# Patient Record
Sex: MALE | HISPANIC OR LATINO | Employment: OTHER | URBAN - METROPOLITAN AREA
[De-identification: names, ages, dates, MRNs, and addresses within clinical notes are randomized per-mention and may not be internally consistent; named-entity substitution may affect disease eponyms.]

---

## 2018-02-27 RX ORDER — ATORVASTATIN CALCIUM 20 MG/1
TABLET, FILM COATED ORAL
Qty: 90 TAB | Refills: 3 | Status: SHIPPED | OUTPATIENT
Start: 2018-02-27 | End: 2020-06-09

## 2018-03-06 RX ORDER — METOCLOPRAMIDE 5 MG/1
TABLET ORAL
Qty: 90 TAB | Refills: 2 | Status: SHIPPED | OUTPATIENT
Start: 2018-03-06 | End: 2018-05-10 | Stop reason: SDUPTHER

## 2018-05-10 RX ORDER — METOCLOPRAMIDE 5 MG/1
TABLET ORAL
Qty: 90 TAB | Refills: 2 | Status: ON HOLD | OUTPATIENT
Start: 2018-05-10 | End: 2018-11-29

## 2018-05-10 NOTE — TELEPHONE ENCOUNTER
Was the patient seen in the last year in this department? Yes     Does patient have an active prescription for medications requested? Yes     Received Request Via Pharmacy

## 2018-05-30 DIAGNOSIS — N18.6 CKD (CHRONIC KIDNEY DISEASE) REQUIRING CHRONIC DIALYSIS (HCC): ICD-10-CM

## 2018-05-30 DIAGNOSIS — Z99.2 CKD (CHRONIC KIDNEY DISEASE) REQUIRING CHRONIC DIALYSIS (HCC): ICD-10-CM

## 2018-05-30 DIAGNOSIS — E83.39 HYPERPHOSPHATEMIA: ICD-10-CM

## 2018-05-30 RX ORDER — SEVELAMER CARBONATE 800 MG/1
800 TABLET, FILM COATED ORAL
Status: DISCONTINUED | OUTPATIENT
Start: 2018-05-30 | End: 2018-11-29

## 2018-09-26 RX ORDER — PAROXETINE HYDROCHLORIDE 20 MG/1
TABLET, FILM COATED ORAL
Qty: 90 TAB | Refills: 3 | Status: SHIPPED | OUTPATIENT
Start: 2018-09-26 | End: 2018-11-21

## 2018-11-21 ENCOUNTER — APPOINTMENT (OUTPATIENT)
Dept: RADIOLOGY | Facility: MEDICAL CENTER | Age: 58
DRG: 091 | End: 2018-11-21
Attending: EMERGENCY MEDICINE
Payer: MEDICARE

## 2018-11-21 ENCOUNTER — HOSPITAL ENCOUNTER (INPATIENT)
Facility: MEDICAL CENTER | Age: 58
LOS: 3 days | DRG: 091 | End: 2018-11-25
Attending: EMERGENCY MEDICINE | Admitting: INTERNAL MEDICINE
Payer: MEDICARE

## 2018-11-21 DIAGNOSIS — R41.0 DELIRIUM: ICD-10-CM

## 2018-11-21 DIAGNOSIS — I15.9 SECONDARY HYPERTENSION: ICD-10-CM

## 2018-11-21 DIAGNOSIS — R73.9 HYPERGLYCEMIA: ICD-10-CM

## 2018-11-21 PROBLEM — N18.6 ESRD ON DIALYSIS (HCC): Status: ACTIVE | Noted: 2018-11-21

## 2018-11-21 PROBLEM — I67.4 HYPERTENSIVE ENCEPHALOPATHY: Status: ACTIVE | Noted: 2018-11-21

## 2018-11-21 PROBLEM — G92.8 TOXIC METABOLIC ENCEPHALOPATHY: Status: ACTIVE | Noted: 2018-11-21

## 2018-11-21 PROBLEM — Z99.2 ESRD ON DIALYSIS (HCC): Status: ACTIVE | Noted: 2018-11-21

## 2018-11-21 LAB
ALBUMIN SERPL BCP-MCNC: 3.9 G/DL (ref 3.2–4.9)
ALBUMIN/GLOB SERPL: 1.3 G/DL
ALP SERPL-CCNC: 85 U/L (ref 30–99)
ALT SERPL-CCNC: 8 U/L (ref 2–50)
ANION GAP SERPL CALC-SCNC: 7 MMOL/L (ref 0–11.9)
APPEARANCE UR: CLEAR
AST SERPL-CCNC: 7 U/L (ref 12–45)
BACTERIA #/AREA URNS HPF: NEGATIVE /HPF
BASOPHILS # BLD AUTO: 0.8 % (ref 0–1.8)
BASOPHILS # BLD: 0.04 K/UL (ref 0–0.12)
BILIRUB SERPL-MCNC: 0.3 MG/DL (ref 0.1–1.5)
BILIRUB UR QL STRIP.AUTO: NEGATIVE
BUN SERPL-MCNC: 30 MG/DL (ref 8–22)
CALCIUM SERPL-MCNC: 8.8 MG/DL (ref 8.5–10.5)
CHLORIDE SERPL-SCNC: 94 MMOL/L (ref 96–112)
CO2 SERPL-SCNC: 33 MMOL/L (ref 20–33)
COLOR UR: YELLOW
CREAT SERPL-MCNC: 3.98 MG/DL (ref 0.5–1.4)
EOSINOPHIL # BLD AUTO: 0.18 K/UL (ref 0–0.51)
EOSINOPHIL NFR BLD: 3.6 % (ref 0–6.9)
EPI CELLS #/AREA URNS HPF: ABNORMAL /HPF
ERYTHROCYTE [DISTWIDTH] IN BLOOD BY AUTOMATED COUNT: 48.1 FL (ref 35.9–50)
GLOBULIN SER CALC-MCNC: 2.9 G/DL (ref 1.9–3.5)
GLUCOSE BLD-MCNC: 207 MG/DL (ref 65–99)
GLUCOSE SERPL-MCNC: 343 MG/DL (ref 65–99)
GLUCOSE UR STRIP.AUTO-MCNC: 500 MG/DL
HCT VFR BLD AUTO: 31.3 % (ref 42–52)
HGB BLD-MCNC: 9.8 G/DL (ref 14–18)
HYALINE CASTS #/AREA URNS LPF: ABNORMAL /LPF
IMM GRANULOCYTES # BLD AUTO: 0.03 K/UL (ref 0–0.11)
IMM GRANULOCYTES NFR BLD AUTO: 0.6 % (ref 0–0.9)
KETONES UR STRIP.AUTO-MCNC: NEGATIVE MG/DL
LEUKOCYTE ESTERASE UR QL STRIP.AUTO: NEGATIVE
LYMPHOCYTES # BLD AUTO: 0.72 K/UL (ref 1–4.8)
LYMPHOCYTES NFR BLD: 14.5 % (ref 22–41)
MCH RBC QN AUTO: 30.2 PG (ref 27–33)
MCHC RBC AUTO-ENTMCNC: 31.3 G/DL (ref 33.7–35.3)
MCV RBC AUTO: 96.3 FL (ref 81.4–97.8)
MICRO URNS: ABNORMAL
MONOCYTES # BLD AUTO: 0.53 K/UL (ref 0–0.85)
MONOCYTES NFR BLD AUTO: 10.6 % (ref 0–13.4)
NEUTROPHILS # BLD AUTO: 3.48 K/UL (ref 1.82–7.42)
NEUTROPHILS NFR BLD: 69.9 % (ref 44–72)
NITRITE UR QL STRIP.AUTO: NEGATIVE
NRBC # BLD AUTO: 0 K/UL
NRBC BLD-RTO: 0 /100 WBC
PH UR STRIP.AUTO: 8.5 [PH]
PLATELET # BLD AUTO: 176 K/UL (ref 164–446)
PMV BLD AUTO: 10.3 FL (ref 9–12.9)
POTASSIUM SERPL-SCNC: 4.8 MMOL/L (ref 3.6–5.5)
PROT SERPL-MCNC: 6.8 G/DL (ref 6–8.2)
PROT UR QL STRIP: >=1000 MG/DL
RBC # BLD AUTO: 3.25 M/UL (ref 4.7–6.1)
RBC # URNS HPF: ABNORMAL /HPF
RBC UR QL AUTO: NEGATIVE
SODIUM SERPL-SCNC: 134 MMOL/L (ref 135–145)
SP GR UR STRIP.AUTO: 1.02
UROBILINOGEN UR STRIP.AUTO-MCNC: 0.2 MG/DL
WBC # BLD AUTO: 5 K/UL (ref 4.8–10.8)
WBC #/AREA URNS HPF: ABNORMAL /HPF

## 2018-11-21 PROCEDURE — G0378 HOSPITAL OBSERVATION PER HR: HCPCS

## 2018-11-21 PROCEDURE — 85025 COMPLETE CBC W/AUTO DIFF WBC: CPT

## 2018-11-21 PROCEDURE — 82962 GLUCOSE BLOOD TEST: CPT

## 2018-11-21 PROCEDURE — 80053 COMPREHEN METABOLIC PANEL: CPT

## 2018-11-21 PROCEDURE — 96372 THER/PROPH/DIAG INJ SC/IM: CPT

## 2018-11-21 PROCEDURE — 83036 HEMOGLOBIN GLYCOSYLATED A1C: CPT

## 2018-11-21 PROCEDURE — 700102 HCHG RX REV CODE 250 W/ 637 OVERRIDE(OP): Performed by: INTERNAL MEDICINE

## 2018-11-21 PROCEDURE — 70450 CT HEAD/BRAIN W/O DYE: CPT

## 2018-11-21 PROCEDURE — 99285 EMERGENCY DEPT VISIT HI MDM: CPT

## 2018-11-21 PROCEDURE — 99220 PR INITIAL OBSERVATION CARE,LEVL III: CPT | Performed by: INTERNAL MEDICINE

## 2018-11-21 PROCEDURE — 81001 URINALYSIS AUTO W/SCOPE: CPT

## 2018-11-21 PROCEDURE — 700111 HCHG RX REV CODE 636 W/ 250 OVERRIDE (IP): Performed by: EMERGENCY MEDICINE

## 2018-11-21 PROCEDURE — 96374 THER/PROPH/DIAG INJ IV PUSH: CPT

## 2018-11-21 PROCEDURE — 36415 COLL VENOUS BLD VENIPUNCTURE: CPT

## 2018-11-21 PROCEDURE — 304561 HCHG STAT O2

## 2018-11-21 PROCEDURE — A9270 NON-COVERED ITEM OR SERVICE: HCPCS | Performed by: INTERNAL MEDICINE

## 2018-11-21 RX ORDER — LISINOPRIL 20 MG/1
40 TABLET ORAL DAILY
COMMUNITY
End: 2019-04-03

## 2018-11-21 RX ORDER — DEXTROSE MONOHYDRATE 25 G/50ML
25 INJECTION, SOLUTION INTRAVENOUS
Status: DISCONTINUED | OUTPATIENT
Start: 2018-11-21 | End: 2018-11-25 | Stop reason: HOSPADM

## 2018-11-21 RX ORDER — PAROXETINE HYDROCHLORIDE 40 MG/1
40 TABLET, FILM COATED ORAL DAILY
COMMUNITY
End: 2019-11-14 | Stop reason: SDUPTHER

## 2018-11-21 RX ORDER — PROMETHAZINE HYDROCHLORIDE 25 MG/1
12.5-25 TABLET ORAL EVERY 4 HOURS PRN
Status: DISCONTINUED | OUTPATIENT
Start: 2018-11-21 | End: 2018-11-25 | Stop reason: HOSPADM

## 2018-11-21 RX ORDER — PREDNISOLONE ACETATE 10 MG/ML
1 SUSPENSION/ DROPS OPHTHALMIC 2 TIMES DAILY
COMMUNITY
End: 2019-04-03

## 2018-11-21 RX ORDER — ONDANSETRON 4 MG/1
4 TABLET, ORALLY DISINTEGRATING ORAL EVERY 4 HOURS PRN
Status: DISCONTINUED | OUTPATIENT
Start: 2018-11-21 | End: 2018-11-25 | Stop reason: HOSPADM

## 2018-11-21 RX ORDER — POLYETHYLENE GLYCOL 3350 17 G/17G
1 POWDER, FOR SOLUTION ORAL
Status: DISCONTINUED | OUTPATIENT
Start: 2018-11-21 | End: 2018-11-25 | Stop reason: HOSPADM

## 2018-11-21 RX ORDER — HEPARIN SODIUM 5000 [USP'U]/ML
5000 INJECTION, SOLUTION INTRAVENOUS; SUBCUTANEOUS EVERY 8 HOURS
Status: DISCONTINUED | OUTPATIENT
Start: 2018-11-22 | End: 2018-11-25 | Stop reason: HOSPADM

## 2018-11-21 RX ORDER — SEVELAMER CARBONATE 800 MG/1
800 TABLET, FILM COATED ORAL
Status: DISCONTINUED | OUTPATIENT
Start: 2018-11-21 | End: 2018-11-25 | Stop reason: HOSPADM

## 2018-11-21 RX ORDER — AMOXICILLIN 250 MG
2 CAPSULE ORAL 2 TIMES DAILY
Status: DISCONTINUED | OUTPATIENT
Start: 2018-11-21 | End: 2018-11-25 | Stop reason: HOSPADM

## 2018-11-21 RX ORDER — PREDNISOLONE ACETATE 10 MG/ML
1 SUSPENSION/ DROPS OPHTHALMIC 2 TIMES DAILY
Status: DISCONTINUED | OUTPATIENT
Start: 2018-11-21 | End: 2018-11-25 | Stop reason: HOSPADM

## 2018-11-21 RX ORDER — OMEPRAZOLE 20 MG/1
20 CAPSULE, DELAYED RELEASE ORAL
Status: DISCONTINUED | OUTPATIENT
Start: 2018-11-22 | End: 2018-11-25 | Stop reason: HOSPADM

## 2018-11-21 RX ORDER — ONDANSETRON 2 MG/ML
4 INJECTION INTRAMUSCULAR; INTRAVENOUS EVERY 4 HOURS PRN
Status: DISCONTINUED | OUTPATIENT
Start: 2018-11-21 | End: 2018-11-25 | Stop reason: HOSPADM

## 2018-11-21 RX ORDER — HYDRALAZINE HYDROCHLORIDE 25 MG/1
75 TABLET, FILM COATED ORAL EVERY 8 HOURS
Status: DISCONTINUED | OUTPATIENT
Start: 2018-11-21 | End: 2018-11-24

## 2018-11-21 RX ORDER — AMLODIPINE BESYLATE 10 MG/1
10 TABLET ORAL DAILY
Status: DISCONTINUED | OUTPATIENT
Start: 2018-11-22 | End: 2018-11-25 | Stop reason: HOSPADM

## 2018-11-21 RX ORDER — BISACODYL 10 MG
10 SUPPOSITORY, RECTAL RECTAL
Status: DISCONTINUED | OUTPATIENT
Start: 2018-11-21 | End: 2018-11-25 | Stop reason: HOSPADM

## 2018-11-21 RX ORDER — DORZOLAMIDE HYDROCHLORIDE AND TIMOLOL MALEATE 20; 5 MG/ML; MG/ML
1 SOLUTION/ DROPS OPHTHALMIC 2 TIMES DAILY
Status: DISCONTINUED | OUTPATIENT
Start: 2018-11-21 | End: 2018-11-25 | Stop reason: HOSPADM

## 2018-11-21 RX ORDER — PAROXETINE HYDROCHLORIDE 20 MG/1
40 TABLET, FILM COATED ORAL DAILY
Status: DISCONTINUED | OUTPATIENT
Start: 2018-11-22 | End: 2018-11-25 | Stop reason: HOSPADM

## 2018-11-21 RX ORDER — SEVELAMER CARBONATE 800 MG/1
TABLET, FILM COATED ORAL
Status: DISCONTINUED | OUTPATIENT
Start: 2018-11-21 | End: 2018-11-21

## 2018-11-21 RX ORDER — LISINOPRIL 20 MG/1
40 TABLET ORAL DAILY
Status: DISCONTINUED | OUTPATIENT
Start: 2018-11-22 | End: 2018-11-25 | Stop reason: HOSPADM

## 2018-11-21 RX ORDER — ASCORBIC ACID 500 MG
500 TABLET ORAL 2 TIMES DAILY
Status: DISCONTINUED | OUTPATIENT
Start: 2018-11-21 | End: 2018-11-25 | Stop reason: HOSPADM

## 2018-11-21 RX ORDER — PROMETHAZINE HYDROCHLORIDE 12.5 MG/1
12.5-25 TABLET ORAL EVERY 4 HOURS PRN
Status: DISCONTINUED | OUTPATIENT
Start: 2018-11-21 | End: 2018-11-21

## 2018-11-21 RX ORDER — METOPROLOL TARTRATE 50 MG/1
100 TABLET, FILM COATED ORAL 2 TIMES DAILY
Status: DISCONTINUED | OUTPATIENT
Start: 2018-11-21 | End: 2018-11-25 | Stop reason: HOSPADM

## 2018-11-21 RX ORDER — HYDRALAZINE HYDROCHLORIDE 20 MG/ML
10 INJECTION INTRAMUSCULAR; INTRAVENOUS ONCE
Status: COMPLETED | OUTPATIENT
Start: 2018-11-21 | End: 2018-11-21

## 2018-11-21 RX ORDER — SEVELAMER CARBONATE 800 MG/1
1600 TABLET, FILM COATED ORAL
Status: DISCONTINUED | OUTPATIENT
Start: 2018-11-21 | End: 2018-11-25 | Stop reason: HOSPADM

## 2018-11-21 RX ORDER — ATORVASTATIN CALCIUM 20 MG/1
20 TABLET, FILM COATED ORAL DAILY
Status: DISCONTINUED | OUTPATIENT
Start: 2018-11-22 | End: 2018-11-25 | Stop reason: HOSPADM

## 2018-11-21 RX ORDER — PROMETHAZINE HYDROCHLORIDE 25 MG/1
12.5-25 SUPPOSITORY RECTAL EVERY 4 HOURS PRN
Status: DISCONTINUED | OUTPATIENT
Start: 2018-11-21 | End: 2018-11-25 | Stop reason: HOSPADM

## 2018-11-21 RX ADMIN — INSULIN HUMAN 2 UNITS: 100 INJECTION, SOLUTION PARENTERAL at 23:31

## 2018-11-21 RX ADMIN — HYDRALAZINE HYDROCHLORIDE 10 MG: 20 INJECTION INTRAMUSCULAR; INTRAVENOUS at 17:21

## 2018-11-21 RX ADMIN — OXYCODONE HYDROCHLORIDE AND ACETAMINOPHEN 500 MG: 500 TABLET ORAL at 22:31

## 2018-11-21 RX ADMIN — INSULIN HUMAN 15 UNITS: 100 INJECTION, SUSPENSION SUBCUTANEOUS at 23:31

## 2018-11-21 RX ADMIN — HYDRALAZINE HYDROCHLORIDE 75 MG: 25 TABLET, FILM COATED ORAL at 22:32

## 2018-11-21 RX ADMIN — METOPROLOL TARTRATE 100 MG: 50 TABLET ORAL at 22:31

## 2018-11-21 RX ADMIN — STANDARDIZED SENNA CONCENTRATE AND DOCUSATE SODIUM 2 TABLET: 8.6; 5 TABLET, FILM COATED ORAL at 22:32

## 2018-11-21 ASSESSMENT — COGNITIVE AND FUNCTIONAL STATUS - GENERAL
MOVING FROM LYING ON BACK TO SITTING ON SIDE OF FLAT BED: A LITTLE
SUGGESTED CMS G CODE MODIFIER DAILY ACTIVITY: CK
DRESSING REGULAR UPPER BODY CLOTHING: A LITTLE
EATING MEALS: A LITTLE
DRESSING REGULAR LOWER BODY CLOTHING: A LITTLE
TOILETING: A LITTLE
WALKING IN HOSPITAL ROOM: A LITTLE
MOVING TO AND FROM BED TO CHAIR: A LITTLE
HELP NEEDED FOR BATHING: A LITTLE
DAILY ACTIVITIY SCORE: 18
SUGGESTED CMS G CODE MODIFIER MOBILITY: CK
TURNING FROM BACK TO SIDE WHILE IN FLAT BAD: A LITTLE
PERSONAL GROOMING: A LITTLE
MOBILITY SCORE: 18
CLIMB 3 TO 5 STEPS WITH RAILING: A LITTLE
STANDING UP FROM CHAIR USING ARMS: A LITTLE

## 2018-11-21 ASSESSMENT — PATIENT HEALTH QUESTIONNAIRE - PHQ9
1. LITTLE INTEREST OR PLEASURE IN DOING THINGS: NOT AT ALL
2. FEELING DOWN, DEPRESSED, IRRITABLE, OR HOPELESS: NOT AT ALL
SUM OF ALL RESPONSES TO PHQ9 QUESTIONS 1 AND 2: 0

## 2018-11-21 ASSESSMENT — PAIN SCALES - GENERAL: PAINLEVEL_OUTOF10: 0

## 2018-11-21 ASSESSMENT — LIFESTYLE VARIABLES
ALCOHOL_USE: NO
EVER_SMOKED: NEVER

## 2018-11-21 NOTE — ED NOTES
Pt w/c to [facility]. Pt changed into gown and placed on monitor.  Agree with triage note.   Chart up and ready for ERP now.

## 2018-11-21 NOTE — ED TRIAGE NOTES
".  Chief Complaint   Patient presents with   • Sent by MD     from Dialysis   • Hypertension     165/75 in triage   • ALOC     per wife he is \"completely different\" today, symptoms started during dialysis, patient lethargic in triage     .BP (!) 165/75   Pulse 69   Temp 36.9 °C (98.4 °F) (Temporal)   Resp 14   Ht 1.702 m (5' 7\")   Wt 70.8 kg (156 lb)   SpO2 95%   BMI 24.43 kg/m²     Patient to triage with above complaints, completed dialysis today, educated on triage process, placed in lobby, told to inform staff of any changes in condition.   "

## 2018-11-22 ENCOUNTER — APPOINTMENT (OUTPATIENT)
Dept: CARDIOLOGY | Facility: MEDICAL CENTER | Age: 58
DRG: 091 | End: 2018-11-22
Attending: INTERNAL MEDICINE
Payer: MEDICARE

## 2018-11-22 ENCOUNTER — APPOINTMENT (OUTPATIENT)
Dept: RADIOLOGY | Facility: MEDICAL CENTER | Age: 58
DRG: 091 | End: 2018-11-22
Attending: INTERNAL MEDICINE
Payer: MEDICARE

## 2018-11-22 PROBLEM — I16.1 HYPERTENSIVE EMERGENCY: Status: ACTIVE | Noted: 2018-11-21

## 2018-11-22 PROBLEM — J96.11 CHRONIC RESPIRATORY FAILURE WITH HYPOXIA (HCC): Status: ACTIVE | Noted: 2018-11-22

## 2018-11-22 LAB
ALBUMIN SERPL BCP-MCNC: 3.6 G/DL (ref 3.2–4.9)
ALBUMIN/GLOB SERPL: 1.4 G/DL
ALP SERPL-CCNC: 70 U/L (ref 30–99)
ALT SERPL-CCNC: 9 U/L (ref 2–50)
ANION GAP SERPL CALC-SCNC: 11 MMOL/L (ref 0–11.9)
AST SERPL-CCNC: 9 U/L (ref 12–45)
BILIRUB SERPL-MCNC: 0.4 MG/DL (ref 0.1–1.5)
BUN SERPL-MCNC: 45 MG/DL (ref 8–22)
CALCIUM SERPL-MCNC: 8.8 MG/DL (ref 8.5–10.5)
CHLORIDE SERPL-SCNC: 99 MMOL/L (ref 96–112)
CO2 SERPL-SCNC: 29 MMOL/L (ref 20–33)
CREAT SERPL-MCNC: 5.26 MG/DL (ref 0.5–1.4)
ERYTHROCYTE [DISTWIDTH] IN BLOOD BY AUTOMATED COUNT: 48 FL (ref 35.9–50)
EST. AVERAGE GLUCOSE BLD GHB EST-MCNC: 169 MG/DL
GLOBULIN SER CALC-MCNC: 2.5 G/DL (ref 1.9–3.5)
GLUCOSE BLD-MCNC: 108 MG/DL (ref 65–99)
GLUCOSE BLD-MCNC: 113 MG/DL (ref 65–99)
GLUCOSE BLD-MCNC: 114 MG/DL (ref 65–99)
GLUCOSE BLD-MCNC: 114 MG/DL (ref 65–99)
GLUCOSE BLD-MCNC: 143 MG/DL (ref 65–99)
GLUCOSE BLD-MCNC: 151 MG/DL (ref 65–99)
GLUCOSE BLD-MCNC: 157 MG/DL (ref 65–99)
GLUCOSE BLD-MCNC: 217 MG/DL (ref 65–99)
GLUCOSE BLD-MCNC: 34 MG/DL (ref 65–99)
GLUCOSE BLD-MCNC: 56 MG/DL (ref 65–99)
GLUCOSE BLD-MCNC: 58 MG/DL (ref 65–99)
GLUCOSE BLD-MCNC: 63 MG/DL (ref 65–99)
GLUCOSE BLD-MCNC: 68 MG/DL (ref 65–99)
GLUCOSE BLD-MCNC: 70 MG/DL (ref 65–99)
GLUCOSE BLD-MCNC: 74 MG/DL (ref 65–99)
GLUCOSE BLD-MCNC: 74 MG/DL (ref 65–99)
GLUCOSE BLD-MCNC: 75 MG/DL (ref 65–99)
GLUCOSE BLD-MCNC: 87 MG/DL (ref 65–99)
GLUCOSE BLD-MCNC: 98 MG/DL (ref 65–99)
GLUCOSE SERPL-MCNC: 76 MG/DL (ref 65–99)
HAV IGM SERPL QL IA: NEGATIVE
HBA1C MFR BLD: 7.5 % (ref 0–5.6)
HBV CORE IGM SER QL: NEGATIVE
HBV SURFACE AB SERPL IA-ACNC: 404.4 MIU/ML (ref 0–10)
HBV SURFACE AG SER QL: NEGATIVE
HCT VFR BLD AUTO: 30.5 % (ref 42–52)
HCV AB SER QL: NEGATIVE
HGB BLD-MCNC: 9.7 G/DL (ref 14–18)
LV EJECT FRACT  99904: 65
LV EJECT FRACT MOD 2C 99903: 70.46
LV EJECT FRACT MOD 4C 99902: 69.8
LV EJECT FRACT MOD BP 99901: 70.3
MCH RBC QN AUTO: 30.4 PG (ref 27–33)
MCHC RBC AUTO-ENTMCNC: 31.8 G/DL (ref 33.7–35.3)
MCV RBC AUTO: 95.6 FL (ref 81.4–97.8)
PLATELET # BLD AUTO: 162 K/UL (ref 164–446)
PMV BLD AUTO: 11.1 FL (ref 9–12.9)
POTASSIUM SERPL-SCNC: 5 MMOL/L (ref 3.6–5.5)
PROT SERPL-MCNC: 6.1 G/DL (ref 6–8.2)
RBC # BLD AUTO: 3.19 M/UL (ref 4.7–6.1)
SODIUM SERPL-SCNC: 139 MMOL/L (ref 135–145)
WBC # BLD AUTO: 5.3 K/UL (ref 4.8–10.8)

## 2018-11-22 PROCEDURE — 82962 GLUCOSE BLOOD TEST: CPT | Mod: 91

## 2018-11-22 PROCEDURE — 99222 1ST HOSP IP/OBS MODERATE 55: CPT | Performed by: INTERNAL MEDICINE

## 2018-11-22 PROCEDURE — A9270 NON-COVERED ITEM OR SERVICE: HCPCS | Performed by: INTERNAL MEDICINE

## 2018-11-22 PROCEDURE — 700101 HCHG RX REV CODE 250: Performed by: INTERNAL MEDICINE

## 2018-11-22 PROCEDURE — 80053 COMPREHEN METABOLIC PANEL: CPT

## 2018-11-22 PROCEDURE — 93306 TTE W/DOPPLER COMPLETE: CPT | Mod: 26 | Performed by: INTERNAL MEDICINE

## 2018-11-22 PROCEDURE — 99233 SBSQ HOSP IP/OBS HIGH 50: CPT | Performed by: INTERNAL MEDICINE

## 2018-11-22 PROCEDURE — 85027 COMPLETE CBC AUTOMATED: CPT

## 2018-11-22 PROCEDURE — 96372 THER/PROPH/DIAG INJ SC/IM: CPT

## 2018-11-22 PROCEDURE — 93306 TTE W/DOPPLER COMPLETE: CPT

## 2018-11-22 PROCEDURE — 700102 HCHG RX REV CODE 250 W/ 637 OVERRIDE(OP): Performed by: INTERNAL MEDICINE

## 2018-11-22 PROCEDURE — 36415 COLL VENOUS BLD VENIPUNCTURE: CPT

## 2018-11-22 PROCEDURE — 80074 ACUTE HEPATITIS PANEL: CPT

## 2018-11-22 PROCEDURE — 700105 HCHG RX REV CODE 258: Performed by: INTERNAL MEDICINE

## 2018-11-22 PROCEDURE — 700111 HCHG RX REV CODE 636 W/ 250 OVERRIDE (IP): Performed by: INTERNAL MEDICINE

## 2018-11-22 PROCEDURE — 770020 HCHG ROOM/CARE - TELE (206)

## 2018-11-22 PROCEDURE — 86706 HEP B SURFACE ANTIBODY: CPT

## 2018-11-22 RX ORDER — DEXTROSE AND SODIUM CHLORIDE 5; .9 G/100ML; G/100ML
INJECTION, SOLUTION INTRAVENOUS
Status: ACTIVE
Start: 2018-11-22 | End: 2018-11-22

## 2018-11-22 RX ORDER — LABETALOL HYDROCHLORIDE 5 MG/ML
10 INJECTION, SOLUTION INTRAVENOUS EVERY 4 HOURS PRN
Status: DISCONTINUED | OUTPATIENT
Start: 2018-11-22 | End: 2018-11-25 | Stop reason: HOSPADM

## 2018-11-22 RX ORDER — DEXTROSE AND SODIUM CHLORIDE 5; .9 G/100ML; G/100ML
INJECTION, SOLUTION INTRAVENOUS CONTINUOUS
Status: DISCONTINUED | OUTPATIENT
Start: 2018-11-22 | End: 2018-11-22

## 2018-11-22 RX ORDER — HYDRALAZINE HYDROCHLORIDE 20 MG/ML
10 INJECTION INTRAMUSCULAR; INTRAVENOUS EVERY 6 HOURS PRN
Status: DISCONTINUED | OUTPATIENT
Start: 2018-11-22 | End: 2018-11-25 | Stop reason: HOSPADM

## 2018-11-22 RX ADMIN — DEXTROSE MONOHYDRATE 25 ML: 25 INJECTION, SOLUTION INTRAVENOUS at 05:39

## 2018-11-22 RX ADMIN — SODIUM CHLORIDE: 234 INJECTION INTRAMUSCULAR; INTRAVENOUS; SUBCUTANEOUS at 10:19

## 2018-11-22 RX ADMIN — LISINOPRIL 40 MG: 20 TABLET ORAL at 06:00

## 2018-11-22 RX ADMIN — DEXTROSE MONOHYDRATE 25 ML: 25 INJECTION, SOLUTION INTRAVENOUS at 08:02

## 2018-11-22 RX ADMIN — DEXTROSE AND SODIUM CHLORIDE: 5; 900 INJECTION, SOLUTION INTRAVENOUS at 08:10

## 2018-11-22 RX ADMIN — HEPARIN SODIUM 5000 UNITS: 5000 INJECTION, SOLUTION INTRAVENOUS; SUBCUTANEOUS at 13:44

## 2018-11-22 RX ADMIN — ATORVASTATIN CALCIUM 20 MG: 20 TABLET, FILM COATED ORAL at 05:40

## 2018-11-22 RX ADMIN — DEXTROSE MONOHYDRATE 25 ML: 25 INJECTION, SOLUTION INTRAVENOUS at 07:44

## 2018-11-22 RX ADMIN — STANDARDIZED SENNA CONCENTRATE AND DOCUSATE SODIUM 2 TABLET: 8.6; 5 TABLET, FILM COATED ORAL at 18:42

## 2018-11-22 RX ADMIN — AMLODIPINE BESYLATE 10 MG: 10 TABLET ORAL at 05:40

## 2018-11-22 RX ADMIN — DEXTROSE MONOHYDRATE 25 ML: 25 INJECTION, SOLUTION INTRAVENOUS at 11:58

## 2018-11-22 RX ADMIN — METOPROLOL TARTRATE 100 MG: 50 TABLET ORAL at 18:42

## 2018-11-22 RX ADMIN — OXYCODONE HYDROCHLORIDE AND ACETAMINOPHEN 500 MG: 500 TABLET ORAL at 18:42

## 2018-11-22 RX ADMIN — PREDNISOLONE ACETATE 1 DROP: 10 SUSPENSION/ DROPS OPHTHALMIC at 06:00

## 2018-11-22 RX ADMIN — SEVELAMER CARBONATE 1600 MG: 800 TABLET, FILM COATED ORAL at 18:42

## 2018-11-22 RX ADMIN — HEPARIN SODIUM 5000 UNITS: 5000 INJECTION, SOLUTION INTRAVENOUS; SUBCUTANEOUS at 20:38

## 2018-11-22 RX ADMIN — STANDARDIZED SENNA CONCENTRATE AND DOCUSATE SODIUM 2 TABLET: 8.6; 5 TABLET, FILM COATED ORAL at 05:40

## 2018-11-22 RX ADMIN — DEXTROSE MONOHYDRATE 25 ML: 25 INJECTION, SOLUTION INTRAVENOUS at 09:02

## 2018-11-22 RX ADMIN — METOPROLOL TARTRATE 100 MG: 50 TABLET ORAL at 05:39

## 2018-11-22 RX ADMIN — INSULIN HUMAN 20 UNITS: 100 INJECTION, SUSPENSION SUBCUTANEOUS at 04:57

## 2018-11-22 RX ADMIN — HYDRALAZINE HYDROCHLORIDE 75 MG: 25 TABLET, FILM COATED ORAL at 05:40

## 2018-11-22 RX ADMIN — DORZOLAMIDE HYDROCHLORIDE AND TIMOLOL MALEATE 1 DROP: 20; 5 SOLUTION/ DROPS OPHTHALMIC at 09:36

## 2018-11-22 RX ADMIN — OXYCODONE HYDROCHLORIDE AND ACETAMINOPHEN 500 MG: 500 TABLET ORAL at 05:39

## 2018-11-22 RX ADMIN — INSULIN HUMAN 2 UNITS: 100 INJECTION, SOLUTION PARENTERAL at 20:41

## 2018-11-22 RX ADMIN — PREDNISOLONE ACETATE 1 DROP: 10 SUSPENSION/ DROPS OPHTHALMIC at 18:43

## 2018-11-22 RX ADMIN — OMEPRAZOLE 20 MG: 20 CAPSULE, DELAYED RELEASE ORAL at 05:40

## 2018-11-22 RX ADMIN — HYDRALAZINE HYDROCHLORIDE 75 MG: 25 TABLET, FILM COATED ORAL at 20:38

## 2018-11-22 RX ADMIN — DEXTROSE MONOHYDRATE 25 ML: 25 INJECTION, SOLUTION INTRAVENOUS at 13:36

## 2018-11-22 RX ADMIN — SEVELAMER CARBONATE 1600 MG: 800 TABLET, FILM COATED ORAL at 13:44

## 2018-11-22 RX ADMIN — HEPARIN SODIUM 5000 UNITS: 5000 INJECTION, SOLUTION INTRAVENOUS; SUBCUTANEOUS at 04:57

## 2018-11-22 RX ADMIN — VITAMIN D, TAB 1000IU (100/BT) 1000 UNITS: 25 TAB at 05:40

## 2018-11-22 RX ADMIN — PAROXETINE HYDROCHLORIDE 40 MG: 20 TABLET, FILM COATED ORAL at 05:39

## 2018-11-22 RX ADMIN — HYDRALAZINE HYDROCHLORIDE 75 MG: 25 TABLET, FILM COATED ORAL at 13:44

## 2018-11-22 RX ADMIN — DORZOLAMIDE HYDROCHLORIDE AND TIMOLOL MALEATE 1 DROP: 20; 5 SOLUTION/ DROPS OPHTHALMIC at 18:43

## 2018-11-22 ASSESSMENT — PAIN SCALES - GENERAL
PAINLEVEL_OUTOF10: 0

## 2018-11-22 NOTE — ASSESSMENT & PLAN NOTE
-No further episodes.  -Resume NPH, but only during the day as fasting blood glucose already low normal.  Continue to monitor. Accu-Chek before meals and at bedtime.

## 2018-11-22 NOTE — CARE PLAN
Problem: Communication  Goal: The ability to communicate needs accurately and effectively will improve  Outcome: PROGRESSING AS EXPECTED  Encourage pt to voice concerns and feelings    Problem: Safety  Goal: Will remain free from falls  Outcome: PROGRESSING AS EXPECTED  Hourly rounding, bed low and locked, nonskid socks on, call light within reach

## 2018-11-22 NOTE — PROGRESS NOTES
This AM patient was hypoglycemic, BG dropping as low as 34.  During this episode patient diaphoretic and nonresponsive, rapid response called.  Patient started on D5NS @ 75 however still having episodes of hypoglycemia, IVFs switched to D10 @75.  Per hypoglycemic protocol patient has received D50 amps x5.  NPH d/c'd at this time.  Last BG-114 at 1353.  Patient awake and alert eating lunch with help from his wife  Will continue to monitor every hour and treat per protocol.       1658:  BG-157.  BG has been above 100 x4.  Will continue to monitor AC/HS.

## 2018-11-22 NOTE — ED NOTES
MRI screening form completed and sent. Pt family requesting a room for pt where wife can stay with him due to patient blindness, weakness, and history of safety issues with prior hospitalizations.

## 2018-11-22 NOTE — ED NOTES
Bedside report given to tele RN. Family at bedside, all belongings collected and sent. Pt care responsibilities handed off.

## 2018-11-22 NOTE — PROGRESS NOTES
Pt oriented to room. Tele placed. VS and assessment to be completed. No c/o pain, no s/s of distress. Wife, daughter and son at bedside. Call light within reach.

## 2018-11-22 NOTE — ASSESSMENT & PLAN NOTE
-better control with current regimen, but still elevated.  -Continue Norvasc, lisinopril, and metoprolol.  Increase hydralazine to 100 mg 3 times daily. Continue to monitor blood pressure trend closely, with PRN IV hydralazine and labetalol for significant hypertension.  -Dialysis per nephrology, which will also help with blood pressure control.

## 2018-11-22 NOTE — CODE DOCUMENTATION
Blood sugar 34, 1/2 amp of D50 given and blood sugar re-checked and 112. Patient unresponsive during hypoglycemic episode

## 2018-11-22 NOTE — CONSULTS
DATE OF SERVICE:  11/22/2018    NEPHROLOGY CONSULTATION    REQUESTING PHYSICIAN:  Bismark Philippe MD    REASON FOR CONSULTATION:  To evaluate and provide dialysis for patient with   end-stage renal disease.    HISTORY OF PRESENT ILLNESS:  Patient is a 58-year-old male who came to the   emergency room with altered mental status, confusion after completed dialysis.    With concerns of stroke, his wife brought him to the emergency room.    Currently, patient is doing better, is alert and oriented x3, no upper or   lower extremity weakness.  His hemodialysis schedule is on Monday, Wednesday,   Friday.  Laboratory results reviewed, remain stable.    REVIEW OF SYSTEMS:  GENERAL:  Positive for fatigue, weakness.  No fever or chills.  HEENT:  No nosebleeds.  No sinus pain.  No sore throat.  NECK:  No pain, no stiffness.  RESPIRATORY:  No cough, no hemoptysis.  No shortness of breath.  CARDIOVASCULAR:  No chest pain, no palpitations, no dyspnea.  GASTROINTESTINAL:  No abdominal pain.  No nausea, vomiting, diarrhea.  All other systems reviewed, all negative.    PAST MEDICAL HISTORY:  End-stage renal disease, on dialysis, anemia, legally   blind, diabetes mellitus type 2, hypertension, hyperlipidemia.    PAST SURGICAL HISTORY:  AV fistula creation, cataract surgery.    FAMILY HISTORY:  No history of kidney disease.    SOCIAL HISTORY:  No tobacco, alcohol or drug use.    ALLERGIES:  No known drug allergies.    OUTPATIENT MEDICATIONS:  Reviewed.    PHYSICAL EXAMINATION:  VITAL SIGNS:  Blood pressure 140/67, heart rate 58, temperature 36.7 Celsius.  GENERAL APPEARANCE:  Well-developed, well-nourished male in no acute distress.  HEENT:  Normocephalic, atraumatic.  Left eye pupil round, reactive to light.    Nares patent.  Oropharynx is clear, moist mucosa, no erythema or exudate.  NECK:  Supple, no lymphadenopathy, no thyromegaly appreciated.  LUNGS:  Clear to auscultation bilaterally.  No rales.  No wheezes.  No    rhonchi.  HEART:  Regular rate.  No rub or gallop.  ABDOMEN:  Soft, nontender, nondistended.  Bowel sounds present.  EXTREMITIES:  No cyanosis, no clubbing, no edema.  NEUROLOGIC:  Alert and oriented x3.  No focal deficits.  Cranial nerves II-XII   grossly intact.    LABORATORY RESULTS:  Hemoglobin 9.7.  Sodium 139, potassium 5.0, BUN 45 and   creatinine 5.26.    ASSESSMENT AND PLAN:  Patient is a 58-year-old male with multiple medical   problems, end-stage renal disease, on hemodialysis, admitted with confusion,   altered mental status changes.  1.  End-stage renal disease.  We will continue dialysis as per patient's   schedule on Monday, Wednesday, Friday.  No need for emergent dialysis today.  2.  Electrolytes remain well controlled, to monitor.  3.  Anemia.  Hemoglobin level is stable, to monitor.  4.  Volume, well controlled.    RECOMMENDATIONS:  Renal diet.  Hemodialysis on Monday, Wednesday, Friday.  All   medications adjust to renal doses.    Thank you for the consult.       ____________________________________     MD SHELLI RODRIGUEZ / MATEO    DD:  11/22/2018 14:18:32  DT:  11/22/2018 15:33:14    D#:  [id number]  Job#:  [id number]

## 2018-11-22 NOTE — ED NOTES
Pt and family updated on plan of care. Pt provided with box lunch per request. Denies any further needs or concerns at this time.

## 2018-11-22 NOTE — ED PROVIDER NOTES
"ED Provider Note    CHIEF COMPLAINT  Chief Complaint   Patient presents with   • Sent by MD     from Dialysis   • Hypertension     165/75 in triage   • ALOC     per wife he is \"completely different\" today, symptoms started during dialysis, patient lethargic in triage       HPI  Pj Britt is a 58 y.o. male who presents for evaluation of altered mentation and apparent confusion, this has been waxing and waning of the past couple months but really severe over the past 2 days, the wife reports that he seems to at times be picking at things in the air, seems to be somewhat disoriented.  At dialysis today dialysis nurse was concerned about his behavior so she sent him here for evaluation.  In the past he has had similar symptoms and workups in the hospital, wife states that last time it happened he was experiencing hypertension and they were ultimately told he might of had a little stroke.  Otherwise the patient offers no complaints, no chest pain or shortness of breath, no abdominal pain, produces urine but only a very little bit.  No other specific complaints offered by the patient or his family at this time.  No unilateral deficits complained of    REVIEW OF SYSTEMS  Negative for fever, rash, chest pain, dyspnea, abdominal pain, nausea, vomiting, diarrhea, headache, focal weakness, focal numbness, focal tingling, back pain. All other systems are negative.     PAST MEDICAL HISTORY  Past Medical History:   Diagnosis Date   • Anemia    • Blind in both eyes    • Diabetes (HCC)     insulin dependent   • Dialysis patient ([facility])     Earline KEATING   • ESRD (end stage renal disease) ([facility])    • Heart burn    • Hyperlipemia    • Hypertension        FAMILY HISTORY  History reviewed. No pertinent family history.    SOCIAL HISTORY  Social History   Substance Use Topics   • Smoking status: Never Smoker   • Smokeless tobacco: Never Used   • Alcohol use No       SURGICAL HISTORY  Past Surgical History:   Procedure Laterality Date " "  • RECOVERY  4/19/2016    Procedure: VASCULAR CASE-ILEANA-LEFT ARM FISTULOGRAM WITH ANGIOPLASTY 45891, 39262, 89483-DBP STAGE RENAL DISEASE N18.6;  Surgeon: Perfecto Surgery;  Location: SURGERY PRE-POST PROC UNIT [facility];  Service:    • RECOVERY  2/24/2016    Procedure: IR1 VASCULAR CASE LEFT ARM FISTULOGRAM WITH ANGIOPLASTY;  Surgeon: Perfecto Surgery;  Location: SURGERY PRE-POST PROC UNIT [facility];  Service:    • CATH PLACEMENT Right 12/10/2015    Procedure: CATH PLACEMENT;  Surgeon: Lorene Baldwin M.D.;  Location: SURGERY [facility];  Service:    • AV FISTULA CREATION Left 12/10/2015    Procedure: AV FISTULA CREATION;  Surgeon: Lorene Baldwin M.D.;  Location: SURGERY [facility];  Service:    • PB REMV CATARACT EXTRACAP,INSERT LENS  10/21/15   • OTHER  9/22/15    trabecular micro bypass stent- right eye   • OTHER  7/2015    take out blood of left eye   • OTHER  3/2015    take blood out of eye        CURRENT MEDICATIONS  I personally reviewed the medication list in the charting documentation.     ALLERGIES  No Known Allergies    MEDICAL RECORD  I have reviewed patient's medical record and pertinent results are listed above.      PHYSICAL EXAM  VITAL SIGNS: BP (!) 165/75   Pulse 65   Temp 36.9 °C (98.4 °F) (Temporal)   Resp 14   Ht 1.702 m (5' 7\")   Wt 70.8 kg (156 lb)   SpO2 100%   BMI 24.43 kg/m²    Constitutional: Chronically ill-appearing, not acutely toxic, no distress  HENT: Mucus membranes moist.    Eyes: No scleral icterus. Normal conjunctiva   Neck: Supple, comfortable, nonpainful range of motion.   Cardiovascular: Regular heart rate and rhythm.   Thorax & Lungs: Chest is nontender.  Lungs are clear to auscultation with good air movement bilaterally.  No wheeze, rhonchi, nor rales.   Abdomen: Soft, with no tenderness, rebound nor guarding.  No mass or pulsatile mass appreciated.  Skin: Warm, dry. No rash appreciated  Extremities/Musculoskeletal: No sign of trauma. No asymmetric calf " tenderness, erythema or edema. Normal range of motion   Neurologic: Alert & oriented.  Normal and symmetric upper and lower extremity motor and sensory function bilaterally  Psychiatric: Normal affect appropriate for the clinical situation.    DIAGNOSTIC STUDIES / PROCEDURES    LABS  Results for orders placed or performed during the hospital encounter of 11/21/18   CBC WITH DIFFERENTIAL   Result Value Ref Range    WBC 5.0 4.8 - 10.8 K/uL    RBC 3.25 (L) 4.70 - 6.10 M/uL    Hemoglobin 9.8 (L) 14.0 - 18.0 g/dL    Hematocrit 31.3 (L) 42.0 - 52.0 %    MCV 96.3 81.4 - 97.8 fL    MCH 30.2 27.0 - 33.0 pg    MCHC 31.3 (L) 33.7 - 35.3 g/dL    RDW 48.1 35.9 - 50.0 fL    Platelet Count 176 164 - 446 K/uL    MPV 10.3 9.0 - 12.9 fL    Neutrophils-Polys 69.90 44.00 - 72.00 %    Lymphocytes 14.50 (L) 22.00 - 41.00 %    Monocytes 10.60 0.00 - 13.40 %    Eosinophils 3.60 0.00 - 6.90 %    Basophils 0.80 0.00 - 1.80 %    Immature Granulocytes 0.60 0.00 - 0.90 %    Nucleated RBC 0.00 /100 WBC    Neutrophils (Absolute) 3.48 1.82 - 7.42 K/uL    Lymphs (Absolute) 0.72 (L) 1.00 - 4.80 K/uL    Monos (Absolute) 0.53 0.00 - 0.85 K/uL    Eos (Absolute) 0.18 0.00 - 0.51 K/uL    Baso (Absolute) 0.04 0.00 - 0.12 K/uL    Immature Granulocytes (abs) 0.03 0.00 - 0.11 K/uL    NRBC (Absolute) 0.00 K/uL   COMP METABOLIC PANEL   Result Value Ref Range    Sodium 134 (L) 135 - 145 mmol/L    Potassium 4.8 3.6 - 5.5 mmol/L    Chloride 94 (L) 96 - 112 mmol/L    Co2 33 20 - 33 mmol/L    Anion Gap 7.0 0.0 - 11.9    Glucose 343 (H) 65 - 99 mg/dL    Bun 30 (H) 8 - 22 mg/dL    Creatinine 3.98 (H) 0.50 - 1.40 mg/dL    Calcium 8.8 8.5 - 10.5 mg/dL    AST(SGOT) 7 (L) 12 - 45 U/L    ALT(SGPT) 8 2 - 50 U/L    Alkaline Phosphatase 85 30 - 99 U/L    Total Bilirubin 0.3 0.1 - 1.5 mg/dL    Albumin 3.9 3.2 - 4.9 g/dL    Total Protein 6.8 6.0 - 8.2 g/dL    Globulin 2.9 1.9 - 3.5 g/dL    A-G Ratio 1.3 g/dL   URINALYSIS CULTURE, IF INDICATED   Result Value Ref Range    Color  Yellow     Character Clear     Specific Gravity 1.017 <1.035    Ph 8.5 (A) 5.0 - 8.0    Glucose 500 (A) Negative mg/dL    Ketones Negative Negative mg/dL    Protein >=1000 (A) Negative mg/dL    Bilirubin Negative Negative    Urobilinogen, Urine 0.2 Negative    Nitrite Negative Negative    Leukocyte Esterase Negative Negative    Occult Blood Negative Negative    Micro Urine Req Microscopic    ESTIMATED GFR   Result Value Ref Range    GFR If  19 (A) >60 mL/min/1.73 m 2    GFR If Non African American 15 (A) >60 mL/min/1.73 m 2   URINE MICROSCOPIC (W/UA)   Result Value Ref Range    WBC 2-5 (A) /hpf    RBC 2-5 (A) /hpf    Bacteria Negative None /hpf    Epithelial Cells Few /hpf    Hyaline Cast 6-10 (A) /lpf        RADIOLOGY  CT-HEAD W/O   Final Result      No acute intracranial hemorrhage is identified.      Patchy white matter hypodensity is present.  This is a nonspecific finding which usually is found to represent chronic microvascular disease in patient's of this demographic.  Demyelination, age indeterminant ischemia and gliosis are also common    possibilities.      Mild sinus inflammatory disease       COURSE & MEDICAL DECISION MAKING  I have reviewed any medical record information, laboratory studies and radiographic results as noted above.  Differential diagnoses includes: ICH, hypertensive urgency, dehydration, electrolyte abnormality, anemia, DKA    Encounter Summary: This is a 58 y.o. male with apparent altered mentation over the past 2 days but a progressive set of symptoms over the past couple of months leading to this point, dialysis patient, he is also diabetic, on exam he has no focal deficits or findings, he does not appear acutely ill.  He offers no specific complaints.  His blood pressure is elevated at the time of exam, 180 systolic, seems unlikely that this represented hypertensive emergency leading to his symptoms but a small dose of hydralazine will be administered to see if we get  his pressure down.  Will check head CT, blood work, urinalysis and ultimately reevaluate him ------ workup was essentially unremarkable, no UTI, blood work reveals hyperglycemia and renal disease but nothing acute, his head CT does not reveal any acute findings either, the family is very concerned, at this point patient has been admitted to the hospital in guarded condition      DISPOSITION: Admit in guarded condition      FINAL IMPRESSION  1. Delirium    2. Secondary hypertension    3. Hyperglycemia           This dictation was created using voice recognition software. The accuracy of the dictation is limited to the abilities of the software. I expect there may be some errors of grammar and possibly content. The nursing notes were reviewed and certain aspects of this information were incorporated into this note.    Electronically signed by: Art Lopez, 11/21/2018 4:33 PM

## 2018-11-22 NOTE — ASSESSMENT & PLAN NOTE
-Unclear etiology, but likely hypertensive encephalopathy. No PRES on MRI. hypoglycemia might have contributed. No seizures on EEG.   -Resolved, and back to baseline.  -Continue to optimize blood pressure.  -Maintain normal glycemia.  -Continue to monitor.

## 2018-11-22 NOTE — PROGRESS NOTES
[facility] Daily Progress Note    Date of Service  11/22/2018    Chief Complaint  58 y.o. 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    Hospital Course    The patient was initially evaluated, and was noted to have high blood pressures in the 200s head CT showed no acute bleed.  Noted to be hyperglycemic.  Urinalysis did not show any UTI.  Initial labs showed sodium of 134, creatinine of 3.98, BUN of 30.  And blood glucose of 343.  Further workup with brain MRI, EEG, echocardiogram were ordered.       Interval Problem Update  11/22/2018 - no overnight events.  Was noted to be hypoglycemic this morning, required IV dextrose. Remains hemodynamically stable and afebrile. Stable on 2L O2 NC. BP better at 140/67.  Creatinine 5.26, BUN of 45.  No leukocytosis.  Hemoglobin stable    > I have personally seen and examined the patient today.  More awake, answers questions appropriately, oriented to place and time.  Per the wife, he is much better.  Able to eat, and finishes breakfast.  Denied any focal weakness or numbness, or facial droop.  No nausea, vomiting, chest pain, shortness of breath.      Consultants/Specialty  Nephrology    Code Status  Full    Disposition  Monitor on telemetry.  I anticipate that he will need at least 2 midnights hospital stay to provide medically necessary services.       Review of Systems  ROS     Pertinent positives/negatives as mentioned above.     A complete review of systems was personally done by me. All other systems were negative.       Physical Exam  Temp:  [36.3 °C (97.4 °F)-37 °C (98.6 °F)] 36.3 °C (97.4 °F)  Pulse:  [58-77] 58  Resp:  [14-20] 20  BP: (140-199)/(67-89) 140/67    Physical Exam   Constitutional: He appears well-developed and well-nourished. No distress.   HENT:   Head: Normocephalic and atraumatic.   Eyes:  Pupils are equal, round, and reactive to light. EOM are normal. Right eye exhibits no discharge. Left eye exhibits no discharge. No scleral icterus.   Neck: Normal range of motion. Neck supple.   Cardiovascular: Normal rate and regular rhythm.  Exam reveals no gallop and no friction rub.    No murmur heard.  Pulmonary/Chest: Effort normal and breath sounds normal. He has no wheezes. He has no rales. He exhibits no tenderness.   Abdominal: Soft. Bowel sounds are normal. There is no tenderness. There is no rebound and no guarding.   Musculoskeletal: Normal range of motion. He exhibits no edema or tenderness.   Lymphadenopathy:     He has no cervical adenopathy.   Neurological: No cranial nerve deficit.   Slow to respond, but answers questions appropriately.  Oriented to place and time.  Moves all 4 extremities.  Motor symmetric x4.  No facial droop.  No pronator drift.   Skin: Skin is warm and dry. No rash noted. He is not diaphoretic. No erythema.   Psychiatric: His behavior is normal. Thought content normal.   Flat affect.  Not very forthcoming, but answers questions appropriately.   Vitals reviewed.          Fluids    Intake/Output Summary (Last 24 hours) at 11/22/18 0834  Last data filed at 11/21/18 2215   Gross per 24 hour   Intake              100 ml   Output                0 ml   Net              100 ml       Laboratory  Recent Labs      11/21/18   1630  11/22/18   0318   WBC  5.0  5.3   RBC  3.25*  3.19*   HEMOGLOBIN  9.8*  9.7*   HEMATOCRIT  31.3*  30.5*   MCV  96.3  95.6   MCH  30.2  30.4   MCHC  31.3*  31.8*   RDW  48.1  48.0   PLATELETCT  176  162*   MPV  10.3  11.1     Recent Labs      11/21/18   1630  11/22/18   0318   SODIUM  134*  139   POTASSIUM  4.8  5.0   CHLORIDE  94*  99   CO2  33  29   GLUCOSE  343*  76   BUN  30*  45*   CREATININE  3.98*  5.26*   CALCIUM  8.8  8.8                   Imaging  EC-ECHOCARDIOGRAM COMPLETE W/O CONT   Final Result      CT-HEAD W/O   Final Result      No acute  intracranial hemorrhage is identified.      Patchy white matter hypodensity is present.  This is a nonspecific finding which usually is found to represent chronic microvascular disease in patient's of this demographic.  Demyelination, age indeterminant ischemia and gliosis are also common    possibilities.      Mild sinus inflammatory disease      MR-BRAIN-W/O    (Results Pending)        Assessment/Plan  * Toxic metabolic encephalopathy- (present on admission)   Assessment & Plan    -Unclear etiology, but likely hypertensive encephalopathy. ?PRES.  I am wondering if hypoglycemia contributed to this.  -Awaiting workup with brain MRI, EEG, and echo.  -Control blood pressure as below.  -Management of hypoglycemia as below.  -Continue to monitor, neurochecks.     Hypertensive emergency- (present on admission)   Assessment & Plan    -Blood pressure better, after IV antihypertensives.  -Continue Norvasc, lisinopril, hydralazine, and metoprolol.  Continue to monitor blood pressure trend closely, and start PRN IV hydralazine and labetalol for significant hypertension.  -Nephrology consulted for resumption of his dialysis, which will also help with blood pressure control.     Hypoglycemia- (present on admission)   Assessment & Plan    -Unclear etiology, but likely from poor oral intake from encephalopathy, and resumption of his long-acting insulin.  -Hold NPH for now.  Start D10 normal saline at 75 cc/h.  Monitor blood glucose every 1 hour until stable, then Accu-Chek before meals and at bedtime.  -Check hemoglobin A1c.  -He is more awake, and eating, hopeful that his blood sugar will stabilize.     Chronic respiratory failure with hypoxia (HCC)- (present on admission)   Assessment & Plan    -Stable at baseline oxygen requirement.  Continue bronchodilators per RT protocol, and oxygen supplements.     ESRD on dialysis (HCC)- (present on admission)   Assessment & Plan    -I have consulted nephrology for resumption of his  dialysis, as I anticipate that he will need more than 2 midnights hospital stay.     Anemia of chronic renal failure- (present on admission)   Assessment & Plan    -Continue to monitor hemoglobin closely, transfuse if less than 7.     Type 2 diabetes mellitus with nephropathy, and retinopathy (HCC)- (present on admission)   Assessment & Plan    Uncontrolled  Continue basal insulin and SSI  A1c ordered     Hyperlipidemia- (present on admission)   Assessment & Plan    -Continue lipitor.          VTE prophylaxis: heparin SQ

## 2018-11-22 NOTE — H&P
"Hospital Medicine History & Physical Note    Date of Service  11/21/2018    Primary Care Physician  Faisal Germain M.D.    Consultants      Code Status  full    Chief Complaint  Sent by MD (from Dialysis); Hypertension (165/75 in triage); and ALOC (per wife he is \"completely different\" today, symptoms started during dialysis, patient lethargic in triage)      History of Presenting Illness  58 y.o. male who presented 11/21/2018 with Sent by MD (from Dialysis); Hypertension (165/75 in triage); and ALOC (per wife he is \"completely different\" today, symptoms started during dialysis, patient lethargic in triage)  Poor historian as he is somewhat confused  Family at bedside who provided story.    On chronic O2 for possibly asthma from secondhand smoke (he works in [employer] before)  History of diabetes with glaucoma and diabetic retinopathy, legally blind  ESRD on dialysis, had a session today.    On and off confusion since September 24, 2018.  Memory loss, does not know his age, unable to find location of bathroom at home. Usually confused for a couple of hours.  Loses balance at times but he is legally blind. Uses blindstick for movement at home. Family says his walking has been slower.  No headaches. Occasional dizziness.  Only change were increasing blood pressure medications  Blood sugars have been averaging 120-130  Blood pressure however usually high  No fever, chills, nausea, diarrhea or dysuria  Still makes a small amount of urine  Family says he denies smoking or alcohol  Yesterday and today all day confused. Systolic blood pressure high at 200s per their report. Also has night terrors  At the ED, he is afebrile and hypertensive with SBOs currently 160s.  Head CT nonspecific findings but no acute bleed  Mild anemia. Hyperglycemia.  U/A not indictaive of UTI  When I saw him at ED, mildly confused. Not oriented to date. Also seems to be slow to thought. Has twitched more like asterixis than tremors. Family at " bedside.    Review of Systems  Review of Systems   Unable to perform ROS: Mental acuity       Past Medical History   has a past medical history of Anemia; Blind in both eyes; Diabetes ([facility]); Dialysis patient ([facility]); ESRD (end stage renal disease) ([facility]); Heart burn; Hyperlipemia; and Hypertension.    Surgical History   has a past surgical history that includes pr remv cataract extracap,insert lens (10/21/15); other (3/2015); other (7/2015); other (9/22/15); cath placement (Right, 12/10/2015); av fistula creation (Left, 12/10/2015); recovery (2/24/2016); and recovery (4/19/2016).     Family History  family history is not on file.   No fam history of kidney disease  Social History   reports that he has never smoked. He has never used smokeless tobacco. He reports that he does not drink alcohol or use drugs.    Allergies  No Known Allergies    Medications  Prior to Admission Medications   Prescriptions Last Dose Informant Patient Reported? Taking?   Cholecalciferol (VITAMIN D3 PO) 11/21/2018 at am  Yes Yes   Sig: Take 1 Cap by mouth every day.   Sevelamer Carbonate (RENVELA) 800 MG Tab 11/21/2018 at am Family Member Yes No   Sig: Take 2-3 Tabs by mouth 3 times a day, with meals. 2 tabs TID With meals, and 1 tab twice with snacks   amlodipine (NORVASC) 10 MG Tab 11/21/2018 at am Family Member No No   Sig: Take 1 Tab by mouth every day.   ascorbic acid (VITAMIN C) 500 MG tablet 11/21/2018 at am Family Member No No   Sig: Take 1 Tab by mouth 2 Times a Day.   atorvastatin (LIPITOR) 20 MG Tab 11/21/2018 at am  No No   Sig: TAKE 1 TAB BY MOUTH DAILY   dorzolamide-timolol (COSOPT) 22.3-6.8 MG/ML Solution 11/20/2018 at unknown Family Member Yes No   Sig: Place 1 Drop in both eyes 2 times a day.   hydrALAZINE (APRESOLINE) 25 MG Tab 11/21/2018 at am Family Member No No   Sig: Take 3 Tabs by mouth every 8 hours.   insulin NPH (HUMULIN,NOVOLIN) 100 UNIT/ML Suspension 11/21/2018 at am Family Member Yes No   Sig: Inject 15-20 Units as  instructed 2 Times a Day. 20 units in the morning and 15 units in the evening   lactulose 10 GM/15ML Solution 11/21/2018 at am Family Member Yes No   Sig: Take 20 g by mouth every day.   lisinopril (PRINIVIL) 20 MG Tab 11/21/2018 at am  Yes Yes   Sig: Take 40 mg by mouth every day.   metoclopramide (REGLAN) 5 MG tablet 11/21/2018 at am  No No   Sig: TAKE 1 TAB BY MOUTH BEFORE MEALS AND AT BEDTIME   metoprolol (LOPRESSOR) 100 MG Tab 11/21/2018 at am Family Member No No   Sig: Take 1 Tab by mouth 2 Times a Day.   omeprazole (PRILOSEC) 20 MG delayed-release capsule 11/21/2018 at am Family Member No No   Sig: TAKE 1 CAPSULE BY MOUTH EVERY DAY   paroxetine (PAXIL) 40 MG tablet 11/21/2018 at am  Yes Yes   Sig: Take 40 mg by mouth every day.   prednisoLONE acetate (PRED FORTE) 1 % Suspension 11/20/2018 at Unknown time  Yes Yes   Sig: Place 1 Drop in right eye 2 Times a Day.   promethazine (PHENERGAN) 12.5 MG tablet 11/21/2018 at unknown Family Member No No   Sig: Take 1-2 Tabs by mouth every four hours as needed for Nausea/Vomiting (if no relief from ondansetron or if ondansetron is not ordered).      Facility-Administered Medications Last Administration Doses Remaining   sevelamer carbonate (RENVELA) tablet 800 mg None recorded 1095          Physical Exam  Temp:  [36.9 °C (98.4 °F)] 36.9 °C (98.4 °F)  Pulse:  [61-77] 74  Resp:  [14] 14  BP: (165)/(75) 165/75    Physical Exam   Constitutional: He appears well-developed and well-nourished.   Weak   HENT:   Head: Normocephalic and atraumatic.   Eyes: Conjunctivae and EOM are normal. No scleral icterus.   Known blindness   Neck: Normal range of motion. Neck supple.   Cardiovascular: Normal rate and regular rhythm.  Exam reveals no gallop and no friction rub.    No murmur heard.  Pulmonary/Chest: Effort normal and breath sounds normal. No respiratory distress. He has no wheezes. He has no rales.   Abdominal: Soft. Bowel sounds are normal. He exhibits no distension. There is no  tenderness. There is no rebound and no guarding.   Musculoskeletal: He exhibits no edema or tenderness.   Neurological: He is alert.   Cognitive deficit  Memoery deficit  AAOx2  Possibly asterixis   Skin: Skin is warm.   Psychiatric: He has a normal mood and affect. His behavior is normal.       Laboratory:  Recent Labs      11/21/18   1630   WBC  5.0   RBC  3.25*   HEMOGLOBIN  9.8*   HEMATOCRIT  31.3*   MCV  96.3   MCH  30.2   MCHC  31.3*   RDW  48.1   PLATELETCT  176   MPV  10.3     Recent Labs      11/21/18   1630   SODIUM  134*   POTASSIUM  4.8   CHLORIDE  94*   CO2  33   GLUCOSE  343*   BUN  30*   CREATININE  3.98*   CALCIUM  8.8     Recent Labs      11/21/18   1630   ALTSGPT  8   ASTSGOT  7*   ALKPHOSPHAT  85   TBILIRUBIN  0.3   GLUCOSE  343*                 No results for input(s): TROPONINI in the last 72 hours.    Urinalysis:    Recent Labs      11/21/18   1710   SPECGRAVITY  1.017   GLUCOSEUR  500*   KETONES  Negative   NITRITE  Negative   LEUKESTERAS  Negative   WBCURINE  2-5*   RBCURINE  2-5*   BACTERIA  Negative   EPITHELCELL  Few        Imaging:  CT-HEAD W/O   Final Result      No acute intracranial hemorrhage is identified.      Patchy white matter hypodensity is present.  This is a nonspecific finding which usually is found to represent chronic microvascular disease in patient's of this demographic.  Demyelination, age indeterminant ischemia and gliosis are also common    possibilities.      Mild sinus inflammatory disease      EC-ECHOCARDIOGRAM COMPLETE W/O CONT    (Results Pending)   MR-BRAIN-W/O    (Results Pending)         Assessment/Plan:  I anticipate this patient is appropriate for observation status at this time.    * Toxic metabolic encephalopathy   Assessment & Plan    Unclear cause probably more related to hypertensive encephalopathy vs stroke  Ordered MRI brain, EEG, echo, put on telemetry     Hypertensive encephalopathy   Assessment & Plan    IV antihypertensives if SBP greater than  175  Follow MRI results     ESRD on dialysis (HCC)   Assessment & Plan    Currently on observation and improving  Contact Nephrology if he is hospitalized longer     Anemia of chronic renal failure- (present on admission)   Assessment & Plan    Noted  Defer to Nephrology in the outpatient     Type 2 diabetes mellitus with nephropathy (HCC)- (present on admission)   Assessment & Plan    Uncontrolled  Continue basal insulin and SSI  A1c ordered     Blindness- (present on admission)   Assessment & Plan    Noted     Hyperlipidemia- (present on admission)   Assessment & Plan    Continue lipitor         VTE prophylaxis: heparin SQ    Spoke with ED physician  Spoke with patient and family at length

## 2018-11-22 NOTE — PROGRESS NOTES
Bedside report received from SAMAN Bates.  Patient resting in bed.  Wife at bedside.  Patient on 2L NC.  Both patient and wife educated on importance of calling for assistance, state understanding.  Call light within reach. Will reassses BP.

## 2018-11-22 NOTE — ASSESSMENT & PLAN NOTE
-Blood glucose trend shows daytime hyperglycemia, with fasting blood glucose normal.  -I will restart him on NPH, but only 10 units in the morning only.  Continue to hold nighttime NPH. Continue sliding scale insulin coverage. Accu-Chek before meals and at bedtime.  Diabetic diet.

## 2018-11-22 NOTE — PROGRESS NOTES
2 RN skin check with Malini RN    Skin intact, fistula LUE  generalized bruising  Bilat heels red and blanchable

## 2018-11-23 ENCOUNTER — APPOINTMENT (OUTPATIENT)
Dept: RADIOLOGY | Facility: MEDICAL CENTER | Age: 58
DRG: 091 | End: 2018-11-23
Attending: INTERNAL MEDICINE
Payer: MEDICARE

## 2018-11-23 LAB
GLUCOSE BLD-MCNC: 120 MG/DL (ref 65–99)
GLUCOSE BLD-MCNC: 137 MG/DL (ref 65–99)
GLUCOSE BLD-MCNC: 155 MG/DL (ref 65–99)
GLUCOSE BLD-MCNC: 170 MG/DL (ref 65–99)
GLUCOSE BLD-MCNC: 186 MG/DL (ref 65–99)
GLUCOSE BLD-MCNC: 201 MG/DL (ref 65–99)
GLUCOSE BLD-MCNC: 224 MG/DL (ref 65–99)

## 2018-11-23 PROCEDURE — 5A1D70Z PERFORMANCE OF URINARY FILTRATION, INTERMITTENT, LESS THAN 6 HOURS PER DAY: ICD-10-PCS | Performed by: INTERNAL MEDICINE

## 2018-11-23 PROCEDURE — 90935 HEMODIALYSIS ONE EVALUATION: CPT

## 2018-11-23 PROCEDURE — 700111 HCHG RX REV CODE 636 W/ 250 OVERRIDE (IP): Performed by: INTERNAL MEDICINE

## 2018-11-23 PROCEDURE — 770020 HCHG ROOM/CARE - TELE (206)

## 2018-11-23 PROCEDURE — 70551 MRI BRAIN STEM W/O DYE: CPT

## 2018-11-23 PROCEDURE — 99232 SBSQ HOSP IP/OBS MODERATE 35: CPT | Performed by: INTERNAL MEDICINE

## 2018-11-23 PROCEDURE — 95951 EEG: CPT | Mod: 26,52 | Performed by: PSYCHIATRY & NEUROLOGY

## 2018-11-23 PROCEDURE — 700101 HCHG RX REV CODE 250: Performed by: INTERNAL MEDICINE

## 2018-11-23 PROCEDURE — 700105 HCHG RX REV CODE 258: Performed by: INTERNAL MEDICINE

## 2018-11-23 PROCEDURE — 700111 HCHG RX REV CODE 636 W/ 250 OVERRIDE (IP)

## 2018-11-23 PROCEDURE — 82962 GLUCOSE BLOOD TEST: CPT | Mod: 91

## 2018-11-23 PROCEDURE — 99233 SBSQ HOSP IP/OBS HIGH 50: CPT | Performed by: INTERNAL MEDICINE

## 2018-11-23 PROCEDURE — 700102 HCHG RX REV CODE 250 W/ 637 OVERRIDE(OP): Performed by: INTERNAL MEDICINE

## 2018-11-23 PROCEDURE — A9270 NON-COVERED ITEM OR SERVICE: HCPCS | Performed by: INTERNAL MEDICINE

## 2018-11-23 PROCEDURE — 95951 EEG: CPT | Mod: 52 | Performed by: PSYCHIATRY & NEUROLOGY

## 2018-11-23 RX ORDER — HEPARIN SODIUM 1000 [USP'U]/ML
1500 INJECTION, SOLUTION INTRAVENOUS; SUBCUTANEOUS
Status: DISCONTINUED | OUTPATIENT
Start: 2018-11-23 | End: 2018-11-25 | Stop reason: HOSPADM

## 2018-11-23 RX ORDER — SODIUM CHLORIDE 9 MG/ML
250 INJECTION, SOLUTION INTRAVENOUS
Status: DISCONTINUED | OUTPATIENT
Start: 2018-11-23 | End: 2018-11-25 | Stop reason: HOSPADM

## 2018-11-23 RX ORDER — HEPARIN SODIUM 1000 [USP'U]/ML
INJECTION, SOLUTION INTRAVENOUS; SUBCUTANEOUS
Status: COMPLETED
Start: 2018-11-23 | End: 2018-11-23

## 2018-11-23 RX ADMIN — PREDNISOLONE ACETATE 1 DROP: 10 SUSPENSION/ DROPS OPHTHALMIC at 06:06

## 2018-11-23 RX ADMIN — HYDRALAZINE HYDROCHLORIDE 75 MG: 25 TABLET, FILM COATED ORAL at 06:07

## 2018-11-23 RX ADMIN — SEVELAMER CARBONATE 1600 MG: 800 TABLET, FILM COATED ORAL at 14:08

## 2018-11-23 RX ADMIN — OXYCODONE HYDROCHLORIDE AND ACETAMINOPHEN 500 MG: 500 TABLET ORAL at 06:06

## 2018-11-23 RX ADMIN — SODIUM CHLORIDE: 234 INJECTION INTRAMUSCULAR; INTRAVENOUS; SUBCUTANEOUS at 04:27

## 2018-11-23 RX ADMIN — AMLODIPINE BESYLATE 10 MG: 10 TABLET ORAL at 06:07

## 2018-11-23 RX ADMIN — STANDARDIZED SENNA CONCENTRATE AND DOCUSATE SODIUM 2 TABLET: 8.6; 5 TABLET, FILM COATED ORAL at 18:00

## 2018-11-23 RX ADMIN — INSULIN HUMAN 1 UNITS: 100 INJECTION, SOLUTION PARENTERAL at 13:53

## 2018-11-23 RX ADMIN — PAROXETINE HYDROCHLORIDE 40 MG: 20 TABLET, FILM COATED ORAL at 06:06

## 2018-11-23 RX ADMIN — OXYCODONE HYDROCHLORIDE AND ACETAMINOPHEN 500 MG: 500 TABLET ORAL at 18:35

## 2018-11-23 RX ADMIN — LISINOPRIL 40 MG: 20 TABLET ORAL at 06:07

## 2018-11-23 RX ADMIN — HEPARIN SODIUM 5000 UNITS: 5000 INJECTION, SOLUTION INTRAVENOUS; SUBCUTANEOUS at 14:08

## 2018-11-23 RX ADMIN — HYDRALAZINE HYDROCHLORIDE 75 MG: 25 TABLET, FILM COATED ORAL at 21:15

## 2018-11-23 RX ADMIN — VITAMIN D, TAB 1000IU (100/BT) 1000 UNITS: 25 TAB at 06:07

## 2018-11-23 RX ADMIN — HEPARIN SODIUM 5000 UNITS: 5000 INJECTION, SOLUTION INTRAVENOUS; SUBCUTANEOUS at 06:05

## 2018-11-23 RX ADMIN — ATORVASTATIN CALCIUM 20 MG: 20 TABLET, FILM COATED ORAL at 06:07

## 2018-11-23 RX ADMIN — SEVELAMER CARBONATE 1600 MG: 800 TABLET, FILM COATED ORAL at 09:10

## 2018-11-23 RX ADMIN — SEVELAMER CARBONATE 1600 MG: 800 TABLET, FILM COATED ORAL at 18:36

## 2018-11-23 RX ADMIN — OMEPRAZOLE 20 MG: 20 CAPSULE, DELAYED RELEASE ORAL at 06:06

## 2018-11-23 RX ADMIN — METOPROLOL TARTRATE 100 MG: 50 TABLET ORAL at 06:11

## 2018-11-23 RX ADMIN — PREDNISOLONE ACETATE 1 DROP: 10 SUSPENSION/ DROPS OPHTHALMIC at 18:36

## 2018-11-23 RX ADMIN — STANDARDIZED SENNA CONCENTRATE AND DOCUSATE SODIUM 2 TABLET: 8.6; 5 TABLET, FILM COATED ORAL at 06:06

## 2018-11-23 RX ADMIN — HEPARIN SODIUM 1500 UNITS: 1000 INJECTION, SOLUTION INTRAVENOUS; SUBCUTANEOUS at 13:48

## 2018-11-23 RX ADMIN — METOPROLOL TARTRATE 100 MG: 50 TABLET ORAL at 18:35

## 2018-11-23 RX ADMIN — HEPARIN SODIUM 5000 UNITS: 5000 INJECTION, SOLUTION INTRAVENOUS; SUBCUTANEOUS at 21:13

## 2018-11-23 ASSESSMENT — ENCOUNTER SYMPTOMS
DIARRHEA: 0
FEVER: 0
NAUSEA: 0
SENSORY CHANGE: 0
CHILLS: 0
MYALGIAS: 0
FOCAL WEAKNESS: 0
BACK PAIN: 0
HEADACHES: 0
ABDOMINAL PAIN: 0
DIZZINESS: 0
PALPITATIONS: 0
SHORTNESS OF BREATH: 0
COUGH: 0
ORTHOPNEA: 0
HEMOPTYSIS: 0
WHEEZING: 0
VOMITING: 0

## 2018-11-23 ASSESSMENT — PAIN SCALES - GENERAL
PAINLEVEL_OUTOF10: 0

## 2018-11-23 NOTE — PROGRESS NOTES
Nephrology Daily Progress Note    Date of Service  11/23/2018    Chief Complaint  58 y.o. Male with ESRD/HD -MWF, admitted 11/21/2018 with AMS, confusion    Interval Problem Update  Doing better  No acute events  AAO  scheduled MRI brain, dialysis after    Review of Systems  Review of Systems   Constitutional: Positive for malaise/fatigue. Negative for chills and fever.   HENT: Negative.    Eyes:        Legally blind   Respiratory: Negative for cough, hemoptysis, shortness of breath and wheezing.    Cardiovascular: Negative for chest pain, palpitations and orthopnea.   Gastrointestinal: Negative for abdominal pain, diarrhea, nausea and vomiting.   Genitourinary: Negative for dysuria.        Making very little urine   Musculoskeletal: Negative for back pain, joint pain and myalgias.   Skin: Negative.    Neurological: Negative for dizziness, sensory change, focal weakness and headaches.        Physical Exam  Temp:  [36.8 °C (98.2 °F)-37.4 °C (99.4 °F)] 37.2 °C (99 °F)  Pulse:  [60-67] 67  Resp:  [17-20] 19  BP: (121-153)/(63-70) 143/70    Physical Exam   Constitutional: He is oriented to person, place, and time. He appears well-developed and well-nourished. No distress.   HENT:   Head: Normocephalic and atraumatic.   Mouth/Throat: Oropharynx is clear and moist.   Neck: Normal range of motion. Neck supple. No thyromegaly present.   Cardiovascular: Normal rate and regular rhythm.  Exam reveals no gallop and no friction rub.    Pulmonary/Chest: Effort normal and breath sounds normal. No respiratory distress. He has no wheezes. He has no rales.   Abdominal: Soft. Bowel sounds are normal. He exhibits no distension.   Musculoskeletal: He exhibits no edema.   Lymphadenopathy:     He has no cervical adenopathy.   Neurological: He is alert and oriented to person, place, and time.   Skin: Skin is warm. No rash noted. No erythema.   Nursing note and vitals reviewed.      Fluids    Intake/Output Summary (Last 24 hours) at  11/23/18 1541  Last data filed at 11/23/18 0700   Gross per 24 hour   Intake              800 ml   Output                0 ml   Net              800 ml       Laboratory  Recent Labs      11/21/18   1630  11/22/18   0318   WBC  5.0  5.3   RBC  3.25*  3.19*   HEMOGLOBIN  9.8*  9.7*   HEMATOCRIT  31.3*  30.5*   MCV  96.3  95.6   MCH  30.2  30.4   MCHC  31.3*  31.8*   RDW  48.1  48.0   PLATELETCT  176  162*   MPV  10.3  11.1     Recent Labs      11/21/18   1630  11/22/18   0318   SODIUM  134*  139   POTASSIUM  4.8  5.0   CHLORIDE  94*  99   CO2  33  29   GLUCOSE  343*  76   BUN  30*  45*   CREATININE  3.98*  5.26*   CALCIUM  8.8  8.8                       Assessment/Plan  1.ESRD/HD -MWF -will dialyze after MRI brain completed  2.HTN: BP well controlled  3.Electrolytes: well controlled.  4.Anemia: Hb stable  5.Volume:UF with HD     Recs: HD MWF, renal diet. all meds to renal doses

## 2018-11-23 NOTE — PROGRESS NOTES
Received report and assumed care of patient. Patient is alert and oriented x3 disoriented to time. Patient lethargic but easy to arouse. Wife at bedside. Patient is legally blind. . D10 running at 75mL/hr. Patient is in no signs of distress denies pain at this time. Patient was updated on the plan of care for the day. Call light within reach, bed in low position, 2 side rails up. All fall precautions in place. Will continue to monitor.

## 2018-11-23 NOTE — PROCEDURES
VIDEO ELECTROENCEPHALOGRAM REPORT      Referring provider: Dr. Philippe.     DOS: 11/23/2018 (total recording of 26 minutes).     INDICATION:  Pj Britt 58 y.o. male presenting with episode of decreased responsiveness. Rule out seizure.     CURRENT ANTIEPILEPTIC REGIMEN: None.     TECHNIQUE: 30 channel video electroencephalogram (EEG) was performed in accordance with the international 10-20 system. The study was reviewed in bipolar and referential montages. The recording examined the patient during wakeful and drowsy state(s).     DESCRIPTION OF THE RECORD:  During the wakefulness, the background showed a symmetrical 7 Hz theta activity posteriorly with amplitude of 70 mV.  There was reactivity to eye closure/opening.  A normal anterior-posterior gradient was noted with faster beta frequencies seen anteriorly.  During drowsiness, theta/delta frequencies were seen.    ACTIVATION PROCEDURES:   Intermittent Photic stimulation was performed in a stepwise fashion from 1 to 30 Hz, but failed to produce any significant background changes.       ICTAL AND/OR INTERICTAL FINDINGS:   Frontal Intermittent Rhythmic Delta Activity (FIRDA) noted. No seizures were reported or recorded during the study.     EKG: sampling of the EKG recording demonstrated sinus rhythm.     INTERPRETATION:  This is an abnormal video EEG recording in the awake and drowsy state(s).  Mild diffuse cerebral dysfunction, suggestive of an encephalopathic state. Frontal Intermittent Rhythmic Delta Activity (FIRDA) noted, which may suggest: increased intracranial pressure, or and underlying structural abnormality, or a deep epileptogenic focus. Clinical and radiological correlation is recommended.    Note: This EEG does not rule out epilepsy.  If the clinical suspicion remains high for seizures, a prolonged recording to capture clinical or subclinical events may be helpful.        Senthil Rivera MD   Epilepsy and Neurodiagnostics.   Clinical   of Neurology [facility].   Diplomate in Neurology, Epilepsy, and Electrodiagnostic Medicine.   Office: [phone]  Fax: [phone]

## 2018-11-23 NOTE — PROGRESS NOTES
Patient's BS at this time is 224. Patient currently running D10 at 75 maintenance fluid. Paged Dr. Dowd to notify. Received orders to hold D10 for 2 hours and reassess BS. If BS is above 150 keep holding D10 for 2 more hours. If BS at 4AM is above 150 to discontinued D10 altogether. If BS drops below 150 during this trial time to restart D10. Family aware and agrees with plan.

## 2018-11-24 LAB
GLUCOSE BLD-MCNC: 101 MG/DL (ref 65–99)
GLUCOSE BLD-MCNC: 130 MG/DL (ref 65–99)
GLUCOSE BLD-MCNC: 134 MG/DL (ref 65–99)
GLUCOSE BLD-MCNC: 93 MG/DL (ref 65–99)

## 2018-11-24 PROCEDURE — 770020 HCHG ROOM/CARE - TELE (206)

## 2018-11-24 PROCEDURE — 700102 HCHG RX REV CODE 250 W/ 637 OVERRIDE(OP): Performed by: INTERNAL MEDICINE

## 2018-11-24 PROCEDURE — G8978 MOBILITY CURRENT STATUS: HCPCS | Mod: CI

## 2018-11-24 PROCEDURE — A9270 NON-COVERED ITEM OR SERVICE: HCPCS | Performed by: INTERNAL MEDICINE

## 2018-11-24 PROCEDURE — G8979 MOBILITY GOAL STATUS: HCPCS | Mod: CI

## 2018-11-24 PROCEDURE — 700111 HCHG RX REV CODE 636 W/ 250 OVERRIDE (IP): Performed by: INTERNAL MEDICINE

## 2018-11-24 PROCEDURE — 97161 PT EVAL LOW COMPLEX 20 MIN: CPT

## 2018-11-24 PROCEDURE — 99232 SBSQ HOSP IP/OBS MODERATE 35: CPT | Performed by: INTERNAL MEDICINE

## 2018-11-24 PROCEDURE — 82962 GLUCOSE BLOOD TEST: CPT | Mod: 91

## 2018-11-24 RX ORDER — HYDRALAZINE HYDROCHLORIDE 50 MG/1
100 TABLET, FILM COATED ORAL EVERY 8 HOURS
Status: DISCONTINUED | OUTPATIENT
Start: 2018-11-24 | End: 2018-11-25 | Stop reason: HOSPADM

## 2018-11-24 RX ADMIN — LISINOPRIL 40 MG: 20 TABLET ORAL at 04:45

## 2018-11-24 RX ADMIN — HEPARIN SODIUM 5000 UNITS: 5000 INJECTION, SOLUTION INTRAVENOUS; SUBCUTANEOUS at 04:44

## 2018-11-24 RX ADMIN — PAROXETINE HYDROCHLORIDE 40 MG: 20 TABLET, FILM COATED ORAL at 04:45

## 2018-11-24 RX ADMIN — PREDNISOLONE ACETATE 1 DROP: 10 SUSPENSION/ DROPS OPHTHALMIC at 17:57

## 2018-11-24 RX ADMIN — SEVELAMER CARBONATE 1600 MG: 800 TABLET, FILM COATED ORAL at 09:09

## 2018-11-24 RX ADMIN — SEVELAMER CARBONATE 1600 MG: 800 TABLET, FILM COATED ORAL at 17:59

## 2018-11-24 RX ADMIN — OXYCODONE HYDROCHLORIDE AND ACETAMINOPHEN 500 MG: 500 TABLET ORAL at 17:59

## 2018-11-24 RX ADMIN — STANDARDIZED SENNA CONCENTRATE AND DOCUSATE SODIUM 2 TABLET: 8.6; 5 TABLET, FILM COATED ORAL at 04:45

## 2018-11-24 RX ADMIN — METOPROLOL TARTRATE 100 MG: 50 TABLET ORAL at 04:45

## 2018-11-24 RX ADMIN — AMLODIPINE BESYLATE 10 MG: 10 TABLET ORAL at 04:45

## 2018-11-24 RX ADMIN — HYDRALAZINE HYDROCHLORIDE 100 MG: 50 TABLET, FILM COATED ORAL at 22:16

## 2018-11-24 RX ADMIN — VITAMIN D, TAB 1000IU (100/BT) 1000 UNITS: 25 TAB at 04:46

## 2018-11-24 RX ADMIN — OXYCODONE HYDROCHLORIDE AND ACETAMINOPHEN 500 MG: 500 TABLET ORAL at 04:45

## 2018-11-24 RX ADMIN — ATORVASTATIN CALCIUM 20 MG: 20 TABLET, FILM COATED ORAL at 04:45

## 2018-11-24 RX ADMIN — HEPARIN SODIUM 5000 UNITS: 5000 INJECTION, SOLUTION INTRAVENOUS; SUBCUTANEOUS at 14:00

## 2018-11-24 RX ADMIN — SEVELAMER CARBONATE 1600 MG: 800 TABLET, FILM COATED ORAL at 13:05

## 2018-11-24 RX ADMIN — INSULIN HUMAN 10 UNITS: 100 INJECTION, SUSPENSION SUBCUTANEOUS at 13:09

## 2018-11-24 RX ADMIN — HYDRALAZINE HYDROCHLORIDE 100 MG: 50 TABLET, FILM COATED ORAL at 13:05

## 2018-11-24 RX ADMIN — PREDNISOLONE ACETATE 1 DROP: 10 SUSPENSION/ DROPS OPHTHALMIC at 04:44

## 2018-11-24 RX ADMIN — HYDRALAZINE HYDROCHLORIDE 75 MG: 25 TABLET, FILM COATED ORAL at 04:44

## 2018-11-24 RX ADMIN — STANDARDIZED SENNA CONCENTRATE AND DOCUSATE SODIUM 2 TABLET: 8.6; 5 TABLET, FILM COATED ORAL at 17:58

## 2018-11-24 RX ADMIN — HEPARIN SODIUM 5000 UNITS: 5000 INJECTION, SOLUTION INTRAVENOUS; SUBCUTANEOUS at 22:16

## 2018-11-24 RX ADMIN — OMEPRAZOLE 20 MG: 20 CAPSULE, DELAYED RELEASE ORAL at 04:46

## 2018-11-24 ASSESSMENT — COGNITIVE AND FUNCTIONAL STATUS - GENERAL
WALKING IN HOSPITAL ROOM: A LITTLE
MOVING TO AND FROM BED TO CHAIR: A LITTLE
CLIMB 3 TO 5 STEPS WITH RAILING: A LITTLE
SUGGESTED CMS G CODE MODIFIER MOBILITY: CK
MOBILITY SCORE: 19
STANDING UP FROM CHAIR USING ARMS: A LITTLE
MOVING FROM LYING ON BACK TO SITTING ON SIDE OF FLAT BED: A LITTLE

## 2018-11-24 ASSESSMENT — ENCOUNTER SYMPTOMS
CHILLS: 0
VOMITING: 0
HEADACHES: 0
FOCAL WEAKNESS: 0
NAUSEA: 0
HEMOPTYSIS: 0
PALPITATIONS: 0
SENSORY CHANGE: 0
ABDOMINAL PAIN: 0
DIARRHEA: 0
DIZZINESS: 0
WHEEZING: 0
SHORTNESS OF BREATH: 0
BACK PAIN: 0
ORTHOPNEA: 0
FEVER: 0
MYALGIAS: 0
COUGH: 0

## 2018-11-24 ASSESSMENT — PAIN SCALES - GENERAL
PAINLEVEL_OUTOF10: 0
PAINLEVEL_OUTOF10: 0

## 2018-11-24 ASSESSMENT — GAIT ASSESSMENTS
DISTANCE (FEET): 200
ASSISTIVE DEVICE: FRONT WHEEL WALKER
GAIT LEVEL OF ASSIST: MINIMAL ASSIST

## 2018-11-24 NOTE — RESPIRATORY CARE
COPD EDUCATION by COPD CLINICAL EDUCATOR  11/24/2018 at 6:07 AM by Roslyn Helm     Patient reviewed by COPD education team. Patient does not qualify for COPD program.

## 2018-11-24 NOTE — PROGRESS NOTES
Hemodialysis ordered by Dr. Chan. Treatment started at 1348 and ended at 1648. Pt stable, vss, no c/o post tx. See flow sheets for details. Net UF 1.5 L. Report to ELGIN Arshad RN. Lt fa avf dressing clean, dry, intact.

## 2018-11-24 NOTE — PROGRESS NOTES
[facility] Medicine Daily Progress Note    Date of Service  11/24/2018    Chief Complaint  58 y.o. 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    Hospital Course    The patient was initially evaluated, and was noted to have high blood pressures in the 200s head CT showed no acute bleed.  Noted to be hyperglycemic.  Urinalysis did not show any UTI.  Initial labs showed sodium of 134, creatinine of 3.98, BUN of 30.  And blood glucose of 343.  Further workup with brain MRI, EEG, echocardiogram were ordered. Was noted to be hypoglycemic, required IV dextrose.  Mentation has improved. Echocardiogram showed EF of 65%, with grade 2 diastolic dysfunction, no significant change from echocardiogram done in December 2015. HbA1c 7.5.         Interval Problem Update  11/24/2018 - no overnight events. Remains hemodynamically stable and afebrile.  Blood pressure elevated, but fairly controlled. Stable on 2L O2 NC.  Brain MRI showed no acute abnormality, with mild cerebral atrophy, and mild chronic microvascular ischemic disease, with chronic punctate micro bleeds in the supratentorial brain parenchyma, and right-sided retinal detachment. . Nighttime , prelunch .  EEG showed no seizures, with only mild diffuse cerebral dysfunction with FIRDA.  I have discussed this with neurology, and recommended no further interventions or need for antiepileptics. Had dialysis yesterday.  PT recommended no further inpatient needs.    > I have personally seen and examined the patient today.  Awake, and answers questions appropriately.  Denied any pain.  No nausea, vomiting, abdominal pain, diarrhea.          Consultants/Specialty  Nephrology    Code Status  Full    Disposition  Monitor on telemetry. Pending OT eval.    Review of Systems  ROS     Pertinent positives/negatives as  mentioned above.     A complete review of systems was personally done by me. All other systems were negative.       Physical Exam  Temp:  [36.4 °C (97.6 °F)-37.5 °C (99.5 °F)] 37.5 °C (99.5 °F)  Pulse:  [56-79] 66  Resp:  [16-18] 17  BP: (129-175)/(68-76) 153/69    Physical Exam   Constitutional: He appears well-developed and well-nourished. No distress.   HENT:   Head: Normocephalic and atraumatic.   Eyes: Pupils are equal, round, and reactive to light. EOM are normal. Right eye exhibits no discharge. Left eye exhibits no discharge. No scleral icterus.   Neck: Normal range of motion. Neck supple.   Cardiovascular: Normal rate and regular rhythm.  Exam reveals no gallop and no friction rub.    No murmur heard.  Pulmonary/Chest: Effort normal and breath sounds normal. He has no wheezes. He has no rales. He exhibits no tenderness.   Abdominal: Soft. Bowel sounds are normal. There is no tenderness. There is no rebound and no guarding.   Musculoskeletal: Normal range of motion. He exhibits no edema or tenderness.   Lymphadenopathy:     He has no cervical adenopathy.   Neurological: No cranial nerve deficit.   Awake, answers questions appropriately.  Oriented to time place and person.  Nonfocal neurologic exam.   Skin: Skin is warm and dry. No rash noted. He is not diaphoretic. No erythema.   Psychiatric: His behavior is normal. Thought content normal.   Flat affect, not forthcoming, but converses when spoken to.   Vitals reviewed.        Fluids    Intake/Output Summary (Last 24 hours) at 11/24/18 0834  Last data filed at 11/23/18 1945   Gross per 24 hour   Intake              890 ml   Output             2000 ml   Net            -1110 ml       Laboratory  Recent Labs      11/21/18   1630  11/22/18   0318   WBC  5.0  5.3   RBC  3.25*  3.19*   HEMOGLOBIN  9.8*  9.7*   HEMATOCRIT  31.3*  30.5*   MCV  96.3  95.6   MCH  30.2  30.4   MCHC  31.3*  31.8*   RDW  48.1  48.0   PLATELETCT  176  162*   MPV  10.3  11.1     Recent Labs       11/21/18   1630  11/22/18   0318   SODIUM  134*  139   POTASSIUM  4.8  5.0   CHLORIDE  94*  99   CO2  33  29   GLUCOSE  343*  76   BUN  30*  45*   CREATININE  3.98*  5.26*   CALCIUM  8.8  8.8                   Imaging  MR-BRAIN-W/O   Final Result      1.  No acute abnormality.   2.  Mild cerebral atrophy.   3.  Mild chronic microvascular ischemic disease.   4.  There are chronic punctate microbleeds in the supratentorial brain parenchyma.   5.  Right sided retinal detachment      EC-ECHOCARDIOGRAM COMPLETE W/O CONT   Final Result      CT-HEAD W/O   Final Result      No acute intracranial hemorrhage is identified.      Patchy white matter hypodensity is present.  This is a nonspecific finding which usually is found to represent chronic microvascular disease in patient's of this demographic.  Demyelination, age indeterminant ischemia and gliosis are also common    possibilities.      Mild sinus inflammatory disease           Assessment/Plan  * Toxic metabolic encephalopathy- (present on admission)   Assessment & Plan    -Unclear etiology, but likely hypertensive encephalopathy. No PRES on MRI. hypoglycemia might have contributed. No seizures on EEG.   -Resolved, and back to baseline.  -Continue to optimize blood pressure.  -Maintain normal glycemia.  -Continue to monitor.     Hypertensive emergency- (present on admission)   Assessment & Plan    -better control with current regimen, but still elevated.  -Continue Norvasc, lisinopril, and metoprolol.  Increase hydralazine to 100 mg 3 times daily. Continue to monitor blood pressure trend closely, with PRN IV hydralazine and labetalol for significant hypertension.  -Dialysis per nephrology, which will also help with blood pressure control.     Hypoglycemia- (present on admission)   Assessment & Plan    -No further episodes.  -Resume NPH, but only during the day as fasting blood glucose already low normal.  Continue to monitor. Accu-Chek before meals and at bedtime.      Chronic respiratory failure with hypoxia (HCC)- (present on admission)   Assessment & Plan    -Stable at baseline oxygen requirement.  Continue bronchodilators per RT protocol, and oxygen supplements.     ESRD on dialysis ([facility])- (present on admission)   Assessment & Plan    -Continue dialysis per nephrology.     Anemia of chronic renal failure- (present on admission)   Assessment & Plan    -Hgb stable. Continue to monitor hemoglobin closely, transfuse if less than 7.     Type 2 diabetes mellitus with nephropathy, and retinopathy ([facility])- (present on admission)   Assessment & Plan    -Blood glucose trend shows daytime hyperglycemia, with fasting blood glucose normal.  -I will restart him on NPH, but only 10 units in the morning only.  Continue to hold nighttime NPH. Continue sliding scale insulin coverage. Accu-Chek before meals and at bedtime.  Diabetic diet.     Blindness- (present on admission)   Assessment & Plan    -Due to retinal detachment.  Chronic.     Hyperlipidemia- (present on admission)   Assessment & Plan    -Continue lipitor.          VTE prophylaxis: heparin SQ

## 2018-11-24 NOTE — PROGRESS NOTES
Received report and assumed care of patient. Patient is alert and oriented x4. Patient is in no signs of distress. Wife at bedside. No longer has IV fluids running. Patient was updated on the plan of care for the day. Call light within reach, bed in low position, 2 side rails up. All fall precautions in place. Will continue to monitor.

## 2018-11-24 NOTE — PROGRESS NOTES
Bedside report received RN to RN with patient. Assuming care [id number].  Patient is sleeping at this time  Wife is present/sleeping/rooming in  2L sopplemental O2 in place

## 2018-11-24 NOTE — PROGRESS NOTES
Nephrology Daily Progress Note    Date of Service  11/24/2018    Chief Complaint  58 y.o. Male with ESRD/HD - MWF, admitted 11/21/2018 with AMS, confusion    Interval Problem Update  Doing better  No acute events  AAO  HD MWF    Review of Systems  Review of Systems   Constitutional: Positive for malaise/fatigue. Negative for chills and fever.   HENT: Negative.    Eyes:        Legally blind   Respiratory: Negative for cough, hemoptysis, shortness of breath and wheezing.    Cardiovascular: Negative for chest pain, palpitations and orthopnea.   Gastrointestinal: Negative for abdominal pain, diarrhea, nausea and vomiting.   Genitourinary: Negative for dysuria.        Making very little urine   Musculoskeletal: Negative for back pain, joint pain and myalgias.   Skin: Negative.    Neurological: Negative for dizziness, sensory change, focal weakness and headaches. Seizures:         Physical Exam  Temp:  [36.4 °C (97.6 °F)-37.5 °C (99.5 °F)] 36.7 °C (98 °F)  Pulse:  [56-79] 63  Resp:  [15-18] 15  BP: (119-175)/(61-76) 133/63    Physical Exam   Constitutional: He is oriented to person, place, and time. He appears well-developed and well-nourished. No distress.   HENT:   Head: Normocephalic and atraumatic.   Mouth/Throat: Oropharynx is clear and moist.   Neck: Normal range of motion. Neck supple. No thyromegaly present.   Cardiovascular: Normal rate and regular rhythm.  Exam reveals no gallop and no friction rub.    Pulmonary/Chest: Effort normal and breath sounds normal. No respiratory distress. He has no wheezes. He has no rales.   Abdominal: Soft. Bowel sounds are normal. He exhibits no distension.   Musculoskeletal: He exhibits no edema.   Lymphadenopathy:     He has no cervical adenopathy.   Neurological: He is alert and oriented to person, place, and time.   Skin: Skin is warm. No rash noted. No erythema.   Nursing note and vitals reviewed.      Fluids    Intake/Output Summary (Last 24 hours) at 11/24/18 9655  Last data  filed at 11/23/18 1945   Gross per 24 hour   Intake              650 ml   Output             2000 ml   Net            -1350 ml       Laboratory  Recent Labs      11/21/18   1630  11/22/18   0318   WBC  5.0  5.3   RBC  3.25*  3.19*   HEMOGLOBIN  9.8*  9.7*   HEMATOCRIT  31.3*  30.5*   MCV  96.3  95.6   MCH  30.2  30.4   MCHC  31.3*  31.8*   RDW  48.1  48.0   PLATELETCT  176  162*   MPV  10.3  11.1     Recent Labs      11/21/18   1630  11/22/18   0318   SODIUM  134*  139   POTASSIUM  4.8  5.0   CHLORIDE  94*  99   CO2  33  29   GLUCOSE  343*  76   BUN  30*  45*   CREATININE  3.98*  5.26*   CALCIUM  8.8  8.8                       Assessment/Plan  1.ESRD/HD -TTS   2.HTN: BP well controlled  3.Electrolytes: well controlled.  4.Anemia: Hb stable  5.Volume: well controlled with UF with HD     Recs: HD TTS, renal diet. all meds to renal doses

## 2018-11-24 NOTE — THERAPY
"Physical Therapy Evaluation completed.   Bed Mobility:  Supine to Sit: Minimal Assist (wife assists pt, very supportive. )  Transfers: Sit to Stand: Supervised  Gait: Level Of Assist: Minimal Assist (needed assist to direct walker due to vision loss. ) with Front-Wheel Walker x 200 feet      Plan of Care: Patient with no further skilled PT needs in the acute care setting at this time  Discharge Recommendations: Equipment: Front-Wheel Walker.   Pt presents with AMS, now improving, close to baseline. Pt's wife is in room, very supportive. Pt has 24/7 assist at home between wife and son. Today, pt needed min A to ambulate using FWW due to vision loss. Pt will need a FWW to use at home. No further inpt PT needs. See \"Rehab Therapy-Acute\" Patient Summary Report for complete documentation.     "

## 2018-11-25 ENCOUNTER — PATIENT OUTREACH (OUTPATIENT)
Dept: HEALTH INFORMATION MANAGEMENT | Facility: OTHER | Age: 58
End: 2018-11-25

## 2018-11-25 VITALS
SYSTOLIC BLOOD PRESSURE: 125 MMHG | DIASTOLIC BLOOD PRESSURE: 69 MMHG | OXYGEN SATURATION: 98 % | TEMPERATURE: 97.8 F | BODY MASS INDEX: 24.19 KG/M2 | HEART RATE: 53 BPM | RESPIRATION RATE: 16 BRPM | WEIGHT: 154.1 LBS | HEIGHT: 67 IN

## 2018-11-25 PROBLEM — G92.8 TOXIC METABOLIC ENCEPHALOPATHY: Status: RESOLVED | Noted: 2018-11-21 | Resolved: 2018-11-25

## 2018-11-25 PROBLEM — I16.1 HYPERTENSIVE EMERGENCY: Status: RESOLVED | Noted: 2018-11-21 | Resolved: 2018-11-25

## 2018-11-25 LAB
ANION GAP SERPL CALC-SCNC: 10 MMOL/L (ref 0–11.9)
BUN SERPL-MCNC: 59 MG/DL (ref 8–22)
CALCIUM SERPL-MCNC: 8.8 MG/DL (ref 8.5–10.5)
CHLORIDE SERPL-SCNC: 95 MMOL/L (ref 96–112)
CO2 SERPL-SCNC: 28 MMOL/L (ref 20–33)
CREAT SERPL-MCNC: 6.76 MG/DL (ref 0.5–1.4)
GLUCOSE SERPL-MCNC: 107 MG/DL (ref 65–99)
POTASSIUM SERPL-SCNC: 5.1 MMOL/L (ref 3.6–5.5)
SODIUM SERPL-SCNC: 133 MMOL/L (ref 135–145)

## 2018-11-25 PROCEDURE — 99239 HOSP IP/OBS DSCHRG MGMT >30: CPT | Performed by: INTERNAL MEDICINE

## 2018-11-25 PROCEDURE — A9270 NON-COVERED ITEM OR SERVICE: HCPCS | Performed by: INTERNAL MEDICINE

## 2018-11-25 PROCEDURE — 82962 GLUCOSE BLOOD TEST: CPT | Mod: 91

## 2018-11-25 PROCEDURE — 700111 HCHG RX REV CODE 636 W/ 250 OVERRIDE (IP): Performed by: INTERNAL MEDICINE

## 2018-11-25 PROCEDURE — 700102 HCHG RX REV CODE 250 W/ 637 OVERRIDE(OP): Performed by: INTERNAL MEDICINE

## 2018-11-25 PROCEDURE — 36415 COLL VENOUS BLD VENIPUNCTURE: CPT

## 2018-11-25 PROCEDURE — 80048 BASIC METABOLIC PNL TOTAL CA: CPT

## 2018-11-25 RX ORDER — HYDRALAZINE HYDROCHLORIDE 100 MG/1
100 TABLET, FILM COATED ORAL EVERY 8 HOURS
Qty: 90 TAB | Refills: 2 | Status: SHIPPED | OUTPATIENT
Start: 2018-11-25 | End: 2019-07-30

## 2018-11-25 RX ADMIN — PAROXETINE HYDROCHLORIDE 40 MG: 20 TABLET, FILM COATED ORAL at 05:05

## 2018-11-25 RX ADMIN — SEVELAMER CARBONATE 1600 MG: 800 TABLET, FILM COATED ORAL at 13:16

## 2018-11-25 RX ADMIN — INSULIN HUMAN 10 UNITS: 100 INJECTION, SUSPENSION SUBCUTANEOUS at 09:45

## 2018-11-25 RX ADMIN — HYDRALAZINE HYDROCHLORIDE 100 MG: 50 TABLET, FILM COATED ORAL at 13:16

## 2018-11-25 RX ADMIN — ATORVASTATIN CALCIUM 20 MG: 20 TABLET, FILM COATED ORAL at 05:05

## 2018-11-25 RX ADMIN — SEVELAMER CARBONATE 1600 MG: 800 TABLET, FILM COATED ORAL at 09:11

## 2018-11-25 RX ADMIN — HEPARIN SODIUM 5000 UNITS: 5000 INJECTION, SOLUTION INTRAVENOUS; SUBCUTANEOUS at 05:05

## 2018-11-25 RX ADMIN — HEPARIN SODIUM 5000 UNITS: 5000 INJECTION, SOLUTION INTRAVENOUS; SUBCUTANEOUS at 13:19

## 2018-11-25 RX ADMIN — LISINOPRIL 40 MG: 20 TABLET ORAL at 06:00

## 2018-11-25 RX ADMIN — OMEPRAZOLE 20 MG: 20 CAPSULE, DELAYED RELEASE ORAL at 05:05

## 2018-11-25 RX ADMIN — HYDRALAZINE HYDROCHLORIDE 100 MG: 50 TABLET, FILM COATED ORAL at 05:05

## 2018-11-25 RX ADMIN — OXYCODONE HYDROCHLORIDE AND ACETAMINOPHEN 500 MG: 500 TABLET ORAL at 05:05

## 2018-11-25 RX ADMIN — AMLODIPINE BESYLATE 10 MG: 10 TABLET ORAL at 05:05

## 2018-11-25 RX ADMIN — METOPROLOL TARTRATE 100 MG: 50 TABLET ORAL at 05:05

## 2018-11-25 RX ADMIN — STANDARDIZED SENNA CONCENTRATE AND DOCUSATE SODIUM 2 TABLET: 8.6; 5 TABLET, FILM COATED ORAL at 05:05

## 2018-11-25 RX ADMIN — PREDNISOLONE ACETATE 1 DROP: 10 SUSPENSION/ DROPS OPHTHALMIC at 05:10

## 2018-11-25 RX ADMIN — VITAMIN D, TAB 1000IU (100/BT) 1000 UNITS: 25 TAB at 05:05

## 2018-11-25 ASSESSMENT — PAIN SCALES - GENERAL: PAINLEVEL_OUTOF10: 0

## 2018-11-25 NOTE — DISCHARGE SUMMARY
"Discharge Summary    CHIEF COMPLAINT ON ADMISSION  Chief Complaint   Patient presents with   • Sent by MD     from Dialysis   • Hypertension     165/75 in triage   • ALOC     per wife he is \"completely different\" today, symptoms started during dialysis, patient lethargic in triage       Reason for Admission  Shaking     Admission Date  11/21/2018    CODE STATUS  Full Code    HPI & HOSPITAL COURSE    58 y.o. 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    The patient was initially evaluated, and was noted to have high blood pressures in the 200s head CT showed no acute bleed.  He was noted to be hyperglycemic.  Urinalysis did not show any UTI.  Initial labs showed sodium of 134, creatinine of 3.98, BUN of 30.   Further workup with brain MRI, EEG, echocardiogram were pursued. He then became hypoglycemic, and required IV dextrose.  His insulin was temporarily held.  His confusion was felt to be due to hypertensive urgency, and his medications were optimized with better blood pressure control.  His mentation has improved. Echocardiogram showed EF of 65%, with grade 2 diastolic dysfunction, no significant change from echocardiogram done in December 2015. HbA1c 7.5. Brain MRI showed no acute abnormality, with mild cerebral atrophy, and mild chronic microvascular ischemic disease, with chronic punctate micro bleeds in the supratentorial brain parenchyma, and right-sided retinal detachment. EEG showed no seizures, with only mild diffuse cerebral dysfunction with FIRDA.  I have discussed this with neurology, and they recommended no further interventions or need for antiepileptics. He had dialysis while in the hospital.  He had improved BG trend, although still had daytime hyperglycemia, and he was restarted on only daytime NPH with better blood glucose control.    He was " seen by PT who recommended no further inpatient needs.  With his clinical improvement, he was deemed ready to be discharged from the hospital as he did not have any further inpatient needs.  The discharge plan was discussed with the patient's wife, with which they were agreeable to.    Therefore, he is discharged in fair and stable condition to home with close outpatient follow-up.    The patient met 2-midnight criteria for an inpatient stay at the time of discharge.    Discharge Date  11/25/2018      FOLLOW UP ITEMS POST DISCHARGE  -He will be continued on adjusted doses of hydralazine, along with the rest of his home high antihypertensives.  Follow-up with PCP.  -He will be continued on only daytime NPH at 10 units.  Continue blood glucose monitoring at home.  -Per the wife's request, I have asked the schedulers to get him an appointment with outpatient neurology.    DISCHARGE DIAGNOSES  Principal Problem (Resolved):    Toxic metabolic encephalopathy POA: Yes  Active Problems:    Hyperlipidemia POA: Yes    Blindness POA: Yes    Type 2 diabetes mellitus with nephropathy, and retinopathy (HCC) POA: Yes    Anemia of chronic renal failure POA: Yes    ESRD on dialysis (HCC) POA: Yes    Chronic respiratory failure with hypoxia (HCC) POA: Yes  Resolved Problems:    Hypertensive emergency POA: Yes    Hypoglycemia POA: Yes      FOLLOW UP  No future appointments.  Faisal Germain M.D.  [address] [facility] [id number] [phone]    Schedule an appointment as soon as possible for a visit  Hospital follow-up      MEDICATIONS ON DISCHARGE     Medication List      CHANGE how you take these medications      Instructions   hydrALAZINE 100 MG tablet  What changed:  · medication strength  · how much to take  Commonly known as:  APRESOLINE   Take 1 Tab by mouth every 8 hours.  Dose:  100 mg     insulin  UNIT/ML Susp  What changed:  · how much to take  · when to take this  · additional instructions  Commonly known as:   HUMULIN,NOVOLIN   Inject 10 Units as instructed every morning.  Dose:  10 Units        CONTINUE taking these medications      Instructions   amLODIPine 10 MG Tabs  Commonly known as:  NORVASC   Take 1 Tab by mouth every day.  Dose:  10 mg     ascorbic acid 500 MG tablet  Commonly known as:  VITAMIN C   Take 1 Tab by mouth 2 Times a Day.  Dose:  500 mg     atorvastatin 20 MG Tabs  Commonly known as:  LIPITOR   TAKE 1 TAB BY MOUTH DAILY     lactulose 10 GM/15ML Soln   Take 20 g by mouth every day.  Dose:  20 g     lisinopril 20 MG Tabs  Commonly known as:  PRINIVIL   Take 40 mg by mouth every day.  Dose:  40 mg     metoclopramide 5 MG tablet  Commonly known as:  REGLAN   TAKE 1 TAB BY MOUTH BEFORE MEALS AND AT BEDTIME     metoprolol 100 MG Tabs  Commonly known as:  LOPRESSOR   Take 1 Tab by mouth 2 Times a Day.  Dose:  100 mg     omeprazole 20 MG delayed-release capsule  Commonly known as:  PRILOSEC   TAKE 1 CAPSULE BY MOUTH EVERY DAY     PAXIL 40 MG tablet  Generic drug:  paroxetine   Take 40 mg by mouth every day.  Dose:  40 mg     prednisoLONE acetate 1 % Susp  Commonly known as:  PRED FORTE   Place 1 Drop in right eye 2 Times a Day.  Dose:  1 Drop     promethazine 12.5 MG tablet  Commonly known as:  PHENERGAN   Take 1-2 Tabs by mouth every four hours as needed for Nausea/Vomiting (if no relief from ondansetron or if ondansetron is not ordered).  Dose:  12.5-25 mg     RENVELA 800 MG Tabs tablet  Generic drug:  sevelamer carbonate   Take 2-3 Tabs by mouth 3 times a day, with meals. 2 tabs TID With meals, and 1 tab twice with snacks  Dose:  2-3 Tab     VITAMIN D3 PO   Take 1 Cap by mouth every day.  Dose:  1 Cap            Allergies  No Known Allergies    DIET  Orders Placed This Encounter   Procedures   • Diet Order Diabetic, Renal     Standing Status:   Standing     Number of Occurrences:   1     Order Specific Question:   Diet:     Answer:   Diabetic [3]     Order Specific Question:   Diet:     Answer:   Renal [8]        ACTIVITY  As tolerated.  Weight bearing as tolerated    CONSULTATIONS  none    PROCEDURES  none    LABORATORY  Lab Results   Component Value Date    SODIUM 133 (L) 11/25/2018    POTASSIUM 5.1 11/25/2018    CHLORIDE 95 (L) 11/25/2018    CO2 28 11/25/2018    GLUCOSE 107 (H) 11/25/2018    BUN 59 (H) 11/25/2018    CREATININE 6.76 (HH) 11/25/2018        Lab Results   Component Value Date    WBC 5.3 11/22/2018    HEMOGLOBIN 9.7 (L) 11/22/2018    HEMATOCRIT 30.5 (L) 11/22/2018    PLATELETCT 162 (L) 11/22/2018        Total time of the discharge process = 40 minutes.

## 2018-11-25 NOTE — DISCHARGE INSTRUCTIONS
Discharge Instructions    Discharged to home by car with relative. Discharged via wheelchair, hospital escort: Yes.  Special equipment needed: Oxygen and Walker    Be sure to schedule a follow-up appointment with your primary care doctor or any specialists as instructed.     Discharge Plan:   Diet Plan: Discussed  Confirmed Follow up Appointment: Patient to Call and Schedule Appointment  Confirmed Symptoms Management: Discussed  Medication Reconciliation Updated: Yes  Pneumococcal Vaccine Administered/Refused: Not given - Patient refused pneumococcal vaccine  Influenza Vaccine Indication: Patient Refuses, Not indicated: Previously immunized this influenza season and > 8 years of age    I understand that a diet low in cholesterol, fat, and sodium is recommended for good health. Unless I have been given specific instructions below for another diet, I accept this instruction as my diet prescription.   Other diet: diabetic    Special Instructions: None    · Is patient discharged on Warfarin / Coumadin?   No     Depression / Suicide Risk    As you are discharged from this [facility] facility, it is important to learn how to keep safe from harming yourself.    Recognize the warning signs:  · Abrupt changes in personality, positive or negative- including increase in energy   · Giving away possessions  · Change in eating patterns- significant weight changes-  positive or negative  · Change in sleeping patterns- unable to sleep or sleeping all the time   · Unwillingness or inability to communicate  · Depression  · Unusual sadness, discouragement and loneliness  · Talk of wanting to die  · Neglect of personal appearance   · Rebelliousness- reckless behavior  · Withdrawal from people/activities they love  · Confusion- inability to concentrate     If you or a loved one observes any of these behaviors or has concerns about self-harm, here's what you can do:  · Talk about it- your feelings and reasons for harming  yourself  · Remove any means that you might use to hurt yourself (examples: pills, rope, extension cords, firearm)  · Get professional help from the community (Mental Health, Substance Abuse, psychological counseling)  · Do not be alone:Call your Safe Contact- someone whom you trust who will be there for you.  · Call your local CRISIS HOTLINE 401-9806 or 089-059-5330  · Call your local Children's Mobile Crisis Response Team [place] (744) 414-4344 or www.[employer]  · Call the toll free National Suicide Prevention Hotlines   · National Suicide Prevention Lifeline 589-603-YKZE (5029)  · National Hope Line Network 800-SUICIDE (389-4068)

## 2018-11-25 NOTE — PROGRESS NOTES
Bedside shift report received. VSS. Family at bedside. No s/s of acute distress, fall precautions in place. Care assumed.

## 2018-11-25 NOTE — PROGRESS NOTES
Discharge instructions reviewed with patient, wife and son. IV removed.  All questions answered. All belongings with patient. Patient, wife and son verbalize understanding of instructions given.

## 2018-11-25 NOTE — CARE PLAN
Problem: Communication  Goal: The ability to communicate needs accurately and effectively will improve  Outcome: PROGRESSING AS EXPECTED  Fall precautions in place. Treaded socks on pt. Appropriate signs on doorway. Bedrails up. Bed in lowest position and locked.  Call light and phone within reach. Patient educated on importance of calling nurses before getting out of bed, verbalizes understanding.        Problem: Safety  Goal: Will remain free from injury  Outcome: PROGRESSING AS EXPECTED  Fistula left arm, sign in place.    Problem: Venous Thromboembolism (VTW)/Deep Vein Thrombosis (DVT) Prevention:  Goal: Patient will participate in Venous Thrombosis (VTE)/Deep Vein Thrombosis (DVT)Prevention Measures  Outcome: PROGRESSING AS EXPECTED  Heparin ordered for DVT prophylaxis     Problem: Pain Management  Goal: Pain level will decrease to patient's comfort goal  Outcome: PROGRESSING AS EXPECTED  Denies any complaints of pain

## 2018-11-26 ENCOUNTER — APPOINTMENT (OUTPATIENT)
Dept: RADIOLOGY | Facility: MEDICAL CENTER | Age: 58
DRG: 091 | End: 2018-11-26
Attending: EMERGENCY MEDICINE
Payer: MEDICARE

## 2018-11-26 ENCOUNTER — HOSPITAL ENCOUNTER (INPATIENT)
Facility: MEDICAL CENTER | Age: 58
LOS: 2 days | DRG: 091 | End: 2018-11-29
Attending: EMERGENCY MEDICINE | Admitting: INTERNAL MEDICINE
Payer: MEDICARE

## 2018-11-26 DIAGNOSIS — R40.4 ALTERED LEVEL OF CONSCIOUSNESS: ICD-10-CM

## 2018-11-26 DIAGNOSIS — R25.3 MUSCLE TWITCHING: ICD-10-CM

## 2018-11-26 DIAGNOSIS — R53.1 WEAKNESS: ICD-10-CM

## 2018-11-26 DIAGNOSIS — G25.3 MYOCLONIC JERKING: ICD-10-CM

## 2018-11-26 DIAGNOSIS — N18.6 ESRD (END STAGE RENAL DISEASE) (HCC): ICD-10-CM

## 2018-11-26 PROBLEM — J98.4 PNEUMONITIS: Status: ACTIVE | Noted: 2018-11-26

## 2018-11-26 LAB
ALBUMIN SERPL BCP-MCNC: 4.3 G/DL (ref 3.2–4.9)
ALBUMIN/GLOB SERPL: 1.6 G/DL
ALP SERPL-CCNC: 98 U/L (ref 30–99)
ALT SERPL-CCNC: 13 U/L (ref 2–50)
AMMONIA PLAS-SCNC: 32 UMOL/L (ref 11–45)
ANION GAP SERPL CALC-SCNC: 10 MMOL/L (ref 0–11.9)
APTT PPP: 29.4 SEC (ref 24.7–36)
AST SERPL-CCNC: 10 U/L (ref 12–45)
BASOPHILS # BLD AUTO: 0.3 % (ref 0–1.8)
BASOPHILS # BLD: 0.02 K/UL (ref 0–0.12)
BILIRUB SERPL-MCNC: 0.4 MG/DL (ref 0.1–1.5)
BNP SERPL-MCNC: 658 PG/ML (ref 0–100)
BUN SERPL-MCNC: 32 MG/DL (ref 8–22)
CALCIUM SERPL-MCNC: 9.2 MG/DL (ref 8.5–10.5)
CHLORIDE SERPL-SCNC: 93 MMOL/L (ref 96–112)
CO2 SERPL-SCNC: 31 MMOL/L (ref 20–33)
CREAT SERPL-MCNC: 4.01 MG/DL (ref 0.5–1.4)
EOSINOPHIL # BLD AUTO: 0.1 K/UL (ref 0–0.51)
EOSINOPHIL NFR BLD: 1.5 % (ref 0–6.9)
ERYTHROCYTE [DISTWIDTH] IN BLOOD BY AUTOMATED COUNT: 45.1 FL (ref 35.9–50)
GLOBULIN SER CALC-MCNC: 2.7 G/DL (ref 1.9–3.5)
GLUCOSE BLD-MCNC: 105 MG/DL (ref 65–99)
GLUCOSE BLD-MCNC: 132 MG/DL (ref 65–99)
GLUCOSE BLD-MCNC: 81 MG/DL (ref 65–99)
GLUCOSE SERPL-MCNC: 258 MG/DL (ref 65–99)
HCT VFR BLD AUTO: 30.4 % (ref 42–52)
HGB BLD-MCNC: 10.1 G/DL (ref 14–18)
IMM GRANULOCYTES # BLD AUTO: 0.02 K/UL (ref 0–0.11)
IMM GRANULOCYTES NFR BLD AUTO: 0.3 % (ref 0–0.9)
INR PPP: 0.97 (ref 0.87–1.13)
LACTATE BLD-SCNC: 0.8 MMOL/L (ref 0.5–2)
LACTATE BLD-SCNC: 0.8 MMOL/L (ref 0.5–2)
LACTATE BLD-SCNC: 1.1 MMOL/L (ref 0.5–2)
LYMPHOCYTES # BLD AUTO: 0.35 K/UL (ref 1–4.8)
LYMPHOCYTES NFR BLD: 5.3 % (ref 22–41)
MAGNESIUM SERPL-MCNC: 2.1 MG/DL (ref 1.5–2.5)
MCH RBC QN AUTO: 30.9 PG (ref 27–33)
MCHC RBC AUTO-ENTMCNC: 33.2 G/DL (ref 33.7–35.3)
MCV RBC AUTO: 93 FL (ref 81.4–97.8)
MONOCYTES # BLD AUTO: 0.55 K/UL (ref 0–0.85)
MONOCYTES NFR BLD AUTO: 8.3 % (ref 0–13.4)
NEUTROPHILS # BLD AUTO: 5.55 K/UL (ref 1.82–7.42)
NEUTROPHILS NFR BLD: 84.3 % (ref 44–72)
NRBC # BLD AUTO: 0 K/UL
NRBC BLD-RTO: 0 /100 WBC
PHOSPHATE SERPL-MCNC: 3.8 MG/DL (ref 2.5–4.5)
PLATELET # BLD AUTO: 176 K/UL (ref 164–446)
PMV BLD AUTO: 10.5 FL (ref 9–12.9)
POTASSIUM SERPL-SCNC: 4.3 MMOL/L (ref 3.6–5.5)
PROCALCITONIN SERPL-MCNC: 0.57 NG/ML
PROT SERPL-MCNC: 7 G/DL (ref 6–8.2)
PROTHROMBIN TIME: 13 SEC (ref 12–14.6)
RBC # BLD AUTO: 3.27 M/UL (ref 4.7–6.1)
SODIUM SERPL-SCNC: 134 MMOL/L (ref 135–145)
TROPONIN I SERPL-MCNC: 0.03 NG/ML (ref 0–0.04)
TSH SERPL DL<=0.005 MIU/L-ACNC: 1.73 UIU/ML (ref 0.38–5.33)
VIT B12 SERPL-MCNC: 510 PG/ML (ref 211–911)
WBC # BLD AUTO: 6.6 K/UL (ref 4.8–10.8)

## 2018-11-26 PROCEDURE — 72125 CT NECK SPINE W/O DYE: CPT

## 2018-11-26 PROCEDURE — 83605 ASSAY OF LACTIC ACID: CPT

## 2018-11-26 PROCEDURE — 84443 ASSAY THYROID STIM HORMONE: CPT

## 2018-11-26 PROCEDURE — 99285 EMERGENCY DEPT VISIT HI MDM: CPT

## 2018-11-26 PROCEDURE — A9270 NON-COVERED ITEM OR SERVICE: HCPCS | Performed by: INTERNAL MEDICINE

## 2018-11-26 PROCEDURE — 70450 CT HEAD/BRAIN W/O DYE: CPT

## 2018-11-26 PROCEDURE — 700101 HCHG RX REV CODE 250: Performed by: INTERNAL MEDICINE

## 2018-11-26 PROCEDURE — 85025 COMPLETE CBC W/AUTO DIFF WBC: CPT

## 2018-11-26 PROCEDURE — 96365 THER/PROPH/DIAG IV INF INIT: CPT

## 2018-11-26 PROCEDURE — 84100 ASSAY OF PHOSPHORUS: CPT

## 2018-11-26 PROCEDURE — 700105 HCHG RX REV CODE 258: Performed by: INTERNAL MEDICINE

## 2018-11-26 PROCEDURE — 82140 ASSAY OF AMMONIA: CPT

## 2018-11-26 PROCEDURE — 82962 GLUCOSE BLOOD TEST: CPT

## 2018-11-26 PROCEDURE — 83735 ASSAY OF MAGNESIUM: CPT

## 2018-11-26 PROCEDURE — 83880 ASSAY OF NATRIURETIC PEPTIDE: CPT

## 2018-11-26 PROCEDURE — 700111 HCHG RX REV CODE 636 W/ 250 OVERRIDE (IP): Performed by: INTERNAL MEDICINE

## 2018-11-26 PROCEDURE — 84484 ASSAY OF TROPONIN QUANT: CPT

## 2018-11-26 PROCEDURE — 82607 VITAMIN B-12: CPT

## 2018-11-26 PROCEDURE — 700102 HCHG RX REV CODE 250 W/ 637 OVERRIDE(OP): Performed by: INTERNAL MEDICINE

## 2018-11-26 PROCEDURE — 87040 BLOOD CULTURE FOR BACTERIA: CPT

## 2018-11-26 PROCEDURE — 71250 CT THORAX DX C-: CPT

## 2018-11-26 PROCEDURE — 85610 PROTHROMBIN TIME: CPT

## 2018-11-26 PROCEDURE — 96367 TX/PROPH/DG ADDL SEQ IV INF: CPT

## 2018-11-26 PROCEDURE — 36415 COLL VENOUS BLD VENIPUNCTURE: CPT

## 2018-11-26 PROCEDURE — 99226 PR SUBSEQUENT OBSERVATION CARE,LEVEL III: CPT | Mod: AI | Performed by: INTERNAL MEDICINE

## 2018-11-26 PROCEDURE — 84145 PROCALCITONIN (PCT): CPT

## 2018-11-26 PROCEDURE — 85730 THROMBOPLASTIN TIME PARTIAL: CPT

## 2018-11-26 PROCEDURE — 71045 X-RAY EXAM CHEST 1 VIEW: CPT

## 2018-11-26 PROCEDURE — G0378 HOSPITAL OBSERVATION PER HR: HCPCS

## 2018-11-26 PROCEDURE — 80053 COMPREHEN METABOLIC PANEL: CPT

## 2018-11-26 RX ORDER — SEVELAMER CARBONATE 800 MG/1
1600 TABLET, FILM COATED ORAL
Status: DISCONTINUED | OUTPATIENT
Start: 2018-11-26 | End: 2018-11-29 | Stop reason: HOSPADM

## 2018-11-26 RX ORDER — PREDNISOLONE ACETATE 10 MG/ML
1 SUSPENSION/ DROPS OPHTHALMIC 2 TIMES DAILY
Status: DISCONTINUED | OUTPATIENT
Start: 2018-11-26 | End: 2018-11-29 | Stop reason: HOSPADM

## 2018-11-26 RX ORDER — OMEPRAZOLE 20 MG/1
20 CAPSULE, DELAYED RELEASE ORAL DAILY
Status: DISCONTINUED | OUTPATIENT
Start: 2018-11-27 | End: 2018-11-29 | Stop reason: HOSPADM

## 2018-11-26 RX ORDER — DEXTROSE MONOHYDRATE 25 G/50ML
25 INJECTION, SOLUTION INTRAVENOUS
Status: DISCONTINUED | OUTPATIENT
Start: 2018-11-26 | End: 2018-11-29 | Stop reason: HOSPADM

## 2018-11-26 RX ORDER — HEPARIN SODIUM 5000 [USP'U]/ML
5000 INJECTION, SOLUTION INTRAVENOUS; SUBCUTANEOUS EVERY 8 HOURS
Status: DISCONTINUED | OUTPATIENT
Start: 2018-11-27 | End: 2018-11-29 | Stop reason: HOSPADM

## 2018-11-26 RX ORDER — HYDRALAZINE HYDROCHLORIDE 50 MG/1
100 TABLET, FILM COATED ORAL EVERY 8 HOURS
Status: DISCONTINUED | OUTPATIENT
Start: 2018-11-26 | End: 2018-11-29 | Stop reason: HOSPADM

## 2018-11-26 RX ORDER — ATORVASTATIN CALCIUM 20 MG/1
20 TABLET, FILM COATED ORAL DAILY
Status: DISCONTINUED | OUTPATIENT
Start: 2018-11-27 | End: 2018-11-29 | Stop reason: HOSPADM

## 2018-11-26 RX ORDER — LACTOBACILLUS RHAMNOSUS GG 10B CELL
1 CAPSULE ORAL
Status: DISCONTINUED | OUTPATIENT
Start: 2018-11-27 | End: 2018-11-29 | Stop reason: HOSPADM

## 2018-11-26 RX ORDER — BISACODYL 10 MG
10 SUPPOSITORY, RECTAL RECTAL
Status: DISCONTINUED | OUTPATIENT
Start: 2018-11-26 | End: 2018-11-29 | Stop reason: HOSPADM

## 2018-11-26 RX ORDER — POLYETHYLENE GLYCOL 3350 17 G/17G
1 POWDER, FOR SOLUTION ORAL
Status: DISCONTINUED | OUTPATIENT
Start: 2018-11-26 | End: 2018-11-29 | Stop reason: HOSPADM

## 2018-11-26 RX ORDER — PAROXETINE HYDROCHLORIDE 20 MG/1
40 TABLET, FILM COATED ORAL DAILY
Status: DISCONTINUED | OUTPATIENT
Start: 2018-11-27 | End: 2018-11-29 | Stop reason: HOSPADM

## 2018-11-26 RX ORDER — AZITHROMYCIN 250 MG/1
500 TABLET, FILM COATED ORAL ONCE
Status: COMPLETED | OUTPATIENT
Start: 2018-11-26 | End: 2018-11-26

## 2018-11-26 RX ORDER — AMLODIPINE BESYLATE 10 MG/1
10 TABLET ORAL DAILY
Status: DISCONTINUED | OUTPATIENT
Start: 2018-11-27 | End: 2018-11-29 | Stop reason: HOSPADM

## 2018-11-26 RX ORDER — LISINOPRIL 20 MG/1
40 TABLET ORAL DAILY
Status: DISCONTINUED | OUTPATIENT
Start: 2018-11-27 | End: 2018-11-29 | Stop reason: HOSPADM

## 2018-11-26 RX ORDER — SODIUM CHLORIDE 9 MG/ML
500 INJECTION, SOLUTION INTRAVENOUS
Status: DISCONTINUED | OUTPATIENT
Start: 2018-11-26 | End: 2018-11-29 | Stop reason: HOSPADM

## 2018-11-26 RX ORDER — PROMETHAZINE HYDROCHLORIDE 25 MG/1
12.5-25 TABLET ORAL EVERY 4 HOURS PRN
Status: DISCONTINUED | OUTPATIENT
Start: 2018-11-26 | End: 2018-11-26

## 2018-11-26 RX ORDER — METOPROLOL TARTRATE 50 MG/1
100 TABLET, FILM COATED ORAL 2 TIMES DAILY
Status: DISCONTINUED | OUTPATIENT
Start: 2018-11-26 | End: 2018-11-29 | Stop reason: HOSPADM

## 2018-11-26 RX ORDER — ASCORBIC ACID 500 MG
500 TABLET ORAL 2 TIMES DAILY
Status: DISCONTINUED | OUTPATIENT
Start: 2018-11-26 | End: 2018-11-29 | Stop reason: HOSPADM

## 2018-11-26 RX ORDER — AZITHROMYCIN 250 MG/1
250 TABLET, FILM COATED ORAL DAILY
Status: DISCONTINUED | OUTPATIENT
Start: 2018-11-27 | End: 2018-11-29 | Stop reason: HOSPADM

## 2018-11-26 RX ORDER — AMOXICILLIN 250 MG
2 CAPSULE ORAL 2 TIMES DAILY
Status: DISCONTINUED | OUTPATIENT
Start: 2018-11-26 | End: 2018-11-29 | Stop reason: HOSPADM

## 2018-11-26 RX ADMIN — HYDRALAZINE HYDROCHLORIDE 100 MG: 50 TABLET, FILM COATED ORAL at 22:38

## 2018-11-26 RX ADMIN — METOPROLOL TARTRATE 100 MG: 25 TABLET, FILM COATED ORAL at 20:51

## 2018-11-26 RX ADMIN — AZITHROMYCIN 500 MG: 250 TABLET, FILM COATED ORAL at 20:58

## 2018-11-26 RX ADMIN — OXYCODONE HYDROCHLORIDE AND ACETAMINOPHEN 500 MG: 500 TABLET ORAL at 20:51

## 2018-11-26 RX ADMIN — SODIUM CHLORIDE 3 G: 900 INJECTION INTRAVENOUS at 20:51

## 2018-11-26 RX ADMIN — SEVELAMER CARBONATE 1600 MG: 800 TABLET, FILM COATED ORAL at 20:50

## 2018-11-26 RX ADMIN — STANDARDIZED SENNA CONCENTRATE AND DOCUSATE SODIUM 2 TABLET: 8.6; 5 TABLET, FILM COATED ORAL at 20:51

## 2018-11-26 RX ADMIN — PREDNISOLONE ACETATE 1 DROP: 10 SUSPENSION/ DROPS OPHTHALMIC at 22:38

## 2018-11-26 ASSESSMENT — COGNITIVE AND FUNCTIONAL STATUS - GENERAL
STANDING UP FROM CHAIR USING ARMS: A LOT
SUGGESTED CMS G CODE MODIFIER DAILY ACTIVITY: CK
MOVING TO AND FROM BED TO CHAIR: A LITTLE
WALKING IN HOSPITAL ROOM: A LOT
SUGGESTED CMS G CODE MODIFIER MOBILITY: CL
DAILY ACTIVITIY SCORE: 16
DRESSING REGULAR UPPER BODY CLOTHING: A LITTLE
DRESSING REGULAR LOWER BODY CLOTHING: A LITTLE
MOBILITY SCORE: 14
EATING MEALS: A LOT
CLIMB 3 TO 5 STEPS WITH RAILING: A LOT
PERSONAL GROOMING: A LITTLE
HELP NEEDED FOR BATHING: A LITTLE
MOVING FROM LYING ON BACK TO SITTING ON SIDE OF FLAT BED: A LOT
TURNING FROM BACK TO SIDE WHILE IN FLAT BAD: A LITTLE
TOILETING: A LOT

## 2018-11-26 ASSESSMENT — PAIN SCALES - GENERAL: PAINLEVEL_OUTOF10: 3

## 2018-11-26 ASSESSMENT — LIFESTYLE VARIABLES: DO YOU DRINK ALCOHOL: NO

## 2018-11-26 ASSESSMENT — PATIENT HEALTH QUESTIONNAIRE - PHQ9
2. FEELING DOWN, DEPRESSED, IRRITABLE, OR HOPELESS: NOT AT ALL
SUM OF ALL RESPONSES TO PHQ9 QUESTIONS 1 AND 2: 0
1. LITTLE INTEREST OR PLEASURE IN DOING THINGS: NOT AT ALL

## 2018-11-26 NOTE — ED NOTES
Med rec complete per pt's wife at bedside. Per wife, pt was continued on all home meds and was added hydralazine. Pt has not started since he was DC'd from [facility] yesterday.

## 2018-11-26 NOTE — ED PROVIDER NOTES
ED Provider Note    CHIEF COMPLAINT  Chief Complaint   Patient presents with   • Weakness   • Altered Mental Status       HPI  Pj Britt is a 58 y.o. male who presents to the emergency department for weakness, dizziness gait instability and altered mentation.  The patient was admitted on 1121 for a similar process and while here as moistening it really improved.  He seemed to sleep poorly he seemed very tired and he seemed very unstable on his feet.  He fell last night and hit his head.  Unclear if he got knocked out.  He has a large periorbital ecchymosis and contusion was at dialysis today which she completed but was sent here for further evaluation.  He remains confused.  There is no history of fever.  He denies pain.  Denies any numbness tingling his arms or legs.  Denies any other aggravating or alleviating factors or associated complaints.  No new medications.  Otherwise not that far from his baseline per his wife.    REVIEW OF SYSTEMS  See HPI for further details. All other systems are negative.    PAST MEDICAL HISTORY  Past Medical History:   Diagnosis Date   • Anemia    • Blind in both eyes    • Diabetes (Formerly [facility])     insulin dependent   • Dialysis patient (Formerly [facility])     Earline KEATING   • ESRD (end stage renal disease) (Formerly [facility])    • Heart burn    • Hyperlipemia    • Hypertension        FAMILY HISTORY  History reviewed. No pertinent family history.    SOCIAL HISTORY  Social History     Social History   • Marital status:      Spouse name: N/A   • Number of children: N/A   • Years of education: N/A     Social History Main Topics   • Smoking status: Never Smoker   • Smokeless tobacco: Never Used   • Alcohol use No   • Drug use: No   • Sexual activity: Not on file     Other Topics Concern   • Not on file     Social History Narrative   • No narrative on file       SURGICAL HISTORY  Past Surgical History:   Procedure Laterality Date   • RECOVERY  4/19/2016    Procedure: VASCULAR CASE-ILEANA-LEFT ARM FISTULOGRAM  WITH ANGIOPLASTY 63286, 55534, [id number] STAGE RENAL DISEASE N18.6;  Surgeon: Recoveryonclay Surgery;  [place] [facility] [place]  Service:    • RECOVERY  2/24/2016    Procedure: IR1 VASCULAR CASE LEFT ARM FISTULOGRAM WITH ANGIOPLASTY;  Surgeon: Recoveryonclay Surgery;  [place] [facility] [place]  Service:    • CATH PLACEMENT Right 12/10/2015    Procedure: CATH PLACEMENT;  Surgeon: Lorene Baldwin M.D.;  Location: SURGERY [facility];  Service:    • AV FISTULA CREATION Left 12/10/2015    Procedure: AV FISTULA CREATION;  Surgeon: Lorene Baldwin M.D.;  Location: SURGERY [facility];  Service:    • PB REMV CATARACT EXTRACAP,INSERT LENS  10/21/15   • OTHER  9/22/15    trabecular micro bypass stent- right eye   • OTHER  7/2015    take out blood of left eye   • OTHER  3/2015    take blood out of eye        CURRENT MEDICATIONS  Home Medications     Reviewed by Hillary Escamilla R.N. (Registered Nurse) on 11/26/18 at 1309  Med List Status: Not Addressed   Medication Last Dose Status   amlodipine (NORVASC) 10 MG Tab 11/21/2018 Active   ascorbic acid (VITAMIN C) 500 MG tablet 11/21/2018 Active   atorvastatin (LIPITOR) 20 MG Tab 11/21/2018 Active   Cholecalciferol (VITAMIN D3 PO) 11/21/2018 Active   hydrALAZINE (APRESOLINE) 100 MG tablet  Active   insulin NPH (HUMULIN,NOVOLIN) 100 UNIT/ML Suspension  Active   lactulose 10 GM/15ML Solution 11/21/2018 Active   lisinopril (PRINIVIL) 20 MG Tab 11/21/2018 Active   metoclopramide (REGLAN) 5 MG tablet 11/21/2018 Active   metoprolol (LOPRESSOR) 100 MG Tab 11/21/2018 Active   omeprazole (PRILOSEC) 20 MG delayed-release capsule 11/21/2018 Active   paroxetine (PAXIL) 40 MG tablet 11/21/2018 Active   prednisoLONE acetate (PRED FORTE) 1 % Suspension 11/20/2018 Active   promethazine (PHENERGAN) 12.5 MG tablet 11/21/2018 Active   Sevelamer Carbonate (RENVELA) 800 MG Tab 11/21/2018 Active   sevelamer carbonate (RENVELA) tablet 800 mg  Active                 ALLERGIES  No Known Allergies    PHYSICAL EXAM  VITAL SIGNS: /61   Pulse 79   Temp 36.4 °C (97.5 °F) (Temporal)   Resp 16   Wt 65 kg (143 lb 4.8 oz)   SpO2 93%   BMI 22.44 kg/m²      Constitutional: Somnolent sitting supine in the gurney.  No acute cardiopulmonary distress.  Eyes are closed.  Obvious periorbital ecchymosis.   HENT: Normocephalic right periorbital ecchymosis., Bilateral external ears normal, Oropharynx moist, No oral exudates, Nose normal.   Eyes: Blind, chronically.  Right eye is red and opacified.  This is chronic.  Left eye diminished responsive to the left pupil.  Eyes not injected red  Neck: No midline tenderness not ranged  Cardiovascular: Normal heart rate, Normal rhythm, No murmurs, No rubs, No gallops.   Thorax & Lungs: Normal breath sounds, No respiratory distress, No wheezing,  Abdomen: Bowel sounds normal, Soft, No tenderness,    Skin: Warm, Dry, No erythema, No rash.   Back: No tenderness, No CVA tenderness.   Musculoskeletal: Good range of motion in all major joints.  Neurologic: Somnolent eyes are closed.  No acute cardiopulmonary distress per  Psychiatric: Affect somnolent, difficult to assess     Results for orders placed or performed during the hospital encounter of 11/26/18   CBC WITH DIFFERENTIAL   Result Value Ref Range    WBC 6.6 4.8 - 10.8 K/uL    RBC 3.27 (L) 4.70 - 6.10 M/uL    Hemoglobin 10.1 (L) 14.0 - 18.0 g/dL    Hematocrit 30.4 (L) 42.0 - 52.0 %    MCV 93.0 81.4 - 97.8 fL    MCH 30.9 27.0 - 33.0 pg    MCHC 33.2 (L) 33.7 - 35.3 g/dL    RDW 45.1 35.9 - 50.0 fL    Platelet Count 176 164 - 446 K/uL    MPV 10.5 9.0 - 12.9 fL    Neutrophils-Polys 84.30 (H) 44.00 - 72.00 %    Lymphocytes 5.30 (L) 22.00 - 41.00 %    Monocytes 8.30 0.00 - 13.40 %    Eosinophils 1.50 0.00 - 6.90 %    Basophils 0.30 0.00 - 1.80 %    Immature Granulocytes 0.30 0.00 - 0.90 %    Nucleated RBC 0.00 /100 WBC    Neutrophils (Absolute) 5.55 1.82 - 7.42 K/uL    Lymphs (Absolute) 0.35 (L)  1.00 - 4.80 K/uL    Monos (Absolute) 0.55 0.00 - 0.85 K/uL    Eos (Absolute) 0.10 0.00 - 0.51 K/uL    Baso (Absolute) 0.02 0.00 - 0.12 K/uL    Immature Granulocytes (abs) 0.02 0.00 - 0.11 K/uL    NRBC (Absolute) 0.00 K/uL   CMP   Result Value Ref Range    Sodium 134 (L) 135 - 145 mmol/L    Potassium 4.3 3.6 - 5.5 mmol/L    Chloride 93 (L) 96 - 112 mmol/L    Co2 31 20 - 33 mmol/L    Anion Gap 10.0 0.0 - 11.9    Glucose 258 (H) 65 - 99 mg/dL    Bun 32 (H) 8 - 22 mg/dL    Creatinine 4.01 (H) 0.50 - 1.40 mg/dL    Calcium 9.2 8.5 - 10.5 mg/dL    AST(SGOT) 10 (L) 12 - 45 U/L    ALT(SGPT) 13 2 - 50 U/L    Alkaline Phosphatase 98 30 - 99 U/L    Total Bilirubin 0.4 0.1 - 1.5 mg/dL    Albumin 4.3 3.2 - 4.9 g/dL    Total Protein 7.0 6.0 - 8.2 g/dL    Globulin 2.7 1.9 - 3.5 g/dL    A-G Ratio 1.6 g/dL   PT/INR   Result Value Ref Range    PT 13.0 12.0 - 14.6 sec    INR 0.97 0.87 - 1.13   LACTIC ACID   Result Value Ref Range    Lactic Acid 1.1 0.5 - 2.0 mmol/L   LACTIC ACID   Result Value Ref Range    Lactic Acid 0.8 0.5 - 2.0 mmol/L   TROPONIN   Result Value Ref Range    Troponin I 0.03 0.00 - 0.04 ng/mL   BTYPE NATRIURETIC PEPTIDE   Result Value Ref Range    B Natriuretic Peptide 658 (H) 0 - 100 pg/mL   PHOSPHORUS   Result Value Ref Range    Phosphorus 3.8 2.5 - 4.5 mg/dL   MAGNESIUM   Result Value Ref Range    Magnesium 2.1 1.5 - 2.5 mg/dL   APTT   Result Value Ref Range    APTT 29.4 24.7 - 36.0 sec   AMMONIA   Result Value Ref Range    Ammonia 32 11 - 45 umol/L   ESTIMATED GFR   Result Value Ref Range    GFR If  19 (A) >60 mL/min/1.73 m 2    GFR If Non African American 15 (A) >60 mL/min/1.73 m 2   Lactic Acid -STAT Once   Result Value Ref Range    Lactic Acid 0.8 0.5 - 2.0 mmol/L   Procalcitonin   Result Value Ref Range    Procalcitonin 0.57 (H) <0.25 ng/mL   TSH WITH REFLEX TO FT4   Result Value Ref Range    TSH 1.730 0.380 - 5.330 uIU/mL   VITAMIN B12   Result Value Ref Range    Vitamin B12 -True Cobalamin  510 211 - 911 pg/mL   ACCU-CHEK GLUCOSE   Result Value Ref Range    Glucose - Accu-Ck 132 (H) 65 - 99 mg/dL      RADIOLOGY/PROCEDURES  CT-CHEST (THORAX) W/O   Final Result      1.  Small bilateral pleural effusions are noted, larger on the left side. These were also present in 2016.      2.  Pulmonary opacifications with poorly defined borders are noted in the lower pulmonary lobes bilaterally. Findings could be due to edema aspiration or pneumonia.      3.  No pneumothorax identified.      4.  Cardiomegaly is again noted.      5.  Right adrenal mass is again noted likely representing adenoma.            CT-HEAD W/O   Final Result      1.  Cerebral atrophy.      2.  White matter lucencies most consistent with small vessel ischemic change versus demyelination or gliosis.      3. No intracranial mass effect or acute hemorrhage identified.      4. Deformity of the right optic globe similar to previous findings and consistent with injury.         CT-CSPINE WITHOUT PLUS RECONS   Final Result      No acute fracture cervical spine. Degenerative changes.   Small left pleural effusion upper left hemothorax.      DX-CHEST-PORTABLE (1 VIEW)   Final Result      Hypoinflation. Bilateral lung base atelectasis with mild edema or pneumonitis not excluded.            COURSE & MEDICAL DECISION MAKING  Pertinent Labs & Imaging studies reviewed. (See chart for details)  Patient presents the emergency department again with altered mentation, and confusion.  This time his family reports more gait instability and weakness and a fall.     The patient had a recent admission for a similar process and was diagnosed with toxic metabolic encephalopathy.  His wife states he never really had significant improvement and went home and seems to continue to decline.  The physician's notes were reviewed to see what the workup and thoughts were as well as his labs and previous workup per    He fell last night and hit his head and has some periorbital  ecchymosis, and contusions.  Because of his obvious signs of head injury and altered mental status I felt that a head CT was indicated.  Because he felt and has decreased mental status and signs of head trauma his neck cannot be cleared on clinical basis alone.  Therefore CT of the neck was obtained as well.  The CTs were negative for acute pathology, however, there was some pleural effusion identified in the left hemithorax.      Patient is worked up with labs.  He does have an elevated BNP.  He may have some component of CHF.  This test is not particular useful in this case.  He is not having an MI.  Does not make urine.  Sepsis remains a possibility although I think it is unlikely.  His lactic acid and white count are normal.    The patient's mental status is still diminished and is still not at baseline and he is confused.  Because of his persistent altered mental status he should be readmitted.  I spoke with Dr. Timmons who will see the patient.  He is admitted in guarded condition per    The patient did go to dialysis today and the family reports a complete run of dialysis.  He has no nuchal rigidity I doubt this is meningitis.  Patient requires further workup and treatment is admitted to Dr. Timmons.    FINAL IMPRESSION  1. Altered level of consciousness    2. Weakness    3. ESRD (end stage renal disease) (HCC)    4.  Acute encephalopathy.    2.   3.         Electronically signed by: Jacky Apodaca, 11/26/2018 3:12 PM

## 2018-11-26 NOTE — ED TRIAGE NOTES
Patient has extensive history of diabetes and ckd on dialysis, [facility]  Returned because per wife patient is very weak, falling and confused.  Patient fell last night night and hit his head, causing a right black eye, no loc per family with fall.  He does not appear to be on blood thinners.  Patient has Toxic Metabolic encephalopathy and wife is having an increasing difficult time to take care of him.  Patient is blind.

## 2018-11-27 LAB
AMMONIA PLAS-SCNC: 34 UMOL/L (ref 11–45)
ERYTHROCYTE [DISTWIDTH] IN BLOOD BY AUTOMATED COUNT: 46.7 FL (ref 35.9–50)
GLUCOSE BLD-MCNC: 111 MG/DL (ref 65–99)
GLUCOSE BLD-MCNC: 142 MG/DL (ref 65–99)
GLUCOSE BLD-MCNC: 158 MG/DL (ref 65–99)
GLUCOSE BLD-MCNC: 180 MG/DL (ref 65–99)
HCT VFR BLD AUTO: 26.8 % (ref 42–52)
HGB BLD-MCNC: 8.7 G/DL (ref 14–18)
IRON SATN MFR SERPL: 32 % (ref 15–55)
IRON SERPL-MCNC: 68 UG/DL (ref 50–180)
MCH RBC QN AUTO: 30.9 PG (ref 27–33)
MCHC RBC AUTO-ENTMCNC: 32.5 G/DL (ref 33.7–35.3)
MCV RBC AUTO: 95 FL (ref 81.4–97.8)
PLATELET # BLD AUTO: 147 K/UL (ref 164–446)
PMV BLD AUTO: 10.7 FL (ref 9–12.9)
POTASSIUM SERPL-SCNC: 4.3 MMOL/L (ref 3.6–5.5)
RBC # BLD AUTO: 2.82 M/UL (ref 4.7–6.1)
TIBC SERPL-MCNC: 214 UG/DL (ref 250–450)
TREPONEMA PALLIDUM IGG+IGM AB [PRESENCE] IN SERUM OR PLASMA BY IMMUNOASSAY: NON REACTIVE
WBC # BLD AUTO: 5.8 K/UL (ref 4.8–10.8)

## 2018-11-27 PROCEDURE — 83550 IRON BINDING TEST: CPT

## 2018-11-27 PROCEDURE — 85027 COMPLETE CBC AUTOMATED: CPT

## 2018-11-27 PROCEDURE — 94760 N-INVAS EAR/PLS OXIMETRY 1: CPT

## 2018-11-27 PROCEDURE — A9270 NON-COVERED ITEM OR SERVICE: HCPCS | Performed by: INTERNAL MEDICINE

## 2018-11-27 PROCEDURE — 700105 HCHG RX REV CODE 258: Performed by: INTERNAL MEDICINE

## 2018-11-27 PROCEDURE — 700102 HCHG RX REV CODE 250 W/ 637 OVERRIDE(OP): Performed by: NURSE PRACTITIONER

## 2018-11-27 PROCEDURE — 770020 HCHG ROOM/CARE - TELE (206)

## 2018-11-27 PROCEDURE — 99232 SBSQ HOSP IP/OBS MODERATE 35: CPT | Performed by: INTERNAL MEDICINE

## 2018-11-27 PROCEDURE — 84132 ASSAY OF SERUM POTASSIUM: CPT

## 2018-11-27 PROCEDURE — 700102 HCHG RX REV CODE 250 W/ 637 OVERRIDE(OP): Performed by: INTERNAL MEDICINE

## 2018-11-27 PROCEDURE — 82140 ASSAY OF AMMONIA: CPT

## 2018-11-27 PROCEDURE — 99233 SBSQ HOSP IP/OBS HIGH 50: CPT | Performed by: INTERNAL MEDICINE

## 2018-11-27 PROCEDURE — 96372 THER/PROPH/DIAG INJ SC/IM: CPT

## 2018-11-27 PROCEDURE — A9270 NON-COVERED ITEM OR SERVICE: HCPCS | Performed by: NURSE PRACTITIONER

## 2018-11-27 PROCEDURE — 83036 HEMOGLOBIN GLYCOSYLATED A1C: CPT

## 2018-11-27 PROCEDURE — 36415 COLL VENOUS BLD VENIPUNCTURE: CPT

## 2018-11-27 PROCEDURE — 99222 1ST HOSP IP/OBS MODERATE 55: CPT | Performed by: PSYCHIATRY & NEUROLOGY

## 2018-11-27 PROCEDURE — 82962 GLUCOSE BLOOD TEST: CPT | Mod: 91

## 2018-11-27 PROCEDURE — 700111 HCHG RX REV CODE 636 W/ 250 OVERRIDE (IP): Performed by: INTERNAL MEDICINE

## 2018-11-27 PROCEDURE — 86780 TREPONEMA PALLIDUM: CPT

## 2018-11-27 PROCEDURE — 700105 HCHG RX REV CODE 258

## 2018-11-27 PROCEDURE — 83540 ASSAY OF IRON: CPT

## 2018-11-27 RX ORDER — CLONAZEPAM 0.5 MG/1
0.25 TABLET ORAL 3 TIMES DAILY
Status: DISCONTINUED | OUTPATIENT
Start: 2018-11-27 | End: 2018-11-27

## 2018-11-27 RX ORDER — CLONAZEPAM 0.5 MG/1
0.25 TABLET ORAL 3 TIMES DAILY
Status: DISCONTINUED | OUTPATIENT
Start: 2018-11-27 | End: 2018-11-29 | Stop reason: HOSPADM

## 2018-11-27 RX ORDER — SODIUM CHLORIDE 9 MG/ML
INJECTION, SOLUTION INTRAVENOUS
Status: COMPLETED
Start: 2018-11-27 | End: 2018-11-27

## 2018-11-27 RX ORDER — CLONAZEPAM 0.5 MG/1
0.25 TABLET ORAL 3 TIMES DAILY PRN
Status: DISCONTINUED | OUTPATIENT
Start: 2018-11-30 | End: 2018-11-29 | Stop reason: HOSPADM

## 2018-11-27 RX ADMIN — Medication 1 CAPSULE: at 08:23

## 2018-11-27 RX ADMIN — ATORVASTATIN CALCIUM 20 MG: 20 TABLET, FILM COATED ORAL at 05:41

## 2018-11-27 RX ADMIN — STANDARDIZED SENNA CONCENTRATE AND DOCUSATE SODIUM 2 TABLET: 8.6; 5 TABLET, FILM COATED ORAL at 05:40

## 2018-11-27 RX ADMIN — INSULIN HUMAN 1 UNITS: 100 INJECTION, SOLUTION PARENTERAL at 20:46

## 2018-11-27 RX ADMIN — PREDNISOLONE ACETATE 1 DROP: 10 SUSPENSION/ DROPS OPHTHALMIC at 17:31

## 2018-11-27 RX ADMIN — METOPROLOL TARTRATE 100 MG: 25 TABLET, FILM COATED ORAL at 05:41

## 2018-11-27 RX ADMIN — LISINOPRIL 40 MG: 20 TABLET ORAL at 05:50

## 2018-11-27 RX ADMIN — AMLODIPINE BESYLATE 10 MG: 10 TABLET ORAL at 05:41

## 2018-11-27 RX ADMIN — HYDRALAZINE HYDROCHLORIDE 100 MG: 50 TABLET, FILM COATED ORAL at 08:23

## 2018-11-27 RX ADMIN — PAROXETINE HYDROCHLORIDE 40 MG: 20 TABLET, FILM COATED ORAL at 06:28

## 2018-11-27 RX ADMIN — AZITHROMYCIN 250 MG: 250 TABLET, FILM COATED ORAL at 05:42

## 2018-11-27 RX ADMIN — SODIUM CHLORIDE 3 G: 900 INJECTION INTRAVENOUS at 17:15

## 2018-11-27 RX ADMIN — SEVELAMER CARBONATE 1600 MG: 800 TABLET, FILM COATED ORAL at 08:23

## 2018-11-27 RX ADMIN — HYDRALAZINE HYDROCHLORIDE 100 MG: 50 TABLET, FILM COATED ORAL at 20:38

## 2018-11-27 RX ADMIN — CLONAZEPAM 0.25 MG: 0.5 TABLET ORAL at 17:12

## 2018-11-27 RX ADMIN — STANDARDIZED SENNA CONCENTRATE AND DOCUSATE SODIUM 2 TABLET: 8.6; 5 TABLET, FILM COATED ORAL at 17:11

## 2018-11-27 RX ADMIN — SEVELAMER CARBONATE 1600 MG: 800 TABLET, FILM COATED ORAL at 17:11

## 2018-11-27 RX ADMIN — HEPARIN SODIUM 5000 UNITS: 5000 INJECTION, SOLUTION INTRAVENOUS; SUBCUTANEOUS at 20:39

## 2018-11-27 RX ADMIN — SEVELAMER CARBONATE 1600 MG: 800 TABLET, FILM COATED ORAL at 12:06

## 2018-11-27 RX ADMIN — OMEPRAZOLE 20 MG: 20 CAPSULE, DELAYED RELEASE ORAL at 05:41

## 2018-11-27 RX ADMIN — METOPROLOL TARTRATE 100 MG: 25 TABLET, FILM COATED ORAL at 17:11

## 2018-11-27 RX ADMIN — OXYCODONE HYDROCHLORIDE AND ACETAMINOPHEN 500 MG: 500 TABLET ORAL at 17:15

## 2018-11-27 RX ADMIN — PREDNISOLONE ACETATE 1 DROP: 10 SUSPENSION/ DROPS OPHTHALMIC at 05:49

## 2018-11-27 RX ADMIN — OXYCODONE HYDROCHLORIDE AND ACETAMINOPHEN 500 MG: 500 TABLET ORAL at 05:41

## 2018-11-27 RX ADMIN — HEPARIN SODIUM 5000 UNITS: 5000 INJECTION, SOLUTION INTRAVENOUS; SUBCUTANEOUS at 05:47

## 2018-11-27 RX ADMIN — HEPARIN SODIUM 5000 UNITS: 5000 INJECTION, SOLUTION INTRAVENOUS; SUBCUTANEOUS at 14:06

## 2018-11-27 RX ADMIN — SODIUM CHLORIDE: 9 INJECTION, SOLUTION INTRAVENOUS at 05:30

## 2018-11-27 RX ADMIN — SODIUM CHLORIDE 3 G: 900 INJECTION INTRAVENOUS at 05:35

## 2018-11-27 RX ADMIN — HYDRALAZINE HYDROCHLORIDE 100 MG: 50 TABLET, FILM COATED ORAL at 14:06

## 2018-11-27 ASSESSMENT — COPD QUESTIONNAIRES
DURING THE PAST 4 WEEKS HOW MUCH DID YOU FEEL SHORT OF BREATH: SOME OF THE TIME
HAVE YOU SMOKED AT LEAST 100 CIGARETTES IN YOUR ENTIRE LIFE: NO/DON'T KNOW
COPD SCREENING SCORE: 3
DO YOU EVER COUGH UP ANY MUCUS OR PHLEGM?: NO/ONLY WITH OCCASIONAL COLDS OR INFECTIONS

## 2018-11-27 ASSESSMENT — ENCOUNTER SYMPTOMS
SPUTUM PRODUCTION: 0
SHORTNESS OF BREATH: 0
SEIZURES: 0
MYALGIAS: 1
HALLUCINATIONS: 0
COUGH: 0
SPEECH CHANGE: 0
ABDOMINAL PAIN: 0
SENSORY CHANGE: 0
FEVER: 0
PALPITATIONS: 0
HEADACHES: 0
FOCAL WEAKNESS: 0
DIARRHEA: 0
NAUSEA: 0
VOMITING: 0
CHILLS: 0

## 2018-11-27 ASSESSMENT — PAIN SCALES - GENERAL
PAINLEVEL_OUTOF10: 0
PAINLEVEL_OUTOF10: 3

## 2018-11-27 ASSESSMENT — LIFESTYLE VARIABLES: EVER_SMOKED: YES

## 2018-11-27 NOTE — ED NOTES
Patient medicated with evening medications with assistance from family. Antibiotics initiated per orders.

## 2018-11-27 NOTE — H&P
Hospital Medicine History & Physical Note    Date of Service  11/26/2018    Primary Care Physician  Faisal Germain M.D.    Consultants  Nephrology  Call Neurology in the AM for opinion on altered mentation and muscle twitching    Code Status  full    Chief Complaint  Weakness and Altered Mental Status        History of Presenting Illness  58 y.o. male who presented 11/26/2018 with Weakness and Altered Mental Status  History of ESRD on dialysis  Known blindness    Hospitalized for confusion yesterday. Had full, exhaustive work-up including CTs, MRIs, EEGs.  Had dialysis yesterday  More confused after discharge. More irritable and angry.  Also had shaking, chills. No fevers. The shaking and muscle twitching is new.   On PRN Reglan for nausea    At the ED, afebrile and hemodynamically stable.  CT head showed:  1.  Cerebral atrophy.  2.  White matter lucencies most consistent with small vessel ischemic change versus demyelination or gliosis.  3. No intracranial mass effect or acute hemorrhage identified.  4. Deformity of the right optic globe similar to previous findings and consistent with injury.  CXR showed poss edema vs pneumonitis. Same with CT chest that was ordered.  Labs reviewed. Other than mild anemia, elevated BUN and creatinine, stable. (normal K no acidosis)  When I saw him at the ED, he is confused. Blind. Lip smaking. Twitches. Seems to have asterixis. Trace LE edema. Family at bedside with questions.    Review of Systems  ROS    Past Medical History   has a past medical history of Anemia; Blind in both eyes; Diabetes (Formerly [facility]); Dialysis patient (Formerly [facility]); ESRD (end stage renal disease) (Formerly [facility]); Heart burn; Hyperlipemia; and Hypertension.    Surgical History   has a past surgical history that includes pr remv cataract extracap,insert lens (10/21/15); other (3/2015); other (7/2015); other (9/22/15); cath placement (Right, 12/10/2015); av fistula creation (Left, 12/10/2015); recovery (2/24/2016); and recovery  (4/19/2016).     Family History  family history is not on file.   No fam history of ESRD  Dementia in the family  Social History   reports that he has never smoked. He has never used smokeless tobacco. He reports that he does not drink alcohol or use drugs.    Allergies  No Known Allergies    Medications  Prior to Admission Medications   Prescriptions Last Dose Informant Patient Reported? Taking?   Cholecalciferol (VITAMIN D3 PO) 11/26/2018 at am  Yes No   Sig: Take 1 Cap by mouth every day.   Sevelamer Carbonate (RENVELA) 800 MG Tab 11/26/2018 at am Family Member Yes No   Sig: Take 2-3 Tabs by mouth 3 times a day, with meals. 2 tabs TID With meals, and 1 tab twice with snacks   amlodipine (NORVASC) 10 MG Tab 11/26/2018 at am Family Member No No   Sig: Take 1 Tab by mouth every day.   ascorbic acid (VITAMIN C) 500 MG tablet 11/26/2018 at am Family Member No No   Sig: Take 1 Tab by mouth 2 Times a Day.   atorvastatin (LIPITOR) 20 MG Tab 11/26/2018 at am  No No   Sig: TAKE 1 TAB BY MOUTH DAILY   hydrALAZINE (APRESOLINE) 100 MG tablet NEW RX at NEW  No No   Sig: Take 1 Tab by mouth every 8 hours.   insulin NPH (HUMULIN,NOVOLIN) 100 UNIT/ML Suspension unknown at Unknown time  No No   Sig: Inject 10 Units as instructed every morning.   lactulose 10 GM/15ML Solution 11/26/2018 at am Family Member Yes No   Sig: Take 20 g by mouth every day.   lisinopril (PRINIVIL) 20 MG Tab 11/26/2018 at am  Yes No   Sig: Take 40 mg by mouth every day.   metoclopramide (REGLAN) 5 MG tablet 11/21/2018 at am  No No   Sig: TAKE 1 TAB BY MOUTH BEFORE MEALS AND AT BEDTIME   metoprolol (LOPRESSOR) 100 MG Tab 11/26/2018 at am Family Member No No   Sig: Take 1 Tab by mouth 2 Times a Day.   omeprazole (PRILOSEC) 20 MG delayed-release capsule 11/26/2018 at am Family Member No No   Sig: TAKE 1 CAPSULE BY MOUTH EVERY DAY   paroxetine (PAXIL) 40 MG tablet 11/26/2018 at am  Yes No   Sig: Take 40 mg by mouth every day.   prednisoLONE acetate (PRED FORTE)  1 % Suspension 11/26/2018 at am  Yes No   Sig: Place 1 Drop in right eye 2 Times a Day.   promethazine (PHENERGAN) 12.5 MG tablet unknown at unknown Family Member No No   Sig: Take 1-2 Tabs by mouth every four hours as needed for Nausea/Vomiting (if no relief from ondansetron or if ondansetron is not ordered).      Facility-Administered Medications Last Administration Doses Remaining   sevelamer carbonate (RENVELA) tablet 800 mg None recorded 1095          Physical Exam  Temp:  [36.4 °C (97.5 °F)] 36.4 °C (97.5 °F)  Pulse:  [72-79] 72  Resp:  [16] 16  BP: (118)/(61) 118/61    Physical Exam    Laboratory:  Recent Labs      11/26/18   1523   WBC  6.6   RBC  3.27*   HEMOGLOBIN  10.1*   HEMATOCRIT  30.4*   MCV  93.0   MCH  30.9   MCHC  33.2*   RDW  45.1   PLATELETCT  176   MPV  10.5     Recent Labs      11/25/18   0508  11/26/18   1523   SODIUM  133*  134*   POTASSIUM  5.1  4.3   CHLORIDE  95*  93*   CO2  28  31   GLUCOSE  107*  258*   BUN  59*  32*   CREATININE  6.76*  4.01*   CALCIUM  8.8  9.2     Recent Labs      11/25/18   0508  11/26/18   1523   ALTSGPT   --   13   ASTSGOT   --   10*   ALKPHOSPHAT   --   98   TBILIRUBIN   --   0.4   GLUCOSE  107*  258*     Recent Labs      11/26/18   1523   APTT  29.4   INR  0.97     Recent Labs      11/26/18   1523   BNPBTYPENAT  658*         Recent Labs      11/26/18   1523   TROPONINI  0.03       Urinalysis:    No results found     Imaging:  CT-CHEST (THORAX) W/O   Final Result      1.  Small bilateral pleural effusions are noted, larger on the left side. These were also present in 2016.      2.  Pulmonary opacifications with poorly defined borders are noted in the lower pulmonary lobes bilaterally. Findings could be due to edema aspiration or pneumonia.      3.  No pneumothorax identified.      4.  Cardiomegaly is again noted.      5.  Right adrenal mass is again noted likely representing adenoma.            CT-HEAD W/O   Final Result      1.  Cerebral atrophy.      2.  White  matter lucencies most consistent with small vessel ischemic change versus demyelination or gliosis.      3. No intracranial mass effect or acute hemorrhage identified.      4. Deformity of the right optic globe similar to previous findings and consistent with injury.         CT-CSPINE WITHOUT PLUS RECONS   Final Result      No acute fracture cervical spine. Degenerative changes.   Small left pleural effusion upper left hemothorax.      DX-CHEST-PORTABLE (1 VIEW)   Final Result      Hypoinflation. Bilateral lung base atelectasis with mild edema or pneumonitis not excluded.            Assessment/Plan:  I anticipate this patient is appropriate for observation status at this time.    * Toxic metabolic encephalopathy- (present on admission)   Assessment & Plan    Multifactorial  PRES in the differential  Work-up, which was exhaustive last hospitalization was unremarkable so far  Has muscle twitching and possibly asterixis as well which may be related to ESRD  PT/OT   Please call Neurology in the AM to provide opinion on etiology. Family requesting.  Ordered TSH and B12     Muscle twitching   Assessment & Plan    Fasciculations and asterixis  Also diskinetic jaw  Has used Reglan in the past; not currently  Also related to the fact he has ESRD and electrolyte shifting  Consult NEurology in the AM for opinion  Avoid antiemetics and Reglan     Acute on chronic respiratory failure with hypoxia (HCC)- (present on admission)   Assessment & Plan    Both CXR and CT Chest show patchy opacities that can either be edema or pneumonia  Fluid restriction, diuretics and dialysis  IV Abx; downgrade or discontinue if procalcitonin negative or low  Ordered baseline ABG  Low threshold for Pulmonology consult for opinion     HCAP (healthcare-associated pneumonia)- (present on admission)   Assessment & Plan    IV Abs, resp and O2 per protocol  Follow procalcitonin     ESRD on dialysis (HCC)- (present on admission)   Assessment & Plan     Nephrology consulted.     Anemia of chronic renal failure- (present on admission)   Assessment & Plan    Mild currently  Ordered iron panel (last one was 2015 on our system)  As per Nephrology     Type 2 diabetes mellitus with nephropathy, and retinopathy (HCC)- (present on admission)   Assessment & Plan    Basal and SS insulin  Order A1c,   Avoid hypoglycemia  Rule out neuroglycopenia as a cause of altered mental statis     Hypertension- (present on admission)   Assessment & Plan    Stable, but not well controlled  COntinue his blood pressure medications and diuretics     Blindness- (present on admission)   Assessment & Plan    Noted  Requested private room     Hyperlipidemia- (present on admission)   Assessment & Plan    Continue statin         VTE prophylaxis: heparin SQ  Reviewed vitals, labs, imaging, staff notes.  Discussed assessment and plan with Pj Britt's family at bedside  Discussed with ED physician.

## 2018-11-27 NOTE — ASSESSMENT & PLAN NOTE
No active signs of bleeding  Ordered iron panel (last one was 2015 on our system)  Continue Epogen as per nephrology

## 2018-11-27 NOTE — CONSULTS
Consults   Nephrology Inpatient Consultation    Date of Service  11/27/2018    Reason for Consultation  ESRD, assess the need for dialysis    History of Presenting Illness  58 y.o. male admitted 11/26/2018 with end-stage renal disease.  The patient is on hemodialysis Monday Wednesday Friday at [facility] in [place].  He was hospitalized previously for altered mental status and muscle twitching and had a workup for this.  The discharge diagnosis was toxic encephalopathy.  The wife tells me that he has become more and more confused.  It seems that he is hallucinating.  She brought him in after his dialysis on Monday.  Electrolytes are stable.    Referring Physician  SORIN Saini*    Consulting Physician  Josh Montgomery M.D.    Review of Systems  Review of Systems   Unable to perform ROS: Mental acuity      Past Medical History  Past Medical History:   Diagnosis Date   • Anemia    • Blind in both eyes    • Diabetes (HCC)     insulin dependent   • Dialysis patient ([facility])     [facility]   • ESRD (end stage renal disease) ([facility])    • Heart burn    • Hyperlipemia    • Hypertension        Surgical History  Past Surgical History:   Procedure Laterality Date   • RECOVERY  4/19/2016    Procedure: VASCULAR CASE-ILEANA-LEFT ARM FISTULOGRAM WITH ANGIOPLASTY 03049, 30812, 53084-WRA STAGE RENAL DISEASE N18.6;  Surgeon: Perfecto Surgery;  Location: SURGERY PRE-POST PROC UNIT [facility];  Service:    • RECOVERY  2/24/2016    Procedure: IR1 VASCULAR CASE LEFT ARM FISTULOGRAM WITH ANGIOPLASTY;  Surgeon: Perfecto Surgery;  Location: SURGERY PRE-POST PROC UNIT [facility];  Service:    • CATH PLACEMENT Right 12/10/2015    Procedure: CATH PLACEMENT;  Surgeon: Lorene Baldwin M.D.;  Location: SURGERY [facility];  Service:    • AV FISTULA CREATION Left 12/10/2015    Procedure: AV FISTULA CREATION;  Surgeon: Lorene Baldwin M.D.;  Location: SURGERY [facility];  Service:    • PB REMV CATARACT EXTRACAP,INSERT LENS  10/21/15    • OTHER  9/22/15    trabecular micro bypass stent- right eye   • OTHER  7/2015    take out blood of left eye   • OTHER  3/2015    take blood out of eye        Medications  No current facility-administered medications on file prior to encounter.      Current Outpatient Prescriptions on File Prior to Encounter   Medication Sig Dispense Refill   • hydrALAZINE (APRESOLINE) 100 MG tablet Take 1 Tab by mouth every 8 hours. 90 Tab 2   • insulin NPH (HUMULIN,NOVOLIN) 100 UNIT/ML Suspension Inject 10 Units as instructed every morning. 1 Vial 0   • lisinopril (PRINIVIL) 20 MG Tab Take 40 mg by mouth every day.     • paroxetine (PAXIL) 40 MG tablet Take 40 mg by mouth every day.     • Cholecalciferol (VITAMIN D3 PO) Take 1 Cap by mouth every day.     • prednisoLONE acetate (PRED FORTE) 1 % Suspension Place 1 Drop in right eye 2 Times a Day.     • metoclopramide (REGLAN) 5 MG tablet TAKE 1 TAB BY MOUTH BEFORE MEALS AND AT BEDTIME 90 Tab 2   • atorvastatin (LIPITOR) 20 MG Tab TAKE 1 TAB BY MOUTH DAILY 90 Tab 3   • Sevelamer Carbonate (RENVELA) 800 MG Tab Take 2-3 Tabs by mouth 3 times a day, with meals. 2 tabs TID With meals, and 1 tab twice with snacks     • lactulose 10 GM/15ML Solution Take 20 g by mouth every day.     • omeprazole (PRILOSEC) 20 MG delayed-release capsule TAKE 1 CAPSULE BY MOUTH EVERY DAY 30 Cap 0   • promethazine (PHENERGAN) 12.5 MG tablet Take 1-2 Tabs by mouth every four hours as needed for Nausea/Vomiting (if no relief from ondansetron or if ondansetron is not ordered). 30 Tab 0   • metoprolol (LOPRESSOR) 100 MG Tab Take 1 Tab by mouth 2 Times a Day. 60 Tab 2   • amlodipine (NORVASC) 10 MG Tab Take 1 Tab by mouth every day. 30 Tab 2   • ascorbic acid (VITAMIN C) 500 MG tablet Take 1 Tab by mouth 2 Times a Day. 30 Tab 2       Family History  family history is not on file.    Social History  Social History   Substance Use Topics   • Smoking status: Never Smoker   • Smokeless tobacco: Never Used   •  Alcohol use No       Allergies  No Known Allergies     Physical Exam  Laboratory/Imaging   Hemodynamics  Temp (24hrs), Av.3 °C (99.2 °F), Min:37.1 °C (98.7 °F), Max:37.8 °C (100 °F)   Temperature: 37.5 °C (99.5 °F)  Pulse  Av.5  Min: 66  Max: 79 Heart Rate (Monitored): 71  Blood Pressure: 117/50, NIBP: (!) 165/55      Respiratory      Respiration: 17, Pulse Oximetry: 97 %, O2 Daily Delivery Respiratory : Silicone Nasal Cannula     Work Of Breathing / Effort: Mild  RUL Breath Sounds: Clear, RML Breath Sounds: Clear;Diminished, RLL Breath Sounds: Clear;Diminished, ANABEL Breath Sounds: Clear, LLL Breath Sounds: Clear;Diminished    Fluids  Date 18 0700 - 18 0659   Shift 7629-7476 3689-6719 7392-4232 24 Hour Total   I  N  T  A  K  E   P.O. 120   120    Shift Total 120   120   O  U  T  P  U  T   Shift Total       Weight (kg) 67.5 67.5 67.5 67.5         Nutrition  Orders Placed This Encounter   Procedures   • Diet Order Renal, Diabetic     Standing Status:   Standing     Number of Occurrences:   1     Order Specific Question:   Diet:     Answer:   Renal [8]     Order Specific Question:   Diet:     Answer:   Diabetic [3]       Physical Exam   Constitutional: He appears well-developed. He has a sickly appearance.   Cardiovascular: Normal rate and regular rhythm.    Pulmonary/Chest: Effort normal and breath sounds normal.   Abdominal: Soft. Bowel sounds are normal.   Musculoskeletal: He exhibits no edema or tenderness.   Neurological: He is alert.   Confused   Skin: Skin is warm and dry.       Recent Labs      18   1523  18   0054   WBC  6.6  5.8   RBC  3.27*  2.82*   HEMOGLOBIN  10.1*  8.7*   HEMATOCRIT  30.4*  26.8*   MCV  93.0  95.0   MCH  30.9  30.9   MCHC  33.2*  32.5*   RDW  45.1  46.7   PLATELETCT  176  147*   MPV  10.5  10.7     Recent Labs      18   0508  18   1523   SODIUM  133*  134*   POTASSIUM  5.1  4.3   CHLORIDE  95*  93*   CO2  28  31   GLUCOSE  107*  258*   BUN  59*   32*   CREATININE  6.76*  4.01*   CALCIUM  8.8  9.2     Recent Labs      11/26/18   1523   APTT  29.4   INR  0.97     Recent Labs      11/26/18   1523   BNPBTYPENAT  658*              Assessment/Plan     1.  End-stage renal disease, normally on a Monday Wednesday Friday schedule.  No indication for dialysis today.  Plan on dialysis tomorrow.  2. Muscle twitching, unclear cause  3. Anemia, add epogen    -HD tomorrow

## 2018-11-27 NOTE — CONSULTS
"Chief Complaint   Patient presents with   • Weakness   • Altered Mental Status       Problem List Items Addressed This Visit     None      Visit Diagnoses     Altered level of consciousness        Weakness        ESRD (end stage renal disease) ([facility])          Hospital Neurology Consult      History of present illness:  This is a 58-year old male with PMhx significant for ESRD (on HD since 2015, Monday/Wed/Fri with Left arm AV fistula), hypertension, dyslipidemia, type II diabetes mellitus, and anemia of chronic disease who presented to [facility] on 11/26/18 for a chief complaint of persistent altered mental status. HPI provided by wife at bedside. She states that since September 2018, patient's cognition/mentation has been gradually declining; she further describes this as mild confusion, forgetfulness, and lethargy. She admits that this impairment appears to fluctuate, and appears to worsen just prior to and during dialysis. Last week (11/21/18) during dialysis, patient was noted to become very confused, impulsive and uncooperative; pulling at lines, removing clothing, etc. Patient was thus brought to [facility] for further evaluation. While here, patient had CT Brain that was found to be unremarkable; also had EEG (26 min study) with mild underlying encephalopathy and FIRDA; no seizures/epileptiforms noted. MRI Brain wo contrast also completed at that time revealed no acute intracranial abnormality. Patient was discharged on 11/24/18 with dx. of encephalopathy and was instructed to follow-up with outpatient neurology.   Per wife, after discharge patient again became very confused; pulling off clothing, occasionally hallucinating (visual); also had mechanical fall from standing resulting in Right periorbital ecchymosis and Left shoulder pain. Patient also witnessed by wife to have brief, intermittent \"jerking\" movements to body. Wife thus brought patient back to hospital for further evaluation. At time of " presentation here on 11/26, repeat CT Brain revealed no acute intracranial abnormality or hemorrhage. Currently, patient is laying in bed; drowsy, arousable to voice. He is oriented x 4. Admits to mild Left shoulder pain from recent fall. Denies headache or dizziness at this time. Admits to mild generalized weakness; denies focal weakness, numbness or tingling.     Neurology has been consulted by Dr. Carnes to further evaluate the findings noted above.     Past medical history:   Past Medical History:   Diagnosis Date   • Anemia    • Blind in both eyes    • Diabetes (HCC)     insulin dependent   • Dialysis patient (HCC)     Earline KEATING   • ESRD (end stage renal disease) ([facility])    • Heart burn    • Hyperlipemia    • Hypertension        Past surgical history:   Past Surgical History:   Procedure Laterality Date   • RECOVERY  4/19/2016    Procedure: VASCULAR CASE-ILEANA-LEFT ARM FISTULOGRAM WITH ANGIOPLASTY 90610, 82727, 23511-SUG STAGE RENAL DISEASE N18.6;  Surgeon: Perfecto Surgery;  [place] [facility]  Service:    • RECOVERY  2/24/2016    Procedure: IR1 VASCULAR CASE LEFT ARM FISTULOGRAM WITH ANGIOPLASTY;  Surgeon: Perfecto Surgery;  [place] [facility]  Service:    • CATH PLACEMENT Right 12/10/2015    Procedure: CATH PLACEMENT;  Surgeon: Lorene Baldwin M.D.;  Location: SURGERY [facility];  Service:    • AV FISTULA CREATION Left 12/10/2015    Procedure: AV FISTULA CREATION;  Surgeon: Lorene Baldwin M.D.;  Location: SURGERY [facility];  Service:    • PB REMV CATARACT EXTRACAP,INSERT LENS  10/21/15   • OTHER  9/22/15    trabecular micro bypass stent- right eye   • OTHER  7/2015    take out blood of left eye   • OTHER  3/2015    take blood out of eye        Family history:   History reviewed. No pertinent family history.    Social history:   Social History     Social History   • Marital status:      Spouse name: N/A   • Number of children: N/A    • Years of education: N/A     Occupational History   • Not on file.     Social History Main Topics   • Smoking status: Never Smoker   • Smokeless tobacco: Never Used   • Alcohol use No   • Drug use: No   • Sexual activity: Not on file     Other Topics Concern   • Not on file     Social History Narrative   • No narrative on file       Current medications:   Current Facility-Administered Medications   Medication Dose   • lidocaine (XYLOCAINE) 1 % injection 1 mL  1 mL   • clonazePAM (KLONOPIN) tablet 0.25 mg  0.25 mg   • amLODIPine (NORVASC) tablet 10 mg  10 mg   • ascorbic acid tablet 500 mg  500 mg   • atorvastatin (LIPITOR) tablet 20 mg  20 mg   • hydrALAZINE (APRESOLINE) tablet 100 mg  100 mg   • insulin NPH (HUMULIN,NOVOLIN) injection 10 Units  10 Units   • lisinopril (PRINIVIL) tablet 40 mg  40 mg   • metoprolol (LOPRESSOR) tablet 100 mg  100 mg   • omeprazole (PRILOSEC) capsule 20 mg  20 mg   • PARoxetine (PAXIL) tablet 40 mg  40 mg   • prednisoLONE acetate (PRED FORTE) 1 % ophthalmic suspension 1 Drop  1 Drop   • sevelamer carbonate (RENVELA) tablet 1,600 mg  1,600 mg   • senna-docusate (PERICOLACE or SENOKOT S) 8.6-50 MG per tablet 2 Tab  2 Tab    And   • polyethylene glycol/lytes (MIRALAX) PACKET 1 Packet  1 Packet    And   • magnesium hydroxide (MILK OF MAGNESIA) suspension 30 mL  30 mL    And   • bisacodyl (DULCOLAX) suppository 10 mg  10 mg   • heparin injection 5,000 Units  5,000 Units   • NS (BOLUS) infusion 500 mL  500 mL   • Respiratory Care per Protocol     • ampicillin/sulbactam (UNASYN) 3 g in  mL IVPB  3 g   • azithromycin (ZITHROMAX) tablet 250 mg  250 mg   • lactobacillus rhamnosus (CULTURELLE) capsule 1 Cap  1 Cap   • insulin regular (HUMULIN R) injection 1-6 Units  1-6 Units    And   • glucose 4 g chewable tablet 16 g  16 g    And   • dextrose 50% (D50W) injection 25 mL  25 mL       Medication Allergy:  No Known Allergies    Review of systems:   Constitutional: denies fever, night  sweats, weight loss.   Eyes: denies acute vision change, eye pain or secretion.   Ears, Nose, Mouth, Throat: denies nasal secretion, nasal bleeding, difficulty swallowing, hearing loss, tinnitus, vertigo, ear pain, acute dental problems, oral ulcers or lesions.   Endocrine: denies recent weight changes, heat or cold intolerance, polyuria, polydypsia, polyphagia,abnormal hair growth.  Cardiovascular: denies new onset of chest pain, palpitations, syncope, or dyspnea of exertion.  Pulmonary: denies shortness of breath, new onset of cough, hemoptysis, wheezing, chest pain or flu-like symptoms.   GI: denies nausea, vomiting, diarrhea, GI bleeding, change in appetite, abdominal pain, and change in bowel habits.  : Oliguric at  Baseline.   Heme/oncology: denies history of easy bruising or bleeding. No history of cancer, DVTor PE.  Allergy/immunology: denies hives/urticaria, or itching.   Dermatologic: denies new rash, or new skin lesions.  Musculoskeletal:denies joint swelling or pain, muscle pain, neck and back pain.  Neurologic: As noted above.   Psychiatric: denies symptoms of depression, anxiety, hallucinations, mood swings or changes, suicidal or homicidal thoughts.     Physical examination:   Vitals:    11/27/18 0530 11/27/18 0716 11/27/18 0849 11/27/18 1213   BP: 142/55 116/53 137/88 117/50   Pulse: 73  66 66   Resp: 16  16 17   Temp: 37.2 °C (99 °F)  37.8 °C (100 °F) 37.5 °C (99.5 °F)   TempSrc:   Temporal Temporal   SpO2: 91%  97% 97%   Weight:       Height:         General: Patient in no acute distress, cooperative.  HEENT: Normocephalic, no signs of acute trauma. Note Right eye opacity; chronic.   Neck: supple, no meningeal signs or carotid bruits. There is normal range of motion. No tenderness on exam.   Chest: clear to auscultation. No cough.   CV: RRR, no murmurs.   Skin: no signs of acute rashes or trauma.   Musculoskeletal: joints exhibit full range of motion, without any pain to palpation. There are no  signs of joint or muscle swelling. There is no tenderness to deep palpation of muscles.   Psychiatric: No hallucinatory behavior. Denies symptoms of depression or suicidal ideation. Mood and affect appear normal on exam.     NEUROLOGICAL EXAM:   Mental status, orientation: Drowsy, briskly arousable. fully oriented to self, place time and situation.   Speech and language: speech is clear and fluent. The patient is able to name, repeat and comprehend.   Memory: There is intact recollection of recent and remote events.   Cranial nerve exam: Right eye pupil is non reactive. Left Pupil is 3 mm and reactive to light and accommodation. Patient is legally blind bilaterally. Intact full EOM in all directions of gaze. Face appears symmetric. Sensation in the face is intact to light touch. Tongue is midline and without any signs of tongue biting or fasciculations. Shoulder shrug is intact bilaterally.   Motor exam: Strength is 5/5 in all extremities. No drift. Tone is normal. No abnormal movements were seen on exam.   Sensory exam reveals normal sense of light touch and pinprick in all extremities.   Deep tendon reflexes:  2+ throughout. Plantar responses are flexor. There is no clonus.   Coordination: shows a normal finger-nose-finger. Normal rapidly alternating movements.   Gait: Not assessed at this time.    ANCILLARY DATA REVIEWED:     Lab Data Review:  Recent Results (from the past 24 hour(s))   CBC WITH DIFFERENTIAL    Collection Time: 11/26/18  3:23 PM   Result Value Ref Range    WBC 6.6 4.8 - 10.8 K/uL    RBC 3.27 (L) 4.70 - 6.10 M/uL    Hemoglobin 10.1 (L) 14.0 - 18.0 g/dL    Hematocrit 30.4 (L) 42.0 - 52.0 %    MCV 93.0 81.4 - 97.8 fL    MCH 30.9 27.0 - 33.0 pg    MCHC 33.2 (L) 33.7 - 35.3 g/dL    RDW 45.1 35.9 - 50.0 fL    Platelet Count 176 164 - 446 K/uL    MPV 10.5 9.0 - 12.9 fL    Neutrophils-Polys 84.30 (H) 44.00 - 72.00 %    Lymphocytes 5.30 (L) 22.00 - 41.00 %    Monocytes 8.30 0.00 - 13.40 %    Eosinophils  1.50 0.00 - 6.90 %    Basophils 0.30 0.00 - 1.80 %    Immature Granulocytes 0.30 0.00 - 0.90 %    Nucleated RBC 0.00 /100 WBC    Neutrophils (Absolute) 5.55 1.82 - 7.42 K/uL    Lymphs (Absolute) 0.35 (L) 1.00 - 4.80 K/uL    Monos (Absolute) 0.55 0.00 - 0.85 K/uL    Eos (Absolute) 0.10 0.00 - 0.51 K/uL    Baso (Absolute) 0.02 0.00 - 0.12 K/uL    Immature Granulocytes (abs) 0.02 0.00 - 0.11 K/uL    NRBC (Absolute) 0.00 K/uL   CMP    Collection Time: 11/26/18  3:23 PM   Result Value Ref Range    Sodium 134 (L) 135 - 145 mmol/L    Potassium 4.3 3.6 - 5.5 mmol/L    Chloride 93 (L) 96 - 112 mmol/L    Co2 31 20 - 33 mmol/L    Anion Gap 10.0 0.0 - 11.9    Glucose 258 (H) 65 - 99 mg/dL    Bun 32 (H) 8 - 22 mg/dL    Creatinine 4.01 (H) 0.50 - 1.40 mg/dL    Calcium 9.2 8.5 - 10.5 mg/dL    AST(SGOT) 10 (L) 12 - 45 U/L    ALT(SGPT) 13 2 - 50 U/L    Alkaline Phosphatase 98 30 - 99 U/L    Total Bilirubin 0.4 0.1 - 1.5 mg/dL    Albumin 4.3 3.2 - 4.9 g/dL    Total Protein 7.0 6.0 - 8.2 g/dL    Globulin 2.7 1.9 - 3.5 g/dL    A-G Ratio 1.6 g/dL   PT/INR    Collection Time: 11/26/18  3:23 PM   Result Value Ref Range    PT 13.0 12.0 - 14.6 sec    INR 0.97 0.87 - 1.13   LACTIC ACID    Collection Time: 11/26/18  3:23 PM   Result Value Ref Range    Lactic Acid 1.1 0.5 - 2.0 mmol/L   BLOOD CULTURE    Collection Time: 11/26/18  3:23 PM   Result Value Ref Range    Significant Indicator NEG     Source BLD     Site PERIPHERAL     Blood Culture       No Growth    Note: Blood cultures are incubated for 5 days and  are monitored continuously.Positive blood cultures  are called to the RN and reported as soon as  they are identified.     TROPONIN    Collection Time: 11/26/18  3:23 PM   Result Value Ref Range    Troponin I 0.03 0.00 - 0.04 ng/mL   BTYPE NATRIURETIC PEPTIDE    Collection Time: 11/26/18  3:23 PM   Result Value Ref Range    B Natriuretic Peptide 658 (H) 0 - 100 pg/mL   PHOSPHORUS    Collection Time: 11/26/18  3:23 PM   Result Value Ref  Range    Phosphorus 3.8 2.5 - 4.5 mg/dL   MAGNESIUM    Collection Time: 11/26/18  3:23 PM   Result Value Ref Range    Magnesium 2.1 1.5 - 2.5 mg/dL   APTT    Collection Time: 11/26/18  3:23 PM   Result Value Ref Range    APTT 29.4 24.7 - 36.0 sec   AMMONIA    Collection Time: 11/26/18  3:23 PM   Result Value Ref Range    Ammonia 32 11 - 45 umol/L   ESTIMATED GFR    Collection Time: 11/26/18  3:23 PM   Result Value Ref Range    GFR If  19 (A) >60 mL/min/1.73 m 2    GFR If Non African American 15 (A) >60 mL/min/1.73 m 2   BLOOD CULTURE    Collection Time: 11/26/18  4:31 PM   Result Value Ref Range    Significant Indicator NEG     Source BLD     Site PERIPHERAL     Blood Culture       No Growth    Note: Blood cultures are incubated for 5 days and  are monitored continuously.Positive blood cultures  are called to the RN and reported as soon as  they are identified.     LACTIC ACID    Collection Time: 11/26/18  4:32 PM   Result Value Ref Range    Lactic Acid 0.8 0.5 - 2.0 mmol/L   Lactic Acid -STAT Once    Collection Time: 11/26/18  6:48 PM   Result Value Ref Range    Lactic Acid 0.8 0.5 - 2.0 mmol/L   Procalcitonin    Collection Time: 11/26/18  6:48 PM   Result Value Ref Range    Procalcitonin 0.57 (H) <0.25 ng/mL   TSH WITH REFLEX TO FT4    Collection Time: 11/26/18  6:48 PM   Result Value Ref Range    TSH 1.730 0.380 - 5.330 uIU/mL   VITAMIN B12    Collection Time: 11/26/18  6:48 PM   Result Value Ref Range    Vitamin B12 -True Cobalamin 510 211 - 911 pg/mL   ACCU-CHEK GLUCOSE    Collection Time: 11/26/18  8:14 PM   Result Value Ref Range    Glucose - Accu-Ck 132 (H) 65 - 99 mg/dL   CBC without Differential    Collection Time: 11/27/18 12:54 AM   Result Value Ref Range    WBC 5.8 4.8 - 10.8 K/uL    RBC 2.82 (L) 4.70 - 6.10 M/uL    Hemoglobin 8.7 (L) 14.0 - 18.0 g/dL    Hematocrit 26.8 (L) 42.0 - 52.0 %    MCV 95.0 81.4 - 97.8 fL    MCH 30.9 27.0 - 33.0 pg    MCHC 32.5 (L) 33.7 - 35.3 g/dL    RDW 46.7  35.9 - 50.0 fL    Platelet Count 147 (L) 164 - 446 K/uL    MPV 10.7 9.0 - 12.9 fL   IRON/TOTAL IRON BIND    Collection Time: 11/27/18 12:54 AM   Result Value Ref Range    Iron 68 50 - 180 ug/dL    Total Iron Binding 214 (L) 250 - 450 ug/dL    % Saturation 32 15 - 55 %   ACCU-CHEK GLUCOSE    Collection Time: 11/27/18  5:52 AM   Result Value Ref Range    Glucose - Accu-Ck 111 (H) 65 - 99 mg/dL   ACCU-CHEK GLUCOSE    Collection Time: 11/27/18 12:07 PM   Result Value Ref Range    Glucose - Accu-Ck 142 (H) 65 - 99 mg/dL       Labs reviewed by me.     Records Reviewed:   Reviewed previous encounter (11/23/18); EEG from previous encounter also reviewed.     Imaging reviewed by me:     CT-CHEST (THORAX) W/O   Final Result      1.  Small bilateral pleural effusions are noted, larger on the left side. These were also present in 2016.      2.  Pulmonary opacifications with poorly defined borders are noted in the lower pulmonary lobes bilaterally. Findings could be due to edema aspiration or pneumonia.      3.  No pneumothorax identified.      4.  Cardiomegaly is again noted.      5.  Right adrenal mass is again noted likely representing adenoma.            CT-HEAD W/O   Final Result      1.  Cerebral atrophy.      2.  White matter lucencies most consistent with small vessel ischemic change versus demyelination or gliosis.      3. No intracranial mass effect or acute hemorrhage identified.      4. Deformity of the right optic globe similar to previous findings and consistent with injury.         CT-CSPINE WITHOUT PLUS RECONS   Final Result      No acute fracture cervical spine. Degenerative changes.   Small left pleural effusion upper left hemothorax.      DX-CHEST-PORTABLE (1 VIEW)   Final Result      Hypoinflation. Bilateral lung base atelectasis with mild edema or pneumonitis not excluded.          ASSESSMENT AND PLAN:  58-year old male with ESRD (on HD since 2015, Monday/Wed/Fri with Left arm AV fistula), hypertension,  "dyslipidemia, type II diabetes mellitus, and anemia of chronic disease who presented to [facility] on 11/26/18 for a chief complaint of progressive altered mental status (since September 2018), with fluctuating confusion (primarily before/during dialysis) and myoclonic jerking. Notably, patient was admitted here last week, work-up at that time included CT Brain and MRI Brain, both of which were unremarkable for acute findings (did note chronic findings consistent with patient's history). Patient also had EEG at that time consistent with mild underlying encephalopathy and FIRDA; no epileptiforms noted.   At time of my presentation to the bedside, patient appears drowsy however is briskly arousable; oriented to self, place, time and situation (\"here because I fell.\"). No focal neurological deficits appreciated by me. Did note multiple (2-3), brief, truncal myoclonic jerks during my examination. Further review of patient's medical record reveals that patient has had an upward trend in his BUN since this September. Considering this, as well as patient's non-focal neurological exam and essentially unremarkable work-up thus far, feel most likely etiology for patient's mild confusion is a toxic-metabolic encephalopathy. Specifically, a uremic encephalopathy in setting of a recent significant increase in BUN.      Recommendations/Plan:     -Will defer further Cr/BUN and electrolyte management to Nephrology, though would recommend more aggressive BUN management (previously in the 20s, now/most recently 30-59). Patient is planned for HD tomorrow (11/28).   -Check B12, TSH, RPR, Ammonia-- Note B12 WNL at 510, TSH WNL at 1.730, most recent Ammonia (11/26) WNL at 32. RPR pending.   -Start Clonazepam, 0.25 mg PO TID x 3 days (11/27 at 1700 through 11/30 at 1200), then TID PRN for myoclonus.   -Will plan to re-evaluate patient tomorrow to determine if need for further diagnostics; may consider continuous EEG monitoring x " 24 hours though feel an epileptogenic source of patient's symptoms is unlikely.  -Recommend PT/OT eval and treat; consider physiatry consult for deconditioning/functional decline.   -All other medical management per primary team/Nephrology.     The plan of care above has been discussed with Dr. Baldwin.     Dulce Arguelles MSN, BSN, AGNP-C  [facility]

## 2018-11-27 NOTE — PROGRESS NOTES
Received report from ED Gustavo DAVISON. Pt transported to Tele 8 with Abdelrahman DAVISON. All personal belongings brought up from ED with patient and family. Family at bedside. Pt put on tele monitor and monitor room notified. Discussed pt plan of care. Any questions and concerns addressed at this time. Bed locked and in lowest position. Educated pt on use of call light and call light within reach. Fall precautions in place. No other needs at this time.

## 2018-11-27 NOTE — RESPIRATORY CARE
COPD EDUCATION by COPD CLINICAL EDUCATOR  11/27/2018 at 8:06 AM by Hermelinda Carrillo     Patient reviewed by COPD education team. Patient does not qualify for COPD program.

## 2018-11-27 NOTE — ED NOTES
Report given at bedside to Tele 8 RN Alejandra.    Patient transported to tele floor via gurney with cardiac monitor in place accompanied by tele RN x 2 and family. All belongings accounted for,

## 2018-11-27 NOTE — ED NOTES
Report received at bedside from SAMAN Ojeda.    Patient resting on gurney. No acute distress. Complains of 2/10 pain to back. Currently A & O x 3, disoriented to event. GCS 14. Denies needs at this time. Family at bedside. Call bell within reach.

## 2018-11-27 NOTE — ASSESSMENT & PLAN NOTE
He is alert and oriented and answers questions appropriately.  It could be multifactorial multifactorial  Neurology evaluated him and made recommendations.  He underwent extensive workup for altered mental status.    Vitamin B12 and TSH are within normal limits.

## 2018-11-27 NOTE — PROGRESS NOTES
Took bedside report from SAMAN Roberts. Assumed patient care. Patient Resting comfortably in bed. Plan of care discussed. Questions and concerns answered at this time. Bed locked and in lowest position. Call light within reach. Will continue to monitor patient.

## 2018-11-27 NOTE — ASSESSMENT & PLAN NOTE
Symptoms improved significantly.  He underwent chest x-ray and CT scan which showed patchy opacities that could be either edema or pneumonia.    Fluid restriction, diuretics and dialysis  Continue IV antibiotics.

## 2018-11-27 NOTE — PROGRESS NOTES
2 RN skin check performed with Angi DAVISON.     Right eye bruised from fall previous night.   Right third toe has skin tear.   All other skin intact.

## 2018-11-27 NOTE — DISCHARGE PLANNING
Outpatient Dialysis Note    Confirmed patient is active at:    [facility]  [address] [place] [address]      Schedule: Monday, Wednesday, Friday  Time: 6:00 am    Spoke with Naresh at facility who confirmed.    Forwarded records for review.    Dialysis Coordinator, [facility]  Anat Almazan [phone]

## 2018-11-27 NOTE — ASSESSMENT & PLAN NOTE
Denies jerking movement.  Also related to the fact he has ESRD and electrolyte shifting  Neurology evaluated him.

## 2018-11-27 NOTE — CARE PLAN
Problem: Safety  Goal: Will remain free from falls  Outcome: PROGRESSING AS EXPECTED  Educated pt and family on use of call light and bed strip alarm. Call light within reach. Fall precautions in place.     Problem: Pain Management  Goal: Pain level will decrease to patient's comfort goal  Outcome: PROGRESSING AS EXPECTED  Discussed importance of pain management. Pt states pain is generalized and does not want any medication or intervention.

## 2018-11-27 NOTE — CARE PLAN
Problem: Safety  Goal: Will remain free from injury  Outcome: PROGRESSING AS EXPECTED  Discussed with the patient and family the importance of calling for assistance prior to getting out of bed in order to help prevent falls since patient is confused and has weakness. Patient and family accepting of the information but reinforcement may be required. All questions answered at this time.     Problem: Infection  Goal: Will remain free from infection  Outcome: PROGRESSING AS EXPECTED  Discussed with the patient and family the importance of washing hands before and after eating or using the restroom in order to help prevent infections. Patient accepting of the information. All questions answered at this time.

## 2018-11-28 LAB
ALBUMIN SERPL BCP-MCNC: 3.3 G/DL (ref 3.2–4.9)
ALBUMIN/GLOB SERPL: 1.4 G/DL
ALP SERPL-CCNC: 65 U/L (ref 30–99)
ALT SERPL-CCNC: 11 U/L (ref 2–50)
ANION GAP SERPL CALC-SCNC: 12 MMOL/L (ref 0–11.9)
AST SERPL-CCNC: 8 U/L (ref 12–45)
BILIRUB SERPL-MCNC: 0.3 MG/DL (ref 0.1–1.5)
BUN SERPL-MCNC: 62 MG/DL (ref 8–22)
CALCIUM SERPL-MCNC: 8.5 MG/DL (ref 8.5–10.5)
CHLORIDE SERPL-SCNC: 92 MMOL/L (ref 96–112)
CO2 SERPL-SCNC: 28 MMOL/L (ref 20–33)
CREAT SERPL-MCNC: 6.42 MG/DL (ref 0.5–1.4)
ERYTHROCYTE [DISTWIDTH] IN BLOOD BY AUTOMATED COUNT: 46.3 FL (ref 35.9–50)
EST. AVERAGE GLUCOSE BLD GHB EST-MCNC: 177 MG/DL
GLOBULIN SER CALC-MCNC: 2.3 G/DL (ref 1.9–3.5)
GLUCOSE BLD-MCNC: 117 MG/DL (ref 65–99)
GLUCOSE BLD-MCNC: 141 MG/DL (ref 65–99)
GLUCOSE BLD-MCNC: 163 MG/DL (ref 65–99)
GLUCOSE BLD-MCNC: 220 MG/DL (ref 65–99)
GLUCOSE SERPL-MCNC: 131 MG/DL (ref 65–99)
HBA1C MFR BLD: 7.8 % (ref 0–5.6)
HCT VFR BLD AUTO: 24.3 % (ref 42–52)
HGB BLD-MCNC: 8.1 G/DL (ref 14–18)
MCH RBC QN AUTO: 31.8 PG (ref 27–33)
MCHC RBC AUTO-ENTMCNC: 33.3 G/DL (ref 33.7–35.3)
MCV RBC AUTO: 95.3 FL (ref 81.4–97.8)
PLATELET # BLD AUTO: 133 K/UL (ref 164–446)
PMV BLD AUTO: 11.4 FL (ref 9–12.9)
POTASSIUM SERPL-SCNC: 4.5 MMOL/L (ref 3.6–5.5)
PROCALCITONIN SERPL-MCNC: 0.44 NG/ML
PROT SERPL-MCNC: 5.6 G/DL (ref 6–8.2)
RBC # BLD AUTO: 2.55 M/UL (ref 4.7–6.1)
SODIUM SERPL-SCNC: 132 MMOL/L (ref 135–145)
WBC # BLD AUTO: 5.7 K/UL (ref 4.8–10.8)

## 2018-11-28 PROCEDURE — G8978 MOBILITY CURRENT STATUS: HCPCS | Mod: CJ

## 2018-11-28 PROCEDURE — 97162 PT EVAL MOD COMPLEX 30 MIN: CPT

## 2018-11-28 PROCEDURE — 97165 OT EVAL LOW COMPLEX 30 MIN: CPT

## 2018-11-28 PROCEDURE — 700102 HCHG RX REV CODE 250 W/ 637 OVERRIDE(OP): Performed by: NURSE PRACTITIONER

## 2018-11-28 PROCEDURE — G8979 MOBILITY GOAL STATUS: HCPCS | Mod: CI

## 2018-11-28 PROCEDURE — 85027 COMPLETE CBC AUTOMATED: CPT

## 2018-11-28 PROCEDURE — 700111 HCHG RX REV CODE 636 W/ 250 OVERRIDE (IP): Performed by: INTERNAL MEDICINE

## 2018-11-28 PROCEDURE — 84145 PROCALCITONIN (PCT): CPT

## 2018-11-28 PROCEDURE — G8987 SELF CARE CURRENT STATUS: HCPCS | Mod: CK

## 2018-11-28 PROCEDURE — 5A1D70Z PERFORMANCE OF URINARY FILTRATION, INTERMITTENT, LESS THAN 6 HOURS PER DAY: ICD-10-PCS | Performed by: INTERNAL MEDICINE

## 2018-11-28 PROCEDURE — A9270 NON-COVERED ITEM OR SERVICE: HCPCS | Performed by: NURSE PRACTITIONER

## 2018-11-28 PROCEDURE — 36415 COLL VENOUS BLD VENIPUNCTURE: CPT

## 2018-11-28 PROCEDURE — 90935 HEMODIALYSIS ONE EVALUATION: CPT

## 2018-11-28 PROCEDURE — 99232 SBSQ HOSP IP/OBS MODERATE 35: CPT | Performed by: INTERNAL MEDICINE

## 2018-11-28 PROCEDURE — 700102 HCHG RX REV CODE 250 W/ 637 OVERRIDE(OP): Performed by: INTERNAL MEDICINE

## 2018-11-28 PROCEDURE — 770020 HCHG ROOM/CARE - TELE (206)

## 2018-11-28 PROCEDURE — 99233 SBSQ HOSP IP/OBS HIGH 50: CPT | Performed by: INTERNAL MEDICINE

## 2018-11-28 PROCEDURE — A9270 NON-COVERED ITEM OR SERVICE: HCPCS | Performed by: INTERNAL MEDICINE

## 2018-11-28 PROCEDURE — 80053 COMPREHEN METABOLIC PANEL: CPT

## 2018-11-28 PROCEDURE — 700105 HCHG RX REV CODE 258: Performed by: INTERNAL MEDICINE

## 2018-11-28 PROCEDURE — G8988 SELF CARE GOAL STATUS: HCPCS | Mod: CJ

## 2018-11-28 PROCEDURE — 82962 GLUCOSE BLOOD TEST: CPT | Mod: 91

## 2018-11-28 PROCEDURE — 99231 SBSQ HOSP IP/OBS SF/LOW 25: CPT | Performed by: PSYCHIATRY & NEUROLOGY

## 2018-11-28 RX ORDER — ACETAMINOPHEN 325 MG/1
650 TABLET ORAL EVERY 6 HOURS PRN
Status: DISCONTINUED | OUTPATIENT
Start: 2018-11-28 | End: 2018-11-29 | Stop reason: HOSPADM

## 2018-11-28 RX ORDER — AMOXICILLIN AND CLAVULANATE POTASSIUM 500; 125 MG/1; MG/1
1 TABLET, FILM COATED ORAL EVERY EVENING
Status: DISCONTINUED | OUTPATIENT
Start: 2018-11-28 | End: 2018-11-29 | Stop reason: HOSPADM

## 2018-11-28 RX ORDER — HEPARIN SODIUM 1000 [USP'U]/ML
2000 INJECTION, SOLUTION INTRAVENOUS; SUBCUTANEOUS
Status: DISCONTINUED | OUTPATIENT
Start: 2018-11-28 | End: 2018-11-29 | Stop reason: HOSPADM

## 2018-11-28 RX ADMIN — AMLODIPINE BESYLATE 10 MG: 10 TABLET ORAL at 05:29

## 2018-11-28 RX ADMIN — Medication 1 CAPSULE: at 08:46

## 2018-11-28 RX ADMIN — HYDRALAZINE HYDROCHLORIDE 100 MG: 50 TABLET, FILM COATED ORAL at 05:29

## 2018-11-28 RX ADMIN — HEPARIN SODIUM 5000 UNITS: 5000 INJECTION, SOLUTION INTRAVENOUS; SUBCUTANEOUS at 21:04

## 2018-11-28 RX ADMIN — PAROXETINE HYDROCHLORIDE 40 MG: 20 TABLET, FILM COATED ORAL at 05:46

## 2018-11-28 RX ADMIN — METOPROLOL TARTRATE 100 MG: 25 TABLET, FILM COATED ORAL at 05:28

## 2018-11-28 RX ADMIN — ATORVASTATIN CALCIUM 20 MG: 20 TABLET, FILM COATED ORAL at 05:29

## 2018-11-28 RX ADMIN — STANDARDIZED SENNA CONCENTRATE AND DOCUSATE SODIUM 2 TABLET: 8.6; 5 TABLET, FILM COATED ORAL at 17:44

## 2018-11-28 RX ADMIN — HYDRALAZINE HYDROCHLORIDE 100 MG: 50 TABLET, FILM COATED ORAL at 14:38

## 2018-11-28 RX ADMIN — LISINOPRIL 40 MG: 20 TABLET ORAL at 05:29

## 2018-11-28 RX ADMIN — INSULIN HUMAN 2 UNITS: 100 INJECTION, SOLUTION PARENTERAL at 21:05

## 2018-11-28 RX ADMIN — HEPARIN SODIUM 2000 UNITS: 1000 INJECTION, SOLUTION INTRAVENOUS; SUBCUTANEOUS at 08:57

## 2018-11-28 RX ADMIN — HYDRALAZINE HYDROCHLORIDE 100 MG: 50 TABLET, FILM COATED ORAL at 21:04

## 2018-11-28 RX ADMIN — HEPARIN SODIUM 5000 UNITS: 5000 INJECTION, SOLUTION INTRAVENOUS; SUBCUTANEOUS at 05:27

## 2018-11-28 RX ADMIN — PREDNISOLONE ACETATE 1 DROP: 10 SUSPENSION/ DROPS OPHTHALMIC at 17:46

## 2018-11-28 RX ADMIN — HEPARIN SODIUM 5000 UNITS: 5000 INJECTION, SOLUTION INTRAVENOUS; SUBCUTANEOUS at 14:38

## 2018-11-28 RX ADMIN — PREDNISOLONE ACETATE 1 DROP: 10 SUSPENSION/ DROPS OPHTHALMIC at 05:40

## 2018-11-28 RX ADMIN — CLONAZEPAM 0.25 MG: 0.5 TABLET ORAL at 17:45

## 2018-11-28 RX ADMIN — METOPROLOL TARTRATE 100 MG: 25 TABLET, FILM COATED ORAL at 17:44

## 2018-11-28 RX ADMIN — SEVELAMER CARBONATE 1600 MG: 800 TABLET, FILM COATED ORAL at 17:44

## 2018-11-28 RX ADMIN — CLONAZEPAM 0.25 MG: 0.5 TABLET ORAL at 12:15

## 2018-11-28 RX ADMIN — OXYCODONE HYDROCHLORIDE AND ACETAMINOPHEN 500 MG: 500 TABLET ORAL at 05:30

## 2018-11-28 RX ADMIN — SEVELAMER CARBONATE 1600 MG: 800 TABLET, FILM COATED ORAL at 12:15

## 2018-11-28 RX ADMIN — OXYCODONE HYDROCHLORIDE AND ACETAMINOPHEN 500 MG: 500 TABLET ORAL at 17:45

## 2018-11-28 RX ADMIN — OMEPRAZOLE 20 MG: 20 CAPSULE, DELAYED RELEASE ORAL at 05:27

## 2018-11-28 RX ADMIN — AZITHROMYCIN 250 MG: 250 TABLET, FILM COATED ORAL at 05:29

## 2018-11-28 RX ADMIN — SODIUM CHLORIDE 3 G: 900 INJECTION INTRAVENOUS at 05:40

## 2018-11-28 RX ADMIN — SEVELAMER CARBONATE 1600 MG: 800 TABLET, FILM COATED ORAL at 08:46

## 2018-11-28 RX ADMIN — AMOXICILLIN AND CLAVULANATE POTASSIUM 1 TABLET: 500; 125 TABLET, FILM COATED ORAL at 17:43

## 2018-11-28 RX ADMIN — CLONAZEPAM 0.25 MG: 0.5 TABLET ORAL at 05:29

## 2018-11-28 ASSESSMENT — GAIT ASSESSMENTS
ASSISTIVE DEVICE: FRONT WHEEL WALKER
GAIT LEVEL OF ASSIST: MINIMAL ASSIST
DISTANCE (FEET): 22

## 2018-11-28 ASSESSMENT — ENCOUNTER SYMPTOMS
SPUTUM PRODUCTION: 0
PALPITATIONS: 0
SENSORY CHANGE: 0
FOCAL WEAKNESS: 0
VOMITING: 0
CHILLS: 0
DIARRHEA: 0
NAUSEA: 0
SPEECH CHANGE: 0
HEADACHES: 0
MYALGIAS: 1
SHORTNESS OF BREATH: 0
HALLUCINATIONS: 0
SEIZURES: 0
FEVER: 0
COUGH: 0
ABDOMINAL PAIN: 0

## 2018-11-28 ASSESSMENT — PATIENT HEALTH QUESTIONNAIRE - PHQ9
2. FEELING DOWN, DEPRESSED, IRRITABLE, OR HOPELESS: NOT AT ALL
1. LITTLE INTEREST OR PLEASURE IN DOING THINGS: NOT AT ALL
SUM OF ALL RESPONSES TO PHQ9 QUESTIONS 1 AND 2: 0

## 2018-11-28 ASSESSMENT — COGNITIVE AND FUNCTIONAL STATUS - GENERAL
DRESSING REGULAR LOWER BODY CLOTHING: A LITTLE
MOVING TO AND FROM BED TO CHAIR: A LOT
EATING MEALS: A LITTLE
WALKING IN HOSPITAL ROOM: A LITTLE
HELP NEEDED FOR BATHING: A LITTLE
SUGGESTED CMS G CODE MODIFIER MOBILITY: CK
TURNING FROM BACK TO SIDE WHILE IN FLAT BAD: A LITTLE
STANDING UP FROM CHAIR USING ARMS: A LITTLE
TOILETING: A LITTLE
DRESSING REGULAR UPPER BODY CLOTHING: A LITTLE
CLIMB 3 TO 5 STEPS WITH RAILING: A LITTLE
PERSONAL GROOMING: A LITTLE
DAILY ACTIVITIY SCORE: 18
MOBILITY SCORE: 17
SUGGESTED CMS G CODE MODIFIER DAILY ACTIVITY: CK
MOVING FROM LYING ON BACK TO SITTING ON SIDE OF FLAT BED: A LITTLE

## 2018-11-28 ASSESSMENT — ACTIVITIES OF DAILY LIVING (ADL): TOILETING: REQUIRES ASSIST

## 2018-11-28 ASSESSMENT — PAIN SCALES - GENERAL: PAINLEVEL_OUTOF10: 3

## 2018-11-28 NOTE — CARE PLAN
Problem: Venous Thromboembolism (VTW)/Deep Vein Thrombosis (DVT) Prevention:  Goal: Patient will participate in Venous Thrombosis (VTE)/Deep Vein Thrombosis (DVT)Prevention Measures  Outcome: PROGRESSING AS EXPECTED  Discussed importance of movement and ambulation as much as pt can tolerate. Discussed PE/DVT. Pt receiving heparin.     Problem: Fluid Volume:  Goal: Will maintain balanced intake and output  Outcome: PROGRESSING AS EXPECTED  Discussed pt diet and diabetes disease process. Pt states appetite good. Had BM prior to arrival. Pt anuric due to ESRD and dialysis.

## 2018-11-28 NOTE — PROGRESS NOTES
HD Tx per Dr. REAGAN Montgomery x 3 hrs., initiated at 0902, ended at 1202, net UF 2000 ml. See paper flow-sheet for details.    Report given to DARRIUS Chan RN.

## 2018-11-28 NOTE — FACE TO FACE
Face to Face Supporting Documentation - Home Health    The encounter with this patient was in whole or in part the primary reason for home health admission.    Date of encounter:   Patient:                    MRN:                       YOB: 2020  Pj Britt  [id number]  1960     Home health to see patient for:  Physical Therapy evaluation and treatment    Skilled need for:  Recent Deterioration of Health Status Due to jerky movements    Skilled nursing interventions to include:  Comment: Physican therapy    Homebound status evidenced by:  Need the aid of supportive devices such as crutches, canes, wheelchairs or walkers. Leaving home requires a considerable and taxing effort. There is a normal inability to leave the home.    Community Physician to provide follow up care: Faisal Germain M.D.     Optional Interventions? No      I certify the face to face encounter for this home health care referral meets the CMS requirements and the encounter/clinical assessment with the patient was, in whole, or in part, for the medical condition(s) listed above, which is the primary reason for home health care. Based on my clinical findings: the service(s) are medically necessary, support the need for home health care, and the homebound criteria are met.  I certify that this patient has had a face to face encounter by myself.  Whitney Carnes M.D. - NPI: [id number]

## 2018-11-28 NOTE — PROGRESS NOTES
Received report from Terri DAVISON. Discussed pt plan of care. Wife at bedside. Any questions and concerns addressed at this time. Re-educated wife that all 4 bed rails cannot be up because it is considered a restraint. Explained a bedside table could be used or she could sit in a chair on that side. Pt wife does not show understanding and needs reinforcement. Pt resting in bed. Bed locked and in lowest position. Educated on use of call light and call light within reach. Fall precautions in place. No other needs at this time.

## 2018-11-28 NOTE — THERAPY
"Physical Therapy Evaluation completed.   Bed Mobility:  Supine to Sit: Minimal Assist  Transfers: Sit to Stand: Contact Guard Assist  Gait: Level Of Assist: Minimal Assist (mostly CGA; min A for navigation of FWW and cueing) with Front-Wheel Walker       Plan of Care: Will benefit from Physical Therapy 3 times per week  Discharge Recommendations: Equipment: Will Continue to Assess for Equipment Needs. Pt has FWW; see below    Pt presents to PT with imparied endurance, balance and general locoomotion associated with recent deconditioning and medical co-morbidties. He was able to demonstrate short distance ambulation with FWW with min A(mostly CGA with assist for navigation and unfamiliar environment as pt is blind). He and family report desire to dc to home and report will refuse SNF placement. Given current objective findings and family/pt desires would recommend home PT after medical dc to home for optimal functional progression in home environment. Pt's current cognition is exacerabting his functional impairements and risk for falls. However it is improving per family though is not yet at baseline. WIll continue to visit while here and note pt essentially has 24/7 assist as needed at home.    See \"Rehab Therapy-Acute\" Patient Summary Report for complete documentation.     "

## 2018-11-28 NOTE — PROGRESS NOTES
Nephrology Daily Progress Note    Date of Service  11/28/2018    Chief Complaint  58 y.o. male admitted 11/26/2018 with ESRD, muscle jerks/MS changes    Interval Problem Update  Events reviewed. Had better sleep overnight.   No new issues.    Review of Systems  Review of Systems   Constitutional: Negative for chills and fever.   All other systems reviewed and are negative.       Physical Exam  Temp:  [36.5 °C (97.7 °F)-37.5 °C (99.5 °F)] 37 °C (98.6 °F)  Pulse:  [59-66] 59  Resp:  [17-18] 17  BP: (117-168)/(50-80) 135/55    Physical Exam   Constitutional: He is oriented to person, place, and time. He appears well-developed.   Cardiovascular: Normal rate and regular rhythm.    Pulmonary/Chest: Effort normal and breath sounds normal.   Musculoskeletal: He exhibits no tenderness.   Neurological: He is alert and oriented to person, place, and time.   Skin: Skin is warm and dry. He is not diaphoretic.       Fluids    Intake/Output Summary (Last 24 hours) at 11/28/18 1042  Last data filed at 11/28/18 1000   Gross per 24 hour   Intake              160 ml   Output                0 ml   Net              160 ml       Laboratory  Recent Labs      11/26/18   1523  11/27/18   0054  11/28/18   0025   WBC  6.6  5.8  5.7   RBC  3.27*  2.82*  2.55*   HEMOGLOBIN  10.1*  8.7*  8.1*   HEMATOCRIT  30.4*  26.8*  24.3*   MCV  93.0  95.0  95.3   MCH  30.9  30.9  31.8   MCHC  33.2*  32.5*  33.3*   RDW  45.1  46.7  46.3   PLATELETCT  176  147*  133*   MPV  10.5  10.7  11.4     Recent Labs      11/26/18   1523  11/27/18   0054  11/28/18   0025   SODIUM  134*   --   132*   POTASSIUM  4.3  4.3  4.5   CHLORIDE  93*   --   92*   CO2  31   --   28   GLUCOSE  258*   --   131*   BUN  32*   --   62*   CREATININE  4.01*   --   6.42*   CALCIUM  9.2   --   8.5     Recent Labs      11/26/18   1523   APTT  29.4   INR  0.97     Recent Labs      11/26/18   1523   BNPBTYPENAT  658*           Imaging  CT-CHEST (THORAX) W/O   Final Result      1.  Small  bilateral pleural effusions are noted, larger on the left side. These were also present in 2016.      2.  Pulmonary opacifications with poorly defined borders are noted in the lower pulmonary lobes bilaterally. Findings could be due to edema aspiration or pneumonia.      3.  No pneumothorax identified.      4.  Cardiomegaly is again noted.      5.  Right adrenal mass is again noted likely representing adenoma.            CT-HEAD W/O   Final Result      1.  Cerebral atrophy.      2.  White matter lucencies most consistent with small vessel ischemic change versus demyelination or gliosis.      3. No intracranial mass effect or acute hemorrhage identified.      4. Deformity of the right optic globe similar to previous findings and consistent with injury.         CT-CSPINE WITHOUT PLUS RECONS   Final Result      No acute fracture cervical spine. Degenerative changes.   Small left pleural effusion upper left hemothorax.      DX-CHEST-PORTABLE (1 VIEW)   Final Result      Hypoinflation. Bilateral lung base atelectasis with mild edema or pneumonitis not excluded.           Assessment/Plan    1. ESRD - HD today, monitor  2. Myoclonic jerking - Noted felt to be uremic; neurology to assess after HD. May need increased time on tx  3. Anemia - Epogen

## 2018-11-28 NOTE — PROGRESS NOTES
[facility] Medicine Daily Progress Note    Date of Service  11/27/2018    Chief Complaint  58 y.o. male admitted 11/26/2018 with weakness and altered mental status    Hospital Course    *58-year-old male presented to the hospital on November 26, 2018 with complaint of altered mental status.  He has a history of end-stage renal disease and he is on hemodialysis.  He has a known bilateral blindness.  He underwent workup for confusion which include CT scan MRI and EEG on prior admission.  Requested consult with neurology.*      Interval Problem Update    I saw this patient at bedside this morning.  Patient's wife Julee interpretation.  Patient explains that he has mild right-sided rib pain.  He was alert and oriented and he answered all my questions appropriately.  Patient's wife requested consult with neurology.  I discussed with neurologist regarding his current medical condition.    Ammonia and vitamin B12 are within normal limits.    Nephrology evaluated him for his end-stage renal disease.    Consultants/Specialty  Neurology  Nephrology  Code Status  Full    Disposition  To be decided    Review of Systems  Review of Systems   Constitutional: Negative for chills and fever.   HENT: Negative for hearing loss and tinnitus.    Eyes:        Bilateral blindness   Respiratory: Negative for cough, sputum production and shortness of breath.    Cardiovascular: Negative for chest pain, palpitations and leg swelling.   Gastrointestinal: Negative for abdominal pain, diarrhea, nausea and vomiting.   Genitourinary: Negative for dysuria and urgency.   Musculoskeletal: Positive for myalgias.   Skin: Negative for rash.   Neurological: Negative for sensory change, speech change, focal weakness, seizures and headaches.   Psychiatric/Behavioral: Negative for hallucinations.        Physical Exam  Temp:  [37.1 °C (98.7 °F)-37.8 °C (100 °F)] 37.3 °C (99.2 °F)  Pulse:  [65-73] 65  Resp:  [16-18] 17  BP: (116-163)/(49-88)  130/80    Physical Exam   Constitutional: He is oriented to person, place, and time. No distress.   HENT:   Head: Normocephalic and atraumatic.   Neck: Normal range of motion.   Cardiovascular: Normal rate.    No murmur heard.  Pulmonary/Chest: Effort normal and breath sounds normal. No respiratory distress. He has no wheezes.   Abdominal: Soft. Bowel sounds are normal. He exhibits no distension. There is no tenderness. There is no rebound.   Musculoskeletal: Normal range of motion. He exhibits no edema.   Bruise below the right knee   Neurological: He is alert and oriented to person, place, and time.   Answered all orientation questions correctly   Skin: Skin is warm and dry. He is not diaphoretic.       Fluids    Intake/Output Summary (Last 24 hours) at 11/27/18 1838  Last data filed at 11/27/18 1042   Gross per 24 hour   Intake              120 ml   Output                0 ml   Net              120 ml       Laboratory  Recent Labs      11/26/18   1523  11/27/18   0054   WBC  6.6  5.8   RBC  3.27*  2.82*   HEMOGLOBIN  10.1*  8.7*   HEMATOCRIT  30.4*  26.8*   MCV  93.0  95.0   MCH  30.9  30.9   MCHC  33.2*  32.5*   RDW  45.1  46.7   PLATELETCT  176  147*   MPV  10.5  10.7     Recent Labs      11/25/18   0508  11/26/18   1523  11/27/18   0054   SODIUM  133*  134*   --    POTASSIUM  5.1  4.3  4.3   CHLORIDE  95*  93*   --    CO2  28  31   --    GLUCOSE  107*  258*   --    BUN  59*  32*   --    CREATININE  6.76*  4.01*   --    CALCIUM  8.8  9.2   --      Recent Labs      11/26/18   1523   APTT  29.4   INR  0.97     Recent Labs      11/26/18   1523   BNPBTYPENAT  658*           Imaging  CT-CHEST (THORAX) W/O   Final Result      1.  Small bilateral pleural effusions are noted, larger on the left side. These were also present in 2016.      2.  Pulmonary opacifications with poorly defined borders are noted in the lower pulmonary lobes bilaterally. Findings could be due to edema aspiration or pneumonia.      3.  No  pneumothorax identified.      4.  Cardiomegaly is again noted.      5.  Right adrenal mass is again noted likely representing adenoma.            CT-HEAD W/O   Final Result      1.  Cerebral atrophy.      2.  White matter lucencies most consistent with small vessel ischemic change versus demyelination or gliosis.      3. No intracranial mass effect or acute hemorrhage identified.      4. Deformity of the right optic globe similar to previous findings and consistent with injury.         CT-CSPINE WITHOUT PLUS RECONS   Final Result      No acute fracture cervical spine. Degenerative changes.   Small left pleural effusion upper left hemothorax.      DX-CHEST-PORTABLE (1 VIEW)   Final Result      Hypoinflation. Bilateral lung base atelectasis with mild edema or pneumonitis not excluded.           Assessment/Plan  * Toxic metabolic encephalopathy- (present on admission)   Assessment & Plan    It could be multifactorial multifactorial  This morning he was alert and oriented and answered all orientations questions.  I requested consult with neurology.  Appreciate recommendations  He underwent extensive workup for altered mental status.    Vitamin B12 and TSH are within normal limits.     Muscle twitching   Assessment & Plan    No jerking movement this morning on examination.  Has used Reglan in the past; not currently  Also related to the fact he has ESRD and electrolyte shifting  Requested consult with neurology.     Acute on chronic respiratory failure with hypoxia (HCC)- (present on admission)   Assessment & Plan    He underwent chest x-ray and CT scan which showed patchy opacities that could be either edema or pneumonia.    Fluid restriction, diuretics and dialysis  Continue IV antibiotics.  Elevated prolactin.       HCAP (healthcare-associated pneumonia)- (present on admission)   Assessment & Plan    Continue IV antibiotic.  Pro calcitonin level elevated.     ESRD on dialysis (HCC)- (present on admission)   Assessment  & Plan    Nephrology consulted.     Anemia of chronic renal failure- (present on admission)   Assessment & Plan    No active signs of bleeding  Ordered iron panel (last one was 2015 on our system)  Continue Epogen as per nephrology     Type 2 diabetes mellitus with nephropathy, and retinopathy (HCC)- (present on admission)   Assessment & Plan    Continue insulin  Last A1c was 7.5  Hypoglycemia per protocol       Hypertension- (present on admission)   Assessment & Plan    Continue his blood pressure medications and diuretics.  We will continue to monitor     Blindness- (present on admission)   Assessment & Plan    Noted  Requested private room     Hyperlipidemia- (present on admission)   Assessment & Plan    Continue statin          VTE prophylaxis: Heparin

## 2018-11-29 ENCOUNTER — PATIENT OUTREACH (OUTPATIENT)
Dept: HEALTH INFORMATION MANAGEMENT | Facility: OTHER | Age: 58
End: 2018-11-29

## 2018-11-29 VITALS
HEART RATE: 59 BPM | TEMPERATURE: 98.4 F | RESPIRATION RATE: 16 BRPM | OXYGEN SATURATION: 98 % | HEIGHT: 67 IN | BODY MASS INDEX: 24.6 KG/M2 | DIASTOLIC BLOOD PRESSURE: 60 MMHG | WEIGHT: 156.75 LBS | SYSTOLIC BLOOD PRESSURE: 169 MMHG

## 2018-11-29 PROBLEM — G92.8 TOXIC METABOLIC ENCEPHALOPATHY: Status: RESOLVED | Noted: 2018-11-21 | Resolved: 2018-11-29

## 2018-11-29 LAB
ERYTHROCYTE [DISTWIDTH] IN BLOOD BY AUTOMATED COUNT: 46.7 FL (ref 35.9–50)
GLUCOSE BLD-MCNC: 134 MG/DL (ref 65–99)
HCT VFR BLD AUTO: 27 % (ref 42–52)
HGB BLD-MCNC: 8.4 G/DL (ref 14–18)
MCH RBC QN AUTO: 30 PG (ref 27–33)
MCHC RBC AUTO-ENTMCNC: 31.1 G/DL (ref 33.7–35.3)
MCV RBC AUTO: 96.4 FL (ref 81.4–97.8)
PLATELET # BLD AUTO: 151 K/UL (ref 164–446)
PMV BLD AUTO: 11.3 FL (ref 9–12.9)
RBC # BLD AUTO: 2.8 M/UL (ref 4.7–6.1)
WBC # BLD AUTO: 5.7 K/UL (ref 4.8–10.8)

## 2018-11-29 PROCEDURE — A9270 NON-COVERED ITEM OR SERVICE: HCPCS | Performed by: INTERNAL MEDICINE

## 2018-11-29 PROCEDURE — 99239 HOSP IP/OBS DSCHRG MGMT >30: CPT | Performed by: INTERNAL MEDICINE

## 2018-11-29 PROCEDURE — 700102 HCHG RX REV CODE 250 W/ 637 OVERRIDE(OP): Performed by: NURSE PRACTITIONER

## 2018-11-29 PROCEDURE — 700111 HCHG RX REV CODE 636 W/ 250 OVERRIDE (IP): Performed by: INTERNAL MEDICINE

## 2018-11-29 PROCEDURE — 82962 GLUCOSE BLOOD TEST: CPT

## 2018-11-29 PROCEDURE — A9270 NON-COVERED ITEM OR SERVICE: HCPCS | Performed by: NURSE PRACTITIONER

## 2018-11-29 PROCEDURE — 700102 HCHG RX REV CODE 250 W/ 637 OVERRIDE(OP): Performed by: INTERNAL MEDICINE

## 2018-11-29 PROCEDURE — 85027 COMPLETE CBC AUTOMATED: CPT

## 2018-11-29 PROCEDURE — 99232 SBSQ HOSP IP/OBS MODERATE 35: CPT | Performed by: INTERNAL MEDICINE

## 2018-11-29 PROCEDURE — 36415 COLL VENOUS BLD VENIPUNCTURE: CPT

## 2018-11-29 RX ORDER — AZITHROMYCIN 250 MG/1
250 TABLET, FILM COATED ORAL DAILY
Qty: 1 TAB | Refills: 0 | Status: SHIPPED | OUTPATIENT
Start: 2018-11-29 | End: 2019-04-03

## 2018-11-29 RX ORDER — AMOXICILLIN AND CLAVULANATE POTASSIUM 500; 125 MG/1; MG/1
1 TABLET, FILM COATED ORAL EVERY EVENING
Qty: 2 TAB | Refills: 0 | Status: SHIPPED | OUTPATIENT
Start: 2018-11-29 | End: 2018-12-01

## 2018-11-29 RX ORDER — CLONAZEPAM 0.5 MG/1
0.25 TABLET ORAL 3 TIMES DAILY
Qty: 30 TAB | Refills: 0 | Status: SHIPPED | OUTPATIENT
Start: 2018-11-29 | End: 2018-12-29

## 2018-11-29 RX ADMIN — CLONAZEPAM 0.25 MG: 0.5 TABLET ORAL at 06:04

## 2018-11-29 RX ADMIN — SEVELAMER CARBONATE 1600 MG: 800 TABLET, FILM COATED ORAL at 06:02

## 2018-11-29 RX ADMIN — METOPROLOL TARTRATE 100 MG: 25 TABLET, FILM COATED ORAL at 06:02

## 2018-11-29 RX ADMIN — LISINOPRIL 40 MG: 20 TABLET ORAL at 06:03

## 2018-11-29 RX ADMIN — HYDRALAZINE HYDROCHLORIDE 100 MG: 50 TABLET, FILM COATED ORAL at 06:03

## 2018-11-29 RX ADMIN — HEPARIN SODIUM 5000 UNITS: 5000 INJECTION, SOLUTION INTRAVENOUS; SUBCUTANEOUS at 06:03

## 2018-11-29 RX ADMIN — AMLODIPINE BESYLATE 10 MG: 10 TABLET ORAL at 06:02

## 2018-11-29 RX ADMIN — ATORVASTATIN CALCIUM 20 MG: 20 TABLET, FILM COATED ORAL at 06:03

## 2018-11-29 RX ADMIN — PREDNISOLONE ACETATE 1 DROP: 10 SUSPENSION/ DROPS OPHTHALMIC at 06:04

## 2018-11-29 RX ADMIN — OMEPRAZOLE 20 MG: 20 CAPSULE, DELAYED RELEASE ORAL at 06:03

## 2018-11-29 RX ADMIN — Medication 1 CAPSULE: at 06:02

## 2018-11-29 RX ADMIN — OXYCODONE HYDROCHLORIDE AND ACETAMINOPHEN 500 MG: 500 TABLET ORAL at 06:04

## 2018-11-29 RX ADMIN — STANDARDIZED SENNA CONCENTRATE AND DOCUSATE SODIUM 2 TABLET: 8.6; 5 TABLET, FILM COATED ORAL at 06:02

## 2018-11-29 RX ADMIN — PAROXETINE HYDROCHLORIDE 40 MG: 20 TABLET, FILM COATED ORAL at 06:02

## 2018-11-29 RX ADMIN — AZITHROMYCIN 250 MG: 250 TABLET, FILM COATED ORAL at 06:02

## 2018-11-29 ASSESSMENT — ENCOUNTER SYMPTOMS
FEVER: 0
CHILLS: 0

## 2018-11-29 ASSESSMENT — PAIN SCALES - GENERAL: PAINLEVEL_OUTOF10: 0

## 2018-11-29 NOTE — DISCHARGE PLANNING
Anticipated Discharge Disposition: D/C to Home with Help    Action: Pt had HH orders placed, pt resides in [place] with his wife acting as his primary care giver. The only HH agency that services pt's area, [facility], is currently full and not able to accept new patients. There is a waiting list and new patients will not begin to be accepted until next week some time. LSW discussed with RN who spoke with MD. Plan is for pt to d/c to home with orders for OP PT/OT and instruction to f/u with PCP for HH orders if family would like to pursue this service.    Barriers to Discharge: None.    Plan: No further d/c planning needs at this time.

## 2018-11-29 NOTE — PROGRESS NOTES
0735: Bedside report received.  Pt resting in bed.  Denies any pain or needs at this time.  Call light and personal belongings within reach.  Bed locked in lowest position.  Bed alarm on.  Educated to call for assistance.  Family remains at bedside.    [id number]: ARMANDO RN from [facility] at bedside.    [id number]: PT and OT with pt. --Family not fully open to the idea of home health, however doesn't sound to be completely against a discussion with social work regarding schedules and costs.  Social work aware.

## 2018-11-29 NOTE — THERAPY
"Occupational Therapy Evaluation completed.   Functional Status:  Min A for supine>sit, toilet transfer and functional transfer; CGA for toileting; CGA for sit>stand; min A for grooming while standing  Plan of Care: Will benefit from Occupational Therapy 3 times per week  Discharge Recommendations:  Equipment: Shower Chair. Recommend home health or outpatient transitional care services for continued occupational therapy services    See \"Rehab Therapy-Acute\" Patient Summary Report for complete documentation.    OT eval completed on 59 YO M admitted with increased confusion. Pt with bilateral blindness ESRD. Pt with supportive family who are able to provide 24/7 assistance as PLOF. Pt required VC and assistance to navigate room and bathroom as unfamiliar to pt. SO was able to demonstrate ability to provide cues as needed for pt to safely func amb within room and bathroom. Pt's SO provided hand-over-hand and physical assistance for grooming while standing. Will continue to follow for acute OT services. Pt would greatly benefit from HH therapy services to increase strength and independence with BADLs/functional transfers in home setting.   "

## 2018-11-29 NOTE — DISCHARGE INSTRUCTIONS
Discharge Instructions    Discharged to home by car with relative. Discharged via wheelchair, hospital escort: Yes.  Special equipment needed: Not Applicable    Be sure to schedule a follow-up appointment with your primary care doctor or any specialists as instructed.     Discharge Plan:   Diet Plan: Discussed  Activity Level: Discussed  Confirmed Follow up Appointment: Patient to Call and Schedule Appointment  Confirmed Symptoms Management: Discussed  Medication Reconciliation Updated: Yes  Influenza Vaccine Indication: Not indicated: Previously immunized this influenza season and > 8 years of age    I understand that a diet low in cholesterol, fat, and sodium is recommended for good health. Unless I have been given specific instructions below for another diet, I accept this instruction as my diet prescription.   Other diet: Renal and diabetic    Special Instructions: None    · Is patient discharged on Warfarin / Coumadin?   No       Community-Acquired Pneumonia, Adult  Pneumonia is an infection of the lungs. There are different types of pneumonia. One type can develop while a person is in a hospital. A different type, called community-acquired pneumonia, develops in people who are not, or have not recently been, in the hospital or other health care facility.  What are the causes?  Pneumonia may be caused by bacteria, viruses, or funguses. Community-acquired pneumonia is often caused by Streptococcus pneumonia bacteria. These bacteria are often passed from one person to another by breathing in droplets from the cough or sneeze of an infected person.  What increases the risk?  The condition is more likely to develop in:  · People who have chronic diseases, such as chronic obstructive pulmonary disease (COPD), asthma, congestive heart failure, cystic fibrosis, diabetes, or kidney disease.  · People who have early-stage or late-stage HIV.  · People who have sickle cell disease.  · People who have had their spleen  removed (splenectomy).  · People who have poor dental hygiene.  · People who have medical conditions that increase the risk of breathing in (aspirating) secretions their own mouth and nose.  · People who have a weakened immune system (immunocompromised).  · People who smoke.  · People who travel to areas where pneumonia-causing germs commonly exist.  · People who are around animal habitats or animals that have pneumonia-causing germs, including birds, bats, rabbits, cats, and farm animals.  What are the signs or symptoms?  Symptoms of this condition include:  · A dry cough.  · A wet (productive) cough.  · Fever.  · Sweating.  · Chest pain, especially when breathing deeply or coughing.  · Rapid breathing or difficulty breathing.  · Shortness of breath.  · Shaking chills.  · Fatigue.  · Muscle aches.  How is this diagnosed?  Your health care provider will take a medical history and perform a physical exam. You may also have other tests, including:  · Imaging studies of your chest, including X-rays.  · Tests to check your blood oxygen level and other blood gases.  · Other tests on blood, mucus (sputum), fluid around your lungs (pleural fluid), and urine.  If your pneumonia is severe, other tests may be done to identify the specific cause of your illness.  How is this treated?  The type of treatment that you receive depends on many factors, such as the cause of your pneumonia, the medicines you take, and other medical conditions that you have. For most adults, treatment and recovery from pneumonia may occur at home. In some cases, treatment must happen in a hospital. Treatment may include:  · Antibiotic medicines, if the pneumonia was caused by bacteria.  · Antiviral medicines, if the pneumonia was caused by a virus.  · Medicines that are given by mouth or through an IV tube.  · Oxygen.  · Respiratory therapy.  Although rare, treating severe pneumonia may include:  · Mechanical ventilation. This is done if you are not  breathing well on your own and you cannot maintain a safe blood oxygen level.  · Thoracentesis. This procedure removes fluid around one lung or both lungs to help you breathe better.  Follow these instructions at home:  · Take over-the-counter and prescription medicines only as told by your health care provider.  ¨ Only take cough medicine if you are losing sleep. Understand that cough medicine can prevent your body’s natural ability to remove mucus from your lungs.  ¨ If you were prescribed an antibiotic medicine, take it as told by your health care provider. Do not stop taking the antibiotic even if you start to feel better.  · Sleep in a semi-upright position at night. Try sleeping in a reclining chair, or place a few pillows under your head.  · Do not use tobacco products, including cigarettes, chewing tobacco, and e-cigarettes. If you need help quitting, ask your health care provider.  · Drink enough water to keep your urine clear or pale yellow. This will help to thin out mucus secretions in your lungs.  How is this prevented?  There are ways that you can decrease your risk of developing community-acquired pneumonia. Consider getting a pneumococcal vaccine if:  · You are older than 65 years of age.  · You are older than 19 years of age and are undergoing cancer treatment, have chronic lung disease, or have other medical conditions that affect your immune system. Ask your health care provider if this applies to you.  There are different types and schedules of pneumococcal vaccines. Ask your health care provider which vaccination option is best for you.  You may also prevent community-acquired pneumonia if you take these actions:  · Get an influenza vaccine every year. Ask your health care provider which type of influenza vaccine is best for you.  · Go to the dentist on a regular basis.  · Wash your hands often. Use hand  if soap and water are not available.  Contact a health care provider if:  · You  have a fever.  · You are losing sleep because you cannot control your cough with cough medicine.  Get help right away if:  · You have worsening shortness of breath.  · You have increased chest pain.  · Your sickness becomes worse, especially if you are an older adult or have a weakened immune system.  · You cough up blood.  This information is not intended to replace advice given to you by your health care provider. Make sure you discuss any questions you have with your health care provider.  Document Released: 12/18/2006 Document Revised: 04/27/2017 Document Reviewed: 04/13/2016  [employer] Interactive Patient Education © 2017 [employer]      Toxic Metabolic Encephalopathy  Toxic metabolic encephalopathy (TME) is a type of brain disorder caused by a change in brain chemistry. This condition may result from illnesses or conditions that cause an imbalance of fluid, minerals (electrolytes), and other substances in the body that affect the way the brain functions. It is not caused by brain damage or brain disease.  TME can cause confusion and other mental disturbances, which are generally referred to as delirium. Untreated delirium may lead to permanent mental changes or worsening medical conditions. Untreated delirium is a life-threatening condition that may need to be treated in the hospital.  What are the causes?  Possible causes of TME that can lead to delirium include:  · Short-term (acute) or long-term (chronic) disease of the kidney or liver.  · Not having enough fluid in the body (dehydration).  · Changes in the acid level (pH) of the blood.  · High or low levels of any of the following substances in the blood:  ¨ Calcium.  ¨ Salt (sodium).  ¨ Sugar (glucose).  ¨ Magnesium.  ¨ Phosphate.  · High body temperature.  · Not having enough oxygen in the blood.  · Low levels of B vitamins. This can result from alcohol abuse.  · Certain medicines, such as steroids and medicines that reduce the activity of the immune  system (immunosuppressants).  · Certain infections.  What increases the risk?  You may have a higher risk for TME if you:  · Are elderly.  · Have dementia.  · Are in the hospital, especially in intensive care.  · Live in a nursing home.  · Had recent surgery.  · Have liver or kidney disease.  · Have poorly controlled diabetes.  · Have chronic medical problems, especially heart or lung disease.  · Are not getting enough fluids.  · Have poor nutrition.  · Abuse alcohol.  What are the signs or symptoms?  Symptoms of TME may include:  · Muscle stiffness or jerking (spasticity).  · Shaking (tremors).  · Flapping of the hands.  · Weakness.  · Clumsiness.  · Slowed breathing.  · Jerky movements that you cannot control (seizures).  · Not being able to stay awake (drowsiness).  · Not being able to pay attention.  · Loss of consciousness (coma).  Symptoms of delirium caused by TME include:  · Confusion.  · Difficulty focusing or concentrating, or inability to focus or concentrate.  · Not knowing where you are (disorientation).  · Seeing or hearing things that are not real (hallucinations).  · Fearfulness.  · False beliefs (delusions).  · Changes in mood or personality.  · Changes in speech, such as saying things that do not make sense.  · Memory loss.  · Irritability.  · Avoiding other people (withdrawal).  · Depression.  · Poor judgment.  · Changes in eating and sleeping patterns.  · Hyperactivity.  · Decreased alertness.  · General mistrust of others (paranoia).  Delirium may come and go. Symptoms of delirium may start suddenly or gradually, and they often get worse at night.  How is this diagnosed?  This condition is diagnosed based on:  · Your symptoms and behavior.  · An exam to check how you are thinking, feeling, and behaving (mental status exam). To diagnose delirium, the mental status exam must rule out other possible causes of TME, and must show:  ¨ Changes in attention and awareness.  ¨ Changes that develop over a  short period of time and tend to come and go (fluctuate).  ¨ Changes in memory, language, and thinking that were not present before.  · A physical exam.  · Imaging tests, such as:  ¨ MRI.  ¨ CT scan.  · Blood tests to:  ¨ Measure liver and kidney function.  ¨ Check for a lack (deficiency) of vitamin B.  ¨ Check for changes in acid levels (pH) and changes in calcium, sodium, or magnesium levels in the blood.  ¨ Measure your blood sugar (glucose).  ¨ Measure your blood oxygen level.  How is this treated?  Treatment for TME depends on the cause, and it may include.  · Getting fluids through an IV tube.  · Regulating calcium, sodium, glucose, or magnesium levels in the body.  · Getting oxygen.  · Improving nutrition.  · Treating liver or kidney disease.  · Adjusting certain medicines.  · Treating infections.  If the cause is found and treated, delirium usually improves. Managing delirium may include:  · Keeping the room well-lit and quiet.  · Using calendars, pictures, and clocks to prevent disorientation.  · Having frequent checks from nursing staff and visits from caregivers.  · Wearing eyeglasses or a hearing aid, if needed.  · Physical therapy.  · Medicine to treat agitation, anxiety, hallucinations, or delusions.  Follow these instructions at home:  · Drink enough fluid to keep your urine clear or pale yellow.  · Take over-the-counter and prescription medicines only as told by your health care provider.  · Return to your normal activities as told by your health care provider. Ask your health care provider what activities are safe for you.  · Follow a healthy diet. Do not skip meals.  · Do not drink alcohol.  · Go to bed at the same time every night.  · Keep all follow-up visits as told by your health care provider. This is important.  Contact a health care provider if:  · You are unable to feed yourself or hydrate yourself.  · You need help at home.  · You start to feel clumsy.  · You start to have tremors or  weakness.  Get help right away if:  · You have a seizure.  · You lose consciousness.  · You have trouble breathing.  · You do not feel able to care for yourself at home.  · You have a fever.  · You become disoriented at home.  This information is not intended to replace advice given to you by your health care provider. Make sure you discuss any questions you have with your health care provider.  Document Released: 05/26/2017 Document Revised: 08/21/2017 Document Reviewed: 05/26/2017  [employer] Interactive Patient Education © 2017 [employer]    Amoxicillin; Clavulanic Acid tablets  What is this medicine?  AMOXICILLIN; CLAVULANIC ACID (a mox i PAULO in; CHARITY oh ic AS id) is a penicillin antibiotic. It is used to treat certain kinds of bacterial infections. It will not work for colds, flu, or other viral infections.  This medicine may be used for other purposes; ask your health care provider or pharmacist if you have questions.  COMMON BRAND NAME(S): Augmentin  What should I tell my health care provider before I take this medicine?  They need to know if you have any of these conditions:  -bowel disease, like colitis  -kidney disease  -liver disease  -mononucleosis  -an unusual or allergic reaction to amoxicillin, penicillin, cephalosporin, other antibiotics, clavulanic acid, other medicines, foods, dyes, or preservatives  -pregnant or trying to get pregnant  -breast-feeding  How should I use this medicine?  Take this medicine by mouth with a full glass of water. Follow the directions on the prescription label. Take at the start of a meal. Do not crush or chew. If the tablet has a score line, you may cut it in half at the score line for easier swallowing. Take your medicine at regular intervals. Do not take your medicine more often than directed. Take all of your medicine as directed even if you think you are better. Do not skip doses or stop your medicine early.  Talk to your pediatrician regarding the use of this  medicine in children. Special care may be needed.  Overdosage: If you think you have taken too much of this medicine contact a poison control center or emergency room at once.  NOTE: This medicine is only for you. Do not share this medicine with others.  What if I miss a dose?  If you miss a dose, take it as soon as you can. If it is almost time for your next dose, take only that dose. Do not take double or extra doses.  What may interact with this medicine?  -allopurinol  -anticoagulants  -birth control pills  -methotrexate  -probenecid  This list may not describe all possible interactions. Give your health care provider a list of all the medicines, herbs, non-prescription drugs, or dietary supplements you use. Also tell them if you smoke, drink alcohol, or use illegal drugs. Some items may interact with your medicine.  What should I watch for while using this medicine?  Tell your doctor or health care professional if your symptoms do not improve.  Do not treat diarrhea with over the counter products. Contact your doctor if you have diarrhea that lasts more than 2 days or if it is severe and watery.  If you have diabetes, you may get a false-positive result for sugar in your urine. Check with your doctor or health care professional.  Birth control pills may not work properly while you are taking this medicine. Talk to your doctor about using an extra method of birth control.  What side effects may I notice from receiving this medicine?  Side effects that you should report to your doctor or health care professional as soon as possible:  -allergic reactions like skin rash, itching or hives, swelling of the face, lips, or tongue  -breathing problems  -dark urine  -fever or chills, sore throat  -redness, blistering, peeling or loosening of the skin, including inside the mouth  -seizures  -trouble passing urine or change in the amount of urine  -unusual bleeding, bruising  -unusually weak or tired  -white patches or  sores in the mouth or throat  Side effects that usually do not require medical attention (report to your doctor or health care professional if they continue or are bothersome):  -diarrhea  -dizziness  -headache  -nausea, vomiting  -stomach upset  -vaginal or anal irritation  This list may not describe all possible side effects. Call your doctor for medical advice about side effects. You may report side effects to FDA at 8-664-EPW-4946.  Where should I keep my medicine?  Keep out of the reach of children.  Store at room temperature below 25 degrees C (77 degrees F). Keep container tightly closed. Throw away any unused medicine after the expiration date.  NOTE: This sheet is a summary. It may not cover all possible information. If you have questions about this medicine, talk to your doctor, pharmacist, or health care provider.  © 2018 [employer] (2009-03-12 12:04:30)    Azithromycin tablets  What is this medicine?  AZITHROMYCIN (az ith lester MYE sin) is a macrolide antibiotic. It is used to treat or prevent certain kinds of bacterial infections. It will not work for colds, flu, or other viral infections.  This medicine may be used for other purposes; ask your health care provider or pharmacist if you have questions.  COMMON BRAND NAME(S): Zithromax, Zithromax Tri-Nader, Zithromax Z-Nader  What should I tell my health care provider before I take this medicine?  They need to know if you have any of these conditions:  -kidney disease  -liver disease  -irregular heartbeat or heart disease  -an unusual or allergic reaction to azithromycin, erythromycin, other macrolide antibiotics, foods, dyes, or preservatives  -pregnant or trying to get pregnant  -breast-feeding  How should I use this medicine?  Take this medicine by mouth with a full glass of water. Follow the directions on the prescription label. The tablets can be taken with food or on an empty stomach. If the medicine upsets your stomach, take it with food.  Take your medicine at regular intervals. Do not take your medicine more often than directed. Take all of your medicine as directed even if you think your are better. Do not skip doses or stop your medicine early.  Talk to your pediatrician regarding the use of this medicine in children. While this drug may be prescribed for children as young as 6 months for selected conditions, precautions do apply.  Overdosage: If you think you have taken too much of this medicine contact a poison control center or emergency room at once.  NOTE: This medicine is only for you. Do not share this medicine with others.  What if I miss a dose?  If you miss a dose, take it as soon as you can. If it is almost time for your next dose, take only that dose. Do not take double or extra doses.  What may interact with this medicine?  Do not take this medicine with any of the following medications:  -lincomycin  This medicine may also interact with the following medications:  -amiodarone  -antacids  -birth control pills  -cyclosporine  -digoxin  -magnesium  -nelfinavir  -phenytoin  -warfarin  This list may not describe all possible interactions. Give your health care provider a list of all the medicines, herbs, non-prescription drugs, or dietary supplements you use. Also tell them if you smoke, drink alcohol, or use illegal drugs. Some items may interact with your medicine.  What should I watch for while using this medicine?  Tell your doctor or healthcare professional if your symptoms do not start to get better or if they get worse.  Do not treat diarrhea with over the counter products. Contact your doctor if you have diarrhea that lasts more than 2 days or if it is severe and watery.  This medicine can make you more sensitive to the sun. Keep out of the sun. If you cannot avoid being in the sun, wear protective clothing and use sunscreen. Do not use sun lamps or tanning beds/booths.  What side effects may I notice from receiving this  medicine?  Side effects that you should report to your doctor or health care professional as soon as possible:  -allergic reactions like skin rash, itching or hives, swelling of the face, lips, or tongue  -confusion, nightmares or hallucinations  -dark urine  -difficulty breathing  -hearing loss  -irregular heartbeat or chest pain  -pain or difficulty passing urine  -redness, blistering, peeling or loosening of the skin, including inside the mouth  -white patches or sores in the mouth  -yellowing of the eyes or skin  Side effects that usually do not require medical attention (report to your doctor or health care professional if they continue or are bothersome):  -diarrhea  -dizziness, drowsiness  -headache  -stomach upset or vomiting  -tooth discoloration  -vaginal irritation  This list may not describe all possible side effects. Call your doctor for medical advice about side effects. You may report side effects to FDA at 3-420-FDA-3632.  Where should I keep my medicine?  Keep out of the reach of children.  Store at room temperature between 15 and 30 degrees C (59 and 86 degrees F). Throw away any unused medicine after the expiration date.  NOTE: This sheet is a summary. It may not cover all possible information. If you have questions about this medicine, talk to your doctor, pharmacist, or health care provider.  © 2018 [employer] (2017-02-14 15:26:03)    Clonazepam tablets  What is this medicine?  CLONAZEPAM (kloe NA ze pernell) is a benzodiazepine. It is used to treat certain types of seizures. It is also used to treat panic disorder.  This medicine may be used for other purposes; ask your health care provider or pharmacist if you have questions.  COMMON BRAND NAME(S): Ceberclon, Klonopin  What should I tell my health care provider before I take this medicine?  They need to know if you have any of these conditions:  -an alcohol or drug abuse problem  -bipolar disorder, depression, psychosis or other mental  health condition  -glaucoma  -kidney or liver disease  -lung or breathing disease  -myasthenia gravis  -Parkinson's disease  -porphyria  -seizures or a history of seizures  -suicidal thoughts  -an unusual or allergic reaction to clonazepam, other benzodiazepines, foods, dyes, or preservatives  -pregnant or trying to get pregnant  -breast-feeding  How should I use this medicine?  Take this medicine by mouth with a glass of water. Follow the directions on the prescription label. If it upsets your stomach, take it with food or milk. Take your medicine at regular intervals. Do not take it more often than directed. Do not stop taking or change the dose except on the advice of your doctor or health care professional.  A special MedGuide will be given to you by the pharmacist with each prescription and refill. Be sure to read this information carefully each time.  Talk to your pediatrician regarding the use of this medicine in children. Special care may be needed.  Overdosage: If you think you have taken too much of this medicine contact a poison control center or emergency room at once.  NOTE: This medicine is only for you. Do not share this medicine with others.  What if I miss a dose?  If you miss a dose, take it as soon as you can. If it is almost time for your next dose, take only that dose. Do not take double or extra doses.  What may interact with this medicine?  Do not take this medication with any of the following medicines:  -narcotic medicines for cough  -sodium oxybate  This medicine may also interact with the following medications:  -alcohol  -antihistamines for allergy, cough and cold  -antiviral medicines for HIV or AIDS  -certain medicines for anxiety or sleep  -certain medicines for depression, like amitriptyline, fluoxetine, sertraline  -certain medicines for fungal infections like ketoconazole and itraconazole  -certain medicines for seizures like carbamazepine, phenobarbital, phenytoin,  primidone  -general anesthetics like halothane, isoflurane, methoxyflurane, propofol  -local anesthetics like lidocaine, pramoxine, tetracaine  -medicines that relax muscles for surgery  -narcotic medicines for pain  -phenothiazines like chlorpromazine, mesoridazine, prochlorperazine, thioridazine  This list may not describe all possible interactions. Give your health care provider a list of all the medicines, herbs, non-prescription drugs, or dietary supplements you use. Also tell them if you smoke, drink alcohol, or use illegal drugs. Some items may interact with your medicine.  What should I watch for while using this medicine?  Tell your doctor or health care professional if your symptoms do not start to get better or if they get worse.  Do not stop taking except on your doctor's advice. You may develop a severe reaction. Your doctor will tell you how much medicine to take.  You may get drowsy or dizzy. Do not drive, use machinery, or do anything that needs mental alertness until you know how this medicine affects you. To reduce the risk of dizzy and fainting spells, do not stand or sit up quickly, especially if you are an older patient. Alcohol may increase dizziness and drowsiness. Avoid alcoholic drinks.  If you are taking another medicine that also causes drowsiness, you may have more side effects. Give your health care provider a list of all medicines you use. Your doctor will tell you how much medicine to take. Do not take more medicine than directed. Call emergency for help if you have problems breathing or unusual sleepiness.  The use of this medicine may increase the chance of suicidal thoughts or actions. Pay special attention to how you are responding while on this medicine. Any worsening of mood, or thoughts of suicide or dying should be reported to your health care professional right away.  What side effects may I notice from receiving this medicine?  Side effects that you should report to your  doctor or health care professional as soon as possible:  -allergic reactions like skin rash, itching or hives, swelling of the face, lips, or tongue  -breathing problems  -confusion  -loss of balance or coordination  -signs and symptoms of low blood pressure like dizziness; feeling faint or lightheaded, falls; unusually weak or tired  -suicidal thoughts or mood changes  Side effects that usually do not require medical attention (report to your doctor or health care professional if they continue or are bothersome):  -dizziness  -headache  -tiredness  -upset stomach  This list may not describe all possible side effects. Call your doctor for medical advice about side effects. You may report side effects to FDA at 7-135-FDA-6192.  Where should I keep my medicine?  Keep out of the reach of children. This medicine can be abused. Keep your medicine in a safe place to protect it from theft. Do not share this medicine with anyone. Selling or giving away this medicine is dangerous and against the law.  This medicine may cause accidental overdose and death if taken by other adults, children, or pets. Mix any unused medicine with a substance like cat litter or coffee grounds. Then throw the medicine away in a sealed container like a sealed bag or a coffee can with a lid. Do not use the medicine after the expiration date.  Store at room temperature between 15 and 30 degrees C (59 and 86 degrees F). Protect from light. Keep container tightly closed.  NOTE: This sheet is a summary. It may not cover all possible information. If you have questions about this medicine, talk to your doctor, pharmacist, or health care provider.  © 2018 Elsevier/Gold Standard (2017-05-26 18:46:32)      Depression / Suicide Risk    As you are discharged from this [facility] facility, it is important to learn how to keep safe from harming yourself.    Recognize the warning signs:  · Abrupt changes in personality, positive or negative- including  increase in energy   · Giving away possessions  · Change in eating patterns- significant weight changes-  positive or negative  · Change in sleeping patterns- unable to sleep or sleeping all the time   · Unwillingness or inability to communicate  · Depression  · Unusual sadness, discouragement and loneliness  · Talk of wanting to die  · Neglect of personal appearance   · Rebelliousness- reckless behavior  · Withdrawal from people/activities they love  · Confusion- inability to concentrate     If you or a loved one observes any of these behaviors or has concerns about self-harm, here's what you can do:  · Talk about it- your feelings and reasons for harming yourself  · Remove any means that you might use to hurt yourself (examples: pills, rope, extension cords, firearm)  · Get professional help from the community (Mental Health, Substance Abuse, psychological counseling)  · Do not be alone:Call your Safe Contact- someone whom you trust who will be there for you.  · Call your local CRISIS HOTLINE 626-7502 or 277-539-0189  · Call your local Children's Mobile Crisis Response Team [place] (287) 995-7584 or www.CBTec  · Call the toll free National Suicide Prevention Hotlines   · National Suicide Prevention Lifeline 435-223-ENKT (8935)  · National Hope Line Network 800-SUICIDE (291-1743)

## 2018-11-29 NOTE — PROGRESS NOTES
"NEUROLOGY CONSULT PROGRESS NOTE:    Referring Physician: SORIN Saini*  Admission Date: 11/26/2018    Reason for Consult: myoclonic jerks and fluctuating mentation.     Subjective:  Pt reports feeling tired after dialysis.  Wife reports much improvement in his mental status and no further abnomal jerking. Denies headache or visual changes.    Physical Exam:  BP (!) 168/61 Comment: RN Notified   Pulse (!) 57   Temp 36.7 °C (98.1 °F) (Temporal)   Resp 16   Ht 1.702 m (5' 7\")   Wt 66.9 kg (147 lb 7.8 oz)   SpO2 97%   BMI 23.10 kg/m²       GEN: lethargic but arousable and conversant.  HEENT: non-icteric and non-injected eyes. moist conjunctivae; no lid-lag.  normocephalic.  Nontender sinuses. Pupils (see below).  Hearings (see below). The nares are patent.  Oropharynx clear without lesions and normal hard and soft palates.   NECK: Trachea midline, supple, No lymphadenopathy  Ext: no edema, Radial and dorsal petal pulses 2+ b/l  Psychiatric: lethargic but normal mood, alert and oriented as below.      Neurological Examination:  1. Higher Functions: alert and oriented to self, place, time, person, normal language, speech, prosody and voice. Intact short and long term memories. Intact simple calculations. No left to right confusion.  2. Cranial Nerves:  CN I: Not examined  CN II: legally blind.  CN III, IV, VI: EOMI; no nystagmus; R pupil damaged and fixed.   CN V: sensation intact bilaterally  CN VII: face symmetric  CN VIII: hearing intact to finger rub bilaterally  CN IX, X: palate elevates symmetrically  CN XI: SCMs & trapezii 5/5 bilaterally  CN XII: tongue protrudes midline  3. Motor: normal muscle bulks, normal tones, symmetric strength in b/l UEs and LEs.  4. Sensory: intact to light touch in all distal limbs.  5. Coordination/Gait: unable to perform due to blindness.   6. Involuntary Movements/Tremor: no more myoclonic jerking noted.   7. Reflexes: 1/2 symmetric throughout, no cross adduction. " No Babinski. No clonus. No Knox.  8. Gait: deferred.    Labs: personally reviewed  Recent Labs      11/26/18   1523  11/27/18   0054  11/28/18   0025   WBC  6.6  5.8  5.7   RBC  3.27*  2.82*  2.55*   HEMOGLOBIN  10.1*  8.7*  8.1*   HEMATOCRIT  30.4*  26.8*  24.3*   MCV  93.0  95.0  95.3   MCH  30.9  30.9  31.8   MCHC  33.2*  32.5*  33.3*   RDW  45.1  46.7  46.3   PLATELETCT  176  147*  133*   MPV  10.5  10.7  11.4     Recent Labs      11/26/18   1523  11/27/18 0054  11/28/18   0025   SODIUM  134*   --   132*   POTASSIUM  4.3  4.3  4.5   CHLORIDE  93*   --   92*   CO2  31   --   28   GLUCOSE  258*   --   131*   BUN  32*   --   62*   CREATININE  4.01*   --   6.42*   CALCIUM  9.2   --   8.5     Recent Labs      11/26/18   1523   BNPBTYPENAT  658*     Recent Labs      11/26/18   1523   TROPONINI  0.03   BNPBTYPENAT  658*         Recent Labs      11/26/18   1523  11/27/18   0054  11/28/18   0025   SODIUM  134*   --   132*   POTASSIUM  4.3  4.3  4.5   CHLORIDE  93*   --   92*   CO2  31   --   28   GLUCOSE  258*   --   131*   BUN  32*   --   62*     Recent Labs      11/26/18   1523  11/27/18   0054  11/28/18   0025   SODIUM  134*   --   132*   POTASSIUM  4.3  4.3  4.5   CHLORIDE  93*   --   92*   CO2  31   --   28   BUN  32*   --   62*   CREATININE  4.01*   --   6.42*   MAGNESIUM  2.1   --    --    PHOSPHORUS  3.8   --    --    CALCIUM  9.2   --   8.5     Recent Labs      11/26/18   1523   APTT  29.4   INR  0.97     Radiology/Reports: independently reviewed by me (see below).    ASSESSMENT:  58 yr old man with DM complicated by retinal detachment and legal blindness as well as ESRD on dialysis consulted for myoclonic jerks and fluctuating mental status. Low dose clonazepam started yesterday. No further abnormal movement noted and the wife confirms that the patient appears better.  He remains generally fatigued. No myoclonus noted during my exam. Urea is the cause of these myoclonic jerks and fluctuating mental state  is more likely due to lethargy.      RECOMMENDATIONS:  - No change in recs.   - Clonazepam 0.25 mg TID for total 3 days and then only as needed.   - Bring BUN closer to normal range.   - PT/OT/Speech.   - There is no further need of neurological intervention for this metabolic issues.     Neurology signing off. Thank you for the consultation. Please feel free to call back w/ any further questions.    I have reviewed the images and labs results with the patient and the family present, and answered all questions and explained in details the assessment and recommendation sections above. The patient and the family expressed their understanding and agreement to the above.    Seth Baldwin M.D.  Neurohospitalist

## 2018-11-29 NOTE — PROGRESS NOTES
Discharge instructions provided to pt and spouse. Discussed diet, activity, follow up, prescriptions and symptom management. Pt and spouse state understanding. Pt states all questions have been answered. Copy of discharge provided to pt. Signed copy in chart. Pt states that all personal belongings are in possession. Pt waiting for ride.

## 2018-11-29 NOTE — PROGRESS NOTES
Nephrology Daily Progress Note    Date of Service  11/29/2018    Chief Complaint  58 y.o. male admitted 11/26/2018 with ESRD, muscle jerks/MS changes    Interval Problem Update  Neurology noted sx felt to be uremia  Overall improved, no problems overnight    Review of Systems  Review of Systems   Constitutional: Negative for chills and fever.   All other systems reviewed and are negative.       Physical Exam  Temp:  [36.6 °C (97.8 °F)-37.4 °C (99.3 °F)] 37.1 °C (98.7 °F)  Pulse:  [57-65] 63  Resp:  [16-17] 16  BP: (142-168)/(59-78) 142/64    Physical Exam   Constitutional: He has a sickly appearance. He appears ill.   Cardiovascular: Normal rate and regular rhythm.    Pulmonary/Chest: Effort normal and breath sounds normal.   Musculoskeletal: He exhibits no edema or tenderness.   Skin: He is not diaphoretic.       Fluids    Intake/Output Summary (Last 24 hours) at 11/29/18 0815  Last data filed at 11/28/18 2000   Gross per 24 hour   Intake              860 ml   Output             2500 ml   Net            -1640 ml       Laboratory  Recent Labs      11/27/18   0054  11/28/18   0025  11/29/18   0023   WBC  5.8  5.7  5.7   RBC  2.82*  2.55*  2.80*   HEMOGLOBIN  8.7*  8.1*  8.4*   HEMATOCRIT  26.8*  24.3*  27.0*   MCV  95.0  95.3  96.4   MCH  30.9  31.8  30.0   MCHC  32.5*  33.3*  31.1*   RDW  46.7  46.3  46.7   PLATELETCT  147*  133*  151*   MPV  10.7  11.4  11.3     Recent Labs      11/26/18   1523  11/27/18   0054  11/28/18   0025   SODIUM  134*   --   132*   POTASSIUM  4.3  4.3  4.5   CHLORIDE  93*   --   92*   CO2  31   --   28   GLUCOSE  258*   --   131*   BUN  32*   --   62*   CREATININE  4.01*   --   6.42*   CALCIUM  9.2   --   8.5     Recent Labs      11/26/18   1523   APTT  29.4   INR  0.97     Recent Labs      11/26/18   1523   BNPBTYPENAT  658*           Imaging  CT-CHEST (THORAX) W/O   Final Result      1.  Small bilateral pleural effusions are noted, larger on the left side. These were also present in 2016.       2.  Pulmonary opacifications with poorly defined borders are noted in the lower pulmonary lobes bilaterally. Findings could be due to edema aspiration or pneumonia.      3.  No pneumothorax identified.      4.  Cardiomegaly is again noted.      5.  Right adrenal mass is again noted likely representing adenoma.            CT-HEAD W/O   Final Result      1.  Cerebral atrophy.      2.  White matter lucencies most consistent with small vessel ischemic change versus demyelination or gliosis.      3. No intracranial mass effect or acute hemorrhage identified.      4. Deformity of the right optic globe similar to previous findings and consistent with injury.         CT-CSPINE WITHOUT PLUS RECONS   Final Result      No acute fracture cervical spine. Degenerative changes.   Small left pleural effusion upper left hemothorax.      DX-CHEST-PORTABLE (1 VIEW)   Final Result      Hypoinflation. Bilateral lung base atelectasis with mild edema or pneumonitis not excluded.           Assessment/Plan    1. ESRD - Outpatient HD reviewed, TIW  2. Myoclonic jerking - Neurology felt to be uremic  3. Anemia - Epogen    -Etiology felt to be uremic. However, outpatient HD orders reviewed and time is 4 hours -- the problem is by our measures he is quite well dialyzed. In fact the sp Kt/V  By the daugirdas II method is 1.97 which is quite excellent for IHD.   -He certainly has improved with dialysis and can be discharged  -I would recommend we consider temporary d/c of Paxil with appropriate outpatient follow up with his PCP as well as d/c phenergan (which he has as an outpatient) and d/c reglan (once more, an outpatient medication list at dialysis)  -Now on klonopin

## 2018-11-29 NOTE — DISCHARGE SUMMARY
Discharge Summary    CHIEF COMPLAINT ON ADMISSION  Chief Complaint   Patient presents with   • Weakness   • Altered Mental Status       Reason for Admission  WEAKNESS GLF     Admission Date  11/26/2018    CODE STATUS  Full Code    HPI & HOSPITAL COURSE     58-year-old male presented to the hospital on November 26, 2018 with complaint of altered mental status.  He has a history of end-stage renal disease and he is on hemodialysis.  He has a known bilateral blindness.  He underwent workup for confusion which include CT scan MRI and EEG on prior admission which was negative for any acute abnormalities.  Requested consult with neurology because of her jerking movements and they evaluated him and recommended clonazepam for his jerking movement and his jerky movement most likely secondary to elevated BUN.  Next day of his hospital admission he was able to answer questions appropriately.  He was completely alert and oriented.  Check vitamin B12 and TSH levels and they were within normal limits.  He has history of end-stage renal disease and he is on hemodialysis.  Nephrology evaluated him and he underwent hemodialysis.  He found to have acute on chronic respiratory failure with hypoxia CT scan finding consistent with possible pneumonia I prescribed him Augmentin and azithromycin to complete a course of antibiotic for total of 5 days.  On the day of discharge he denies any acute complaints and patient and his wife feels ready to be discharged home.  Physical therapy evaluated him for discharge planning.  I provided him prescription for outpatient physical therapy.  I recommended him to follow-up with primary care provider.    Therefore, he is discharged in fair and stable condition to home with close outpatient follow-up.    The patient met 2-midnight criteria for an inpatient stay at the time of discharge.    Discharge Date  11/29/18     FOLLOW UP ITEMS POST DISCHARGE  Primary care provider    DISCHARGE  DIAGNOSES  Principal Problem (Resolved):    Toxic metabolic encephalopathy POA: Yes  Active Problems:    Acute on chronic respiratory failure with hypoxia (HCC) POA: Yes    Muscle twitching POA: Unknown    HCAP (healthcare-associated pneumonia) POA: Yes    Hyperlipidemia POA: Yes    Blindness POA: Yes    Hypertension POA: Yes    Type 2 diabetes mellitus with nephropathy, and retinopathy (HCC) POA: Yes    Anemia of chronic renal failure POA: Yes    ESRD on dialysis ([facility]) POA: Yes      FOLLOW UP  Future Appointments  Date Time Provider Department Center   5/17/2019 8:20 AM Jcarlos Edwards M.D. [facility] None     Faisal Germain M.D.  [address] [facility] [address]  [phone]    Schedule an appointment as soon as possible for a visit in 2 weeks        MEDICATIONS ON DISCHARGE     Medication List      START taking these medications      Instructions   amoxicillin-clavulanate 500-125 MG Tabs  Commonly known as:  AUGMENTIN   Doctor's comments:  Renal adjusted dose. To complete course of antibiotic on 11/30/18  Take 1 Tab by mouth every evening for 2 doses.  Dose:  1 Tab     azithromycin 250 MG Tabs  Commonly known as:  ZITHROMAX   Take 1 Tab by mouth every day. 1 tablet to complete 5 day course of antibiotic on 11/30/18  Dose:  250 mg     clonazePAM 0.5 MG Tabs  Commonly known as:  KLONOPIN   Doctor's comments:  Three times daily till 11/30/18 and afterwards as needed for muscle twitching.  Take 0.5 Tabs by mouth 3 times a day for 30 days. Three times daily till 11/30/18 and later as needed for muscle twitching.  Dose:  0.25 mg        CONTINUE taking these medications      Instructions   amLODIPine 10 MG Tabs  Commonly known as:  NORVASC   Take 1 Tab by mouth every day.  Dose:  10 mg     ascorbic acid 500 MG tablet  Commonly known as:  VITAMIN C   Take 1 Tab by mouth 2 Times a Day.  Dose:  500 mg     atorvastatin 20 MG Tabs  Commonly known as:  LIPITOR   TAKE 1 TAB BY MOUTH DAILY     hydrALAZINE 100 MG tablet  Commonly  known as:  APRESOLINE   Take 1 Tab by mouth every 8 hours.  Dose:  100 mg     insulin  UNIT/ML Susp  Commonly known as:  HUMULIN,NOVOLIN   Inject 10 Units as instructed every morning.  Dose:  10 Units     lactulose 10 GM/15ML Soln   Take 20 g by mouth every day.  Dose:  20 g     lisinopril 20 MG Tabs  Commonly known as:  PRINIVIL   Take 40 mg by mouth every day.  Dose:  40 mg     metoprolol 100 MG Tabs  Commonly known as:  LOPRESSOR   Take 1 Tab by mouth 2 Times a Day.  Dose:  100 mg     omeprazole 20 MG delayed-release capsule  Commonly known as:  PRILOSEC   TAKE 1 CAPSULE BY MOUTH EVERY DAY     PAXIL 40 MG tablet  Generic drug:  paroxetine   Take 40 mg by mouth every day.  Dose:  40 mg     prednisoLONE acetate 1 % Susp  Commonly known as:  PRED FORTE   Place 1 Drop in right eye 2 Times a Day.  Dose:  1 Drop     promethazine 12.5 MG tablet  Commonly known as:  PHENERGAN   Take 1-2 Tabs by mouth every four hours as needed for Nausea/Vomiting (if no relief from ondansetron or if ondansetron is not ordered).  Dose:  12.5-25 mg     RENVELA 800 MG Tabs tablet  Generic drug:  sevelamer carbonate   Take 2-3 Tabs by mouth 3 times a day, with meals. 2 tabs TID With meals, and 1 tab twice with snacks  Dose:  2-3 Tab     VITAMIN D3 PO   Take 1 Cap by mouth every day.  Dose:  1 Cap        STOP taking these medications    metoclopramide 5 MG tablet  Commonly known as:  REGLAN            Allergies  No Known Allergies    DIET  Orders Placed This Encounter   Procedures   • Diet Order Renal, Diabetic     Standing Status:   Standing     Number of Occurrences:   1     Order Specific Question:   Diet:     Answer:   Renal [8]     Order Specific Question:   Diet:     Answer:   Diabetic [3]       ACTIVITY  As tolerated.  Weight bearing as tolerated    CONSULTATIONS  Neurology  Nephrology    PROCEDURES  Hemodialysis    LABORATORY  Lab Results   Component Value Date    SODIUM 132 (L) 11/28/2018    POTASSIUM 4.5 11/28/2018     CHLORIDE 92 (L) 11/28/2018    CO2 28 11/28/2018    GLUCOSE 131 (H) 11/28/2018    BUN 62 (H) 11/28/2018    CREATININE 6.42 (HH) 11/28/2018        Lab Results   Component Value Date    WBC 5.7 11/29/2018    HEMOGLOBIN 8.4 (L) 11/29/2018    HEMATOCRIT 27.0 (L) 11/29/2018    PLATELETCT 151 (L) 11/29/2018        Total time of the discharge process exceeds 36 minutes.

## 2018-12-01 LAB
BACTERIA BLD CULT: NORMAL
BACTERIA BLD CULT: NORMAL
SIGNIFICANT IND 70042: NORMAL
SIGNIFICANT IND 70042: NORMAL
SITE SITE: NORMAL
SITE SITE: NORMAL
SOURCE SOURCE: NORMAL
SOURCE SOURCE: NORMAL

## 2018-12-12 NOTE — TELEPHONE ENCOUNTER
Was the patient seen in the last year in this department? No     Does patient have an active prescription for medications requested? Yes    Received Request Via: Pharmacy    Request was sent for Vitamin D3 2000 Unit Softgel and a 90 day supply of this.    Thank you.

## 2019-01-07 RX ORDER — ACETAMINOPHEN 160 MG
1 TABLET,DISINTEGRATING ORAL DAILY
Qty: 30 CAP | Refills: 11 | Status: SHIPPED | OUTPATIENT
Start: 2019-01-07 | End: 2019-04-03

## 2019-04-03 ENCOUNTER — HOSPITAL ENCOUNTER (OUTPATIENT)
Facility: MEDICAL CENTER | Age: 59
End: 2019-04-04
Attending: EMERGENCY MEDICINE | Admitting: INTERNAL MEDICINE
Payer: MEDICARE

## 2019-04-03 ENCOUNTER — APPOINTMENT (OUTPATIENT)
Dept: RADIOLOGY | Facility: MEDICAL CENTER | Age: 59
End: 2019-04-03
Attending: EMERGENCY MEDICINE
Payer: MEDICARE

## 2019-04-03 DIAGNOSIS — N18.9 CHRONIC RENAL FAILURE, UNSPECIFIED CKD STAGE: ICD-10-CM

## 2019-04-03 DIAGNOSIS — Z99.81 ON HOME OXYGEN THERAPY: Chronic | ICD-10-CM

## 2019-04-03 DIAGNOSIS — Z99.2 ESRD ON DIALYSIS (HCC): ICD-10-CM

## 2019-04-03 DIAGNOSIS — E11.21 TYPE 2 DIABETES MELLITUS WITH NEPHROPATHY (HCC): ICD-10-CM

## 2019-04-03 DIAGNOSIS — Z91.81 AT HIGH RISK FOR FALLS: ICD-10-CM

## 2019-04-03 DIAGNOSIS — I10 ESSENTIAL HYPERTENSION: Chronic | ICD-10-CM

## 2019-04-03 DIAGNOSIS — H54.7 BLINDNESS: ICD-10-CM

## 2019-04-03 DIAGNOSIS — R53.1 WEAKNESS: ICD-10-CM

## 2019-04-03 DIAGNOSIS — N18.6 ESRD ON DIALYSIS (HCC): ICD-10-CM

## 2019-04-03 PROBLEM — R12 HEART BURN: Chronic | Status: ACTIVE | Noted: 2019-04-03

## 2019-04-03 PROBLEM — F39 MOOD DISORDER (HCC): Chronic | Status: ACTIVE | Noted: 2019-04-03

## 2019-04-03 LAB
25(OH)D3 SERPL-MCNC: 39 NG/ML (ref 30–100)
ABO GROUP BLD: NORMAL
ALBUMIN SERPL BCP-MCNC: 4.3 G/DL (ref 3.2–4.9)
ALBUMIN/GLOB SERPL: 1.8 G/DL
ALP SERPL-CCNC: 102 U/L (ref 30–99)
ALT SERPL-CCNC: 14 U/L (ref 2–50)
ANION GAP SERPL CALC-SCNC: 9 MMOL/L (ref 0–11.9)
AST SERPL-CCNC: 11 U/L (ref 12–45)
BASOPHILS # BLD AUTO: 0.8 % (ref 0–1.8)
BASOPHILS # BLD: 0.04 K/UL (ref 0–0.12)
BILIRUB SERPL-MCNC: 0.6 MG/DL (ref 0.1–1.5)
BLD GP AB SCN SERPL QL: NORMAL
BUN SERPL-MCNC: 26 MG/DL (ref 8–22)
CALCIUM SERPL-MCNC: 9.1 MG/DL (ref 8.5–10.5)
CHLORIDE SERPL-SCNC: 93 MMOL/L (ref 96–112)
CK SERPL-CCNC: 43 U/L (ref 0–154)
CO2 SERPL-SCNC: 30 MMOL/L (ref 20–33)
CREAT SERPL-MCNC: 4.01 MG/DL (ref 0.5–1.4)
EOSINOPHIL # BLD AUTO: 0.3 K/UL (ref 0–0.51)
EOSINOPHIL NFR BLD: 6 % (ref 0–6.9)
ERYTHROCYTE [DISTWIDTH] IN BLOOD BY AUTOMATED COUNT: 48.8 FL (ref 35.9–50)
ERYTHROCYTE [SEDIMENTATION RATE] IN BLOOD BY WESTERGREN METHOD: 8 MM/HOUR (ref 0–20)
EST. AVERAGE GLUCOSE BLD GHB EST-MCNC: 123 MG/DL
FOLATE SERPL-MCNC: 11.5 NG/ML
GLOBULIN SER CALC-MCNC: 2.4 G/DL (ref 1.9–3.5)
GLUCOSE BLD-MCNC: 119 MG/DL (ref 65–99)
GLUCOSE BLD-MCNC: 127 MG/DL (ref 65–99)
GLUCOSE BLD-MCNC: 143 MG/DL (ref 65–99)
GLUCOSE SERPL-MCNC: 134 MG/DL (ref 65–99)
HBA1C MFR BLD: 5.9 % (ref 0–5.6)
HCT VFR BLD AUTO: 34 % (ref 42–52)
HGB BLD-MCNC: 11 G/DL (ref 14–18)
IMM GRANULOCYTES # BLD AUTO: 0.01 K/UL (ref 0–0.11)
IMM GRANULOCYTES NFR BLD AUTO: 0.2 % (ref 0–0.9)
LACTATE BLD-SCNC: 0.7 MMOL/L (ref 0.5–2)
LYMPHOCYTES # BLD AUTO: 0.39 K/UL (ref 1–4.8)
LYMPHOCYTES NFR BLD: 7.8 % (ref 22–41)
MAGNESIUM SERPL-MCNC: 2.2 MG/DL (ref 1.5–2.5)
MCH RBC QN AUTO: 30.7 PG (ref 27–33)
MCHC RBC AUTO-ENTMCNC: 32.4 G/DL (ref 33.7–35.3)
MCV RBC AUTO: 95 FL (ref 81.4–97.8)
MONOCYTES # BLD AUTO: 0.48 K/UL (ref 0–0.85)
MONOCYTES NFR BLD AUTO: 9.6 % (ref 0–13.4)
NEUTROPHILS # BLD AUTO: 3.79 K/UL (ref 1.82–7.42)
NEUTROPHILS NFR BLD: 75.6 % (ref 44–72)
NRBC # BLD AUTO: 0 K/UL
NRBC BLD-RTO: 0 /100 WBC
PHOSPHATE SERPL-MCNC: 3 MG/DL (ref 2.5–4.5)
PLATELET # BLD AUTO: 173 K/UL (ref 164–446)
PMV BLD AUTO: 10.9 FL (ref 9–12.9)
POTASSIUM SERPL-SCNC: 4.4 MMOL/L (ref 3.6–5.5)
PROCALCITONIN SERPL-MCNC: 0.24 NG/ML
PROT SERPL-MCNC: 6.7 G/DL (ref 6–8.2)
RBC # BLD AUTO: 3.58 M/UL (ref 4.7–6.1)
RH BLD: NORMAL
SODIUM SERPL-SCNC: 132 MMOL/L (ref 135–145)
T4 FREE SERPL-MCNC: 1.11 NG/DL (ref 0.53–1.43)
TROPONIN I SERPL-MCNC: 0.04 NG/ML (ref 0–0.04)
TSH SERPL DL<=0.005 MIU/L-ACNC: 2.26 UIU/ML (ref 0.38–5.33)
VIT B12 SERPL-MCNC: 380 PG/ML (ref 211–911)
WBC # BLD AUTO: 5 K/UL (ref 4.8–10.8)

## 2019-04-03 PROCEDURE — 700102 HCHG RX REV CODE 250 W/ 637 OVERRIDE(OP): Performed by: STUDENT IN AN ORGANIZED HEALTH CARE EDUCATION/TRAINING PROGRAM

## 2019-04-03 PROCEDURE — 82746 ASSAY OF FOLIC ACID SERUM: CPT

## 2019-04-03 PROCEDURE — 83735 ASSAY OF MAGNESIUM: CPT

## 2019-04-03 PROCEDURE — 85610 PROTHROMBIN TIME: CPT

## 2019-04-03 PROCEDURE — 86850 RBC ANTIBODY SCREEN: CPT

## 2019-04-03 PROCEDURE — 304561 HCHG STAT O2

## 2019-04-03 PROCEDURE — 85730 THROMBOPLASTIN TIME PARTIAL: CPT

## 2019-04-03 PROCEDURE — 86900 BLOOD TYPING SEROLOGIC ABO: CPT

## 2019-04-03 PROCEDURE — 85025 COMPLETE CBC W/AUTO DIFF WBC: CPT

## 2019-04-03 PROCEDURE — 83605 ASSAY OF LACTIC ACID: CPT

## 2019-04-03 PROCEDURE — 83036 HEMOGLOBIN GLYCOSYLATED A1C: CPT

## 2019-04-03 PROCEDURE — A9270 NON-COVERED ITEM OR SERVICE: HCPCS | Performed by: STUDENT IN AN ORGANIZED HEALTH CARE EDUCATION/TRAINING PROGRAM

## 2019-04-03 PROCEDURE — G0378 HOSPITAL OBSERVATION PER HR: HCPCS

## 2019-04-03 PROCEDURE — 71045 X-RAY EXAM CHEST 1 VIEW: CPT

## 2019-04-03 PROCEDURE — 82962 GLUCOSE BLOOD TEST: CPT | Mod: 91

## 2019-04-03 PROCEDURE — 82306 VITAMIN D 25 HYDROXY: CPT

## 2019-04-03 PROCEDURE — 84484 ASSAY OF TROPONIN QUANT: CPT

## 2019-04-03 PROCEDURE — 70450 CT HEAD/BRAIN W/O DYE: CPT

## 2019-04-03 PROCEDURE — 99220 PR INITIAL OBSERVATION CARE,LEVL III: CPT | Mod: GC | Performed by: INTERNAL MEDICINE

## 2019-04-03 PROCEDURE — 82550 ASSAY OF CK (CPK): CPT

## 2019-04-03 PROCEDURE — 84100 ASSAY OF PHOSPHORUS: CPT

## 2019-04-03 PROCEDURE — 85652 RBC SED RATE AUTOMATED: CPT

## 2019-04-03 PROCEDURE — 96372 THER/PROPH/DIAG INJ SC/IM: CPT | Mod: XU

## 2019-04-03 PROCEDURE — 99285 EMERGENCY DEPT VISIT HI MDM: CPT

## 2019-04-03 PROCEDURE — 93005 ELECTROCARDIOGRAM TRACING: CPT | Performed by: EMERGENCY MEDICINE

## 2019-04-03 PROCEDURE — 86901 BLOOD TYPING SEROLOGIC RH(D): CPT

## 2019-04-03 PROCEDURE — 96374 THER/PROPH/DIAG INJ IV PUSH: CPT

## 2019-04-03 PROCEDURE — 80053 COMPREHEN METABOLIC PANEL: CPT

## 2019-04-03 PROCEDURE — 84439 ASSAY OF FREE THYROXINE: CPT

## 2019-04-03 PROCEDURE — 84145 PROCALCITONIN (PCT): CPT

## 2019-04-03 PROCEDURE — 36415 COLL VENOUS BLD VENIPUNCTURE: CPT

## 2019-04-03 PROCEDURE — 84443 ASSAY THYROID STIM HORMONE: CPT

## 2019-04-03 PROCEDURE — 82607 VITAMIN B-12: CPT

## 2019-04-03 PROCEDURE — 700111 HCHG RX REV CODE 636 W/ 250 OVERRIDE (IP): Performed by: STUDENT IN AN ORGANIZED HEALTH CARE EDUCATION/TRAINING PROGRAM

## 2019-04-03 RX ORDER — PROMETHAZINE HYDROCHLORIDE 25 MG/1
25 TABLET ORAL EVERY 6 HOURS PRN
COMMUNITY
End: 2019-07-30

## 2019-04-03 RX ORDER — AMLODIPINE BESYLATE 5 MG/1
5 TABLET ORAL
Status: DISCONTINUED | OUTPATIENT
Start: 2019-04-04 | End: 2019-04-03

## 2019-04-03 RX ORDER — PAROXETINE HYDROCHLORIDE 20 MG/1
40 TABLET, FILM COATED ORAL DAILY
Status: DISCONTINUED | OUTPATIENT
Start: 2019-04-04 | End: 2019-04-04 | Stop reason: HOSPADM

## 2019-04-03 RX ORDER — PROMETHAZINE HYDROCHLORIDE 25 MG/1
25 TABLET ORAL EVERY 6 HOURS PRN
Status: DISCONTINUED | OUTPATIENT
Start: 2019-04-03 | End: 2019-04-04 | Stop reason: HOSPADM

## 2019-04-03 RX ORDER — ASCORBIC ACID 500 MG
500 TABLET ORAL 2 TIMES DAILY
Status: DISCONTINUED | OUTPATIENT
Start: 2019-04-03 | End: 2019-04-04 | Stop reason: HOSPADM

## 2019-04-03 RX ORDER — GUAIFENESIN/DEXTROMETHORPHAN 100-10MG/5
10 SYRUP ORAL EVERY 6 HOURS PRN
Status: DISCONTINUED | OUTPATIENT
Start: 2019-04-03 | End: 2019-04-04 | Stop reason: HOSPADM

## 2019-04-03 RX ORDER — HYDRALAZINE HYDROCHLORIDE 20 MG/ML
10 INJECTION INTRAMUSCULAR; INTRAVENOUS EVERY 4 HOURS PRN
Status: DISCONTINUED | OUTPATIENT
Start: 2019-04-03 | End: 2019-04-04 | Stop reason: HOSPADM

## 2019-04-03 RX ORDER — LACTULOSE 20 G/30ML
30 SOLUTION ORAL 2 TIMES DAILY
Status: DISCONTINUED | OUTPATIENT
Start: 2019-04-03 | End: 2019-04-04 | Stop reason: HOSPADM

## 2019-04-03 RX ORDER — HYDRALAZINE HYDROCHLORIDE 25 MG/1
100 TABLET, FILM COATED ORAL 3 TIMES DAILY
Status: DISCONTINUED | OUTPATIENT
Start: 2019-04-03 | End: 2019-04-04 | Stop reason: HOSPADM

## 2019-04-03 RX ORDER — ACETAMINOPHEN 325 MG/1
650 TABLET ORAL EVERY 6 HOURS PRN
Status: DISCONTINUED | OUTPATIENT
Start: 2019-04-03 | End: 2019-04-04 | Stop reason: HOSPADM

## 2019-04-03 RX ORDER — AMOXICILLIN 250 MG
2 CAPSULE ORAL 2 TIMES DAILY
Status: DISCONTINUED | OUTPATIENT
Start: 2019-04-03 | End: 2019-04-04 | Stop reason: HOSPADM

## 2019-04-03 RX ORDER — OMEPRAZOLE 20 MG/1
20 CAPSULE, DELAYED RELEASE ORAL DAILY
Status: DISCONTINUED | OUTPATIENT
Start: 2019-04-04 | End: 2019-04-04 | Stop reason: HOSPADM

## 2019-04-03 RX ORDER — AMLODIPINE BESYLATE 5 MG/1
5 TABLET ORAL
Status: DISCONTINUED | OUTPATIENT
Start: 2019-04-04 | End: 2019-04-04 | Stop reason: HOSPADM

## 2019-04-03 RX ORDER — DEXTROSE MONOHYDRATE 25 G/50ML
25 INJECTION, SOLUTION INTRAVENOUS
Status: DISCONTINUED | OUTPATIENT
Start: 2019-04-03 | End: 2019-04-04 | Stop reason: HOSPADM

## 2019-04-03 RX ORDER — POLYETHYLENE GLYCOL 3350 17 G/17G
1 POWDER, FOR SOLUTION ORAL
Status: DISCONTINUED | OUTPATIENT
Start: 2019-04-03 | End: 2019-04-04 | Stop reason: HOSPADM

## 2019-04-03 RX ORDER — ATORVASTATIN CALCIUM 20 MG/1
20 TABLET, FILM COATED ORAL DAILY
Status: DISCONTINUED | OUTPATIENT
Start: 2019-04-04 | End: 2019-04-04 | Stop reason: HOSPADM

## 2019-04-03 RX ORDER — METOPROLOL TARTRATE 100 MG/1
100 TABLET ORAL 2 TIMES DAILY
Status: DISCONTINUED | OUTPATIENT
Start: 2019-04-03 | End: 2019-04-04 | Stop reason: HOSPADM

## 2019-04-03 RX ORDER — BISACODYL 10 MG
10 SUPPOSITORY, RECTAL RECTAL
Status: DISCONTINUED | OUTPATIENT
Start: 2019-04-03 | End: 2019-04-04 | Stop reason: HOSPADM

## 2019-04-03 RX ORDER — HEPARIN SODIUM 5000 [USP'U]/ML
5000 INJECTION, SOLUTION INTRAVENOUS; SUBCUTANEOUS EVERY 8 HOURS
Status: DISCONTINUED | OUTPATIENT
Start: 2019-04-03 | End: 2019-04-04 | Stop reason: HOSPADM

## 2019-04-03 RX ORDER — CALCIUM ACETATE 667 MG/1
667 TABLET ORAL
Status: DISCONTINUED | OUTPATIENT
Start: 2019-04-04 | End: 2019-04-04 | Stop reason: HOSPADM

## 2019-04-03 RX ADMIN — INSULIN HUMAN 10 UNITS: 100 INJECTION, SUSPENSION SUBCUTANEOUS at 19:09

## 2019-04-03 RX ADMIN — HYDRALAZINE HYDROCHLORIDE 100 MG: 50 TABLET, FILM COATED ORAL at 18:50

## 2019-04-03 RX ADMIN — HYDRALAZINE HYDROCHLORIDE 10 MG: 20 INJECTION INTRAMUSCULAR; INTRAVENOUS at 23:26

## 2019-04-03 RX ADMIN — LACTULOSE 30 ML: 20 SOLUTION ORAL at 17:52

## 2019-04-03 RX ADMIN — OXYCODONE HYDROCHLORIDE AND ACETAMINOPHEN 500 MG: 500 TABLET ORAL at 17:52

## 2019-04-03 RX ADMIN — METOPROLOL TARTRATE 100 MG: 100 TABLET ORAL at 17:52

## 2019-04-03 RX ADMIN — HEPARIN SODIUM 5000 UNITS: 5000 INJECTION, SOLUTION INTRAVENOUS; SUBCUTANEOUS at 20:00

## 2019-04-03 ASSESSMENT — COGNITIVE AND FUNCTIONAL STATUS - GENERAL
CLIMB 3 TO 5 STEPS WITH RAILING: A LOT
DRESSING REGULAR LOWER BODY CLOTHING: A LITTLE
TOILETING: A LOT
TURNING FROM BACK TO SIDE WHILE IN FLAT BAD: A LITTLE
SUGGESTED CMS G CODE MODIFIER DAILY ACTIVITY: CK
DRESSING REGULAR UPPER BODY CLOTHING: A LITTLE
DAILY ACTIVITIY SCORE: 17
PERSONAL GROOMING: A LITTLE
MOVING TO AND FROM BED TO CHAIR: A LITTLE
EATING MEALS: A LITTLE
HELP NEEDED FOR BATHING: A LITTLE
MOVING FROM LYING ON BACK TO SITTING ON SIDE OF FLAT BED: A LITTLE
WALKING IN HOSPITAL ROOM: A LOT
MOBILITY SCORE: 15
SUGGESTED CMS G CODE MODIFIER MOBILITY: CK
STANDING UP FROM CHAIR USING ARMS: A LOT

## 2019-04-03 ASSESSMENT — PATIENT HEALTH QUESTIONNAIRE - PHQ9
SUM OF ALL RESPONSES TO PHQ9 QUESTIONS 1 AND 2: 0
1. LITTLE INTEREST OR PLEASURE IN DOING THINGS: NOT AT ALL
2. FEELING DOWN, DEPRESSED, IRRITABLE, OR HOPELESS: NOT AT ALL

## 2019-04-03 ASSESSMENT — LIFESTYLE VARIABLES
EVER_SMOKED: NEVER
DO YOU DRINK ALCOHOL: NO

## 2019-04-03 NOTE — ED NOTES
Patient family states that after dialysis at 10:00 am patient was not responding to questions normally. In triage patient has right sided facial droop. Charge called to updated. Stew code called.

## 2019-04-03 NOTE — CONSULTS
Chief Complaint   Patient presents with   • Weakness       Problem List Items Addressed This Visit     None      Visit Diagnoses     Weakness        At high risk for falls        Chronic renal failure, unspecified CKD stage          Stroke Alert Consult    History of present illness:  This is a 58-year old male with PMhx significant for dyslipidemia, hypertension, diabetes mellitus, ESRD on HD on M/W/F, anemia of chronic disease, blind bilaterally who presented to [facility] on 4/3/19 for a chief complaint of generalized weakness and confusion following HD today. Patient's wife provides majority of history. She states that patient presented to HD center this morning around 0500 in his usual state of health; finished HD around 1000, was reportedly uncomplicated. At time HD finished, wife noted that patient appeared lethargic and confused; was not following commands; had extreme difficulty getting into the car. Wife thus drove from [place] to ED here; at time of presentation /70, . CT Brain revealed no acute intracranial abnormality. Patient ultimately determined not to be a candidate for IV tPA secondary to mild/non disabling deficits, NIHSS 0 thus low suspicion for stroke. Currently patient is sitting up in stretcher; awake, drowsy. Follows simple commands; moves all extremities equally, mildy weak throughout. Denies headache, dizziness, chest pain, SOB.     Neurology has been consulted by Dr. Andrzej Burns to further evaluate the findings noted above.     Past medical history:   Past Medical History:   Diagnosis Date   • Anemia    • Blind in both eyes    • Diabetes (HCC)     insulin dependent   • Dialysis patient ([facility])     RISHABH, Earline   • ESRD (end stage renal disease) ([facility])    • Heart burn    • Hyperlipemia    • Hypertension        Past surgical history:   Past Surgical History:   Procedure Laterality Date   • RECOVERY  4/19/2016    Procedure: VASCULAR CASE-ILEANA-LEFT ARM FISTULOGRAM WITH  ANGIOPLASTY 50698, 06966, 29059-JHU STAGE RENAL DISEASE N18.6;  Surgeon: Perfecto Surgery;  [place] [facility] [place];  Service:    • RECOVERY  2/24/2016    Procedure: IR1 VASCULAR CASE LEFT ARM FISTULOGRAM WITH ANGIOPLASTY;  Surgeon: Perfecto Surgery;  Location: SURGERY PRE[place] PROC UNIT [facility] [place];  Service:    • CATH PLACEMENT Right 12/10/2015    Procedure: CATH PLACEMENT;  Surgeon: Lorene Baldwin M.D.;  Location: SURGERY [facility];  Service:    • AV FISTULA CREATION Left 12/10/2015    Procedure: AV FISTULA CREATION;  Surgeon: Lorene Baldwin M.D.;  Location: SURGERY [facility];  Service:    • PB REMV CATARACT EXTRACAP,INSERT LENS  10/21/15   • OTHER  9/22/15    trabecular micro bypass stent- right eye   • OTHER  7/2015    take out blood of left eye   • OTHER  3/2015    take blood out of eye        Family history:   History reviewed. No pertinent family history.    Social history:   Social History     Social History   • Marital status:      Spouse name: N/A   • Number of children: N/A   • Years of education: N/A     Occupational History   • Not on file.     Social History Main Topics   • Smoking status: Never Smoker   • Smokeless tobacco: Never Used   • Alcohol use No   • Drug use: No   • Sexual activity: Not on file     Other Topics Concern   • Not on file     Social History Narrative   • No narrative on file       Current medications:   No current facility-administered medications for this encounter.      Current Outpatient Prescriptions   Medication   • calcium acetate (PHOS-LO) 667 MG Cap   • insulin NPH (HUMULIN,NOVOLIN) 100 UNIT/ML Suspension   • promethazine (PHENERGAN) 25 MG Tab   • hydrALAZINE (APRESOLINE) 100 MG tablet   • paroxetine (PAXIL) 40 MG tablet   • atorvastatin (LIPITOR) 20 MG Tab   • lactulose 10 GM/15ML Solution   • omeprazole (PRILOSEC) 20 MG delayed-release capsule   • metoprolol (LOPRESSOR) 100 MG Tab   • ascorbic acid (VITAMIN C) 500 MG tablet  "      Medication Allergy:  No Known Allergies    Review of systems:   As noted above; otherwise all systems review and found to be negative.     Physical examination:   Vitals:    04/03/19 1505 04/03/19 1506 04/03/19 1531 04/03/19 1532   BP:       Pulse:   64 64   Resp:   16 16   Temp:  36.7 °C (98.1 °F)     TempSrc:  Tympanic     SpO2:   100% 96%   Weight:       Height: 1.702 m (5' 7\")        General: Patient in no acute distress, pleasant and cooperative.  HEENT: Normocephalic, no signs of acute trauma.   Neck: supple, no meningeal signs or carotid bruits. There is normal range of motion. No tenderness on exam.   Chest: clear to auscultation. No cough.   CV: RRR, no murmurs.   Skin: no signs of acute rashes or trauma.   Musculoskeletal: joints exhibit full range of motion, without any pain to palpation. There are no signs of joint or muscle swelling. There is no tenderness to deep palpation of muscles.   Psychiatric: No hallucinatory behavior. Denies symptoms of depression or suicidal ideation. Mood and affect appear normal on exam.     NEUROLOGICAL EXAM:   Mental status, orientation: Awake, alert and fully oriented.   Speech and language: speech is clear and fluent. The patient is able to name, repeat and comprehend.   Memory: There is intact recollection of recent and remote events.   Cranial nerve exam: Patient is blind bilaterally; Right pupil is non reactive; left 4-5 mm an reactive. Tracks spontaneously, not to command. Intact full EOM in all directions of gaze. Face appears symmetric. Sensation in the face is intact to light touch. Tongue is midline and without any signs of tongue biting or fasciculations. Shoulder shrug is intact bilaterally.   Motor exam: Strength is 4+/5 in all extremities, no drift. Tone is normal. No abnormal movements were seen on exam.   Sensory exam reveals normal sense of light touch and pinprick in all extremities.   Deep tendon reflexes:  2+ throughout. Plantar responses are " flexor. There is no clonus.   Coordination: shows a normal finger-nose-finger. Normal rapidly alternating movements.   Gait: Not assessed at this time as patient is a fall risk.     NIH Stroke Scale    1a Level of Consciousness   1b Orientation Questions   1c Response to Commands   2 Gaze   3 Visual Fields   4 Facial Movement   5 Motor Function (arm)   a Left   b Right   6 Motor Function (leg)   a Left   b Right   7 Limb Ataxia   8 Sensory   9 Language   10 Articulation   11 Extinction/Inattention     Score: 0    ANCILLARY DATA REVIEWED:     Lab Data Review:  Recent Results (from the past 24 hour(s))   CBC WITH DIFFERENTIAL    Collection Time: 04/03/19  2:25 PM   Result Value Ref Range    WBC 5.0 4.8 - 10.8 K/uL    RBC 3.58 (L) 4.70 - 6.10 M/uL    Hemoglobin 11.0 (L) 14.0 - 18.0 g/dL    Hematocrit 34.0 (L) 42.0 - 52.0 %    MCV 95.0 81.4 - 97.8 fL    MCH 30.7 27.0 - 33.0 pg    MCHC 32.4 (L) 33.7 - 35.3 g/dL    RDW 48.8 35.9 - 50.0 fL    Platelet Count 173 164 - 446 K/uL    MPV 10.9 9.0 - 12.9 fL    Neutrophils-Polys 75.60 (H) 44.00 - 72.00 %    Lymphocytes 7.80 (L) 22.00 - 41.00 %    Monocytes 9.60 0.00 - 13.40 %    Eosinophils 6.00 0.00 - 6.90 %    Basophils 0.80 0.00 - 1.80 %    Immature Granulocytes 0.20 0.00 - 0.90 %    Nucleated RBC 0.00 /100 WBC    Neutrophils (Absolute) 3.79 1.82 - 7.42 K/uL    Lymphs (Absolute) 0.39 (L) 1.00 - 4.80 K/uL    Monos (Absolute) 0.48 0.00 - 0.85 K/uL    Eos (Absolute) 0.30 0.00 - 0.51 K/uL    Baso (Absolute) 0.04 0.00 - 0.12 K/uL    Immature Granulocytes (abs) 0.01 0.00 - 0.11 K/uL    NRBC (Absolute) 0.00 K/uL   COMP METABOLIC PANEL    Collection Time: 04/03/19  2:25 PM   Result Value Ref Range    Sodium 132 (L) 135 - 145 mmol/L    Potassium 4.4 3.6 - 5.5 mmol/L    Chloride 93 (L) 96 - 112 mmol/L    Co2 30 20 - 33 mmol/L    Anion Gap 9.0 0.0 - 11.9    Glucose 134 (H) 65 - 99 mg/dL    Bun 26 (H) 8 - 22 mg/dL    Creatinine 4.01 (H) 0.50 - 1.40 mg/dL    Calcium 9.1 8.5 - 10.5 mg/dL     AST(SGOT) 11 (L) 12 - 45 U/L    ALT(SGPT) 14 2 - 50 U/L    Alkaline Phosphatase 102 (H) 30 - 99 U/L    Total Bilirubin 0.6 0.1 - 1.5 mg/dL    Albumin 4.3 3.2 - 4.9 g/dL    Total Protein 6.7 6.0 - 8.2 g/dL    Globulin 2.4 1.9 - 3.5 g/dL    A-G Ratio 1.8 g/dL   COD (ADULT)    Collection Time: 04/03/19  2:25 PM   Result Value Ref Range    ABO Grouping Only O     Rh Grouping Only POS     Antibody Screen-Cod NEG    TROPONIN    Collection Time: 04/03/19  2:25 PM   Result Value Ref Range    Troponin I 0.04 0.00 - 0.04 ng/mL   ACCU-CHEK GLUCOSE    Collection Time: 04/03/19  2:25 PM   Result Value Ref Range    Glucose - Accu-Ck 143 (H) 65 - 99 mg/dL   ESTIMATED GFR    Collection Time: 04/03/19  2:25 PM   Result Value Ref Range    GFR If  19 (A) >60 mL/min/1.73 m 2    GFR If Non African American 15 (A) >60 mL/min/1.73 m 2       Labs reviewed by me.     Imaging reviewed by me:     DX-CHEST-PORTABLE (1 VIEW)   Final Result      1.  Cardiomegaly with interstitial edema.      2.  Bibasilar atelectasis.      CT-HEAD W/O   Final Result      1.  No evidence of acute intracranial process.      2.  Cerebral atrophy as well as periventricular chronic small vessel ischemic change.        ASSESSMENT AND PLAN:  58-year old male with PMhx significant for dyslipidemia, hypertension, diabetes mellitus, ESRD on HD on M/W/F, anemia of chronic disease, blind bilaterally who presented to [facility] on 4/3/19 for a chief complaint of generalized weakness and confusion following HD today; CT Brain unremarkable; Patient ultimately determined not to be a candidate for IV tPA secondary to mild/non disabling deficits, NIHSS 0 thus low suspicion for stroke. Differential diagnoses include generalized fatigue/weakness associated with extracellular fluid shifts associated with HD vs. Epileptogenic event (will note however that patient was seen here in January 2019; had myoclonus in setting of profoundly elevated BUN/Cr after which  time he started HD and symptoms resolved). Will also recommend to rule out infectious and or metabolic sources of patient's symptoms.     Recommendations/Plan:     -Give Keppra 500 mg IVPB now.   -Obtain EEG in the am.   -Check CBC, CMP, Blood cultures.   -All other medical management per primary team. As was noted above, recommend to also rule out infectious and/or metabolic source of patient's symptoms.     Will follow up with results of the above and make additional recommendations accordingly. The plan of care above has been discussed with Dr. Montalvo.     Dulce Arguelles MSN, BSN, AGNP-C  [facility]

## 2019-04-03 NOTE — ED NOTES
Pt arrvied to ED with spouse in WC. Vitals recorded. Pulse ox 64% 2 lpm via NC. Triage RN notified. Pt put on 6lpm via NC, improved to 98%. Pts BP taken manually, 190/70. Value reported to triage RN.

## 2019-04-03 NOTE — ED PROVIDER NOTES
ED Provider Note    CHIEF COMPLAINT  Chief Complaint   Patient presents with   • Weakness       HPI  Pj Britt is a 58 y.o. male who presents with a report that the patient was at dialysis at around 10:00 this morning and was feeling globally weak and when his wife came to pick him up this afternoon she felt like he might have a little facial droop on perhaps the right and he could not walk very well because of weakness.  The patient is blind legally and is a history of Monday Wednesday Friday dialysis secondary to chronic renal failure.  Denies any recent trauma, fever, chills, sweats, unilateral weakness or numbness and states that he just feels weak all over.  Unclear whether he had full dialysis today or not    REVIEW OF SYSTEMS  See HPI for further details. All other systems are negative.     PAST MEDICAL HISTORY  Past Medical History:   Diagnosis Date   • Anemia    • Blind in both eyes    • Diabetes (HCC)     insulin dependent   • Dialysis patient ([facility])     Earline KEATING   • ESRD (end stage renal disease) ([facility])    • Heart burn    • Hyperlipemia    • Hypertension        FAMILY HISTORY  No family history on file.    SOCIAL HISTORY   reports that he has never smoked. He has never used smokeless tobacco. He reports that he does not drink alcohol or use drugs.    SURGICAL HISTORY  Past Surgical History:   Procedure Laterality Date   • RECOVERY  4/19/2016    Procedure: VASCULAR CASE-ILEANA-LEFT ARM FISTULOGRAM WITH ANGIOPLASTY 32380, 35031, 31119-VAN STAGE RENAL DISEASE N18.6;  Surgeon: Recoveryonly Surgery;  [place] [facility] [place]:    • RECOVERY  2/24/2016    Procedure: IR1 VASCULAR CASE LEFT ARM FISTULOGRAM WITH ANGIOPLASTY;  Surgeon: Perfecto Surgery;  [place] [facility] [place]  Service:    • CATH PLACEMENT Right 12/10/2015    Procedure: CATH PLACEMENT;  Surgeon: Lorene Baldwin M.D.;  Location: SURGERY [facility];  Service:    • AV FISTULA CREATION  Left 12/10/2015    Procedure: AV FISTULA CREATION;  Surgeon: Lorene Baldwin M.D.;  Location: SURGERY [facility];  Service:    • PB REMV CATARACT EXTRACAP,INSERT LENS  10/21/15   • OTHER  9/22/15    trabecular micro bypass stent- right eye   • OTHER  7/2015    take out blood of left eye   • OTHER  3/2015    take blood out of eye        CURRENT MEDICATIONS  Home Medications     Reviewed by Laurent Ladd (Pharmacy Tech) on 04/03/19 at 1449  Med List Status: Complete   Medication Last Dose Status   ascorbic acid (VITAMIN C) 500 MG tablet 4/3/2019 Active   atorvastatin (LIPITOR) 20 MG Tab 4/3/2019 Active   calcium acetate (PHOS-LO) 667 MG Cap 4/2/2019 Active   hydrALAZINE (APRESOLINE) 100 MG tablet 4/3/2019 Active   insulin NPH (HUMULIN,NOVOLIN) 100 UNIT/ML Suspension 4/2/2019 Active   lactulose 10 GM/15ML Solution 4/3/2019 Active   metoprolol (LOPRESSOR) 100 MG Tab 4/3/2019 Active   omeprazole (PRILOSEC) 20 MG delayed-release capsule 4/3/2019 Active   paroxetine (PAXIL) 40 MG tablet 4/3/2019 Active   promethazine (PHENERGAN) 25 MG Tab 4/2/2019 Active                ALLERGIES  No Known Allergies    PHYSICAL EXAM  VITAL SIGNS: BP (!) 190/70   Pulse 66   Temp 37.2 °C (99 °F) (Temporal)   Resp 18   Wt 63.4 kg (139 lb 12.4 oz)   SpO2 98%   BMI 21.89 kg/m²    Constitutional: Appears older than stated age, No acute distress, weak appearance.   HENT: Normocephalic, Atraumatic, Bilateral external ears normal, Oropharynx is clear mucous membranes are moist. No oral exudates or nasal discharge.   Eyes: Pupils are equal round and reactive, EOMI, Conjunctiva normal, No discharge.   Neck: Normal range of motion, No tenderness, Supple, No stridor. No meningismus.  Lymphatic: No lymphadenopathy noted.   Cardiovascular: Regular rate and rhythm without murmur rub or gallop.  Thorax & Lungs: Clear breath sounds bilaterally without wheezes, rhonchi or rales. There is no chest wall tenderness.   Abdomen: Soft non-tender  non-distended. There is no rebound or guarding. No organomegaly is appreciated. Bowel sounds are normal.  Skin: Normal without rash.   Back: No CVA or spinal tenderness.   Extremities: Intact distal pulses, No edema, No tenderness, No cyanosis, No clubbing. Capillary refill is less than 2 seconds.  Musculoskeletal: Good range of motion in all major joints. No tenderness to palpation or major deformities noted.   Neurologic: Alert & oriented x 3, Normal motor function, Normal sensory function, patient seems to be globally weak with 4 out of 5 strength in both upper extremities and both lower extremities symmetrically with no evidence of facial droop and he is following commands from his wife on examination  psychiatric: Affect normal, Judgment normal, Mood normal. There is no suicidal ideation or patient reported hallucinations.       RADIOLOGY/PROCEDURES  DX-CHEST-PORTABLE (1 VIEW)   Final Result      1.  Cardiomegaly with interstitial edema.      2.  Bibasilar atelectasis.      CT-HEAD W/O   Final Result      1.  No evidence of acute intracranial process.      2.  Cerebral atrophy as well as periventricular chronic small vessel ischemic change.            COURSE & MEDICAL DECISION MAKING  Pertinent Labs & Imaging studies reviewed. (See chart for details)  Patient presents at charge desk with possible stroke symptoms per his wife but the patient appears globally weak and symmetric and has a NIH stroke score of 0 with no evidence of facial droop or unilateral extremity numbness or weakness    I sent him for CT scan of the head and this shows no evidence of intracranial process and only periventricular chronic small vessel disease    Laboratory evaluation reveals anemia with a hemoglobin of 11, electrolyte derangements including low sodium 132 and a BUN and creatinine elevated at 26 and 4.01 consistent with his chronic renal disease.  Does have a glucose of 143 ruling out hypoglycemia as a cause of his  weakness    Portable chest x-ray shows some cardiomegaly with interstitial edema however the patient does not have any hypoxia or respiratory distress.    I thought we might be able to get the patient home but we attempted to ambulate the patient with his normal walker and he was too weak and his knees gave out and almost fell.  He is at fall risk of significance and will bring him in hospital under the care of the resident staff service.  Family is appreciative this plan of care    Nephrology will be consulted on an as-needed basis    FINAL IMPRESSION  1. Weakness    2. At high risk for falls    3. Chronic renal failure, unspecified CKD stage             Electronically signed by: Andrzej Burns, 4/3/2019 2:59 PM

## 2019-04-03 NOTE — ED NOTES
Pt ambulated using walker with standby asst, per wife pt moving slow and weaker , feels pt high risk for falling. Informed erp, pt will be admitted. Updated pt's family poc

## 2019-04-03 NOTE — ED NOTES
Med rec updated and complete.  Allergies reviewed. Pt is unable to participate in an interview.  Wife provides a detailed list of current medications.  Dicussed with wife last doses taken.  Confirmed with home pharmacy that the hydralazine in 100 mg TID  Not 300 mg TID.

## 2019-04-03 NOTE — ED NOTES
Patient roomed, on monitor, in gown, NIH stroke scale started, wife at bedside. Patient lethargic.

## 2019-04-03 NOTE — H&P
Internal Medicine Admitting History and Physical      Note Author: Zulema Garza M.D.       Name Pj Britt 1960   Age and Sex 58 y.o. male   MRN [id number]   Code Status DNAR, DNI     See patient list for primary contact information.  Call [phone] to page.     After 5 PM or if no immediate response to page, please call for cross-coverage.    Attending: Pawel Moreno  Team: WIL Rosa  1st Call: Zulema Garza, Day Intern, R1  2nd Call: Carline Masters, Day Senior Resident, R2-R3      CC:  Weakness and muscle twitching following HD session earlier on the day of admission.      HPI:  58-year-old male with IDDM complicated by BL blindness, nephropathy and neuropathy, ESRD on HD MWF, HTN, HLD, depression, and home O2 dependence on 2 LPM. Admitted with generalized weakness and muscle twitching after his HD session today. The patient lives with his wife and children; the patient's wife is his primary caregiver. The patient is a poor historian, per his wife's report, he developed generalized weakness and muscle twitching (characterized by a pill rolling tremor and random spasms) following HD today. The patient completed a 4 hour HD session today, but was only able to complete 30-45 minutes of his HD session on Monday due to an issue with his fistula. The patient has been noted to have associated symptoms of reduced appetite x 2 months, nausea x 3 weeks, and an episode of NBNB emesis last night and this morning.     ROS was positive for chronic cough and chronic constipation unchanged from baseline, history of pneumonia 0.5-1.5 years ago, chronic BL blindness, and chronic peripheral neuropathy.  ROS was negative for fever, chills, SOB, CP, palpitations, abdominal pain, diarrhea, melena, hematochezia, orthopnea, PND, peripheral edema, abnormal bruising and history of stroke or heart attack.   Of note, the patient was hospitalized for a similar presentation in 2018 and diagnosed with  encephalopathy on EEG.     In the ED, the patient had -210 mmHg, his vital signs were otherwise stable; he was afebrile with NSR, DBP 70-90 and SpO2 95% on his home O2 dose of 2 LPM. CMP was at the patient's baseline. Normal WBC 5.0, trop 0.04, procal 0.24, lactate 0.7 and CPK 43. Non-contrast head CT negative was for an acute bleed or intracranial abnormality. CXR was significant for bibasilar atelectasis and interstitial edema, but otherwise unremarkable compared with the patient's 11/26/19 CXR. Neurology consulted by EDP and recommended EEG for further evaluation.       ROS:  Constitutional: Negative for fever, chills and malaise.  HEN: Negative for sinus pain and congestion.  Throat: Negative for dysphagia and sore throat.  Eyes: Positive for chronic BL blindness. Negative for pain and abnormal discharge.   Respiratory: Positive for chronic cough. Negative for shortness of breath.  Cardiovascular: Negative for chest pain and palpitations.  Gastrointestinal: Positive for chronic constipation, reduced appetite x 2 months, occasional nausea x 3 weeks. Negative for diarrhea, melena and hematochezia.   Genitourinary: Positive for oliguria secondary to ESRD. Negative for dysuria and hematuria.    Extremities: Negative for pain and swelling.  Neurological: Positive for muscle twitching. Negative for headaches and dizziness.  Hematologic: Negative for abnormal bleeding and bruising.   Psychiatric: Positive for depression. Negative for substance abuse.      PMH:  Patient Active Problem List    Diagnosis Date Noted   • Generalized weakness 04/03/2019     Priority: High   • Muscle twitching 11/26/2018     Priority: High   • ESRD on dialysis ([facility]) 11/21/2018     Priority: High   • Acute on chronic respiratory failure with hypoxia ([facility]) 10/28/2016     Priority: High   • IDDM (insulin dependent diabetes mellitus) ([facility]) 12/08/2015     Priority: High   • HCAP (healthcare-associated pneumonia) 10/30/2016     Priority:  Medium   • Type 2 diabetes mellitus with nephropathy, and retinopathy (HCC) 10/28/2016     Priority: Medium   • Anemia of chronic renal failure 10/28/2016     Priority: Medium   • Fever, unknown origin 10/28/2016     Priority: Medium   • Chronic abdominal pain 10/28/2016     Priority: Medium   • Blindness 12/08/2015     Priority: Medium   • Adrenal incidentaloma (HCC) 10/30/2016     Priority: Low   • Hyperlipidemia 12/08/2015     Priority: Low   • Hypertension 12/08/2015     Priority: Low   • Pneumonitis 11/26/2018   • Acute renal failure (HCC) 12/06/2015   • Elevated troponin 12/06/2015   • Hypertensive crisis 12/06/2015   • Bilateral pulmonary infiltrates on chest x-ray 12/06/2015       PSH:  Past Surgical History:   Procedure Laterality Date   • RECOVERY  4/19/2016    Procedure: VASCULAR CASE-ILEANA-LEFT ARM FISTULOGRAM WITH ANGIOPLASTY 57642, 44911, 21301-WBI STAGE RENAL DISEASE N18.6;  Surgeon: Recoveryonclay Surgery;  Location: SURGERY PRE-POST PROC UNIT [facility] [place];  Service:    • RECOVERY  2/24/2016    Procedure: IR1 VASCULAR CASE LEFT ARM FISTULOGRAM WITH ANGIOPLASTY;  Surgeon: Perfecto Surgery;  Location: SURGERY PRE-POST PROC UNIT [facility] [place];  Service:    • CATH PLACEMENT Right 12/10/2015    Procedure: CATH PLACEMENT;  Surgeon: Lorene Baldwin M.D.;  Location: SURGERY [facility];  Service:    • AV FISTULA CREATION Left 12/10/2015    Procedure: AV FISTULA CREATION;  Surgeon: Lorene Baldwin M.D.;  Location: SURGERY [facility];  Service:    • PB REMV CATARACT EXTRACAP,INSERT LENS  10/21/15   • OTHER  9/22/15    trabecular micro bypass stent- right eye   • OTHER  7/2015    take out blood of left eye   • OTHER  3/2015    take blood out of eye        Medications:  No current facility-administered medications on file prior to encounter.      Current Outpatient Prescriptions on File Prior to Encounter   Medication Sig Dispense Refill   • hydrALAZINE (APRESOLINE) 100 MG tablet Take 1 Tab by mouth every 8  hours. 90 Tab 2   • paroxetine (PAXIL) 40 MG tablet Take 40 mg by mouth every day.     • atorvastatin (LIPITOR) 20 MG Tab TAKE 1 TAB BY MOUTH DAILY 90 Tab 3   • lactulose 10 GM/15ML Solution Take 20 g by mouth every day.     • omeprazole (PRILOSEC) 20 MG delayed-release capsule TAKE 1 CAPSULE BY MOUTH EVERY DAY 30 Cap 0   • metoprolol (LOPRESSOR) 100 MG Tab Take 1 Tab by mouth 2 Times a Day. 60 Tab 2   • ascorbic acid (VITAMIN C) 500 MG tablet Take 1 Tab by mouth 2 Times a Day. 30 Tab 2       Allergies:   No Known Allergies      FH:  Family History   Problem Relation Age of Onset   • Alzheimer's Disease Mother    • Hypertension Father    • Diabetes Father    • Diabetes Brother        SH:  Social History     Social History   • Marital status:      Spouse name: N/A   • Number of children: N/A   • Years of education: N/A     Occupational History   • Not on file.     Social History Main Topics   • Smoking status: Never Smoker   • Smokeless tobacco: Never Used   • Alcohol use No   • Drug use: No   • Sexual activity: Not on file     Other Topics Concern   • Weight Concern No   • Special Diet Yes     Social History Narrative   • No narrative on file       Vitals:  Vitals:    04/03/19 1631 04/03/19 1701 04/03/19 1830 04/03/19 1901   BP:       Pulse: 64 65 66 65   Resp: 12 19 16 20   Temp:       TempSrc:       SpO2: 97% 95% 99% 97%   Weight:       Height:           Physical Exam  General: No apparent distress, pleasant, cooperative  HEN: Normocephalic, atraumatic  Eyes: Sclerae anicteric, chronic blindness bilaterally  Throat: Oropharynx clear, oral mucosa moist, Mallampati score 3  Neck: Supple, no lymphadenopathy    Heart: Regular rhythm, normal rate, faint systolic murmur at LUSB, no rubs or gallops  Lung: Normal work of breathing, clear to auscultation bilaterally, diminished breath sounds at BL lung bases   Abdomen: Soft, normoactive BS, non-tender, non-distended, no rebound or guarding, OUSMANE deferred to a later  time    Extremities: No cyanosis, trace pitting edema till mid-shin in BL LEs, AV fistula with palpable thrill in L forearm    Neuro: Alert, oriented to person, place, time and situation, normal sensation, 4+/5 strength in BL upper and lower extremities, normal tone and coordination  Skin: Warm, dry, intact, RUE with fine petechial rash distal to BP cuff  Psych: Low mood, flat affect      Lab Data:  Recent Results (from the past 24 hour(s))   CBC WITH DIFFERENTIAL    Collection Time: 04/03/19  2:25 PM   Result Value Ref Range    WBC 5.0 4.8 - 10.8 K/uL    RBC 3.58 (L) 4.70 - 6.10 M/uL    Hemoglobin 11.0 (L) 14.0 - 18.0 g/dL    Hematocrit 34.0 (L) 42.0 - 52.0 %    MCV 95.0 81.4 - 97.8 fL    MCH 30.7 27.0 - 33.0 pg    MCHC 32.4 (L) 33.7 - 35.3 g/dL    RDW 48.8 35.9 - 50.0 fL    Platelet Count 173 164 - 446 K/uL    MPV 10.9 9.0 - 12.9 fL    Neutrophils-Polys 75.60 (H) 44.00 - 72.00 %    Lymphocytes 7.80 (L) 22.00 - 41.00 %    Monocytes 9.60 0.00 - 13.40 %    Eosinophils 6.00 0.00 - 6.90 %    Basophils 0.80 0.00 - 1.80 %    Immature Granulocytes 0.20 0.00 - 0.90 %    Nucleated RBC 0.00 /100 WBC    Neutrophils (Absolute) 3.79 1.82 - 7.42 K/uL    Lymphs (Absolute) 0.39 (L) 1.00 - 4.80 K/uL    Monos (Absolute) 0.48 0.00 - 0.85 K/uL    Eos (Absolute) 0.30 0.00 - 0.51 K/uL    Baso (Absolute) 0.04 0.00 - 0.12 K/uL    Immature Granulocytes (abs) 0.01 0.00 - 0.11 K/uL    NRBC (Absolute) 0.00 K/uL   COMP METABOLIC PANEL    Collection Time: 04/03/19  2:25 PM   Result Value Ref Range    Sodium 132 (L) 135 - 145 mmol/L    Potassium 4.4 3.6 - 5.5 mmol/L    Chloride 93 (L) 96 - 112 mmol/L    Co2 30 20 - 33 mmol/L    Anion Gap 9.0 0.0 - 11.9    Glucose 134 (H) 65 - 99 mg/dL    Bun 26 (H) 8 - 22 mg/dL    Creatinine 4.01 (H) 0.50 - 1.40 mg/dL    Calcium 9.1 8.5 - 10.5 mg/dL    AST(SGOT) 11 (L) 12 - 45 U/L    ALT(SGPT) 14 2 - 50 U/L    Alkaline Phosphatase 102 (H) 30 - 99 U/L    Total Bilirubin 0.6 0.1 - 1.5 mg/dL    Albumin 4.3 3.2 -  4.9 g/dL    Total Protein 6.7 6.0 - 8.2 g/dL    Globulin 2.4 1.9 - 3.5 g/dL    A-G Ratio 1.8 g/dL   COD (ADULT)    Collection Time: 04/03/19  2:25 PM   Result Value Ref Range    ABO Grouping Only O     Rh Grouping Only POS     Antibody Screen-Cod NEG    TROPONIN    Collection Time: 04/03/19  2:25 PM   Result Value Ref Range    Troponin I 0.04 0.00 - 0.04 ng/mL   ACCU-CHEK GLUCOSE    Collection Time: 04/03/19  2:25 PM   Result Value Ref Range    Glucose - Accu-Ck 143 (H) 65 - 99 mg/dL   ESTIMATED GFR    Collection Time: 04/03/19  2:25 PM   Result Value Ref Range    GFR If  19 (A) >60 mL/min/1.73 m 2    GFR If Non African American 15 (A) >60 mL/min/1.73 m 2   TSH    Collection Time: 04/03/19  2:25 PM   Result Value Ref Range    TSH 2.260 0.380 - 5.330 uIU/mL   VITAMIN B12    Collection Time: 04/03/19  2:25 PM   Result Value Ref Range    Vitamin B12 -True Cobalamin 380 211 - 911 pg/mL   FREE THYROXINE    Collection Time: 04/03/19  2:25 PM   Result Value Ref Range    Free T-4 1.11 0.53 - 1.43 ng/dL   FOLATE    Collection Time: 04/03/19  2:25 PM   Result Value Ref Range    Folate -Folic Acid 11.5 >4.0 ng/mL   LACTIC ACID    Collection Time: 04/03/19  4:52 PM   Result Value Ref Range    Lactic Acid 0.7 0.5 - 2.0 mmol/L   PROCALCITONIN    Collection Time: 04/03/19  4:52 PM   Result Value Ref Range    Procalcitonin 0.24 <0.25 ng/mL   MAGNESIUM    Collection Time: 04/03/19  4:52 PM   Result Value Ref Range    Magnesium 2.2 1.5 - 2.5 mg/dL   PHOSPHORUS    Collection Time: 04/03/19  4:52 PM   Result Value Ref Range    Phosphorus 3.0 2.5 - 4.5 mg/dL   CREATINE KINASE    Collection Time: 04/03/19  4:52 PM   Result Value Ref Range    CPK Total 43 0 - 154 U/L   WESTERGREN SED RATE    Collection Time: 04/03/19  4:52 PM   Result Value Ref Range    Sed Rate Westergren 8 0 - 20 mm/hour   ACCU-CHEK GLUCOSE    Collection Time: 04/03/19  5:51 PM   Result Value Ref Range    Glucose - Accu-Ck 127 (H) 65 - 99 mg/dL        Imaging and Procedures:  Independant Imaging Review: Completed     DX-CHEST-PORTABLE (1 VIEW)   Final Result      1.  Cardiomegaly with interstitial edema.      2.  Bibasilar atelectasis.      CT-HEAD W/O   Final Result      1.  No evidence of acute intracranial process.      2.  Cerebral atrophy as well as periventricular chronic small vessel ischemic change.          Assessment and Plan    Muscle twitching  Onset of generalized weakness and muscle twitching following HD today.  Characterized by random spasms and a pill rolling tremor per the patient's wife's report.   Hospitalized for a similar presentation in November 2018, diagnosed with encephalopathy on EEG.    CMP at patient's baseline per chart review.   Normal trop 0.04, procal 0.24, WBC 5.0, lactate 0.7, CPK 43.   Non-contrast head CT negative for acute bleed or intracranial abnormality.   CXR shows bibasilar atelectasis and interstitial edema, otherwise unremarkable compared with 11/26/19 CXR.    Differential includes seizure, metabolic derangement, neuromuscular disorder and neurocognitive impairment. Stroke and an infectious process are possible but less likely at this time.   Neurology consulted by EDP, appreciate recommendations.    Plan:  Admit to medical floor for observation  q4h neuro checks, EEG  NPO till bedside swallow screen  RT protocol, continuous pulse ox  Normal TSH 2.26 and FT4 1.11  RPR non-reactive  B12, folate, iron panel pending  Monitoring for signs of infection    Generalized weakness  Onset of generalized weakness and muscle twitching following HD today.  Hospitalized for a similar presentation in November 2018, diagnosed with encephalopathy on EEG.  Associated sx of reduced appetite x 2 months, nausea x 3 weeks, and an episode of NBNB emesis last night and this morning.    -210 mmHg, VS otherwise stable.   CMP at patient's baseline per chart review.   Normal trop 0.04, procal 0.24, WBC 5.0, lactate 0.7, CPK 43.   "  Non-contrast head CT negative for acute bleed or intracranial abnormality.   CXR shows bibasilar atelectasis and interstitial edema, otherwise unremarkable compared with 11/26/19 CXR.    Differential includes seizure, metabolic derangement, neuromuscular disorder and neurocognitive impairment. Stroke and an infectious process are possible but less likely at this time.   Neurology consulted by EDP, appreciate recommendations.    Plan:  Admit to medical floor for observation  q4h neuro checks, EEG  NPO till bedside swallow screen  RT protocol, continuous pulse ox  Normal TSH 2.26 and FT4 1.11  RPR non-reactive  B12, folate, iron panel pending  Monitoring for signs of infection    IDDM (insulin dependent diabetes mellitus) (HCC)  On NPH 15 units qam and 20 units qpm at home.  Hgb A1c 7.8 in Nov 2018.  Endorses good medication and dietary compliance.     Plan:  NPH 10 units BID while inpatient  Low SSI  Hypoglycemia protocol   Diabetic diet    Adrenal incidentaloma (HCC)  Seen on chest CT.  Work up in 2016, including plasma metanephrines, catecholamines, renin, aldosterone and DHEA, was unremarkable.  CTM.    Hyperlipidemia  Continue atorvastatin 20 mg daily.    Hypertension  Presented with generalized weakness and muscle twitching, SBP elevated to 190-210 at presentation.   Non-contrast head CT negative for acute bleed or intracranial abnormality.   Home -180 per patient's wife's report.     Plan:  Continue home hydralazine and metoprolol with holding parameters  PRN IV hydralazine for SBP > 210, DBP > 110  CTM    Type 2 diabetes mellitus with nephropathy, and retinopathy (HCC)  Several year history, complicated by BL blindness, ESRD and peripheral neuropathy.   See \"IDDM\" for additional information.     Blindness  BL blindness, chronic, secondary to diabetic retinopathy.    Plan:  Fall precautions  Supervised meals and activity    Anemia of chronic renal failure  H and H stable.    Plan:  Iron panel " pending  Continue Epogen per Nephrology       ESRD on dialysis (HCC)  Onset 3-4 years ago, on HD MWF, fistula in L forearm with palpable thrill, produces minimal urine.  Underwent HD today with 4 L UF, developed generalized weakness and muscle twitching thereafter, for which he was brought to the ED.  BUN and Cr at patient's baseline.   CTM.  Nephrology consult if patient remains inpatient on Friday.       Anticipated Hospital Stay: Observation admit      Quality-Core Measures:  No Morales catheter   No central line  No antibiotics indicated at this time  Heparin for VTE prophylaxis   PT and OT consulted for rehab potential and discharge recommendations      PCP: Faisal Germain M.D.

## 2019-04-03 NOTE — ED TRIAGE NOTES
Chief Complaint   Patient presents with   • Weakness       Per report, Patient came from dialysis, symptoms started around 10am.  generalized weakness, and right sided facial droop. Wife states  is off from baseline mentation.  BSFS 147.  Wife states patient was in dialysis for 4 hours.

## 2019-04-03 NOTE — H&P
Senior Admit note       HPI: Mr. Britt is a pleasant 59 yo Greenlandic speaking male with PMHx of IDDM2, blindness, ESRD on MWF HD, HTN, chronic respiratory failure on 2L home oxygen and anxiety who presented to the hospital following hemodialysis due to generalized weakness. Per wife who provides much of the history he was moving more slowly after dialysis and having difficulty walking due to weakness. She notes that he has had a decreased appetite lately and has not been eating well, his fluid intake has also been poor but is restricted due to his ESRD.   He underwent HD on Monday but was only able to complete 30-40 minutes due to problems with his fistula, he did complete 4 hours today. He was seen back in November with a very similar presentation including weakness and recovered, he was treated with antibiotics for pneumonia at that time, per wife he has been doing well since.   He denies fever, chills, headache, chest pain, palpitations, SOB, abdominal pain, nausea or vomiting. He has issues with constipation and takes a daily lactulose, he also has intermittent jerking like movement and was evaluated on his previous admission by neurology who recomended clonazepam, EEG was negative of seizure activity. He is alert and oriented x 4 during interview.     Physical Exam   Vitals: 98.1, HR 64, RR 16, /86, 96% on 2L home oxygen     Physical Exam   Constitutional: He is oriented to person, place, and time.   Chronically ill appearing  male, sitting in bed comfortably in no acute distress     HENT:   Head: Normocephalic and atraumatic.   Mouth/Throat: Oropharynx is clear and moist.   Eyes: Right eye exhibits no discharge. Left eye exhibits no discharge.   Bilateral blindness, Rt sided ptosis    Neck: Normal range of motion. No JVD present.   Cardiovascular: Normal rate and regular rhythm.    Murmur heard.  Soft systolic murmur in aortic region    Pulmonary/Chest: Effort normal and breath sounds normal. No  respiratory distress. He has no wheezes. He has no rales.   On home Oxygen    Abdominal: Soft. Bowel sounds are normal. He exhibits no distension and no mass. There is no tenderness. There is no guarding.   Musculoskeletal: Normal range of motion. He exhibits no edema.   Lymphadenopathy:     He has no cervical adenopathy.   Neurological: He is alert and oriented to person, place, and time. No cranial nerve deficit or sensory deficit. He exhibits normal muscle tone.   No focal deficits    Skin: Skin is warm and dry.   Petechial rash over Rt forarm and hand, distal to blood pressure cuff  Left forearm fistula with good thrill    Psychiatric:   Depressed affect        Assessment and Plan     # Generalized weakness   # Hypertensive urgency   # ESRD on HD  # IDDM type 2   # hyperlipidemia   # bilateral blindness   # peripheral neuropathy   # Petechia   # anxiety and depression     - Weakness possibly due to post dialysis vs poor oral intake vs possible depression. Will evaluate for other conditions with CPK, ESR, Thyroid, VIt D, Vit B12, folate. Continue antidepressant, consider pscyh consult if no improvement, PT/OT and nutrition consulted   - Blood pressure significantly elevated during admission, per wife he was hypotensive with SBP 50's during HD. Will restart home BP medications, hydralazine PRN   - pt does not appear to have infectious process but given clinic picture will order blood Cx and Pro-calcitonin, currently 0/4 SIRS criteria, CXR shows no consolidation  - continue home statin  - continue home insulin at reduced dose 10 U BID   - petechia is a new findings, seen only on Rt arm may be secondary to blood pressure cuff as cuff is very tight and has been done frequently due to hemodialysis and BP fluctuations, will monitor closely, coag panel pending   - will consult nephrology tomorrow morning for inpatient HD, no acute HD needed at this time     Patient is DNR, this is different from his advanced directed  that his wife has a copy of. He was A&O x 4 and clearly verbalized this decision with his family present.     [facility] Moe Masters  PGY 2 [facility]

## 2019-04-03 NOTE — ED NOTES
Labs drawn sent to lab, patient resting in bed, still lethargic, but states he is comfortable, family at bedside. Updated on plan of care.

## 2019-04-04 ENCOUNTER — PATIENT OUTREACH (OUTPATIENT)
Dept: HEALTH INFORMATION MANAGEMENT | Facility: OTHER | Age: 59
End: 2019-04-04

## 2019-04-04 VITALS
HEIGHT: 67 IN | RESPIRATION RATE: 17 BRPM | HEART RATE: 63 BPM | DIASTOLIC BLOOD PRESSURE: 68 MMHG | SYSTOLIC BLOOD PRESSURE: 158 MMHG | OXYGEN SATURATION: 95 % | BODY MASS INDEX: 22.35 KG/M2 | TEMPERATURE: 98.5 F | WEIGHT: 142.42 LBS

## 2019-04-04 PROBLEM — N18.6 ESRD ON DIALYSIS (HCC): Chronic | Status: ACTIVE | Noted: 2018-11-21

## 2019-04-04 PROBLEM — R25.3 MUSCLE TWITCHING: Status: RESOLVED | Noted: 2018-11-26 | Resolved: 2019-04-04

## 2019-04-04 PROBLEM — Z99.2 ESRD ON DIALYSIS (HCC): Chronic | Status: ACTIVE | Noted: 2018-11-21

## 2019-04-04 PROBLEM — R53.1 GENERALIZED WEAKNESS: Chronic | Status: ACTIVE | Noted: 2019-04-03

## 2019-04-04 PROBLEM — R53.1 GENERALIZED WEAKNESS: Status: RESOLVED | Noted: 2019-04-03 | Resolved: 2019-04-04

## 2019-04-04 LAB
ALBUMIN SERPL BCP-MCNC: 3.6 G/DL (ref 3.2–4.9)
ALBUMIN/GLOB SERPL: 1.6 G/DL
ALP SERPL-CCNC: 81 U/L (ref 30–99)
ALT SERPL-CCNC: 11 U/L (ref 2–50)
ANION GAP SERPL CALC-SCNC: 8 MMOL/L (ref 0–11.9)
APTT PPP: 29.9 SEC (ref 24.7–36)
AST SERPL-CCNC: 9 U/L (ref 12–45)
BASOPHILS # BLD AUTO: 1 % (ref 0–1.8)
BASOPHILS # BLD: 0.05 K/UL (ref 0–0.12)
BILIRUB SERPL-MCNC: 0.5 MG/DL (ref 0.1–1.5)
BUN SERPL-MCNC: 34 MG/DL (ref 8–22)
CALCIUM SERPL-MCNC: 8.4 MG/DL (ref 8.5–10.5)
CHLORIDE SERPL-SCNC: 93 MMOL/L (ref 96–112)
CO2 SERPL-SCNC: 32 MMOL/L (ref 20–33)
CREAT SERPL-MCNC: 5 MG/DL (ref 0.5–1.4)
EOSINOPHIL # BLD AUTO: 0.28 K/UL (ref 0–0.51)
EOSINOPHIL NFR BLD: 5.6 % (ref 0–6.9)
ERYTHROCYTE [DISTWIDTH] IN BLOOD BY AUTOMATED COUNT: 49.1 FL (ref 35.9–50)
GLOBULIN SER CALC-MCNC: 2.3 G/DL (ref 1.9–3.5)
GLUCOSE BLD-MCNC: 106 MG/DL (ref 65–99)
GLUCOSE BLD-MCNC: 118 MG/DL (ref 65–99)
GLUCOSE BLD-MCNC: 71 MG/DL (ref 65–99)
GLUCOSE SERPL-MCNC: 63 MG/DL (ref 65–99)
HCT VFR BLD AUTO: 31.5 % (ref 42–52)
HGB BLD-MCNC: 10 G/DL (ref 14–18)
IMM GRANULOCYTES # BLD AUTO: 0.01 K/UL (ref 0–0.11)
IMM GRANULOCYTES NFR BLD AUTO: 0.2 % (ref 0–0.9)
INR PPP: 1.01 (ref 0.87–1.13)
LYMPHOCYTES # BLD AUTO: 0.45 K/UL (ref 1–4.8)
LYMPHOCYTES NFR BLD: 9.1 % (ref 22–41)
MAGNESIUM SERPL-MCNC: 2.2 MG/DL (ref 1.5–2.5)
MCH RBC QN AUTO: 30.4 PG (ref 27–33)
MCHC RBC AUTO-ENTMCNC: 31.7 G/DL (ref 33.7–35.3)
MCV RBC AUTO: 95.7 FL (ref 81.4–97.8)
MONOCYTES # BLD AUTO: 0.6 K/UL (ref 0–0.85)
MONOCYTES NFR BLD AUTO: 12.1 % (ref 0–13.4)
NEUTROPHILS # BLD AUTO: 3.57 K/UL (ref 1.82–7.42)
NEUTROPHILS NFR BLD: 72 % (ref 44–72)
NRBC # BLD AUTO: 0 K/UL
NRBC BLD-RTO: 0 /100 WBC
PLATELET # BLD AUTO: 153 K/UL (ref 164–446)
PMV BLD AUTO: 10.7 FL (ref 9–12.9)
POTASSIUM SERPL-SCNC: 4.5 MMOL/L (ref 3.6–5.5)
PROT SERPL-MCNC: 5.9 G/DL (ref 6–8.2)
PROTHROMBIN TIME: 13.4 SEC (ref 12–14.6)
RBC # BLD AUTO: 3.29 M/UL (ref 4.7–6.1)
SODIUM SERPL-SCNC: 133 MMOL/L (ref 135–145)
WBC # BLD AUTO: 5 K/UL (ref 4.8–10.8)

## 2019-04-04 PROCEDURE — 700111 HCHG RX REV CODE 636 W/ 250 OVERRIDE (IP): Performed by: STUDENT IN AN ORGANIZED HEALTH CARE EDUCATION/TRAINING PROGRAM

## 2019-04-04 PROCEDURE — 80053 COMPREHEN METABOLIC PANEL: CPT

## 2019-04-04 PROCEDURE — 99214 OFFICE O/P EST MOD 30 MIN: CPT | Performed by: INTERNAL MEDICINE

## 2019-04-04 PROCEDURE — 99217 PR OBSERVATION CARE DISCHARGE: CPT | Mod: GC | Performed by: INTERNAL MEDICINE

## 2019-04-04 PROCEDURE — 96375 TX/PRO/DX INJ NEW DRUG ADDON: CPT

## 2019-04-04 PROCEDURE — 700102 HCHG RX REV CODE 250 W/ 637 OVERRIDE(OP): Performed by: STUDENT IN AN ORGANIZED HEALTH CARE EDUCATION/TRAINING PROGRAM

## 2019-04-04 PROCEDURE — 97162 PT EVAL MOD COMPLEX 30 MIN: CPT

## 2019-04-04 PROCEDURE — 36415 COLL VENOUS BLD VENIPUNCTURE: CPT

## 2019-04-04 PROCEDURE — G0378 HOSPITAL OBSERVATION PER HR: HCPCS

## 2019-04-04 PROCEDURE — 99214 OFFICE O/P EST MOD 30 MIN: CPT | Performed by: PSYCHIATRY & NEUROLOGY

## 2019-04-04 PROCEDURE — 85025 COMPLETE CBC W/AUTO DIFF WBC: CPT

## 2019-04-04 PROCEDURE — A9270 NON-COVERED ITEM OR SERVICE: HCPCS | Performed by: INTERNAL MEDICINE

## 2019-04-04 PROCEDURE — 700102 HCHG RX REV CODE 250 W/ 637 OVERRIDE(OP): Performed by: INTERNAL MEDICINE

## 2019-04-04 PROCEDURE — 82962 GLUCOSE BLOOD TEST: CPT

## 2019-04-04 PROCEDURE — 700105 HCHG RX REV CODE 258: Performed by: PSYCHIATRY & NEUROLOGY

## 2019-04-04 PROCEDURE — 96372 THER/PROPH/DIAG INJ SC/IM: CPT

## 2019-04-04 PROCEDURE — 700111 HCHG RX REV CODE 636 W/ 250 OVERRIDE (IP): Performed by: PSYCHIATRY & NEUROLOGY

## 2019-04-04 PROCEDURE — A9270 NON-COVERED ITEM OR SERVICE: HCPCS | Performed by: STUDENT IN AN ORGANIZED HEALTH CARE EDUCATION/TRAINING PROGRAM

## 2019-04-04 PROCEDURE — 83735 ASSAY OF MAGNESIUM: CPT

## 2019-04-04 PROCEDURE — 97165 OT EVAL LOW COMPLEX 30 MIN: CPT

## 2019-04-04 RX ORDER — AMLODIPINE BESYLATE 5 MG/1
5 TABLET ORAL DAILY
Qty: 30 TAB | Refills: 0 | Status: SHIPPED | OUTPATIENT
Start: 2019-04-05 | End: 2019-07-30

## 2019-04-04 RX ADMIN — PAROXETINE HYDROCHLORIDE 40 MG: 20 TABLET, FILM COATED ORAL at 04:33

## 2019-04-04 RX ADMIN — Medication 667 MG: at 12:26

## 2019-04-04 RX ADMIN — SODIUM CHLORIDE 250 MG: 9 INJECTION, SOLUTION INTRAVENOUS at 12:26

## 2019-04-04 RX ADMIN — HYDRALAZINE HYDROCHLORIDE 100 MG: 50 TABLET, FILM COATED ORAL at 04:34

## 2019-04-04 RX ADMIN — ATORVASTATIN CALCIUM 20 MG: 20 TABLET, FILM COATED ORAL at 04:35

## 2019-04-04 RX ADMIN — HYDRALAZINE HYDROCHLORIDE 100 MG: 50 TABLET, FILM COATED ORAL at 13:20

## 2019-04-04 RX ADMIN — OMEPRAZOLE 20 MG: 20 CAPSULE, DELAYED RELEASE ORAL at 04:35

## 2019-04-04 RX ADMIN — OXYCODONE HYDROCHLORIDE AND ACETAMINOPHEN 500 MG: 500 TABLET ORAL at 04:35

## 2019-04-04 RX ADMIN — Medication 667 MG: at 08:36

## 2019-04-04 RX ADMIN — HEPARIN SODIUM 5000 UNITS: 5000 INJECTION, SOLUTION INTRAVENOUS; SUBCUTANEOUS at 04:35

## 2019-04-04 RX ADMIN — METOPROLOL TARTRATE 100 MG: 100 TABLET ORAL at 04:33

## 2019-04-04 RX ADMIN — AMLODIPINE BESYLATE 5 MG: 5 TABLET ORAL at 00:36

## 2019-04-04 RX ADMIN — LACTULOSE 30 ML: 20 SOLUTION ORAL at 04:35

## 2019-04-04 ASSESSMENT — ENCOUNTER SYMPTOMS
WEAKNESS: 1
COUGH: 0
WEIGHT LOSS: 0
PALPITATIONS: 0
DIAPHORESIS: 0
NAUSEA: 0
SHORTNESS OF BREATH: 0
FEVER: 0
DEPRESSION: 1
HEMOPTYSIS: 0
CHILLS: 0
ABDOMINAL PAIN: 0
DIZZINESS: 0
HEARTBURN: 0
HEADACHES: 0
BLURRED VISION: 1

## 2019-04-04 ASSESSMENT — COPD QUESTIONNAIRES
DO YOU EVER COUGH UP ANY MUCUS OR PHLEGM?: NO/ONLY WITH OCCASIONAL COLDS OR INFECTIONS
DURING THE PAST 4 WEEKS HOW MUCH DID YOU FEEL SHORT OF BREATH: SOME OF THE TIME
HAVE YOU SMOKED AT LEAST 100 CIGARETTES IN YOUR ENTIRE LIFE: NO/DON'T KNOW
COPD SCREENING SCORE: 3

## 2019-04-04 ASSESSMENT — COGNITIVE AND FUNCTIONAL STATUS - GENERAL
WALKING IN HOSPITAL ROOM: A LITTLE
CLIMB 3 TO 5 STEPS WITH RAILING: A LITTLE
DRESSING REGULAR LOWER BODY CLOTHING: A LITTLE
MOVING FROM LYING ON BACK TO SITTING ON SIDE OF FLAT BED: A LITTLE
TOILETING: A LITTLE
EATING MEALS: A LITTLE
STANDING UP FROM CHAIR USING ARMS: A LITTLE
DAILY ACTIVITIY SCORE: 18
MOVING TO AND FROM BED TO CHAIR: A LITTLE
HELP NEEDED FOR BATHING: A LITTLE
SUGGESTED CMS G CODE MODIFIER DAILY ACTIVITY: CK
DRESSING REGULAR UPPER BODY CLOTHING: A LITTLE
SUGGESTED CMS G CODE MODIFIER MOBILITY: CK
TURNING FROM BACK TO SIDE WHILE IN FLAT BAD: A LITTLE
MOBILITY SCORE: 18
PERSONAL GROOMING: A LITTLE

## 2019-04-04 ASSESSMENT — GAIT ASSESSMENTS
DEVIATION: BRADYKINETIC
DISTANCE (FEET): 100
ASSISTIVE DEVICE: FRONT WHEEL WALKER
GAIT LEVEL OF ASSIST: CONTACT GUARD ASSIST

## 2019-04-04 ASSESSMENT — ACTIVITIES OF DAILY LIVING (ADL): TOILETING: INDEPENDENT

## 2019-04-04 ASSESSMENT — LIFESTYLE VARIABLES: EVER_SMOKED: NEVER

## 2019-04-04 NOTE — ASSESSMENT & PLAN NOTE
RESOLVED.  Onset of generalized weakness and muscle twitching following HD today.  Hospitalized for a similar presentation in November 2018, diagnosed with encephalopathy on EEG.  Associated sx of reduced appetite x 2 months, nausea x 3 weeks, and an episode of NBNB emesis last night and this morning.    -210 mmHg, VS otherwise stable.   CMP at patient's baseline per chart review.   Normal trop 0.04, procal 0.24, WBC 5.0, lactate 0.7, CPK 43.    Non-contrast head CT negative for acute bleed or intracranial abnormality.   CXR shows bibasilar atelectasis and interstitial edema, otherwise unremarkable compared with 11/26/19 CXR.    Differential includes seizure, metabolic derangement, neuromuscular disorder and neurocognitive impairment. Stroke and an infectious process are possible but less likely at this time.   Neurology consulted by EDP, appreciate recommendations.    Plan:  Admit to medical floor for observation  q4h neuro checks, EEG  NPO till bedside swallow screen  RT protocol, continuous pulse ox  Normal TSH 2.26 and FT4 1.11  RPR non-reactive  B12, folate, iron panel pending  Monitoring for signs of infection

## 2019-04-04 NOTE — DISCHARGE PLANNING
Bedside RN informed LSW that [facility] MD is requesting HH for pt. MD placed F2F but no referral. Bedside RN later informed LSW that pt and family are refusing HHC.

## 2019-04-04 NOTE — FACE TO FACE
Face to Face Supporting Documentation - Home Health    The encounter with this patient was in whole or in part the primary reason for home health admission.    Date of encounter:   Patient:                    MRN:                       YOB: 2021  Pj Britt  [id number]  1960     Home health to see patient for:  Skilled Nursing care for assessment, interventions & education, Registered dietitian consult, Home health aide and Physical Therapy evaluation and treatment    Skilled need for:  Exacerbation of Chronic Disease State ESRD, Recent Deterioration of Health Status ESRD and Medication Management NA    Skilled nursing interventions to include:  Line/Drain/Airway education and care and Comment: Per Home Health evaluation    Homebound status evidenced by:  Need the aid of supportive devices such as crutches, canes, wheelchairs or walkers, Require the use of special transportation, Needs the assistance of another person in order to leave the home or Have a condition such that leaving his or her home is medically contraindicated. Leaving home requires a considerable and taxing effort. There is a normal inability to leave the home.    Community Physician to provide follow up care: Faisal Germain M.D.     Optional Interventions? No      I certify the face to face encounter for this home health care referral meets the CMS requirements and the encounter/clinical assessment with the patient was, in whole, or in part, for the medical condition(s) listed above, which is the primary reason for home health care. Based on my clinical findings: the service(s) are medically necessary, support the need for home health care, and the homebound criteria are met.  I certify that this patient has had a face to face encounter by myself.  Zulema Garza M.D. - NPI: [id number]

## 2019-04-04 NOTE — ASSESSMENT & PLAN NOTE
History of anxiety and low mood per chart review, on paroxetine 40 mg daily at home.    Plan:  Continue home paroxetine for now  Recommend outpatient Psych referral for further evaluation and management

## 2019-04-04 NOTE — PROGRESS NOTES
Received patient from ED around 2100. Supportive family at bedside. Patient and family oriented to room. Assessment performed. Alert and oriented x 4. Neurologically intact. Generalized weakness. Lungs clear to diminished. HTN, otherwise VSS. Bilateral, complete blindness. Dialysis graft to left arm, +thrill/+bruit. Will continue to monitor.

## 2019-04-04 NOTE — ASSESSMENT & PLAN NOTE
Onset 3-4 years ago, on HD MWF, fistula in L forearm with palpable thrill, produces minimal urine.  Underwent HD today with 4 L UF, developed generalized weakness and muscle twitching thereafter, for which he was brought to the ED.  BUN and Cr at patient's baseline.   CTM.  Nephrology consult if patient remains inpatient on Friday.

## 2019-04-04 NOTE — PROGRESS NOTES
2RN skin check:  Mild discoloration to bilateral feet. Dialysis graft to left arm. Slight redness to right arm. Otherwise, skin intact.

## 2019-04-04 NOTE — THERAPY
"Physical Therapy Evaluation completed.   Bed Mobility:  Supine to Sit: Minimal Assist  Transfers: Sit to Stand: Minimal Assist  Gait: Level Of Assist: Contact Guard Assist (provided by spouse, SPV from therapist) with Front-Wheel Walker       Plan of Care: Patient with no further skilled PT needs in the acute care setting at this time  Discharge Recommendations: Equipment: No Equipment Needed. Recommend outpatient or home health transitional care services for continued physical therapy services.    See \"Rehab Therapy-Acute\" Patient Summary Report for complete documentation.     Pt is a 57yo male presenting to acute from dialysis with increased weakness an tremors. Pt with hx of ESRD and bilateral blindness. Spouse and dtr present during session. Spouse reports that she or her son are always available at home to assist pt. Spouse reports she ambulates with him and gives him directions, as well as assists with IADLs and ADLs. Spouse reports pt is close to his baseline, just moving around slower. Discussed with spouse that home health PT can be beneficial to assist pt and spouse in returning to pt's PLOF. Spouse seeming hesitant. During session, spouse able to demonstrate providing CGA for safety and balance and PV from therapist to help pt perform supine > sit and STS. Pt ambulated with CGA from spouse and SPV from therapist with FWW ~100ft, and negotiated 2 stairs without LOB. Pt with slight unsteadiness throughout, spouse able to assist pt. Pt demonstrated good dynamic standing balance to brush teeth at sink. Pt with no further acute PT needs, recommend home health therapy to maximize functional independence to facilitate safe return home. Patient is currently not being actively followed for therapy services at this time, however may be seen if requested by attending provider for 1 more visit within 30 days to address any discharge or equipment needs.      "

## 2019-04-04 NOTE — DISCHARGE PLANNING
Care Transition Team Assessment    Information Source  Orientation : Disoriented to Event  Elopement Risk  Legal Hold: No  Interdisciplinary Discharge Planning  Does Admitting Nurse Feel This Could be a Complex Discharge?: No  Primary Care Physician:  (Faisal Germain)  Lives with - Patient's Self Care Capacity: Spouse  Support Systems: Family Member(s)  Do You Take your Prescribed Medications Regularly: Yes  Patient Expects to be Discharged to::  (home with wife)  Durable Medical Equipment:  (home oxygen 2lm)  Vision / Hearing Impairment  Vision Impairment :  (bilateral blindness)  Advance Directive  Advance Directive?:  (pt in now DNR which is new)  Domestic Abuse  Have you ever been the victim of abuse or violence?: No

## 2019-04-04 NOTE — ASSESSMENT & PLAN NOTE
BL blindness, chronic, secondary to diabetic retinopathy.    Plan:  Fall precautions  Supervised meals and activity

## 2019-04-04 NOTE — DIETARY
"Nutrition services: Day 0 of admit.  Pj Britt is a 58 y.o. male with admitting DX of ESRD and generalized weakness.   Consult received for for tube feeding.  Pt is currently on a renal, diabetic diet.  Per discussion with RN, pt is doing well on an oral diet - no indication for TF at this time.  Pt also noted with poor PO intake on nutrition admit screen.  Pt appears thin.  RD attempted to visit pt to discuss appetite and PO intake - pt was busy with interdisciplinary team multiple times.  RD to follow-up as appropriate and will continue to monitor for consistently adequate PO intake.     Assessment:  Height: 170.2 cm (5' 7\")  Weight: 64.6 kg (142 lb 6.7 oz) - via bed scale.   Body mass index is 22.31 kg/m². - WNL.   Diet/Intake: Renal, diabetic.  Per chart, pt consumed % of breakfast this morning.     Evaluation:   1. Pt noted with muscle twitching, IDDM, ESRD on HD (M/W/F), T2DM, blindness, on home oxygen therapy, heart burn, hyperlipidemia, HTN, and adrenal incidentaloma.    2. Per H&P, pt noted with a decreased appetite lately and has not been eating well, his fluid intake has also been poor.   3. Per discussion with RN earlier, RN inquired about supplements (Boost) to optimize intake.  4. Family is interested in peritoneal dialysis per progress notes.  5. Per chart review, pt weighed 66.9 kg via stand up scale on 11/27 admit and wt upon current admit is 64.6 kg via bed scale.  This is a 3.4% wt loss over the past 4.5 months, which is not severe however notable.  6. Labs: Sodium: 133, Glucose: 63, BUN: 34, Creat.: 5.00.  A1C: 5.9.   7. Meds: Ascorbic acid, Phos-lo, SSI, Humulin, Lactulose, Prilosec, Senokot.    Malnutrition Risk: N/A.     Recommendations/Plan:  1. Boost Glucose Control BID.   2. Encourage intake of meals and supplements.  3. Document intake of all meals and supplements as % taken in ADL's to provide interdisciplinary communication across all shifts.   4. Monitor " weight.  5. Nutrition rep will continue to see patient for ongoing meal and snack preferences.     RD following.

## 2019-04-04 NOTE — ASSESSMENT & PLAN NOTE
Seen on chest CT.  Work up in 2016, including plasma metanephrines, catecholamines, renin, aldosterone and DHEA, was unremarkable.  CTM.

## 2019-04-04 NOTE — PROGRESS NOTES
CC:  CONFUSION    Date of Admission: 4/3/2019    Today's Date: 04/04/19    Consulting Physician: Pawel Moreno M.D.      HPI:    Pj Britt is a 58 y.o. male with confusion spell post HD, often has but this more severe. Now at baseline. Episodes of staring spells and jerks of limbs lasting several minutes, kelby post HD.  No other complaints.       ROS:   PERTINENT POSITIVES IN HPI  All other systems were reviewed and were negative.       Past Medical History:   Past Medical History:   Diagnosis Date   • Anemia    • Blind in both eyes    • Diabetes (HCC)     insulin dependent   • Dialysis patient (HCC)     MWF, [facility]   • ESRD (end stage renal disease) ([facility])    • Heart burn    • Hyperlipemia    • Hypertension    • Pneumonia        Past Surgical History:   Past Surgical History:   Procedure Laterality Date   • RECOVERY  4/19/2016    Procedure: VASCULAR CASE-ILEANA-LEFT ARM FISTULOGRAM WITH ANGIOPLASTY 02902, 25996, 56354-BNE STAGE RENAL DISEASE N18.6;  Surgeon: Perfecto Surgery;  Location: SURGERY PRE-POST PROC UNIT [facility];  Service:    • RECOVERY  2/24/2016    Procedure: IR1 VASCULAR CASE LEFT ARM FISTULOGRAM WITH ANGIOPLASTY;  Surgeon: Perfecto Surgery;  Location: SURGERY PRE-POST PROC UNIT [facility];  Service:    • CATH PLACEMENT Right 12/10/2015    Procedure: CATH PLACEMENT;  Surgeon: Lorene Baldwin M.D.;  Location: [facility];  Service:    • AV FISTULA CREATION Left 12/10/2015    Procedure: AV FISTULA CREATION;  Surgeon: Lorene Baldwin M.D.;  Location: SURGERY [facility];  Service:    • PB REMV CATARACT EXTRACAP,INSERT LENS  10/21/15   • OTHER  9/22/15    trabecular micro bypass stent- right eye   • OTHER  7/2015    take out blood of left eye   • OTHER  3/2015    take blood out of eye        Social History:   Social History     Social History   • Marital status:      Spouse name: N/A   • Number of children: N/A   • Years of education: N/A     Occupational History   • Not on  file.     Social History Main Topics   • Smoking status: Never Smoker   • Smokeless tobacco: Never Used   • Alcohol use No   • Drug use: No   • Sexual activity: Not on file     Other Topics Concern   • Weight Concern No   • Special Diet Yes     Social History Narrative   • No narrative on file       Family History:   Family History   Problem Relation Age of Onset   • Alzheimer's Disease Mother    • Hypertension Father    • Diabetes Father    • Diabetes Brother        HISTORIES AS ABOVE ARE INCOMPLETE IF PATIENT IS INTUBATED, OR UNABLE TO COMMUNICATE OR ELUCIDATE.    Allergies: No Known Allergies      Current Facility-Administered Medications:   •  senna-docusate (PERICOLACE or SENOKOT S) 8.6-50 MG per tablet 2 Tab, 2 Tab, Oral, BID **AND** polyethylene glycol/lytes (MIRALAX) PACKET 1 Packet, 1 Packet, Oral, QDAY PRN **AND** magnesium hydroxide (MILK OF MAGNESIA) suspension 30 mL, 30 mL, Oral, QDAY PRN **AND** bisacodyl (DULCOLAX) suppository 10 mg, 10 mg, Rectal, QDAY PRN, Zulema Garza M.D.  •  Respiratory Care per Protocol, , Nebulization, Continuous RT, Zulema Garza M.D.  •  heparin injection 5,000 Units, 5,000 Units, Subcutaneous, Q8HRS, Zulema Garza M.D., 5,000 Units at 04/04/19 0435  •  acetaminophen (TYLENOL) tablet 650 mg, 650 mg, Oral, Q6HRS PRN, Zulema Garza M.D.  •  hydrALAZINE (APRESOLINE) injection 10 mg, 10 mg, Intravenous, Q4HRS PRN, Zulema Garza M.D., 10 mg at 04/03/19 2326  •  guaiFENesin dextromethorphan (ROBITUSSIN DM) 100-10 MG/5ML syrup 10 mL, 10 mL, Oral, Q6HRS PRN, Zulema Garza M.D.  •  insulin lispro (HUMALOG) injection 1-6 Units, 1-6 Units, Subcutaneous, Q6HRS, Stopped at 04/03/19 1800 **AND** Accu-Chek Q6 if NPO, , , Q6H **AND** NOTIFY MD and PharmD, , , Once **AND** glucose 4 g chewable tablet 16 g, 16 g, Oral, Q15 MIN PRN **AND** dextrose 50% (D50W) injection 25 mL, 25 mL, Intravenous, Q15 MIN PRN, Zulema Garza M.D.  •  ascorbic acid tablet 500 mg, 500 mg, Oral, BID, Zulema Garza,  M.D., 500 mg at 04/04/19 0435  •  atorvastatin (LIPITOR) tablet 20 mg, 20 mg, Oral, DAILY, Zulema Garza M.D., 20 mg at 04/04/19 0435  •  calcium acetate (PHOS-LO) 667 MG tablet 667 mg, 667 mg, Oral, TID AC, Zulema Garza M.D., 667 mg at 04/04/19 0836  •  hydrALAZINE (APRESOLINE) tablet 100 mg, 100 mg, Oral, TID, Zulema Garza M.D., 100 mg at 04/04/19 0434  •  insulin NPH (HUMULIN,NOVOLIN) injection 10 Units, 10 Units, Subcutaneous, BID, Zulema Garza M.D., Stopped at 04/04/19 0600  •  metoprolol (LOPRESSOR) tablet 100 mg, 100 mg, Oral, BID, Zulema Garza M.D., 100 mg at 04/04/19 0433  •  lactulose 20 GM/30ML solution 30 mL, 30 mL, Oral, BID, Zulema Garza M.D., 30 mL at 04/04/19 0435  •  omeprazole (PRILOSEC) capsule 20 mg, 20 mg, Oral, DAILY, Zulema Garza M.D., 20 mg at 04/04/19 0435  •  promethazine (PHENERGAN) tablet 25 mg, 25 mg, Oral, Q6HRS PRN, Zulema Garza M.D.  •  PARoxetine (PAXIL) tablet 40 mg, 40 mg, Oral, DAILY, Zulema Garza M.D., 40 mg at 04/04/19 0433  •  amLODIPine (NORVASC) tablet 5 mg, 5 mg, Oral, Q DAY, Pawel Moreno M.D., 5 mg at 04/04/19 0036      PHYSICAL EXAM    Vitals:    04/04/19 0000 04/04/19 0036 04/04/19 0400 04/04/19 0800   BP: (!) 185/80 (!) 176/79 150/71 147/68   Pulse: 61  61 (!) 58   Resp: 16  16 17   Temp: 36.8 °C (98.3 °F)  36.7 °C (98 °F) 37.1 °C (98.8 °F)   TempSrc: Temporal  Temporal Temporal   SpO2: 100%  99% 97%   Weight:       Height:           Head/Neck: NCAT. no meningismus neg kernig neg brudzinski. No obvious mass or heard bruit. No tender arteries or lost pulses. No rash of head or neck.    Skin: Warm, dry, intact. No rashes observed head/neck or body    Eyes/Funduscopic: unable    Mental Status: Awake, alert, oriented x 3. Name/repeat/fluent/command follow intact. No neglect/extinction. Attention and concentration, recent & remote memory, Fund of Knowledge wnl.     Cranial Nerves:  bilat blind right eye opaque other CN II-XII intact. No nystagmus.       Motor:  bulk  & tone wnl. strength intact. no abn mvmts.       Sensory: symmetric to sharp     Coordination: dysmetria absent    DTR's: intact/sym. no clonus. Toes mute.    Gait/Station: n/a fall concern        Labs:  Recent Labs      04/03/19 1425 04/04/19   0354   WBC  5.0  5.0   RBC  3.58*  3.29*   HEMOGLOBIN  11.0*  10.0*   HEMATOCRIT  34.0*  31.5*   MCV  95.0  95.7   MCH  30.7  30.4   MCHC  32.4*  31.7*   RDW  48.8  49.1   PLATELETCT  173  153*   MPV  10.9  10.7     Recent Labs      04/03/19 1425 04/04/19   0354   SODIUM  132*  133*   POTASSIUM  4.4  4.5   CHLORIDE  93*  93*   CO2  30  32   GLUCOSE  134*  63*   BUN  26*  34*   CREATININE  4.01*  5.00*   CALCIUM  9.1  8.4*     Recent Labs      04/03/19   1425   APTT  29.9   INR  1.01         Recent Labs      04/03/19 1425 04/03/19   1652   CPKTOTAL   --   43   TROPONINI  0.04   --          Recent Labs      04/03/19 1425 04/03/19   1652 04/04/19   0354   SODIUM  132*   --   133*   POTASSIUM  4.4   --   4.5   CHLORIDE  93*   --   93*   CO2  30   --   32   GLUCOSE  134*   --   63*   BUN  26*   --   34*   CPKTOTAL   --   43   --      Recent Labs      04/03/19 1425 04/03/19   1652 04/04/19   0354   SODIUM  132*   --   133*   POTASSIUM  4.4   --   4.5   CHLORIDE  93*   --   93*   CO2  30   --   32   BUN  26*   --   34*   CREATININE  4.01*   --   5.00*   MAGNESIUM   --   2.2  2.2   PHOSPHORUS   --   3.0   --    CALCIUM  9.1   --   8.4*     Recent Labs      04/03/19   1425   APTT  29.9   INR  1.01     Results for orders placed or performed during the hospital encounter of 12/06/15   ECHOCARDIOGRAM COMP W/O CONT   Result Value Ref Range    Eject.Frac. MOD BP 53.52     Eject.Frac. MOD 4C 53.07     Eject.Frac. MOD 2C 54.91     Left Ventrical Ejection Fraction 60               Imaging: neuroimaging reviewed and directly visualized by me  DX-CHEST-PORTABLE (1 VIEW)   Final Result      1.  Cardiomegaly with interstitial edema.      2.  Bibasilar atelectasis.      CT-HEAD  W/O   Final Result      1.  No evidence of acute intracranial process.      2.  Cerebral atrophy as well as periventricular chronic small vessel ischemic change.      MRI 11/21/18  Impression       1.  No acute abnormality.  2.  Mild cerebral atrophy.  3.  Mild chronic microvascular ischemic disease.  4.  There are chronic punctate microbleeds in the supratentorial brain parenchyma.  5.  Right sided retinal detachment   Reading Provider Reading Date   Dax Talbert M.D. 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  Neuro sign off, reconsult PRN.  F/u otpt Neuro MD in coming months.     Consulting Physician: ZEYAD Wells M.D.  , Neurohospitalist & Stroke  Clinical Professor, [employer]  Diplomate, Neurology & Neuroimaging

## 2019-04-04 NOTE — ASSESSMENT & PLAN NOTE
On 2 LPM of oxygen at home, clarify the condition for which he is requiring home O2, determine whether he is hypoxemic on room air.

## 2019-04-04 NOTE — DISCHARGE SUMMARY
Internal Medicine Discharge Summary    Note Author: Zulema Garza M.D.       Name Pj Britt     1960   Age-Sex 58 y.o. male   MRN [id number]       Admit Date: 4/3/2019       Discharge Date: 19    Service: [facility] Internal Medicine Blue Team  Attending Physician: Dr. Moreno       Senior Resident: Dr. Masters  Jesus Resident: Dr. Garza      Primary Diagnosis:   Generalized weakness    Secondary Diagnoses:   Principal Problem (Resolved):    Generalized weakness POA: Yes  Active Problems:    IDDM (insulin dependent diabetes mellitus) (HCC) (Chronic) POA: Yes    ESRD on dialysis (HCC) (Chronic) POA: Yes    Blindness (Chronic) POA: Yes    Type 2 diabetes mellitus with nephropathy, and retinopathy (HCC) (Chronic) POA: Yes    Anemia of chronic renal failure (Chronic) POA: Yes    Mood disorder (HCC) (Chronic) POA: Yes    Hyperlipidemia (Chronic) POA: Yes    Hypertension (Chronic) POA: Yes    Adrenal incidentaloma (HCC) (Chronic) POA: Yes    Heart burn (Chronic) POA: Yes    On home oxygen therapy (Chronic) POA: Yes  Resolved Problems:    Muscle twitching POA: Yes      Hospital Summary Brief Narrative:       58-year-old male with IDDM complicated by BL blindness, nephropathy and neuropathy, ESRD on HD MWF, HTN, HLD, depression, prolonged occupational exposure to 2nd-hand smoke and home O2 dependence on 2 LPM. Admitted with generalized weakness and muscle twitching after completing a 4-hour HD session earlier that day in the setting of completing only 30-40 minutes of his previous HD session on Monday due to an issue with his fistula. He did not have any focal deficits at presentation. He was noted to have -210 mmHg in the ED, but his temperature, HR and RR were WNL and his SpO2 was greater than 95% on his home dose of 2L O2 by NC. The patient’s CMP was at his usual baseline per chart review, and he had a normal trop 0.04, procal 0.24, WBC 5.0, lactate 0.7 and CPK 43. His non-contrast head CT was  negative for acute bleed or intracranial abnormality. His CXR showed bibasilar atelectasis and interstitial edema, but was otherwise unremarkable compared with his previous CXR on 11/26/19. The patient’s weakness, muscle twitching and HTN resolved overnight. He was evaluated by Nephrology 4/4/19 morning and his symptoms were most likely secondary to post-HD disequilibrium. The the patient was cleared by Nephrology to be discharged home. PT evaluation on 4/4/19 recommended home with home PT, but the patient and his family made the informed decision to decline home health at this time. The patient was accordingly discharged with his family with close outpatient follow-up.      Of note, the patient expressed his desire to start peritoneal dialysis in place of HD and was advised by Nephrology to follow up on this with his vascular surgeon Dr. Baldwin in the outpatient setting.      Problem-Based Hospital Course:          ESRD on dialysis (HCC)   Assessment & Plan    Onset 3-4 years ago, on HD MWF, fistula in L forearm with palpable thrill, produces minimal urine.  Underwent HD today with 4 L UF, developed generalized weakness and muscle twitching thereafter, for which he was brought to the ED.  BUN and Cr at patient's baseline.   CTM.  Nephrology consult if patient remains inpatient on Friday.      IDDM (insulin dependent diabetes mellitus) ([facility])   Assessment & Plan    On NPH 15 units qam and 20 units qpm at home.  Hgb A1c 7.8 in Nov 2018.  Endorses good medication and dietary compliance.     Plan:  NPH 10 units BID while inpatient  Low SSI  Hypoglycemia protocol   Diabetic diet     Muscle twitching-resolved as of 4/4/2019   Assessment & Plan    RESOLVED.  Onset of generalized weakness and muscle twitching following HD today.  Characterized by random spasms and a pill rolling tremor per the patient's wife's report.   Hospitalized for a similar presentation in November 2018, diagnosed with encephalopathy on EEG.    CMP  at patient's baseline per chart review.   Normal trop 0.04, procal 0.24, WBC 5.0, lactate 0.7, CPK 43.   Non-contrast head CT negative for acute bleed or intracranial abnormality.   CXR shows bibasilar atelectasis and interstitial edema, otherwise unremarkable compared with 11/26/19 CXR.    Differential includes seizure, metabolic derangement, neuromuscular disorder and neurocognitive impairment. Stroke and an infectious process are possible but less likely at this time.   Neurology consulted by EDP, appreciate recommendations.    Plan:  Admit to medical floor for observation  q4h neuro checks, EEG  NPO till bedside swallow screen  RT protocol, continuous pulse ox  Normal TSH 2.26 and FT4 1.11  RPR non-reactive  B12, folate, iron panel pending  Monitoring for signs of infection     * Generalized weakness-resolved as of 4/4/2019   Assessment & Plan    RESOLVED.  Onset of generalized weakness and muscle twitching following HD today.  Hospitalized for a similar presentation in November 2018, diagnosed with encephalopathy on EEG.  Associated sx of reduced appetite x 2 months, nausea x 3 weeks, and an episode of NBNB emesis last night and this morning.    -210 mmHg, VS otherwise stable.   CMP at patient's baseline per chart review.   Normal trop 0.04, procal 0.24, WBC 5.0, lactate 0.7, CPK 43.    Non-contrast head CT negative for acute bleed or intracranial abnormality.   CXR shows bibasilar atelectasis and interstitial edema, otherwise unremarkable compared with 11/26/19 CXR.    Differential includes seizure, metabolic derangement, neuromuscular disorder and neurocognitive impairment. Stroke and an infectious process are possible but less likely at this time.   Neurology consulted by EDP, appreciate recommendations.    Plan:  Admit to medical floor for observation  q4h neuro checks, EEG  NPO till bedside swallow screen  RT protocol, continuous pulse ox  Normal TSH 2.26 and FT4 1.11  RPR non-reactive  B12, folate,  "iron panel pending  Monitoring for signs of infection     Mood disorder (HCC)   Assessment & Plan    History of anxiety and low mood per chart review, on paroxetine 40 mg daily at home.    Plan:  Continue home paroxetine for now  Recommend outpatient Psych referral for further evaluation and management      Anemia of chronic renal failure   Assessment & Plan    H and H stable.    Plan:  Iron panel pending  Continue Epogen per Nephrology        Type 2 diabetes mellitus with nephropathy, and retinopathy (HCC)   Assessment & Plan    Several year history, complicated by BL blindness, ESRD and peripheral neuropathy.   See \"IDDM\" for additional information.      Blindness   Assessment & Plan    BL blindness, chronic, secondary to diabetic retinopathy.    Plan:  Fall precautions  Supervised meals and activity     On home oxygen therapy   Assessment & Plan    On 2 LPM of oxygen at home, clarify the condition for which he is requiring home O2, determine whether he is hypoxemic on room air.     Heart burn   Assessment & Plan    Continue home omeprazole 20 mg daily     Adrenal incidentaloma (HCC)   Assessment & Plan    Seen on chest CT.  Work up in 2016, including plasma metanephrines, catecholamines, renin, aldosterone and DHEA, was unremarkable.  CTM.     Hypertension   Assessment & Plan    Presented with generalized weakness and muscle twitching, SBP elevated to 190-210 at presentation.   Non-contrast head CT negative for acute bleed or intracranial abnormality.   Home -180 per patient's wife's report.     Plan:  Continue home hydralazine and metoprolol with holding parameters  PRN IV hydralazine for SBP > 210, DBP > 110  CTM     Hyperlipidemia   Assessment & Plan    Continue atorvastatin 20 mg daily.         Consultants:     Nephrology  Neurology    Procedures:        None      Imaging and Testing:      DX-CHEST-PORTABLE (1 VIEW)   Final Result      1.  Cardiomegaly with interstitial edema.      2.  Bibasilar " atelectasis.      CT-HEAD W/O   Final Result      1.  No evidence of acute intracranial process.      2.  Cerebral atrophy as well as periventricular chronic small vessel ischemic change.          Discharge Medications:          Medication Reconciliation: Completed       Medication List      START taking these medications      Instructions   amLODIPine 5 MG Tabs  Start taking on:  4/5/2019  Commonly known as:  NORVASC   Take 1 Tab by mouth every day.  Dose:  5 mg        CONTINUE taking these medications      Instructions   ascorbic acid 500 MG tablet  Commonly known as:  VITAMIN C   Take 1 Tab by mouth 2 Times a Day.  Dose:  500 mg     atorvastatin 20 MG Tabs  Commonly known as:  LIPITOR   TAKE 1 TAB BY MOUTH DAILY     calcium acetate 667 MG Caps  Commonly known as:  PHOS-LO   Take 667-2,001 mg by mouth 3 times a day, with meals. 667 mg with snacks 2001 mg with meals  Dose:  667-2001 mg     hydrALAZINE 100 MG tablet  Commonly known as:  APRESOLINE   Take 1 Tab by mouth every 8 hours.  Dose:  100 mg     insulin  UNIT/ML Susp  Commonly known as:  HUMULIN,NOVOLIN   Inject 15-20 Units as instructed 2 Times a Day. 20 units in AM  15 units in EVENING  Dose:  15-20 Units     lactulose 10 GM/15ML Soln   Take 20 g by mouth every day.  Dose:  20 g     metoprolol 100 MG Tabs  Commonly known as:  LOPRESSOR   Take 1 Tab by mouth 2 Times a Day.  Dose:  100 mg     omeprazole 20 MG delayed-release capsule  Commonly known as:  PRILOSEC   TAKE 1 CAPSULE BY MOUTH EVERY DAY     PAXIL 40 MG tablet  Generic drug:  paroxetine   Take 40 mg by mouth every day.  Dose:  40 mg     promethazine 25 MG Tabs  Commonly known as:  PHENERGAN   Take 25 mg by mouth every 6 hours as needed for Nausea/Vomiting.  Dose:  25 mg            Disposition: Home with close outpatient follow up    Diet: Diabetic, Renal    Activity: As tolerated    Instructions:      The patient was instructed to return to the ER in the event of worsening symptoms. I have  counseled the patient on the importance of compliance and the patient has agreed to proceed with all medical recommendations and follow up plan indicated above.   The patient understands that all medications come with benefits and risks. Risks may include permanent injury or death and these risks can be minimized with close reassessment and monitoring.        Primary Care Provider:   Faisal Germain M.D.  Discharge summary made available to primary care provider:  Completed  Copy of discharge summary given to the patient: Completed      Follow up appointment details:        Future Appointments  Date Time Provider Department Center   5/17/2019 8:20 AM Jcarlos Edwards M.D. [facility] None     Faisal Germain M.D.  [address]  [place] [address]  [phone]    Go on 4/8/2019  Please arrive at 7:25am for your appointment. Thank you      Pending Studies:   None    Summary of follow up issues:   The patient is due for a repeat colonoscopy this year, he should also follow up with GI about his decreased appetite and nausea.   The patient was started on amlodipine during this hospitalization.   The patient will need follow up on his ESRD, diabetes and hypertension.  The patient was advised to follow up with his providers at [facility] regarding peritoneal dialysis.        Discharge Time: greater than 45 minutes (amoutn of time spent on discharge day patient visit, preparing discharge paperwork and arranging for patient follow up)    Hospital Course Type: Inpatient Stay < 2 midnights, patient recovered more rapidly than anticipated    Condition on Discharge: stable within his chronic medical conditions, alert, oriented x 4    ______________________________________________________________________      Interval History and Exam at Discharge:   No acute events overnight.  No longer with the generalized weakness and muscle twitching with which he presented yesterday.   Slept well, patient reports feeling better this morning.    Patient's wife notes significant improvement in her , she feels he is back to his baseline.  Evaluated by Nephrology and cleared to be discharged home; his symptoms were likely due to post HD dysequilibrium considering he did not complete his HD session on Monday, 4/1/19.      Physical Exam:     Temp:  [36.7 °C (98 °F)-37.1 °C (98.8 °F)] 36.9 °C (98.4 °F)  Pulse:  [56-67] 57  Resp:  [12-20] 17  BP: (147-185)/(68-80) 160/74  SpO2:  [91 %-100 %] 95 %    General: No apparent distress, pleasant, cooperative  HEN: Normocephalic, atraumatic  Eyes: Sclerae anicteric, chronic blindness bilaterally  Throat: Oropharynx clear, oral mucosa moist, Mallampati score 3  Neck: Supple, no lymphadenopathy    Heart: Regular rhythm, normal rate, faint systolic murmur at LUSB, no rubs or gallops  Lung: Normal work of breathing, clear to auscultation bilaterally, diminished breath sounds at BL lung bases   Abdomen: Soft, normoactive BS, non-tender, non-distended, no rebound or guarding, OUSMANE deferred to a later time    Extremities: No cyanosis, no edema, AV fistula with palpable thrill in L forearm    Neuro: Alert, oriented to person, place, time and situation, normal sensation, 4+/5 strength in BL upper and lower extremities, normal tone and coordination  Skin: Warm, dry, intact, no rash  Psych: Low mood, flat affect       Most Recent Labs:      Lab Results   Component Value Date/Time    WBC 5.0 04/04/2019 03:54 AM    RBC 3.29 (L) 04/04/2019 03:54 AM    HEMOGLOBIN 10.0 (L) 04/04/2019 03:54 AM    HEMATOCRIT 31.5 (L) 04/04/2019 03:54 AM    MCV 95.7 04/04/2019 03:54 AM    MCH 30.4 04/04/2019 03:54 AM    MCHC 31.7 (L) 04/04/2019 03:54 AM    MPV 10.7 04/04/2019 03:54 AM    NEUTSPOLYS 72.00 04/04/2019 03:54 AM    LYMPHOCYTES 9.10 (L) 04/04/2019 03:54 AM    MONOCYTES 12.10 04/04/2019 03:54 AM    EOSINOPHILS 5.60 04/04/2019 03:54 AM    BASOPHILS 1.00 04/04/2019 03:54 AM    HYPOCHROMIA 1+ 12/30/2013 09:18 AM      Lab Results   Component  Value Date/Time    SODIUM 133 (L) 04/04/2019 03:54 AM    POTASSIUM 4.5 04/04/2019 03:54 AM    CHLORIDE 93 (L) 04/04/2019 03:54 AM    CO2 32 04/04/2019 03:54 AM    GLUCOSE 63 (L) 04/04/2019 03:54 AM    BUN 34 (H) 04/04/2019 03:54 AM    CREATININE 5.00 (H) 04/04/2019 03:54 AM      Lab Results   Component Value Date/Time    ALTSGPT 11 04/04/2019 03:54 AM    ASTSGOT 9 (L) 04/04/2019 03:54 AM    ALKPHOSPHAT 81 04/04/2019 03:54 AM    TBILIRUBIN 0.5 04/04/2019 03:54 AM    ALBUMIN 3.6 04/04/2019 03:54 AM    GLOBULIN 2.3 04/04/2019 03:54 AM    INR 1.01 04/03/2019 02:25 PM     Lab Results   Component Value Date/Time    PROTHROMBTM 13.4 04/03/2019 02:25 PM    INR 1.01 04/03/2019 02:25 PM

## 2019-04-04 NOTE — NON-PROVIDER
**MED STUDENT NOTE**        Attending: Pawel Moreno M.D.  Student: Javon Phelps, Student    Patient: Pj Britt; [id number]; 1960    ID: 58 y.o. male admitted for Weakness/malaise post-dialysis     SUBJECTIVE:   · No acute events overnight, Pt reports feeling better and more energy  · No pain, no HA, No dizziness or LOC.     Review of Systems   Constitutional: Positive for malaise/fatigue. Negative for chills, diaphoresis, fever and weight loss.   Eyes: Positive for blurred vision.   Respiratory: Negative for cough and hemoptysis.    Cardiovascular: Negative for chest pain and palpitations.   Gastrointestinal: Negative for abdominal pain, heartburn and nausea.   Genitourinary: Negative for dysuria.   Skin: Positive for rash.   Neurological: Positive for weakness. Negative for dizziness and headaches.   Psychiatric/Behavioral: Positive for depression.       OBJECTIVE:  Vitals:  Vitals:    04/04/19 0400 04/04/19 0800 04/04/19 1100 04/04/19 1214   BP: 150/71 147/68 159/74    Pulse: 61 (!) 58 (!) 56 62   Resp: 16 17 18 17   Temp: 36.7 °C (98 °F) 37.1 °C (98.8 °F) 36.9 °C (98.4 °F)    TempSrc: Temporal Temporal Temporal    SpO2: 99% 97% 96% 98%   Weight:       Height:             PE:  Physical Exam   Constitutional: He is oriented to person, place, and time and well-developed, well-nourished, and in no distress. No distress.   HENT:   Head: Atraumatic.   Eyes:   Loss of vision in both eyes   Cardiovascular: Normal rate.    Murmur heard.  Holosystolic   Pulmonary/Chest: No respiratory distress. He has no wheezes. He has no rales.   Abdominal: He exhibits no distension. There is no tenderness.   Musculoskeletal: Normal range of motion.   Neurological: He is alert and oriented to person, place, and time.   Skin: He is not diaphoretic.     Recent Labs      04/03/19   1425  04/04/19   0354   WBC  5.0  5.0   RBC  3.58*  3.29*   HEMOGLOBIN  11.0*  10.0*   HEMATOCRIT  34.0*  31.5*   MCV  95.0  95.7   MCH  30.7  30.4    RDW  48.8  49.1   PLATELETCT  173  153*   MPV  10.9  10.7   NEUTSPOLYS  75.60*  72.00   LYMPHOCYTES  7.80*  9.10*   MONOCYTES  9.60  12.10   EOSINOPHILS  6.00  5.60   BASOPHILS  0.80  1.00     Recent Labs      04/03/19   1425  04/03/19   1652  04/04/19   0354   SODIUM  132*   --   133*   POTASSIUM  4.4   --   4.5   CHLORIDE  93*   --   93*   CO2  30   --   32   GLUCOSE  134*   --   63*   BUN  26*   --   34*   CPKTOTAL   --   43   --      Recent Labs      04/03/19   1425 04/04/19   0354   ALBUMIN  4.3  3.6   TBILIRUBIN  0.6  0.5   ALKPHOSPHAT  102*  81   TOTPROTEIN  6.7  5.9*   ALTSGPT  14  11   ASTSGOT  11*  9*   CREATININE  4.01*  5.00*     Recent Labs      04/03/19   1425  04/04/19   0354   ASTSGOT  11*  9*   ALTSGPT  14  11   TBILIRUBIN  0.6  0.5   ALKPHOSPHAT  102*  81   GLOBULIN  2.4  2.3   INR  1.01   --      Recent Labs      04/03/19   1425   APTT  29.9   INR  1.01     Recent Labs      04/03/19   1425  04/04/19   0354   SODIUM  132*  133*   POTASSIUM  4.4  4.5   CHLORIDE  93*  93*   CO2  30  32   GLUCOSE  134*  63*   BUN  26*  34*   CREATININE  4.01*  5.00*     Recent Labs      04/03/19   1425  04/03/19   1652   CPKTOTAL   --   43   TROPONINI  0.04   --          Intake/Output Summary (Last 24 hours) at 04/04/19 1254  Last data filed at 04/04/19 0900   Gross per 24 hour   Intake              240 ml   Output                0 ml   Net              240 ml         Microbiology:     Imaging:       Assessment and Plan:    # Weakness/ malaise  - Post dialysis pt felt weak, very exhausted. Had bp systolic of 50 while in dialysis. Bp in ER was 200's. Petechiae noted on R forearm and hand. Likely due to blood pressure cuff.  - Wife reported he was acting different, moving slower   - Pt missed dialysis monday  - Checked TSH, b12, vit D CMP, CBC, troponins  -Hbg 10  -Work up for possible stroke due to AMS concerns  -Pt feeling better today  -PT OT consulted. Pt able to ambulate      # ESRD  - on dialysis 3x a week. GFR  12. Cr 5 is slightly above baseline. Pt makes minimal urine   - pt missed dialysis Monday. Likely relating to current condition.   - BP medications previously discussed  -Ca Acetate- bind phosphate.  - Renally dosing any medications needed  - Nephrology consulted. Pt discussed peritoneal dialysis given good care from caretaker (wife)   -Dr. Baldwin will be notified of pt peritoneal dialysis desires. Plan to have pt seen before going back to [place]    # HTN  - Likely related to ESRD.  - BP's can be extremely elevated. Pt reports good BP control at home.   - Kidney and eye disease likely related. Evidence of small vessel damage on head CT.   - Giving amlodipine metoprolol and hydralazine  - anticipate good BP control. Goals systolic <180    # Chronic Resp Failure  - Pt on 2L of oxygen at home   - Continued oxygen use in hospital. No acute worsening at this time.     # Diabetes Mellitis   - longstanding hx of DM2. Slightly elvated BG on admission. Contributing to blindness, ESRD. neuropathy,  - Pt taking humalog and NPH. Report good control of BG at home  - A1c of 5.9%  - Diabetic diet while in hospital     #Hyperlipidemia  -Pt taking statin  -Will continue statin therapy     # Depression  - hx of depression   - Pt taking Paroxetine  - May be contributing to symptoms of malaise.  - Continuing paroxetine   - Recommend outpt therapy    # Blindness  -Pt has lost vision in both eyes related to HTN and diabetes    But is DNR, expressed wishes to be DNR/do not intubate. This was confirmed verbally. Pt advanced directive states otherwise. Pt A&O x4. Has capacity to make this decision.    PROBLEM LIST:  Active Hospital Problems    Diagnosis   • Generalized weakness [R53.1]     Priority: High   • Muscle twitching [R25.3]     Priority: High   • ESRD on dialysis ([facility]) [N18.6, Z99.2]     Priority: High   • IDDM (insulin dependent diabetes mellitus) ([facility]) [E11.9, Z79.4]     Priority: High   • Mood disorder ([facility]) [F39]      Priority: Medium   • Type 2 diabetes mellitus with nephropathy, and retinopathy (HCC) [E11.21]     Priority: Medium   • Anemia of chronic renal failure [N18.9, D63.1]     Priority: Medium   • Blindness [H54.7]     Priority: Medium   • Heart burn [R12]     Priority: Low   • Adrenal incidentaloma (HCC) [E27.8]     Priority: Low   • Hyperlipidemia [E78.5]     Priority: Low   • Hypertension [I10]     Priority: Low   • On home oxygen therapy [Z99.81]         #Core Measures   VTE PPx:Yes heparin  Abx:no  Lines/Tubes:PIV  Fluids:NS  Diet: Diabetic  Code Status: DNR

## 2019-04-04 NOTE — ASSESSMENT & PLAN NOTE
RESOLVED.  Onset of generalized weakness and muscle twitching following HD today.  Characterized by random spasms and a pill rolling tremor per the patient's wife's report.   Hospitalized for a similar presentation in November 2018, diagnosed with encephalopathy on EEG.    CMP at patient's baseline per chart review.   Normal trop 0.04, procal 0.24, WBC 5.0, lactate 0.7, CPK 43.   Non-contrast head CT negative for acute bleed or intracranial abnormality.   CXR shows bibasilar atelectasis and interstitial edema, otherwise unremarkable compared with 11/26/19 CXR.    Differential includes seizure, metabolic derangement, neuromuscular disorder and neurocognitive impairment. Stroke and an infectious process are possible but less likely at this time.   Neurology consulted by EDP, appreciate recommendations.    Plan:  Admit to medical floor for observation  q4h neuro checks, EEG  NPO till bedside swallow screen  RT protocol, continuous pulse ox  Normal TSH 2.26 and FT4 1.11  RPR non-reactive  B12, folate, iron panel pending  Monitoring for signs of infection

## 2019-04-04 NOTE — PROGRESS NOTES
Internal Medicine Interval Note    Note Author: Zulema Garza M.D.      Name Pj Britt    1960   Age 58 y.o. male   MRN [id number]   Code Status DNAR-DNI     After 5PM or if no immediate response to page, please call for cross-coverage.    Attending: Dr. Moreno  Team: Moe See patient list for primary contact information.  Call [phone] to page.    1st Call:  Dr. Garza, Day Intern, R1 2nd Call:  Dr. Masters, Day Senior Resident, R2-R3       Principal Problem:  Generalized weakness       Interval History:  No acute events overnight.  No longer with the generalized weakness and muscle twitching with which he presented yesterday.   Slept well, patient reports feeling better this morning.   Patient's wife notes significant improvement in her , she feels he is back to his baseline.  Evaluated by Nephrology and cleared to be discharged home; his symptoms were likely due to post HD dysequilibrium considering he did not complete his HD session on Monday, 19.      ROS:  Constitutional: Negative for fever, chills and malaise.  HEN: Negative for sinus pain and congestion.  Throat: Negative for dysphagia and sore throat.  Eyes: Positive for chronic BL blindness. Negative for pain and abnormal discharge.   Respiratory: Positive for chronic cough. Negative for shortness of breath.  Cardiovascular: Negative for chest pain and palpitations.  Gastrointestinal: Positive for chronic constipation, reduced appetite x 2 months, occasional nausea x 3 weeks. Negative for diarrhea, melena and hematochezia.   Genitourinary: Positive for oliguria secondary to ESRD. Negative for dysuria and hematuria.    Extremities: Negative for pain and swelling.  Neurological: Negative for headaches and dizziness.  Hematologic: Negative for abnormal bleeding and bruising.   Psychiatric: Positive for depression. Negative for substance abuse.      Disposition-Discharge Barriers:  Discharged home with close outpatient  follow up.   Home health was recommended for home PT.  The patient and his family made the informed decision to decline home health at this time.      Consultants:  None      Quality Measures:  Morales: No  Central Line: No, LUE AV fistula with palpable thrill  VTE Prophylaxis: Heparin  Antibiotics: NA  Rehab: Evaluated by PT      Physical Exam:    Temp:  [36.7 °C (98 °F)-37.1 °C (98.8 °F)] 36.9 °C (98.4 °F)  Pulse:  [56-67] 57  Resp:  [12-20] 17  BP: (147-185)/(68-80) 160/74  SpO2:  [91 %-100 %] 95 %     General: No apparent distress, pleasant, cooperative  HEN: Normocephalic, atraumatic  Eyes: Sclerae anicteric, chronic blindness bilaterally  Throat: Oropharynx clear, oral mucosa moist, Mallampati score 3  Neck: Supple, no lymphadenopathy    Heart: Regular rhythm, normal rate, faint systolic murmur at LUSB, no rubs or gallops  Lung: Normal work of breathing, clear to auscultation bilaterally, diminished breath sounds at BL lung bases   Abdomen: Soft, normoactive BS, non-tender, non-distended, no rebound or guarding, OUSMANE deferred to a later time    Extremities: No cyanosis, no edema, AV fistula with palpable thrill in L forearm    Neuro: Alert, oriented to person, place, time and situation, normal sensation, 4+/5 strength in BL upper and lower extremities, normal tone and coordination  Skin: Warm, dry, intact, no rash  Psych: Low mood, flat affect      Labs and Imaging:  Pertinent labs and diagnostic tests associated with this visit have been reviewed and specific comments can be found in the assessment and plan section below.     Recent Labs      04/03/19   1425 04/03/19   1652  04/04/19   0354   SODIUM  132*   --   133*   POTASSIUM  4.4   --   4.5   CHLORIDE  93*   --   93*   CO2  30   --   32   BUN  26*   --   34*   CREATININE  4.01*   --   5.00*   GLUCOSE  134*   --   63*   CALCIUM  9.1   --   8.4*   MAGNESIUM   --   2.2  2.2   PHOSPHORUS   --   3.0   --      Recent Labs      04/03/19   1425 04/04/19   0354  "  WBC  5.0  5.0   HEMOGLOBIN  11.0*  10.0*   HEMATOCRIT  34.0*  31.5*   PLATELETCT  173  153*     Recent Labs      04/03/19   1425  04/04/19   0354   ALTSGPT  14  11   ASTSGOT  11*  9*   ALKPHOSPHAT  102*  81   TBILIRUBIN  0.6  0.5   ALBUMIN  4.3  3.6       Assessment and Plan:    ESRD on dialysis (HCC)   Assessment & Plan    Onset 3-4 years ago, on HD MWF, fistula in L forearm with palpable thrill, produces minimal urine.  Underwent HD today with 4 L UF, developed generalized weakness and muscle twitching thereafter, for which he was brought to the ED.  BUN and Cr at patient's baseline.   CTM.  Nephrology consult if patient remains inpatient on Friday.      IDDM (insulin dependent diabetes mellitus) ([facility])   Assessment & Plan    On NPH 15 units qam and 20 units qpm at home.  Hgb A1c 7.8 in Nov 2018.  Endorses good medication and dietary compliance.     Plan:  NPH 10 units BID while inpatient  Low SSI  Hypoglycemia protocol   Diabetic diet     Mood disorder ([facility])   Assessment & Plan    History of anxiety and low mood per chart review, on paroxetine 40 mg daily at home.    Plan:  Continue home paroxetine for now  Recommend outpatient Psych referral for further evaluation and management      Anemia of chronic renal failure   Assessment & Plan    H and H stable.    Plan:  Iron panel pending  Continue Epogen per Nephrology        Type 2 diabetes mellitus with nephropathy, and retinopathy ([facility])   Assessment & Plan    Several year history, complicated by BL blindness, ESRD and peripheral neuropathy.   See \"IDDM\" for additional information.      Blindness   Assessment & Plan    BL blindness, chronic, secondary to diabetic retinopathy.    Plan:  Fall precautions  Supervised meals and activity     On home oxygen therapy   Assessment & Plan    On 2 LPM of oxygen at home, clarify the condition for which he is requiring home O2, determine whether he is hypoxemic on room air.     Heart burn   Assessment & Plan    Continue home " omeprazole 20 mg daily     Adrenal incidentaloma (HCC)   Assessment & Plan    Seen on chest CT.  Work up in 2016, including plasma metanephrines, catecholamines, renin, aldosterone and DHEA, was unremarkable.  CTM.     Hypertension   Assessment & Plan    Presented with generalized weakness and muscle twitching, SBP elevated to 190-210 at presentation.   Non-contrast head CT negative for acute bleed or intracranial abnormality.   Home -180 per patient's wife's report.     Plan:  Continue home hydralazine and metoprolol with holding parameters  PRN IV hydralazine for SBP > 210, DBP > 110  CTM     Hyperlipidemia   Assessment & Plan    Continue atorvastatin 20 mg daily.

## 2019-04-04 NOTE — DISCHARGE INSTRUCTIONS
Please attend your dialysis appointment tomorrow as scheduled.   Resume your home medications.   Sart taking amlodipine for better blood pressure control.   Continue to monitor and log your blood pressure at home, bring this log to your primary care appointment.    Discharge Instructions    Discharged to home by car with relative. Discharged via wheelchair, hospital escort: Refused.  Special equipment needed: Not Applicable    Be sure to schedule a follow-up appointment with your primary care doctor or any specialists as instructed.     Discharge Plan:   Diet Plan: Discussed  Activity Level: Discussed  Confirmed Follow up Appointment: Patient to Call and Schedule Appointment  Confirmed Symptoms Management: Discussed  Medication Reconciliation Updated: Yes  Influenza Vaccine Indication: Patient Refuses    I understand that a diet low in cholesterol, fat, and sodium is recommended for good health. Unless I have been given specific instructions below for another diet, I accept this instruction as my diet prescription.   Other diet: Diabetic    Special Instructions: None    · Is patient discharged on Warfarin / Coumadin?   No     Depression / Suicide Risk    As you are discharged from this [facility] facility, it is important to learn how to keep safe from harming yourself.    Recognize the warning signs:  · Abrupt changes in personality, positive or negative- including increase in energy   · Giving away possessions  · Change in eating patterns- significant weight changes-  positive or negative  · Change in sleeping patterns- unable to sleep or sleeping all the time   · Unwillingness or inability to communicate  · Depression  · Unusual sadness, discouragement and loneliness  · Talk of wanting to die  · Neglect of personal appearance   · Rebelliousness- reckless behavior  · Withdrawal from people/activities they love  · Confusion- inability to concentrate     If you or a loved one observes any of these behaviors or  has concerns about self-harm, here's what you can do:  · Talk about it- your feelings and reasons for harming yourself  · Remove any means that you might use to hurt yourself (examples: pills, rope, extension cords, firearm)  · Get professional help from the community (Mental Health, Substance Abuse, psychological counseling)  · Do not be alone:Call your Safe Contact- someone whom you trust who will be there for you.  · Call your local CRISIS HOTLINE 131-9913 or 326-469-1890  · Call your local Children's Mobile Crisis Response Team [place] (602) 540-0418 or www.Nutrinia  · Call the toll free National Suicide Prevention Hotlines   · National Suicide Prevention Lifeline 571-357-PUAH (9026)  · National Hope Line Network 800-SUICIDE (852-8957)

## 2019-04-04 NOTE — ASSESSMENT & PLAN NOTE
"Several year history, complicated by BL blindness, ESRD and peripheral neuropathy.   See \"IDDM\" for additional information.   "

## 2019-04-04 NOTE — ASSESSMENT & PLAN NOTE
Presented with generalized weakness and muscle twitching, SBP elevated to 190-210 at presentation.   Non-contrast head CT negative for acute bleed or intracranial abnormality.   Home -180 per patient's wife's report.     Plan:  Continue home hydralazine and metoprolol with holding parameters  PRN IV hydralazine for SBP > 210, DBP > 110  CTM

## 2019-04-04 NOTE — RESPIRATORY CARE
COPD EDUCATION by COPD CLINICAL EDUCATOR  4/4/2019 at 5:58 AM by Pippa Bateman     Patient reviewed by COPD education team. Patient does not qualify for COPD program.

## 2019-04-04 NOTE — THERAPY
"Occupational Therapy Evaluation completed.   Functional Status:  Min A supine to sit, SBA sit to stand.  Pt walked hallway with FWW.  Pt stood at sink to brush teeth with min A.  Pt's wife present for session and providing all physical support for session, and reports this is baseline due to pt's blindness.  Pt left up in chair at end of session.  Plan of Care: Patient with no further skilled OT needs in the acute care setting at this time  Discharge Recommendations:  Equipment: No Equipment Needed. urrently anticipate no further skilled therapy needs once patient is discharged from the inpatient setting.    Pt is 59 y/o M seen for OT evaluation. Pt admitted with weakness and has hx of ESRD and blindness. Pt's wife present and assists pt at home due to blindness, and provided all physical support today. Pt appears to be close to or at baseline with self-care and does not have any further acute OT needs. Pt's wife expressed that pt probably would not want HH services.    See \"Rehab Therapy-Acute\" Patient Summary Report for complete documentation.    "

## 2019-04-04 NOTE — CONSULTS
Consults     Nephrology Consultation    Date of Service  4/4/2019    Referring Physician  Pawel Moreno M.D.    Consulting Physician  Denys Haskins M.D.    Reason for Consultation  Management of ESRD on HD      History of Presenting Illness  58 y.o. male with ESRD on HD MWF who presented 4/3/2019 with weakness, nausea, vomiting.     Patient was in his usual state of health attending hemodialysis every Monday Wednesday Friday.  The patient went to hemodialysis 2 days ago (Monday), but was unable to get a full dialysis session due to infiltration of his access.  In fact, he had less than 30 minutes of hemodialysis.  He then returned to hemodialysis on Wednesday (yesterday) for full hemodialysis treatment.  After hemodialysis he was feeling very weak, headache, nauseous.  This did not improve, and he was recommended to go to the hospital.  Today, his headache, nausea, and weakness are slowly improving.    The patient has been on hemodialysis for 3-1/2 years.  Diabetes is the etiology of his ESRD.  The patient is 100% blind.  The patient lives with his wife and son, who are considering transitioning to peritoneal dialysis.  They recognize that they must fully care for the patient's peritoneal dialysis as the patient will be unable to do it himself as he has 100% blind.    Review of Systems  Review of Systems   Constitutional: Negative for fever.   Respiratory: Negative for shortness of breath.    Cardiovascular: Negative for chest pain.   Gastrointestinal: Negative for abdominal pain.   All other systems reviewed and are negative.      Past Medical History  Past Medical History:   Diagnosis Date   • Anemia    • Blind in both eyes    • Diabetes (HCC)     insulin dependent   • Dialysis patient (HCC)     Earline KEATING   • ESRD (end stage renal disease) ([facility])    • Heart burn    • Hyperlipemia    • Hypertension    • Pneumonia        Surgical History  Past Surgical History:   Procedure Laterality Date   • RECOVERY  4/19/2016     Procedure: VASCULAR CASE-ILEANA-LEFT ARM FISTULOGRAM WITH ANGIOPLASTY 65444, 81652, [id number]-NZA STAGE RENAL DISEASE N18.6;  Surgeon: Recoveryonclay Surgery;  [place] [facility] [place];  Service:    • RECOVERY  2/24/2016    Procedure: IR1 VASCULAR CASE LEFT ARM FISTULOGRAM WITH ANGIOPLASTY;  Surgeon: Perfecto Surgery;  Location: SURG[place] PRE-[place] PROC UNIT [facility];  Service:    • CATH PLACEMENT Right 12/10/2015    Procedure: CATH PLACEMENT;  Surgeon: Lorene Baldwin M.D.;  Location: SURGERY [facility];  Service:    • AV FISTULA CREATION Left 12/10/2015    Procedure: AV FISTULA CREATION;  Surgeon: Lorene Baldwin M.D.;  Location: SURGERY [facility];  Service:    • PB REMV CATARACT EXTRACAP,INSERT LENS  10/21/15   • OTHER  9/22/15    trabecular micro bypass stent- right eye   • OTHER  7/2015    take out blood of left eye   • OTHER  3/2015    take blood out of eye        Family History  Family History   Problem Relation Age of Onset   • Alzheimer's Disease Mother    • Hypertension Father    • Diabetes Father    • Diabetes Brother        Social History  Social History     Social History   • Marital status:      Spouse name: N/A   • Number of children: N/A   • Years of education: N/A     Occupational History   • Not on file.     Social History Main Topics   • Smoking status: Never Smoker   • Smokeless tobacco: Never Used   • Alcohol use No   • Drug use: No   • Sexual activity: Not on file     Other Topics Concern   • Weight Concern No   • Special Diet Yes     Social History Narrative   • No narrative on file       Medications  Current Facility-Administered Medications   Medication Dose Route Frequency Provider Last Rate Last Dose   • levETIRAcetam (KEPPRA) 250 mg in  mL IVPB  250 mg Intravenous Q12HRS Suhas Montalvo M.D.   Stopped at 04/04/19 1241   • senna-docusate (PERICOLACE or SENOKOT S) 8.6-50 MG per tablet 2 Tab  2 Tab Oral BID Zulema Garza M.D.        And   • polyethylene  glycol/lytes (MIRALAX) PACKET 1 Packet  1 Packet Oral QDAY PRN Zulema Garza M.D.        And   • magnesium hydroxide (MILK OF MAGNESIA) suspension 30 mL  30 mL Oral QDAY PRN Zulema Garza M.D.        And   • bisacodyl (DULCOLAX) suppository 10 mg  10 mg Rectal QDAY PRN Zulema Garza M.D.       • Respiratory Care per Protocol   Nebulization Continuous RT Zulema Garza M.D.       • heparin injection 5,000 Units  5,000 Units Subcutaneous Q8HRS Zulema Garza M.D.   5,000 Units at 04/04/19 0435   • acetaminophen (TYLENOL) tablet 650 mg  650 mg Oral Q6HRS PRN Zulema Garza M.D.       • hydrALAZINE (APRESOLINE) injection 10 mg  10 mg Intravenous Q4HRS PRN Zulema Garza M.D.   10 mg at 04/03/19 2326   • guaiFENesin dextromethorphan (ROBITUSSIN DM) 100-10 MG/5ML syrup 10 mL  10 mL Oral Q6HRS PRN Zulema Garza M.D.       • insulin lispro (HUMALOG) injection 1-6 Units  1-6 Units Subcutaneous Q6HRS Zulema Garza M.D.   Stopped at 04/03/19 1800    And   • glucose 4 g chewable tablet 16 g  16 g Oral Q15 MIN PRN Zulema Garza M.D.        And   • dextrose 50% (D50W) injection 25 mL  25 mL Intravenous Q15 MIN PRN Zulema Garza M.D.       • ascorbic acid tablet 500 mg  500 mg Oral BID Zulema Garza M.D.   500 mg at 04/04/19 0435   • atorvastatin (LIPITOR) tablet 20 mg  20 mg Oral DAILY Zulema Garza M.D.   20 mg at 04/04/19 0435   • calcium acetate (PHOS-LO) 667 MG tablet 667 mg  667 mg Oral TID AC Zulema Garza M.D.   667 mg at 04/04/19 1226   • hydrALAZINE (APRESOLINE) tablet 100 mg  100 mg Oral TID Zulema Garza M.D.   100 mg at 04/04/19 1320   • insulin NPH (HUMULIN,NOVOLIN) injection 10 Units  10 Units Subcutaneous BID Zulema Garza M.D.   Stopped at 04/04/19 0600   • metoprolol (LOPRESSOR) tablet 100 mg  100 mg Oral BID Zulema Garza M.D.   100 mg at 04/04/19 0433   • lactulose 20 GM/30ML solution 30 mL  30 mL Oral BID Zulema Garza M.D.   30 mL at 04/04/19 0435   • omeprazole (PRILOSEC) capsule 20 mg  20 mg Oral DAILY Zulema  ZEYAD Garza   20 mg at 04/04/19 0435   • promethazine (PHENERGAN) tablet 25 mg  25 mg Oral Q6HRS PRN Zulema Garza M.D.       • PARoxetine (PAXIL) tablet 40 mg  40 mg Oral DAILY Zulema Garza M.D.   40 mg at 04/04/19 0433   • amLODIPine (NORVASC) tablet 5 mg  5 mg Oral Q DAY Pawel Moreno M.D.   5 mg at 04/04/19 0036       Allergies  No Known Allergies    Physical Exam  Temp:  [36.7 °C (98 °F)-37.2 °C (99 °F)] 36.9 °C (98.4 °F)  Pulse:  [56-67] 57  Resp:  [12-20] 17  BP: (147-190)/(68-80) 160/74  SpO2:  [91 %-100 %] 95 %    Physical Exam   Constitutional: He is oriented to person, place, and time. He appears well-developed. No distress.   HENT:   Mouth/Throat: Oropharynx is clear and moist. No oropharyngeal exudate.   Eyes: EOM are normal. No scleral icterus.   Neck: No tracheal deviation present.   Cardiovascular: Normal rate and normal heart sounds.    No murmur heard.  Pulmonary/Chest: Effort normal and breath sounds normal. No stridor. He has no rales.   Abdominal: Soft. He exhibits no distension. There is no tenderness.   Musculoskeletal: Normal range of motion. He exhibits no edema.   Neurological: He is alert and oriented to person, place, and time.   Skin: Skin is warm and dry. He is not diaphoretic.   Psychiatric: He has a normal mood and affect.   Access: Left radiocephalic AV fistula with patent bruit and thrill.      Fluids  Date 04/04/19 0700 - 04/05/19 0659   Shift 9160-9045 6906-1680 8571-2333 24 Hour Total   I  N  T  A  K  E   P.O. 240   240    Shift Total 240   240   O  U  T  P  U  T   Shift Total       Weight (kg) 64.6 64.6 64.6 64.6         Laboratory  Labs reviewed, pertinent labs below.  Recent Labs      04/03/19   1425  04/04/19   0354   WBC  5.0  5.0   RBC  3.58*  3.29*   HEMOGLOBIN  11.0*  10.0*   HEMATOCRIT  34.0*  31.5*   MCV  95.0  95.7   MCH  30.7  30.4   MCHC  32.4*  31.7*   RDW  48.8  49.1   PLATELETCT  173  153*   MPV  10.9  10.7     Recent Labs      04/03/19   1425  04/04/19   0354    SODIUM  132*  133*   POTASSIUM  4.4  4.5   CHLORIDE  93*  93*   CO2  30  32   GLUCOSE  134*  63*   BUN  26*  34*   CREATININE  4.01*  5.00*   CALCIUM  9.1  8.4*     Recent Labs      04/03/19   1425   APTT  29.9   INR  1.01                 URINALYSIS:    Lab Results  Component Value Date/Time   COLORURINE Yellow 11/21/2018 1710   CLARITY Clear 11/21/2018 1710   SPECGRAVITY 1.017 11/21/2018 1710   PHURINE 8.5 (A) 11/21/2018 1710   KETONES Negative 11/21/2018 1710   PROTEINURIN >=1000 (A) 11/21/2018 1710   BILIRUBINUR Negative 11/21/2018 1710   UROBILU 0.2 11/21/2018 1710   NITRITE Negative 11/21/2018 1710   LEUKESTERAS Negative 11/21/2018 1710   OCCULTBLOOD Negative 11/21/2018 1710     UPC    Lab Results  Component Value Date/Time   TOTPROTUR 340.8 (H) 12/06/2015 0748      Lab Results  Component Value Date/Time   CREATININEU 58.80 12/06/2015 0748       Imaging reviewed      Assessment/Plan  58 y.o. male with ESRD on HD MWF who presented 4/3/2019 with weakness, nausea, vomiting.     1.  Weakness, nausea, vomiting.  This is likely due to mild dialysis disequilibrium syndrome after missing dialysis on Monday.  The patient should make a recovery without any further intervention.  -Patient and family are considering a switch to peritoneal dialysis.  Patient is established with Dr. Baldwin who previously was the vascular surgeon for his AV fistula.  The dialysis clinic will take care of future referrals for peritoneal dialysis catheter placement if needed.    2.  End-stage renal disease on hemodialysis Monday Wednesday Friday.  No acute need for dialysis today we will plan on hemodialysis tomorrow per usual schedule.    3.  Anemia of chronic kidney disease, no need for Epogen with hemoglobin over 10.  We will continue to monitor and order Epogen as needed.    4.  Hyponatremia.  Due to excess free water intake in this patient who is anuric.  Limit free water to 1 to 1-1/2 L a day.    Plan discussed with Dr. Tiffanie Mcfarlane  MD Jozef  Nephrology

## 2019-04-26 DIAGNOSIS — I10 ESSENTIAL HYPERTENSION: Chronic | ICD-10-CM

## 2019-04-26 RX ORDER — AMLODIPINE BESYLATE 5 MG/1
TABLET ORAL
Refills: 0 | OUTPATIENT
Start: 2019-04-26

## 2019-07-30 ENCOUNTER — OFFICE VISIT (OUTPATIENT)
Dept: NEUROLOGY | Facility: MEDICAL CENTER | Age: 59
End: 2019-07-30
Payer: MEDICARE

## 2019-07-30 VITALS
RESPIRATION RATE: 16 BRPM | DIASTOLIC BLOOD PRESSURE: 60 MMHG | TEMPERATURE: 98.4 F | SYSTOLIC BLOOD PRESSURE: 126 MMHG | HEART RATE: 60 BPM | OXYGEN SATURATION: 92 %

## 2019-07-30 DIAGNOSIS — E11.21 TYPE 2 DIABETES MELLITUS WITH NEPHROPATHY (HCC): Chronic | ICD-10-CM

## 2019-07-30 DIAGNOSIS — Z99.2 ESRD ON DIALYSIS (HCC): Chronic | ICD-10-CM

## 2019-07-30 DIAGNOSIS — G25.3 MYOCLONUS: ICD-10-CM

## 2019-07-30 DIAGNOSIS — G93.41 METABOLIC ENCEPHALOPATHY: ICD-10-CM

## 2019-07-30 DIAGNOSIS — I10 ESSENTIAL HYPERTENSION: Chronic | ICD-10-CM

## 2019-07-30 DIAGNOSIS — R41.0 DELIRIUM: ICD-10-CM

## 2019-07-30 DIAGNOSIS — R90.89 ABNORMAL BRAIN MRI: ICD-10-CM

## 2019-07-30 DIAGNOSIS — N18.6 ESRD ON DIALYSIS (HCC): Chronic | ICD-10-CM

## 2019-07-30 PROCEDURE — 99215 OFFICE O/P EST HI 40 MIN: CPT | Performed by: PSYCHIATRY & NEUROLOGY

## 2019-07-30 RX ORDER — ONDANSETRON 4 MG/1
4 TABLET, FILM COATED ORAL PRN
Refills: 11 | Status: ON HOLD | COMMUNITY
Start: 2019-05-17 | End: 2022-07-12

## 2019-07-30 RX ORDER — ACETAMINOPHEN 160 MG
1 TABLET,DISINTEGRATING ORAL
Refills: 11 | COMMUNITY
Start: 2019-05-22 | End: 2019-07-30

## 2019-07-30 ASSESSMENT — ENCOUNTER SYMPTOMS
ABDOMINAL PAIN: 0
HALLUCINATIONS: 1
BRUISES/BLEEDS EASILY: 0
EYE DISCHARGE: 0
FALLS: 0
SHORTNESS OF BREATH: 0
SORE THROAT: 0
WEIGHT LOSS: 0
FEVER: 0

## 2019-07-30 ASSESSMENT — PATIENT HEALTH QUESTIONNAIRE - PHQ9: CLINICAL INTERPRETATION OF PHQ2 SCORE: 0

## 2019-07-30 NOTE — PROGRESS NOTES
Chief Complaint   Patient presents with   • New Patient     Confusion, lethargy     Patient is referred by Dr. Montalvo (inpatient neurology) for initial consult.    History of present illness:  Pj Britt 58 y.o. male presents today for altered mental status.   History is obtained from patient, significant other and daughter.  and Patient is accompanied by Wife Lindsey and daughter Tonja.    Duration/timing: See below  Context:   Metabolic encephalopathy: Patient is in dialysis (initally at baseline) and during the dialysis he thought he was in [facility]. When told to go home, patient states he wasn't done with dialysis. In the hospital, he was having visual hallucinations and stating he was working and found all his friends in the hospital. Dialysis ~2015 from DM complications. +Rare hypoglycemia.  Positive associated myoclonus.  Metabolic encephalopathy occurred twice once in November 2018 (dialysis shortened due to fistula failure) and once in April 2019 (skipped dialysis due to plumbing issue at dialysis center).  He otherwise does not miss any of his dialysis sessions.  Abnormal MRI: incidental microbleeds  Baseline: He is alert, oriented, aware. Blind at baseline. No myoclonus. Intermittent, 1x a week, he does interact with people and having conversations, and singing in the middle of the night. None that are scary. Occurs mostly in the evening. Minimal urine output. +Neuropathy, feet. He can walk with walker.   Location: Brain  Quality: Confusion, abnormal movements  Severity: Moderate, requiring hospitalization  Modifying factors: Improved with regular normal duration dialysis  Associated signs/symptoms: End-stage renal disease on hemodialysis since 2015 Monday Wednesday Friday with left arm AV fistula  Denies: bladder incontinence, bowel incontinence, headaches, vision changes, weakness, other numbness/tingling, swallowing difficulties, speech disturbance, memory loss, hearing loss, loss of  consciousness, REM behavior disorder, seizures, falls, family hx seizure and febrile seizure as child     Patient has tried:  -Clonazepam 0.25 mg 3 times daily as needed, uncertain benefit, tried inpatient    Past medical history:   Past Medical History:   Diagnosis Date   • Anemia    • Blind    • Blind in both eyes    • Diabetes (HCC)     insulin dependent   • Dialysis patient ([facility])     RISHABH, Earline   • ESRD (end stage renal disease) ([facility])    • Heart burn    • Hyperlipemia    • Hypertension    • Pneumonia        Past surgical history:   Past Surgical History:   Procedure Laterality Date   • RECOVERY  4/19/2016    Procedure: VASCULAR CASE-ILEANA-LEFT ARM FISTULOGRAM WITH ANGIOPLASTY 21282, 55497, [id number]-[id number] STAGE RENAL DISEASE N18.6;  Surgeon: Recoveryonclay Surgery;  Location: SURGERY PRE-POST PROC UNIT [facility];  Service:    • RECOVERY  2/24/2016    Procedure: IR1 VASCULAR CASE LEFT ARM FISTULOGRAM WITH ANGIOPLASTY;  Surgeon: Perfecto Surgery;  Location: SURGERY PRE-POST PROC UNIT [facility];  Service:    • CATH PLACEMENT Right 12/10/2015    Procedure: CATH PLACEMENT;  Surgeon: Lorene Baldwin M.D.;  Location: SURGERY [facility];  Service:    • AV FISTULA CREATION Left 12/10/2015    Procedure: AV FISTULA CREATION;  Surgeon: Lorene Baldwin M.D.;  Location: SURGERY [facility];  Service:    • PB REMV CATARACT EXTRACAP,INSERT LENS  10/21/15   • OTHER  9/22/15    trabecular micro bypass stent- right eye   • OTHER  7/2015    take out blood of left eye   • OTHER  3/2015    take blood out of eye        Family history:   Family History   Problem Relation Age of Onset   • Alzheimer's Disease Mother 80        Lives in [place]   • Hypertension Father    • Diabetes Father    • Diabetes Brother      Social history:   Social History   • Marital status:      Social History Main Topics   • Smoking status: Never Smoker   • Smokeless tobacco: Never Used   • Alcohol use No   • Drug use: No       Current medications:    Current Outpatient Prescriptions   Medication   • calcium acetate (PHOS-LO) 667 MG Cap   • insulin NPH (HUMULIN,NOVOLIN) 100 UNIT/ML Suspension   • paroxetine (PAXIL) 40 MG tablet   • atorvastatin (LIPITOR) 20 MG Tab   • lactulose 10 GM/15ML Solution   • omeprazole (PRILOSEC) 20 MG delayed-release capsule   • metoprolol (LOPRESSOR) 100 MG Tab   • ascorbic acid (VITAMIN C) 500 MG tablet   • ondansetron (ZOFRAN) 4 MG Tab tablet     No current facility-administered medications for this visit.        Medication Allergy:  No Known Allergies    Review of Systems   Constitutional: Negative for fever and weight loss.   HENT: Negative for sore throat.    Eyes: Negative for discharge.   Respiratory: Negative for shortness of breath.    Cardiovascular: Negative for leg swelling.   Gastrointestinal: Negative for abdominal pain.   Genitourinary: Negative for dysuria.   Musculoskeletal: Negative for falls.   Skin: Negative for rash.   Neurological:        As per HPI   Endo/Heme/Allergies: Does not bruise/bleed easily.   Psychiatric/Behavioral: Positive for hallucinations.       Physical examination:   Vitals:    07/30/19 1338   BP: 126/60   BP Location: Right arm   Patient Position: Sitting   BP Cuff Size: Adult   Pulse: 60   Resp: 16   Temp: 36.9 °C (98.4 °F)   TempSrc: Temporal   SpO2: 92%     General: Patient in no apparent distress.  He is sitting quietly in his wheelchair.  He is thin.  Eyes: Ophthalmoscopic examination performed but discs cannot be visualized well enough to characterize bilaterally.  White cornea on the right.  HENT: Normocephalic, atraumatic.  Cardiovascular: No lower extremity edema.  Respiratory: Normal respiratory effort.   Skin: No appreciable signs of acute rashes or bruising.   Musculoskeletal: No signs of joint or muscle swelling.   Psychiatric: Pleasant.  He allows his wife and family to speak mostly for him.  He is very quiet.    NEUROLOGICAL EXAM:   Mental status: Awake, alert and fully  oriented to person, place, time and situation. Normal attention, concentration and fund of knowledge for education level.  Patient knows it is July 2019.  When asked current events regarding sports he does not want to answer me.  Speech and language: Speech is fluent without errors and clear.  Cranial nerve exam:  II: Patient cannot see light or large movements.  Visual fields are not intact to confrontation.  Right pupil is nonreactive.  Left is reactive, round.  III, IV, VI: Conjugate gaze, no diplopia, right eyelid asymmetric to the left (uncertain of this is ptosis)  V: Sensation to light touch is normal over V1-3 distributions bilaterally.  .  VII: Facial movements are symmetrical. There is no facial droop. .  VIII: Hearing intact to soft speech and finger rub bilaterally  IX: Cannot visualize uvula.  Dysarthria is not present.  XI: Shoulder shrug are symmetrical and strong.   XII: Tongue protrudes midline.    Motor exam:  Muscle tone is normal in all 4 limbs. and No abnormal movements appreciated.    Muscle strength: 5 out of 5 strength in the upper extremities proximal and with handgrip.  4 out of 5 hip flexion bilaterally.  4+ out of 5 knee flexion extension.    Sensory exam:  Decreased light touch in the feet.  Otherwise intact in the arms and proximal leg.    Deep tendon reflexes:       Right  Left  Biceps   0/4  0/4  Triceps  0/4  0/4  Brachioradialis 0/4  0/4  Knee jerk  0/4  0/4  Ankle jerk  0/4  0/4   bilateral toes are downgoing to plantar stimulation..    Coordination: He can touch his nose without issue bilaterally.  Gait: Not attempted, no walker with him today, he is in wheelchair      ANCILLARY DATA REVIEWED:   Lab Data Review:  Lab Results   Component Value Date/Time    WBC 5.0 04/04/2019 03:54 AM    RBC 3.29 (L) 04/04/2019 03:54 AM    HEMOGLOBIN 10.0 (L) 04/04/2019 03:54 AM    HEMATOCRIT 31.5 (L) 04/04/2019 03:54 AM    MCV 95.7 04/04/2019 03:54 AM    MCH 30.4 04/04/2019 03:54 AM    MCHC 31.7  (L) 04/04/2019 03:54 AM    MPV 10.7 04/04/2019 03:54 AM    NEUTSPOLYS 72.00 04/04/2019 03:54 AM    LYMPHOCYTES 9.10 (L) 04/04/2019 03:54 AM    MONOCYTES 12.10 04/04/2019 03:54 AM    EOSINOPHILS 5.60 04/04/2019 03:54 AM    BASOPHILS 1.00 04/04/2019 03:54 AM    HYPOCHROMIA 1+ 12/30/2013 09:18 AM      Lab Results   Component Value Date/Time    SODIUM 133 (L) 04/04/2019 03:54 AM    POTASSIUM 4.5 04/04/2019 03:54 AM    CHLORIDE 93 (L) 04/04/2019 03:54 AM    CO2 32 04/04/2019 03:54 AM    GLUCOSE 63 (L) 04/04/2019 03:54 AM    BUN 34 (H) 04/04/2019 03:54 AM    CREATININE 5.00 (H) 04/04/2019 03:54 AM       Lab Results  Component Value Date/Time   ASTSGOT 9 (L) 04/04/2019 0354   ALTSGPT 11 04/04/2019 0354   ALKPHOSPHAT 81 04/04/2019 0354   ALBUMIN 3.6 04/04/2019 0354     Lab Results   Component Value Date/Time    HBA1C 5.9 (H) 04/03/2019 04:52 PM      EEG November 2018, duration of 26 minutes interpreted by Dr. Dimas:  This is an abnormal video EEG recording in the awake and drowsy   state(s).  Mild diffuse cerebral dysfunction, suggestive of an   encephalopathic state. Frontal Intermittent Rhythmic Delta   Activity (FIRDA) noted, which may suggest: increased intracranial   pressure, or and underlying structural abnormality, or a deep   epileptogenic focus. Clinical and radiological correlation is   recommended.    Imaging:   MRI brain without contrast November 2018 performed for altered mental status:  1.  No acute abnormality.  2.  Mild cerebral atrophy.  3.  Mild chronic microvascular ischemic disease.  4.  There are chronic punctate microbleeds in the supratentorial brain parenchyma.  5.  Right sided retinal detachment    CT head without contrast April 2019 performed for altered mental status:  1.  No evidence of acute intracranial process.  2.  Cerebral atrophy as well as periventricular chronic small vessel ischemic change.    Records reviewed: Patient has been seen twice by neurology inpatient, November 2018 and April  2019.  In November patient was having myoclonic jerking encephalopathy thought to be secondary to uremia.  Suspicious for seizures was less likely.  Clonazepam 0.25 mg 3 times daily was tried as needed for 3 days.  Resolution of symptoms after hemodialysis.  In April 2019 they were consulted for confusion and generalized weakness following hemodialysis.  Patient was not following commands after hemodialysis and had extreme difficulty gaining and car.  On evaluation blood glucose was within normal limits with a blood pressure was 190/70.  CT head was unremarkable.  On evaluation patient was drowsy.  At that time patient was given Keppra  mg once.    Discharge summary for April was reviewed.  Again conclusion is much like disequilibrium from hemodialysis.    ASSESSMENT AND PLAN:    1. Abnormal brain MRI: Cerebral micro-bleeds on GRE sequence.  Incidental on MRI brain performed in November 2018 for altered mental status.  Patient does have evidence and history suggestive of toxic metabolic encephalopathy associated with shortened or missed dialysis sessions.  He does also have delirium which can be metabolic as well.  Based on family's report there are no clear concerns with regards to his static or progressive memory dysfunction suggestive of a neurodegenerative disease.  Cerebral amyloid angiopathy (none in the cerebellum) is a possibility however he does have a history of uncontrolled hypertension of uncertain duration with systolics up to the 200s which can cause microbleed's as well.  Mom with memory disease in her 80s.  -Monitor clinically in 1 year  -Educated family that he does have micro-bleeds, caution with anticoagulation    2. Metabolic encephalopathy: Toxic metabolic based on clinical history and evaluation at the time of hospitalization.  No acute intracranial abnormality on brain imaging.  EEG at the time of event with FIRDA and diffuse slowing which is supportive.  Again these are associated with  missed or shortened dialysis sessions which can clearly cause a metabolic encephalopathy.  These are associated with mild clonus which is not uncommon as well.  -Recommend regular duration and frequency dialysis as deemed appropriate by nephrology    3. Myoclonus: Toxic, see above     4. Delirium: Based on clinical history reported by wife.  Patient is having pleasant visual hallucinations.  He is not having any signs of behavioral disturbances that are aggressive or bothersome to the family or patient.  -Monitor    5. ESRD on dialysis (HCC): As above    6. Type 2 diabetes mellitus with nephropathy, and retinopathy (HCC): Complicated by peripheral neuropathy as well in the bilateral lower extremities.  Will defer treatment to primary care and endocrinology.    7. Essential hypertension: Chronic with intermittent fluctuations in systolic blood pressure.  Recommend optimization by nephrology and primary care as deemed appropriate.    FOLLOW-UP: Return in about 1 year (around 7/30/2020).  For abnormal brain MRI  EDUCATION AND COUNSELING:  -I personally discussed the following with the patient:   Differential diagnosis, medical reasoning as above, work-up in the hospital, education as above, red flag symptoms and exam findings to seek emergency care and to return to office sooner than scheduled as above    The patient understands and agrees that due to the complexity of his/her diagnosis, results of any testing and further recommendations will typically be discussed/made during a face to face encounter in my office. The patient and/or family further understands it is their responsibility to keep proper follow up.     Disclaimer  This dictation was created using voice recognition software. I have made every reasonable attempt to avoid dictation errors, but this document may contain an error not identified before finalizing. If the error changes the accuracy of the document, I would appreciate it being brought to my  attention. Thank you very much.     Lizet Cardenas MD  Neurology, Neurophysiology  [facility]

## 2019-12-01 RX ORDER — PAROXETINE HYDROCHLORIDE 40 MG/1
TABLET, FILM COATED ORAL
Qty: 90 TAB | Refills: 3 | Status: SHIPPED | OUTPATIENT
Start: 2019-12-01 | End: 2020-12-01

## 2020-01-30 NOTE — PROGRESS NOTES
[facility] Medicine Daily Progress Note    Date of Service  11/28/2018    Chief Complaint  58 y.o. male admitted 11/26/2018 with weakness and altered mental status    Hospital Course    *58-year-old male presented to the hospital on November 26, 2018 with complaint of altered mental status.  He has a history of end-stage renal disease and he is on hemodialysis.  He has a known bilateral blindness.  He underwent workup for confusion which include CT scan MRI and EEG on prior admission.  Requested consult with neurology.*      Interval Problem Update    No acute events overnight.    Patient family expressed that he is doing well.  He underwent hemodialysis today.  His jerking movement has improved.    Continue IV antibiotics.    Today again he answered all orientation questions correctly.      Consultants/Specialty  Neurology  Nephrology  Code Status  Full    Disposition  To be decided    Review of Systems  Review of Systems   Constitutional: Negative for chills and fever.   HENT: Negative for hearing loss and tinnitus.    Eyes:        Bilateral blindness   Respiratory: Negative for cough, sputum production and shortness of breath.    Cardiovascular: Negative for chest pain, palpitations and leg swelling.   Gastrointestinal: Negative for abdominal pain, diarrhea, nausea and vomiting.   Genitourinary: Negative for dysuria and urgency.   Musculoskeletal: Positive for myalgias.   Skin: Negative for rash.   Neurological: Negative for sensory change, speech change, focal weakness, seizures and headaches.   Psychiatric/Behavioral: Negative for hallucinations.        Physical Exam  Temp:  [36.5 °C (97.7 °F)-37.5 °C (99.5 °F)] 37 °C (98.6 °F)  Pulse:  [59-66] 59  Resp:  [17-18] 17  BP: (117-168)/(50-80) 135/55    Physical Exam   Constitutional: He is oriented to person, place, and time. No distress.   HENT:   Head: Normocephalic and atraumatic.   Neck: Normal range of motion.   Cardiovascular: Normal rate.    No murmur  Patient talked with brother via phone   heard.  Pulmonary/Chest: Effort normal and breath sounds normal. No respiratory distress. He has no wheezes.   Abdominal: Soft. Bowel sounds are normal. He exhibits no distension. There is no tenderness. There is no rebound.   Musculoskeletal: Normal range of motion. He exhibits no edema.   Bruise below the right knee   Neurological: He is alert and oriented to person, place, and time.   Answered all orientation questions correctly   Skin: Skin is warm and dry. He is not diaphoretic.   No acute change in exam    Fluids    Intake/Output Summary (Last 24 hours) at 11/28/18 0904  Last data filed at 11/27/18 1042   Gross per 24 hour   Intake              120 ml   Output                0 ml   Net              120 ml       Laboratory  Recent Labs      11/26/18   1523  11/27/18   0054  11/28/18   0025   WBC  6.6  5.8  5.7   RBC  3.27*  2.82*  2.55*   HEMOGLOBIN  10.1*  8.7*  8.1*   HEMATOCRIT  30.4*  26.8*  24.3*   MCV  93.0  95.0  95.3   MCH  30.9  30.9  31.8   MCHC  33.2*  32.5*  33.3*   RDW  45.1  46.7  46.3   PLATELETCT  176  147*  133*   MPV  10.5  10.7  11.4     Recent Labs      11/26/18   1523  11/27/18   0054  11/28/18   0025   SODIUM  134*   --   132*   POTASSIUM  4.3  4.3  4.5   CHLORIDE  93*   --   92*   CO2  31   --   28   GLUCOSE  258*   --   131*   BUN  32*   --   62*   CREATININE  4.01*   --   6.42*   CALCIUM  9.2   --   8.5     Recent Labs      11/26/18   1523   APTT  29.4   INR  0.97     Recent Labs      11/26/18   1523   BNPBTYPENAT  658*           Imaging  CT-CHEST (THORAX) W/O   Final Result      1.  Small bilateral pleural effusions are noted, larger on the left side. These were also present in 2016.      2.  Pulmonary opacifications with poorly defined borders are noted in the lower pulmonary lobes bilaterally. Findings could be due to edema aspiration or pneumonia.      3.  No pneumothorax identified.      4.  Cardiomegaly is again noted.      5.  Right adrenal mass is again noted likely representing  adenoma.            CT-HEAD W/O   Final Result      1.  Cerebral atrophy.      2.  White matter lucencies most consistent with small vessel ischemic change versus demyelination or gliosis.      3. No intracranial mass effect or acute hemorrhage identified.      4. Deformity of the right optic globe similar to previous findings and consistent with injury.         CT-CSPINE WITHOUT PLUS RECONS   Final Result      No acute fracture cervical spine. Degenerative changes.   Small left pleural effusion upper left hemothorax.      DX-CHEST-PORTABLE (1 VIEW)   Final Result      Hypoinflation. Bilateral lung base atelectasis with mild edema or pneumonitis not excluded.           Assessment/Plan  * Toxic metabolic encephalopathy- (present on admission)   Assessment & Plan    He is alert and oriented and answers questions appropriately.  It could be multifactorial multifactorial  Neurology evaluated him and made recommendations.  He underwent extensive workup for altered mental status.    Vitamin B12 and TSH are within normal limits.     Muscle twitching   Assessment & Plan    Denies jerking movement.  Also related to the fact he has ESRD and electrolyte shifting  Neurology evaluated him.     Acute on chronic respiratory failure with hypoxia (HCC)- (present on admission)   Assessment & Plan    Symptoms improved significantly.  He underwent chest x-ray and CT scan which showed patchy opacities that could be either edema or pneumonia.    Fluid restriction, diuretics and dialysis  Continue IV antibiotics.         HCAP (healthcare-associated pneumonia)- (present on admission)   Assessment & Plan    Continue IV antibiotic.  Pro calcitonin level elevated.     ESRD on dialysis (HCC)- (present on admission)   Assessment & Plan    He underwent hemodialysis today.     Anemia of chronic renal failure- (present on admission)   Assessment & Plan    No active signs of bleeding  Ordered iron panel (last one was 2015 on our system)  Continue  Epogen as per nephrology     Type 2 diabetes mellitus with nephropathy, and retinopathy (HCC)- (present on admission)   Assessment & Plan    Continue insulin  Last A1c was 7.5  Hypoglycemia per protocol       Hypertension- (present on admission)   Assessment & Plan    Continue his blood pressure medications and diuretics.  We will continue to monitor     Blindness- (present on admission)   Assessment & Plan    History of bilateral blindness.     Hyperlipidemia- (present on admission)   Assessment & Plan    Continue statin          VTE prophylaxis: Heparin

## 2020-04-06 RX ORDER — AMLODIPINE BESYLATE 5 MG/1
TABLET ORAL
Qty: 180 TAB | Refills: 3 | Status: SHIPPED | OUTPATIENT
Start: 2020-04-06 | End: 2021-04-12

## 2020-12-01 RX ORDER — PAROXETINE HYDROCHLORIDE 40 MG/1
TABLET, FILM COATED ORAL
Qty: 90 TAB | Refills: 3 | Status: SHIPPED | OUTPATIENT
Start: 2020-12-01 | End: 2021-12-03

## 2021-03-23 RX ORDER — CALCIUM ACETATE 667 MG/1
CAPSULE ORAL
Qty: 540 CAPSULE | Refills: 3 | Status: SHIPPED | OUTPATIENT
Start: 2021-03-23 | End: 2022-04-19 | Stop reason: SDUPTHER

## 2021-04-12 RX ORDER — AMLODIPINE BESYLATE 5 MG/1
TABLET ORAL
Qty: 90 TABLET | Refills: 3 | Status: SHIPPED | OUTPATIENT
Start: 2021-04-12 | End: 2021-10-07

## 2021-06-03 RX ORDER — ATORVASTATIN CALCIUM 20 MG/1
TABLET, FILM COATED ORAL
Qty: 90 TABLET | Refills: 3 | Status: SHIPPED | OUTPATIENT
Start: 2021-06-03 | End: 2022-06-03 | Stop reason: SDUPTHER

## 2021-10-07 RX ORDER — AMLODIPINE BESYLATE 5 MG/1
TABLET ORAL
Qty: 90 TABLET | Refills: 3 | Status: SHIPPED | OUTPATIENT
Start: 2021-10-07 | End: 2022-04-02

## 2021-12-03 RX ORDER — PAROXETINE HYDROCHLORIDE 40 MG/1
TABLET, FILM COATED ORAL
Qty: 90 TABLET | Refills: 3 | Status: ON HOLD | OUTPATIENT
Start: 2021-12-03 | End: 2022-07-19

## 2022-03-30 DIAGNOSIS — E11.21 TYPE 2 DIABETES MELLITUS WITH NEPHROPATHY (HCC): Chronic | ICD-10-CM

## 2022-03-30 DIAGNOSIS — Z99.2 ESRD ON DIALYSIS (HCC): Chronic | ICD-10-CM

## 2022-03-30 DIAGNOSIS — N18.6 ESRD ON DIALYSIS (HCC): Chronic | ICD-10-CM

## 2022-03-30 DIAGNOSIS — H54.3 BLINDNESS OF BOTH EYES: Chronic | ICD-10-CM

## 2022-03-30 DIAGNOSIS — R53.1 WEAKNESS: ICD-10-CM

## 2022-03-30 NOTE — PROGRESS NOTES
Patient seen and examined at [facility]. See [facility] EMR for full record. His wife complains he has worsening weakness in right arm. He was supposed to see neurology before the pandemic, but the appointment was cancelled due to Covid. She is requesting re-referral to neurology. Referral placed.    Denys Haskins MD  Nephrology

## 2022-04-02 RX ORDER — AMLODIPINE BESYLATE 5 MG/1
TABLET ORAL
Qty: 90 TABLET | Refills: 3 | Status: ON HOLD | OUTPATIENT
Start: 2022-04-02 | End: 2022-07-19

## 2022-04-16 DIAGNOSIS — N18.6 ESRD (END STAGE RENAL DISEASE) ON DIALYSIS (HCC): ICD-10-CM

## 2022-04-16 DIAGNOSIS — E83.39 HYPERPHOSPHATEMIA: ICD-10-CM

## 2022-04-16 DIAGNOSIS — Z99.2 ESRD (END STAGE RENAL DISEASE) ON DIALYSIS (HCC): ICD-10-CM

## 2022-04-18 RX ORDER — CALCIUM ACETATE 667 MG/1
CAPSULE ORAL
Qty: 540 CAPSULE | Refills: 3 | OUTPATIENT
Start: 2022-04-18

## 2022-04-19 RX ORDER — CALCIUM ACETATE 667 MG/1
1334 CAPSULE ORAL
Qty: 540 CAPSULE | Refills: 3 | Status: ON HOLD | OUTPATIENT
Start: 2022-04-19 | End: 2022-07-19

## 2022-04-21 NOTE — CARE PLAN
Problem: Safety  Goal: Will remain free from injury  Outcome: PROGRESSING AS EXPECTED  Bed position low, call light within reach, treaded footwear, fall risk education    Problem: Infection  Goal: Will remain free from infection  Outcome: PROGRESSING AS EXPECTED  Hand hygiene and infection prevention education    Problem: Safety:  Goal: Will remain free from injury  Outcome: PROGRESSING AS EXPECTED  Bed position low, call light within reach, treaded footwear, fall risk education       Normal for race

## 2022-05-23 ENCOUNTER — OFFICE VISIT (OUTPATIENT)
Dept: NEUROLOGY | Facility: MEDICAL CENTER | Age: 62
End: 2022-05-23
Attending: PSYCHIATRY & NEUROLOGY
Payer: MEDICARE

## 2022-05-23 VITALS
HEIGHT: 67 IN | BODY MASS INDEX: 18.69 KG/M2 | SYSTOLIC BLOOD PRESSURE: 134 MMHG | TEMPERATURE: 97.7 F | WEIGHT: 119.05 LBS | DIASTOLIC BLOOD PRESSURE: 68 MMHG | OXYGEN SATURATION: 93 % | RESPIRATION RATE: 18 BRPM | HEART RATE: 65 BPM

## 2022-05-23 DIAGNOSIS — R29.898 RIGHT ARM WEAKNESS: Primary | ICD-10-CM

## 2022-05-23 PROCEDURE — 99212 OFFICE O/P EST SF 10 MIN: CPT | Performed by: PSYCHIATRY & NEUROLOGY

## 2022-05-23 PROCEDURE — 99215 OFFICE O/P EST HI 40 MIN: CPT | Performed by: PSYCHIATRY & NEUROLOGY

## 2022-05-23 ASSESSMENT — ENCOUNTER SYMPTOMS
TINGLING: 1
DIZZINESS: 0
HEADACHES: 0
FOCAL WEAKNESS: 1
NECK PAIN: 1
SENSORY CHANGE: 1

## 2022-05-23 ASSESSMENT — PATIENT HEALTH QUESTIONNAIRE - PHQ9: CLINICAL INTERPRETATION OF PHQ2 SCORE: 0

## 2022-05-23 ASSESSMENT — LIFESTYLE VARIABLES: SUBSTANCE_ABUSE: 0

## 2022-05-24 NOTE — PROGRESS NOTES
Bessie Britt is a 61 y.o. male who presents with his wife, daughter, from the office of Dr. Faisal Germain MD, for consultation, with a 4-week history of sudden onset right upper extremity pain, weakness and paresthesias.  Translation services are provided.  The family also helps with translation.    LILIBETH eDsai is a pleasant 61-year-old right-handed central [place] gentleman who symptoms started spontaneously about 4 weeks ago.  He describes a sudden onset of shoulder pain on the right, into the joint but also anteriorly and up towards the neck.  Neck rotation in either direction worsen the symptoms.  Within a week he had hit a roel, now he was having tingling and paresthesias in the fingers on the right, the weakness in the right upper extremity most profound proximally, but even there was some weakness with grasp.  He denies cramping sensations, there was never a rash, he was never febrile.  Though he had received his Pneumovax immunization several months prior, he had received no other immunizations recently.    He denied any involvement of the right lower extremity or right face, there was never slurring of speech or language deficit.  The left side of his body was unaffected.  He denies ever having symptoms like this in the past.    A directed work-up has never been done.  He has not gone to physical therapy.  With his diabetes, steroids were never introduced.    Review of the records as well as information from the family, show that he has had a history of a small stroke in the past, evidently affecting the right side of the body though the symptoms resolved.  He has been on Lipitor since, because of dialysis and his shunt, antiplatelets are contraindicated.  He has a history of a diabetic polyneuropathy with numbness in the feet, this has not changed since his present symptoms began.  He denies burning dysesthesias or paresthesias.    He has a medical history of diabetes with  "ophthalmologic complication (he is legally blind), nephropathy now on hemodialysis, GERD, hypertension and anemia of chronic renal disease.  There is no history of PVD, malignancy, thyroid disease, liver disease, migraine, autoimmune disease, MS, seizure, or neurodegenerative disease.    There is no surgical history of note.    They are not aware of anyone in the family with a history of similar symptoms or diagnosed neuropathy.    He does not smoke or drink.  He is permanently disabled because of his diabetes, the renal failure, and blindness.    He is on Norvasc, vitamin C, Lipitor, NPH insulin, lactulose, Lopressor, Prilosec, Zofran, Paxil and PhosLo.    Review of Systems   Constitutional: Negative for malaise/fatigue.   Musculoskeletal: Positive for neck pain.   Neurological: Positive for tingling, sensory change and focal weakness. Negative for dizziness and headaches.   Psychiatric/Behavioral: Negative for substance abuse.   All other systems reviewed and are negative.    Objective     /68 (BP Location: Right arm, Patient Position: Sitting, BP Cuff Size: Adult)   Pulse 65   Temp 36.5 °C (97.7 °F) (Temporal)   Resp 18   Ht 1.702 m (5' 7\")   Wt 54 kg (119 lb 0.8 oz)   SpO2 93%   BMI 18.65 kg/m²      Physical Exam    He appears in no acute distress.  Vital signs are stable.  There is no malar rash.  Dentition is poor, there is no active periodontal disease.  Cardiac evaluation is unremarkable.  Carotid pulses are present bilaterally without asymmetry.  Compression maneuvers of the cervical spine are negative, range of motion is full.  I could not auscultate easily for bruits.  There is a brawny, darkened discoloration in both lower extremities distal to the midshin.  Distal pulses, still, are present though diminished.  Straight leg raising is negative bilaterally.     Neurological Exam    He is fully oriented, there is no aphasia, apraxia, or inattention.    PERRLA/EOMI, visual fields are full, " facial movements are symmetric.  There is a subtle diminished perception of pin on the right side of the face when compared to the left, the tongue and uvula are midline, there is no bulbar dysfunction.  Right shoulder shrug is slowed when compared to the left, head rotation is intact.    Musculoskeletal exam does reveal diminished tone in the right upper extremity.  Atrophy is seen of the hand intrinsic musculatures as well as at the biceps.  Strength assessment with the left upper and lower extremity is intact, as it is with the right lower extremity.  Right upper extremity shows significant weakness in multiple groups including subscapularis, supra and infraspinatus, deltoid, to a lesser degree biceps than triceps, but also with hand intrinsic musculatures, though this is to a lesser degree than the proximal muscle groups.    Reflexes are absent at the ankles bilaterally, the right triceps jerk is absent, the biceps jerk on the right slightly diminished when compared to the left.  Knee jerks are present but symmetric and depressed, ankle jerks are absent bilaterally.  Both toes are downgoing.    Repetitive movements with the right hand are notably slowed proportionate to the degree of weakness, they are minimally slowed with the right foot as well as both left upper and lower extremities.  There is no appendicular dystaxia throughout, movements are absent with the right.  I did not stand him to walk.    Sensory exam reveals a subjective decrement of pin in the right upper extremity over the outer aspect of the arm as well as forearm.  There is diminished pin in any median nerve distribution in the right hand.  Pin and temperature intact in the left upper extremity.  There is a stocking pattern loss of vibration below the knees, pin and temperature below the ankles.  JPS is impaired in the toes.    I discussed the case with Dr. Lizet Cardenas MD, who will be performing the EMG/NCV studies.    Assessment & Plan      1. Right arm weakness  The distribution of his weakness as well as sensory distortions involves more than just a single peripheral branch or 1 to 2 cervical levels.  It suggests a brachial plexopathy, which would fit [place] given the early onset pain, even though there was no rash.  We are already beyond window of time for possible use of steroids, but these will be contraindicated, regardless, because of his diabetes.  Physical therapy is going to be critical if we are to see some improvement, they were all told it would take a while for this to happen.  He has rather significant weakness in the right upper extremity.    Though he is at risk, stroke could be considered as good cervical myeloradiculopathy though I think this even less likely.  Still, MRI of the cervical spine as well as MRI of the brain should be done for thoroughness.  We will contact him with results if they are significant, otherwise would follow-up and talk specifics over the next several months.    - Referral to Neurodiagnostics (EEG,EP,EMG/NCS/DBS)  - MR-CERVICAL SPINE-W/O; Future  - Referral to Physical Therapy  - MR-BRAIN-W/O; Future    Time: 50 minutes in total spent on patient care including free charting, record review, discussion with healthcare staff and documentation.  This includes face-to-face time with the patient for exam, review, discussion, as well as counseling and coordinating care.

## 2022-05-30 ENCOUNTER — HOSPITAL ENCOUNTER (OUTPATIENT)
Dept: RADIOLOGY | Facility: MEDICAL CENTER | Age: 62
End: 2022-05-30
Attending: PSYCHIATRY & NEUROLOGY
Payer: MEDICARE

## 2022-05-30 DIAGNOSIS — R29.898 RIGHT ARM WEAKNESS: ICD-10-CM

## 2022-05-30 PROCEDURE — 70551 MRI BRAIN STEM W/O DYE: CPT | Mod: ME

## 2022-05-30 PROCEDURE — 72141 MRI NECK SPINE W/O DYE: CPT | Mod: ME

## 2022-06-03 ENCOUNTER — TELEPHONE (OUTPATIENT)
Dept: CARDIOLOGY | Facility: MEDICAL CENTER | Age: 62
End: 2022-06-03
Payer: MEDICARE

## 2022-06-03 RX ORDER — ATORVASTATIN CALCIUM 20 MG/1
20 TABLET, FILM COATED ORAL
Qty: 90 TABLET | Refills: 3 | Status: ON HOLD | OUTPATIENT
Start: 2022-06-03 | End: 2022-07-19

## 2022-06-06 ENCOUNTER — TELEPHONE (OUTPATIENT)
Dept: NEUROLOGY | Facility: MEDICAL CENTER | Age: 62
End: 2022-06-06
Payer: MEDICARE

## 2022-06-06 NOTE — TELEPHONE ENCOUNTER
I spoke to Lindsey, in regards  To a new referral to [facility],   She would like to know details about images and when the referral will be placed .

## 2022-06-07 NOTE — TELEPHONE ENCOUNTER
Naresh Camp from [facility] scheduling called  and spoke to this patients wife who stated he's never seen another cardio provider before. She stated he has had some testing done at [facility] and unsure what tests were done there.      Thank you,    VIKKI

## 2022-06-09 ENCOUNTER — TELEPHONE (OUTPATIENT)
Dept: CARDIOLOGY | Facility: MEDICAL CENTER | Age: 62
End: 2022-06-09
Payer: MEDICARE

## 2022-06-09 NOTE — TELEPHONE ENCOUNTER
FAX Medical Records to [facility].   Fax Number : [phone]  Phone Number [phone]  Confirmation received.

## 2022-06-16 ENCOUNTER — NON-PROVIDER VISIT (OUTPATIENT)
Dept: NEUROLOGY | Facility: MEDICAL CENTER | Age: 62
End: 2022-06-16
Attending: PSYCHIATRY & NEUROLOGY
Payer: MEDICARE

## 2022-06-16 DIAGNOSIS — R29.898 RIGHT ARM WEAKNESS: ICD-10-CM

## 2022-06-16 PROCEDURE — 95886 MUSC TEST DONE W/N TEST COMP: CPT | Mod: 26 | Performed by: PSYCHIATRY & NEUROLOGY

## 2022-06-16 PROCEDURE — 95886 MUSC TEST DONE W/N TEST COMP: CPT | Performed by: PSYCHIATRY & NEUROLOGY

## 2022-06-16 PROCEDURE — 95913 NRV CNDJ TEST 13/> STUDIES: CPT | Mod: 26 | Performed by: PSYCHIATRY & NEUROLOGY

## 2022-06-16 PROCEDURE — 95913 NRV CNDJ TEST 13/> STUDIES: CPT | Performed by: PSYCHIATRY & NEUROLOGY

## 2022-06-16 NOTE — LETTER
[facility] - Neurology   [address], [address]  [place] [address]  Phone: [phone]  Fax: [phone]              Pj Britt  1960    Encounter Date: 6/16/2022    EMG PROVIDER          PROGRESS NOTE:  No notes on file        No Recipients

## 2022-06-16 NOTE — PROCEDURES
"NERVE CONDUCTION STUDIES AND ELECTROMYOGRAPHY REPORT  [facility]  06/16/22          IMPRESSION:  This is an abnormal study.  There is electrophysiologic evidence of:  1. Subacute reinnervation of the right C5/C6 myotome consistent with subacute right C5-6 radiculopathy.   2. Severe, length dependent, sensorimotor, primarily axonal polyneuropathy.  3. Suspected right ulnar neuropathy -likely at the wrist.  Recommend dedicated studies for further localization if clinically appropriate.    There is no convincing evidence of a right brachial plexopathy.  Recommend clinical correlation.    Lizet Cardenas MD  Neurology - Neurophysiology              REASON FOR REFERRAL:  Mr. Pj Britt 61 y.o. referred by Dr. Javon Flores for evaluation of right arm weakness.  He had spontaneous onset of right shoulder pain radiating towards the neck worse with turning of his head.  Symptoms were at its worst after week.  This is associate with tingling and paresthesias of the fingers.  The left upper extremity is unaffected.  The legs fatigue with prolonged ambulation.  He has a 30 year history of diabetes.  He has a left upper extremity dialysis fistula.    Height: 5'7\"   Weight: 65 kg    Symptom focused neurological exam shows significant proximal right upper extremity weakness with more retained strength distally.  He has normal sensation to light touch in the right second finger but decreased sensation to light touch in the right fifth finger.         ELECTRODIAGNOSTIC EXAMINATION:  Nerve conduction studies (NCS) and electromyography (EMG) are utilized to evaluate direct or indirect damage to the peripheral nervous system. NCS are performed to measure the nerve(s) response(s) to electrostimulation across a given nerve segment. EMG evaluates the passive and active electrical activity of the muscle(s) in question.  Muscles are innervated by specific peripheral nerves and roots. Often times, several " nerves the muscle to be examined in order to determine the presence or absence of the disease process. Furthermore, nerves and muscles may need to be tested in a [id number] comparison, as well as in additional extremities, as this may be crucial in characterizing the extent of the disease process, which may be diffuse or isolated and of varying degree of severity. The extent of the neurodiagnostic exam is justified as it may help arrive to a proper diagnosis, which ultimately may contribute to better management of the patient. Therefore, the nerves to muscles examined during the study were medically necessary.    Unless otherwise noted, temperature of the extremity(s) study was monitored before and during the examination and remained between 32 and 36 degrees C for the upper extremities, and between 30 and 36 degrees C for the lower extremities. The patient tolerated testing well, without any complications.       NERVE CONDUCTION STUDY SUMMARY:  Selected nerves of the bilateral upper extremity and right lower extremity are studied.    Normal and symmetric bilateral lateral antebrachial cutaneous sensory responses - latency asymmetry of doubtful clinical significance.  Normal bilateral medial antebrachial cutaneous sensory responses.  Abnormal median sensory and motor responses due to slowing.  Abnormal right radial sensory response due to slowing.  Essentially normal right radial motor response at the EIP.  Abnormal right ulnar sensory response due to low amplitude and slowing.  Abnormal right ulnar motor response at the ADM due to prolonged distal latency, slowing and low amplitude.  Unobtainable right radial F wave.  Prolonged right median and ulnar F waves.  Unobtainable right sural sensory response.  Unobtainable right common peroneal motor response at the EDB.  Abnormal right common peroneal motor response at the TA due to low amplitude.  Unobtainable right tibial motor response at the AH.      NEEDLE EMG  SUMMARY:  Concentric needle study of selected right upper and lower extremity muscles is performed.     Insertion activity could not be evaluated in the right biceps and deltoid due to inability to relax.  Otherwise insertion activity is normal in remaining muscles sampled.    Unstable polyphasic motor units with reduced recruitment/activation are appreciated in the right biceps triceps and deltoid.    Large amplitude prolonged duration motor unit potentials are appreciated in the right TA.  Otherwise with activation, there are normal morphology (amplitude/duration) motor unit action potentials firing with normal recruitment in remaining muscles tested.         PATIENT DATA TABLES  Nerve Conduction Studies     Stim Site NR Onset (ms) Norm Onset (ms) O-P Amp (µV) Norm O-P Amp Site1 Site2 Delta-P (ms) Dist (cm) Goyo (m/s) Norm Goyo (m/s)   Left Lat Ante Brach Cutan Anti Sensory (Lat Forearm)   Lat Biceps    1.3  11.3  Lat Biceps Lat Forearm 1.7 10.0 59    Right Lat Ante Brach Cutan Anti Sensory (Lat Forearm)   Lat Biceps    1.7  9.1  Lat Biceps Lat Forearm 2.3 10.0 43        2.1  4.9              2.2  4.4          Left Med Ante Brach Cutan Anti Sensory (Med Forearm)   Elbow    1.8  7.8  Elbow Med Forearm 2.6 10.0 38        1.2  1.2          Right Med Ante Brach Cutan Anti Sensory (Med Forearm)   Elbow    2.0  12.7  Elbow Med Forearm 3.2 10.0 31        1.8  9.4              2.1  12.0              2.2  0.4          Right Median Anti Sensory (2nd Digit)   Wrist    3.8 <3.8 26.3 >10 Wrist 2nd Digit 4.7 14.0 *30 >50   Right Radial Anti Sensory (Base 1st Digit)   Wrist    2.1 <2.8 11.4 >10 Wrist Base 1st Digit 2.8 10.0 *36 >50   Right Sural Anti Sensory (Lat Mall)   Calf *NR  <4.6  >3 Calf Lat Mall  14.0  >40   Right Ulnar Anti Sensory (5th Digit)   Wrist    3.1 <3.2 *3.4 >5 Wrist 5th Digit 3.9 14.0 *36 >50       3.0  1.8              3.0  2.9              2.8  2.0               Stim Site NR Onset (ms) Norm Onset (ms) O-P Amp  (mV) Norm O-P Amp Site1 Site2 Delta-0 (ms) Dist (cm) Goyo (m/s) Norm Goyo (m/s)   Right Median Motor (Abd Poll Brev)   Wrist    3.8 <4 5.2 >5 Elbow Wrist 6.3 24.0 *38 >50   Elbow    10.1  4.4          Right Peroneal EDB Motor (Ext Dig Brev)   Ankle *NR  <6  >2.5 B Fib Ankle  0.0  >40   B Fib *NR     Poplt B Fib  0.0     Poplt *NR             Right Peroneal TA Motor (AntTibialis)   Fib Head    4.4 <4.5 2.3 3 Poplit Fib Head 2.1 10.0 48 >40   Poplit    6.5  2.0          Right Radial Motor (Ext Indicis)   Elbow    2.0 <3.1 *4.9 >5 Up Arm Elbow 2.5 16.0 64 >50   Up Arm    4.5  3.9  Axilla Up Arm 1.6 10.0 63    Axilla    6.1  3.4          Right Tibial Motor (Abd Giron Brev)   Ankle *NR  <6  >4 Knee Ankle  0.0  >40   Knee *NR             Right Ulnar Motor (Abd Dig Min)   Wrist    *4.1 <3.1 *4.9 >7 B Elbow Wrist 4.4 18.5 *42 >50   B Elbow    8.5  3.5  A Elbow B Elbow 2.4 10.0 42    A Elbow    10.9  3.4            F Wave Studies     NR F-Lat (ms) Lat Norm (ms)   Right Median (Abd Poll Brev)      *33.80 <31   Right Radial (EIP)   *NR     Right Ulnar (Abd Dig Min)      *33.67 <32                                              Electromyography     Side Muscle Nerve Root Ins Act Fibs Psw Amp Dur Poly Recrt Int Pat Comment   Right 1stDorInt Ulnar C8-T1 Nml Nml Nml Nml Nml 0 Nml Nml    Right PronatorTeres Median C6-7 Nml Nml Nml Nml Nml 0 Nml Nml    Right Biceps Musculocut C5-6    Nml Nml *2+ *Reduced *75% Cannot relax   Right Triceps Radial C6-7-8 Nml Nml Nml Nml Nml *1+ *Reduced *50%    Right Deltoid Axillary C5-6    Nml Nml *2+ *Reduced *25% Cannot relax   Right Abd Poll Brev Median C8-T1 Nml Nml Nml Nml Nml 0 Nml Nml    Right Abd Dig Min Ulnar C8-T1 Nml Nml Nml Nml Nml 0 Nml Nml    Right FlexDigProf Ulnar C8,T1 Nml Nml Nml Nml Nml 0 Nml Nml    Right FlexCarpiUln Ulnar C8,T1 Nml Nml Nml Nml Nml 0 Nml Nml    Right Ext Indicis Radial (Post Int) C7-8 Nml Nml Nml Nml Nml 0 Nml Nml    Right AntTibialis Dp Br Fibular L4-5 Nml Nml Nml  *Incr *>12ms 0 Nml Nml    Right Gastroc Tibial S1-2 Nml Nml Nml Nml Nml 0 Nml Nml    Right VastusLat Femoral L2-4 Nml Nml Nml Nml Nml 0 Nml Nml    Right GluteusMed SupGluteal L5-S1 Nml Nml Nml Nml Nml 0 Nml Nml    Right ExtHallLong Dp Br Fibular L5, S1 Nml Nml Nml Nml Nml 0 Nml Nml

## 2022-06-21 ENCOUNTER — OFFICE VISIT (OUTPATIENT)
Dept: CARDIOLOGY | Facility: MEDICAL CENTER | Age: 62
End: 2022-06-21
Payer: MEDICARE

## 2022-06-21 ENCOUNTER — PRE-ADMISSION TESTING (OUTPATIENT)
Dept: ADMISSIONS | Facility: MEDICAL CENTER | Age: 62
DRG: 453 | End: 2022-06-21
Attending: NEUROLOGICAL SURGERY
Payer: MEDICARE

## 2022-06-21 VITALS
DIASTOLIC BLOOD PRESSURE: 70 MMHG | HEIGHT: 67 IN | OXYGEN SATURATION: 98 % | WEIGHT: 143.3 LBS | HEART RATE: 54 BPM | RESPIRATION RATE: 18 BRPM | SYSTOLIC BLOOD PRESSURE: 140 MMHG | BODY MASS INDEX: 22.49 KG/M2

## 2022-06-21 DIAGNOSIS — N18.6 ESRD ON DIALYSIS (HCC): Chronic | ICD-10-CM

## 2022-06-21 DIAGNOSIS — Z99.81 ON HOME OXYGEN THERAPY: Chronic | ICD-10-CM

## 2022-06-21 DIAGNOSIS — Z99.2 ESRD ON DIALYSIS (HCC): Chronic | ICD-10-CM

## 2022-06-21 DIAGNOSIS — I10 HTN (HYPERTENSION), MALIGNANT: ICD-10-CM

## 2022-06-21 DIAGNOSIS — I48.0 PAROXYSMAL ATRIAL FIBRILLATION (HCC): ICD-10-CM

## 2022-06-21 DIAGNOSIS — Z91.89 OTHER SPECIFIED PERSONAL RISK FACTORS, NOT ELSEWHERE CLASSIFIED: ICD-10-CM

## 2022-06-21 DIAGNOSIS — E11.21 TYPE 2 DIABETES MELLITUS WITH NEPHROPATHY (HCC): Chronic | ICD-10-CM

## 2022-06-21 DIAGNOSIS — F39 MOOD DISORDER (HCC): Chronic | ICD-10-CM

## 2022-06-21 PROCEDURE — 99204 OFFICE O/P NEW MOD 45 MIN: CPT | Mod: 25 | Performed by: INTERNAL MEDICINE

## 2022-06-21 PROCEDURE — 93000 ELECTROCARDIOGRAM COMPLETE: CPT | Performed by: INTERNAL MEDICINE

## 2022-06-21 NOTE — PROGRESS NOTES
Chief Complaint   Patient presents with   • Atrial Fibrillation   • Hyperlipidemia   • Hypertension       Subjective     Pj Britt is a 61 y.o. male who presents today for cardiac care and evaluation due to suspicion for new onset atrial fibrillation recently when he was seen at [facility].  Patient also has a history of diabetes, end-stage renal disease on hemodialysis, hypertension.  Patient does not have any prior cardiac problems that he knows of.  No prior cardiac procedures or surgeries.    I personally interpreted the EKG tracings at the outside facility which showed evidence of paroxysmal atrial fibrillation.  Patient is largely asymptomatic.  Of note, patient cannot be on anticoagulation due to his wife's report.  It is unclear at this time.    I personally interpreted EKG tracing today in clinic which shows sinus bradycardia.  No evidence of heart block.  No evidence of acute coronary syndrome.    His most recent since last echocardiogram was done in November 2018 which showed normal LV function and no significant valvular disease.  I personally interpreted the images with patient and his wife in clinic today.    No family history of sudden cardiac death.    Of note, patient is on oxygen supplementation and mostly immobile due to blindness.    Past Medical History:   Diagnosis Date   • Anemia    • Blind in both eyes    • Diabetes ([facility])     insulin dependent   • Dialysis patient ([facility])     Earline KEATING   • ESRD (end stage renal disease) ([facility])     Dr flores   • High cholesterol    • Hyperlipemia    • Hypertension    • Snoring     no sleep study     Past Surgical History:   Procedure Laterality Date   • RECOVERY  4/19/2016    Procedure: VASCULAR CASE-ILEANA-LEFT ARM FISTULOGRAM WITH ANGIOPLASTY 05472, 93946, 51280-FSV STAGE RENAL DISEASE N18.6;  Surgeon: Perfecto Surgery;  Location: SURGERY PRE-POST PROC UNIT [facility];  Service:    • RECOVERY  2/24/2016    Procedure: IR1 VASCULAR CASE LEFT  ARM FISTULOGRAM WITH ANGIOPLASTY;  Surgeon: Perfecto Surgery;  Location: SURGERY PRE-POST PROC UNIT [facility];  Service:    • CATH PLACEMENT Right 12/10/2015    Procedure: CATH PLACEMENT;  Surgeon: Lorene Baldwin M.D.;  Location: SURGERY [facility];  Service:    • AV FISTULA CREATION Left 12/10/2015    Procedure: AV FISTULA CREATION;  Surgeon: Lorene Baldwin M.D.;  Location: SURGERY [facility];  Service:    • SD CATARACT SURG W/IOL 1 STAGE WO ENDO  10/21/15   • OTHER  9/22/15    trabecular micro bypass stent- right eye   • OTHER  7/2015    take out blood of left eye   • OTHER  3/2015    take blood out of eye      Family History   Problem Relation Age of Onset   • Alzheimer's Disease Mother 80        Lives in [place]   • Hypertension Father    • Diabetes Father    • Diabetes Brother      Social History     Socioeconomic History   • Marital status:      Spouse name: Not on file   • Number of children: Not on file   • Years of education: Not on file   • Highest education level: Not on file   Occupational History   • Not on file   Tobacco Use   • Smoking status: Never Smoker   • Smokeless tobacco: Never Used   Vaping Use   • Vaping Use: Never used   Substance and Sexual Activity   • Alcohol use: No     Alcohol/week: 0.0 oz   • Drug use: No   • Sexual activity: Not on file   Other Topics Concern   •  Service Not Asked   • Blood Transfusions Not Asked   • Caffeine Concern Not Asked   • Occupational Exposure Not Asked   • Hobby Hazards Not Asked   • Sleep Concern Not Asked   • Stress Concern Not Asked   • Weight Concern No   • Special Diet Yes   • Back Care Not Asked   • Exercise Not Asked   • Bike Helmet Not Asked   • Seat Belt Not Asked   • Self-Exams Not Asked   Social History Narrative   • Not on file     Social Determinants of Health     Financial Resource Strain: Not on file   Food Insecurity: Not on file   Transportation Needs: Not on file   Physical Activity: Not on file   Stress: Not on  "file   Social Connections: Not on file   Intimate Partner Violence: Not on file   Housing Stability: Not on file     No Known Allergies  Outpatient Encounter Medications as of 6/21/2022   Medication Sig Dispense Refill   • atorvastatin (LIPITOR) 20 MG Tab Take 1 Tablet by mouth every day. 90 Tablet 3   • calcium acetate (PHOS-LO) 667 MG Cap Take 2 Capsules by mouth 3 times a day with meals. (Patient taking differently: Take 2,001 mg by mouth 3 times a day with meals.) 540 Capsule 3   • amLODIPine (NORVASC) 5 MG Tab TAKE 2 TABLETS BY MOUTH EVERY DAY 90 Tablet 3   • paroxetine (PAXIL) 40 MG tablet TAKE 1 TABLET BY MOUTH EVERY DAY 90 Tablet 3   • ondansetron (ZOFRAN) 4 MG Tab tablet Take  by mouth as needed. Indications: Nausea and Vomiting  11   • insulin NPH (HUMULIN,NOVOLIN) 100 UNIT/ML Suspension Inject 15-20 Units as instructed 2 Times a Day. 20 units in AM   15 units in EVENING     • lactulose 10 GM/15ML Solution Take 20 g by mouth as needed. Indications: Constipation     • metoprolol (LOPRESSOR) 100 MG Tab Take 1 Tab by mouth 2 Times a Day. 60 Tab 2   • ascorbic acid (VITAMIN C) 500 MG tablet Take 1 Tab by mouth 2 Times a Day. 30 Tab 2     No facility-administered encounter medications on file as of 6/21/2022.     ROS           Objective     BP (!) 140/70 (BP Location: Left arm, Patient Position: Sitting, BP Cuff Size: Adult)   Pulse (!) 54   Resp 18   Ht 1.702 m (5' 7\")   Wt 65 kg (143 lb 4.8 oz)   SpO2 98%   BMI 22.44 kg/m²     Physical Exam  Vitals and nursing note reviewed.   Constitutional:       General: He is not in acute distress.     Appearance: He is not diaphoretic.   HENT:      Head: Normocephalic and atraumatic.      Right Ear: External ear normal.      Left Ear: External ear normal.      Nose: No congestion or rhinorrhea.   Eyes:      General:         Right eye: No discharge.         Left eye: No discharge.   Neck:      Thyroid: No thyromegaly.      Vascular: No JVD.   Cardiovascular:      " Rate and Rhythm: Normal rate and regular rhythm.      Pulses: Normal pulses.   Pulmonary:      Effort: No respiratory distress.      Comments: Patient is dependent on supplemental oxygen.    Abdominal:      General: There is no distension.      Tenderness: There is no abdominal tenderness.   Musculoskeletal:         General: No swelling or tenderness.      Right lower leg: No edema.      Left lower leg: No edema.   Skin:     General: Skin is warm and dry.   Neurological:      Mental Status: He is alert and oriented to person, place, and time.      Cranial Nerves: No cranial nerve deficit.   Psychiatric:         Behavior: Behavior normal.                Assessment & Plan     1. Paroxysmal atrial fibrillation (HCC)  EKG    EC-ECHOCARDIOGRAM COMPLETE W/O CONT    NM-CARDIAC STRESS TEST    Cardiac Event Monitor   2. Type 2 diabetes mellitus with nephropathy, and retinopathy (HCC)  EC-ECHOCARDIOGRAM COMPLETE W/O CONT    NM-CARDIAC STRESS TEST   3. ESRD on dialysis (HCC)  NM-CARDIAC STRESS TEST   4. On home oxygen therapy     5. Mood disorder (HCC)     6. HTN (hypertension), malignant  EC-ECHOCARDIOGRAM COMPLETE W/O CONT    NM-CARDIAC STRESS TEST   7. Other specified personal risk factors, not elsewhere classified  EC-ECHOCARDIOGRAM COMPLETE W/O CONT    NM-CARDIAC STRESS TEST       Medical Decision Making: Today's Assessment/Status/Plan:   At this time, to further risk stratify, I will order a transthoracic echocardiogram to assess cardiac and valvular functions. I will also order a myocardial nuclear scan stress test to assess for coronary ischemia.  Blood pressure is borderline today.  No change in medical therapy at this time.  Continue dialysis with nephrology.  In terms of paroxysmal atrial fibrillation, we will obtain long-term event monitor to assess for atrial fibrillation burden.  In the meantime, we will hold off on anticoagulation at patient's wife's request.

## 2022-06-24 LAB — EKG IMPRESSION: NORMAL

## 2022-07-07 ENCOUNTER — NON-PROVIDER VISIT (OUTPATIENT)
Dept: CARDIOLOGY | Facility: MEDICAL CENTER | Age: 62
End: 2022-07-07
Attending: INTERNAL MEDICINE
Payer: MEDICARE

## 2022-07-07 ENCOUNTER — TELEPHONE (OUTPATIENT)
Dept: CARDIOLOGY | Facility: MEDICAL CENTER | Age: 62
End: 2022-07-07

## 2022-07-07 ENCOUNTER — HOSPITAL ENCOUNTER (OUTPATIENT)
Dept: RADIOLOGY | Facility: MEDICAL CENTER | Age: 62
End: 2022-07-07
Attending: INTERNAL MEDICINE
Payer: MEDICARE

## 2022-07-07 DIAGNOSIS — E11.21 TYPE 2 DIABETES MELLITUS WITH NEPHROPATHY (HCC): Chronic | ICD-10-CM

## 2022-07-07 DIAGNOSIS — N18.6 ESRD ON DIALYSIS (HCC): Chronic | ICD-10-CM

## 2022-07-07 DIAGNOSIS — I48.0 PAROXYSMAL ATRIAL FIBRILLATION (HCC): ICD-10-CM

## 2022-07-07 DIAGNOSIS — Z91.89 OTHER SPECIFIED PERSONAL RISK FACTORS, NOT ELSEWHERE CLASSIFIED: ICD-10-CM

## 2022-07-07 DIAGNOSIS — I10 HTN (HYPERTENSION), MALIGNANT: ICD-10-CM

## 2022-07-07 DIAGNOSIS — Z99.2 ESRD ON DIALYSIS (HCC): Chronic | ICD-10-CM

## 2022-07-07 PROCEDURE — A9502 TC99M TETROFOSMIN: HCPCS

## 2022-07-07 PROCEDURE — 700111 HCHG RX REV CODE 636 W/ 250 OVERRIDE (IP)

## 2022-07-07 PROCEDURE — 99999 PR NO CHARGE: CPT | Performed by: INTERNAL MEDICINE

## 2022-07-07 RX ORDER — REGADENOSON 0.08 MG/ML
INJECTION, SOLUTION INTRAVENOUS
Status: COMPLETED
Start: 2022-07-07 | End: 2022-07-07

## 2022-07-07 RX ORDER — REGADENOSON 0.08 MG/ML
0.4 INJECTION, SOLUTION INTRAVENOUS ONCE
Status: COMPLETED | OUTPATIENT
Start: 2022-07-07 | End: 2022-07-07

## 2022-07-07 RX ORDER — AMINOPHYLLINE 25 MG/ML
100 INJECTION, SOLUTION INTRAVENOUS
Status: DISCONTINUED | OUTPATIENT
Start: 2022-07-07 | End: 2022-07-08 | Stop reason: HOSPADM

## 2022-07-07 RX ADMIN — REGADENOSON 0.4 MG: 0.08 INJECTION, SOLUTION INTRAVENOUS at 11:10

## 2022-07-07 NOTE — PROGRESS NOTES
Closed encounter no charge.    Pt's wife refused the monitor, stating  was having surgery on the 12th. Also BioTel MCOT to confusing for the wife to handle. Pt and wife refused .

## 2022-07-07 NOTE — TELEPHONE ENCOUNTER
"[1:08 PM] Shannan Deluca, I have Pj Britt here. Supposed to get a 30 day MCOT BioTel, he is having 2 surgeries starting 7/12 then 2 days later having a spine surgery, the wife wants to come back after the surgeries for this type of monitor.    [1:08 PM] Shannan Dawson  I can always put on a Holter for 5 days have them take it off day of surgery and send it in?    [1:09 PM] Shannan Dawson Dr. To patient, what do you think before I send them away.    [1:23 PM] Shannan Dawson  I think they will need an easier monitor.    _________________________________________________________________________________     Spoke to Shannan kelly tech over phone. Patient in office for placement of 30 day BioTel. Per Shannan, patient is scheduled to have several back surgeries next week. Patient and his spouse do not want to worry about monitor during extended hospitalization. Shannan requesting guidance.    Per TT last office visit note, \"at this time, to further risk stratify, I will order a transthoracic echocardiogram to assess cardiac and valvular functions. I will also order a myocardial nuclear scan stress test to assess for coronary ischemia. In terms of paroxysmal atrial fibrillation, we will obtain long-term event monitor to assess for atrial fibrillation burden.  In the meantime, we will hold off on anticoagulation at patient's wife's request.\" Discussed with JACKIE who advises patient is not cleared from a cardiac standpoint as testing has not been completed, but patient may decline placement of monitor if they choose.    Reviewed JACKIE discussion with Shannan. Information relayed to patient and his spouse. Per Shannan, patient and his spouse do not understand. They decline placement of monitor at this time. Shannan states that they do not understand need for monitor or how to manage it. She recommends Zio AT be placed instead.             To TT: Please advise  "

## 2022-07-08 PROCEDURE — 78452 HT MUSCLE IMAGE SPECT MULT: CPT | Mod: 26 | Performed by: INTERNAL MEDICINE

## 2022-07-08 PROCEDURE — 93018 CV STRESS TEST I&R ONLY: CPT | Performed by: INTERNAL MEDICINE

## 2022-07-12 ENCOUNTER — APPOINTMENT (OUTPATIENT)
Dept: RADIOLOGY | Facility: MEDICAL CENTER | Age: 62
DRG: 453 | End: 2022-07-12
Attending: NEUROLOGICAL SURGERY
Payer: MEDICARE

## 2022-07-12 ENCOUNTER — ANESTHESIA EVENT (OUTPATIENT)
Dept: SURGERY | Facility: MEDICAL CENTER | Age: 62
DRG: 453 | End: 2022-07-12
Payer: MEDICARE

## 2022-07-12 ENCOUNTER — HOSPITAL ENCOUNTER (INPATIENT)
Facility: MEDICAL CENTER | Age: 62
LOS: 7 days | DRG: 453 | End: 2022-07-19
Attending: NEUROLOGICAL SURGERY | Admitting: NEUROLOGICAL SURGERY
Payer: MEDICARE

## 2022-07-12 ENCOUNTER — ANESTHESIA (OUTPATIENT)
Dept: SURGERY | Facility: MEDICAL CENTER | Age: 62
DRG: 453 | End: 2022-07-12
Payer: MEDICARE

## 2022-07-12 DIAGNOSIS — R12 HEARTBURN: Chronic | ICD-10-CM

## 2022-07-12 DIAGNOSIS — Z99.2 ESRD ON DIALYSIS (HCC): Chronic | ICD-10-CM

## 2022-07-12 DIAGNOSIS — I10 PRIMARY HYPERTENSION: Chronic | ICD-10-CM

## 2022-07-12 DIAGNOSIS — K59.00 CONSTIPATION, UNSPECIFIED CONSTIPATION TYPE: ICD-10-CM

## 2022-07-12 DIAGNOSIS — E78.5 HYPERLIPIDEMIA, UNSPECIFIED HYPERLIPIDEMIA TYPE: Chronic | ICD-10-CM

## 2022-07-12 DIAGNOSIS — E11.21 TYPE 2 DIABETES MELLITUS WITH NEPHROPATHY (HCC): Chronic | ICD-10-CM

## 2022-07-12 DIAGNOSIS — N18.6 ESRD ON DIALYSIS (HCC): Chronic | ICD-10-CM

## 2022-07-12 DIAGNOSIS — M62.838 MUSCLE SPASMS OF LOWER EXTREMITY, UNSPECIFIED LATERALITY: ICD-10-CM

## 2022-07-12 DIAGNOSIS — F39 MOOD DISORDER (HCC): Chronic | ICD-10-CM

## 2022-07-12 DIAGNOSIS — M47.12 CERVICAL SPONDYLOSIS WITH MYELOPATHY: ICD-10-CM

## 2022-07-12 DIAGNOSIS — M48.02 SPINAL STENOSIS IN CERVICAL REGION: ICD-10-CM

## 2022-07-12 LAB
ABO GROUP BLD: NORMAL
BLD GP AB SCN SERPL QL: NORMAL
GLUCOSE BLD STRIP.AUTO-MCNC: 133 MG/DL (ref 65–99)
GLUCOSE BLD STRIP.AUTO-MCNC: 239 MG/DL (ref 65–99)
GLUCOSE BLD STRIP.AUTO-MCNC: 305 MG/DL (ref 65–99)
POTASSIUM SERPL-SCNC: 5.2 MMOL/L (ref 3.6–5.5)
RH BLD: NORMAL

## 2022-07-12 PROCEDURE — 0RG2071 FUSION OF 2 OR MORE CERVICAL VERTEBRAL JOINTS WITH AUTOLOGOUS TISSUE SUBSTITUTE, POSTERIOR APPROACH, POSTERIOR COLUMN, OPEN APPROACH: ICD-10-PCS | Performed by: NEUROLOGICAL SURGERY

## 2022-07-12 PROCEDURE — 502000 HCHG MISC OR IMPLANTS RC 0278: Performed by: NEUROLOGICAL SURGERY

## 2022-07-12 PROCEDURE — 95861 NEEDLE EMG 2 EXTREMITIES: CPT | Performed by: NEUROLOGICAL SURGERY

## 2022-07-12 PROCEDURE — 86900 BLOOD TYPING SEROLOGIC ABO: CPT

## 2022-07-12 PROCEDURE — 160002 HCHG RECOVERY MINUTES (STAT): Performed by: NEUROLOGICAL SURGERY

## 2022-07-12 PROCEDURE — 36620 INSERTION CATHETER ARTERY: CPT | Performed by: ANESTHESIOLOGY

## 2022-07-12 PROCEDURE — 95940 IONM IN OPERATNG ROOM 15 MIN: CPT | Performed by: NEUROLOGICAL SURGERY

## 2022-07-12 PROCEDURE — 82962 GLUCOSE BLOOD TEST: CPT

## 2022-07-12 PROCEDURE — 86901 BLOOD TYPING SEROLOGIC RH(D): CPT

## 2022-07-12 PROCEDURE — 95868 NDL EMG CRANIAL NRV MUSC BI: CPT | Performed by: NEUROLOGICAL SURGERY

## 2022-07-12 PROCEDURE — 160009 HCHG ANES TIME/MIN: Performed by: NEUROLOGICAL SURGERY

## 2022-07-12 PROCEDURE — 99291 CRITICAL CARE FIRST HOUR: CPT | Performed by: EMERGENCY MEDICINE

## 2022-07-12 PROCEDURE — 700101 HCHG RX REV CODE 250: Performed by: NEUROLOGICAL SURGERY

## 2022-07-12 PROCEDURE — 00670 ANES XTNSV SP&SPI CORD PX: CPT | Performed by: ANESTHESIOLOGY

## 2022-07-12 PROCEDURE — 160048 HCHG OR STATISTICAL LEVEL 1-5: Performed by: NEUROLOGICAL SURGERY

## 2022-07-12 PROCEDURE — 160029 HCHG SURGERY MINUTES - 1ST 30 MINS LEVEL 4: Performed by: NEUROLOGICAL SURGERY

## 2022-07-12 PROCEDURE — 95938 SOMATOSENSORY TESTING: CPT | Performed by: NEUROLOGICAL SURGERY

## 2022-07-12 PROCEDURE — 0RG4071 FUSION OF CERVICOTHORACIC VERTEBRAL JOINT WITH AUTOLOGOUS TISSUE SUBSTITUTE, POSTERIOR APPROACH, POSTERIOR COLUMN, OPEN APPROACH: ICD-10-PCS | Performed by: NEUROLOGICAL SURGERY

## 2022-07-12 PROCEDURE — 700111 HCHG RX REV CODE 636 W/ 250 OVERRIDE (IP): Performed by: NEUROLOGICAL SURGERY

## 2022-07-12 PROCEDURE — A9270 NON-COVERED ITEM OR SERVICE: HCPCS | Performed by: ANESTHESIOLOGY

## 2022-07-12 PROCEDURE — 160035 HCHG PACU - 1ST 60 MINS PHASE I: Performed by: NEUROLOGICAL SURGERY

## 2022-07-12 PROCEDURE — A9270 NON-COVERED ITEM OR SERVICE: HCPCS | Performed by: NEUROLOGICAL SURGERY

## 2022-07-12 PROCEDURE — 95939 C MOTOR EVOKED UPR&LWR LIMBS: CPT | Performed by: NEUROLOGICAL SURGERY

## 2022-07-12 PROCEDURE — 01N10ZZ RELEASE CERVICAL NERVE, OPEN APPROACH: ICD-10-PCS | Performed by: NEUROLOGICAL SURGERY

## 2022-07-12 PROCEDURE — 72040 X-RAY EXAM NECK SPINE 2-3 VW: CPT

## 2022-07-12 PROCEDURE — 86850 RBC ANTIBODY SCREEN: CPT

## 2022-07-12 PROCEDURE — 93005 ELECTROCARDIOGRAM TRACING: CPT | Performed by: STUDENT IN AN ORGANIZED HEALTH CARE EDUCATION/TRAINING PROGRAM

## 2022-07-12 PROCEDURE — 770022 HCHG ROOM/CARE - ICU (200)

## 2022-07-12 PROCEDURE — 4A11X4G MONITORING OF PERIPHERAL NERVOUS ELECTRICAL ACTIVITY, INTRAOPERATIVE, EXTERNAL APPROACH: ICD-10-PCS | Performed by: NEUROLOGICAL SURGERY

## 2022-07-12 PROCEDURE — 110371 HCHG SHELL REV 272: Performed by: NEUROLOGICAL SURGERY

## 2022-07-12 PROCEDURE — 160041 HCHG SURGERY MINUTES - EA ADDL 1 MIN LEVEL 4: Performed by: NEUROLOGICAL SURGERY

## 2022-07-12 PROCEDURE — 302129 PCA PLUS: Performed by: NEUROLOGICAL SURGERY

## 2022-07-12 PROCEDURE — 95955 EEG DURING SURGERY: CPT | Performed by: NEUROLOGICAL SURGERY

## 2022-07-12 PROCEDURE — 700102 HCHG RX REV CODE 250 W/ 637 OVERRIDE(OP): Performed by: ANESTHESIOLOGY

## 2022-07-12 PROCEDURE — 0RT30ZZ RESECTION OF CERVICAL VERTEBRAL DISC, OPEN APPROACH: ICD-10-PCS | Performed by: NEUROLOGICAL SURGERY

## 2022-07-12 PROCEDURE — 95865 NEEDLE EMG LARYNX: CPT | Performed by: NEUROLOGICAL SURGERY

## 2022-07-12 PROCEDURE — 700105 HCHG RX REV CODE 258: Performed by: NEUROLOGICAL SURGERY

## 2022-07-12 PROCEDURE — 700111 HCHG RX REV CODE 636 W/ 250 OVERRIDE (IP): Performed by: ANESTHESIOLOGY

## 2022-07-12 PROCEDURE — 700105 HCHG RX REV CODE 258: Performed by: ANESTHESIOLOGY

## 2022-07-12 PROCEDURE — 110454 HCHG SHELL REV 250: Performed by: NEUROLOGICAL SURGERY

## 2022-07-12 PROCEDURE — 84132 ASSAY OF SERUM POTASSIUM: CPT

## 2022-07-12 PROCEDURE — 700102 HCHG RX REV CODE 250 W/ 637 OVERRIDE(OP): Performed by: CLINICAL NURSE SPECIALIST

## 2022-07-12 PROCEDURE — 95937 NEUROMUSCULAR JUNCTION TEST: CPT | Performed by: NEUROLOGICAL SURGERY

## 2022-07-12 PROCEDURE — 700101 HCHG RX REV CODE 250: Performed by: ANESTHESIOLOGY

## 2022-07-12 PROCEDURE — 700102 HCHG RX REV CODE 250 W/ 637 OVERRIDE(OP): Performed by: NEUROLOGICAL SURGERY

## 2022-07-12 PROCEDURE — C1713 ANCHOR/SCREW BN/BN,TIS/BN: HCPCS | Performed by: NEUROLOGICAL SURGERY

## 2022-07-12 PROCEDURE — 0RG20A0 FUSION OF 2 OR MORE CERVICAL VERTEBRAL JOINTS WITH INTERBODY FUSION DEVICE, ANTERIOR APPROACH, ANTERIOR COLUMN, OPEN APPROACH: ICD-10-PCS | Performed by: NEUROLOGICAL SURGERY

## 2022-07-12 PROCEDURE — 00NW0ZZ RELEASE CERVICAL SPINAL CORD, OPEN APPROACH: ICD-10-PCS | Performed by: NEUROLOGICAL SURGERY

## 2022-07-12 PROCEDURE — 160036 HCHG PACU - EA ADDL 30 MINS PHASE I: Performed by: NEUROLOGICAL SURGERY

## 2022-07-12 DEVICE — SCREW SET INFINITY (1EA): Type: IMPLANTABLE DEVICE | Site: SPINE CERVICAL | Status: FUNCTIONAL

## 2022-07-12 DEVICE — SCREW BONE INFINITY 3.5 X 14MM (1EA): Type: IMPLANTABLE DEVICE | Site: SPINE CERVICAL | Status: FUNCTIONAL

## 2022-07-12 DEVICE — GRAFT BONE PASTE GRAFTON PLUS 5CC (1EA): Type: IMPLANTABLE DEVICE | Site: SPINE CERVICAL | Status: FUNCTIONAL

## 2022-07-12 DEVICE — SCREW VARIABLE ANGLE XTEND SELF-DRILLING 4.2MM 14MM (1TCONX10=10): Type: IMPLANTABLE DEVICE | Site: SPINE CERVICAL | Status: FUNCTIONAL

## 2022-07-12 DEVICE — IMPLANTABLE DEVICE: Type: IMPLANTABLE DEVICE | Site: SPINE CERVICAL | Status: FUNCTIONAL

## 2022-07-12 RX ORDER — ONDANSETRON 4 MG/1
4 TABLET, ORALLY DISINTEGRATING ORAL EVERY 4 HOURS PRN
Status: DISCONTINUED | OUTPATIENT
Start: 2022-07-12 | End: 2022-07-19 | Stop reason: HOSPADM

## 2022-07-12 RX ORDER — DEXTROSE MONOHYDRATE 25 G/50ML
25 INJECTION, SOLUTION INTRAVENOUS
Status: DISCONTINUED | OUTPATIENT
Start: 2022-07-12 | End: 2022-07-19 | Stop reason: HOSPADM

## 2022-07-12 RX ORDER — BUPIVACAINE HYDROCHLORIDE AND EPINEPHRINE 5; 5 MG/ML; UG/ML
INJECTION, SOLUTION PERINEURAL
Status: DISCONTINUED | OUTPATIENT
Start: 2022-07-12 | End: 2022-07-12 | Stop reason: HOSPADM

## 2022-07-12 RX ORDER — OXYCODONE HCL 5 MG/5 ML
5 SOLUTION, ORAL ORAL
Status: COMPLETED | OUTPATIENT
Start: 2022-07-12 | End: 2022-07-12

## 2022-07-12 RX ORDER — ONDANSETRON 2 MG/ML
4 INJECTION INTRAMUSCULAR; INTRAVENOUS
Status: DISCONTINUED | OUTPATIENT
Start: 2022-07-12 | End: 2022-07-12 | Stop reason: HOSPADM

## 2022-07-12 RX ORDER — HYDRALAZINE HYDROCHLORIDE 20 MG/ML
5 INJECTION INTRAMUSCULAR; INTRAVENOUS
Status: DISCONTINUED | OUTPATIENT
Start: 2022-07-12 | End: 2022-07-12 | Stop reason: HOSPADM

## 2022-07-12 RX ORDER — ALBUTEROL SULFATE 2.5 MG/3ML
2.5 SOLUTION RESPIRATORY (INHALATION)
Status: DISCONTINUED | OUTPATIENT
Start: 2022-07-12 | End: 2022-07-12 | Stop reason: HOSPADM

## 2022-07-12 RX ORDER — AMOXICILLIN 250 MG
1 CAPSULE ORAL NIGHTLY
Status: DISCONTINUED | OUTPATIENT
Start: 2022-07-12 | End: 2022-07-19 | Stop reason: HOSPADM

## 2022-07-12 RX ORDER — CEFAZOLIN SODIUM 1 G/3ML
INJECTION, POWDER, FOR SOLUTION INTRAMUSCULAR; INTRAVENOUS PRN
Status: DISCONTINUED | OUTPATIENT
Start: 2022-07-12 | End: 2022-07-12 | Stop reason: SURG

## 2022-07-12 RX ORDER — ONDANSETRON 2 MG/ML
4 INJECTION INTRAMUSCULAR; INTRAVENOUS EVERY 4 HOURS PRN
Status: DISCONTINUED | OUTPATIENT
Start: 2022-07-12 | End: 2022-07-19 | Stop reason: HOSPADM

## 2022-07-12 RX ORDER — LACTULOSE 20 G/30ML
20 SOLUTION ORAL 2 TIMES DAILY PRN
Status: DISCONTINUED | OUTPATIENT
Start: 2022-07-12 | End: 2022-07-15

## 2022-07-12 RX ORDER — PROMETHAZINE HYDROCHLORIDE 25 MG/1
12.5-25 SUPPOSITORY RECTAL EVERY 4 HOURS PRN
Status: DISCONTINUED | OUTPATIENT
Start: 2022-07-12 | End: 2022-07-19 | Stop reason: HOSPADM

## 2022-07-12 RX ORDER — SODIUM CHLORIDE 9 MG/ML
INJECTION, SOLUTION INTRAVENOUS
Status: DISCONTINUED | OUTPATIENT
Start: 2022-07-12 | End: 2022-07-12 | Stop reason: SURG

## 2022-07-12 RX ORDER — ENEMA 19; 7 G/133ML; G/133ML
1 ENEMA RECTAL
Status: DISCONTINUED | OUTPATIENT
Start: 2022-07-12 | End: 2022-07-19 | Stop reason: HOSPADM

## 2022-07-12 RX ORDER — MIDAZOLAM HYDROCHLORIDE 1 MG/ML
1 INJECTION INTRAMUSCULAR; INTRAVENOUS
Status: DISCONTINUED | OUTPATIENT
Start: 2022-07-12 | End: 2022-07-12 | Stop reason: HOSPADM

## 2022-07-12 RX ORDER — AMLODIPINE BESYLATE 5 MG/1
10 TABLET ORAL DAILY
Status: DISCONTINUED | OUTPATIENT
Start: 2022-07-13 | End: 2022-07-19 | Stop reason: HOSPADM

## 2022-07-12 RX ORDER — CALCIUM ACETATE 667 MG/1
2001 TABLET ORAL
Status: DISCONTINUED | OUTPATIENT
Start: 2022-07-12 | End: 2022-07-19 | Stop reason: HOSPADM

## 2022-07-12 RX ORDER — BISACODYL 10 MG
10 SUPPOSITORY, RECTAL RECTAL
Status: DISCONTINUED | OUTPATIENT
Start: 2022-07-12 | End: 2022-07-19 | Stop reason: HOSPADM

## 2022-07-12 RX ORDER — DIPHENHYDRAMINE HCL 25 MG
25 TABLET ORAL EVERY 6 HOURS PRN
Status: DISCONTINUED | OUTPATIENT
Start: 2022-07-12 | End: 2022-07-19 | Stop reason: HOSPADM

## 2022-07-12 RX ORDER — DIPHENHYDRAMINE HYDROCHLORIDE 50 MG/ML
12.5 INJECTION INTRAMUSCULAR; INTRAVENOUS
Status: DISCONTINUED | OUTPATIENT
Start: 2022-07-12 | End: 2022-07-12 | Stop reason: HOSPADM

## 2022-07-12 RX ORDER — SODIUM CHLORIDE, SODIUM LACTATE, POTASSIUM CHLORIDE, CALCIUM CHLORIDE 600; 310; 30; 20 MG/100ML; MG/100ML; MG/100ML; MG/100ML
INJECTION, SOLUTION INTRAVENOUS CONTINUOUS
Status: DISCONTINUED | OUTPATIENT
Start: 2022-07-12 | End: 2022-07-12 | Stop reason: HOSPADM

## 2022-07-12 RX ORDER — DEXAMETHASONE SODIUM PHOSPHATE 4 MG/ML
INJECTION, SOLUTION INTRA-ARTICULAR; INTRALESIONAL; INTRAMUSCULAR; INTRAVENOUS; SOFT TISSUE PRN
Status: DISCONTINUED | OUTPATIENT
Start: 2022-07-12 | End: 2022-07-12 | Stop reason: SURG

## 2022-07-12 RX ORDER — TIZANIDINE 4 MG/1
4 TABLET ORAL EVERY 8 HOURS PRN
Status: DISCONTINUED | OUTPATIENT
Start: 2022-07-12 | End: 2022-07-19 | Stop reason: HOSPADM

## 2022-07-12 RX ORDER — HYDROMORPHONE HYDROCHLORIDE 1 MG/ML
0.4 INJECTION, SOLUTION INTRAMUSCULAR; INTRAVENOUS; SUBCUTANEOUS
Status: DISCONTINUED | OUTPATIENT
Start: 2022-07-12 | End: 2022-07-12 | Stop reason: HOSPADM

## 2022-07-12 RX ORDER — SODIUM CHLORIDE 9 MG/ML
INJECTION, SOLUTION INTRAVENOUS CONTINUOUS
Status: DISCONTINUED | OUTPATIENT
Start: 2022-07-12 | End: 2022-07-13

## 2022-07-12 RX ORDER — DOCUSATE SODIUM 100 MG/1
100 CAPSULE, LIQUID FILLED ORAL 2 TIMES DAILY
Status: DISCONTINUED | OUTPATIENT
Start: 2022-07-12 | End: 2022-07-19 | Stop reason: HOSPADM

## 2022-07-12 RX ORDER — CEFAZOLIN SODIUM 2 G/100ML
2 INJECTION, SOLUTION INTRAVENOUS EVERY 8 HOURS
Status: COMPLETED | OUTPATIENT
Start: 2022-07-12 | End: 2022-07-13

## 2022-07-12 RX ORDER — SUCCINYLCHOLINE CHLORIDE 20 MG/ML
INJECTION INTRAMUSCULAR; INTRAVENOUS PRN
Status: DISCONTINUED | OUTPATIENT
Start: 2022-07-12 | End: 2022-07-12 | Stop reason: SURG

## 2022-07-12 RX ORDER — MEPERIDINE HYDROCHLORIDE 25 MG/ML
12.5 INJECTION INTRAMUSCULAR; INTRAVENOUS; SUBCUTANEOUS
Status: DISCONTINUED | OUTPATIENT
Start: 2022-07-12 | End: 2022-07-12 | Stop reason: HOSPADM

## 2022-07-12 RX ORDER — PAROXETINE HYDROCHLORIDE 20 MG/1
40 TABLET, FILM COATED ORAL DAILY
Status: DISCONTINUED | OUTPATIENT
Start: 2022-07-13 | End: 2022-07-19 | Stop reason: HOSPADM

## 2022-07-12 RX ORDER — AMOXICILLIN 250 MG
1 CAPSULE ORAL
Status: DISCONTINUED | OUTPATIENT
Start: 2022-07-12 | End: 2022-07-19 | Stop reason: HOSPADM

## 2022-07-12 RX ORDER — SODIUM CHLORIDE 9 MG/ML
INJECTION, SOLUTION INTRAVENOUS CONTINUOUS
Status: DISCONTINUED | OUTPATIENT
Start: 2022-07-12 | End: 2022-07-12 | Stop reason: HOSPADM

## 2022-07-12 RX ORDER — PROMETHAZINE HYDROCHLORIDE 25 MG/1
12.5-25 TABLET ORAL EVERY 4 HOURS PRN
Status: DISCONTINUED | OUTPATIENT
Start: 2022-07-12 | End: 2022-07-19 | Stop reason: HOSPADM

## 2022-07-12 RX ORDER — HYDROMORPHONE HYDROCHLORIDE 1 MG/ML
0.2 INJECTION, SOLUTION INTRAMUSCULAR; INTRAVENOUS; SUBCUTANEOUS
Status: DISCONTINUED | OUTPATIENT
Start: 2022-07-12 | End: 2022-07-12 | Stop reason: HOSPADM

## 2022-07-12 RX ORDER — PROCHLORPERAZINE EDISYLATE 5 MG/ML
5-10 INJECTION INTRAMUSCULAR; INTRAVENOUS EVERY 4 HOURS PRN
Status: DISCONTINUED | OUTPATIENT
Start: 2022-07-12 | End: 2022-07-19 | Stop reason: HOSPADM

## 2022-07-12 RX ORDER — CEFAZOLIN SODIUM 1 G/3ML
INJECTION, POWDER, FOR SOLUTION INTRAMUSCULAR; INTRAVENOUS
Status: DISCONTINUED | OUTPATIENT
Start: 2022-07-12 | End: 2022-07-12 | Stop reason: HOSPADM

## 2022-07-12 RX ORDER — METOPROLOL TARTRATE 100 MG/1
100 TABLET ORAL 2 TIMES DAILY
Status: DISCONTINUED | OUTPATIENT
Start: 2022-07-12 | End: 2022-07-19 | Stop reason: HOSPADM

## 2022-07-12 RX ORDER — HALOPERIDOL 5 MG/ML
1 INJECTION INTRAMUSCULAR
Status: DISCONTINUED | OUTPATIENT
Start: 2022-07-12 | End: 2022-07-12 | Stop reason: HOSPADM

## 2022-07-12 RX ORDER — HYDROMORPHONE HYDROCHLORIDE 1 MG/ML
0.1 INJECTION, SOLUTION INTRAMUSCULAR; INTRAVENOUS; SUBCUTANEOUS
Status: DISCONTINUED | OUTPATIENT
Start: 2022-07-12 | End: 2022-07-12 | Stop reason: HOSPADM

## 2022-07-12 RX ORDER — DIPHENHYDRAMINE HYDROCHLORIDE 50 MG/ML
25 INJECTION INTRAMUSCULAR; INTRAVENOUS EVERY 6 HOURS PRN
Status: DISCONTINUED | OUTPATIENT
Start: 2022-07-12 | End: 2022-07-19 | Stop reason: HOSPADM

## 2022-07-12 RX ORDER — POLYETHYLENE GLYCOL 3350 17 G/17G
1 POWDER, FOR SOLUTION ORAL 2 TIMES DAILY PRN
Status: DISCONTINUED | OUTPATIENT
Start: 2022-07-12 | End: 2022-07-19 | Stop reason: HOSPADM

## 2022-07-12 RX ORDER — SODIUM CHLORIDE 9 MG/ML
INJECTION, SOLUTION INTRAVENOUS ONCE
Status: COMPLETED | OUTPATIENT
Start: 2022-07-12 | End: 2022-07-12

## 2022-07-12 RX ORDER — ATORVASTATIN CALCIUM 20 MG/1
20 TABLET, FILM COATED ORAL DAILY
Status: DISCONTINUED | OUTPATIENT
Start: 2022-07-13 | End: 2022-07-19 | Stop reason: HOSPADM

## 2022-07-12 RX ORDER — HYDRALAZINE HYDROCHLORIDE 20 MG/ML
10 INJECTION INTRAMUSCULAR; INTRAVENOUS
Status: DISCONTINUED | OUTPATIENT
Start: 2022-07-12 | End: 2022-07-19 | Stop reason: HOSPADM

## 2022-07-12 RX ORDER — OXYCODONE HCL 5 MG/5 ML
10 SOLUTION, ORAL ORAL
Status: COMPLETED | OUTPATIENT
Start: 2022-07-12 | End: 2022-07-12

## 2022-07-12 RX ADMIN — PROPOFOL 150 MG: 10 INJECTION, EMULSION INTRAVENOUS at 11:29

## 2022-07-12 RX ADMIN — Medication: at 20:18

## 2022-07-12 RX ADMIN — DEXAMETHASONE SODIUM PHOSPHATE 8 MG: 4 INJECTION, SOLUTION INTRA-ARTICULAR; INTRALESIONAL; INTRAMUSCULAR; INTRAVENOUS; SOFT TISSUE at 12:06

## 2022-07-12 RX ADMIN — FENTANYL CITRATE 50 MCG: 50 INJECTION, SOLUTION INTRAMUSCULAR; INTRAVENOUS at 13:16

## 2022-07-12 RX ADMIN — FENTANYL CITRATE 50 MCG: 50 INJECTION, SOLUTION INTRAMUSCULAR; INTRAVENOUS at 12:07

## 2022-07-12 RX ADMIN — SUCCINYLCHOLINE CHLORIDE 100 MG: 20 INJECTION, SOLUTION INTRAMUSCULAR; INTRAVENOUS; PARENTERAL at 11:29

## 2022-07-12 RX ADMIN — CEFAZOLIN SODIUM 2 G: 2 INJECTION, SOLUTION INTRAVENOUS at 20:30

## 2022-07-12 RX ADMIN — OXYCODONE HYDROCHLORIDE 10 MG: 5 SOLUTION ORAL at 16:30

## 2022-07-12 RX ADMIN — PROPOFOL 100 MCG/KG/MIN: 10 INJECTION, EMULSION INTRAVENOUS at 11:30

## 2022-07-12 RX ADMIN — EPHEDRINE SULFATE 10 MG: 50 INJECTION, SOLUTION INTRAVENOUS at 12:37

## 2022-07-12 RX ADMIN — CEFAZOLIN 1.8 G: 330 INJECTION, POWDER, FOR SOLUTION INTRAMUSCULAR; INTRAVENOUS at 11:54

## 2022-07-12 RX ADMIN — INSULIN HUMAN 6 UNITS: 100 INJECTION, SOLUTION PARENTERAL at 20:27

## 2022-07-12 RX ADMIN — FENTANYL CITRATE 50 MCG: 50 INJECTION, SOLUTION INTRAMUSCULAR; INTRAVENOUS at 13:51

## 2022-07-12 RX ADMIN — INSULIN HUMAN 15 UNITS: 100 INJECTION, SUSPENSION SUBCUTANEOUS at 21:32

## 2022-07-12 RX ADMIN — SODIUM CHLORIDE: 9 INJECTION, SOLUTION INTRAVENOUS at 11:24

## 2022-07-12 RX ADMIN — FENTANYL CITRATE 50 MCG: 50 INJECTION, SOLUTION INTRAMUSCULAR; INTRAVENOUS at 13:14

## 2022-07-12 RX ADMIN — FENTANYL CITRATE 100 MCG: 50 INJECTION, SOLUTION INTRAMUSCULAR; INTRAVENOUS at 11:27

## 2022-07-12 RX ADMIN — Medication 2001 MG: at 20:29

## 2022-07-12 RX ADMIN — FENTANYL CITRATE 100 MCG: 50 INJECTION, SOLUTION INTRAMUSCULAR; INTRAVENOUS at 12:16

## 2022-07-12 RX ADMIN — FENTANYL CITRATE 100 MCG: 50 INJECTION, SOLUTION INTRAMUSCULAR; INTRAVENOUS at 12:04

## 2022-07-12 RX ADMIN — FENTANYL CITRATE 25 MCG: 50 INJECTION, SOLUTION INTRAMUSCULAR; INTRAVENOUS at 16:53

## 2022-07-12 RX ADMIN — SODIUM CHLORIDE: 9 INJECTION, SOLUTION INTRAVENOUS at 10:00

## 2022-07-12 RX ADMIN — SENNOSIDES AND DOCUSATE SODIUM 1 TABLET: 50; 8.6 TABLET ORAL at 20:32

## 2022-07-12 ASSESSMENT — PATIENT HEALTH QUESTIONNAIRE - PHQ9
2. FEELING DOWN, DEPRESSED, IRRITABLE, OR HOPELESS: NOT AT ALL
SUM OF ALL RESPONSES TO PHQ9 QUESTIONS 1 AND 2: 0
1. LITTLE INTEREST OR PLEASURE IN DOING THINGS: NOT AT ALL
SUM OF ALL RESPONSES TO PHQ9 QUESTIONS 1 AND 2: 0
1. LITTLE INTEREST OR PLEASURE IN DOING THINGS: NOT AT ALL
2. FEELING DOWN, DEPRESSED, IRRITABLE, OR HOPELESS: NOT AT ALL

## 2022-07-12 ASSESSMENT — LIFESTYLE VARIABLES
ALCOHOL_USE: NO
ON A TYPICAL DAY WHEN YOU DRINK ALCOHOL HOW MANY DRINKS DO YOU HAVE: 0
EVER HAD A DRINK FIRST THING IN THE MORNING TO STEADY YOUR NERVES TO GET RID OF A HANGOVER: NO
TOTAL SCORE: 0
DOES PATIENT WANT TO STOP DRINKING: NO
EVER FELT BAD OR GUILTY ABOUT YOUR DRINKING: NO
TOTAL SCORE: 0
HAVE PEOPLE ANNOYED YOU BY CRITICIZING YOUR DRINKING: NO
TOTAL SCORE: 0
HAVE YOU EVER FELT YOU SHOULD CUT DOWN ON YOUR DRINKING: NO
AVERAGE NUMBER OF DAYS PER WEEK YOU HAVE A DRINK CONTAINING ALCOHOL: 0
CONSUMPTION TOTAL: NEGATIVE
HOW MANY TIMES IN THE PAST YEAR HAVE YOU HAD 5 OR MORE DRINKS IN A DAY: 0

## 2022-07-12 ASSESSMENT — PAIN DESCRIPTION - PAIN TYPE
TYPE: ACUTE PAIN
TYPE: OTHER (COMMENT)

## 2022-07-12 ASSESSMENT — ENCOUNTER SYMPTOMS
SHORTNESS OF BREATH: 0
FEVER: 0
VOMITING: 0
HEADACHES: 0
WEAKNESS: 1
EYES NEGATIVE: 1
CHILLS: 0
NAUSEA: 0
NECK PAIN: 1

## 2022-07-12 ASSESSMENT — PAIN SCALES - GENERAL: PAIN_LEVEL: 3

## 2022-07-12 NOTE — ANESTHESIA PREPROCEDURE EVALUATION
Case: [id number] Date/Time: 07/12/22 1115    Procedures:       STAGE 1- ANTERIOR C5 CORPECTOMY, C4-6 FUSION STAGE 2- POSTERIOR C3-T3 LAMINECTOMY AND FUSION (Neck)      FUSION, SPINE, CERVICAL, POSTERIOR APPROACH (Spine Cervical)      CORPECTOMY, SPINE (Spine Cervical)      LAMINECTOMY, SPINE, CERVICAL, POSTERIOR APPROACH (Spine Cervical)    Anesthesia type: General    Pre-op diagnosis: SPINAL STENOSIS IN CERVICAL REGION WITH MYELOPATHY    Location: [place] [address] / SURGERY [facility]    Surgeons: Ady Barr M.D.          Relevant Problems   PULMONARY   (positive) HCAP (healthcare-associated pneumonia)      CARDIAC   (positive) Hypertension   (positive) Hypertensive crisis         (positive) Acute renal failure (HCC)   (positive) Anemia of chronic renal failure   (positive) ESRD on dialysis (HCC)      ENDO   (positive) Type 2 diabetes mellitus with nephropathy, and retinopathy (HCC)       Physical Exam    Airway   Mallampati: II  TM distance: >3 FB  Neck ROM: full       Cardiovascular - normal exam  Rhythm: regular  Rate: normal  (-) murmur     Dental - normal exam           Pulmonary - normal exam  Breath sounds clear to auscultation     Abdominal    Neurological - normal exam                 Anesthesia Plan    ASA 4 (chart)       Plan - general       Airway plan will be ETT          Induction: intravenous    Postoperative Plan: Postoperative administration of opioids is intended.    Pertinent diagnostic labs and testing reviewed    Informed Consent:    Anesthetic plan and risks discussed with patient.    Use of blood products discussed with: patient whom consented to blood products.

## 2022-07-12 NOTE — CONSULTS
Critical Care Consultation    Date of consult: 7/12/2022    Referring Physician  Ady Barr MD    Reason for Consultation  Post-op cervical laminectomy and fusion    History of Presenting Illness  61 y.o. male who presented 7/12/2022 for scheduled spinal surgery.  He has a history of end-stage renal disease, on MWF dialysis, is blind in both eyes, generally wheelchair-bound but does use a walker around the house.  Recent MRI of the spine with severe central canal stenosis at essentially from C3-C7.  He presents to the hospital today for scheduled C3-T1 laminectomy and fusion.  Uncomplicated intraoperative course.    Code Status   Full Code     Review of Systems   Review of Systems   Constitutional: Negative for chills and fever.   HENT: Negative.    Eyes: Negative.    Respiratory: Negative for shortness of breath.    Cardiovascular: Negative for chest pain.   Gastrointestinal: Negative for nausea and vomiting.   Genitourinary: Negative.    Musculoskeletal: Positive for neck pain.   Skin: Negative.    Neurological: Positive for weakness. Negative for headaches.   All other systems reviewed and are negative.      Past Medical History   has a past medical history of Anemia, Blind in both eyes, Diabetes ([facility]), Dialysis patient ([facility]), ESRD (end stage renal disease) ([facility]), High cholesterol, Hyperlipemia, Hypertension, Oxygen dependent, and Snoring.    He has no past medical history of Disorder of thyroid or Myocardial infarct ([facility]).    Surgical History   has a past surgical history that includes pr cataract surg w/iol 1 stage wo endo (10/21/15); other (3/2015); other (7/2015); other (9/22/15); cath placement (Right, 12/10/2015); av fistula creation (Left, 12/10/2015); recovery (2/24/2016); and recovery (4/19/2016).    Family History  family history includes Alzheimer's Disease (age of onset: 80) in his mother; Diabetes in his brother and father; Hypertension in his father.    Social History   reports that he  has never smoked. He has never used smokeless tobacco. He reports that he does not drink alcohol and does not use drugs.    Medications  Home Medications     Reviewed by Toro Bey M.D. (Physician) on 07/12/22 at 0957  Med List Status: Complete   Medication Last Dose Status   amLODIPine (NORVASC) 5 MG Tab 7/12/2022 Active   ascorbic acid (VITAMIN C) 500 MG tablet month ago Active   atorvastatin (LIPITOR) 20 MG Tab 7/12/2022 Active   calcium acetate (PHOS-LO) 667 MG Cap 7/11/2022 Active   insulin NPH (HUMULIN,NOVOLIN) 100 UNIT/ML Suspension 7/11/2022 Active   lactulose 10 GM/15ML Solution last week Active   metoprolol (LOPRESSOR) 100 MG Tab 7/12/2022 Active   paroxetine (PAXIL) 40 MG tablet 7/12/2022 Active              Current Facility-Administered Medications   Medication Dose Route Frequency Provider Last Rate Last Admin   • [START ON 7/13/2022] amLODIPine (NORVASC) tablet 10 mg  10 mg Oral DAILY Ady Barr M.D.       • [START ON 7/13/2022] atorvastatin (LIPITOR) tablet 20 mg  20 mg Oral DAILY Ady Barr M.D.       • calcium acetate (PHOS-LO) 667 MG tablet 2,001 mg  2,001 mg Oral TID WITH MEALS Ady Barr M.D.   2,001 mg at 07/12/22 2029   • lactulose 20 GM/30ML solution 20 g  20 g Rectal BID PRN Ady Barr M.D.       • metoprolol tartrate (LOPRESSOR) tablet 100 mg  100 mg Oral BID Ady Barr M.D.       • [START ON 7/13/2022] PARoxetine (PAXIL) tablet 40 mg  40 mg Oral DAILY Ady Barr M.D.       • Pharmacy Consult Request ...Pain Management Review 1 Each  1 Each Other PHARMACY TO DOSE Ady Barr M.D.       • MD ALERT...DO NOT ADMINISTER NSAIDS or ASPIRIN unless ORDERED By Neurosurgery 1 Each  1 Each Other PRN Ady Barr M.D.       • docusate sodium (COLACE) capsule 100 mg  100 mg Oral BID Ady Barr M.D.       • senna-docusate (PERICOLACE or SENOKOT S) 8.6-50 MG per tablet 1 Tablet  1 Tablet Oral Nightly  Ady Barr M.D.   1 Tablet at 07/12/22 2032   • senna-docusate (PERICOLACE or SENOKOT S) 8.6-50 MG per tablet 1 Tablet  1 Tablet Oral Q24HRS PRN Ady Barr M.D.       • polyethylene glycol/lytes (MIRALAX) PACKET 1 Packet  1 Packet Oral BID PRN Ady Barr M.D.       • magnesium hydroxide (MILK OF MAGNESIA) suspension 30 mL  30 mL Oral QDAY PRN Ady Barr M.D.       • bisacodyl (DULCOLAX) suppository 10 mg  10 mg Rectal Q24HRS PRN Ady Barr M.D.       • sodium phosphate (Fleet) enema 133 mL  1 Each Rectal Once PRN Ady Barr M.D.       • NS infusion   Intravenous Continuous Ady Barr M.D. 100 mL/hr at 07/12/22 1800 Rate Verify at 07/12/22 1800   • morphine 1 mg/mL in 50 mL (PCA)   Intravenous Continuous Ady Barr M.D.   New Bag at 07/12/22 2018   • ceFAZolin in dextrose (Ancef) IVPB premix 2 g  2 g Intravenous Q8HR Ady Barr M.D. 200 mL/hr at 07/12/22 2030 2 g at 07/12/22 2030   • diphenhydrAMINE (BENADRYL) tablet/capsule 25 mg  25 mg Oral Q6HRS PRN Ady Barr M.D.        Or   • diphenhydrAMINE (BENADRYL) injection 25 mg  25 mg Intravenous Q6HRS PRN Ady Barr M.D.       • ondansetron (ZOFRAN) syringe/vial injection 4 mg  4 mg Intravenous Q4HRS PRN Ady Barr M.D.       • ondansetron (ZOFRAN ODT) dispertab 4 mg  4 mg Oral Q4HRS PRN Ady Barr M.D.       • promethazine (PHENERGAN) tablet 12.5-25 mg  12.5-25 mg Oral Q4HRS PRN Christopher P Momo, M.D.       • promethazine (PHENERGAN) suppository 12.5-25 mg  12.5-25 mg Rectal Q4HRS PRN Ady Barr M.D.       • prochlorperazine (COMPAZINE) injection 5-10 mg  5-10 mg Intravenous Q4HRS PRN Ady Barr M.D.       • tizanidine (ZANAFLEX) tablet 4 mg  4 mg Oral Q8HRS PRN Ady Barr M.D.       • hydrALAZINE (APRESOLINE) injection 10 mg  10 mg Intravenous Q HOUR PRN Ady Barr M.D.       •  benzocaine-menthol (Cepacol) lozenge 1 Lozenge  1 Lozenge Mouth/Throat Q2HRS PRN Ady Barr M.D.       • insulin regular (HumuLIN R,NovoLIN R) injection  2-9 Units Subcutaneous 4X/DAY CARLOS Barr M.D.   6 Units at 07/12/22 2027    And   • dextrose 50% (D50W) injection 25 g  25 g Intravenous Q15 MIN PRN Ady Barr M.D.       • [START ON 7/13/2022] insulin NPH (HumuLIN N,NovoLIN N) injection  20 Units Subcutaneous QAM INSULIN Edi Field, A.P.N.       • insulin NPH (HumuLIN N,NovoLIN N) injection  15 Units Subcutaneous PM INSULIN Edi Field, A.P.N.           Allergies  Allergies   Allergen Reactions   • Aspirin Unspecified     Cannot have because on Dialysis       Vital Signs last 24 hours  Temp:  [35.9 °C (96.7 °F)-36.2 °C (97.2 °F)] 36.2 °C (97.2 °F)  Pulse:  [48-56] 53  Resp:  [12-18] 17  BP: (139-158)/(63-70) 149/67  SpO2:  [92 %-100 %] 100 %      Physical Exam  Physical Exam  Vitals and nursing note reviewed. Exam conducted with a chaperone present.   Constitutional:       General: He is not in acute distress.     Appearance: Normal appearance. He is not toxic-appearing.   HENT:      Head: Normocephalic.      Comments: Hemovac and cervical collar     Right Ear: External ear normal.      Left Ear: External ear normal.      Nose: Nose normal.      Mouth/Throat:      Mouth: Mucous membranes are moist.   Eyes:      Comments: Right - milky cornea  Left - normal   Cardiovascular:      Rate and Rhythm: Normal rate and regular rhythm.      Pulses: Normal pulses.      Heart sounds: Normal heart sounds.   Pulmonary:      Effort: Pulmonary effort is normal. No respiratory distress.      Breath sounds: Normal breath sounds.   Abdominal:      General: There is no distension.      Palpations: Abdomen is soft.      Tenderness: There is no abdominal tenderness.   Musculoskeletal:         General: Normal range of motion.        Arms:       Cervical back: Normal range of motion and neck  supple.      Right lower leg: No edema.      Left lower leg: No edema.   Skin:     General: Skin is warm and dry.      Capillary Refill: Capillary refill takes less than 2 seconds.   Neurological:      General: No focal deficit present.         Fluids    Intake/Output Summary (Last 24 hours) at 2022  Last data filed at 2022 1617  Gross per 24 hour   Intake 947.61 ml   Output 100 ml   Net 847.61 ml       Laboratory  Recent Results (from the past 48 hour(s))   POCT glucose device results    Collection Time: 22 10:16 AM   Result Value Ref Range    POC Glucose, Blood 133 (H) 65 - 99 mg/dL   Potassium Serum (K)    Collection Time: 22 10:20 AM   Result Value Ref Range    Potassium 5.2 3.6 - 5.5 mmol/L   COD - Adult (Type and Screen)    Collection Time: 22 10:51 AM   Result Value Ref Range    ABO Grouping Only O     Rh Grouping Only POS     Antibody Screen-Cod NEG    POCT glucose device results    Collection Time: 22  4:06 PM   Result Value Ref Range    POC Glucose, Blood 239 (H) 65 - 99 mg/dL   EKG    Collection Time: 22  6:15 PM   Result Value Ref Range    Report       [facility]    Test Date:  2022  Pt Name:    CARLY LIGHT                  Department: 19  MRN:        [id number]                      Room:       [facility]  Gender:     Male                         Technician: TXM  :        [id number]                   Requested By:JENNIFER RICARDO IV  Order #:    [id number]                    Reading MD:    Measurements  Intervals                                Axis  Rate:       55                           P:          80  MS:         204                          QRS:        16  QRSD:       104                          T:          64  QT:         504  QTc:        483    Interpretive Statements  SINUS BRADYCARDIA  BORDERLINE PROLONGED QT INTERVAL  Compared to ECG 2022 16:03:23  ST (T wave) deviation no longer present  Myocardial infarct finding no longer present          Imaging  DX-PORTABLE FLUORO > 1 HOUR   Final Result      Intraoperative images intended for localization and not for diagnostic purposes demonstrate C4-C6 ACDF. Extensive posterior spinal fusion. See procedure report for details.      Fluoroscopy Time:  29 second.  6 fluoroscopic images were obtained.      DX-CERVICAL SPINE-2 OR 3 VIEWS   Final Result      Intraoperative images intended for localization and not for diagnostic purposes demonstrate C4-C6 ACDF. Extensive posterior spinal fusion. See procedure report for details.      Fluoroscopy Time:  29 second.  6 fluoroscopic images were obtained.          Assessment/Plan  * Spinal stenosis in cervical region- (present on admission)  Assessment & Plan  Now s/p C3-T1 laminectomy and fusion  Date of surgery: 7/12/22  Surgeon: MD Momo    Morphine PCA for pain control    ESRD on dialysis (HCC)- (present on admission)  Assessment & Plan  Seen by [facility]  Spoken to them today - they will arrange dialysis tomorrow    Type 2 diabetes mellitus with nephropathy, and retinopathy (HCC)- (present on admission)  Assessment & Plan  On NPH at home 20 units am / 15 units pm  Continue home NPH once tolerating diet  SSI in addition    Hypertension- (present on admission)  Assessment & Plan  Home metoprolol & amlodipine    Blindness- (present on admission)  Assessment & Plan  Secondary to diabetes & cataracts    Hyperlipidemia- (present on admission)  Assessment & Plan  Continue statin    DVT prophylaxis: No chemical DVT prophylaxis 2/2 high-risk of bleeding  PUD prophylaxis: Not indicated  Glycemic control: Basal insulin and sliding scale insulin  Nutrition: Diet  Lines: D/C arterial line  Morales: None  Mobility: Early mobility as tolerated  Goals of care: Full code  Disposition: ICU    Discussed patient condition and risk of morbidity and/or mortality with Family, RN, RT, Pharmacy, Code status disscussed, Charge nurse / hot rounds, Patient and nephrology and  neurosurgery.      The patient remains critically ill.  Critical care time = 45 minutes in directly providing and coordinating critical care and extensive data review.  No time overlap and excludes procedures.

## 2022-07-12 NOTE — ANESTHESIA PROCEDURE NOTES
Arterial Line  Performed by: Toro Bey M.D.  Authorized by: Toro Bey M.D.     Start Time:  7/12/2022 11:32 AM  End Time:  7/12/2022 11:38 AM  Localization: ultrasound guidance and surface landmarks    Patient Location:  OR  Indication: continuous blood pressure monitoring        Catheter Size:  20 G  Seldinger Technique?: Yes    Laterality:  Right  Site:  Radial artery  Line Secured:  Antimicrobial disc, tape and transparent dressing  Events: patient tolerated procedure well with no complications

## 2022-07-12 NOTE — RESULT ENCOUNTER NOTE
Dear Rosamaria,    Can you please let Pj Britt know that result is ok and I will see patient as scheduled?    Thank you,  Slim.

## 2022-07-12 NOTE — OR SURGEON
Immediate Post OP Note    PreOp Diagnosis: cervical stenosis with myelopathy      PostOp Diagnosis: same      Procedure(s):  STAGE 1- ANTERIOR C5 CORPECTOMY, C4-6 FUSION STAGE 2- POSTERIOR C3-T1 LAMINECTOMY AND FUSION - Wound Class: Clean with Drain  FUSION, SPINE, CERVICAL, POSTERIOR APPROACH - Wound Class: Clean with Drain  CORPECTOMY, SPINE - Wound Class: Clean with Drain  LAMINECTOMY, SPINE, CERVICAL, POSTERIOR APPROACH - Wound Class: Clean with Drain    Surgeon(s):  Ady Barr M.D.    Anesthesiologist/Type of Anesthesia:  Anesthesiologist: Toro Bey M.D./General    Surgical Staff:  Assistant: SINTIA Eng  Circulator: Lucy Conner R.N.  Relief Circulator: Cam Perez R.N.  Scrub Person: Carline Israel  Radiology Technologist: Cherelle Smith    Specimens removed if any:  * No specimens in log *    Estimated Blood Loss: 200cc    Findings: good decompression    Complications: none        7/12/2022 3:46 PM SINTIA Eng

## 2022-07-12 NOTE — ANESTHESIA POSTPROCEDURE EVALUATION
Patient: Pj Britt    Procedure Summary     Date: 07/12/22 Room / Location: [facility] 05 / SURGERY [facility]    Anesthesia Start: 1124 Anesthesia Stop: 1605    Procedures:       STAGE 1- ANTERIOR C5 CORPECTOMY, C4-6 FUSION STAGE 2- POSTERIOR C3-T1 LAMINECTOMY AND FUSION (Neck)      FUSION, SPINE, CERVICAL, POSTERIOR APPROACH (Spine Cervical)      CORPECTOMY, SPINE (Spine Cervical)      LAMINECTOMY, SPINE, CERVICAL, POSTERIOR APPROACH (Spine Cervical) Diagnosis: (SPINAL STENOSIS IN CERVICAL REGION WITH MYELOPATHY)    Surgeons: Ady Barr M.D. Responsible Provider: Toro Bey M.D.    Anesthesia Type: general ASA Status: 4          Final Anesthesia Type: general  Last vitals  BP   Blood Pressure: 139/65    Temp   36.1 °C (97 °F)    Pulse   (!) 48   Resp   16    SpO2   100 %      Anesthesia Post Evaluation    Patient location during evaluation: PACU  Patient participation: complete - patient participated  Level of consciousness: awake and alert  Pain score: 3    Airway patency: patent  Anesthetic complications: no  Cardiovascular status: hemodynamically stable  Respiratory status: acceptable  Hydration status: euvolemic    PONV: none          No complications documented.     Nurse Pain Score: 0 (NPRS)

## 2022-07-12 NOTE — ANESTHESIA TIME REPORT
Anesthesia Start and Stop Event Times     Date Time Event    7/12/2022 1000 Ready for Procedure     1124 Anesthesia Start     1605 Anesthesia Stop        Responsible Staff  07/12/22    Name Role Begin End    Toro Bey M.D. Anesth 1124 1605        Overtime Reason:  no overtime (within assigned shift)    Comments:

## 2022-07-12 NOTE — ANESTHESIA PROCEDURE NOTES
Airway    Date/Time: 7/12/2022 11:30 AM  Performed by: Toro Bey M.D.  Authorized by: Toro Bey M.D.     Location:  OR  Urgency:  Elective  Difficult Airway: No    Indications for Airway Management:  Anesthesia      Spontaneous Ventilation: absent    Sedation Level:  Deep  Preoxygenated: Yes    Patient Position:  Sniffing  Final Airway Type:  Endotracheal airway  Final Endotracheal Airway:  ETT and NIM tube  Cuffed: Yes    Technique Used for Successful ETT Placement:  Direct laryngoscopy    Insertion Site:  Oral  Blade Type:  Jamal  Laryngoscope Blade/Videolaryngoscope Blade Size:  3  ETT Size (mm):  8.0  Measured from:  Lips  ETT to Lips (cm):  24  Placement Verified by: auscultation and capnometry    Cormack-Lehane Classification:  Grade IIa - partial view of glottis  Number of Attempts at Approach:  1  Number of Other Approaches Attempted:  0

## 2022-07-13 LAB
ALBUMIN SERPL BCP-MCNC: 4.2 G/DL (ref 3.2–4.9)
ALBUMIN/GLOB SERPL: 1.6 G/DL
ALP SERPL-CCNC: 111 U/L (ref 30–99)
ALT SERPL-CCNC: 11 U/L (ref 2–50)
ANION GAP SERPL CALC-SCNC: 16 MMOL/L (ref 7–16)
AST SERPL-CCNC: 20 U/L (ref 12–45)
BILIRUB SERPL-MCNC: 0.2 MG/DL (ref 0.1–1.5)
BUN SERPL-MCNC: 50 MG/DL (ref 8–22)
CALCIUM SERPL-MCNC: 8.5 MG/DL (ref 8.5–10.5)
CHLORIDE SERPL-SCNC: 95 MMOL/L (ref 96–112)
CO2 SERPL-SCNC: 23 MMOL/L (ref 20–33)
CREAT SERPL-MCNC: 6.91 MG/DL (ref 0.5–1.4)
EKG IMPRESSION: NORMAL
ERYTHROCYTE [DISTWIDTH] IN BLOOD BY AUTOMATED COUNT: 47.7 FL (ref 35.9–50)
GFR SERPLBLD CREATININE-BSD FMLA CKD-EPI: 8 ML/MIN/1.73 M 2
GLOBULIN SER CALC-MCNC: 2.6 G/DL (ref 1.9–3.5)
GLUCOSE BLD STRIP.AUTO-MCNC: 127 MG/DL (ref 65–99)
GLUCOSE BLD STRIP.AUTO-MCNC: 133 MG/DL (ref 65–99)
GLUCOSE BLD STRIP.AUTO-MCNC: 140 MG/DL (ref 65–99)
GLUCOSE BLD STRIP.AUTO-MCNC: 145 MG/DL (ref 65–99)
GLUCOSE SERPL-MCNC: 145 MG/DL (ref 65–99)
HAV IGM SERPL QL IA: NORMAL
HBV CORE IGM SER QL: NORMAL
HBV SURFACE AB SERPL IA-ACNC: <3.5 MIU/ML (ref 0–10)
HBV SURFACE AG SER QL: NORMAL
HCT VFR BLD AUTO: 33.8 % (ref 42–52)
HCV AB SER QL: NORMAL
HGB BLD-MCNC: 10.8 G/DL (ref 14–18)
MAGNESIUM SERPL-MCNC: 2.5 MG/DL (ref 1.5–2.5)
MCH RBC QN AUTO: 30.9 PG (ref 27–33)
MCHC RBC AUTO-ENTMCNC: 32 G/DL (ref 33.7–35.3)
MCV RBC AUTO: 96.8 FL (ref 81.4–97.8)
PHOSPHATE SERPL-MCNC: 5.6 MG/DL (ref 2.5–4.5)
PLATELET # BLD AUTO: 170 K/UL (ref 164–446)
PMV BLD AUTO: 10.6 FL (ref 9–12.9)
POTASSIUM SERPL-SCNC: 5.7 MMOL/L (ref 3.6–5.5)
PROT SERPL-MCNC: 6.8 G/DL (ref 6–8.2)
RBC # BLD AUTO: 3.49 M/UL (ref 4.7–6.1)
SODIUM SERPL-SCNC: 134 MMOL/L (ref 135–145)
WBC # BLD AUTO: 9 K/UL (ref 4.8–10.8)

## 2022-07-13 PROCEDURE — 83735 ASSAY OF MAGNESIUM: CPT

## 2022-07-13 PROCEDURE — 700111 HCHG RX REV CODE 636 W/ 250 OVERRIDE (IP): Performed by: INTERNAL MEDICINE

## 2022-07-13 PROCEDURE — 770001 HCHG ROOM/CARE - MED/SURG/GYN PRIV*

## 2022-07-13 PROCEDURE — 700102 HCHG RX REV CODE 250 W/ 637 OVERRIDE(OP): Performed by: NEUROLOGICAL SURGERY

## 2022-07-13 PROCEDURE — 84100 ASSAY OF PHOSPHORUS: CPT

## 2022-07-13 PROCEDURE — 82962 GLUCOSE BLOOD TEST: CPT | Mod: 91

## 2022-07-13 PROCEDURE — 80053 COMPREHEN METABOLIC PANEL: CPT

## 2022-07-13 PROCEDURE — 700111 HCHG RX REV CODE 636 W/ 250 OVERRIDE (IP): Performed by: NEUROLOGICAL SURGERY

## 2022-07-13 PROCEDURE — 99233 SBSQ HOSP IP/OBS HIGH 50: CPT | Performed by: STUDENT IN AN ORGANIZED HEALTH CARE EDUCATION/TRAINING PROGRAM

## 2022-07-13 PROCEDURE — A9270 NON-COVERED ITEM OR SERVICE: HCPCS | Performed by: HOSPITALIST

## 2022-07-13 PROCEDURE — A9270 NON-COVERED ITEM OR SERVICE: HCPCS | Performed by: CLINICAL NURSE SPECIALIST

## 2022-07-13 PROCEDURE — 90935 HEMODIALYSIS ONE EVALUATION: CPT

## 2022-07-13 PROCEDURE — 85027 COMPLETE CBC AUTOMATED: CPT

## 2022-07-13 PROCEDURE — 700102 HCHG RX REV CODE 250 W/ 637 OVERRIDE(OP): Performed by: HOSPITALIST

## 2022-07-13 PROCEDURE — 93010 ELECTROCARDIOGRAM REPORT: CPT | Performed by: STUDENT IN AN ORGANIZED HEALTH CARE EDUCATION/TRAINING PROGRAM

## 2022-07-13 PROCEDURE — 86706 HEP B SURFACE ANTIBODY: CPT

## 2022-07-13 PROCEDURE — 99222 1ST HOSP IP/OBS MODERATE 55: CPT | Performed by: HOSPITALIST

## 2022-07-13 PROCEDURE — A9270 NON-COVERED ITEM OR SERVICE: HCPCS | Performed by: NEUROLOGICAL SURGERY

## 2022-07-13 PROCEDURE — 99223 1ST HOSP IP/OBS HIGH 75: CPT | Performed by: INTERNAL MEDICINE

## 2022-07-13 PROCEDURE — 80074 ACUTE HEPATITIS PANEL: CPT

## 2022-07-13 PROCEDURE — 700102 HCHG RX REV CODE 250 W/ 637 OVERRIDE(OP): Performed by: CLINICAL NURSE SPECIALIST

## 2022-07-13 RX ORDER — ACETAMINOPHEN 325 MG/1
650 TABLET ORAL EVERY 6 HOURS
Status: DISCONTINUED | OUTPATIENT
Start: 2022-07-13 | End: 2022-07-19 | Stop reason: HOSPADM

## 2022-07-13 RX ORDER — MORPHINE SULFATE 4 MG/ML
2 INJECTION INTRAVENOUS
Status: DISCONTINUED | OUTPATIENT
Start: 2022-07-13 | End: 2022-07-19 | Stop reason: HOSPADM

## 2022-07-13 RX ORDER — OXYCODONE HYDROCHLORIDE 10 MG/1
10 TABLET ORAL EVERY 4 HOURS PRN
Status: DISCONTINUED | OUTPATIENT
Start: 2022-07-13 | End: 2022-07-19 | Stop reason: HOSPADM

## 2022-07-13 RX ORDER — OXYCODONE HYDROCHLORIDE 5 MG/1
5 TABLET ORAL EVERY 4 HOURS PRN
Status: DISCONTINUED | OUTPATIENT
Start: 2022-07-13 | End: 2022-07-19 | Stop reason: HOSPADM

## 2022-07-13 RX ADMIN — Medication 2001 MG: at 17:42

## 2022-07-13 RX ADMIN — PAROXETINE HYDROCHLORIDE 40 MG: 20 TABLET, FILM COATED ORAL at 05:58

## 2022-07-13 RX ADMIN — DOCUSATE SODIUM 100 MG: 100 CAPSULE, LIQUID FILLED ORAL at 05:58

## 2022-07-13 RX ADMIN — SENNOSIDES AND DOCUSATE SODIUM 1 TABLET: 50; 8.6 TABLET ORAL at 21:39

## 2022-07-13 RX ADMIN — METOPROLOL 100 MG: 100 TABLET ORAL at 17:42

## 2022-07-13 RX ADMIN — Medication 2001 MG: at 08:49

## 2022-07-13 RX ADMIN — OXYCODONE HYDROCHLORIDE 10 MG: 10 TABLET ORAL at 15:47

## 2022-07-13 RX ADMIN — DOCUSATE SODIUM 100 MG: 100 CAPSULE, LIQUID FILLED ORAL at 17:42

## 2022-07-13 RX ADMIN — ACETAMINOPHEN 650 MG: 325 TABLET, FILM COATED ORAL at 15:48

## 2022-07-13 RX ADMIN — TIZANIDINE 4 MG: 4 TABLET ORAL at 08:48

## 2022-07-13 RX ADMIN — HYDRALAZINE HYDROCHLORIDE 10 MG: 20 INJECTION INTRAMUSCULAR; INTRAVENOUS at 07:08

## 2022-07-13 RX ADMIN — AMLODIPINE BESYLATE 10 MG: 10 TABLET ORAL at 05:58

## 2022-07-13 RX ADMIN — ATORVASTATIN CALCIUM 20 MG: 20 TABLET, FILM COATED ORAL at 05:58

## 2022-07-13 RX ADMIN — TIZANIDINE 4 MG: 4 TABLET ORAL at 19:18

## 2022-07-13 RX ADMIN — OXYCODONE 5 MG: 5 TABLET ORAL at 12:32

## 2022-07-13 RX ADMIN — Medication 2001 MG: at 12:32

## 2022-07-13 RX ADMIN — EPOETIN ALFA-EPBX 3000 UNITS: 3000 INJECTION, SOLUTION INTRAVENOUS; SUBCUTANEOUS at 10:30

## 2022-07-13 RX ADMIN — CEFAZOLIN SODIUM 2 G: 2 INJECTION, SOLUTION INTRAVENOUS at 04:00

## 2022-07-13 ASSESSMENT — ENCOUNTER SYMPTOMS
SORE THROAT: 0
EYES NEGATIVE: 1
FEVER: 0
DIARRHEA: 0
SHORTNESS OF BREATH: 0
PALPITATIONS: 0
COUGH: 0
HEADACHES: 0
ABDOMINAL PAIN: 0
NAUSEA: 0
NECK PAIN: 1
BACK PAIN: 0
WEAKNESS: 1
DIZZINESS: 0
SENSORY CHANGE: 1
VOMITING: 0
SPUTUM PRODUCTION: 0
LOSS OF CONSCIOUSNESS: 0
FOCAL WEAKNESS: 1
CHILLS: 0

## 2022-07-13 ASSESSMENT — COGNITIVE AND FUNCTIONAL STATUS - GENERAL
DRESSING REGULAR LOWER BODY CLOTHING: A LOT
HELP NEEDED FOR BATHING: A LOT
WALKING IN HOSPITAL ROOM: A LITTLE
EATING MEALS: A LITTLE
SUGGESTED CMS G CODE MODIFIER MOBILITY: CK
DAILY ACTIVITIY SCORE: 15
STANDING UP FROM CHAIR USING ARMS: A LITTLE
MOVING FROM LYING ON BACK TO SITTING ON SIDE OF FLAT BED: A LITTLE
PERSONAL GROOMING: A LITTLE
TOILETING: A LITTLE
DRESSING REGULAR UPPER BODY CLOTHING: A LOT
MOVING TO AND FROM BED TO CHAIR: A LITTLE
CLIMB 3 TO 5 STEPS WITH RAILING: A LITTLE
SUGGESTED CMS G CODE MODIFIER DAILY ACTIVITY: CK
MOBILITY SCORE: 19

## 2022-07-13 ASSESSMENT — PAIN DESCRIPTION - PAIN TYPE
TYPE: ACUTE PAIN
TYPE: ACUTE PAIN
TYPE: ACUTE PAIN;SURGICAL PAIN
TYPE: ACUTE PAIN
TYPE: ACUTE PAIN
TYPE: ACUTE PAIN;SURGICAL PAIN
TYPE: ACUTE PAIN
TYPE: ACUTE PAIN;SURGICAL PAIN
TYPE: ACUTE PAIN
TYPE: ACUTE PAIN;SURGICAL PAIN

## 2022-07-13 ASSESSMENT — FIBROSIS 4 INDEX: FIB4 SCORE: 2.234731142164600968

## 2022-07-13 NOTE — ASSESSMENT & PLAN NOTE
Now s/p surgical decompression and fusion  Hard collar  No chemical DVD prophylaxis  PT/OT consulted  Physical medicine and rehab would like to see increased exercise tolerance before he is a candidate for their facility.  Will reevaluate with PT and OT and once posterior drain is removed  Ant drain removed 7/15: post remains in place

## 2022-07-13 NOTE — PROGRESS NOTES
4 Eyes Skin Assessment Completed by SAMAN King and SAMAN Alberto.    Head Scab  Ears WDL  Nose WDL  Mouth WDL  Neck Incision, L clavicle  Breast/Chest WDL  Shoulder Blades WDL  Spine WDLR upper back abrasion  (R) Arm/Elbow/Hand WDL  (L) Arm/Elbow/Hand WDL, multiple fistulas  Abdomen WDL  Groin WDL  Scrotum/Coccyx/Buttocks Discoloration  (R) Leg Scab and Abrasion  (L) Leg WDL  (R) Heel/Foot/Toe WDL, dry, cracked  (L) Heel/Foot/Toe WDL, dry cracked          Devices In Places ECG, Blood Pressure Cuff, Pulse Ox and SCD's      Interventions In Place Sacral Mepilex, TAP System, Pillows, Q2 Turns, Low Air Loss Mattress and ZFlo Pillow    Possible Skin Injury No    Pictures Uploaded Into Epic N/A  Wound Consult Placed N/A  RN Wound Prevention Protocol Ordered No

## 2022-07-13 NOTE — THERAPY
07/13/22 0914   Interdisciplinary Plan of Care Collaboration   Collaboration Comments Defer PT eval, just started HD

## 2022-07-13 NOTE — PROGRESS NOTES
Critical Care Progress Note    Date of admission  7/12/2022    Chief Complaint  61 y.o. male who presented 7/12/2022 for scheduled spinal surgery.  He has a history of end-stage renal disease, on MWF dialysis, is blind in both eyes, generally wheelchair-bound but does use a walker around the house.  Recent MRI of the spine with severe central canal stenosis at essentially from C3-C7.  He presents to the hospital today for scheduled C3-T1 laminectomy and fusion.  Uncomplicated intraoperative course.    Hospital Course  7/12 - C3-T1 laminectomy/fusion, POD0  7/13 - POD1. Stable, ok for floor      Interval Problem Update  Reviewed last 24 hour events:  Neuro: intact, A&Ox3  Tmax: Afebrile  Diet: Advance as tolerated  I/O: +1.795L yesterday, +3L for admission      Review of Systems   Review of Systems   Constitutional: Negative for chills, fever and malaise/fatigue.   HENT: Negative for congestion and sore throat.    Eyes: Negative.    Respiratory: Negative for sputum production and shortness of breath.    Cardiovascular: Negative for chest pain and palpitations.   Gastrointestinal: Negative for abdominal pain, nausea and vomiting.   Genitourinary: Negative.    Musculoskeletal: Positive for neck pain.   Skin: Negative.    Neurological: Positive for focal weakness and weakness. Negative for headaches.   All other systems reviewed and are negative.       Vital Signs for last 24 hours   Temp:  [35.8 °C (96.5 °F)-36.7 °C (98.1 °F)] 36 °C (96.8 °F)  Pulse:  [48-62] 55  Resp:  [7-23] 13  BP: ()/(51-79) 87/51  SpO2:  [92 %-100 %] 99 %    Hemodynamic parameters for last 24 hours       Respiratory Information for the last 24 hours       Physical Exam   Physical Exam  Vitals and nursing note reviewed. Exam conducted with a chaperone present.   Constitutional:       General: He is not in acute distress.     Appearance: Normal appearance. He is not ill-appearing.      Interventions: Cervical collar in place.   HENT:       Head: Normocephalic.      Comments: Hemovac in place     Mouth/Throat:      Mouth: Mucous membranes are moist.   Eyes:      Comments: Right eye cloudy   Cardiovascular:      Rate and Rhythm: Normal rate and regular rhythm.      Pulses: Normal pulses.   Pulmonary:      Effort: Pulmonary effort is normal. No respiratory distress.   Abdominal:      General: There is no distension.      Palpations: Abdomen is soft.      Tenderness: There is no abdominal tenderness.   Musculoskeletal:         General: Normal range of motion.      Cervical back: Normal range of motion and neck supple.      Comments: LUE fistula   Skin:     General: Skin is warm and dry.      Capillary Refill: Capillary refill takes less than 2 seconds.   Neurological:      General: No focal deficit present.      Mental Status: He is alert and oriented to person, place, and time. Mental status is at baseline.         Medications  Current Facility-Administered Medications   Medication Dose Route Frequency Provider Last Rate Last Admin   • epoetin (Retacrit) injection (Dialysis Use Only) 3,000 Units  3,000 Units Intravenous MO, WE + FR Fadi Najjar, M.D.   3,000 Units at 07/13/22 1030   • oxyCODONE immediate-release (ROXICODONE) tablet 5 mg  5 mg Oral Q4HRS PRN Edi Field, A.P.N.   5 mg at 07/13/22 1232    Or   • oxyCODONE immediate release (ROXICODONE) tablet 10 mg  10 mg Oral Q4HRS PRN Edi Field, A.P.N.       • morphine 4 MG/ML injection 2 mg  2 mg Intravenous Q HOUR PRN Edi Field, A.P.N.       • amLODIPine (NORVASC) tablet 10 mg  10 mg Oral DAILY Ady Barr M.D.   10 mg at 07/13/22 0558   • atorvastatin (LIPITOR) tablet 20 mg  20 mg Oral DAILY Ady Barr M.D.   20 mg at 07/13/22 0558   • calcium acetate (PHOS-LO) 667 MG tablet 2,001 mg  2,001 mg Oral TID WITH MEALS Ady Barr M.D.   2,001 mg at 07/13/22 1232   • lactulose 20 GM/30ML solution 20 g  20 g Rectal BID PRN Ady Barr M.D.       •  metoprolol tartrate (LOPRESSOR) tablet 100 mg  100 mg Oral BID Ady Barr M.D.       • PARoxetine (PAXIL) tablet 40 mg  40 mg Oral DAILY Ady Barr M.D.   40 mg at 07/13/22 0558   • Pharmacy Consult Request ...Pain Management Review 1 Each  1 Each Other PHARMACY TO DOSE Ady Barr M.D.       • MD ALERT...DO NOT ADMINISTER NSAIDS or ASPIRIN unless ORDERED By Neurosurgery 1 Each  1 Each Other PRN Ady Barr M.D.       • docusate sodium (COLACE) capsule 100 mg  100 mg Oral BID Ady Barr M.D.   100 mg at 07/13/22 0558   • senna-docusate (PERICOLACE or SENOKOT S) 8.6-50 MG per tablet 1 Tablet  1 Tablet Oral Nightly Ady Brar M.D.   1 Tablet at 07/12/22 2032   • senna-docusate (PERICOLACE or SENOKOT S) 8.6-50 MG per tablet 1 Tablet  1 Tablet Oral Q24HRS PRN Ady Barr M.D.       • polyethylene glycol/lytes (MIRALAX) PACKET 1 Packet  1 Packet Oral BID PRN Ady Barr M.D.       • magnesium hydroxide (MILK OF MAGNESIA) suspension 30 mL  30 mL Oral QDAY PRN Ady Barr M.D.       • bisacodyl (DULCOLAX) suppository 10 mg  10 mg Rectal Q24HRS PRN Ady Barr M.D.       • sodium phosphate (Fleet) enema 133 mL  1 Each Rectal Once PRN Ady Barr M.D.       • NS infusion   Intravenous Continuous Ady Barr M.D.   Stopped at 07/13/22 0842   • diphenhydrAMINE (BENADRYL) tablet/capsule 25 mg  25 mg Oral Q6HRS PRN Ady Barr M.D.        Or   • diphenhydrAMINE (BENADRYL) injection 25 mg  25 mg Intravenous Q6HRS PRN Ady Barr M.D.       • ondansetron (ZOFRAN) syringe/vial injection 4 mg  4 mg Intravenous Q4HRS PRN Ady Barr M.D.       • ondansetron (ZOFRAN ODT) dispertab 4 mg  4 mg Oral Q4HRS PRN Ady Barr M.D.       • promethazine (PHENERGAN) tablet 12.5-25 mg  12.5-25 mg Oral Q4HRS PRN Ady Barr M.D.       • promethazine (PHENERGAN) suppository  12.5-25 mg  12.5-25 mg Rectal Q4HRS PRN Ady Barr M.D.       • prochlorperazine (COMPAZINE) injection 5-10 mg  5-10 mg Intravenous Q4HRS PRN Ady Barr M.D.       • tizanidine (ZANAFLEX) tablet 4 mg  4 mg Oral Q8HRS PRN Ady Barr M.D.   4 mg at 07/13/22 0848   • hydrALAZINE (APRESOLINE) injection 10 mg  10 mg Intravenous Q HOUR PRN Ady Barr M.D.   10 mg at 07/13/22 0708   • benzocaine-menthol (Cepacol) lozenge 1 Lozenge  1 Lozenge Mouth/Throat Q2HRS PRN Ady Barr M.D.       • insulin regular (HumuLIN R,NovoLIN R) injection  2-9 Units Subcutaneous 4X/DAY ACHLISSETH Barr M.D.   6 Units at 07/12/22 2027    And   • dextrose 50% (D50W) injection 25 g  25 g Intravenous Q15 MIN PRN Ady Barr M.D.       • insulin NPH (HumuLIN N,NovoLIN N) injection  20 Units Subcutaneous QAM INSULIN Edi , A.P.N.   20 Units at 07/13/22 0617   • insulin NPH (HumuLIN N,NovoLIN N) injection  15 Units Subcutaneous PM INSULIN Edi , A.P.N.   15 Units at 07/12/22 2132       Fluids    Intake/Output Summary (Last 24 hours) at 7/13/2022 1239  Last data filed at 7/13/2022 0800  Gross per 24 hour   Intake 3335.44 ml   Output 360 ml   Net 2975.44 ml       Laboratory          Recent Labs     07/12/22  1020 07/13/22 0429   SODIUM  --  134*   POTASSIUM 5.2 5.7*   CHLORIDE  --  95*   CO2  --  23   BUN  --  50*   CREATININE  --  6.91*   MAGNESIUM  --  2.5   PHOSPHORUS  --  5.6*   CALCIUM  --  8.5     Recent Labs     07/13/22  0429   ALTSGPT 11   ASTSGOT 20   ALKPHOSPHAT 111*   TBILIRUBIN 0.2   GLUCOSE 145*     Recent Labs     07/13/22 0429   WBC 9.0   ASTSGOT 20   ALTSGPT 11   ALKPHOSPHAT 111*   TBILIRUBIN 0.2     Recent Labs     07/13/22 0429   RBC 3.49*   HEMOGLOBIN 10.8*   HEMATOCRIT 33.8*   PLATELETCT 170       Imaging  No imaging this morning    Assessment/Plan  * Spinal stenosis in cervical region- (present on admission)  Assessment & Plan  -s/p  C3-T1 laminectomy and fusion w Neurosurgery (7/12)  -Morphine PCA for pain control    ESRD on dialysis (HCC)- (present on admission)  Assessment & Plan  -Seen by [facility]  -Typically MWF Dialysis; scheduled for dialysis today    Type 2 diabetes mellitus with nephropathy, and retinopathy (HCC)- (present on admission)  Assessment & Plan  -on NPH at home 20 units am / 15 units pm  -c/w home NPH once tolerating diet  -SSI in addition    Hypertension- (present on admission)  Assessment & Plan  -c/w home Amlodipine 10mg daily  -c/w home Metoprolol 100mg BID    Blindness- (present on admission)  Assessment & Plan  -2/2 to diabetes & cataracts    Hyperlipidemia- (present on admission)  Assessment & Plan  -C/w Atorvastatin 20mg daily       VTE:  per neurosurgery  Ulcer: Not Indicated  Lines: None    I have performed a physical exam and reviewed and updated ROS and Plan today (7/13/2022). In review of yesterday's note (7/12/2022), there are no changes except as documented above.     Discussed patient condition and risk of morbidity and/or mortality with Family, RN, RT, Pharmacy, , Charge nurse / hot rounds, Patient and neurosurgery     Ok to transfer patient out of ICU today. [facility] will sign off at transfer. Please call with questions.

## 2022-07-13 NOTE — DISCHARGE PLANNING
[facility] Transitional Care Coordination    Referral from:  Dr Barr  Insurance Provider on Facesheet: [employer]  Potential Rehab Diagnosis: Ortho    Chart review indicates patient may need on going medical management and may have therapy needs to possibly meet inpatient rehab facility criteria with the goal of returning to community.    D/C support: Spouse - will need to verify level of support.     Physiatry consultation forwarded per protocol.     POD# 1 anterior C5 corpectomy, 4-6 fusion, posterior C3-T1 lami fusion. H/o ESRD (dialysis MWF) and blindness in both eyes. Physiatry consult pended, awaiting therapy evals when appropriate. TCC will continue to follow.      Thank you for the referral.

## 2022-07-13 NOTE — THERAPY
Occupational Therapy Contact Note      Patient Name: Pj Britt  Age:  61 y.o., Sex:  male  Medical Record #: [id number]  Today's Date: 7/13/2022 07/13/22 0933   Interdisciplinary Plan of Care Collaboration   Collaboration Comments Attempted, in HD, will re attempt as able and appropriate for OT eval,

## 2022-07-13 NOTE — ASSESSMENT & PLAN NOTE
outpt is on NPH 20 in am and 15 in hs with preprandial short acting however wife will on occasion not give evening dose if his qhs BG is <140.  Given hypoglycemia overnight stop his evening NPH and decrease am dose to 15  Last A1c in our system was from 3 years ago; 5.9

## 2022-07-13 NOTE — TELEPHONE ENCOUNTER
Thomas Mcdermott M.D.  You 9 minutes ago (2:54 PM)     yes    Message text       You  Thomas Mcdermott M.D. 5 hours ago (9:09 AM)       Ok to order zio instead?     Message text       Sandrine Lazaro R.N.  You 2 days ago     Hi! Awaiting TT response. Thanks!    Message text       GARRICK Molina M.D.; Sandrine Lazaro R.N. 6 days ago     Hi! Patient was in office for placement of [employer]. Per tech, patient and his spouse do not understand how to manage and declined placement. Tech suggests easier monitor like Zio AT. Please advise. Thanks!    Routing comment      Zio order placed.

## 2022-07-13 NOTE — PROGRESS NOTES
[facility] Progress Note     Hemodialysis treatment  performed as ordered per Dr. Najjar  Treatment started at 0943 and ended at 1143. Dr. Najjar notified  Total run time 2 hours, terminated early due to infiltration of venous site during treatment- with sudden increase in VPs/TMPs,swelling and firmness noted around venous access site with some report of pain to site, cold pack applied around venous access site, patient educated.      Net UF Removed:  395 mL     Patient tolerated treatment with soft BPs/hypotensive episodes SBPs 80s-90s, asymptomatic- UF goal adjusted as BP tolerates, stable otherwise  See Acute HD flow sheets on clinical data notes under media for details.     Post tx access: Cannulation needles removed from LUE forearm AVF access sites, hemostasis established on each insertion site; verified no bleeding. (+) bruit/thrill post treatment. Covered with clean gauze dressings and secured with tape. Cold ice pack applied to venous access site due to infiltration, use cold pack for first 24 hours for at least 20 mins, may use warm (not hot) compress/warm washcloth after 24 hours, patient instructed and educated.     Please monitor for LUE AVF access bleeding. Should any breakthrough bleeding occur, please apply gauze and firm pressure on site until bleeding resolves, cover with gauze dressing and tape. Please observe LUE precautions- NO taking of BPs or blood draws to LUE please.    Please notify Nephrologist/Dialysis for follow-up.    Report given to primary care nurse ROBERTA Pulido RN.

## 2022-07-13 NOTE — DISCHARGE PLANNING
Care Transition Team Assessment    In the case of an emergency, pt's legal NOK is spouse, Lindsey Britt [phone]      Chart reviewed and assessment completed. Patient has an advance directive on file. Wife is DPOA. Contact number listed above & in chart. Patient discussed in IDT rounds. No case management or dc needs at this time. Plan for PT/OT and PMR. Evals pending.     Plan: Continue to assist with social/dc needs     Care Transition Team Assessment    Information Source  Orientation Level: Oriented X4 (Per RN's assessment)  Information Given By: Other (Comments)  Informant's Name: EMR  Who is responsible for making decisions for patient? : Patient    Readmission Evaluation  Is this a readmission?: No    Elopement Risk  Legal Hold: No  Ambulatory or Self Mobile in Wheelchair: No-Not an Elopement Risk  Elopement Risk: Not at Risk for Elopement    Interdisciplinary Discharge Planning  Patient or legal guardian wants to designate a caregiver: No  Support Systems: Family Member(s), Friends / Neighbors  Durable Medical Equipment: Home Oxygen    Discharge Preparedness  What is your plan after discharge?: Uncertain - pending medical team collaboration  What are your discharge supports?: Spouse  Prior Functional Level: Ambulatory, Independent with Activities of Daily Living, Independent with Medication Management    Functional Assesment  Prior Functional Level: Ambulatory, Independent with Activities of Daily Living, Independent with Medication Management    Finances  Financial Barriers to Discharge: No  Prescription Coverage: Yes    Vision / Hearing Impairment  Vision Impairment : Yes  Right Eye Vision: Blind  Left Eye Vision: Blind  Hearing Impairment : No    Advance Directive  Advance Directive?: DPOA for Health Care  Durable Power of  Name and Contact : spouse    Domestic Abuse  Have you ever been the victim of abuse or violence?: No  Physical Abuse or Sexual Abuse: No  Verbal Abuse or Emotional  Abuse: No  Possible Abuse/Neglect Reported to:: Not Applicable    Psychological Assessment  History of Substance Abuse: None  History of Psychiatric Problems: No  Non-compliant with Treatment: No  Newly Diagnosed Illness: Yes    Discharge Risks or Barriers  Discharge risks or barriers?: Post-acute placement / services, Complex medical needs  Patient risk factors: Complex medical needs    Anticipated Discharge Information  Discharge Disposition: Disch to  rehab facility or distinct part unit

## 2022-07-13 NOTE — PROGRESS NOTES
Neurosurgery Progress Note    Subjective:  C/o neck and right arm pain  States right arm weakness is unchanged from preop  Eating  Dialysis today  Pca     Exam:  LUE and BLE 5/5  Right UE diffusely 4- except delt 3  Incisions c/d ant hvac 50, post 210    BP  Min: 139/63  Max: 158/68  Pulse  Av  Min: 48  Max: 56  Resp  Av.7  Min: 12  Max: 18  Temp  Av.4 °C (97.6 °F)  Min: 35.9 °C (96.7 °F)  Max: 36.7 °C (98.1 °F)  SpO2  Av.9 %  Min: 92 %  Max: 100 %    No data recorded    Recent Labs     22  0429   WBC 9.0   RBC 3.49*   HEMOGLOBIN 10.8*   HEMATOCRIT 33.8*   MCV 96.8   MCH 30.9   MCHC 32.0*   RDW 47.7   PLATELETCT 170   MPV 10.6     Recent Labs     22  1020 22  0429   SODIUM  --  134*   POTASSIUM 5.2 5.7*   CHLORIDE  --  95*   CO2  --  23   GLUCOSE  --  145*   BUN  --  50*   CREATININE  --  6.91*   CALCIUM  --  8.5               Intake/Output                       22 0700 - 22 0659 22 07 - 22 0659     0420-0261 2867-0419 Total 2590-8660 9206-1615 Total                 Intake    P.O.  --  180 180  --  -- --    P.O. -- 180 180 -- -- --    I.V.  947.6  1287.8 2235.4  --  -- --    Propofol Volume 197.6 -- 197.6 -- -- --    Volume (mL) (NS infusion) 750 -- 750 -- -- --    Volume (mL) (NS infusion) -- 1200 1200 -- -- --    Volume (mL) (NS infusion) -- 87.8 87.8 -- -- --    Other  --  420 420  --  -- --    Medications (PO/Enteral Liquids) -- 420 420 -- -- --    IV Piggyback  --  100 100  --  -- --    Volume (mL) (ceFAZolin in dextrose (Ancef) IVPB premix 2 g) -- 100 100 -- -- --    Total Intake 947.6 1987.8 2935.4 -- -- --       Output    Urine  --  0 0  --  -- --    Urine Void (mL) -- 0 0 -- -- --    Drains  --  260 260  --  -- --    Output (mL) (Closed/Suction Drain 1 Anterior Neck Hemovac) -- 50 50 -- -- --    Output (mL) (Closed/Suction Drain 2 Posterior Neck Hemovac) -- 210 210 -- -- --    Blood  100  -- 100  --  -- --    Est. Blood Loss 100 -- 100 -- -- --     Total Output 100 260 360 -- -- --       Net I/O     847.6 1727.8 2575.4 -- -- --            Intake/Output Summary (Last 24 hours) at 7/13/2022 0829  Last data filed at 7/13/2022 0600  Gross per 24 hour   Intake 2935.44 ml   Output 360 ml   Net 2575.44 ml            • epoetin  3,000 Units MO, WE + FR   • amLODIPine  10 mg DAILY   • atorvastatin  20 mg DAILY   • calcium acetate  2,001 mg TID WITH MEALS   • lactulose  20 g BID PRN   • metoprolol tartrate  100 mg BID   • PARoxetine  40 mg DAILY   • Pharmacy Consult Request  1 Each PHARMACY TO DOSE   • MD ALERT...DO NOT ADMINISTER NSAIDS or ASPIRIN unless ORDERED By Neurosurgery  1 Each PRN   • docusate sodium  100 mg BID   • senna-docusate  1 Tablet Nightly   • senna-docusate  1 Tablet Q24HRS PRN   • polyethylene glycol/lytes  1 Packet BID PRN   • magnesium hydroxide  30 mL QDAY PRN   • bisacodyl  10 mg Q24HRS PRN   • sodium phosphate  1 Each Once PRN   • NS   Continuous   • morphine   Continuous   • diphenhydrAMINE  25 mg Q6HRS PRN    Or   • diphenhydrAMINE  25 mg Q6HRS PRN   • ondansetron  4 mg Q4HRS PRN   • ondansetron  4 mg Q4HRS PRN   • promethazine  12.5-25 mg Q4HRS PRN   • promethazine  12.5-25 mg Q4HRS PRN   • prochlorperazine  5-10 mg Q4HRS PRN   • tizanidine  4 mg Q8HRS PRN   • hydrALAZINE  10 mg Q HOUR PRN   • benzocaine-menthol  1 Lozenge Q2HRS PRN   • insulin regular  2-9 Units 4X/DAY ACHS    And   • dextrose bolus  25 g Q15 MIN PRN   • insulin NPH  20 Units QAM INSULIN   • insulin NPH  15 Units PM INSULIN       Assessment and Plan:  Hospital day # 2  POD# 1 anterior C5 corpectomy, 4-6 fusion, posterior C3-T1 lami fusion  Chemical prophylactic DVT therapy: No  Start date/time: TBD    NM as above- stable to slightly worse RUE- per pt he is stable  Pain control- will switch to PO oxy and give tizandine as well  Pt/ot/up as carol   Collar at all times  Dialysis M, W, F- nephorology on board - thank you  Appreciate intensivists assistance w/ medical  management  Bowel protocol  Cont hvac's- d/c when < 30 cc in 8 hrs  Home vs rehab- will be eval by PT-- d/w family

## 2022-07-13 NOTE — CONSULTS
Hospital Medicine Consultation    Date of Service  7/13/2022    Referring Physician  Dr Shepherd    Consulting Physician  Jae Kenney D.O.    Reason for Consultation  Assuming medical management    History of Presenting Illness  61 y.o. male who presented 7/12/2022 for planned C3-T1 laminectomy and fusion.  By the neurosurgical team for recent diagnosis of severe cervical spine central stenosis..  Procedure was completed on July 12.  He has a previous medical history that includes chronic kidney disease on dialysis, legally blind in both eyes, glaucoma, hypertension, dyslipidemia, insulin-dependent type 2 diabetes.      On my exam pt reports ongoing pain in his shoulders and numbness in both hands, right more so then left.  Pain is new since surgery.  Pain medications help a little.  Numbness is from before the surgery and is unchanged    Review of Systems  Review of Systems   Constitutional: Negative for chills and fever.   HENT: Negative for nosebleeds and sore throat.    Eyes:        Blind   Respiratory: Negative for cough and shortness of breath.    Cardiovascular: Negative for chest pain, palpitations and leg swelling.   Gastrointestinal: Negative for abdominal pain, diarrhea, nausea and vomiting.   Genitourinary: Negative for dysuria and urgency.   Musculoskeletal: Positive for neck pain. Negative for back pain.   Skin: Negative for rash.   Neurological: Positive for sensory change and focal weakness. Negative for dizziness, loss of consciousness and headaches.       Past Medical History   has a past medical history of Anemia, Blind in both eyes, Diabetes ([facility]), Dialysis patient ([facility]), ESRD (end stage renal disease) ([facility]), High cholesterol, Hyperlipemia, Hypertension, Oxygen dependent, and Snoring.    He has no past medical history of Disorder of thyroid or Myocardial infarct ([facility]).    Surgical History   has a past surgical history that includes pr cataract surg w/iol 1 stage wo endo (10/21/15); other  (3/2015); other (7/2015); other (9/22/15); cath placement (Right, 12/10/2015); av fistula creation (Left, 12/10/2015); recovery (2/24/2016); recovery (4/19/2016); cervical disk and fusion anterior (7/12/2022); cervical fusion posterior (7/12/2022); corpectomy (7/12/2022); and cervical laminectomy posterior (7/12/2022).    Family History  family history includes Alzheimer's Disease (age of onset: 80) in his mother; Diabetes in his brother and father; Hypertension in his father.    Social History   reports that he has never smoked. He has never used smokeless tobacco. He reports that he does not drink alcohol and does not use drugs.    Medications  Prior to Admission Medications   Prescriptions Last Dose Informant Patient Reported? Taking?   amLODIPine (NORVASC) 5 MG Tab 7/12/2022 at 0600 Family Member No No   Sig: TAKE 2 TABLETS BY MOUTH EVERY DAY   Patient taking differently: Take 10 mg by mouth every day.   ascorbic acid (VITAMIN C) 500 MG tablet month ago at Unknown time Family Member No No   Sig: Take 1 Tab by mouth 2 Times a Day.   atorvastatin (LIPITOR) 20 MG Tab 7/12/2022 at 0600 Family Member No No   Sig: Take 1 Tablet by mouth every day.   calcium acetate (PHOS-LO) 667 MG Cap 7/11/2022 at 1900 Family Member No No   Sig: Take 2 Capsules by mouth 3 times a day with meals.   Patient taking differently: Take 2,001 mg by mouth 3 times a day with meals.   insulin NPH (HUMULIN,NOVOLIN) 100 UNIT/ML Suspension 7/11/2022 at am Family Member Yes No   Sig: Inject 15-20 Units as instructed 2 Times a Day. 20 units in AM   15 units in EVENING   lactulose 10 GM/15ML Solution last week at Unknown time Family Member Yes No   Sig: Take 20 g by mouth 2 times a day as needed. Indications: Constipation   metoprolol (LOPRESSOR) 100 MG Tab 7/12/2022 at 0600 Family Member No No   Sig: Take 1 Tab by mouth 2 Times a Day.   paroxetine (PAXIL) 40 MG tablet 7/12/2022 at 0600 Family Member No No   Sig: TAKE 1 TABLET BY MOUTH EVERY DAY    Patient taking differently: Take 40 mg by mouth every day.      Facility-Administered Medications: None       Allergies  Allergies   Allergen Reactions   • Aspirin Unspecified     Cannot have because on Dialysis       Physical Exam  Temp:  [35.8 °C (96.5 °F)-36.7 °C (98.1 °F)] 36 °C (96.8 °F)  Pulse:  [48-62] 56  Resp:  [6-30] 7  BP: ()/(51-79) 141/72  SpO2:  [92 %-100 %] 100 %    Physical Exam  Vitals reviewed.   Constitutional:       General: He is not in acute distress.     Appearance: Normal appearance. He is well-developed. He is not diaphoretic.   HENT:      Head: Normocephalic and atraumatic.   Eyes:      Conjunctiva/sclera: Conjunctivae normal.      Comments: Cataract R eye   Neck:      Vascular: No JVD.      Comments: Examined in hard collar  Cardiovascular:      Rate and Rhythm: Normal rate.      Heart sounds: No murmur heard.    No gallop.   Pulmonary:      Effort: Pulmonary effort is normal. No respiratory distress.      Breath sounds: No stridor. No wheezing or rales.   Abdominal:      Palpations: Abdomen is soft.      Tenderness: There is no abdominal tenderness. There is no guarding or rebound.   Musculoskeletal:         General: No tenderness.      Right lower leg: No edema.      Left lower leg: No edema.   Skin:     General: Skin is warm and dry.      Findings: No rash.   Neurological:      Mental Status: He is alert and oriented to person, place, and time.      Comments: 4/5 B uppers   Psychiatric:         Mood and Affect: Mood normal.         Thought Content: Thought content normal.         Fluids  Date 07/13/22 0700 - 07/14/22 0659   Shift 4981-0670 2195-1588 3636-2467 24 Hour Total   INTAKE   P.O. 100   100   I.V. 200   200   Dialysis 500   500   IV Piggyback 100   100   Shift Total 900   900   OUTPUT   Urine 0   0   Dialysis 895   895   Shift Total 895   895   Weight (kg) 62.4 62.4 62.4 62.4       Laboratory  Recent Labs     07/13/22  0427   WBC 9.0   RBC 3.49*   HEMOGLOBIN 10.8*    HEMATOCRIT 33.8*   MCV 96.8   MCH 30.9   MCHC 32.0*   RDW 47.7   PLATELETCT 170   MPV 10.6     Recent Labs     07/12/22  1020 07/13/22  0429   SODIUM  --  134*   POTASSIUM 5.2 5.7*   CHLORIDE  --  95*   CO2  --  23   GLUCOSE  --  145*   BUN  --  50*   CREATININE  --  6.91*   CALCIUM  --  8.5                     Imaging  DX-PORTABLE FLUORO > 1 HOUR   Final Result      Intraoperative images intended for localization and not for diagnostic purposes demonstrate C4-C6 ACDF. Extensive posterior spinal fusion. See procedure report for details.      Fluoroscopy Time:  29 second.  6 fluoroscopic images were obtained.      DX-CERVICAL SPINE-2 OR 3 VIEWS   Final Result      Intraoperative images intended for localization and not for diagnostic purposes demonstrate C4-C6 ACDF. Extensive posterior spinal fusion. See procedure report for details.      Fluoroscopy Time:  29 second.  6 fluoroscopic images were obtained.          Assessment/Plan  * Spinal stenosis in cervical region- (present on admission)  Assessment & Plan  Now s/p surgical decompression and fusion  Hard collar  No chemical DVD prophylaxis  PT/OT consulted    ESRD on dialysis (HCC)- (present on admission)  Assessment & Plan  Nephrology following  Follow BMP  DC IVFs    Type 2 diabetes mellitus with nephropathy, and retinopathy (HCC)- (present on admission)  Assessment & Plan  As outpt is on NPH 20 in am and 15 in hs with preprandial short acting  Cont home regimen monitor and titrate  Last A1c in our system was from 3 years ago; 5.9    Hypertension- (present on admission)  Assessment & Plan  On metoprolol 100 BID, amlodipine 10 as an outpt which we have continued with parameters  monitor    Hyperlipidemia- (present on admission)  Assessment & Plan  statin

## 2022-07-13 NOTE — CARE PLAN
Shift Goals  Clinical Goals: pain management; comfort  Patient Goals: comfort  Family Goals: comfort and go home    Progress made toward(s) clinical / shift goals:Collaborating with IDT and family on POC. Medicating per MAR. Hourly rounding in place.       Problem: Pain - Standard  Goal: Alleviation of pain or a reduction in pain to the patient’s comfort goal  Outcome: Progressing     Problem: Fall Risk  Goal: Patient will remain free from falls  Outcome: Progressing     Problem: Knowledge Deficit - Standard  Goal: Patient and family/care givers will demonstrate understanding of plan of care, disease process/condition, diagnostic tests and medications  Outcome: Progressing     Problem: Skin Integrity  Goal: Skin integrity is maintained or improved  Outcome: Progressing       Patient is not progressing towards the following goals:       [Nutrition] : nutrition [Breast Self Exam] : breast self exam [Vitamins/Supplements] : vitamins/supplements [Exercise] : exercise [STD (testing, results, tx)] : STD (testing, results, tx) [Vaccines] : vaccines

## 2022-07-13 NOTE — ASSESSMENT & PLAN NOTE
On metoprolol 100 BID, amlodipine 10 as an outpt which we have continued with parameters  Still running 150s generally: add hydralazine 10

## 2022-07-13 NOTE — PROGRESS NOTES
Belongings to bedside including brown shoes, grey top, pants, wheel chair and home o2 in black bag.

## 2022-07-13 NOTE — OR NURSING
1600:Patient arrived to PACU in stable condition. VSS. Dressing to anterior neck is CDI. hemovac in place with clean dressing. Posterior neck dressing and hemovac dressing is CDI. FSBS 239. Orders from anesthesia to treat on unit.     1630: pt medicated for moderate pain in right wrist.     1645: Anterior hemovac dressing has oozing. SG Carballo Notified. Orders to reinforce as nessasary.     Pt wife called and updated on pt status and room assignment.     1704: Report given to Dolly DAVISON. All pertinent pt information has been addressed.     1715: pt transported to room in stable condition with ACLS RN and monitor. Handoff given to RN in room.

## 2022-07-13 NOTE — CARE PLAN
Problem: Pain - Standard  Goal: Alleviation of pain or a reduction in pain to the patient’s comfort goal  Outcome: Progressing     Problem: Fall Risk  Goal: Patient will remain free from falls  Outcome: Progressing     Problem: Knowledge Deficit - Standard  Goal: Patient and family/care givers will demonstrate understanding of plan of care, disease process/condition, diagnostic tests and medications  Outcome: Progressing     Problem: Skin Integrity  Goal: Skin integrity is maintained or improved  7/12/2022 2057 by Peggy Duran R.N.  Outcome: Progressing  7/12/2022 2057 by Peggy Duran R.N.  Outcome: Progressing     Problem: Communication  Goal: The ability to communicate needs accurately and effectively will improve  7/12/2022 2057 by Peggy Duran R.N.  Outcome: Progressing  7/12/2022 2057 by Peggy Duran R.N.  Outcome: Progressing     Problem: Respiratory  Goal: Patient will achieve/maintain optimum respiratory ventilation and gas exchange  7/12/2022 2057 by Peggy Duran R.N.  Outcome: Progressing  7/12/2022 2057 by Peggy Duran R.N.  Outcome: Progressing     Problem: Urinary Elimination  Goal: Establish and maintain regular urinary output  7/12/2022 2057 by Peggy Duran R.N.  Outcome: Progressing  7/12/2022 2057 by Peggy Duran R.N.  Outcome: Progressing     Problem: Self Care  Goal: Patient will have the ability to perform ADLs independently or with assistance (bathe, groom, dress, toilet and feed)  7/12/2022 2057 by Peggy Duran R.N.  Outcome: Progressing  7/12/2022 2057 by Peggy Duran R.N.  Outcome: Progressing     Problem: Infection - Standard  Goal: Patient will remain free from infection  Outcome: Progressing

## 2022-07-13 NOTE — PROGRESS NOTES
PACU report was given to Cristina DAVISON.   Per PACU anterior neck dressing/ Hemovac site has drainage on it and MD is aware. RN to monitor.   Traction tech called for aspen collar.   Mobility confirmed with APRN via Cristina DAVISON. Aspen collar to be worn at all times, mobilize as carol with collar.   EKG ordered, MD notified of results.

## 2022-07-13 NOTE — PROGRESS NOTES
2025 - Dr Foss at bedside. Per Md ok to remove A line if not able to get working.   2200 - not able to get A line to work properly. Removed and pressure dressing placed.

## 2022-07-13 NOTE — PROGRESS NOTES
[facility] ICU Progress Note      Admit Date: 7/12/2022    Resident(s): Stevo Quezada M.D.   Attending:  WIL JHAVERI/ Dr. Shepherd    Patient ID:    Name:  Pj Britt   YOB: 1960  Age:  61 y.o.  male   MRN:  [id number]    Hospital Course:  60 yo M presented (7/12) for scheduled spinal surgery. PMH ESRD, on MWF dialysis, is blind in both eyes, generally wheelchair-bound but does use walker around house. Recent MRI of spine w severe central canal stenosis from essentially C3-C7. Underwent scheduled C3-T1 laminectomy and fusion w Neurosurgery (7/12); uncomplicated intraoperative course.    Consultants:  Critical Care  Neurosurgery    Interval Events:    No acute overnight events  S/p C3-T1 laminectomy and fusion (7/12)  H&H stable at 10.8 and 33.8  Patient scheduled to undergo dialysis today (7/13)    Vitals Range last 24h:  Temp:  [35.9 °C (96.7 °F)-36.7 °C (98.1 °F)] 36.7 °C (98.1 °F)  Pulse:  [48-56] 53  Resp:  [12-18] 17  BP: (139-158)/(63-70) 149/67  SpO2:  [92 %-100 %] 100 %      Intake/Output Summary (Last 24 hours) at 7/13/2022 0626  Last data filed at 7/13/2022 0200  Gross per 24 hour   Intake 2295.44 ml   Output 230 ml   Net 2065.44 ml        ROS   Constitutional: Negative for chills and fever.   HENT: Negative.    Eyes: Negative.    Respiratory: Negative for shortness of breath.    Cardiovascular: Negative for chest pain.   Gastrointestinal: Negative for nausea and vomiting.   Genitourinary: Negative.    Musculoskeletal: Positive for neck pain.   Skin: Negative.    Neurological: Positive for weakness. Negative for headaches.     PHYSICAL EXAM:  Vitals:    07/12/22 2000 07/12/22 2200 07/13/22 0000 07/13/22 0200   BP:       Pulse:       Resp:       Temp: 36.3 °C (97.4 °F) 36.6 °C (97.8 °F) 36.7 °C (98 °F) 36.7 °C (98.1 °F)   TempSrc: Temporal Temporal Temporal Temporal   SpO2:       Weight:       Height:        Body mass index is 21.55 kg/m².    O2 therapy: Pulse Oximetry: 100 %, O2 (LPM):  3, O2 Delivery Device: Nasal Cannula         Physical Exam  Vitals and nursing note reviewed. Exam conducted with a chaperone present.   Constitutional:       General: He is not in acute distress.     Appearance: Normal appearance. He is not toxic-appearing.   HENT:      Head: Normocephalic.      Comments: Hemovac and Cervical Collar     Right Ear: External ear normal.      Left Ear: External ear normal.      Nose: Nose normal.      Mouth/Throat:      Mouth: Mucous membranes are moist.   Eyes:      Comments: Right - milky cornea; Left - normal   Cardiovascular:      Rate and Rhythm: Normal rate and regular rhythm.      Pulses: Normal pulses.      Heart sounds: Normal heart sounds.   Pulmonary:      Effort: Pulmonary effort is normal. No respiratory distress.      Breath sounds: Normal breath sounds.   Abdominal:      General: There is no distension.      Palpations: Abdomen is soft.      Tenderness: There is no abdominal tenderness.   Musculoskeletal:         General: Normal range of motion.        Arms:       Cervical back: Normal range of motion and neck supple.      Right lower leg: No edema.      Left lower leg: No edema.   Skin:     General: Skin is warm and dry.      Capillary Refill: Capillary refill takes less than 2 seconds.   Neurological:      General: No focal deficit present.       Recent Labs     07/12/22  1020 07/13/22 0429   SODIUM  --  134*   POTASSIUM 5.2 5.7*   CHLORIDE  --  95*   CO2  --  23   BUN  --  50*   CREATININE  --  6.91*   MAGNESIUM  --  2.5   PHOSPHORUS  --  5.6*   CALCIUM  --  8.5     Recent Labs     07/13/22 0429   ALTSGPT 11   ASTSGOT 20   ALKPHOSPHAT 111*   TBILIRUBIN 0.2   GLUCOSE 145*     Recent Labs     07/13/22 0429   RBC 3.49*   HEMOGLOBIN 10.8*   HEMATOCRIT 33.8*   PLATELETCT 170     Recent Labs     07/13/22 0429   WBC 9.0   ASTSGOT 20   ALTSGPT 11   ALKPHOSPHAT 111*   TBILIRUBIN 0.2       Meds:  • amLODIPine  10 mg     • atorvastatin  20 mg     • calcium acetate  2,001 mg      • lactulose  20 g     • metoprolol tartrate  100 mg     • PARoxetine  40 mg     • Pharmacy Consult Request  1 Each     • MD ALERT...DO NOT ADMINISTER NSAIDS or ASPIRIN unless ORDERED By Neurosurgery  1 Each     • docusate sodium  100 mg     • senna-docusate  1 Tablet     • senna-docusate  1 Tablet     • polyethylene glycol/lytes  1 Packet     • magnesium hydroxide  30 mL     • bisacodyl  10 mg     • sodium phosphate  1 Each     • NS   100 mL/hr at 07/12/22 1800   • morphine       • diphenhydrAMINE  25 mg      Or   • diphenhydrAMINE  25 mg     • ondansetron  4 mg     • ondansetron  4 mg     • promethazine  12.5-25 mg     • promethazine  12.5-25 mg     • prochlorperazine  5-10 mg     • tizanidine  4 mg     • hydrALAZINE  10 mg     • benzocaine-menthol  1 Lozenge     • insulin regular  2-9 Units      And   • dextrose bolus  25 g     • insulin NPH  20 Units     • insulin NPH  15 Units          Procedures:  C3-T1 laminectomy and fusion (7/13)    Imaging:  DX-PORTABLE FLUORO > 1 HOUR   Final Result      Intraoperative images intended for localization and not for diagnostic purposes demonstrate C4-C6 ACDF. Extensive posterior spinal fusion. See procedure report for details.      Fluoroscopy Time:  29 second.  6 fluoroscopic images were obtained.      DX-CERVICAL SPINE-2 OR 3 VIEWS   Final Result      Intraoperative images intended for localization and not for diagnostic purposes demonstrate C4-C6 ACDF. Extensive posterior spinal fusion. See procedure report for details.      Fluoroscopy Time:  29 second.  6 fluoroscopic images were obtained.          ASSESSEMENT and PLAN:    * Spinal stenosis in cervical region- (present on admission)  Assessment & Plan  -s/p C3-T1 laminectomy and fusion w Neurosurgery (7/12)  -Morphine PCA for pain control    ESRD on dialysis (HCC)- (present on admission)  Assessment & Plan  -Seen by [facility]  -Typically MWF Dialysis; scheduled for dialysis today    Type 2 diabetes mellitus  with nephropathy, and retinopathy (HCC)- (present on admission)  Assessment & Plan  -on NPH at home 20 units am / 15 units pm  -c/w home NPH once tolerating diet  -SSI in addition    Hypertension- (present on admission)  Assessment & Plan  -c/w home Amlodipine 10mg daily  -c/w home Metoprolol 100mg BID    Blindness- (present on admission)  Assessment & Plan  -2/2 to diabetes & cataracts    Hyperlipidemia- (present on admission)  Assessment & Plan  -C/w Atorvastatin 20mg daily      CODE STATUS: Full Code      Stevo Quezada M.D.

## 2022-07-14 LAB
ANION GAP SERPL CALC-SCNC: 11 MMOL/L (ref 7–16)
BUN SERPL-MCNC: 33 MG/DL (ref 8–22)
CALCIUM SERPL-MCNC: 8.6 MG/DL (ref 8.5–10.5)
CHLORIDE SERPL-SCNC: 96 MMOL/L (ref 96–112)
CO2 SERPL-SCNC: 29 MMOL/L (ref 20–33)
CREAT SERPL-MCNC: 5.08 MG/DL (ref 0.5–1.4)
ERYTHROCYTE [DISTWIDTH] IN BLOOD BY AUTOMATED COUNT: 47.6 FL (ref 35.9–50)
GFR SERPLBLD CREATININE-BSD FMLA CKD-EPI: 12 ML/MIN/1.73 M 2
GLUCOSE BLD STRIP.AUTO-MCNC: 106 MG/DL (ref 65–99)
GLUCOSE BLD STRIP.AUTO-MCNC: 107 MG/DL (ref 65–99)
GLUCOSE BLD STRIP.AUTO-MCNC: 111 MG/DL (ref 65–99)
GLUCOSE BLD STRIP.AUTO-MCNC: 60 MG/DL (ref 65–99)
GLUCOSE BLD STRIP.AUTO-MCNC: 74 MG/DL (ref 65–99)
GLUCOSE BLD STRIP.AUTO-MCNC: 81 MG/DL (ref 65–99)
GLUCOSE SERPL-MCNC: 68 MG/DL (ref 65–99)
HCT VFR BLD AUTO: 30.1 % (ref 42–52)
HGB BLD-MCNC: 9.8 G/DL (ref 14–18)
MCH RBC QN AUTO: 31.2 PG (ref 27–33)
MCHC RBC AUTO-ENTMCNC: 32.6 G/DL (ref 33.7–35.3)
MCV RBC AUTO: 95.9 FL (ref 81.4–97.8)
PLATELET # BLD AUTO: 156 K/UL (ref 164–446)
PMV BLD AUTO: 10.6 FL (ref 9–12.9)
POTASSIUM SERPL-SCNC: 4.9 MMOL/L (ref 3.6–5.5)
RBC # BLD AUTO: 3.14 M/UL (ref 4.7–6.1)
SODIUM SERPL-SCNC: 136 MMOL/L (ref 135–145)
WBC # BLD AUTO: 9 K/UL (ref 4.8–10.8)

## 2022-07-14 PROCEDURE — 99232 SBSQ HOSP IP/OBS MODERATE 35: CPT | Performed by: INTERNAL MEDICINE

## 2022-07-14 PROCEDURE — 700102 HCHG RX REV CODE 250 W/ 637 OVERRIDE(OP): Performed by: CLINICAL NURSE SPECIALIST

## 2022-07-14 PROCEDURE — 85027 COMPLETE CBC AUTOMATED: CPT

## 2022-07-14 PROCEDURE — 82962 GLUCOSE BLOOD TEST: CPT | Mod: 91

## 2022-07-14 PROCEDURE — 97535 SELF CARE MNGMENT TRAINING: CPT

## 2022-07-14 PROCEDURE — 700102 HCHG RX REV CODE 250 W/ 637 OVERRIDE(OP): Performed by: HOSPITALIST

## 2022-07-14 PROCEDURE — 700111 HCHG RX REV CODE 636 W/ 250 OVERRIDE (IP): Performed by: NEUROLOGICAL SURGERY

## 2022-07-14 PROCEDURE — 80048 BASIC METABOLIC PNL TOTAL CA: CPT

## 2022-07-14 PROCEDURE — 700102 HCHG RX REV CODE 250 W/ 637 OVERRIDE(OP): Performed by: NEUROLOGICAL SURGERY

## 2022-07-14 PROCEDURE — A9270 NON-COVERED ITEM OR SERVICE: HCPCS | Performed by: CLINICAL NURSE SPECIALIST

## 2022-07-14 PROCEDURE — 97163 PT EVAL HIGH COMPLEX 45 MIN: CPT

## 2022-07-14 PROCEDURE — 770001 HCHG ROOM/CARE - MED/SURG/GYN PRIV*

## 2022-07-14 PROCEDURE — A9270 NON-COVERED ITEM OR SERVICE: HCPCS | Performed by: NEUROLOGICAL SURGERY

## 2022-07-14 PROCEDURE — 97167 OT EVAL HIGH COMPLEX 60 MIN: CPT

## 2022-07-14 PROCEDURE — 99232 SBSQ HOSP IP/OBS MODERATE 35: CPT | Performed by: HOSPITALIST

## 2022-07-14 PROCEDURE — A9270 NON-COVERED ITEM OR SERVICE: HCPCS | Performed by: HOSPITALIST

## 2022-07-14 RX ORDER — OMEPRAZOLE 20 MG/1
20 CAPSULE, DELAYED RELEASE ORAL DAILY
Status: DISCONTINUED | OUTPATIENT
Start: 2022-07-14 | End: 2022-07-19 | Stop reason: HOSPADM

## 2022-07-14 RX ADMIN — PAROXETINE HYDROCHLORIDE 40 MG: 20 TABLET, FILM COATED ORAL at 06:11

## 2022-07-14 RX ADMIN — ACETAMINOPHEN 650 MG: 325 TABLET, FILM COATED ORAL at 17:39

## 2022-07-14 RX ADMIN — SENNOSIDES AND DOCUSATE SODIUM 1 TABLET: 50; 8.6 TABLET ORAL at 21:00

## 2022-07-14 RX ADMIN — OXYCODONE HYDROCHLORIDE 10 MG: 10 TABLET ORAL at 04:27

## 2022-07-14 RX ADMIN — DOCUSATE SODIUM 100 MG: 100 CAPSULE, LIQUID FILLED ORAL at 06:11

## 2022-07-14 RX ADMIN — Medication 2001 MG: at 17:39

## 2022-07-14 RX ADMIN — ONDANSETRON 4 MG: 4 TABLET, ORALLY DISINTEGRATING ORAL at 13:06

## 2022-07-14 RX ADMIN — Medication 2001 MG: at 12:15

## 2022-07-14 RX ADMIN — METOPROLOL 100 MG: 100 TABLET ORAL at 06:11

## 2022-07-14 RX ADMIN — ACETAMINOPHEN 650 MG: 325 TABLET, FILM COATED ORAL at 06:11

## 2022-07-14 RX ADMIN — TIZANIDINE 4 MG: 4 TABLET ORAL at 17:38

## 2022-07-14 RX ADMIN — TIZANIDINE 4 MG: 4 TABLET ORAL at 08:12

## 2022-07-14 RX ADMIN — ACETAMINOPHEN 650 MG: 325 TABLET, FILM COATED ORAL at 00:21

## 2022-07-14 RX ADMIN — ACETAMINOPHEN 650 MG: 325 TABLET, FILM COATED ORAL at 12:15

## 2022-07-14 RX ADMIN — ATORVASTATIN CALCIUM 20 MG: 20 TABLET, FILM COATED ORAL at 06:11

## 2022-07-14 RX ADMIN — METOPROLOL 100 MG: 100 TABLET ORAL at 17:39

## 2022-07-14 RX ADMIN — AMLODIPINE BESYLATE 10 MG: 10 TABLET ORAL at 06:11

## 2022-07-14 RX ADMIN — Medication 2001 MG: at 08:10

## 2022-07-14 RX ADMIN — DOCUSATE SODIUM 100 MG: 100 CAPSULE, LIQUID FILLED ORAL at 17:39

## 2022-07-14 ASSESSMENT — ENCOUNTER SYMPTOMS
SENSORY CHANGE: 1
NAUSEA: 0
DIARRHEA: 0
BACK PAIN: 0
ABDOMINAL PAIN: 0
DOUBLE VISION: 0
DIZZINESS: 0
BLURRED VISION: 0
COUGH: 0
SPEECH CHANGE: 0
FEVER: 0
SORE THROAT: 0
PALPITATIONS: 0
LOSS OF CONSCIOUSNESS: 0
CHILLS: 0
VOMITING: 0
WEAKNESS: 1
NECK PAIN: 1
HEADACHES: 0
SHORTNESS OF BREATH: 0
FOCAL WEAKNESS: 1

## 2022-07-14 ASSESSMENT — PAIN DESCRIPTION - PAIN TYPE
TYPE: ACUTE PAIN;SURGICAL PAIN

## 2022-07-14 ASSESSMENT — COGNITIVE AND FUNCTIONAL STATUS - GENERAL
HELP NEEDED FOR BATHING: A LOT
MOVING FROM LYING ON BACK TO SITTING ON SIDE OF FLAT BED: UNABLE
STANDING UP FROM CHAIR USING ARMS: A LOT
DRESSING REGULAR UPPER BODY CLOTHING: A LITTLE
WALKING IN HOSPITAL ROOM: A LOT
CLIMB 3 TO 5 STEPS WITH RAILING: A LOT
TOILETING: A LOT
SUGGESTED CMS G CODE MODIFIER MOBILITY: CL
DAILY ACTIVITIY SCORE: 16
TURNING FROM BACK TO SIDE WHILE IN FLAT BAD: A LOT
DRESSING REGULAR LOWER BODY CLOTHING: A LOT
PERSONAL GROOMING: A LITTLE
MOBILITY SCORE: 10
SUGGESTED CMS G CODE MODIFIER DAILY ACTIVITY: CK
MOVING TO AND FROM BED TO CHAIR: UNABLE

## 2022-07-14 ASSESSMENT — GAIT ASSESSMENTS: GAIT LEVEL OF ASSIST: UNABLE TO PARTICIPATE

## 2022-07-14 ASSESSMENT — PATIENT HEALTH QUESTIONNAIRE - PHQ9
1. LITTLE INTEREST OR PLEASURE IN DOING THINGS: NOT AT ALL
SUM OF ALL RESPONSES TO PHQ9 QUESTIONS 1 AND 2: 0
2. FEELING DOWN, DEPRESSED, IRRITABLE, OR HOPELESS: NOT AT ALL

## 2022-07-14 ASSESSMENT — ACTIVITIES OF DAILY LIVING (ADL): TOILETING: INDEPENDENT

## 2022-07-14 NOTE — PROGRESS NOTES
Hospital Medicine Daily Progress Note    Date of Service  7/14/2022    Chief Complaint  Pj Britt is a 61 y.o. male admitted 7/12/2022 with neck pain and extremity weakness    Hospital Course    61 y.o. male who presented 7/12/2022 for planned C3-T1 laminectomy and fusion.  By the neurosurgical team for recent diagnosis of severe cervical spine central stenosis..  Procedure was completed on July 12.  He has a previous medical history that includes chronic kidney disease on dialysis, legally blind in both eyes, glaucoma, hypertension, dyslipidemia, insulin-dependent type 2 diabetes.         Interval Problem Update  Pt notes some pain in his shoulders which is unchanged.  Weakness in his arms, R>L also unchanged  Overnight did have some hypoglycemia down to 50    AFebrile  HR 50s  -1602  On 0.5 LNC O2  DW pt's wife at bedside    I have discussed this patient's plan of care and discharge plan at IDT rounds today with Case Management, Nursing, Nursing leadership, and other members of the IDT team.    Consultants/Specialty  nephrology and neurosurgery    Code Status  Full Code    Disposition  Patient is medically cleared for discharge.   Anticipate discharge to to home with close outpatient follow-up.  I have placed the appropriate orders for post-discharge needs.    Review of Systems  Review of Systems   Constitutional: Negative for chills and fever.   HENT: Negative for nosebleeds and sore throat.    Eyes: Negative for blurred vision and double vision.   Respiratory: Negative for cough and shortness of breath.    Cardiovascular: Negative for chest pain, palpitations and leg swelling.   Gastrointestinal: Negative for abdominal pain, diarrhea, nausea and vomiting.   Genitourinary: Negative for dysuria and urgency.   Musculoskeletal: Negative for back pain.   Skin: Negative for rash.   Neurological: Positive for sensory change, focal weakness and weakness. Negative for dizziness, speech change, loss of  consciousness and headaches.        Physical Exam  Temp:  [35.9 °C (96.7 °F)-36.3 °C (97.3 °F)] 36.3 °C (97.3 °F)  Pulse:  [51-65] 60  Resp:  [5-65] 65  BP: (106-168)/(55-79) 155/71  SpO2:  [97 %-100 %] 100 %    Physical Exam  Vitals reviewed.   Constitutional:       General: He is not in acute distress.     Appearance: Normal appearance. He is well-developed. He is not diaphoretic.   HENT:      Head: Normocephalic and atraumatic.   Eyes:      Conjunctiva/sclera: Conjunctivae normal.   Neck:      Vascular: No JVD.      Comments: In hard collar  Cardiovascular:      Rate and Rhythm: Normal rate.      Heart sounds: No murmur heard.    No gallop.   Pulmonary:      Effort: Pulmonary effort is normal. No respiratory distress.      Breath sounds: No stridor. No wheezing or rales.   Abdominal:      Palpations: Abdomen is soft.      Tenderness: There is no abdominal tenderness. There is no guarding or rebound.   Musculoskeletal:         General: No tenderness.      Right lower leg: No edema.      Left lower leg: No edema.   Skin:     General: Skin is warm and dry.      Findings: No rash.   Neurological:      Mental Status: He is alert and oriented to person, place, and time.      Comments: 2/5 R upper  3/5 L upper   Psychiatric:         Mood and Affect: Mood normal.         Thought Content: Thought content normal.         Fluids    Intake/Output Summary (Last 24 hours) at 7/14/2022 5825  Last data filed at 7/14/2022 1200  Gross per 24 hour   Intake 540 ml   Output 90 ml   Net 450 ml       Laboratory  Recent Labs     07/13/22  0429 07/14/22 0445   WBC 9.0 9.0   RBC 3.49* 3.14*   HEMOGLOBIN 10.8* 9.8*   HEMATOCRIT 33.8* 30.1*   MCV 96.8 95.9   MCH 30.9 31.2   MCHC 32.0* 32.6*   RDW 47.7 47.6   PLATELETCT 170 156*   MPV 10.6 10.6     Recent Labs     07/12/22  1020 07/13/22  0429 07/14/22  0445   SODIUM  --  134* 136   POTASSIUM 5.2 5.7* 4.9   CHLORIDE  --  95* 96   CO2  --  23 29   GLUCOSE  --  145* 68   BUN  --  50* 33*    CREATININE  --  6.91* 5.08*   CALCIUM  --  8.5 8.6                   Imaging  DX-PORTABLE FLUORO > 1 HOUR   Final Result      Intraoperative images intended for localization and not for diagnostic purposes demonstrate C4-C6 ACDF. Extensive posterior spinal fusion. See procedure report for details.      Fluoroscopy Time:  29 second.  6 fluoroscopic images were obtained.      DX-CERVICAL SPINE-2 OR 3 VIEWS   Final Result      Intraoperative images intended for localization and not for diagnostic purposes demonstrate C4-C6 ACDF. Extensive posterior spinal fusion. See procedure report for details.      Fluoroscopy Time:  29 second.  6 fluoroscopic images were obtained.           Assessment/Plan  * Spinal stenosis in cervical region- (present on admission)  Assessment & Plan  Now s/p surgical decompression and fusion  Hard collar  No chemical DVD prophylaxis  PT/OT consulted    ESRD on dialysis (HCC)- (present on admission)  Assessment & Plan  Nephrology following  Follow BMP  DC IVFs    Type 2 diabetes mellitus with nephropathy, and retinopathy (HCC)- (present on admission)  Assessment & Plan  outpt is on NPH 20 in am and 15 in hs with preprandial short acting however wife will on occasion not give evening dose if his qhs BG is <140.  Given hypoglycemia overnight will DC his evening NPH  Last A1c in our system was from 3 years ago; 5.9    Hypertension- (present on admission)  Assessment & Plan  On metoprolol 100 BID, amlodipine 10 as an outpt which we have continued with parameters  monitor    Hyperlipidemia- (present on admission)  Assessment & Plan  statin       VTE prophylaxis: SCDs    I have performed a physical exam and reviewed and updated ROS and Plan today (7/14/2022). In review of yesterday's note (7/13/2022), there are no changes except as documented above.

## 2022-07-14 NOTE — CONSULTS
DATE OF SERVICE:  07/13/2022     REQUESTING PHYSICIAN:  Stanley Shepherd MD from the ICU team.     REASON FOR CONSULTATION:  Management of chronic kidney disease assessing the   need for urgent dialysis.     The patient seen and examined, medical record reviewed.     HISTORY OF PRESENT ILLNESS:  The patient is a very unfortunate but pleasant   61-year-old Russian-speaking gentleman who is very well known to our service.    He has a history of end-stage renal disease, undergoes hemodialysis on a   Monday, Wednesday, Friday at [facility].  He is under the care of my   associate, Dr. Denys Haskins.  The patient presented to the hospital yesterday   for elective spinal surgery.  His procedure went well yesterday without any   complication.  Now, he is in the intensive care unit.  We were called to   manage his kidney disease, assessing the need for urgent dialysis.     The patient had dialysis on Monday, 07/11/2022.     The patient is complaining of some neck pain around the surgery site but no   chest pain, no shortness of breath.     PAST MEDICAL HISTORY:  Significant for:  1.  Diabetes mellitus.  2.  End-stage renal disease, hypertension.  3.  Anemia.     ALLERGIES:  THE PATIENT IS ALLERGIC TO ASPIRIN.     SOCIAL HISTORY:  The patient is .  He has no smoking history.     FAMILY HISTORY:  Positive for diabetes in his brother.     MEDICATIONS:  Reviewed.     REVIEW OF SYSTEMS:  All systems were reviewed; they were negative, except   outlined in the history of present illness.     PHYSICAL EXAMINATION:  GENERAL:  The patient appears ill, but no apparent distress.  He is legally   blind.  HEENT:  Normocephalic, atraumatic.  Sclerae is anicteric.  Pupils are   reactive.  Nose normal.  Mucous membranes dry.  NECK:  The patient has a surgical collar.  There is no lymphadenopathy, no   JVD.  CHEST:  Normal.  LUNGS:  Clear to auscultation.  HEART:  S1, S2.  ABDOMEN:  Soft, nontender.  No hepatosplenomegaly.  There  is no inguinal   lymphadenopathy.  EXTREMITIES:  There is trace lower extremity edema.  SKIN:  No skin rash.  NEUROLOGIC:  The patient is alert and oriented x3.  Again, the patient is   blind.     LABORATORY DATA:  His recent labs from today were reviewed.  His potassium was   5.7.     ASSESSMENT:  1.  End-stage renal disease.  2.  Hyperkalemia.  3.  Hyponatremia.  4.  Anemia.     PLAN:  1.  We will plan urgent dialysis to manage his hyperkalemia.  2.  Erythropoietin with dialysis.  3.  Renal diet.  4.  Evaluate daily for the need of dialysis.  5.  Prognosis is guarded.     Plan discussed in detail with Dr. Shepherd.        ______________________________  FADI NAJJAR, MD FN/CR    DD:  07/13/2022 14:01  DT:  07/13/2022 18:11    Job#:  [id number]

## 2022-07-14 NOTE — THERAPY
Physical Therapy   Initial Evaluation     Patient Name: Pj Britt  Age:  61 y.o., Sex:  male  Medical Record #: [id number]  Today's Date: 7/14/2022     Precautions  Precautions: Fall Risk;Spinal / Back Precautions ;Cervical Collar    Comments: cervical collar at all times. Blind    Assessment  Mr. Britt is a 62 y/o male who presents to acute secondary to Anterior C5 corpectomy, C4-6 fusion, posterior C3-T1 laminectomy and fusion. PMH of ESRD on dialysis, blind in B eyes, and severe canal stenosis. Pt has been primarily using a w/c for mobility. Pt spouse present and acting as . Pt with mild LE weakness, however is baseline and not new post op. Reviewed spinal precautions and log roll technique. Pt with mild weakness, dynamic balance impairments, and sequencing deficits. Mobility additionally imparied due to patient's blindness and lack of familiarity with environment. Was able to take side steps to chair with assist. Educated on seated HEP of quad sets, SLR, and glute sets. Pt able to demonstrate understanding.    Additional treatment provided of created [place] handout for spouse with spinal precautions and reviewed extensively. Had long discussion with patient and spouse regarding discharge. Based on today's session would recommend intensive post acute therapies, however anticipate pt will recover quickly and is near baseline. Spouse open to talking with physiatry team. Acute PT to follow.  Plan    Recommend Physical Therapy 4 times per week until therapy goals are met for the following treatments:  Bed Mobility, Gait Training, Neuro Re-Education / Balance, Therapeutic Activities and Therapeutic Exercises    DC Equipment Recommendations: None  Discharge Recommendations: Recommend post-acute placement for additional physical therapy services prior to discharge home            Objective       07/14/22 0912   Prior Living Situation   Prior Services Intermittent Physical Support for ADL Per Family    Housing / Facility 1 Story House   Steps Into Home 3   Equipment Owned 4-Wheel Walker;Wheelchair   Lives with - Patient's Self Care Capacity Spouse   Comments has sons who live nearby and assist as needed as well   Prior Level of Functional Mobility   Bed Mobility Independent   Transfer Status Independent   Ambulation Required Assist   Distance Ambulation (Feet) 10   Assistive Devices Used 4-Wheel Walker   Stairs Required Assist   Comments spouse assists up stairs. Ambulates extremely short distance with 4WW to get to w/c.   Cognition    Level of Consciousness Alert   Comments very pleasant and motivated. Spouse desired to provide translation services.   Passive ROM Lower Body   Passive ROM Lower Body WDL   Active ROM Lower Body    Active ROM Lower Body  WDL   Strength Lower Body   Lower Body Strength  X   Comments B LE grossly 3+/5   Sensation Lower Body   Lower Extremity Sensation   WDL   Balance Assessment   Sitting Balance (Static) Fair -   Sitting Balance (Dynamic) Fair -   Standing Balance (Static) Poor   Standing Balance (Dynamic) Poor -   Weight Shift Sitting Fair   Weight Shift Standing Poor   Comments HHA   Gait Analysis   Gait Level Of Assist Unable to Participate   Bed Mobility    Supine to Sit Minimal Assist   Sit to Supine   (up in chair)   Functional Mobility   Sit to Stand Minimal Assist   Bed, Chair, Wheelchair Transfer Moderate Assist   ICU Target Mobility Level   ICU Mobility - Targeted Level Level 3B   How much difficulty does the patient currently have...   Turning over in bed (including adjusting bedclothes, sheets and blankets)? 2   Sitting down on and standing up from a chair with arms (e.g., wheelchair, bedside commode, etc.) 1   Moving from lying on back to sitting on the side of the bed? 1   How much help from another person does the patient currently need...   Moving to and from a bed to a chair (including a wheelchair)? 2   Need to walk in a hospital room? 2   Climbing 3-5 steps with a  railing? 2   6 clicks Mobility Score 10   Short Term Goals    Short Term Goal # 1 Pt will perform supine <> sit with SPV in 6 visits to get in/out of bed   Short Term Goal # 2 Pt will perform functional transfers with SPV in 6 visits to increase independence   Short Term Goal # 3 Pt will ambulate 10ft with FWW and SPV in 6 visits to return to baseline   Short Term Goal # 4 Pt will ascend/descend 3 steps with min assist in 6 visits to return to baseline for stairs in/out of home

## 2022-07-14 NOTE — CARE PLAN
Problem: Pain - Standard  Goal: Alleviation of pain or a reduction in pain to the patient’s comfort goal  Outcome: Progressing     Problem: Fall Risk  Goal: Patient will remain free from falls  Outcome: Progressing     Problem: Knowledge Deficit - Standard  Goal: Patient and family/care givers will demonstrate understanding of plan of care, disease process/condition, diagnostic tests and medications  Outcome: Progressing     Problem: Skin Integrity  Goal: Skin integrity is maintained or improved  Outcome: Progressing     Problem: Communication  Goal: The ability to communicate needs accurately and effectively will improve  Outcome: Progressing     Problem: Respiratory  Goal: Patient will achieve/maintain optimum respiratory ventilation and gas exchange  Outcome: Progressing     Problem: Self Care  Goal: Patient will have the ability to perform ADLs independently or with assistance (bathe, groom, dress, toilet and feed)  Outcome: Progressing     Problem: Infection - Standard  Goal: Patient will remain free from infection  Outcome: Progressing   The patient is Watcher - Medium risk of patient condition declining or worsening    Shift Goals  Clinical Goals: POC, mobility,  Patient Goals: transfer  Family Goals: transfer    Progress made toward(s) clinical / shift goals:  yes

## 2022-07-14 NOTE — PROGRESS NOTES
Neurosurgery Progress Note    Subjective:  C/o neck pain - improved from yest  States right arm weakness is unchanged from preop  Eating, some mild diff swallowing  Dialysis yest cut short d/t high venous pressure  Sat at [facility] yest, PT pending this am      Exam:  LUE and BLE 5/5  Right  5/5, bi/tri 4-,  delt 3  Incisions c/d ant hvac 5, post 45    BP  Min: 87/51  Max: 168/78  Pulse  Av.2  Min: 51  Max: 65  Resp  Av.2  Min: 5  Max: 65  Temp  Av °C (96.8 °F)  Min: 35.8 °C (96.5 °F)  Max: 36.3 °C (97.3 °F)  SpO2  Av.4 %  Min: 96 %  Max: 100 %    No data recorded    Recent Labs     22  042225   WBC 9.0 9.0   RBC 3.49* 3.14*   HEMOGLOBIN 10.8* 9.8*   HEMATOCRIT 33.8* 30.1*   MCV 96.8 95.9   MCH 30.9 31.2   MCHC 32.0* 32.6*   RDW 47.7 47.6   PLATELETCT 170 156*   MPV 10.6 10.6     Recent Labs     22  1020 22  0429 22  0445   SODIUM  --  134* 136   POTASSIUM 5.2 5.7* 4.9   CHLORIDE  --  95* 96   CO2  --  23 29   GLUCOSE  --  145* 68   BUN  --  50* 33*   CREATININE  --  6.91* 5.08*   CALCIUM  --  8.5 8.6               Intake/Output                       22 07 - 22 0659 22 07 - 07/15/22 0659      9204-7202 Total 190059 Total                 Intake    P.O.  440  -- 440  --  -- --    P.O. 440 -- 440 -- -- --    I.V.  200  -- 200  --  -- --    Volume (mL) (lactated ringers infusion) 0 -- 0 -- -- --    Volume (mL) (NS infusion) 0 -- 0 -- -- --    Volume (mL) (NS infusion) 200 -- 200 -- -- --    Volume (mL) (NS infusion) 0 -- 0 -- -- --    Other  --  60 60  --  -- --    Medications (PO/Enteral Liquids) -- 60 60 -- -- --    Dialysis  500  -- 500  --  -- --    Dialysis Input (Dialysis Input / Output) 500 -- 500 -- -- --    IV Piggyback  100  -- 100  --  -- --    Volume (mL) (ceFAZolin in dextrose (Ancef) IVPB premix 2 g) 100 -- 100 -- -- --    Total Intake 1240 60 1300 -- -- --       Output    Urine  0  -- 0  --  -- --    Urine  Void (mL) 0 -- 0 -- -- --    Drains  --  90 90  --  -- --    Output (mL) (Closed/Suction Drain 1 Anterior Neck Hemovac) -- 30 30 -- -- --    Output (mL) (Closed/Suction Drain 2 Posterior Neck Hemovac) -- 60 60 -- -- --    Dialysis  895  -- 895  --  -- --    Dialysis Output (Dialysis Input / Output) 895 -- 895 -- -- --    Stool  0  0 0  --  -- --    Measurable Stool (mL) 0 0 0 -- -- --    Total Output 895 90 985 -- -- --       Net I/O     345 -30 315 -- -- --            Intake/Output Summary (Last 24 hours) at 7/14/2022 0837  Last data filed at 7/14/2022 0000  Gross per 24 hour   Intake 900 ml   Output 985 ml   Net -85 ml            • menthol  1 Lozenge Q2HRS PRN   • epoetin  3,000 Units MO, WE + FR   • oxyCODONE immediate-release  5 mg Q4HRS PRN    Or   • oxyCODONE immediate-release  10 mg Q4HRS PRN   • morphine injection  2 mg Q HOUR PRN   • acetaminophen  650 mg Q6HRS   • amLODIPine  10 mg DAILY   • atorvastatin  20 mg DAILY   • calcium acetate  2,001 mg TID WITH MEALS   • lactulose  20 g BID PRN   • metoprolol tartrate  100 mg BID   • PARoxetine  40 mg DAILY   • Pharmacy Consult Request  1 Each PHARMACY TO DOSE   • MD ALERT...DO NOT ADMINISTER NSAIDS or ASPIRIN unless ORDERED By Neurosurgery  1 Each PRN   • docusate sodium  100 mg BID   • senna-docusate  1 Tablet Nightly   • senna-docusate  1 Tablet Q24HRS PRN   • polyethylene glycol/lytes  1 Packet BID PRN   • magnesium hydroxide  30 mL QDAY PRN   • bisacodyl  10 mg Q24HRS PRN   • sodium phosphate  1 Each Once PRN   • diphenhydrAMINE  25 mg Q6HRS PRN    Or   • diphenhydrAMINE  25 mg Q6HRS PRN   • ondansetron  4 mg Q4HRS PRN   • ondansetron  4 mg Q4HRS PRN   • promethazine  12.5-25 mg Q4HRS PRN   • promethazine  12.5-25 mg Q4HRS PRN   • prochlorperazine  5-10 mg Q4HRS PRN   • tizanidine  4 mg Q8HRS PRN   • hydrALAZINE  10 mg Q HOUR PRN   • insulin regular  2-9 Units 4X/DAY ACHS    And   • dextrose bolus  25 g Q15 MIN PRN   • insulin NPH  20 Units QAM INSULIN   •  insulin NPH  15 Units PM INSULIN       Assessment and Plan:  Hospital day # 3  POD# 2 anterior C5 corpectomy, 4-6 fusion, posterior C3-T1 lami fusion  Chemical prophylactic DVT therapy: No  Start date/time: TBD    NM as above- improved from yest  Pain control-  PO oxy and tizanidine  Pt/ot/up as carol   Collar at all times  Dialysis M, W, F- neph on board - thank you  Appreciate intensivists/hospitalists assistance w/ medical management  Bowel protocol  Cont post hvac- d/c when < 30 cc in 8 hrs, d/c ant hvac   Home vs rehab- will be eval by PT-- d/w family   awaiting floor bed

## 2022-07-14 NOTE — THERAPY
Occupational Therapy   Initial Evaluation     Patient Name: Pj Britt  Age:  61 y.o., Sex:  male  Medical Record #: [id number]  Today's Date: 7/14/2022     Precautions: Fall Risk, Spinal / Back Precautions , Cervical Collar    Comments: (P) aspen on AAT, legally blind    Assessment  Pt is a 61 y.o. male seen s/p C5 corpectomy, C4-C6 fusion, posterior C3-T1 lami and fusion with PMHx of ESRD on HD, blind in B eyes, and severe canal stenosis. Pt has supportive family who assist occasionally at baseline with self cares. Pt mobilizes short distances at baseline, however is able to complete toileting, dressing, and G/H without assist. Today pt demonstrated poor balance, LUE weakness (however reported improvements from pre sx), poor endurance impacting ADLs and mobility. Provided education and handout to pt and family regarding spinal precautions, neutral spine alignment during self cares, and brace wear/care. Recommend rehab vs home with HHOT services. Will follow for skilled OT.    Plan    Recommend Occupational Therapy 4 times per week until therapy goals are met for the following treatments:  Adaptive Equipment, Cognitive Skill Development, Neuro Re-Education / Balance, Self Care/Activities of Daily Living, Sensory Integration Techniques, Therapeutic Activities and Therapeutic Exercises.    DC Equipment Recommendations: (P) Unable to determine at this time  Discharge Recommendations: (P)  (recommend PM&R consult, vs home with HHOT and 1-2 more therapy sessions prior to DC)      07/14/22 0917   Prior Living Situation   Prior Services Intermittent Physical Support for ADL Per Family   Housing / Facility 1 Story House   Bathroom Set up Walk In Shower;Shower Chair   Equipment Owned Wheelchair;4-Wheel Walker   Lives with - Patient's Self Care Capacity Spouse;Adult Children   Comments Pt has supportive family to assist upon DC   Prior Level of ADL Function   Self Feeding Independent   Grooming / Hygiene Independent    Bathing Requires Assist   Dressing Requires Assist   Toileting Independent   Comments has a bidet for toileting, occassional assist required for dressing   Prior Level of IADL Function   Medication Management Requires Assist   Laundry Requires Assist   Kitchen Mobility Requires Assist   Finances Requires Assist   Home Management Requires Assist   Shopping Requires Assist   Prior Level Of Mobility Independent With Device in Community   Precautions   Precautions Fall Risk;Spinal / Back Precautions ;Cervical Collar     Comments aspen on AAT, legally blind   Cognition    Cognition / Consciousness WDL   Comments pleasant and cooperative, spouse able to translate   Active ROM Upper Body   Active ROM Upper Body  X   Comments BUE WFL, however LUE slightly limited by pain, able to range shoulder 90 degrees, and elbow, wrist, hand WFL, RUE WFL   Strength Upper Body   Upper Body Strength  X   Comments 3+/5 L bicep strength, 4/5 L shoulder, 5/5 L hand and wrist, RUE 5/5   Sensation Upper Body   Upper Extremity Sensation  WDL   Coordination Upper Body   Coordination WDL   Balance Assessment   Sitting Balance (Static) Fair -   Sitting Balance (Dynamic) Fair -   Standing Balance (Static) Poor   Standing Balance (Dynamic) Poor -   Weight Shift Sitting Fair   Weight Shift Standing Poor   Bed Mobility    Supine to Sit Minimal Assist   Scooting Minimal Assist   ADL Assessment   Grooming Minimal Assist;Seated   Upper Body Dressing Maximal Assist  (adjusting brace)   Lower Body Dressing Moderate Assist   How much help from another person does the patient currently need...   6 Clicks Daily Activity Score 16   Functional Mobility   Sit to Stand Minimal Assist   Bed, Chair, Wheelchair Transfer Moderate Assist   Visual Perception   Visual Perception  X   Comments both eyes legally blind at baseline   Patient / Family Goals   Patient / Family Goal #1 to go to rehab   Short Term Goals   Short Term Goal # 1 Pt/spouse will verbalize spinal  precautions without verbal prompting   Short Term Goal # 2 Pt/spouse will demo FB dressing following precautions SPV   Short Term Goal # 3 Pt will demo functional txfs SPV   Education Group   Education Provided Back Safety;Transfers;Activities of Daily Living   Role of Occupational Therapist Patient Response Patient;Acceptance;Explanation;Family   Back Safety Patient Response Patient;Family;Acceptance;Explanation;Handout;Action Demonstration   Transfers Patient Response Patient;Family;Acceptance;Explanation;Reinforcement Needed   ADL Patient Response Patient;Family;Acceptance;Explanation;Handout   Problem List   Problem List Decreased Active Daily Living Skills;Decreased Homemaking Skills;Decreased Upper Extremity Strength Left;Decreased Upper Extremity AROM Left;Decreased Functional Mobility;Decreased Activity Tolerance;Impaired Postural Control / Balance   Anticipated Discharge Equipment and Recommendations   DC Equipment Recommendations Unable to determine at this time   Discharge Recommendations   (recommend PM&R consult, vs home with HHOT and 1-2 more therapy sessions prior to DC)

## 2022-07-14 NOTE — DISCHARGE PLANNING
Spoke with Dr. Nunes. PT/OT evals today to help with determination of dc. Anticipate home vs rehab. PMR consulted, pending eval. LMSW will continue to follow and assist with dc needs.

## 2022-07-14 NOTE — PROGRESS NOTES
Nephrology Daily Progress Note    Date of Service  7/14/2022    Chief Complaint  61 y.o. male admitted 7/12/2022 with ESRD, status post elective cervical spine fusion.    Interval Problem Update  Patient is doing well today, complaining of mild pain in the surgery site    Review of Systems  Review of Systems   Constitutional: Negative for chills, fever and malaise/fatigue.   Respiratory: Negative for cough and shortness of breath.    Cardiovascular: Negative for chest pain and leg swelling.   Gastrointestinal: Negative for nausea and vomiting.   Genitourinary: Negative for dysuria, frequency and urgency.   Musculoskeletal: Positive for neck pain.        Physical Exam  Temp:  [35.9 °C (96.7 °F)-36.3 °C (97.3 °F)] 36.3 °C (97.3 °F)  Pulse:  [51-65] 59  Resp:  [5-65] 65  BP: (106-168)/(55-79) 136/66  SpO2:  [97 %-100 %] 97 %    Physical Exam  Vitals and nursing note reviewed.   Constitutional:       General: He is not in acute distress.     Appearance: He is not ill-appearing.      Interventions: Cervical collar in place.   HENT:      Head: Normocephalic and atraumatic.      Right Ear: External ear normal.      Left Ear: External ear normal.      Nose: Nose normal.   Eyes:      General:         Right eye: No discharge.         Left eye: No discharge.      Conjunctiva/sclera: Conjunctivae normal.   Cardiovascular:      Rate and Rhythm: Normal rate and regular rhythm.      Heart sounds: No murmur heard.  Pulmonary:      Effort: Pulmonary effort is normal. No respiratory distress.      Breath sounds: Normal breath sounds. No wheezing.   Musculoskeletal:         General: No tenderness or deformity.      Right lower leg: No edema.      Left lower leg: No edema.   Skin:     General: Skin is warm.   Neurological:      General: No focal deficit present.      Mental Status: He is alert and oriented to person, place, and time.      Comments: Patient is blind   Psychiatric:         Mood and Affect: Mood normal.         Behavior:  Behavior normal.         Fluids    Intake/Output Summary (Last 24 hours) at 7/14/2022 1232  Last data filed at 7/14/2022 0000  Gross per 24 hour   Intake 180 ml   Output 90 ml   Net 90 ml       Laboratory  Recent Labs     07/13/22  0429 07/14/22  0445   WBC 9.0 9.0   RBC 3.49* 3.14*   HEMOGLOBIN 10.8* 9.8*   HEMATOCRIT 33.8* 30.1*   MCV 96.8 95.9   MCH 30.9 31.2   MCHC 32.0* 32.6*   RDW 47.7 47.6   PLATELETCT 170 156*   MPV 10.6 10.6     Recent Labs     07/12/22  1020 07/13/22  0429 07/14/22  0445   SODIUM  --  134* 136   POTASSIUM 5.2 5.7* 4.9   CHLORIDE  --  95* 96   CO2  --  23 29   GLUCOSE  --  145* 68   BUN  --  50* 33*   CREATININE  --  6.91* 5.08*   CALCIUM  --  8.5 8.6         No results for input(s): NTPROBNP in the last 72 hours.        Imaging  DX-PORTABLE FLUORO > 1 HOUR   Final Result      Intraoperative images intended for localization and not for diagnostic purposes demonstrate C4-C6 ACDF. Extensive posterior spinal fusion. See procedure report for details.      Fluoroscopy Time:  29 second.  6 fluoroscopic images were obtained.      DX-CERVICAL SPINE-2 OR 3 VIEWS   Final Result      Intraoperative images intended for localization and not for diagnostic purposes demonstrate C4-C6 ACDF. Extensive posterior spinal fusion. See procedure report for details.      Fluoroscopy Time:  29 second.  6 fluoroscopic images were obtained.            Assessment/Plan  1 ESRD  2 anemia  3 spinal stenosis  4 diabetes mellitus    no acute need for HD today  EPO for dialysis  Renal diet  Daily labs  Renal dose all meds  Avoid nephrotoxins

## 2022-07-15 LAB
ALBUMIN SERPL BCP-MCNC: 3.4 G/DL (ref 3.2–4.9)
ALBUMIN/GLOB SERPL: 1.3 G/DL
ALP SERPL-CCNC: 99 U/L (ref 30–99)
ALT SERPL-CCNC: <5 U/L (ref 2–50)
ANION GAP SERPL CALC-SCNC: 14 MMOL/L (ref 7–16)
AST SERPL-CCNC: 17 U/L (ref 12–45)
BILIRUB SERPL-MCNC: 0.2 MG/DL (ref 0.1–1.5)
BUN SERPL-MCNC: 46 MG/DL (ref 8–22)
CALCIUM SERPL-MCNC: 8.5 MG/DL (ref 8.5–10.5)
CHLORIDE SERPL-SCNC: 90 MMOL/L (ref 96–112)
CO2 SERPL-SCNC: 25 MMOL/L (ref 20–33)
CREAT SERPL-MCNC: 6.25 MG/DL (ref 0.5–1.4)
ERYTHROCYTE [DISTWIDTH] IN BLOOD BY AUTOMATED COUNT: 48 FL (ref 35.9–50)
GFR SERPLBLD CREATININE-BSD FMLA CKD-EPI: 9 ML/MIN/1.73 M 2
GLOBULIN SER CALC-MCNC: 2.7 G/DL (ref 1.9–3.5)
GLUCOSE BLD STRIP.AUTO-MCNC: 106 MG/DL (ref 65–99)
GLUCOSE BLD STRIP.AUTO-MCNC: 61 MG/DL (ref 65–99)
GLUCOSE BLD STRIP.AUTO-MCNC: 63 MG/DL (ref 65–99)
GLUCOSE BLD STRIP.AUTO-MCNC: 69 MG/DL (ref 65–99)
GLUCOSE BLD STRIP.AUTO-MCNC: 81 MG/DL (ref 65–99)
GLUCOSE BLD STRIP.AUTO-MCNC: 86 MG/DL (ref 65–99)
GLUCOSE BLD STRIP.AUTO-MCNC: 93 MG/DL (ref 65–99)
GLUCOSE BLD STRIP.AUTO-MCNC: 96 MG/DL (ref 65–99)
GLUCOSE SERPL-MCNC: 78 MG/DL (ref 65–99)
HCT VFR BLD AUTO: 30.9 % (ref 42–52)
HGB BLD-MCNC: 10 G/DL (ref 14–18)
MCH RBC QN AUTO: 31.3 PG (ref 27–33)
MCHC RBC AUTO-ENTMCNC: 32.4 G/DL (ref 33.7–35.3)
MCV RBC AUTO: 96.9 FL (ref 81.4–97.8)
PLATELET # BLD AUTO: 125 K/UL (ref 164–446)
PMV BLD AUTO: 10.9 FL (ref 9–12.9)
POTASSIUM SERPL-SCNC: 5.4 MMOL/L (ref 3.6–5.5)
PROT SERPL-MCNC: 6.1 G/DL (ref 6–8.2)
RBC # BLD AUTO: 3.19 M/UL (ref 4.7–6.1)
SODIUM SERPL-SCNC: 129 MMOL/L (ref 135–145)
WBC # BLD AUTO: 10.6 K/UL (ref 4.8–10.8)

## 2022-07-15 PROCEDURE — 700102 HCHG RX REV CODE 250 W/ 637 OVERRIDE(OP): Performed by: NEUROLOGICAL SURGERY

## 2022-07-15 PROCEDURE — 82962 GLUCOSE BLOOD TEST: CPT

## 2022-07-15 PROCEDURE — A9270 NON-COVERED ITEM OR SERVICE: HCPCS | Performed by: NEUROLOGICAL SURGERY

## 2022-07-15 PROCEDURE — 700102 HCHG RX REV CODE 250 W/ 637 OVERRIDE(OP): Performed by: CLINICAL NURSE SPECIALIST

## 2022-07-15 PROCEDURE — 99231 SBSQ HOSP IP/OBS SF/LOW 25: CPT | Performed by: HOSPITALIST

## 2022-07-15 PROCEDURE — 80053 COMPREHEN METABOLIC PANEL: CPT

## 2022-07-15 PROCEDURE — 90935 HEMODIALYSIS ONE EVALUATION: CPT

## 2022-07-15 PROCEDURE — 85027 COMPLETE CBC AUTOMATED: CPT

## 2022-07-15 PROCEDURE — 700111 HCHG RX REV CODE 636 W/ 250 OVERRIDE (IP): Performed by: INTERNAL MEDICINE

## 2022-07-15 PROCEDURE — 36415 COLL VENOUS BLD VENIPUNCTURE: CPT

## 2022-07-15 PROCEDURE — 700111 HCHG RX REV CODE 636 W/ 250 OVERRIDE (IP): Performed by: NEUROLOGICAL SURGERY

## 2022-07-15 PROCEDURE — A9270 NON-COVERED ITEM OR SERVICE: HCPCS | Performed by: CLINICAL NURSE SPECIALIST

## 2022-07-15 PROCEDURE — 770001 HCHG ROOM/CARE - MED/SURG/GYN PRIV*

## 2022-07-15 PROCEDURE — A9270 NON-COVERED ITEM OR SERVICE: HCPCS | Performed by: HOSPITALIST

## 2022-07-15 PROCEDURE — 99232 SBSQ HOSP IP/OBS MODERATE 35: CPT | Performed by: INTERNAL MEDICINE

## 2022-07-15 PROCEDURE — 700102 HCHG RX REV CODE 250 W/ 637 OVERRIDE(OP): Performed by: HOSPITALIST

## 2022-07-15 RX ORDER — LACTULOSE 20 G/30ML
30 SOLUTION ORAL 3 TIMES DAILY PRN
Status: DISCONTINUED | OUTPATIENT
Start: 2022-07-15 | End: 2022-07-19 | Stop reason: HOSPADM

## 2022-07-15 RX ORDER — HYDRALAZINE HYDROCHLORIDE 10 MG/1
10 TABLET, FILM COATED ORAL EVERY 8 HOURS
Status: DISCONTINUED | OUTPATIENT
Start: 2022-07-15 | End: 2022-07-19 | Stop reason: HOSPADM

## 2022-07-15 RX ADMIN — LACTULOSE 30 ML: 20 SOLUTION ORAL at 17:42

## 2022-07-15 RX ADMIN — HYDRALAZINE HYDROCHLORIDE 10 MG: 10 TABLET ORAL at 14:54

## 2022-07-15 RX ADMIN — AMLODIPINE BESYLATE 10 MG: 10 TABLET ORAL at 05:17

## 2022-07-15 RX ADMIN — Medication 2001 MG: at 11:37

## 2022-07-15 RX ADMIN — SENNOSIDES AND DOCUSATE SODIUM 1 TABLET: 50; 8.6 TABLET ORAL at 22:51

## 2022-07-15 RX ADMIN — ACETAMINOPHEN 650 MG: 325 TABLET, FILM COATED ORAL at 17:42

## 2022-07-15 RX ADMIN — ACETAMINOPHEN 650 MG: 325 TABLET, FILM COATED ORAL at 00:00

## 2022-07-15 RX ADMIN — ACETAMINOPHEN 650 MG: 325 TABLET, FILM COATED ORAL at 11:39

## 2022-07-15 RX ADMIN — PAROXETINE HYDROCHLORIDE 40 MG: 20 TABLET, FILM COATED ORAL at 05:17

## 2022-07-15 RX ADMIN — Medication 2001 MG: at 17:42

## 2022-07-15 RX ADMIN — OXYCODONE HYDROCHLORIDE 10 MG: 10 TABLET ORAL at 22:51

## 2022-07-15 RX ADMIN — METOPROLOL 100 MG: 100 TABLET ORAL at 17:43

## 2022-07-15 RX ADMIN — OMEPRAZOLE 20 MG: 20 CAPSULE, DELAYED RELEASE ORAL at 05:17

## 2022-07-15 RX ADMIN — DOCUSATE SODIUM 100 MG: 100 CAPSULE, LIQUID FILLED ORAL at 17:43

## 2022-07-15 RX ADMIN — SENNOSIDES AND DOCUSATE SODIUM 1 TABLET: 50; 8.6 TABLET ORAL at 22:50

## 2022-07-15 RX ADMIN — ONDANSETRON 4 MG: 2 INJECTION INTRAMUSCULAR; INTRAVENOUS at 18:24

## 2022-07-15 RX ADMIN — ACETAMINOPHEN 650 MG: 325 TABLET, FILM COATED ORAL at 22:50

## 2022-07-15 RX ADMIN — HYDRALAZINE HYDROCHLORIDE 10 MG: 10 TABLET ORAL at 23:17

## 2022-07-15 RX ADMIN — DOCUSATE SODIUM 100 MG: 100 CAPSULE, LIQUID FILLED ORAL at 05:17

## 2022-07-15 RX ADMIN — METOPROLOL 100 MG: 100 TABLET ORAL at 05:17

## 2022-07-15 RX ADMIN — ATORVASTATIN CALCIUM 20 MG: 20 TABLET, FILM COATED ORAL at 05:17

## 2022-07-15 RX ADMIN — EPOETIN ALFA-EPBX 3000 UNITS: 3000 INJECTION, SOLUTION INTRAVENOUS; SUBCUTANEOUS at 08:56

## 2022-07-15 RX ADMIN — ACETAMINOPHEN 650 MG: 325 TABLET, FILM COATED ORAL at 05:17

## 2022-07-15 ASSESSMENT — ENCOUNTER SYMPTOMS
BACK PAIN: 0
NECK PAIN: 1
FEVER: 0
CHILLS: 0
FOCAL WEAKNESS: 1
ABDOMINAL PAIN: 0
SENSORY CHANGE: 1
WEAKNESS: 1
HEADACHES: 0
SHORTNESS OF BREATH: 0
ROS GI COMMENTS: DECREASED APPETITE
PALPITATIONS: 0
NAUSEA: 0
VOMITING: 0
DIARRHEA: 0
COUGH: 0
LOSS OF CONSCIOUSNESS: 0
SPEECH CHANGE: 0
DIZZINESS: 0

## 2022-07-15 ASSESSMENT — PAIN DESCRIPTION - PAIN TYPE
TYPE: ACUTE PAIN

## 2022-07-15 ASSESSMENT — FIBROSIS 4 INDEX: FIB4 SCORE: 4.04

## 2022-07-15 NOTE — PROGRESS NOTES
Neurosurgery Progress Note    Subjective:  Pt undergoing dialysis  Wife at bedside   Had n/v yest, better today      Exam:  BLE 5/5  Right  5/5, bi/tri 4-,  delt 3,  left 5/5, did not test other muscle groups as pt rec'ing dialysis  Incisions c/d ant intact with dermabond, post inc intact with drsg hvac out 50 cc past 8hrs    BP  Min: 126/60  Max: 175/78  Pulse  Av.8  Min: 58  Max: 60  Resp  Avg: 10  Min: 9  Max: 11  Temp  Av.4 °C (97.5 °F)  Min: 36.1 °C (97 °F)  Max: 36.7 °C (98 °F)  SpO2  Av.3 %  Min: 97 %  Max: 100 %    No data recorded    Recent Labs     07/13/22  0429 07/14/22  0445 07/15/22  0540   WBC 9.0 9.0 10.6   RBC 3.49* 3.14* 3.19*   HEMOGLOBIN 10.8* 9.8* 10.0*   HEMATOCRIT 33.8* 30.1* 30.9*   MCV 96.8 95.9 96.9   MCH 30.9 31.2 31.3   MCHC 32.0* 32.6* 32.4*   RDW 47.7 47.6 48.0   PLATELETCT 170 156* 125*   MPV 10.6 10.6 10.9     Recent Labs     07/13/22  0429 07/14/22  0445 07/15/22  0445   SODIUM 134* 136 129*   POTASSIUM 5.7* 4.9 5.4   CHLORIDE 95* 96 90*   CO2 23 29 25   GLUCOSE 145* 68 78   BUN 50* 33* 46*   CREATININE 6.91* 5.08* 6.25*   CALCIUM 8.5 8.6 8.5               Intake/Output                       22 - 07/15/22 0659 07/15/22 0700 - 22 0659     [id number] Total  Total                 Intake    P.O.  360  480 840  --  -- --    P.O. 360 480 840 -- -- --    Other  --  180 180  --  -- --    Medications (PO/Enteral Liquids) -- 180 180 -- -- --    Total Intake  -- -- --       Output    Emesis  --  -- --  --  -- --    Emesis - Number of Times 1 x -- 1 x -- -- --    Drains  0  -- 0  0  -- 0    Output (mL) ([REMOVED] Closed/Suction Drain 1 Anterior Neck Hemovac) 0 -- 0 -- -- --    Output (mL) (Closed/Suction Drain 2 Posterior Neck Hemovac) -- -- -- 0 -- 0    Total Output 0 -- 0 0 -- 0       Net I/O      0 -- 0            Intake/Output Summary (Last 24 hours) at 7/15/2022 0922  Last data filed at 7/15/2022  0800  Gross per 24 hour   Intake 900 ml   Output 0 ml   Net 900 ml            • menthol  1 Lozenge Q2HRS PRN   • omeprazole  20 mg DAILY   • epoetin  3,000 Units MO, WE + FR   • oxyCODONE immediate-release  5 mg Q4HRS PRN    Or   • oxyCODONE immediate-release  10 mg Q4HRS PRN   • morphine injection  2 mg Q HOUR PRN   • acetaminophen  650 mg Q6HRS   • amLODIPine  10 mg DAILY   • atorvastatin  20 mg DAILY   • calcium acetate  2,001 mg TID WITH MEALS   • lactulose  20 g BID PRN   • metoprolol tartrate  100 mg BID   • PARoxetine  40 mg DAILY   • Pharmacy Consult Request  1 Each PHARMACY TO DOSE   • MD ALERT...DO NOT ADMINISTER NSAIDS or ASPIRIN unless ORDERED By Neurosurgery  1 Each PRN   • docusate sodium  100 mg BID   • senna-docusate  1 Tablet Nightly   • senna-docusate  1 Tablet Q24HRS PRN   • polyethylene glycol/lytes  1 Packet BID PRN   • magnesium hydroxide  30 mL QDAY PRN   • bisacodyl  10 mg Q24HRS PRN   • sodium phosphate  1 Each Once PRN   • diphenhydrAMINE  25 mg Q6HRS PRN    Or   • diphenhydrAMINE  25 mg Q6HRS PRN   • ondansetron  4 mg Q4HRS PRN   • ondansetron  4 mg Q4HRS PRN   • promethazine  12.5-25 mg Q4HRS PRN   • promethazine  12.5-25 mg Q4HRS PRN   • prochlorperazine  5-10 mg Q4HRS PRN   • tizanidine  4 mg Q8HRS PRN   • hydrALAZINE  10 mg Q HOUR PRN   • insulin regular  2-9 Units 4X/DAY ACHS    And   • dextrose bolus  25 g Q15 MIN PRN   • insulin NPH  20 Units QAM INSULIN       Assessment and Plan:  Hospital day # 4  POD# 3 anterior C5 corpectomy, 4-6 fusion, posterior C3-T1 lami fusion  Chemical prophylactic DVT therapy: No  Start date/time: TBD    Pain control-  PO oxy and tizanidine  Pt/ot/up as carol   Collar at all times  Dialysis M, W, F- neph on board - thank you  Appreciate intensivists/hospitalists assistance w/ medical management  Bowel protocol  Cont post hvac- d/c when < 30 cc in 8 hrs  D/w wife - wants rehab -pending   awaiting floor bed    ATTENDING ADDENDUM:  Patient seen independently  and agree with above note

## 2022-07-15 NOTE — DISCHARGE PLANNING
Outpatient Dialysis Note    Confirmed patient is active at:    [facility]   [address] [place] [address]    Schedule: Monday, Wednesday, Friday  Time: 5:15AM    Patient is seen by Dr. Haskins in HD clinic.    Spoke with April at facility who confirmed.    Forwarded records for review.    Lizet Maki- Senior Dialysis Coordinator Ph# [phone]  [facility]

## 2022-07-15 NOTE — PROGRESS NOTES
Hospital Medicine Daily Progress Note    Date of Service  7/15/2022    Chief Complaint  jP Britt is a 61 y.o. male admitted 7/12/2022 with neck pain and extremity weakness    Hospital Course    61 y.o. male who presented 7/12/2022 for planned C3-T1 laminectomy and fusion.  By the neurosurgical team for recent diagnosis of severe cervical spine central stenosis..  Procedure was completed on July 12.  He has a previous medical history that includes chronic kidney disease on dialysis, legally blind in both eyes, glaucoma, hypertension, dyslipidemia, insulin-dependent type 2 diabetes.         Interval Problem Update  Pt notes some pain in his shoulders which is unchanged.  Weakness in his arms, R>L also unchanged  Ant VIKKI drain removed this am: post remains in place  Overnight did have some hypoglycemia down to 69    AFebrile  HR 50s  -150s  On 0.5 LNC O2  DW pt's wife at bedside    I have discussed this patient's plan of care and discharge plan at IDT rounds today with Case Management, Nursing, Nursing leadership, and other members of the IDT team.    Consultants/Specialty  nephrology and neurosurgery    Code Status  Full Code    Disposition  Patient is medically cleared for discharge.   Anticipate discharge to to home with close outpatient follow-up.  I have placed the appropriate orders for post-discharge needs.    Review of Systems  Review of Systems   Constitutional: Negative for chills and fever.   Eyes:        Pt is blind   Respiratory: Negative for cough and shortness of breath.    Cardiovascular: Negative for chest pain, palpitations and leg swelling.   Gastrointestinal: Negative for abdominal pain, diarrhea, nausea and vomiting.   Genitourinary: Negative for dysuria and urgency.   Musculoskeletal: Positive for neck pain. Negative for back pain.   Skin: Negative for rash.   Neurological: Positive for sensory change, focal weakness and weakness. Negative for dizziness, speech change, loss of  consciousness and headaches.        Physical Exam  Temp:  [36.1 °C (97 °F)-36.7 °C (98 °F)] 36.7 °C (98 °F)  Pulse:  [56-60] 60  Resp:  [14-65] 65  BP: (126-166)/(60-75) 126/60  SpO2:  [97 %-100 %] 99 %    Physical Exam  Vitals reviewed.   Constitutional:       General: He is not in acute distress.     Appearance: Normal appearance. He is well-developed. He is not diaphoretic.   HENT:      Head: Normocephalic and atraumatic.   Eyes:      Conjunctiva/sclera: Conjunctivae normal.   Neck:      Vascular: No JVD.      Comments: In hard collar  Cardiovascular:      Rate and Rhythm: Normal rate.      Heart sounds: No murmur heard.    No gallop.   Pulmonary:      Effort: Pulmonary effort is normal. No respiratory distress.      Breath sounds: No stridor. No wheezing or rales.   Abdominal:      General: There is no distension.      Palpations: Abdomen is soft.      Tenderness: There is no abdominal tenderness. There is no guarding or rebound.   Musculoskeletal:         General: No tenderness.      Right lower leg: No edema.      Left lower leg: No edema.   Skin:     General: Skin is warm and dry.      Coloration: Skin is not jaundiced.      Findings: No rash.   Neurological:      Mental Status: He is alert and oriented to person, place, and time. Mental status is at baseline.      Comments: 2/5 R upper  3/5 L upper   Psychiatric:         Mood and Affect: Mood normal.         Thought Content: Thought content normal.         Fluids    Intake/Output Summary (Last 24 hours) at 7/15/2022 0617  Last data filed at 7/15/2022 0600  Gross per 24 hour   Intake 1020 ml   Output 0 ml   Net 1020 ml       Laboratory  Recent Labs     07/13/22  0429 07/14/22  0445 07/15/22  0540   WBC 9.0 9.0 10.6   RBC 3.49* 3.14* 3.19*   HEMOGLOBIN 10.8* 9.8* 10.0*   HEMATOCRIT 33.8* 30.1* 30.9*   MCV 96.8 95.9 96.9   MCH 30.9 31.2 31.3   MCHC 32.0* 32.6* 32.4*   RDW 47.7 47.6 48.0   PLATELETCT 170 156* 125*   MPV 10.6 10.6 10.9     Recent Labs      07/13/22  0429 07/14/22  0445 07/15/22  0445   SODIUM 134* 136 129*   POTASSIUM 5.7* 4.9 5.4   CHLORIDE 95* 96 90*   CO2 23 29 25   GLUCOSE 145* 68 78   BUN 50* 33* 46*   CREATININE 6.91* 5.08* 6.25*   CALCIUM 8.5 8.6 8.5                   Imaging  DX-PORTABLE FLUORO > 1 HOUR   Final Result      Intraoperative images intended for localization and not for diagnostic purposes demonstrate C4-C6 ACDF. Extensive posterior spinal fusion. See procedure report for details.      Fluoroscopy Time:  29 second.  6 fluoroscopic images were obtained.      DX-CERVICAL SPINE-2 OR 3 VIEWS   Final Result      Intraoperative images intended for localization and not for diagnostic purposes demonstrate C4-C6 ACDF. Extensive posterior spinal fusion. See procedure report for details.      Fluoroscopy Time:  29 second.  6 fluoroscopic images were obtained.           Assessment/Plan  * Spinal stenosis in cervical region- (present on admission)  Assessment & Plan  Now s/p surgical decompression and fusion  Hard collar  No chemical DVD prophylaxis  PT/OT consulted  Plan Rehab DC  Ant drain removed 7/15: post remains in place    ESRD on dialysis (HCC)- (present on admission)  Assessment & Plan  Nephrology following  Follow BMP  DC IVFs    Type 2 diabetes mellitus with nephropathy, and retinopathy (HCC)- (present on admission)  Assessment & Plan  outpt is on NPH 20 in am and 15 in hs with preprandial short acting however wife will on occasion not give evening dose if his qhs BG is <140.  Given hypoglycemia overnight stop his evening NPH and decrease am dose to 15  Last A1c in our system was from 3 years ago; 5.9    Hypertension- (present on admission)  Assessment & Plan  On metoprolol 100 BID, amlodipine 10 as an outpt which we have continued with parameters  Still running 150s generally: add hydralazine 10    Blindness- (present on admission)  Assessment & Plan  chronic    Hyperlipidemia- (present on admission)  Assessment & Plan  statin        VTE prophylaxis: SCDs    I have performed a physical exam and reviewed and updated ROS and Plan today (7/15/2022). In review of yesterday's note (7/14/2022), there are no changes except as documented above.

## 2022-07-15 NOTE — CARE PLAN
The patient is Stable - Low risk of patient condition declining or worsening    Shift Goals  Clinical Goals: iHD, transfer  Patient Goals: transfer  Family Goals: transfer    Progress made toward(s) clinical / shift goals:    Problem: Pain - Standard  Goal: Alleviation of pain or a reduction in pain to the patient’s comfort goal  Outcome: Progressing     Problem: Fall Risk  Goal: Patient will remain free from falls  Outcome: Progressing     Problem: Knowledge Deficit - Standard  Goal: Patient and family/care givers will demonstrate understanding of plan of care, disease process/condition, diagnostic tests and medications  Outcome: Progressing     Problem: Skin Integrity  Goal: Skin integrity is maintained or improved  Outcome: Progressing     Problem: Communication  Goal: The ability to communicate needs accurately and effectively will improve  Outcome: Progressing     Problem: Respiratory  Goal: Patient will achieve/maintain optimum respiratory ventilation and gas exchange  Outcome: Progressing     Problem: Self Care  Goal: Patient will have the ability to perform ADLs independently or with assistance (bathe, groom, dress, toilet and feed)  Outcome: Progressing     Problem: Infection - Standard  Goal: Patient will remain free from infection  Outcome: Progressing       Patient is not progressing towards the following goals:

## 2022-07-15 NOTE — OP REPORT
DATE OF SERVICE:  07/12/2022     PREOPERATIVE DIAGNOSIS:  Cervical kyphosis and spondylolisthesis with critical   central canal stenosis with myelopathy.     POSTOPERATIVE DIAGNOSIS:  Cervical kyphosis and spondylolisthesis with   critical central canal stenosis with myelopathy.     PROCEDURES:  There are 2 procedures here.  PROCEDURE #1 as follows:  1.  Anterior cervical 4-5 and 5-6 diskectomy with interbody arthrodesis.  2.  Total corpectomy of cervical 5.  3.  Bilateral neural foraminotomies, cervical 5 and cervical 6 roots.  4.  Implantation of Globus expandable cage spanning cervical 4 through 6.  5.  Anterior cervical plating, cervical 4 through 6 using a Globus static   plate with 14 mm self-drilling, self-tapping screws.  6.  Use of operative microscope for microdissection.  7.  Use of intraoperative neuromonitoring.  8.  Use of locally harvested morselized autograft.     PROCEDURE #2:  1.  Posterior cervical 3 through thoracic 1 bilateral laminectomy and partial   facetectomy.  2.  Lateral mass screw instrumentation, cervical 3, 4, 5, 6 and 7.  3.  Pedicle screw instrumentation, thoracic 1.  Both these were bilateral   using Shelfie system.  4.  Posterolateral arthrodesis, posterior cervical 3, 4, 5, 6, 7 and thoracic   1.  5.  Use of locally harvested morselized autograft.  6.  Use of allograft.  7.  Use of intraoperative neuromonitoring.  8.  Use of modifier 22 for extensive difficulty with positioning the patient   using the David table.     SURGEON:  Ady Barr MD     ASSISTANT:  NIVIA Eng     ANESTHESIA:  General.     COMPLICATIONS:  None.     ESTIMATED BLOOD LOSS:  200 mL for both procedures.     DESCRIPTION OF PROCEDURE:  The patient was brought to the operating room,   identified in the usual fashion.  General endotracheal anesthesia was induced   by the anesthesia team.  The patient was then placed supine on the operating   room table.  All pressure points were  meticulously padded.  Head was placed in   gentle extension.  We got good baseline neuromonitoring.  We then planned an   anterior cervical incision using fluoroscopic guidance to nathaly a transverse   incision in the left anterior neck.  The patient was then prepped and draped   in the usual sterile fashion.  Local anesthesia was infiltrated in   subcutaneous tissue.  A 10 blade was used to incise the skin.  Dissection was   carried down through platysma using Bovie electrocautery.  Retractors were put   in place.  We then used sharp dissection to access the anterior spine.    Spinal needle was placed at what we believed to be cervical 5-6.  Film was   taken confirming that we were at the correct level.  This was marked with a   Bovie.  We then exposed the cervical 4, 5 and 6.  Using a Bovie   electrocautery, we exposed the bone in the disk space and then reflected   longus colli muscles laterally bilaterally.   retractors were then   placed laterally underneath longus colli, then superiorly and inferiorly, we   brought in the operative microscope for the remainder of the case.  We placed   Onaway pins at cervical 4 and 6 for gentle disk space distraction.  We then   used a 15 blade to incise both the cervical 4-5 and 5-6 disk spaces.  We then   removed disk contents using combination of alley and pituitary and upgoing   curette at both 4-5 and 5-6.  At this point, we then used a combination of   high-speed air drill, Leksell rongeur to harvest autograft from the cage. We   then used a high-speed air drill to drill the vertebral body down to the   posterior longitudinal ligament.  We then used Kerrison 1 and starting at   cervical 5-6 to open up the posterior longitudinal ligament.  We then   meticulously lifted the PLL and remaining eggshell of bone off of the dura.    This was worst at cervical 4-5, which is where we started at cervical 5-6 to   lift it off with Microsect curettes.  We then completed the  exposure of the   central canal and the lateral recesses using Kerrison 1 and 2 punches.    Copious amounts of antibiotic irrigation were used to wash out the wound.    FloSeal gentle tamponade was used for hemostasis in the gutters.  We performed   foraminotomies of cervical 5 and 6 roots.  We had excellent decompression,   neuro monitoring actually improved upon completion of the decompression.  We   then brought in fluoroscopic guidance to measure the size of the Globus cage,   it was filled with autograft, inserted the disk space and gently countersunk   using a tamp and expanded to the appropriate size.  Ringgold retraction was then   taken off.  Film was taken confirming that the cage looked to be in excellent   position.  Ringgold pins were removed at this point.  We then placed an   anterior cervical plate of the appropriate size with 14 mm self-drilling,   self-tapping screws.  All screws were final tightened, had good purchase and   Cam locked to the plate. All retractors were removed.  Hemostasis was   excellent.  We achieved some hemostasis in longus colli with bipolar   electrocautery.  Copious amounts of antibiotic irrigation were used to wash   out the wound.  We then left a Hemovac drain and closed the incision in layers   and topped with Dermabond.  All sponge and needle counts were correct x2 at   the end the case.  I was present and scrubbed for the entire procedure.     The patient was then prepared for part 2.  He was placed in Sullivan 3-point   pin fixation to the appropriate tightness.  He was then sandwiched on the bed   using the David table.  All pressure points were meticulously padded.  This   took an extensive amount of time.  Using the David table, we then flipped   the patient into a prone position after securing a monitored bed.  There were   no changes to monitoring with the flip.  We then meticulously padded the   patient again.  Shoulders were taped down.  All pressure points were    meticulously padded.  A surgical clippers were used to clip the back of the   patient's hair.  We then marked posterior cervical thoracic incision using a   marking pen.  He was then prepped and draped in the usual sterile fashion.    Local anesthesia was infiltrated in subcutaneous tissue.  A 10 blade was used   to incise the skin.  Dissection was carried down in a subperiosteal fashion   bilaterally.  Retractors were put in place.  Kocher was placed on the spinous   process of cervical 3.  Film was taken confirming that we were at the correct   level.  This was then marked with a blue marking pen.  We then adjusted the   retractors and performed a standard laminectomy of cervical 3, 4, 5, 6, 7 and   partial thoracic 1 using a high-speed air drill to drill a trough at the   junction of the lateral mass and lamina.  We then used heavy Nieves scissors to   cut the interspinous ligament between cervical 2 and 3 and between cervical 7   and thoracic 1.  We then used 2 Kochers to lift the lamina complex off en   juan diego.  We then completed the decompression of the lateral recesses using   Kerrison 1 and 2 punches.  We had excellent decompression.  FloSeal gentle   tamponade was used for hemostasis.  We then placed lateral mass screws first   on the left and then on the right using the following sequence of events.  A   high-speed air drill was used to drill a starting point.  A 14 mm cannulating   drill was then used to cannulate the lateral mass.  Kandace confirmed we had   no breach.  We then placed 3.5x14 mm screws at cervical 3, 4, 5, 6 and 7 on   the left side.  We then placed a right-sided pedicle screws using the   following sequence of events.  We used a double ball probe to identify the   medial and superior border of the T1 pedicle.  We then used high-speed air   drill to drill a  hole.  A gearshift was used to cannulate the pedicle   down to 26 mm.  Kandace confirmed we had no breach.  We then partially  tapped   the pedicle.  We then placed a 4.5x26 mm screw at T1.  Film was taken   confirming the hardware looked to be in good position but we thought that the   right-sided pedicle screw at thoracic 1 may have been a little bit low.  We   were unable to tell if it breached the inferior aspect of the pedicle because   we were unable to feel with a double ball probe but films looked like it was a   little bit low.  We then removed the pedicle screw and we drilled a new   trajectory using the same starting point using a high-speed air drill and then   partially tapped that new hole.  We then placed a screw in a different   trajectory.  Film was taken confirming that it looked to be in excellent   position.  We then moved to the opposite side and placed right-sided lateral   mass screws at cervical 4.  Patient had a very small lateral mass and   placement of the screw looked like it broke out inferiorly.  We then defined   the anatomy again and drilled a new  hole and a new cannulating 14 mm   drill slightly superior to the previous screw, which had good purchase.  We   replaced the screw at cervical 4 and had good purchase.  Films were taken   showing all the hardware looked to be in excellent position.  FloSeal gentle   tamponade was used for hemostasis.  There were no changes to monitoring   throughout the entirety of part 2 of the case, we had consistent improvement   after the first operation.  FloSeal gentle tamponade was used for hemostasis.    We then decorticated the transverse processes of thoracic 1 and the lateral   masses of cervical 3 through 7.  We then placed rods with a nut caps, which   were all final tightened.  We had excellent purchase.  All the hardware looked   to be in excellent position.  We left a Hemovac drain in the epidural space,   brought out through a separate stab incision.  We then closed the incision in   layers and topped with staples and a sterile dressing.  I was present and    scrubbed for the entire procedure.  The patient was flipped into a supine   position, taken out of pins and transferred to the recovery room in stable   condition.  There were no complications.        ______________________________  MD ALEXANDREA Torres/CR    DD:  07/14/2022 23:10  DT:  07/15/2022 00:28    Job#:  [id number]

## 2022-07-15 NOTE — PROGRESS NOTES
Anterior drain with no measurable output. D/C per order. Gauze teg dressing applied. Pt tolerated well.

## 2022-07-15 NOTE — PROGRESS NOTES
Nephrology Daily Progress Note    Date of Service  7/15/2022    Chief Complaint  61 y.o. male admitted 7/12/2022 with ESRD, status post elective cervical spine fusion.    Interval Problem Update  Patient is doing well today, complaining of mild pain in the surgery site  7/15 patient had hemodialysis earlier today  His neck pain is improving  Review of Systems  Review of Systems   Constitutional: Negative for chills, fever and malaise/fatigue.   Respiratory: Negative for cough and shortness of breath.    Cardiovascular: Negative for chest pain and leg swelling.   Gastrointestinal: Negative for nausea and vomiting.        Decreased appetite   Genitourinary: Negative for dysuria, frequency and urgency.   Musculoskeletal: Positive for joint pain.        Physical Exam  Temp:  [36.1 °C (97 °F)-36.7 °C (98 °F)] 36.7 °C (98 °F)  Pulse:  [57-67] 67  Resp:  [9-42] 19  BP: (116-175)/(55-78) 147/68  SpO2:  [97 %-100 %] 100 %    Physical Exam  Vitals and nursing note reviewed.   Constitutional:       General: He is not in acute distress.     Appearance: He is not ill-appearing.   HENT:      Head: Normocephalic and atraumatic.      Right Ear: External ear normal.      Left Ear: External ear normal.      Nose: Nose normal.   Eyes:      General:         Right eye: No discharge.         Left eye: No discharge.      Conjunctiva/sclera: Conjunctivae normal.   Cardiovascular:      Rate and Rhythm: Normal rate and regular rhythm.      Heart sounds: No murmur heard.  Pulmonary:      Effort: Pulmonary effort is normal. No respiratory distress.      Breath sounds: Normal breath sounds. No wheezing.   Musculoskeletal:         General: No tenderness or deformity.      Right lower leg: No edema.      Left lower leg: No edema.   Skin:     General: Skin is warm.   Neurological:      General: No focal deficit present.      Mental Status: He is alert and oriented to person, place, and time.   Psychiatric:         Mood and Affect: Mood normal.          Behavior: Behavior normal.         Fluids    Intake/Output Summary (Last 24 hours) at 7/15/2022 1340  Last data filed at 7/15/2022 1200  Gross per 24 hour   Intake 1160 ml   Output 2500 ml   Net -1340 ml       Laboratory  Recent Labs     07/13/22 0429 07/14/22 0445 07/15/22  0540   WBC 9.0 9.0 10.6   RBC 3.49* 3.14* 3.19*   HEMOGLOBIN 10.8* 9.8* 10.0*   HEMATOCRIT 33.8* 30.1* 30.9*   MCV 96.8 95.9 96.9   MCH 30.9 31.2 31.3   MCHC 32.0* 32.6* 32.4*   RDW 47.7 47.6 48.0   PLATELETCT 170 156* 125*   MPV 10.6 10.6 10.9     Recent Labs     07/13/22 0429 07/14/22 0445 07/15/22  0445   SODIUM 134* 136 129*   POTASSIUM 5.7* 4.9 5.4   CHLORIDE 95* 96 90*   CO2 23 29 25   GLUCOSE 145* 68 78   BUN 50* 33* 46*   CREATININE 6.91* 5.08* 6.25*   CALCIUM 8.5 8.6 8.5         No results for input(s): NTPROBNP in the last 72 hours.        Imaging  DX-PORTABLE FLUORO > 1 HOUR   Final Result      Intraoperative images intended for localization and not for diagnostic purposes demonstrate C4-C6 ACDF. Extensive posterior spinal fusion. See procedure report for details.      Fluoroscopy Time:  29 second.  6 fluoroscopic images were obtained.      DX-CERVICAL SPINE-2 OR 3 VIEWS   Final Result      Intraoperative images intended for localization and not for diagnostic purposes demonstrate C4-C6 ACDF. Extensive posterior spinal fusion. See procedure report for details.      Fluoroscopy Time:  29 second.  6 fluoroscopic images were obtained.            Assessment/Plan  1 ESRD  2 anemia  3 spinal stenosis  4 diabetes mellitus  Continue HD 3 times weekly  Renal diet  Daily labs  Renal dose all meds  Avoid nephrotoxins  Epogen with dialysis

## 2022-07-15 NOTE — PROGRESS NOTES
0510 - FSBS 61. Gave Pt 8 oz juice.  0525 - FSBS 63. 4oz juice  0540-  FSBS 69. 4 oz juice   0555 - FSBS 81. 4oz juice  0610 - FSBS 106. Will recheck in 1 hour.

## 2022-07-15 NOTE — PROGRESS NOTES
[facility] Progress Note      HD ordered by Dr. Najjar. Treatment started at 0653 and ended at 0953.     Total Net UF 2000mL.    Pt tolerated treatment well with no issues. See flow sheet for details. Cannulation needles taken out, held pressure for 10 min and placed gauze dressing with no bleeding. Dressing CDI post dialysis, primary RN instructed to monitor for bleeding. LUE AVF + for bruit/thrill. Report given to HYACINTH Mancilla RN.

## 2022-07-15 NOTE — DISCHARGE PLANNING
S/p anterior C5 corpectomy, 4-6 fusion, posterior C3-T1 lami fusion. H/o ESRD (dialysis MWF) and blindness in both eyes. Physiatry to consult, TCC will follow.

## 2022-07-16 LAB
ALBUMIN SERPL BCP-MCNC: 3.1 G/DL (ref 3.2–4.9)
ALBUMIN/GLOB SERPL: 1.2 G/DL
ALP SERPL-CCNC: 94 U/L (ref 30–99)
ALT SERPL-CCNC: <5 U/L (ref 2–50)
ANION GAP SERPL CALC-SCNC: 12 MMOL/L (ref 7–16)
AST SERPL-CCNC: 14 U/L (ref 12–45)
BILIRUB SERPL-MCNC: 0.2 MG/DL (ref 0.1–1.5)
BUN SERPL-MCNC: 31 MG/DL (ref 8–22)
CALCIUM SERPL-MCNC: 8.4 MG/DL (ref 8.5–10.5)
CHLORIDE SERPL-SCNC: 97 MMOL/L (ref 96–112)
CO2 SERPL-SCNC: 27 MMOL/L (ref 20–33)
CREAT SERPL-MCNC: 4.47 MG/DL (ref 0.5–1.4)
ERYTHROCYTE [DISTWIDTH] IN BLOOD BY AUTOMATED COUNT: 49.5 FL (ref 35.9–50)
GFR SERPLBLD CREATININE-BSD FMLA CKD-EPI: 14 ML/MIN/1.73 M 2
GLOBULIN SER CALC-MCNC: 2.6 G/DL (ref 1.9–3.5)
GLUCOSE BLD STRIP.AUTO-MCNC: 153 MG/DL (ref 65–99)
GLUCOSE BLD STRIP.AUTO-MCNC: 156 MG/DL (ref 65–99)
GLUCOSE BLD STRIP.AUTO-MCNC: 160 MG/DL (ref 65–99)
GLUCOSE BLD STRIP.AUTO-MCNC: 59 MG/DL (ref 65–99)
GLUCOSE BLD STRIP.AUTO-MCNC: 92 MG/DL (ref 65–99)
GLUCOSE SERPL-MCNC: 68 MG/DL (ref 65–99)
HCT VFR BLD AUTO: 28.6 % (ref 42–52)
HGB BLD-MCNC: 9 G/DL (ref 14–18)
MCH RBC QN AUTO: 30.8 PG (ref 27–33)
MCHC RBC AUTO-ENTMCNC: 31.5 G/DL (ref 33.7–35.3)
MCV RBC AUTO: 97.9 FL (ref 81.4–97.8)
PLATELET # BLD AUTO: 149 K/UL (ref 164–446)
PMV BLD AUTO: 11.2 FL (ref 9–12.9)
POTASSIUM SERPL-SCNC: 4.8 MMOL/L (ref 3.6–5.5)
PROT SERPL-MCNC: 5.7 G/DL (ref 6–8.2)
RBC # BLD AUTO: 2.92 M/UL (ref 4.7–6.1)
SODIUM SERPL-SCNC: 136 MMOL/L (ref 135–145)
WBC # BLD AUTO: 8 K/UL (ref 4.8–10.8)

## 2022-07-16 PROCEDURE — 770001 HCHG ROOM/CARE - MED/SURG/GYN PRIV*

## 2022-07-16 PROCEDURE — A9270 NON-COVERED ITEM OR SERVICE: HCPCS | Performed by: NEUROLOGICAL SURGERY

## 2022-07-16 PROCEDURE — 82962 GLUCOSE BLOOD TEST: CPT | Mod: 91

## 2022-07-16 PROCEDURE — 700102 HCHG RX REV CODE 250 W/ 637 OVERRIDE(OP): Performed by: HOSPITALIST

## 2022-07-16 PROCEDURE — 85027 COMPLETE CBC AUTOMATED: CPT

## 2022-07-16 PROCEDURE — 99232 SBSQ HOSP IP/OBS MODERATE 35: CPT | Performed by: INTERNAL MEDICINE

## 2022-07-16 PROCEDURE — 99223 1ST HOSP IP/OBS HIGH 75: CPT | Performed by: PHYSICAL MEDICINE & REHABILITATION

## 2022-07-16 PROCEDURE — 80053 COMPREHEN METABOLIC PANEL: CPT

## 2022-07-16 PROCEDURE — A9270 NON-COVERED ITEM OR SERVICE: HCPCS | Performed by: HOSPITALIST

## 2022-07-16 PROCEDURE — 700102 HCHG RX REV CODE 250 W/ 637 OVERRIDE(OP): Performed by: NEUROLOGICAL SURGERY

## 2022-07-16 PROCEDURE — 99232 SBSQ HOSP IP/OBS MODERATE 35: CPT | Performed by: HOSPITALIST

## 2022-07-16 PROCEDURE — 36415 COLL VENOUS BLD VENIPUNCTURE: CPT

## 2022-07-16 RX ADMIN — HYDRALAZINE HYDROCHLORIDE 10 MG: 10 TABLET ORAL at 21:39

## 2022-07-16 RX ADMIN — ACETAMINOPHEN 650 MG: 325 TABLET, FILM COATED ORAL at 12:23

## 2022-07-16 RX ADMIN — DOCUSATE SODIUM 100 MG: 100 CAPSULE, LIQUID FILLED ORAL at 16:11

## 2022-07-16 RX ADMIN — AMLODIPINE BESYLATE 10 MG: 10 TABLET ORAL at 05:25

## 2022-07-16 RX ADMIN — Medication 2001 MG: at 08:12

## 2022-07-16 RX ADMIN — INSULIN HUMAN 2 UNITS: 100 INJECTION, SOLUTION PARENTERAL at 11:58

## 2022-07-16 RX ADMIN — HYDRALAZINE HYDROCHLORIDE 10 MG: 10 TABLET ORAL at 05:25

## 2022-07-16 RX ADMIN — Medication 2001 MG: at 12:23

## 2022-07-16 RX ADMIN — METOPROLOL 100 MG: 100 TABLET ORAL at 05:25

## 2022-07-16 RX ADMIN — OMEPRAZOLE 20 MG: 20 CAPSULE, DELAYED RELEASE ORAL at 05:25

## 2022-07-16 RX ADMIN — Medication 2001 MG: at 17:29

## 2022-07-16 RX ADMIN — ACETAMINOPHEN 650 MG: 325 TABLET, FILM COATED ORAL at 17:29

## 2022-07-16 RX ADMIN — PAROXETINE HYDROCHLORIDE 40 MG: 20 TABLET, FILM COATED ORAL at 05:24

## 2022-07-16 RX ADMIN — SENNOSIDES AND DOCUSATE SODIUM 1 TABLET: 50; 8.6 TABLET ORAL at 21:39

## 2022-07-16 RX ADMIN — DOCUSATE SODIUM 100 MG: 100 CAPSULE, LIQUID FILLED ORAL at 05:25

## 2022-07-16 RX ADMIN — ACETAMINOPHEN 650 MG: 325 TABLET, FILM COATED ORAL at 05:24

## 2022-07-16 RX ADMIN — METOPROLOL 100 MG: 100 TABLET ORAL at 17:29

## 2022-07-16 RX ADMIN — ATORVASTATIN CALCIUM 20 MG: 20 TABLET, FILM COATED ORAL at 05:25

## 2022-07-16 ASSESSMENT — ENCOUNTER SYMPTOMS
CHILLS: 0
DIARRHEA: 0
LOSS OF CONSCIOUSNESS: 0
SENSORY CHANGE: 1
COUGH: 0
PALPITATIONS: 0
FOCAL WEAKNESS: 1
SPEECH CHANGE: 0
WEAKNESS: 1
DIZZINESS: 0
HEADACHES: 0
FEVER: 0
ABDOMINAL PAIN: 0
SHORTNESS OF BREATH: 0
BACK PAIN: 0
NECK PAIN: 1
NAUSEA: 0
VOMITING: 0

## 2022-07-16 ASSESSMENT — PAIN DESCRIPTION - PAIN TYPE: TYPE: CHRONIC PAIN

## 2022-07-16 NOTE — PROGRESS NOTES
Hospital Medicine Daily Progress Note    Date of Service  7/16/2022    Chief Complaint  Pj Britt is a 61 y.o. male admitted 7/12/2022 with neck pain and extremity weakness    Hospital Course    61 y.o. male who presented 7/12/2022 for planned C3-T1 laminectomy and fusion.  By the neurosurgical team for recent diagnosis of severe cervical spine central stenosis..  Procedure was completed on July 12.  He has a previous medical history that includes chronic kidney disease on dialysis, legally blind in both eyes, glaucoma, hypertension, dyslipidemia, insulin-dependent type 2 diabetes.         Interval Problem Update  Pt notes some pain in his shoulders which is unchanged.  Weakness in his arms, R>L also unchanged  Ant VIKKI drain removed this am: post remains in place  Overnight did have some hypoglycemia down to 69    AFebrile  HR 50s  -150s  On 0.5 LNC O2  DW pt's wife at bedside    I have discussed this patient's plan of care and discharge plan at IDT rounds today with Case Management, Nursing, Nursing leadership, and other members of the IDT team.    Consultants/Specialty  nephrology and neurosurgery    Code Status  Full Code    Disposition  Patient is medically cleared for discharge.   Anticipate discharge to to home with close outpatient follow-up.  I have placed the appropriate orders for post-discharge needs.    Review of Systems  Review of Systems   Constitutional: Negative for chills and fever.   Eyes:        Pt is blind   Respiratory: Negative for cough and shortness of breath.    Cardiovascular: Negative for chest pain, palpitations and leg swelling.   Gastrointestinal: Negative for abdominal pain, diarrhea, nausea and vomiting.   Genitourinary: Negative for dysuria and urgency.   Musculoskeletal: Positive for neck pain. Negative for back pain.   Skin: Negative for rash.   Neurological: Positive for sensory change, focal weakness and weakness. Negative for dizziness, speech change, loss of  consciousness and headaches.        Physical Exam  Temp:  [36.4 °C (97.5 °F)-36.7 °C (98 °F)] 36.6 °C (97.8 °F)  Pulse:  [57-82] 62  Resp:  [10-42] 18  BP: (116-166)/(52-79) 134/52  SpO2:  [96 %-100 %] 100 %    Physical Exam  Vitals reviewed.   Constitutional:       General: He is not in acute distress.     Appearance: Normal appearance. He is well-developed. He is not diaphoretic.   HENT:      Head: Normocephalic and atraumatic.   Eyes:      Conjunctiva/sclera: Conjunctivae normal.   Neck:      Vascular: No JVD.      Comments: In hard collar  Cardiovascular:      Rate and Rhythm: Normal rate.      Heart sounds: No murmur heard.    No gallop.   Pulmonary:      Effort: Pulmonary effort is normal. No respiratory distress.      Breath sounds: No stridor. No wheezing or rales.   Abdominal:      General: There is no distension.      Palpations: Abdomen is soft.      Tenderness: There is no abdominal tenderness. There is no guarding or rebound.   Musculoskeletal:         General: No tenderness.      Right lower leg: No edema.      Left lower leg: No edema.   Skin:     General: Skin is warm and dry.      Coloration: Skin is not jaundiced.      Findings: No rash.   Neurological:      Mental Status: He is alert and oriented to person, place, and time. Mental status is at baseline.      Comments: 2/5 R upper  3/5 L upper   Psychiatric:         Mood and Affect: Mood normal.         Thought Content: Thought content normal.         Fluids    Intake/Output Summary (Last 24 hours) at 7/16/2022 0615  Last data filed at 7/16/2022 0433  Gross per 24 hour   Intake 500 ml   Output 2540 ml   Net -2040 ml       Laboratory  Recent Labs     07/14/22  0445 07/15/22  0540 07/16/22  0501   WBC 9.0 10.6 8.0   RBC 3.14* 3.19* 2.92*   HEMOGLOBIN 9.8* 10.0* 9.0*   HEMATOCRIT 30.1* 30.9* 28.6*   MCV 95.9 96.9 97.9*   MCH 31.2 31.3 30.8   MCHC 32.6* 32.4* 31.5*   RDW 47.6 48.0 49.5   PLATELETCT 156* 125* 149*   MPV 10.6 10.9 11.2     Recent Labs      07/14/22  0445 07/15/22  0445 07/16/22  0501   SODIUM 136 129* 136   POTASSIUM 4.9 5.4 4.8   CHLORIDE 96 90* 97   CO2 29 25 27   GLUCOSE 68 78 68   BUN 33* 46* 31*   CREATININE 5.08* 6.25* 4.47*   CALCIUM 8.6 8.5 8.4*                   Imaging  DX-PORTABLE FLUORO > 1 HOUR   Final Result      Intraoperative images intended for localization and not for diagnostic purposes demonstrate C4-C6 ACDF. Extensive posterior spinal fusion. See procedure report for details.      Fluoroscopy Time:  29 second.  6 fluoroscopic images were obtained.      DX-CERVICAL SPINE-2 OR 3 VIEWS   Final Result      Intraoperative images intended for localization and not for diagnostic purposes demonstrate C4-C6 ACDF. Extensive posterior spinal fusion. See procedure report for details.      Fluoroscopy Time:  29 second.  6 fluoroscopic images were obtained.           Assessment/Plan  * Spinal stenosis in cervical region- (present on admission)  Assessment & Plan  Now s/p surgical decompression and fusion  Hard collar  No chemical DVD prophylaxis  PT/OT consulted  Plan Rehab DC  Ant drain removed 7/15: post remains in place    ESRD on dialysis (HCC)- (present on admission)  Assessment & Plan  Nephrology following  Follow BMP  DC IVFs    Type 2 diabetes mellitus with nephropathy, and retinopathy (HCC)- (present on admission)  Assessment & Plan  outpt is on NPH 20 in am and 15 in hs with preprandial short acting however wife will on occasion not give evening dose if his qhs BG is <140.  Given hypoglycemia overnight stop his evening NPH and decrease am dose to 15  Last A1c in our system was from 3 years ago; 5.9    Hypertension- (present on admission)  Assessment & Plan  On metoprolol 100 BID, amlodipine 10 as an outpt which we have continued with parameters  Still running 150s generally: add hydralazine 10    Blindness- (present on admission)  Assessment & Plan  chronic    Hyperlipidemia- (present on admission)  Assessment & Plan  statin        VTE prophylaxis: SCDs    I have performed a physical exam and reviewed and updated ROS and Plan today (7/16/2022). In review of yesterday's note (7/15/2022), there are no changes except as documented above.

## 2022-07-16 NOTE — PROGRESS NOTES
4 Eyes Skin Assessment Completed by SAMAN Hicks and SAMAN Salgado.    Head WDL  Ears WDL  Nose WDL  Mouth WDL  Neck Incision  Breast/Chest WDL  Shoulder Blades Blanching  Spine Incision  (R) Arm/Elbow/Hand Blanching and Bruising  (L) Arm/Elbow/Hand WDL Fistula for HD  Abdomen WDL  Groin WDL  Scrotum/Coccyx/Buttocks Blanching  (R) Leg WDL  (L) Leg WDL  (R) Heel/Foot/Toe WDL  (L) Heel/Foot/Toe WDL          Devices In Places Pulse Ox and SCD's      Interventions In Place Sacral Mepilex    Possible Skin Injury No    Pictures Uploaded Into Epic N/A  Wound Consult Placed N/A  RN Wound Prevention Protocol Ordered No

## 2022-07-16 NOTE — CARE PLAN
The patient is Stable - Low risk of patient condition declining or worsening    Shift Goals  Clinical Goals: iHD, transfer  Patient Goals: Rest and pain management  Family Goals: rest and pain management    Progress made toward(s) clinical / shift goals:    Problem: Pain - Standard  Goal: Alleviation of pain or a reduction in pain to the patient’s comfort goal  Outcome: Progressing     Problem: Fall Risk  Goal: Patient will remain free from falls  Outcome: Progressing     Problem: Knowledge Deficit - Standard  Goal: Patient and family/care givers will demonstrate understanding of plan of care, disease process/condition, diagnostic tests and medications  Outcome: Progressing     Problem: Skin Integrity  Goal: Skin integrity is maintained or improved  Outcome: Progressing       Patient is not progressing towards the following goals:

## 2022-07-16 NOTE — PROGRESS NOTES
Nephrology Daily Progress Note    Date of Service  7/16/2022    Chief Complaint  61 y.o. male admitted 7/12/2022 with ESRD, status post elective cervical spine fusion.    Interval Problem Update  Patient is doing well today, complaining of mild pain in the surgery site  7/15 patient had hemodialysis earlier today  His neck pain is improving  7/16 patient feels better  Mild neck pain  Complain of difficulty swallowing solid food  Review of Systems  Review of Systems   Constitutional: Negative for chills, fever and malaise/fatigue.   Respiratory: Negative for cough and shortness of breath.    Cardiovascular: Negative for chest pain and leg swelling.   Gastrointestinal: Negative for nausea and vomiting.   Genitourinary: Negative for dysuria, frequency and urgency.   Musculoskeletal: Positive for neck pain.        Physical Exam  Temp:  [36.3 °C (97.3 °F)-36.7 °C (98 °F)] 36.3 °C (97.3 °F)  Pulse:  [61-82] 66  Resp:  [12-23] 16  BP: (119-166)/(52-79) 119/53  SpO2:  [96 %-100 %] 99 %    Physical Exam  Vitals and nursing note reviewed.   Constitutional:       General: He is not in acute distress.     Appearance: He is not ill-appearing.      Interventions: Cervical collar in place.   HENT:      Head: Normocephalic and atraumatic.      Right Ear: External ear normal.      Left Ear: External ear normal.      Nose: Nose normal.   Eyes:      General:         Right eye: No discharge.         Left eye: No discharge.      Conjunctiva/sclera: Conjunctivae normal.   Cardiovascular:      Rate and Rhythm: Normal rate and regular rhythm.      Heart sounds: No murmur heard.  Pulmonary:      Effort: Pulmonary effort is normal. No respiratory distress.      Breath sounds: Normal breath sounds. No wheezing.   Musculoskeletal:         General: No tenderness or deformity.      Right lower leg: No edema.      Left lower leg: No edema.   Skin:     General: Skin is warm.   Neurological:      General: No focal deficit present.      Mental  Status: He is alert and oriented to person, place, and time.      Comments: Patient is blind   Psychiatric:         Mood and Affect: Mood normal.         Behavior: Behavior normal.         Fluids    Intake/Output Summary (Last 24 hours) at 7/16/2022 1202  Last data filed at 7/16/2022 0433  Gross per 24 hour   Intake --   Output 40 ml   Net -40 ml       Laboratory  Recent Labs     07/14/22  0445 07/15/22  0540 07/16/22  0501   WBC 9.0 10.6 8.0   RBC 3.14* 3.19* 2.92*   HEMOGLOBIN 9.8* 10.0* 9.0*   HEMATOCRIT 30.1* 30.9* 28.6*   MCV 95.9 96.9 97.9*   MCH 31.2 31.3 30.8   MCHC 32.6* 32.4* 31.5*   RDW 47.6 48.0 49.5   PLATELETCT 156* 125* 149*   MPV 10.6 10.9 11.2     Recent Labs     07/14/22  0445 07/15/22  0445 07/16/22  0501   SODIUM 136 129* 136   POTASSIUM 4.9 5.4 4.8   CHLORIDE 96 90* 97   CO2 29 25 27   GLUCOSE 68 78 68   BUN 33* 46* 31*   CREATININE 5.08* 6.25* 4.47*   CALCIUM 8.6 8.5 8.4*         No results for input(s): NTPROBNP in the last 72 hours.        Imaging  DX-PORTABLE FLUORO > 1 HOUR   Final Result      Intraoperative images intended for localization and not for diagnostic purposes demonstrate C4-C6 ACDF. Extensive posterior spinal fusion. See procedure report for details.      Fluoroscopy Time:  29 second.  6 fluoroscopic images were obtained.      DX-CERVICAL SPINE-2 OR 3 VIEWS   Final Result      Intraoperative images intended for localization and not for diagnostic purposes demonstrate C4-C6 ACDF. Extensive posterior spinal fusion. See procedure report for details.      Fluoroscopy Time:  29 second.  6 fluoroscopic images were obtained.            Assessment/Plan  1 ESRD  2 anemia  3 spinal stenosis  4 diabetes mellitus  no acute need for HD today  Renal diet  Daily labs  Renal dose all meds  Avoid nephrotoxins  Epogen with dialysis

## 2022-07-16 NOTE — CONSULTS
Physical Medicine and Rehabilitation Consultation              Date of initial consultation: 7/16/2022  Requesting provider: Dr. Barr   Consulting provider: Belen Marie D.O.  Reason for consultation: assess for acute inpatient rehab appropriateness  LOS: 4 Day(s)    Chief complaint: s/p C3-T1 lami and fusion     HPI: The patient is a 61 y.o.  male with a past medical history of ESRD on HD MWF, visual impairment of blindness b/l, and WC user at baseline ;  who presented on 7/12/2022  8:31 AM for a scheduled C3-T1 lami and fusion. Per documentation, patient has been followed in the outpatient setting for neck pain, upper extremity weakness right greater than left and difficulty walking with changes in numbness and tingling upper extremities and worsening weakness of his lower extremities.  And patient was deemed appropriate for a lami and fusion of C3-T1 for his severe spinal canal stenosis and myelopathy. There are no outpatient images or EMG available for my review.  Patient was taken to the OR on  7/12 for procedure #1 of Anterior C4-5 and 5-6 diskectomy and corpectomy of C5, then procedure #2 of posterior C3- T1 lami and fusion. Patient is to have his C collar on at all times. Post operatively, patient has continued to have shoulder pain and R arm weakness, unchanged since prior to surgery. His anterior VIKKI drain was removed on /15. Post operatively patient has been followed by medicine for his DM and HTN; nephro consulted for patient's HD. Functionally, patient has been willing to participate with therapy, he is functioning at  A Min A for sit to stand and Mod A for transfers.     Patient seen and examined at bedside with wife at bedside.  Wife is able to provide information for me about prior level of function.  Wife reports that the patient was able to navigate stairs with her assistance.  He only used a wheelchair in the community and used to rolling walker at home.  Patient has had chronic upper  extremity weakness right greater than left.  Patient and wife report that the weakness in his right upper extremity is unchanged since surgery.  They do report that patient has had improvement in strength in his lower extremity since surgery.    Social Hx:  Patient lives in a 1 story house in [place] with his spouse, spouse is able to provide assistance  3 SANDRA  At prior level of function patient ambulates very short distances with 4WW to access WC. Patient is mod I with WC.       Tobacco: Denies  Alcohol: Denies  Drugs: Denies    THERAPY:  Restrictions: Fall risk, spinal precautions   PT: Functional mobility    PT note, unable to participate in gait, Min A for bed mobility, mod A for transfers     OT: ADLs   Max A upper body dressing, Mod A lower body dressing     SLP:   No SLP note    IMAGIN2022 MRI C-spine  IMPRESSION:     1.  Multilevel multifactorial degenerative changes  2.  Severe central canal narrowing at C4-C5 with myelomalacia  3.  No other areas of severe central canal narrowing  4.  Other areas of central canal and neural foraminal narrowing as described above      PROCEDURES:   anterior C5 corpectomy, C4-6 fusion, posterior C3-T1 laminae fusion performed by Dr. Barr    PMH:  Past Medical History:   Diagnosis Date   • Anemia    • Blind in both eyes    • Diabetes ([facility]ly [facility])     insulin dependent   • Dialysis patient (Formerly [facility])     RISHABHEarline   • ESRD (end stage renal disease) (Formerly [facility])     Dr. Haskins   • High cholesterol    • Hyperlipemia    • Hypertension    • Oxygen dependent     2 liters, LINCARE   • Snoring     no sleep study       PSH:  Past Surgical History:   Procedure Laterality Date   • CERVICAL DISK AND FUSION ANTERIOR  2022    Procedure: STAGE 1- ANTERIOR C5 CORPECTOMY, C4-6 FUSION STAGE 2- POSTERIOR C3-T1 LAMINECTOMY AND FUSION;  Surgeon: Ady Barr M.D.;  Location: SURGERY [facility];  Service: Neurosurgery   • CERVICAL FUSION POSTERIOR  2022    Procedure:  FUSION, SPINE, CERVICAL, POSTERIOR APPROACH;  Surgeon: Ady Barr M.D.;  Location: SURGERY [facility];  Service: Neurosurgery   • CORPECTOMY  7/12/2022    Procedure: CORPECTOMY, SPINE;  Surgeon: Ady Barr M.D.;  Location: SURGERY [facility];  Service: Neurosurgery   • CERVICAL LAMINECTOMY POSTERIOR  7/12/2022    Procedure: LAMINECTOMY, SPINE, CERVICAL, POSTERIOR APPROACH;  Surgeon: Ady Barr M.D.;  Location: SURGERY [facility];  Service: Neurosurgery   • RECOVERY  4/19/2016    Procedure: VASCULAR CASE-ILEANA-LEFT ARM FISTULOGRAM WITH ANGIOPLASTY 81238, 46942, 48345-IJW STAGE RENAL DISEASE N18.6;  Surgeon: Recoveryonclay Surgery;  Location: SURGERY PRE-POST PROC UNIT [facility];  Service:    • RECOVERY  2/24/2016    Procedure: IR1 VASCULAR CASE LEFT ARM FISTULOGRAM WITH ANGIOPLASTY;  Surgeon: Perfecto Surgery;  Location: SURGERY PRE-POST PROC UNIT [facility];  Service:    • CATH PLACEMENT Right 12/10/2015    Procedure: CATH PLACEMENT;  Surgeon: Lorene Baldwin M.D.;  Location: SURGERY [facility];  Service:    • AV FISTULA CREATION Left 12/10/2015    Procedure: AV FISTULA CREATION;  Surgeon: Lorene Baldwin M.D.;  Location: SURGERY [facility];  Service:    • AL CATARACT SURG W/IOL 1 STAGE WO ENDO  10/21/15   • OTHER  9/22/15    trabecular micro bypass stent- right eye   • OTHER  7/2015    take out blood of left eye   • OTHER  3/2015    take blood out of eye        FHX:  Family History   Problem Relation Age of Onset   • Alzheimer's Disease Mother 80        Lives in [place]   • Hypertension Father    • Diabetes Father    • Diabetes Brother        Medications:  Current Facility-Administered Medications   Medication Dose   • insulin NPH (HumuLIN N,NovoLIN N) injection  15 Units   • lactulose 20 GM/30ML solution 30 mL  30 mL   • hydrALAZINE (APRESOLINE) tablet 10 mg  10 mg   • menthol (Halls) lozenge 1 Lozenge  1 Lozenge   • omeprazole (PRILOSEC) capsule 20 mg  20 mg   • epoetin  (Retacrit) injection (Dialysis Use Only) 3,000 Units  3,000 Units   • oxyCODONE immediate-release (ROXICODONE) tablet 5 mg  5 mg    Or   • oxyCODONE immediate release (ROXICODONE) tablet 10 mg  10 mg   • morphine 4 MG/ML injection 2 mg  2 mg   • acetaminophen (Tylenol) tablet 650 mg  650 mg   • amLODIPine (NORVASC) tablet 10 mg  10 mg   • atorvastatin (LIPITOR) tablet 20 mg  20 mg   • calcium acetate (PHOS-LO) 667 MG tablet 2,001 mg  2,001 mg   • metoprolol tartrate (LOPRESSOR) tablet 100 mg  100 mg   • PARoxetine (PAXIL) tablet 40 mg  40 mg   • Pharmacy Consult Request ...Pain Management Review 1 Each  1 Each   • MD ALERT...DO NOT ADMINISTER NSAIDS or ASPIRIN unless ORDERED By Neurosurgery 1 Each  1 Each   • docusate sodium (COLACE) capsule 100 mg  100 mg   • senna-docusate (PERICOLACE or SENOKOT S) 8.6-50 MG per tablet 1 Tablet  1 Tablet   • senna-docusate (PERICOLACE or SENOKOT S) 8.6-50 MG per tablet 1 Tablet  1 Tablet   • polyethylene glycol/lytes (MIRALAX) PACKET 1 Packet  1 Packet   • magnesium hydroxide (MILK OF MAGNESIA) suspension 30 mL  30 mL   • bisacodyl (DULCOLAX) suppository 10 mg  10 mg   • sodium phosphate (Fleet) enema 133 mL  1 Each   • diphenhydrAMINE (BENADRYL) tablet/capsule 25 mg  25 mg    Or   • diphenhydrAMINE (BENADRYL) injection 25 mg  25 mg   • ondansetron (ZOFRAN) syringe/vial injection 4 mg  4 mg   • ondansetron (ZOFRAN ODT) dispertab 4 mg  4 mg   • promethazine (PHENERGAN) tablet 12.5-25 mg  12.5-25 mg   • promethazine (PHENERGAN) suppository 12.5-25 mg  12.5-25 mg   • prochlorperazine (COMPAZINE) injection 5-10 mg  5-10 mg   • tizanidine (ZANAFLEX) tablet 4 mg  4 mg   • hydrALAZINE (APRESOLINE) injection 10 mg  10 mg   • insulin regular (HumuLIN R,NovoLIN R) injection  2-9 Units    And   • dextrose 50% (D50W) injection 25 g  25 g       Allergies:  Allergies   Allergen Reactions   • Aspirin Unspecified     Cannot have because on Dialysis       Physical Exam:  Vitals: /53   Pulse  "66   Temp 36.3 °C (97.3 °F) (Temporal)   Resp 16   Ht 1.702 m (5' 7\")   Wt 66 kg (145 lb 8.1 oz)   SpO2 99%   Gen: NAD,, laying comfortably in bed, wife at bedside  Head: C-collar in place NC/AT  Eyes/ Nose/ Mouth: Decreased vision in bilateral visual fields moist mucous membranes  Cardio: RRR, good distal perfusion, warm extremities  Pulm: normal respiratory effort, no cyanosis   Abd: Soft NTND, negative borborygmi   Ext: No peripheral edema. No calf tenderness. No clubbing.    Mental status:  A&Ox4 (person, place, date, situation) answers questions appropriately follows commands  Speech: fluent, no aphasia or dysarthria    CRANIAL NERVES:  2,3: Decreased vision in bilateral visual fields  3,4,6: EOMI bilaterally, no nystagmus or diplopia  5: sensation intact to light touch bilaterally and symmetric  7: no facial asymmetry  8: hearing grossly intact      Motor:      Upper Extremity  Myotome R L   Shoulder flexion C5 3/5 4/5   Elbow flexion C5 4/5 4/5   Wrist extension C6 4/5 4/5   Elbow extension C7 4/5 4/5   Finger flexion C8 5/5 5/5   Finger abduction T1 5/5 5/5     Lower Extremity Myotome R L   Hip flexion L2 5/5 5/5   Knee extension L3 5/5 5/5   Ankle dorsiflexion L4 5/5 5/5   Toe extension L5 5/5 5/5   Ankle plantarflexion S1 5/5 5/5       Negative Pronator drift bilaterally    Sensory:   intact to light touch through out  intact to proprioception at bilateral great toes  Negative double simultaneous touch bilaterally UE and LE    DTRs: 2+ in bilateral  biceps, 2+ in bilateral patellar tendons  No clonus at bilateral ankles  Negative babinski b/l  Negative Knox b/l     Tone: no spasticity noted, no cogwheeling noted    Coordination:   intact finger yuval bilaterally  intact finger to nose bilaterally  intact fine motor with fingers bilaterally  intact heel to shin bilaterally      Labs: Reviewed and significant for   Recent Labs     07/14/22  0445 07/15/22  0540 07/16/22  0501   RBC 3.14* 3.19* 2.92* "   HEMOGLOBIN 9.8* 10.0* 9.0*   HEMATOCRIT 30.1* 30.9* 28.6*   PLATELETCT 156* 125* 149*     Recent Labs     07/14/22  0445 07/15/22  0445 07/16/22  0501   SODIUM 136 129* 136   POTASSIUM 4.9 5.4 4.8   CHLORIDE 96 90* 97   CO2 29 25 27   GLUCOSE 68 78 68   BUN 33* 46* 31*   CREATININE 5.08* 6.25* 4.47*   CALCIUM 8.6 8.5 8.4*     Recent Results (from the past 24 hour(s))   POCT glucose device results    Collection Time: 07/15/22 11:46 AM   Result Value Ref Range    POC Glucose, Blood 96 65 - 99 mg/dL   POCT glucose device results    Collection Time: 07/15/22  5:54 PM   Result Value Ref Range    POC Glucose, Blood 86 65 - 99 mg/dL   POCT glucose device results    Collection Time: 07/15/22 10:22 PM   Result Value Ref Range    POC Glucose, Blood 93 65 - 99 mg/dL   CBC without Differential    Collection Time: 07/16/22  5:01 AM   Result Value Ref Range    WBC 8.0 4.8 - 10.8 K/uL    RBC 2.92 (L) 4.70 - 6.10 M/uL    Hemoglobin 9.0 (L) 14.0 - 18.0 g/dL    Hematocrit 28.6 (L) 42.0 - 52.0 %    MCV 97.9 (H) 81.4 - 97.8 fL    MCH 30.8 27.0 - 33.0 pg    MCHC 31.5 (L) 33.7 - 35.3 g/dL    RDW 49.5 35.9 - 50.0 fL    Platelet Count 149 (L) 164 - 446 K/uL    MPV 11.2 9.0 - 12.9 fL   Comp Metabolic Panel    Collection Time: 07/16/22  5:01 AM   Result Value Ref Range    Sodium 136 135 - 145 mmol/L    Potassium 4.8 3.6 - 5.5 mmol/L    Chloride 97 96 - 112 mmol/L    Co2 27 20 - 33 mmol/L    Anion Gap 12.0 7.0 - 16.0    Glucose 68 65 - 99 mg/dL    Bun 31 (H) 8 - 22 mg/dL    Creatinine 4.47 (HH) 0.50 - 1.40 mg/dL    Calcium 8.4 (L) 8.5 - 10.5 mg/dL    AST(SGOT) 14 12 - 45 U/L    ALT(SGPT) <5 2 - 50 U/L    Alkaline Phosphatase 94 30 - 99 U/L    Total Bilirubin 0.2 0.1 - 1.5 mg/dL    Albumin 3.1 (L) 3.2 - 4.9 g/dL    Total Protein 5.7 (L) 6.0 - 8.2 g/dL    Globulin 2.6 1.9 - 3.5 g/dL    A-G Ratio 1.2 g/dL   ESTIMATED GFR    Collection Time: 07/16/22  5:01 AM   Result Value Ref Range    GFR (CKD-EPI) 14 (A) >60 mL/min/1.73 m 2   POCT glucose  device results    Collection Time: 07/16/22  8:02 AM   Result Value Ref Range    POC Glucose, Blood 59 (L) 65 - 99 mg/dL   POCT glucose device results    Collection Time: 07/16/22  8:29 AM   Result Value Ref Range    POC Glucose, Blood 92 65 - 99 mg/dL         ASSESSMENT:  Patient is a 61 y.o. male admitted with cervical myelopathy    [facility] Code / Diagnosis to Support: [phone] - Spinal Cord Dysfunction, Non-Traumatic: Quadriplegia, Incomplete C5-8    Rehabilitation: Impaired ADLs and mobility  Patient is a good candidate for inpatient rehab based on needs for PT, OT, see disposition details below    Barriers to transfer include: Insurance authorization, TCCs to verify disposition, medical clearance and bed availability     Additional Recommendations:  Central cord syndrome, cervical myelopathy  - Patient with evidence of severe central cord stenosis on MRI, in May 2022  - 5/30/2022 MRI C-spine without dense of severe central canal narrowing at C4-C5 with myelomalacia  - Status post C3-T1 laminectomy and posterior fusion, performed by Dr. Barr  - C-collar on at all times  - Patient with upper extremity weakness greater than lower extremity weakness and right upper extremity greater than left  -Anterior VIKKI drain removed on 7/15  - Continue with PT/OT, will need to see progress with PT in order to ambulate as patient's home is not currently wheelchair accessible    End-stage renal disease  - Nephrology following for hemodialysis  - HD on M WF    Bladder  - Patient is anuric, no need for neurogenic bladder management at this time    Bowels  - Monitor for neurogenic bowel  - Continue with bowel regimen  - If patient has no bowel movement in 24 hours recommend evening suppositories    Dispo:  - patient is currently functioning below their level of baseline, recommend post acute rehab  - recommend IRF level therapy with 3hr of therapy 5 days per week  - piror to acceptance to IRF, will evidence of improved gait with PT  and confirmation of physical assistance from wife  - TCC to assist with DC support         Medical Complexity:  Cervical myelopathy  Status post C3-T1 posterior fusion  Hypertension  Visual impairment  Upper extremity weakness  End-stage renal disease on HD  Hypertension  Type 2 diabetes  Impaired mobility and ADLs      DVT PPX: SCDs      Thank you for allowing us to participate in the care of this patient.     Patient was seen for 111 minutes on unit/floor of which > 50% of time was spent on counseling and coordination of care regarding the above, including prognosis, risk reduction, benefits of treatment, and options for next stage of care.    Belen Marie D.O.   Physical Medicine and Rehabilitation     Please note that this dictation was created using voice recognition software. I have made every reasonable attempt to correct obvious errors, but there may be errors of grammar and possibly content that I did not discover before finalizing the note.

## 2022-07-16 NOTE — PROGRESS NOTES
Neurosurgery Progress Note    Subjective:  Pt awake, alert  Wife at bedside   Pain minimal       Exam:  BLE 5/5  Right  5/5, bi/tri 4,  delt 4-, left UE and bilat LE 5/5  Incisions c/d ant intact with dermabond, post inc intact with drsg hvac 20 cc past 8hrs    BP  Min: 119/53  Max: 166/79  Pulse  Av.4  Min: 61  Max: 82  Resp  Av.1  Min: 12  Max: 42  Temp  Av.5 °C (97.7 °F)  Min: 36.3 °C (97.3 °F)  Max: 36.7 °C (98 °F)  SpO2  Av %  Min: 96 %  Max: 100 %    No data recorded    Recent Labs     07/14/22  0445 07/15/22  0540 22  0501   WBC 9.0 10.6 8.0   RBC 3.14* 3.19* 2.92*   HEMOGLOBIN 9.8* 10.0* 9.0*   HEMATOCRIT 30.1* 30.9* 28.6*   MCV 95.9 96.9 97.9*   MCH 31.2 31.3 30.8   MCHC 32.6* 32.4* 31.5*   RDW 47.6 48.0 49.5   PLATELETCT 156* 125* 149*   MPV 10.6 10.9 11.2     Recent Labs     07/14/22  0445 07/15/22  0445 22  0501   SODIUM 136 129* 136   POTASSIUM 4.9 5.4 4.8   CHLORIDE 96 90* 97   CO2 29 25 27   GLUCOSE 68 78 68   BUN 33* 46* 31*   CREATININE 5.08* 6.25* 4.47*   CALCIUM 8.6 8.5 8.4*               Intake/Output                       07/15/22 0700 - 22 0659 22 07 - 22 0659      Total  Total                 Intake    Dialysis  500  -- 500  --  -- --    Dialysis Input (Dialysis Input / Output) 500 -- 500 -- -- --    Total Intake 500 -- 500 -- -- --       Output    Drains  0  40 40  --  -- --    Output (mL) (Closed/Suction Drain 2 Posterior Neck Hemovac) 0 40 40 -- -- --    Dialysis  2500  -- 2500  --  -- --    Dialysis Output (Dialysis Input / Output) 2500 -- 2500 -- -- --    Total Output 2500 40 2540 -- -- --       Net I/O     -2000 - -- -- --            Intake/Output Summary (Last 24 hours) at 2022 1135  Last data filed at 2022 0433  Gross per 24 hour   Intake --   Output 40 ml   Net -40 ml            • insulin NPH  15 Units QAM INSULIN   • lactulose  30 mL TID PRN   • hydrALAZINE  10 mg Q8HRS   •  menthol  1 Lozenge Q2HRS PRN   • omeprazole  20 mg DAILY   • epoetin  3,000 Units MO, WE + FR   • oxyCODONE immediate-release  5 mg Q4HRS PRN    Or   • oxyCODONE immediate-release  10 mg Q4HRS PRN   • morphine injection  2 mg Q HOUR PRN   • acetaminophen  650 mg Q6HRS   • amLODIPine  10 mg DAILY   • atorvastatin  20 mg DAILY   • calcium acetate  2,001 mg TID WITH MEALS   • metoprolol tartrate  100 mg BID   • PARoxetine  40 mg DAILY   • Pharmacy Consult Request  1 Each PHARMACY TO DOSE   • MD ALERT...DO NOT ADMINISTER NSAIDS or ASPIRIN unless ORDERED By Neurosurgery  1 Each PRN   • docusate sodium  100 mg BID   • senna-docusate  1 Tablet Nightly   • senna-docusate  1 Tablet Q24HRS PRN   • polyethylene glycol/lytes  1 Packet BID PRN   • magnesium hydroxide  30 mL QDAY PRN   • bisacodyl  10 mg Q24HRS PRN   • sodium phosphate  1 Each Once PRN   • diphenhydrAMINE  25 mg Q6HRS PRN    Or   • diphenhydrAMINE  25 mg Q6HRS PRN   • ondansetron  4 mg Q4HRS PRN   • ondansetron  4 mg Q4HRS PRN   • promethazine  12.5-25 mg Q4HRS PRN   • promethazine  12.5-25 mg Q4HRS PRN   • prochlorperazine  5-10 mg Q4HRS PRN   • tizanidine  4 mg Q8HRS PRN   • hydrALAZINE  10 mg Q HOUR PRN   • insulin regular  2-9 Units 4X/DAY ACHS    And   • dextrose bolus  25 g Q15 MIN PRN       Assessment and Plan:  Hospital day # 4  POD# 3 anterior C5 corpectomy, 4-6 fusion, posterior C3-T1 lami fusion  Chemical prophylactic DVT therapy: No  Start date/time: TBD    Pain control-  PO oxy and tizanidine prn  Pt/ot/up as carol   Collar at all times  Dialysis M, W, F- neph on board - thank you  Appreciate  IMs assistance with management  Bowel protocol  Dc hvac   Rehab pending   Ok for rehab anytime from our perspective

## 2022-07-16 NOTE — PROGRESS NOTES
Hospital Medicine Daily Progress Note    Date of Service  7/16/2022    Chief Complaint  Pj Britt is a 61 y.o. male admitted 7/12/2022 with neck pain and extremity weakness    Hospital Course    61 y.o. male who presented 7/12/2022 for planned C3-T1 laminectomy and fusion.  By the neurosurgical team for recent diagnosis of severe cervical spine central stenosis..  Procedure was completed on July 12.  He has a previous medical history that includes chronic kidney disease on dialysis, legally blind in both eyes, glaucoma, hypertension, dyslipidemia, insulin-dependent type 2 diabetes.         Interval Problem Update  Pt notes some pain in his shoulders which is unchanged.  Weakness in his arms, R>L also unchanged  Ant VIKKI drain removed 7/15: post remains in place  Hypoglycemia recurrent down to 59 despite adjustment of insulins yesterday.  This will be readjusted today.  Wife at bedside.      Consultants/Specialty  nephrology and neurosurgery    Code Status  Full Code    Disposition  Patient is medically cleared for discharge.   Anticipate discharge to to an inpatient rehabilitation hospital or SNF pending PT/OT outcomes.  I have placed the appropriate orders for post-discharge needs.       Physical Exam  Temp:  [36.3 °C (97.3 °F)-36.7 °C (98 °F)] 36.3 °C (97.3 °F)  Pulse:  [62-82] 66  Resp:  [12-23] 16  BP: (119-166)/(52-79) 119/53  SpO2:  [96 %-100 %] 99 %    Physical Exam  Vitals reviewed.   Constitutional:       General: He is not in acute distress.     Appearance: Normal appearance. He is well-developed. He is not diaphoretic.   HENT:      Head: Normocephalic and atraumatic.   Eyes:      Conjunctiva/sclera: Conjunctivae normal.   Neck:      Vascular: No JVD.      Comments: In hard collar  Cardiovascular:      Rate and Rhythm: Normal rate.      Heart sounds: No murmur heard.    No gallop.   Pulmonary:      Effort: Pulmonary effort is normal. No respiratory distress.      Breath sounds: No stridor. No  wheezing or rales.   Abdominal:      General: There is no distension.      Palpations: Abdomen is soft.      Tenderness: There is no abdominal tenderness. There is no guarding or rebound.   Musculoskeletal:         General: No tenderness.      Right lower leg: No edema.      Left lower leg: No edema.   Skin:     General: Skin is warm and dry.      Coloration: Skin is not jaundiced.      Findings: No rash.   Neurological:      Mental Status: He is alert and oriented to person, place, and time. Mental status is at baseline.      Comments: 2/5 R upper  3/5 L upper   Psychiatric:         Mood and Affect: Mood normal.         Thought Content: Thought content normal.       Current Facility-Administered Medications:   •  insulin NPH (HumuLIN N,NovoLIN N) injection, 15 Units, Subcutaneous, QAM INSULIN, Jae Kenney D.O.  •  lactulose 20 GM/30ML solution 30 mL, 30 mL, Oral, TID PRN, Jae Kenney D.O., 30 mL at 07/15/22 1742  •  hydrALAZINE (APRESOLINE) tablet 10 mg, 10 mg, Oral, Q8HRS, Jae Kenney, D.O., 10 mg at 07/16/22 0525  •  menthol (Halls) lozenge 1 Lozenge, 1 Lozenge, Oral, Q2HRS PRN, Ady Barr M.D.  •  omeprazole (PRILOSEC) capsule 20 mg, 20 mg, Oral, DAILY, Jae Kenney D.O., 20 mg at 07/16/22 0525  •  epoetin (Retacrit) injection (Dialysis Use Only) 3,000 Units, 3,000 Units, Intravenous, MO, WE + FR, Fadi Najjar, M.D., 3,000 Units at 07/15/22 0856  •  oxyCODONE immediate-release (ROXICODONE) tablet 5 mg, 5 mg, Oral, Q4HRS PRN, 5 mg at 07/13/22 1232 **OR** oxyCODONE immediate release (ROXICODONE) tablet 10 mg, 10 mg, Oral, Q4HRS PRN, Edi Owens, A.P.N., 10 mg at 07/15/22 2251  •  morphine 4 MG/ML injection 2 mg, 2 mg, Intravenous, Q HOUR PRN, NI EngN.  •  acetaminophen (Tylenol) tablet 650 mg, 650 mg, Oral, Q6HRS, Jae Kenney D.O., 650 mg at 07/16/22 1223  •  amLODIPine (NORVASC) tablet 10 mg, 10 mg, Oral, DAILY, Ady MARIANO  ZEYAD Barr, 10 mg at 07/16/22 0525  •  atorvastatin (LIPITOR) tablet 20 mg, 20 mg, Oral, DAILY, Ady Barr M.D., 20 mg at 07/16/22 0525  •  calcium acetate (PHOS-LO) 667 MG tablet 2,001 mg, 2,001 mg, Oral, TID WITH MEALS, Ady Barr M.D., 2,001 mg at 07/16/22 1223  •  metoprolol tartrate (LOPRESSOR) tablet 100 mg, 100 mg, Oral, BID, Ady Barr M.D., 100 mg at 07/16/22 0525  •  PARoxetine (PAXIL) tablet 40 mg, 40 mg, Oral, DAILY, Ady Barr M.D., 40 mg at 07/16/22 0524  •  Pharmacy Consult Request ...Pain Management Review 1 Each, 1 Each, Other, PHARMACY TO DOSE, Ady Barr M.D.  •  MD ALERT...DO NOT ADMINISTER NSAIDS or ASPIRIN unless ORDERED By Neurosurgery 1 Each, 1 Each, Other, PRN, Ady Barr M.D.  •  docusate sodium (COLACE) capsule 100 mg, 100 mg, Oral, BID, Ady Barr M.D., 100 mg at 07/16/22 0525  •  senna-docusate (PERICOLACE or SENOKOT S) 8.6-50 MG per tablet 1 Tablet, 1 Tablet, Oral, Nightly, Ady Barr M.D., 1 Tablet at 07/15/22 2250  •  senna-docusate (PERICOLACE or SENOKOT S) 8.6-50 MG per tablet 1 Tablet, 1 Tablet, Oral, Q24HRS PRN, Ady Barr M.D., 1 Tablet at 07/15/22 2251  •  polyethylene glycol/lytes (MIRALAX) PACKET 1 Packet, 1 Packet, Oral, BID PRN, Ady Barr M.D.  •  magnesium hydroxide (MILK OF MAGNESIA) suspension 30 mL, 30 mL, Oral, QDAY PRN, Ady Barr M.D.  •  bisacodyl (DULCOLAX) suppository 10 mg, 10 mg, Rectal, Q24HRS PRN, Ady Barr M.D.  •  sodium phosphate (Fleet) enema 133 mL, 1 Each, Rectal, Once PRN, Ady Barr M.D.  •  diphenhydrAMINE (BENADRYL) tablet/capsule 25 mg, 25 mg, Oral, Q6HRS PRN **OR** diphenhydrAMINE (BENADRYL) injection 25 mg, 25 mg, Intravenous, Q6HRS PRN, Ady Barr M.D.  •  ondansetron (ZOFRAN) syringe/vial injection 4 mg, 4 mg, Intravenous, Q4HRS PRN, Ady Barr M.D., 4 mg at 07/15/22 1824  •   ondansetron (ZOFRAN ODT) dispertab 4 mg, 4 mg, Oral, Q4HRS PRN, Ady Barr M.D., 4 mg at 07/14/22 1306  •  promethazine (PHENERGAN) tablet 12.5-25 mg, 12.5-25 mg, Oral, Q4HRS PRN, Ady Barr M.D.  •  promethazine (PHENERGAN) suppository 12.5-25 mg, 12.5-25 mg, Rectal, Q4HRS PRN, Ady Barr M.D.  •  prochlorperazine (COMPAZINE) injection 5-10 mg, 5-10 mg, Intravenous, Q4HRS PRN, Ady Barr M.D.  •  tizanidine (ZANAFLEX) tablet 4 mg, 4 mg, Oral, Q8HRS PRN, Ady Barr M.D., 4 mg at 07/14/22 1738  •  hydrALAZINE (APRESOLINE) injection 10 mg, 10 mg, Intravenous, Q HOUR PRN, Ady Barr M.D., 10 mg at 07/13/22 0708  •  insulin regular (HumuLIN R,NovoLIN R) injection, 2-9 Units, Subcutaneous, 4X/DAY ACHS, 2 Units at 07/16/22 1158 **AND** POC blood glucose manual result, , , Q AC AND BEDTIME(S) **AND** NOTIFY MD and PharmD, , , Once **AND** Administer 20 grams of glucose (approximately 8 ounces of fruit juice) every 15 minutes PRN FSBG less than 70 mg/dL, , , PRN **AND** dextrose 50% (D50W) injection 25 g, 25 g, Intravenous, Q15 MIN PRN, Ady Barr M.D.      Fluids    Intake/Output Summary (Last 24 hours) at 7/16/2022 1426  Last data filed at 7/16/2022 0750  Gross per 24 hour   Intake 240 ml   Output 40 ml   Net 200 ml       Laboratory  Recent Labs     07/14/22  0445 07/15/22  0540 07/16/22  0501   WBC 9.0 10.6 8.0   RBC 3.14* 3.19* 2.92*   HEMOGLOBIN 9.8* 10.0* 9.0*   HEMATOCRIT 30.1* 30.9* 28.6*   MCV 95.9 96.9 97.9*   MCH 31.2 31.3 30.8   MCHC 32.6* 32.4* 31.5*   RDW 47.6 48.0 49.5   PLATELETCT 156* 125* 149*   MPV 10.6 10.9 11.2     Recent Labs     07/14/22  0445 07/15/22  0445 07/16/22  0501   SODIUM 136 129* 136   POTASSIUM 4.9 5.4 4.8   CHLORIDE 96 90* 97   CO2 29 25 27   GLUCOSE 68 78 68   BUN 33* 46* 31*   CREATININE 5.08* 6.25* 4.47*   CALCIUM 8.6 8.5 8.4*                   Imaging  DX-PORTABLE FLUORO > 1 HOUR   Final Result       Intraoperative images intended for localization and not for diagnostic purposes demonstrate C4-C6 ACDF. Extensive posterior spinal fusion. See procedure report for details.      Fluoroscopy Time:  29 second.  6 fluoroscopic images were obtained.      DX-CERVICAL SPINE-2 OR 3 VIEWS   Final Result      Intraoperative images intended for localization and not for diagnostic purposes demonstrate C4-C6 ACDF. Extensive posterior spinal fusion. See procedure report for details.      Fluoroscopy Time:  29 second.  6 fluoroscopic images were obtained.           Assessment/Plan  * Spinal stenosis in cervical region- (present on admission)  Assessment & Plan  Now s/p surgical decompression and fusion  Hard collar  No chemical DVD prophylaxis  PT/OT consulted  Physical medicine and rehab would like to see increased exercise tolerance before he is a candidate for their facility.  Will reevaluate with PT and OT and once posterior drain is removed  Ant drain removed 7/15: post remains in place    ESRD on dialysis (HCC)- (present on admission)  Assessment & Plan  Nephrology following  Follow BMP  DC IVFs    Type 2 diabetes mellitus with nephropathy, and retinopathy (HCC)- (present on admission)  Assessment & Plan  outpt is on NPH 20 in am and 15 in hs with preprandial short acting however wife will on occasion not give evening dose if his qhs BG is <140.  Given hypoglycemia overnight stop his evening NPH and decrease am dose to 15  Last A1c in our system was from 3 years ago; 5.9    Hypertension- (present on admission)  Assessment & Plan  On metoprolol 100 BID, amlodipine 10 as an outpt which we have continued with parameters  Still running 150s generally: add hydralazine 10    Blindness- (present on admission)  Assessment & Plan  chronic    Hyperlipidemia- (present on admission)  Assessment & Plan  statin       VTE prophylaxis: SCDs    I have performed a physical exam and reviewed and updated ROS and Plan today (7/16/2022). In  review of yesterday's note (7/15/2022), there are no changes except as documented above.

## 2022-07-16 NOTE — PROGRESS NOTES
Pt adamantly refusing suppository at this time     Education reinforced. Education provided to wife who is at bedside--pts wife verbalizes understanding and states she will discuss importance with patient. Will reassess readiness.

## 2022-07-16 NOTE — PROGRESS NOTES
1220: Drain removed without difficulty, pt tolerated well. Site covered with 2x2 gauze as per order. Medication administered as per order--see MAR. Pts wife at bedside. Safety precautions maintained, no additional needs at this time

## 2022-07-16 NOTE — PROGRESS NOTES
1145: Pt reports no bowel movement x3 days, discussed with patient need for bisacodyl suppository--pt verbalizes understanding and is agreeable to this, pt requesting administration to follow afternoon meal.     Patient requesting to defer scheduled meds at this time d/t nausea. PRN offered, pt declines. Pain reassessed at this time; pt endorses mild pain, adamantly refuses pain medication at this time.     Standing orders to remove drain for output less than 30ml for 8 hours--discussed with patient at bedside who verbalizes understanding, requesting to remove drain following afternoon meal

## 2022-07-16 NOTE — PROGRESS NOTES
"0750: Assumed care of patient who is observed to be sleeping at this time, easily arouses to voice, A&Ox4, RR WDL on 2L NC, no acute distress. Patient endorses non-specific L sided pain stating \"hurts a little\".. pt refuses pain medication at this time. Pts wife at bedside. Per pts wife, pt able to sleep through the night, denying pain. Pt repositioned for comfort at this time.    0802: BG 52, agreeable to applejuice at this time, states he will increase PO intake--refuses additional intervention.     0829: Repeat BG 92, patient semi fowlers, eating breakfast at bedside. Education regarding hypoglycemia reinforced. Patient verbalizes understanding.   "

## 2022-07-16 NOTE — PROGRESS NOTES
Report given to SAMAN Hicks. Pt xferred via bed to [facility]; on O2; belongings and meds sent; Transport and spouse as escort.

## 2022-07-17 LAB
ALBUMIN SERPL BCP-MCNC: 3.1 G/DL (ref 3.2–4.9)
ALBUMIN/GLOB SERPL: 1.1 G/DL
ALP SERPL-CCNC: 93 U/L (ref 30–99)
ALT SERPL-CCNC: <5 U/L (ref 2–50)
ANION GAP SERPL CALC-SCNC: 10 MMOL/L (ref 7–16)
AST SERPL-CCNC: 10 U/L (ref 12–45)
BILIRUB SERPL-MCNC: 0.2 MG/DL (ref 0.1–1.5)
BUN SERPL-MCNC: 45 MG/DL (ref 8–22)
CALCIUM SERPL-MCNC: 8.4 MG/DL (ref 8.5–10.5)
CHLORIDE SERPL-SCNC: 94 MMOL/L (ref 96–112)
CO2 SERPL-SCNC: 29 MMOL/L (ref 20–33)
CREAT SERPL-MCNC: 6.28 MG/DL (ref 0.5–1.4)
ERYTHROCYTE [DISTWIDTH] IN BLOOD BY AUTOMATED COUNT: 50.1 FL (ref 35.9–50)
GFR SERPLBLD CREATININE-BSD FMLA CKD-EPI: 9 ML/MIN/1.73 M 2
GLOBULIN SER CALC-MCNC: 2.7 G/DL (ref 1.9–3.5)
GLUCOSE BLD STRIP.AUTO-MCNC: 102 MG/DL (ref 65–99)
GLUCOSE BLD STRIP.AUTO-MCNC: 116 MG/DL (ref 65–99)
GLUCOSE BLD STRIP.AUTO-MCNC: 158 MG/DL (ref 65–99)
GLUCOSE BLD STRIP.AUTO-MCNC: 179 MG/DL (ref 65–99)
GLUCOSE SERPL-MCNC: 126 MG/DL (ref 65–99)
HCT VFR BLD AUTO: 28.1 % (ref 42–52)
HGB BLD-MCNC: 8.9 G/DL (ref 14–18)
MCH RBC QN AUTO: 31.1 PG (ref 27–33)
MCHC RBC AUTO-ENTMCNC: 31.7 G/DL (ref 33.7–35.3)
MCV RBC AUTO: 98.3 FL (ref 81.4–97.8)
PHOSPHATE SERPL-MCNC: 3.8 MG/DL (ref 2.5–4.5)
PLATELET # BLD AUTO: 173 K/UL (ref 164–446)
PMV BLD AUTO: 10.7 FL (ref 9–12.9)
POTASSIUM SERPL-SCNC: 5.3 MMOL/L (ref 3.6–5.5)
PROT SERPL-MCNC: 5.8 G/DL (ref 6–8.2)
RBC # BLD AUTO: 2.86 M/UL (ref 4.7–6.1)
SODIUM SERPL-SCNC: 133 MMOL/L (ref 135–145)
WBC # BLD AUTO: 7.4 K/UL (ref 4.8–10.8)

## 2022-07-17 PROCEDURE — 36415 COLL VENOUS BLD VENIPUNCTURE: CPT

## 2022-07-17 PROCEDURE — 97116 GAIT TRAINING THERAPY: CPT

## 2022-07-17 PROCEDURE — 84100 ASSAY OF PHOSPHORUS: CPT

## 2022-07-17 PROCEDURE — 700102 HCHG RX REV CODE 250 W/ 637 OVERRIDE(OP): Performed by: HOSPITALIST

## 2022-07-17 PROCEDURE — 99232 SBSQ HOSP IP/OBS MODERATE 35: CPT | Performed by: HOSPITALIST

## 2022-07-17 PROCEDURE — 85027 COMPLETE CBC AUTOMATED: CPT

## 2022-07-17 PROCEDURE — 80053 COMPREHEN METABOLIC PANEL: CPT

## 2022-07-17 PROCEDURE — 770001 HCHG ROOM/CARE - MED/SURG/GYN PRIV*

## 2022-07-17 PROCEDURE — 82962 GLUCOSE BLOOD TEST: CPT | Mod: 91

## 2022-07-17 PROCEDURE — A9270 NON-COVERED ITEM OR SERVICE: HCPCS | Performed by: HOSPITALIST

## 2022-07-17 PROCEDURE — 97530 THERAPEUTIC ACTIVITIES: CPT

## 2022-07-17 PROCEDURE — 700102 HCHG RX REV CODE 250 W/ 637 OVERRIDE(OP): Performed by: NEUROLOGICAL SURGERY

## 2022-07-17 PROCEDURE — 99232 SBSQ HOSP IP/OBS MODERATE 35: CPT | Performed by: INTERNAL MEDICINE

## 2022-07-17 PROCEDURE — A9270 NON-COVERED ITEM OR SERVICE: HCPCS | Performed by: NEUROLOGICAL SURGERY

## 2022-07-17 PROCEDURE — 99233 SBSQ HOSP IP/OBS HIGH 50: CPT | Performed by: PHYSICAL MEDICINE & REHABILITATION

## 2022-07-17 RX ADMIN — Medication 2001 MG: at 13:10

## 2022-07-17 RX ADMIN — SENNOSIDES AND DOCUSATE SODIUM 1 TABLET: 50; 8.6 TABLET ORAL at 21:52

## 2022-07-17 RX ADMIN — HYDRALAZINE HYDROCHLORIDE 10 MG: 10 TABLET ORAL at 13:10

## 2022-07-17 RX ADMIN — Medication 2001 MG: at 10:29

## 2022-07-17 RX ADMIN — OMEPRAZOLE 20 MG: 20 CAPSULE, DELAYED RELEASE ORAL at 04:55

## 2022-07-17 RX ADMIN — DOCUSATE SODIUM 100 MG: 100 CAPSULE, LIQUID FILLED ORAL at 17:22

## 2022-07-17 RX ADMIN — METOPROLOL 100 MG: 100 TABLET ORAL at 04:55

## 2022-07-17 RX ADMIN — PAROXETINE HYDROCHLORIDE 40 MG: 20 TABLET, FILM COATED ORAL at 04:55

## 2022-07-17 RX ADMIN — METOPROLOL 100 MG: 100 TABLET ORAL at 17:22

## 2022-07-17 RX ADMIN — HYDRALAZINE HYDROCHLORIDE 10 MG: 10 TABLET ORAL at 04:56

## 2022-07-17 RX ADMIN — ACETAMINOPHEN 650 MG: 325 TABLET, FILM COATED ORAL at 13:10

## 2022-07-17 RX ADMIN — ATORVASTATIN CALCIUM 20 MG: 20 TABLET, FILM COATED ORAL at 04:55

## 2022-07-17 RX ADMIN — AMLODIPINE BESYLATE 10 MG: 10 TABLET ORAL at 04:56

## 2022-07-17 RX ADMIN — Medication 2001 MG: at 17:22

## 2022-07-17 RX ADMIN — HYDRALAZINE HYDROCHLORIDE 10 MG: 10 TABLET ORAL at 21:52

## 2022-07-17 RX ADMIN — ACETAMINOPHEN 650 MG: 325 TABLET, FILM COATED ORAL at 04:56

## 2022-07-17 ASSESSMENT — GAIT ASSESSMENTS
GAIT LEVEL OF ASSIST: MINIMAL ASSIST
ASSISTIVE DEVICE: FRONT WHEEL WALKER
DEVIATION: INCREASED BASE OF SUPPORT;BRADYKINETIC;SHUFFLED GAIT
DISTANCE (FEET): 5

## 2022-07-17 ASSESSMENT — COGNITIVE AND FUNCTIONAL STATUS - GENERAL
TURNING FROM BACK TO SIDE WHILE IN FLAT BAD: UNABLE
MOVING FROM LYING ON BACK TO SITTING ON SIDE OF FLAT BED: UNABLE
WALKING IN HOSPITAL ROOM: A LOT
SUGGESTED CMS G CODE MODIFIER MOBILITY: CM
MOVING TO AND FROM BED TO CHAIR: UNABLE
STANDING UP FROM CHAIR USING ARMS: A LOT
CLIMB 3 TO 5 STEPS WITH RAILING: TOTAL
MOBILITY SCORE: 8

## 2022-07-17 ASSESSMENT — ENCOUNTER SYMPTOMS
CHILLS: 0
VOMITING: 0
FEVER: 0
NAUSEA: 0
COUGH: 0
SHORTNESS OF BREATH: 0

## 2022-07-17 NOTE — CARE PLAN
The patient is Stable - Low risk of patient condition declining or worsening    Shift Goals  Clinical Goals: Comfort, pain management, blood pressure management  Patient Goals: cultural considerations, rest  Family Goals: cultural considerations, rest    Progress made toward(s) clinical / shift goals:    Problem: Pain - Standard  Goal: Alleviation of pain or a reduction in pain to the patient’s comfort goal  Outcome: Progressing     Problem: Skin Integrity  Goal: Skin integrity is maintained or improved  Outcome: Progressing

## 2022-07-17 NOTE — CONSULTS
Physical Medicine and Rehabilitation Consultation              Date of initial consultation: 7/16/2022  Requesting provider: Dr. Barr   Consulting provider: Belen Marie D.O.  Reason for consultation: assess for acute inpatient rehab appropriateness  LOS: 5 Day(s)    Chief complaint: s/p C3-T1 lami and fusion     HPI: The patient is a 61 y.o.  male with a past medical history of ESRD on HD MWF, visual impairment of blindness b/l, and WC user at baseline ;  who presented on 7/12/2022  8:31 AM for a scheduled C3-T1 lami and fusion. Per documentation, patient has been followed in the outpatient setting for neck pain, upper extremity weakness right greater than left and difficulty walking with changes in numbness and tingling upper extremities and worsening weakness of his lower extremities.  And patient was deemed appropriate for a lami and fusion of C3-T1 for his severe spinal canal stenosis and myelopathy. There are no outpatient images or EMG available for my review.  Patient was taken to the OR on  7/12 for procedure #1 of Anterior C4-5 and 5-6 diskectomy and corpectomy of C5, then procedure #2 of posterior C3- T1 lami and fusion. Patient is to have his C collar on at all times. Post operatively, patient has continued to have shoulder pain and R arm weakness, unchanged since prior to surgery. His anterior VIKKI drain was removed on /15. Post operatively patient has been followed by medicine for his DM and HTN; nephro consulted for patient's HD. Functionally, patient has been willing to participate with therapy, he is functioning at  A Min A for sit to stand and Mod A for transfers.     7/16: Patient seen and examined at bedside with wife at bedside.  Wife is able to provide information for me about prior level of function.  Wife reports that the patient was able to navigate stairs with her assistance.  He only used a wheelchair in the community and used to rolling walker at home.  Patient has had chronic  upper extremity weakness right greater than left.  Patient and wife report that the weakness in his right upper extremity is unchanged since surgery.  They do report that patient has had improvement in strength in his lower extremity since surgery.    : Patient seen and examined at bedside.  No updated therapy notes from today.  Patient reports continued shoulder pain in his right shoulder.  Otherwise reports his left upper extremity pain is improved.  Denies significant numbness or tingling in his lower extremities.  Patient has been refusing his bowel program.  Last BM     Social Hx:  Patient lives in a 1 story house in [place] with his spouse, spouse is able to provide assistance.  Spouse has been helping his  on the stairs.  3 SANDRA  At prior level of function patient ambulates very short distances with 4WW to access WC. Patient is mod I with WC.       Tobacco: Denies  Alcohol: Denies  Drugs: Denies    THERAPY:  Restrictions: Fall risk, spinal precautions   PT: Functional mobility    PT note, unable to participate in gait, Min A for bed mobility, mod A for transfers     OT: ADLs   Max A upper body dressing, Mod A lower body dressing     SLP:   No SLP note    IMAGIN2022 MRI C-spine  IMPRESSION:     1.  Multilevel multifactorial degenerative changes  2.  Severe central canal narrowing at C4-C5 with myelomalacia  3.  No other areas of severe central canal narrowing  4.  Other areas of central canal and neural foraminal narrowing as described above      PROCEDURES:   anterior C5 corpectomy, C4-6 fusion, posterior C3-T1 laminae fusion performed by Dr. Barr    PMH:  Past Medical History:   Diagnosis Date   • Anemia    • Blind in both eyes    • Diabetes ([facility])     insulin dependent   • Dialysis patient ([facility])     Earline KEATING   • ESRD (end stage renal disease) ([facility])     Dr. Haskins   • High cholesterol    • Hyperlipemia    • Hypertension    • Oxygen dependent     2 liters, [place]   •  Snoring     no sleep study       PSH:  Past Surgical History:   Procedure Laterality Date   • CERVICAL DISK AND FUSION ANTERIOR  7/12/2022    Procedure: STAGE 1- ANTERIOR C5 CORPECTOMY, C4-6 FUSION STAGE 2- POSTERIOR C3-T1 LAMINECTOMY AND FUSION;  Surgeon: Ady Barr M.D.;  Location: [facility];  Service: Neurosurgery   • CERVICAL FUSION POSTERIOR  7/12/2022    Procedure: FUSION, SPINE, CERVICAL, POSTERIOR APPROACH;  Surgeon: Ady Barr M.D.;  Location: SURGERY [facility];  Service: Neurosurgery   • CORPECTOMY  7/12/2022    Procedure: CORPECTOMY, SPINE;  Surgeon: Ady Barr M.D.;  Location: SURGERY [facility];  Service: Neurosurgery   • CERVICAL LAMINECTOMY POSTERIOR  7/12/2022    Procedure: LAMINECTOMY, SPINE, CERVICAL, POSTERIOR APPROACH;  Surgeon: Ady Barr M.D.;  Location: [facility];  Service: Neurosurgery   • RECOVERY  4/19/2016    Procedure: VASCULAR CASEYSABEL-LEFT ARM FISTULOGRAM WITH ANGIOPLASTY 62579, 17536, 55054-COS STAGE RENAL DISEASE N18.6;  Surgeon: Perfecto Surgery;  [place]: SURG[place] [facility] [facility];  Service:    • RECOVERY  2/24/2016    Procedure: IR1 VASCULAR CASE LEFT ARM FISTULOGRAM WITH ANGIOPLASTY;  Surgeon: Perfecto Surgery;  Location: SURGERY PRE[facility] PROC [facility];  Service:    • CATH PLACEMENT Right 12/10/2015    Procedure: CATH PLACEMENT;  Surgeon: Lorene Baldwin M.D.;  Location: [facility];  Service:    • AV FISTULA CREATION Left 12/10/2015    Procedure: AV FISTULA CREATION;  Surgeon: Lorene Baldwin M.D.;  Location: [facility];  Service:    • VA CATARACT SURG W/IOL 1 STAGE WO ENDO  10/21/15   • OTHER  9/22/15    trabecular micro bypass stent- right eye   • OTHER  7/2015    take out blood of left eye   • OTHER  3/2015    take blood out of eye        FHX:  Family History   Problem Relation Age of Onset   • Alzheimer's Disease Mother 80        Lives in [place]   • Hypertension  Father    • Diabetes Father    • Diabetes Brother        Medications:  Current Facility-Administered Medications   Medication Dose   • insulin NPH (HumuLIN N,NovoLIN N) injection  10 Units   • lactulose 20 GM/30ML solution 30 mL  30 mL   • hydrALAZINE (APRESOLINE) tablet 10 mg  10 mg   • menthol (Halls) lozenge 1 Lozenge  1 Lozenge   • omeprazole (PRILOSEC) capsule 20 mg  20 mg   • epoetin (Retacrit) injection (Dialysis Use Only) 3,000 Units  3,000 Units   • oxyCODONE immediate-release (ROXICODONE) tablet 5 mg  5 mg    Or   • oxyCODONE immediate release (ROXICODONE) tablet 10 mg  10 mg   • morphine 4 MG/ML injection 2 mg  2 mg   • acetaminophen (Tylenol) tablet 650 mg  650 mg   • amLODIPine (NORVASC) tablet 10 mg  10 mg   • atorvastatin (LIPITOR) tablet 20 mg  20 mg   • calcium acetate (PHOS-LO) 667 MG tablet 2,001 mg  2,001 mg   • metoprolol tartrate (LOPRESSOR) tablet 100 mg  100 mg   • PARoxetine (PAXIL) tablet 40 mg  40 mg   • Pharmacy Consult Request ...Pain Management Review 1 Each  1 Each   • MD ALERT...DO NOT ADMINISTER NSAIDS or ASPIRIN unless ORDERED By Neurosurgery 1 Each  1 Each   • docusate sodium (COLACE) capsule 100 mg  100 mg   • senna-docusate (PERICOLACE or SENOKOT S) 8.6-50 MG per tablet 1 Tablet  1 Tablet   • senna-docusate (PERICOLACE or SENOKOT S) 8.6-50 MG per tablet 1 Tablet  1 Tablet   • polyethylene glycol/lytes (MIRALAX) PACKET 1 Packet  1 Packet   • magnesium hydroxide (MILK OF MAGNESIA) suspension 30 mL  30 mL   • bisacodyl (DULCOLAX) suppository 10 mg  10 mg   • sodium phosphate (Fleet) enema 133 mL  1 Each   • diphenhydrAMINE (BENADRYL) tablet/capsule 25 mg  25 mg    Or   • diphenhydrAMINE (BENADRYL) injection 25 mg  25 mg   • ondansetron (ZOFRAN) syringe/vial injection 4 mg  4 mg   • ondansetron (ZOFRAN ODT) dispertab 4 mg  4 mg   • promethazine (PHENERGAN) tablet 12.5-25 mg  12.5-25 mg   • promethazine (PHENERGAN) suppository 12.5-25 mg  12.5-25 mg   • prochlorperazine (COMPAZINE)  "injection 5-10 mg  5-10 mg   • tizanidine (ZANAFLEX) tablet 4 mg  4 mg   • hydrALAZINE (APRESOLINE) injection 10 mg  10 mg   • insulin regular (HumuLIN R,NovoLIN R) injection  2-9 Units    And   • dextrose 50% (D50W) injection 25 g  25 g       Allergies:  Allergies   Allergen Reactions   • Aspirin Unspecified     Cannot have because on Dialysis       Physical Exam:  Vitals: /52   Pulse 60   Temp 36.5 °C (97.7 °F) (Temporal)   Resp 17   Ht 1.702 m (5' 7\")   Wt 66 kg (145 lb 8.1 oz)   SpO2 98%   Gen: NAD,, laying comfortably in bed,  Head: C-collar in place NC/AT  Eyes/ Nose/ Mouth: Decreased vision in bilateral visual fields moist mucous membranes  Cardio: RRR, good distal perfusion, warm extremities  Pulm: normal respiratory effort, no cyanosis   Abd: Soft NTND, negative borborygmi   Ext: No peripheral edema. No calf tenderness. No clubbing.    Mental status:  A&Ox4 (person, place, date, situation) answers questions appropriately follows commands  Speech: fluent, no aphasia or dysarthria    CRANIAL NERVES:  2,3: Decreased vision in bilateral visual fields  3,4,6: EOMI bilaterally, no nystagmus or diplopia  5: sensation intact to light touch bilaterally and symmetric  7: no facial asymmetry  8: hearing grossly intact      Motor:      Upper Extremity  Myotome R L   Shoulder flexion C5 3/5 4/5   Elbow flexion C5 4/5 4/5   Wrist extension C6 4/5 4/5   Elbow extension C7 4/5 5/5   Finger flexion C8 5/5 5/5   Finger abduction T1 5/5 5/5     Lower Extremity Myotome R L   Hip flexion L2 5/5 5/5   Knee extension L3 5/5 5/5   Ankle dorsiflexion L4 5/5 5/5   Toe extension L5 5/5 5/5   Ankle plantarflexion S1 5/5 5/5       Negative Pronator drift bilaterally    Sensory:   intact to light touch through out    Tone: no spasticity noted, no cogwheeling noted          Labs: Reviewed and significant for   Recent Labs     07/15/22  0540 07/16/22  0501 07/17/22  0338   RBC 3.19* 2.92* 2.86*   HEMOGLOBIN 10.0* 9.0* 8.9* "   HEMATOCRIT 30.9* 28.6* 28.1*   PLATELETCT 125* 149* 173     Recent Labs     07/15/22  0445 07/16/22  0501 07/17/22  0338   SODIUM 129* 136 133*   POTASSIUM 5.4 4.8 5.3   CHLORIDE 90* 97 94*   CO2 25 27 29   GLUCOSE 78 68 126*   BUN 46* 31* 45*   CREATININE 6.25* 4.47* 6.28*   CALCIUM 8.5 8.4* 8.4*     Recent Results (from the past 24 hour(s))   POCT glucose device results    Collection Time: 07/16/22  9:26 PM   Result Value Ref Range    POC Glucose, Blood 153 (H) 65 - 99 mg/dL   CBC without Differential    Collection Time: 07/17/22  3:38 AM   Result Value Ref Range    WBC 7.4 4.8 - 10.8 K/uL    RBC 2.86 (L) 4.70 - 6.10 M/uL    Hemoglobin 8.9 (L) 14.0 - 18.0 g/dL    Hematocrit 28.1 (L) 42.0 - 52.0 %    MCV 98.3 (H) 81.4 - 97.8 fL    MCH 31.1 27.0 - 33.0 pg    MCHC 31.7 (L) 33.7 - 35.3 g/dL    RDW 50.1 (H) 35.9 - 50.0 fL    Platelet Count 173 164 - 446 K/uL    MPV 10.7 9.0 - 12.9 fL   Comp Metabolic Panel    Collection Time: 07/17/22  3:38 AM   Result Value Ref Range    Sodium 133 (L) 135 - 145 mmol/L    Potassium 5.3 3.6 - 5.5 mmol/L    Chloride 94 (L) 96 - 112 mmol/L    Co2 29 20 - 33 mmol/L    Anion Gap 10.0 7.0 - 16.0    Glucose 126 (H) 65 - 99 mg/dL    Bun 45 (H) 8 - 22 mg/dL    Creatinine 6.28 (HH) 0.50 - 1.40 mg/dL    Calcium 8.4 (L) 8.5 - 10.5 mg/dL    AST(SGOT) 10 (L) 12 - 45 U/L    ALT(SGPT) <5 2 - 50 U/L    Alkaline Phosphatase 93 30 - 99 U/L    Total Bilirubin 0.2 0.1 - 1.5 mg/dL    Albumin 3.1 (L) 3.2 - 4.9 g/dL    Total Protein 5.8 (L) 6.0 - 8.2 g/dL    Globulin 2.7 1.9 - 3.5 g/dL    A-G Ratio 1.1 g/dL   Renal Function Panel    Collection Time: 07/17/22  3:38 AM   Result Value Ref Range    Phosphorus 3.8 2.5 - 4.5 mg/dL   ESTIMATED GFR    Collection Time: 07/17/22  3:38 AM   Result Value Ref Range    GFR (CKD-EPI) 9 (A) >60 mL/min/1.73 m 2   POCT glucose device results    Collection Time: 07/17/22  4:54 AM   Result Value Ref Range    POC Glucose, Blood 116 (H) 65 - 99 mg/dL   POCT glucose device  results    Collection Time: 07/17/22  9:48 AM   Result Value Ref Range    POC Glucose, Blood 102 (H) 65 - 99 mg/dL         ASSESSMENT:  Patient is a 61 y.o. male admitted with cervical myelopathy    [facility] Code / Diagnosis to Support: [phone] - Spinal Cord Dysfunction, Non-Traumatic: Quadriplegia, Incomplete C5-8    Rehabilitation: Impaired ADLs and mobility  Patient is a good candidate for inpatient rehab based on needs for PT, OT, see disposition details below    Barriers to transfer include: Insurance authorization, TCCs to verify disposition, medical clearance and bed availability     Additional Recommendations:  Central cord syndrome, cervical myelopathy  - Patient with evidence of severe central cord stenosis on MRI, in May 2022  - 5/30/2022 MRI C-spine without dense of severe central canal narrowing at C4-C5 with myelomalacia  - Status post C3-T1 laminectomy and posterior fusion, performed by Dr. Barr  - C-collar on at all times  - Patient with upper extremity weakness greater than lower extremity weakness and right upper extremity greater than left  -Anterior VIKKI drain removed on 7/15  - Continue with PT/OT, will need to see progress with PT in order to ambulate as patient's home is not currently wheelchair accessible , needs updated therapy eval on 7/18    End-stage renal disease  - Nephrology following for hemodialysis  - HD on M WF    Bladder  - Patient is anuric, no need for neurogenic bladder management at this time    Bowels  - Monitor for neurogenic bowel  - Continue with bowel regimen  - If patient has no bowel movement in 24 hours recommend evening suppositories  - Last BM 7/17    Dispo:  - patient is currently functioning below their level of baseline, recommend post acute rehab  - recommend IRF level therapy with 3hr of therapy 5 days per week  - piror to acceptance to IRF, will evidence of improved gait with PT and confirmation of physical assistance from wife  - TCC to assist with DC support          Medical Complexity:  Cervical myelopathy  Status post C3-T1 posterior fusion  Hypertension  Visual impairment  Upper extremity weakness  End-stage renal disease on HD  Hypertension  Type 2 diabetes  Impaired mobility and ADLs      DVT PPX: SCDs      Thank you for allowing us to participate in the care of this patient.     Patient was seen for 35 minutes on unit/floor of which > 50% of time was spent on counseling and coordination of care regarding the above, including prognosis, risk reduction, benefits of treatment, and options for next stage of care.    Belen Marie D.O.   Physical Medicine and Rehabilitation     Please note that this dictation was created using voice recognition software. I have made every reasonable attempt to correct obvious errors, but there may be errors of grammar and possibly content that I did not discover before finalizing the note.

## 2022-07-17 NOTE — PROGRESS NOTES
Nephrology Daily Progress Note    Date of Service  7/17/2022    Chief Complaint  61 y.o. male admitted 7/12/2022 with ESRD, status post elective cervical spine fusion.    Interval Problem Update  Patient is doing well today, complaining of mild pain in the surgery site  7/15 patient had hemodialysis earlier today  His neck pain is improving  7/16 patient feels better  Mild neck pain  Complain of difficulty swallowing solid food  7/17 patient is doing better, no new complaints  Review of Systems  Review of Systems   Constitutional: Negative for chills, fever and malaise/fatigue.   Respiratory: Negative for cough and shortness of breath.    Cardiovascular: Negative for chest pain and leg swelling.   Gastrointestinal: Negative for nausea and vomiting.   Genitourinary: Negative for dysuria, frequency and urgency.        Physical Exam  Temp:  [36.1 °C (97 °F)-36.9 °C (98.4 °F)] 36.5 °C (97.7 °F)  Pulse:  [60-66] 60  Resp:  [16-18] 17  BP: (127-149)/(52-63) 127/52  SpO2:  [98 %-99 %] 98 %    Physical Exam  Vitals and nursing note reviewed.   Constitutional:       General: He is not in acute distress.     Appearance: He is not ill-appearing.      Interventions: Cervical collar in place.   HENT:      Head: Normocephalic and atraumatic.      Right Ear: External ear normal.      Left Ear: External ear normal.      Nose: Nose normal.   Eyes:      General:         Right eye: No discharge.         Left eye: No discharge.      Conjunctiva/sclera: Conjunctivae normal.   Cardiovascular:      Rate and Rhythm: Normal rate and regular rhythm.      Heart sounds: No murmur heard.  Pulmonary:      Effort: Pulmonary effort is normal. No respiratory distress.      Breath sounds: Normal breath sounds. No wheezing.   Musculoskeletal:         General: No tenderness or deformity.      Right lower leg: No edema.      Left lower leg: No edema.   Skin:     General: Skin is warm.   Neurological:      General: No focal deficit present.      Mental  Status: He is alert and oriented to person, place, and time.   Psychiatric:         Mood and Affect: Mood normal.         Behavior: Behavior normal.         Fluids    Intake/Output Summary (Last 24 hours) at 7/17/2022 1249  Last data filed at 7/16/2022 2100  Gross per 24 hour   Intake 360 ml   Output --   Net 360 ml       Laboratory  Recent Labs     07/15/22  0540 07/16/22  0501 07/17/22  0338   WBC 10.6 8.0 7.4   RBC 3.19* 2.92* 2.86*   HEMOGLOBIN 10.0* 9.0* 8.9*   HEMATOCRIT 30.9* 28.6* 28.1*   MCV 96.9 97.9* 98.3*   MCH 31.3 30.8 31.1   MCHC 32.4* 31.5* 31.7*   RDW 48.0 49.5 50.1*   PLATELETCT 125* 149* 173   MPV 10.9 11.2 10.7     Recent Labs     07/15/22  0445 07/16/22  0501 07/17/22  0338   SODIUM 129* 136 133*   POTASSIUM 5.4 4.8 5.3   CHLORIDE 90* 97 94*   CO2 25 27 29   GLUCOSE 78 68 126*   BUN 46* 31* 45*   CREATININE 6.25* 4.47* 6.28*   CALCIUM 8.5 8.4* 8.4*         No results for input(s): NTPROBNP in the last 72 hours.        Imaging  DX-PORTABLE FLUORO > 1 HOUR   Final Result      Intraoperative images intended for localization and not for diagnostic purposes demonstrate C4-C6 ACDF. Extensive posterior spinal fusion. See procedure report for details.      Fluoroscopy Time:  29 second.  6 fluoroscopic images were obtained.      DX-CERVICAL SPINE-2 OR 3 VIEWS   Final Result      Intraoperative images intended for localization and not for diagnostic purposes demonstrate C4-C6 ACDF. Extensive posterior spinal fusion. See procedure report for details.      Fluoroscopy Time:  29 second.  6 fluoroscopic images were obtained.            Assessment/Plan  1 ESRD  2 anemia  3 spinal stenosis  4 diabetes mellitus  no acute need for HD today  Epogen with dialysis  Renal diet  Daily labs  Renal dose all meds  Avoid nephrotoxins

## 2022-07-17 NOTE — PROGRESS NOTES
Rounded on patient who is observed to be sitting upright in bed, denies pain, VSS, due medications administered as per order--see MAR. Pt continues to refuse PRN bisacodyl. Education reinforced. Wife at bedside. Safety precautions maintained, no acute distress, no additional needs at this time

## 2022-07-17 NOTE — THERAPY
Physical Therapy   Daily Treatment     Patient Name: Pj Britt  Age:  61 y.o., Sex:  male  Medical Record #: [id number]  Today's Date: 7/17/2022     Precautions  Precautions: Fall Risk;Cervical Collar  ;Spinal / Back Precautions   Comments: [place] on AAT, legally blind    Assessment    The pt presents today agreeable to participate in tx session.  He and his wife were provided w/ an English handout for cervical precautions to provide to pt's son, as both the pt and his wife are unable to see well enough to read handout.  Pt performed bed mobility Gt for trunk positioning and UE leverage requiring cues for log roll throughout w/ hob flat and no bed rails.  He demo'd STS eob w/ fww Gt for stability and extension, along w/ gait 5' using fww Gt for stability and weight shift via increased BENNIE and shuffled gait pattern, LOBx2 requiring therapist assist to prevent fall, distance limited by weakness and fatigue.  Recommend placement given pt's inability to perform transfer tasks independently and high risk of future falls.  Will follow.     Plan    Continue current treatment plan.    DC Equipment Recommendations: None  Discharge Recommendations: Recommend post-acute placement for additional physical therapy services prior to discharge home      Subjective/Objective       07/17/22 1134   Cognition    Cognition / Consciousness WDL   Level of Consciousness Alert   Comments pt pleasant and cooperative   Passive ROM Lower Body   Passive ROM Lower Body WDL   Active ROM Lower Body    Active ROM Lower Body  WDL   Strength Lower Body   Lower Body Strength  X   Comments generalized weakness BLE   Sensation Lower Body   Lower Extremity Sensation   WDL   Neuro-Muscular Treatments   Neuro-Muscular Treatments Anterior weight shift;Weight Shift Right;Weight Shift Left   Comments stand w/ FWW   Vision   Vision Comments baseline visual impairment   Other Treatments   Other Treatments Provided bed mobility, STS, gait, reviewed  cervical precautions   Balance   Sitting Balance (Static) Fair -   Sitting Balance (Dynamic) Fair -   Standing Balance (Static) Poor +   Standing Balance (Dynamic) Poor   Weight Shift Sitting Fair   Weight Shift Standing Poor   Skilled Intervention Verbal Cuing;Tactile Cuing;Facilitation;Compensatory Strategies   Comments stand w/ FWW   Gait Analysis   Gait Level Of Assist Minimal Assist   Assistive Device Front Wheel Walker   Distance (Feet) 5   # of Times Distance was Traveled 1   Deviation Increased Base Of Support;Bradykinetic;Shuffled Gait   Weight Bearing Status no restrictions   Vision Deficits Impacting Mobility baseline visual impairment   Skilled Intervention Verbal Cuing;Tactile Cuing;Sequencing;Facilitation;Compensatory Strategies   Comments distance limited by fatigue and weakness   Bed Mobility    Supine to Sit Minimal Assist   Sit to Supine Minimal Assist   Scooting Contact Guard Assist   Rolling Minimum Assist to Lt.   Skilled Intervention Verbal Cuing;Tactile Cuing;Sequencing;Facilitation;Compensatory Strategies   Comments hob flat, use of rails   Functional Mobility   Sit to Stand Minimal Assist   Bed, Chair, Wheelchair Transfer Minimal Assist   Transfer Method Stand Step   Mobility STS eob w/ fww   Skilled Intervention Verbal Cuing;Tactile Cuing;Sequencing;Facilitation;Compensatory Strategies   Activity Tolerance   Sitting Edge of Bed 12min   Standing 6min   Short Term Goals    Short Term Goal # 1 Pt will perform supine <> sit with SPV in 6 visits to get in/out of bed   Goal Outcome # 1 goal not met   Short Term Goal # 2 Pt will perform functional transfers with SPV in 6 visits to increase independence   Goal Outcome # 2 Goal not met   Short Term Goal # 3 Pt will ambulate 10ft with FWW and SPV in 6 visits to return to baseline   Goal Outcome # 3 Goal not met   Short Term Goal # 4 Pt will ascend/descend 3 steps with min assist in 6 visits to return to baseline for stairs in/out of home   Goal  Outcome # 4 Goal not met   Education Group   Education Provided Role of Physical Therapist;Gait Training;Cervical Precautions   Cervical Precautions Patient Response Patient;Significant Other;Acceptance;Explanation;Demonstration;Handout;Action Demonstration   Role of Physical Therapist Patient Response Patient;Significant Other;Acceptance;Explanation;Demonstration;Action Demonstration   Gait Training Patient Response Patient;Significant Other;Acceptance;Explanation;Demonstration;Action Demonstration   Additional Comments pt and SO both receptive of edu provided   Interdisciplinary Plan of Care Collaboration   IDT Collaboration with  Nursing;Family / Caregiver   Patient Position at End of Therapy In Bed;Bed Alarm On;Call Light within Reach;Tray Table within Reach;Phone within Reach;Family / Friend in Room   Collaboration Comments regarding outcome of tx session   Session Information   Date / Session Number  7/17- 2 (2/4, 7/20)

## 2022-07-17 NOTE — PROGRESS NOTES
Neurosurgery Progress Note    Subjective:  Pt awake, alert  Wife at bedside   Pain minimal- mostly to post neck    Exam:  BLE 5/5  Right  5/5, bi/tri 4,  delt 4-, left UE and bilat LE 5/5  Incisions c/d ant intact with dermabond, post inc intact with drsg, collar on    BP  Min: 127/52  Max: 149/63  Pulse  Av.8  Min: 60  Max: 66  Resp  Av.3  Min: 16  Max: 18  Temp  Av.5 °C (97.7 °F)  Min: 36.1 °C (97 °F)  Max: 36.9 °C (98.4 °F)  SpO2  Av.8 %  Min: 98 %  Max: 99 %    No data recorded    Recent Labs     07/15/22  0540 22  0501 22  0338   WBC 10.6 8.0 7.4   RBC 3.19* 2.92* 2.86*   HEMOGLOBIN 10.0* 9.0* 8.9*   HEMATOCRIT 30.9* 28.6* 28.1*   MCV 96.9 97.9* 98.3*   MCH 31.3 30.8 31.1   MCHC 32.4* 31.5* 31.7*   RDW 48.0 49.5 50.1*   PLATELETCT 125* 149* 173   MPV 10.9 11.2 10.7     Recent Labs     07/15/22  0445 22  0501 22  0338   SODIUM 129* 136 133*   POTASSIUM 5.4 4.8 5.3   CHLORIDE 90* 97 94*   CO2 25 27 29   GLUCOSE 78 68 126*   BUN 46* 31* 45*   CREATININE 6.25* 4.47* 6.28*   CALCIUM 8.5 8.4* 8.4*               Intake/Output                       22 - 22 0659 22 07 - 22 0659      Total  Total                 Intake    P.O.  240  360 600  --  -- --    P.O. 240 360 600 -- -- --    Total Intake 240 360 600 -- -- --       Output    Drains  0  -- 0  --  -- --    Output (mL) ([REMOVED] Closed/Suction Drain 2 Posterior Neck Hemovac) 0 -- 0 -- -- --    Total Output 0 -- 0 -- -- --       Net I/O     240 360 600 -- -- --            Intake/Output Summary (Last 24 hours) at 2022 1022  Last data filed at 2022 2100  Gross per 24 hour   Intake 360 ml   Output --   Net 360 ml            • insulin NPH  10 Units QAM INSULIN   • lactulose  30 mL TID PRN   • hydrALAZINE  10 mg Q8HRS   • menthol  1 Lozenge Q2HRS PRN   • omeprazole  20 mg DAILY   • epoetin  3,000 Units MO, WE + FR   • oxyCODONE immediate-release   5 mg Q4HRS PRN    Or   • oxyCODONE immediate-release  10 mg Q4HRS PRN   • morphine injection  2 mg Q HOUR PRN   • acetaminophen  650 mg Q6HRS   • amLODIPine  10 mg DAILY   • atorvastatin  20 mg DAILY   • calcium acetate  2,001 mg TID WITH MEALS   • metoprolol tartrate  100 mg BID   • PARoxetine  40 mg DAILY   • Pharmacy Consult Request  1 Each PHARMACY TO DOSE   • MD ALERT...DO NOT ADMINISTER NSAIDS or ASPIRIN unless ORDERED By Neurosurgery  1 Each PRN   • docusate sodium  100 mg BID   • senna-docusate  1 Tablet Nightly   • senna-docusate  1 Tablet Q24HRS PRN   • polyethylene glycol/lytes  1 Packet BID PRN   • magnesium hydroxide  30 mL QDAY PRN   • bisacodyl  10 mg Q24HRS PRN   • sodium phosphate  1 Each Once PRN   • diphenhydrAMINE  25 mg Q6HRS PRN    Or   • diphenhydrAMINE  25 mg Q6HRS PRN   • ondansetron  4 mg Q4HRS PRN   • ondansetron  4 mg Q4HRS PRN   • promethazine  12.5-25 mg Q4HRS PRN   • promethazine  12.5-25 mg Q4HRS PRN   • prochlorperazine  5-10 mg Q4HRS PRN   • tizanidine  4 mg Q8HRS PRN   • hydrALAZINE  10 mg Q HOUR PRN   • insulin regular  2-9 Units 4X/DAY ACHS    And   • dextrose bolus  25 g Q15 MIN PRN       Assessment and Plan:  Hospital day # 5  POD# 4 anterior C5 corpectomy, 4-6 fusion, posterior C3-T1 lami fusion  Chemical prophylactic DVT therapy: No  Start date/time: TBD    Pain control-  PO oxy and tizanidine prn  Pt/ot/up as carol- discussed with RN as pt has not been seen  Collar at all times  Dialysis M, W, F- neph on board - thank you  Appreciate  [facility] assistance with management  Bowel protocol   Rehab pending   Ok for rehab anytime from our perspective

## 2022-07-17 NOTE — PROGRESS NOTES
Hospital Medicine Daily Progress Note    Date of Service  7/17/2022    Chief Complaint  Pj Britt is a 61 y.o. male admitted 7/12/2022 with neck pain and extremity weakness    Hospital Course    61 y.o. male who presented 7/12/2022 for planned C3-T1 laminectomy and fusion.  By the neurosurgical team for recent diagnosis of severe cervical spine central stenosis..  Procedure was completed on July 12.  He has a previous medical history that includes chronic kidney disease on dialysis, legally blind in both eyes, glaucoma, hypertension, dyslipidemia, insulin-dependent type 2 diabetes.         Interval Problem Update  Pt notes some pain in his shoulders which is unchanged.  Weakness in his arms, R>L also unchanged  Ant VIKKI drain removed 7/15: post ermoved 7/16.   Disposition planning discussed extensively with wife at bedside.      Consultants/Specialty  nephrology and neurosurgery    Code Status  Full Code    Disposition  Patient is medically cleared for discharge.   Anticipate discharge to to an inpatient rehabilitation hospital or SNF pending PT/OT outcomes.  I have placed the appropriate orders for post-discharge needs.       Physical Exam  Temp:  [36.1 °C (97 °F)-36.9 °C (98.4 °F)] 36.5 °C (97.7 °F)  Pulse:  [60-66] 60  Resp:  [16-18] 17  BP: (127-149)/(52-63) 127/52  SpO2:  [98 %-99 %] 98 %    Physical Exam  Vitals reviewed.   Constitutional:       General: He is not in acute distress.     Appearance: Normal appearance. He is well-developed. He is not diaphoretic.   HENT:      Head: Normocephalic and atraumatic.   Eyes:      Conjunctiva/sclera: Conjunctivae normal.   Neck:      Vascular: No JVD.      Comments: In hard collar  Cardiovascular:      Rate and Rhythm: Normal rate.      Heart sounds: No murmur heard.    No gallop.   Pulmonary:      Effort: Pulmonary effort is normal. No respiratory distress.      Breath sounds: No stridor. No wheezing or rales.   Abdominal:      General: There is no distension.       Palpations: Abdomen is soft.      Tenderness: There is no abdominal tenderness. There is no guarding or rebound.   Musculoskeletal:         General: No tenderness.      Right lower leg: No edema.      Left lower leg: No edema.   Skin:     General: Skin is warm and dry.      Coloration: Skin is not jaundiced.      Findings: No rash.   Neurological:      Mental Status: He is alert and oriented to person, place, and time. Mental status is at baseline.      Comments: 2/5 R upper  3/5 L upper   Psychiatric:         Mood and Affect: Mood normal.         Thought Content: Thought content normal.       Current Facility-Administered Medications:   •  insulin NPH (HumuLIN N,NovoLIN N) injection, 10 Units, Subcutaneous, [facility] INSULIN, Mp Li M.D.  •  lactulose 20 GM/30ML solution 30 mL, 30 mL, Oral, TID PRN, Jae Kenney D.O., 30 mL at 07/15/22 1742  •  hydrALAZINE (APRESOLINE) tablet 10 mg, 10 mg, Oral, Q8HRS, Jae Kenney D.O., 10 mg at 07/17/22 1310  •  menthol (Halls) lozenge 1 Lozenge, 1 Lozenge, Oral, Q2HRS PRN, Ady Barr M.D.  •  omeprazole (PRILOSEC) capsule 20 mg, 20 mg, Oral, DAILY, Jae Kenney D.O., 20 mg at 07/17/22 0455  •  epoetin (Retacrit) injection (Dialysis Use Only) 3,000 Units, 3,000 Units, Intravenous, MO, WE + FR, Fadi Najjar, M.D., 3,000 Units at 07/15/22 0856  •  oxyCODONE immediate-release (ROXICODONE) tablet 5 mg, 5 mg, Oral, Q4HRS PRN, 5 mg at 07/13/22 1232 **OR** oxyCODONE immediate release (ROXICODONE) tablet 10 mg, 10 mg, Oral, Q4HRS PRN, Edi Owens, A.P.N., 10 mg at 07/15/22 2251  •  morphine 4 MG/ML injection 2 mg, 2 mg, Intravenous, Q HOUR PRN, MUNIR nEg.  •  acetaminophen (Tylenol) tablet 650 mg, 650 mg, Oral, Q6HRS, Jae Kenney D.O., 650 mg at 07/17/22 1310  •  amLODIPine (NORVASC) tablet 10 mg, 10 mg, Oral, DAILY, Ady Barr M.D., 10 mg at 07/17/22 0456  •  atorvastatin (LIPITOR) tablet 20 mg, 20  mg, Oral, DAILY, Ady Barr M.D., 20 mg at 07/17/22 0455  •  calcium acetate (PHOS-LO) 667 MG tablet 2,001 mg, 2,001 mg, Oral, TID WITH MEALS, Ady Barr M.D., 2,001 mg at 07/17/22 1310  •  metoprolol tartrate (LOPRESSOR) tablet 100 mg, 100 mg, Oral, BID, Ady Barr M.D., 100 mg at 07/17/22 0455  •  PARoxetine (PAXIL) tablet 40 mg, 40 mg, Oral, DAILY, Ady Barr M.D., 40 mg at 07/17/22 0455  •  Pharmacy Consult Request ...Pain Management Review 1 Each, 1 Each, Other, PHARMACY TO DOSE, Ady Barr M.D.  •  MD ALERT...DO NOT ADMINISTER NSAIDS or ASPIRIN unless ORDERED By Neurosurgery 1 Each, 1 Each, Other, PRN, Ady Barr M.D.  •  docusate sodium (COLACE) capsule 100 mg, 100 mg, Oral, BID, Ady Barr M.D., 100 mg at 07/16/22 1611  •  senna-docusate (PERICOLACE or SENOKOT S) 8.6-50 MG per tablet 1 Tablet, 1 Tablet, Oral, Nightly, Ady Barr M.D., 1 Tablet at 07/16/22 2139  •  senna-docusate (PERICOLACE or SENOKOT S) 8.6-50 MG per tablet 1 Tablet, 1 Tablet, Oral, Q24HRS PRN, Ady Barr M.D., 1 Tablet at 07/15/22 2251  •  polyethylene glycol/lytes (MIRALAX) PACKET 1 Packet, 1 Packet, Oral, BID PRN, Ady Barr M.D.  •  magnesium hydroxide (MILK OF MAGNESIA) suspension 30 mL, 30 mL, Oral, QDAY PRN, Ady Barr M.D.  •  bisacodyl (DULCOLAX) suppository 10 mg, 10 mg, Rectal, Q24HRS PRN, Ady Barr M.D.  •  sodium phosphate (Fleet) enema 133 mL, 1 Each, Rectal, Once PRN, Ady Barr M.D.  •  diphenhydrAMINE (BENADRYL) tablet/capsule 25 mg, 25 mg, Oral, Q6HRS PRN **OR** diphenhydrAMINE (BENADRYL) injection 25 mg, 25 mg, Intravenous, Q6HRS PRN, Ady Barr M.D.  •  ondansetron (ZOFRAN) syringe/vial injection 4 mg, 4 mg, Intravenous, Q4HRS PRN, Ady Barr M.D., 4 mg at 07/15/22 1824  •  ondansetron (ZOFRAN ODT) dispertab 4 mg, 4 mg, Oral, Q4HRS PRN, Ady Barr,  M.D., 4 mg at 07/14/22 1306  •  promethazine (PHENERGAN) tablet 12.5-25 mg, 12.5-25 mg, Oral, Q4HRS PRN, Ady Barr M.D.  •  promethazine (PHENERGAN) suppository 12.5-25 mg, 12.5-25 mg, Rectal, Q4HRS PRN, Ady Barr M.D.  •  prochlorperazine (COMPAZINE) injection 5-10 mg, 5-10 mg, Intravenous, Q4HRS PRN, Ady Barr M.D.  •  tizanidine (ZANAFLEX) tablet 4 mg, 4 mg, Oral, Q8HRS PRN, Ady Barr M.D., 4 mg at 07/14/22 1738  •  hydrALAZINE (APRESOLINE) injection 10 mg, 10 mg, Intravenous, Q HOUR PRN, Ady Barr M.D., 10 mg at 07/13/22 0708  •  insulin regular (HumuLIN R,NovoLIN R) injection, 2-9 Units, Subcutaneous, 4X/DAY ACHS, 2 Units at 07/16/22 1158 **AND** POC blood glucose manual result, , , Q AC AND BEDTIME(S) **AND** NOTIFY MD and PharmD, , , Once **AND** Administer 20 grams of glucose (approximately 8 ounces of fruit juice) every 15 minutes PRN FSBG less than 70 mg/dL, , , PRN **AND** dextrose 50% (D50W) injection 25 g, 25 g, Intravenous, Q15 MIN PRN, Ady Barr M.D.      Fluids    Intake/Output Summary (Last 24 hours) at 7/17/2022 1509  Last data filed at 7/16/2022 2100  Gross per 24 hour   Intake 360 ml   Output --   Net 360 ml       Laboratory  Recent Labs     07/15/22  0540 07/16/22  0501 07/17/22  0338   WBC 10.6 8.0 7.4   RBC 3.19* 2.92* 2.86*   HEMOGLOBIN 10.0* 9.0* 8.9*   HEMATOCRIT 30.9* 28.6* 28.1*   MCV 96.9 97.9* 98.3*   MCH 31.3 30.8 31.1   MCHC 32.4* 31.5* 31.7*   RDW 48.0 49.5 50.1*   PLATELETCT 125* 149* 173   MPV 10.9 11.2 10.7     Recent Labs     07/15/22  0445 07/16/22  0501 07/17/22  0338   SODIUM 129* 136 133*   POTASSIUM 5.4 4.8 5.3   CHLORIDE 90* 97 94*   CO2 25 27 29   GLUCOSE 78 68 126*   BUN 46* 31* 45*   CREATININE 6.25* 4.47* 6.28*   CALCIUM 8.5 8.4* 8.4*                   Imaging  DX-PORTABLE FLUORO > 1 HOUR   Final Result      Intraoperative images intended for localization and not for diagnostic purposes demonstrate  C4-C6 ACDF. Extensive posterior spinal fusion. See procedure report for details.      Fluoroscopy Time:  29 second.  6 fluoroscopic images were obtained.      DX-CERVICAL SPINE-2 OR 3 VIEWS   Final Result      Intraoperative images intended for localization and not for diagnostic purposes demonstrate C4-C6 ACDF. Extensive posterior spinal fusion. See procedure report for details.      Fluoroscopy Time:  29 second.  6 fluoroscopic images were obtained.           Assessment/Plan  * Spinal stenosis in cervical region- (present on admission)  Assessment & Plan  Now s/p surgical decompression and fusion  Hard collar  No chemical DVD prophylaxis  PT/OT consulted  Physical medicine and rehab would like to see increased exercise tolerance before he is a candidate for their facility.  Will reevaluate with PT and OT and once posterior drain is removed  Ant drain removed 7/15: post remains in place    ESRD on dialysis (HCC)- (present on admission)  Assessment & Plan  Nephrology following  Follow BMP  DC IVFs    Type 2 diabetes mellitus with nephropathy, and retinopathy (HCC)- (present on admission)  Assessment & Plan  outpt is on NPH 20 in am and 15 in hs with preprandial short acting however wife will on occasion not give evening dose if his qhs BG is <140.  Given hypoglycemia overnight stop his evening NPH and decrease am dose to 15  Last A1c in our system was from 3 years ago; 5.9    Hypertension- (present on admission)  Assessment & Plan  On metoprolol 100 BID, amlodipine 10 as an outpt which we have continued with parameters  Still running 150s generally: add hydralazine 10    Blindness- (present on admission)  Assessment & Plan  chronic    Hyperlipidemia- (present on admission)  Assessment & Plan  statin       VTE prophylaxis: SCDs    I have performed a physical exam and reviewed and updated ROS and Plan today (7/17/2022). In review of yesterday's note (7/16/2022), there are no changes except as documented above.

## 2022-07-18 LAB
ALBUMIN SERPL BCP-MCNC: 3.4 G/DL (ref 3.2–4.9)
ALBUMIN/GLOB SERPL: 1.3 G/DL
ALP SERPL-CCNC: 103 U/L (ref 30–99)
ALT SERPL-CCNC: <5 U/L (ref 2–50)
ANION GAP SERPL CALC-SCNC: 14 MMOL/L (ref 7–16)
AST SERPL-CCNC: 11 U/L (ref 12–45)
BILIRUB SERPL-MCNC: 0.2 MG/DL (ref 0.1–1.5)
BUN SERPL-MCNC: 58 MG/DL (ref 8–22)
CALCIUM SERPL-MCNC: 8.5 MG/DL (ref 8.5–10.5)
CHLORIDE SERPL-SCNC: 89 MMOL/L (ref 96–112)
CO2 SERPL-SCNC: 27 MMOL/L (ref 20–33)
CREAT SERPL-MCNC: 7.37 MG/DL (ref 0.5–1.4)
ERYTHROCYTE [DISTWIDTH] IN BLOOD BY AUTOMATED COUNT: 47.8 FL (ref 35.9–50)
GFR SERPLBLD CREATININE-BSD FMLA CKD-EPI: 8 ML/MIN/1.73 M 2
GLOBULIN SER CALC-MCNC: 2.6 G/DL (ref 1.9–3.5)
GLUCOSE BLD STRIP.AUTO-MCNC: 120 MG/DL (ref 65–99)
GLUCOSE BLD STRIP.AUTO-MCNC: 140 MG/DL (ref 65–99)
GLUCOSE BLD STRIP.AUTO-MCNC: 154 MG/DL (ref 65–99)
GLUCOSE BLD STRIP.AUTO-MCNC: 181 MG/DL (ref 65–99)
GLUCOSE SERPL-MCNC: 156 MG/DL (ref 65–99)
HCT VFR BLD AUTO: 28.8 % (ref 42–52)
HGB BLD-MCNC: 9.3 G/DL (ref 14–18)
MCH RBC QN AUTO: 31.2 PG (ref 27–33)
MCHC RBC AUTO-ENTMCNC: 32.3 G/DL (ref 33.7–35.3)
MCV RBC AUTO: 96.6 FL (ref 81.4–97.8)
PLATELET # BLD AUTO: 207 K/UL (ref 164–446)
PMV BLD AUTO: 10.5 FL (ref 9–12.9)
POTASSIUM SERPL-SCNC: 6.4 MMOL/L (ref 3.6–5.5)
PROT SERPL-MCNC: 6 G/DL (ref 6–8.2)
RBC # BLD AUTO: 2.98 M/UL (ref 4.7–6.1)
SODIUM SERPL-SCNC: 130 MMOL/L (ref 135–145)
WBC # BLD AUTO: 6.9 K/UL (ref 4.8–10.8)

## 2022-07-18 PROCEDURE — 82962 GLUCOSE BLOOD TEST: CPT

## 2022-07-18 PROCEDURE — A9270 NON-COVERED ITEM OR SERVICE: HCPCS | Performed by: NEUROLOGICAL SURGERY

## 2022-07-18 PROCEDURE — 700102 HCHG RX REV CODE 250 W/ 637 OVERRIDE(OP): Performed by: HOSPITALIST

## 2022-07-18 PROCEDURE — 99232 SBSQ HOSP IP/OBS MODERATE 35: CPT | Performed by: HOSPITALIST

## 2022-07-18 PROCEDURE — 90935 HEMODIALYSIS ONE EVALUATION: CPT

## 2022-07-18 PROCEDURE — 80053 COMPREHEN METABOLIC PANEL: CPT

## 2022-07-18 PROCEDURE — 700102 HCHG RX REV CODE 250 W/ 637 OVERRIDE(OP): Performed by: NEUROLOGICAL SURGERY

## 2022-07-18 PROCEDURE — 700111 HCHG RX REV CODE 636 W/ 250 OVERRIDE (IP)

## 2022-07-18 PROCEDURE — 85027 COMPLETE CBC AUTOMATED: CPT

## 2022-07-18 PROCEDURE — A9270 NON-COVERED ITEM OR SERVICE: HCPCS | Performed by: HOSPITALIST

## 2022-07-18 PROCEDURE — 770001 HCHG ROOM/CARE - MED/SURG/GYN PRIV*

## 2022-07-18 PROCEDURE — 36415 COLL VENOUS BLD VENIPUNCTURE: CPT

## 2022-07-18 PROCEDURE — 90935 HEMODIALYSIS ONE EVALUATION: CPT | Performed by: INTERNAL MEDICINE

## 2022-07-18 RX ADMIN — HYDRALAZINE HYDROCHLORIDE 10 MG: 10 TABLET ORAL at 15:12

## 2022-07-18 RX ADMIN — Medication 2001 MG: at 16:42

## 2022-07-18 RX ADMIN — PAROXETINE HYDROCHLORIDE 40 MG: 20 TABLET, FILM COATED ORAL at 05:18

## 2022-07-18 RX ADMIN — HYDRALAZINE HYDROCHLORIDE 10 MG: 10 TABLET ORAL at 22:10

## 2022-07-18 RX ADMIN — ACETAMINOPHEN 650 MG: 325 TABLET, FILM COATED ORAL at 16:50

## 2022-07-18 RX ADMIN — OMEPRAZOLE 20 MG: 20 CAPSULE, DELAYED RELEASE ORAL at 05:18

## 2022-07-18 RX ADMIN — INSULIN HUMAN 2 UNITS: 100 INJECTION, SOLUTION PARENTERAL at 16:41

## 2022-07-18 RX ADMIN — METOPROLOL 100 MG: 100 TABLET ORAL at 16:43

## 2022-07-18 RX ADMIN — DOCUSATE SODIUM 100 MG: 100 CAPSULE, LIQUID FILLED ORAL at 05:19

## 2022-07-18 RX ADMIN — Medication 2001 MG: at 07:45

## 2022-07-18 RX ADMIN — ACETAMINOPHEN 650 MG: 325 TABLET, FILM COATED ORAL at 05:18

## 2022-07-18 RX ADMIN — METOPROLOL 100 MG: 100 TABLET ORAL at 05:18

## 2022-07-18 RX ADMIN — ACETAMINOPHEN 650 MG: 325 TABLET, FILM COATED ORAL at 12:13

## 2022-07-18 RX ADMIN — DOCUSATE SODIUM 100 MG: 100 CAPSULE, LIQUID FILLED ORAL at 16:50

## 2022-07-18 RX ADMIN — AMLODIPINE BESYLATE 10 MG: 10 TABLET ORAL at 05:18

## 2022-07-18 RX ADMIN — EPOETIN ALFA-EPBX 3000 UNITS: 3000 INJECTION, SOLUTION INTRAVENOUS; SUBCUTANEOUS at 10:39

## 2022-07-18 RX ADMIN — HYDRALAZINE HYDROCHLORIDE 10 MG: 10 TABLET ORAL at 05:20

## 2022-07-18 RX ADMIN — Medication 2001 MG: at 12:12

## 2022-07-18 RX ADMIN — ATORVASTATIN CALCIUM 20 MG: 20 TABLET, FILM COATED ORAL at 05:18

## 2022-07-18 ASSESSMENT — PAIN DESCRIPTION - PAIN TYPE: TYPE: ACUTE PAIN

## 2022-07-18 NOTE — CARE PLAN
The patient is Stable - Low risk of patient condition declining or worsening    Shift Goals  Clinical Goals: monitor blood sugar levels  Patient Goals: comfortable bed position, rest  Family Goals: comfort    Progress made toward(s) clinical / shift goals:    Problem: Communication  Goal: The ability to communicate needs accurately and effectively will improve  Outcome: Progressing     Problem: Self Care  Goal: Patient will have the ability to perform ADLs independently or with assistance (bathe, groom, dress, toilet and feed)  Outcome: Progressing       Patient is not progressing towards the following goals: A substantial bowel movement

## 2022-07-18 NOTE — PROCEDURES
Nephrology/Hemodialysis note    Patient with ESRD/HD admitted for elective cervical fusion  Doing well, no complaints  Tolerates dialysis well  VS stable  Lab results reviewed  Please see dialysis flow sheet for details

## 2022-07-18 NOTE — DISCHARGE PLANNING
"Would appreciate an updated OT eval once appropriate.  Msg placed to Dr. Li-seeking medical clearance. Will need to verify D/C resources/support available.     2762-Pj remains medically cleared per Dr. Li.     7996-Spoke with Lindsey, spouse regarding [facility] and D/C resources/support.  They live together in a 1LV home with 3ST to enter.  [facility] provides 24/7.  They have a son that works F/T days and is able to assist when not working.  Lindsey is requesting to stay with Pj 24/7 while @ the Rehab as he is \"completely blind\" per spouse and egts anxious without Lindsey by his side.  Will discuss this case further with the Admissions Team this morning.   "

## 2022-07-18 NOTE — PROGRESS NOTES
Neurosurgery Progress Note    Subjective:  Pt not in room, down in dialysis   Visited with wife   Rehab pending       BP  Min: 125/73  Max: 150/55  Pulse  Av.8  Min: 62  Max: 65  Resp  Av.8  Min: 16  Max: 18  Temp  Av.6 °C (97.9 °F)  Min: 36.2 °C (97.2 °F)  Max: 36.9 °C (98.4 °F)  SpO2  Av %  Min: 94 %  Max: 96 %    No data recorded    Recent Labs     22  0501 22  0338 22  0308   WBC 8.0 7.4 6.9   RBC 2.92* 2.86* 2.98*   HEMOGLOBIN 9.0* 8.9* 9.3*   HEMATOCRIT 28.6* 28.1* 28.8*   MCV 97.9* 98.3* 96.6   MCH 30.8 31.1 31.2   MCHC 31.5* 31.7* 32.3*   RDW 49.5 50.1* 47.8   PLATELETCT 149* 173 207   MPV 11.2 10.7 10.5     Recent Labs     22  0501 22  0338 22  0308   SODIUM 136 133* 130*   POTASSIUM 4.8 5.3 6.4*   CHLORIDE 97 94* 89*   CO2 27 29 27   GLUCOSE 68 126* 156*   BUN 31* 45* 58*   CREATININE 4.47* 6.28* 7.37*   CALCIUM 8.4* 8.4* 8.5               Intake/Output                       22 - 2259 22 - 22 59      Total  Total                 Intake    Other  --  150 150  --  -- --    Medications (PO/Enteral Liquids) -- 150 150 -- -- --    Total Intake -- 150 150 -- -- --       Output    Stool  --  -- --  --  -- --    Number of Times Stooled -- 1 x 1 x -- -- --    Total Output -- -- -- -- -- --       Net I/O     -- 150 150 -- -- --            Intake/Output Summary (Last 24 hours) at 2022 0933  Last data filed at 2022 0500  Gross per 24 hour   Intake 150 ml   Output --   Net 150 ml            • insulin NPH  10 Units QAM INSULIN   • lactulose  30 mL TID PRN   • hydrALAZINE  10 mg Q8HRS   • menthol  1 Lozenge Q2HRS PRN   • omeprazole  20 mg DAILY   • epoetin  3,000 Units MO, WE + FR   • oxyCODONE immediate-release  5 mg Q4HRS PRN    Or   • oxyCODONE immediate-release  10 mg Q4HRS PRN   • morphine injection  2 mg Q HOUR PRN   • acetaminophen  650 mg Q6HRS   • amLODIPine  10 mg DAILY    • atorvastatin  20 mg DAILY   • calcium acetate  2,001 mg TID WITH MEALS   • metoprolol tartrate  100 mg BID   • PARoxetine  40 mg DAILY   • Pharmacy Consult Request  1 Each PHARMACY TO DOSE   • MD ALERT...DO NOT ADMINISTER NSAIDS or ASPIRIN unless ORDERED By Neurosurgery  1 Each PRN   • docusate sodium  100 mg BID   • senna-docusate  1 Tablet Nightly   • senna-docusate  1 Tablet Q24HRS PRN   • polyethylene glycol/lytes  1 Packet BID PRN   • magnesium hydroxide  30 mL QDAY PRN   • bisacodyl  10 mg Q24HRS PRN   • sodium phosphate  1 Each Once PRN   • diphenhydrAMINE  25 mg Q6HRS PRN    Or   • diphenhydrAMINE  25 mg Q6HRS PRN   • ondansetron  4 mg Q4HRS PRN   • ondansetron  4 mg Q4HRS PRN   • promethazine  12.5-25 mg Q4HRS PRN   • promethazine  12.5-25 mg Q4HRS PRN   • prochlorperazine  5-10 mg Q4HRS PRN   • tizanidine  4 mg Q8HRS PRN   • hydrALAZINE  10 mg Q HOUR PRN   • insulin regular  2-9 Units 4X/DAY ACHS    And   • dextrose bolus  25 g Q15 MIN PRN       Assessment and Plan:  Hospital day # 6  POD# 5 anterior C5 corpectomy, 4-6 fusion, posterior C3-T1 lami fusion  Chemical prophylactic DVT therapy: No  Start date/time: TBD    Pain control-  PO oxy and tizanidine prn  Pt/ot/up as carol- discussed with RN as pt has not been seen  Collar at all times  Dialysis M, W, F- neph on board - thank you  Appreciate  IMs assistance with management  Bowel protocol   Rehab pending   Ok for rehab anytime from our perspective

## 2022-07-18 NOTE — PROGRESS NOTES
Hospital Medicine Daily Progress Note    Date of Service  7/18/2022    Chief Complaint  Pj Britt is a 61 y.o. male admitted 7/12/2022 with neck pain and extremity weakness    Hospital Course    61 y.o. male who presented 7/12/2022 for planned C3-T1 laminectomy and fusion.  By the neurosurgical team for recent diagnosis of severe cervical spine central stenosis..  Procedure was completed on July 12.  He has a previous medical history that includes chronic kidney disease on dialysis, legally blind in both eyes, glaucoma, hypertension, dyslipidemia, insulin-dependent type 2 diabetes.         Interval Problem Update  Pt notes some pain in his shoulders which is unchanged.  Weakness in his arms, R>L also unchanged  Ant VIKKI drain removed 7/15: post ermoved 7/16.   Disposition planning discussed extensively with wife at bedside 7/17.    7/18: Updated PT noted appreciated, updated OT notes pending.  No acute overnight events.      Consultants/Specialty  nephrology and neurosurgery    Code Status  Full Code    Disposition  Patient is medically cleared for discharge.   Anticipate discharge to to an inpatient rehabilitation hospital or SNF pending PT/OT outcomes.  I have placed the appropriate orders for post-discharge needs.       Physical Exam  Temp:  [35.9 °C (96.6 °F)-36.9 °C (98.4 °F)] 36.4 °C (97.5 °F)  Pulse:  [62-96] 96  Resp:  [16-18] 18  BP: (125-150)/(55-73) 136/55  SpO2:  [94 %-100 %] 97 %    Physical Exam  Vitals reviewed.   Constitutional:       General: He is not in acute distress.     Appearance: Normal appearance. He is well-developed. He is not diaphoretic.   HENT:      Head: Normocephalic and atraumatic.   Eyes:      Conjunctiva/sclera: Conjunctivae normal.   Neck:      Vascular: No JVD.      Comments: In hard collar  Cardiovascular:      Rate and Rhythm: Normal rate.      Heart sounds: No murmur heard.    No gallop.   Pulmonary:      Effort: Pulmonary effort is normal. No respiratory distress.       Breath sounds: No stridor. No wheezing or rales.   Abdominal:      General: There is no distension.      Palpations: Abdomen is soft.      Tenderness: There is no abdominal tenderness. There is no guarding or rebound.   Musculoskeletal:         General: No tenderness.      Right lower leg: No edema.      Left lower leg: No edema.   Skin:     General: Skin is warm and dry.      Coloration: Skin is not jaundiced.      Findings: No rash.   Neurological:      Mental Status: He is alert and oriented to person, place, and time. Mental status is at baseline.      Comments: 2/5 R upper  3/5 L upper   Psychiatric:         Mood and Affect: Mood normal.         Thought Content: Thought content normal.       Current Facility-Administered Medications:   •  insulin NPH (HumuLIN N,NovoLIN N) injection, 10 Units, Subcutaneous, [facility] INSULIN, pM Li M.D.  •  lactulose 20 GM/30ML solution 30 mL, 30 mL, Oral, TID PRN, Jae Kenney, D.O., 30 mL at 07/15/22 1742  •  hydrALAZINE (APRESOLINE) tablet 10 mg, 10 mg, Oral, Q8HRS, Jae Kenney, D.O., 10 mg at 07/18/22 1512  •  menthol (Halls) lozenge 1 Lozenge, 1 Lozenge, Oral, Q2HRS PRN, Ady Barr M.D.  •  omeprazole (PRILOSEC) capsule 20 mg, 20 mg, Oral, DAILY, Jae Kenney, D.O., 20 mg at 07/18/22 0518  •  epoetin (Retacrit) injection (Dialysis Use Only) 3,000 Units, 3,000 Units, Intravenous, MO, WE + FR, Fadi Najjar, M.D., 3,000 Units at 07/18/22 1039  •  oxyCODONE immediate-release (ROXICODONE) tablet 5 mg, 5 mg, Oral, Q4HRS PRN, 5 mg at 07/13/22 1232 **OR** oxyCODONE immediate release (ROXICODONE) tablet 10 mg, 10 mg, Oral, Q4HRS PRN, Edi Owens AMiltonP.N., 10 mg at 07/15/22 2251  •  morphine 4 MG/ML injection 2 mg, 2 mg, Intravenous, Q HOUR PRN, Edi Owens AMiltonPMiltonN.  •  acetaminophen (Tylenol) tablet 650 mg, 650 mg, Oral, Q6HRS, Jae Kenney D.O., 650 mg at 07/18/22 1213  •  amLODIPine (NORVASC) tablet 10 mg, 10 mg, Oral,  DAILY, Ady Barr M.D., 10 mg at 07/18/22 0518  •  atorvastatin (LIPITOR) tablet 20 mg, 20 mg, Oral, DAILY, Ady Barr M.D., 20 mg at 07/18/22 0518  •  calcium acetate (PHOS-LO) 667 MG tablet 2,001 mg, 2,001 mg, Oral, TID WITH MEALS, Ady Barr M.D., 2,001 mg at 07/18/22 1212  •  metoprolol tartrate (LOPRESSOR) tablet 100 mg, 100 mg, Oral, BID, Ady Barr M.D., 100 mg at 07/18/22 0518  •  PARoxetine (PAXIL) tablet 40 mg, 40 mg, Oral, DAILY, dAy Barr M.D., 40 mg at 07/18/22 0518  •  Pharmacy Consult Request ...Pain Management Review 1 Each, 1 Each, Other, PHARMACY TO DOSE, Ady Barr M.D.  •  MD ALERT...DO NOT ADMINISTER NSAIDS or ASPIRIN unless ORDERED By Neurosurgery 1 Each, 1 Each, Other, PRN, Ady Barr M.D.  •  docusate sodium (COLACE) capsule 100 mg, 100 mg, Oral, BID, Ady Barr M.D., 100 mg at 07/18/22 0519  •  senna-docusate (PERICOLACE or SENOKOT S) 8.6-50 MG per tablet 1 Tablet, 1 Tablet, Oral, Nightly, Ady Barr M.D., 1 Tablet at 07/17/22 2152  •  senna-docusate (PERICOLACE or SENOKOT S) 8.6-50 MG per tablet 1 Tablet, 1 Tablet, Oral, Q24HRS PRN, Ady Barr M.D., 1 Tablet at 07/15/22 2251  •  polyethylene glycol/lytes (MIRALAX) PACKET 1 Packet, 1 Packet, Oral, BID PRN, Ady Barr M.D.  •  magnesium hydroxide (MILK OF MAGNESIA) suspension 30 mL, 30 mL, Oral, QDAY PRN, Ady Barr M.D.  •  bisacodyl (DULCOLAX) suppository 10 mg, 10 mg, Rectal, Q24HRS PRN, Ady Barr M.D.  •  sodium phosphate (Fleet) enema 133 mL, 1 Each, Rectal, Once PRN, Ady Barr M.D.  •  diphenhydrAMINE (BENADRYL) tablet/capsule 25 mg, 25 mg, Oral, Q6HRS PRN **OR** diphenhydrAMINE (BENADRYL) injection 25 mg, 25 mg, Intravenous, Q6HRS PRN, Ady Barr M.D.  •  ondansetron (ZOFRAN) syringe/vial injection 4 mg, 4 mg, Intravenous, Q4HRS PRN, Ady Barr M.D., 4 mg at  07/15/22 1824  •  ondansetron (ZOFRAN ODT) dispertab 4 mg, 4 mg, Oral, Q4HRS PRN, Ady Barr M.D., 4 mg at 07/14/22 1306  •  promethazine (PHENERGAN) tablet 12.5-25 mg, 12.5-25 mg, Oral, Q4HRS PRN, Ady Barr M.D.  •  promethazine (PHENERGAN) suppository 12.5-25 mg, 12.5-25 mg, Rectal, Q4HRS PRN, Ady Barr M.D.  •  prochlorperazine (COMPAZINE) injection 5-10 mg, 5-10 mg, Intravenous, Q4HRS PRN, Ady Barr M.D.  •  tizanidine (ZANAFLEX) tablet 4 mg, 4 mg, Oral, Q8HRS PRN, Ady Barr M.D., 4 mg at 07/14/22 1738  •  hydrALAZINE (APRESOLINE) injection 10 mg, 10 mg, Intravenous, Q HOUR PRN, Ady Barr M.D., 10 mg at 07/13/22 0708  •  insulin regular (HumuLIN R,NovoLIN R) injection, 2-9 Units, Subcutaneous, 4X/DAY ACHS, 2 Units at 07/16/22 1158 **AND** POC blood glucose manual result, , , Q AC AND BEDTIME(S) **AND** NOTIFY MD and PharmD, , , Once **AND** Administer 20 grams of glucose (approximately 8 ounces of fruit juice) every 15 minutes PRN FSBG less than 70 mg/dL, , , PRN **AND** dextrose 50% (D50W) injection 25 g, 25 g, Intravenous, Q15 MIN PRN, Ady Barr M.D.      Fluids    Intake/Output Summary (Last 24 hours) at 7/18/2022 1638  Last data filed at 7/18/2022 1500  Gross per 24 hour   Intake 1190 ml   Output 2500 ml   Net -1310 ml       Laboratory  Recent Labs     07/16/22  0501 07/17/22  0338 07/18/22  0308   WBC 8.0 7.4 6.9   RBC 2.92* 2.86* 2.98*   HEMOGLOBIN 9.0* 8.9* 9.3*   HEMATOCRIT 28.6* 28.1* 28.8*   MCV 97.9* 98.3* 96.6   MCH 30.8 31.1 31.2   MCHC 31.5* 31.7* 32.3*   RDW 49.5 50.1* 47.8   PLATELETCT 149* 173 207   MPV 11.2 10.7 10.5     Recent Labs     07/16/22  0501 07/17/22  0338 07/18/22  0308   SODIUM 136 133* 130*   POTASSIUM 4.8 5.3 6.4*   CHLORIDE 97 94* 89*   CO2 27 29 27   GLUCOSE 68 126* 156*   BUN 31* 45* 58*   CREATININE 4.47* 6.28* 7.37*   CALCIUM 8.4* 8.4* 8.5                   Imaging  DX-PORTABLE FLUORO > 1 HOUR    Final Result      Intraoperative images intended for localization and not for diagnostic purposes demonstrate C4-C6 ACDF. Extensive posterior spinal fusion. See procedure report for details.      Fluoroscopy Time:  29 second.  6 fluoroscopic images were obtained.      DX-CERVICAL SPINE-2 OR 3 VIEWS   Final Result      Intraoperative images intended for localization and not for diagnostic purposes demonstrate C4-C6 ACDF. Extensive posterior spinal fusion. See procedure report for details.      Fluoroscopy Time:  29 second.  6 fluoroscopic images were obtained.           Assessment/Plan  * Spinal stenosis in cervical region- (present on admission)  Assessment & Plan  Now s/p surgical decompression and fusion  Hard collar  No chemical DVD prophylaxis  PT/OT consulted  Physical medicine and rehab would like to see increased exercise tolerance before he is a candidate for their facility.  Will reevaluate with PT and OT and once posterior drain is removed  Ant drain removed 7/15: post remains in place    ESRD on dialysis (HCC)- (present on admission)  Assessment & Plan  Nephrology following  Follow BMP  DC IVFs    Type 2 diabetes mellitus with nephropathy, and retinopathy (HCC)- (present on admission)  Assessment & Plan  outpt is on NPH 20 in am and 15 in hs with preprandial short acting however wife will on occasion not give evening dose if his qhs BG is <140.  Given hypoglycemia overnight stop his evening NPH and decrease am dose to 15  Last A1c in our system was from 3 years ago; 5.9    Hypertension- (present on admission)  Assessment & Plan  On metoprolol 100 BID, amlodipine 10 as an outpt which we have continued with parameters  Still running 150s generally: add hydralazine 10    Blindness- (present on admission)  Assessment & Plan  chronic    Hyperlipidemia- (present on admission)  Assessment & Plan  statin       VTE prophylaxis: SCDs    I have performed a physical exam and reviewed and updated ROS and Plan today  (7/18/2022). In review of yesterday's note (7/17/2022), there are no changes except as documented above.

## 2022-07-18 NOTE — PROGRESS NOTES
[facility] Progress Note      HD today x 3 hours per Dr. Chan.   Initiated at 0816 and ended at 1116.   Patient assessed prior tx.  Alert and oriented x 5. With neck brace. Denies pain, no SOB. L arm AVF with bruit and thrill prior dialysis.        UF Net: 2,000 mL    Patient tolerated treatment. No hypotensive episodes.     Blood returned. Applied gauze and held L forearm AVF site for 8 minutes. Verified no bleeding post treatment. Bruit and thrill present post dialysis.   Instructions given to Primary RN that if bleeding occurs on the AVF site, change dressing and held the site with pressure.       Report given to Primary FORREST Stephenson RN.

## 2022-07-19 ENCOUNTER — HOSPITAL ENCOUNTER (INPATIENT)
Facility: REHABILITATION | Age: 62
LOS: 7 days | DRG: 073 | End: 2022-07-26
Attending: PHYSICAL MEDICINE & REHABILITATION | Admitting: PHYSICAL MEDICINE & REHABILITATION
Payer: MEDICARE

## 2022-07-19 VITALS
TEMPERATURE: 98.2 F | BODY MASS INDEX: 22.84 KG/M2 | HEART RATE: 65 BPM | OXYGEN SATURATION: 99 % | RESPIRATION RATE: 17 BRPM | SYSTOLIC BLOOD PRESSURE: 144 MMHG | HEIGHT: 67 IN | WEIGHT: 145.5 LBS | DIASTOLIC BLOOD PRESSURE: 57 MMHG

## 2022-07-19 DIAGNOSIS — M54.12 CERVICAL RADICULOPATHY: Primary | ICD-10-CM

## 2022-07-19 DIAGNOSIS — K59.00 CONSTIPATION, UNSPECIFIED CONSTIPATION TYPE: ICD-10-CM

## 2022-07-19 DIAGNOSIS — E78.5 HYPERLIPIDEMIA, UNSPECIFIED HYPERLIPIDEMIA TYPE: Chronic | ICD-10-CM

## 2022-07-19 DIAGNOSIS — I10 PRIMARY HYPERTENSION: Chronic | ICD-10-CM

## 2022-07-19 DIAGNOSIS — N18.6 ESRD ON DIALYSIS (HCC): Chronic | ICD-10-CM

## 2022-07-19 DIAGNOSIS — E11.21 TYPE 2 DIABETES MELLITUS WITH NEPHROPATHY (HCC): Chronic | ICD-10-CM

## 2022-07-19 DIAGNOSIS — Z99.2 ESRD ON DIALYSIS (HCC): Chronic | ICD-10-CM

## 2022-07-19 PROBLEM — G95.9 CERVICAL MYELOPATHY (HCC): Status: ACTIVE | Noted: 2022-07-19

## 2022-07-19 LAB
ALBUMIN SERPL BCP-MCNC: 3.1 G/DL (ref 3.2–4.9)
ALBUMIN/GLOB SERPL: 1.1 G/DL
ALP SERPL-CCNC: 94 U/L (ref 30–99)
ALT SERPL-CCNC: <5 U/L (ref 2–50)
ANION GAP SERPL CALC-SCNC: 11 MMOL/L (ref 7–16)
AST SERPL-CCNC: 12 U/L (ref 12–45)
BILIRUB SERPL-MCNC: 0.2 MG/DL (ref 0.1–1.5)
BUN SERPL-MCNC: 28 MG/DL (ref 8–22)
CALCIUM SERPL-MCNC: 8.9 MG/DL (ref 8.5–10.5)
CHLORIDE SERPL-SCNC: 95 MMOL/L (ref 96–112)
CO2 SERPL-SCNC: 28 MMOL/L (ref 20–33)
CREAT SERPL-MCNC: 4.78 MG/DL (ref 0.5–1.4)
ERYTHROCYTE [DISTWIDTH] IN BLOOD BY AUTOMATED COUNT: 47.8 FL (ref 35.9–50)
FLUAV RNA SPEC QL NAA+PROBE: NEGATIVE
FLUBV RNA SPEC QL NAA+PROBE: NEGATIVE
GFR SERPLBLD CREATININE-BSD FMLA CKD-EPI: 13 ML/MIN/1.73 M 2
GLOBULIN SER CALC-MCNC: 2.7 G/DL (ref 1.9–3.5)
GLUCOSE BLD STRIP.AUTO-MCNC: 125 MG/DL (ref 65–99)
GLUCOSE BLD STRIP.AUTO-MCNC: 156 MG/DL (ref 65–99)
GLUCOSE BLD STRIP.AUTO-MCNC: 170 MG/DL (ref 65–99)
GLUCOSE SERPL-MCNC: 116 MG/DL (ref 65–99)
HCT VFR BLD AUTO: 30.9 % (ref 42–52)
HGB BLD-MCNC: 9.9 G/DL (ref 14–18)
MCH RBC QN AUTO: 30.8 PG (ref 27–33)
MCHC RBC AUTO-ENTMCNC: 32 G/DL (ref 33.7–35.3)
MCV RBC AUTO: 96.3 FL (ref 81.4–97.8)
PLATELET # BLD AUTO: 228 K/UL (ref 164–446)
PMV BLD AUTO: 9.8 FL (ref 9–12.9)
POTASSIUM SERPL-SCNC: 5.1 MMOL/L (ref 3.6–5.5)
PROT SERPL-MCNC: 5.8 G/DL (ref 6–8.2)
RBC # BLD AUTO: 3.21 M/UL (ref 4.7–6.1)
RSV RNA SPEC QL NAA+PROBE: NEGATIVE
SARS-COV-2 RNA RESP QL NAA+PROBE: NOTDETECTED
SODIUM SERPL-SCNC: 134 MMOL/L (ref 135–145)
SPECIMEN SOURCE: NORMAL
WBC # BLD AUTO: 5.2 K/UL (ref 4.8–10.8)

## 2022-07-19 PROCEDURE — 97530 THERAPEUTIC ACTIVITIES: CPT

## 2022-07-19 PROCEDURE — 700102 HCHG RX REV CODE 250 W/ 637 OVERRIDE(OP)

## 2022-07-19 PROCEDURE — 82962 GLUCOSE BLOOD TEST: CPT | Mod: 91

## 2022-07-19 PROCEDURE — 700102 HCHG RX REV CODE 250 W/ 637 OVERRIDE(OP): Performed by: NEUROLOGICAL SURGERY

## 2022-07-19 PROCEDURE — A9270 NON-COVERED ITEM OR SERVICE: HCPCS | Performed by: HOSPITALIST

## 2022-07-19 PROCEDURE — A9270 NON-COVERED ITEM OR SERVICE: HCPCS | Performed by: PHYSICAL MEDICINE & REHABILITATION

## 2022-07-19 PROCEDURE — 80053 COMPREHEN METABOLIC PANEL: CPT

## 2022-07-19 PROCEDURE — 36415 COLL VENOUS BLD VENIPUNCTURE: CPT

## 2022-07-19 PROCEDURE — 99232 SBSQ HOSP IP/OBS MODERATE 35: CPT | Performed by: INTERNAL MEDICINE

## 2022-07-19 PROCEDURE — A9270 NON-COVERED ITEM OR SERVICE: HCPCS

## 2022-07-19 PROCEDURE — 99239 HOSP IP/OBS DSCHRG MGMT >30: CPT | Performed by: STUDENT IN AN ORGANIZED HEALTH CARE EDUCATION/TRAINING PROGRAM

## 2022-07-19 PROCEDURE — 0241U HCHG SARS-COV-2 COVID-19 NFCT DS RESP RNA 4 TRGT MIC: CPT

## 2022-07-19 PROCEDURE — A9270 NON-COVERED ITEM OR SERVICE: HCPCS | Performed by: NEUROLOGICAL SURGERY

## 2022-07-19 PROCEDURE — 770010 HCHG ROOM/CARE - REHAB SEMI PRIVAT*

## 2022-07-19 PROCEDURE — 700102 HCHG RX REV CODE 250 W/ 637 OVERRIDE(OP): Performed by: HOSPITALIST

## 2022-07-19 PROCEDURE — 85027 COMPLETE CBC AUTOMATED: CPT

## 2022-07-19 PROCEDURE — 97535 SELF CARE MNGMENT TRAINING: CPT

## 2022-07-19 PROCEDURE — 700102 HCHG RX REV CODE 250 W/ 637 OVERRIDE(OP): Performed by: PHYSICAL MEDICINE & REHABILITATION

## 2022-07-19 RX ORDER — ACETAMINOPHEN 325 MG/1
650 TABLET ORAL EVERY 6 HOURS
Qty: 30 TABLET | Refills: 0 | Status: ON HOLD
Start: 2022-07-19 | End: 2022-07-25

## 2022-07-19 RX ORDER — DEXTROSE MONOHYDRATE 25 G/50ML
25 INJECTION, SOLUTION INTRAVENOUS
Status: CANCELLED | OUTPATIENT
Start: 2022-07-19

## 2022-07-19 RX ORDER — ACETAMINOPHEN 500 MG
1000 TABLET ORAL 3 TIMES DAILY
Status: DISCONTINUED | OUTPATIENT
Start: 2022-07-19 | End: 2022-07-25

## 2022-07-19 RX ORDER — OXYCODONE HYDROCHLORIDE 10 MG/1
10 TABLET ORAL EVERY 4 HOURS PRN
Status: DISCONTINUED | OUTPATIENT
Start: 2022-07-19 | End: 2022-07-26 | Stop reason: HOSPADM

## 2022-07-19 RX ORDER — LACTULOSE 20 G/30ML
30 SOLUTION ORAL 3 TIMES DAILY PRN
Qty: 450 ML | Status: ON HOLD
Start: 2022-07-19 | End: 2022-07-25

## 2022-07-19 RX ORDER — TIZANIDINE 4 MG/1
4 TABLET ORAL EVERY 8 HOURS PRN
Qty: 30 TABLET | Refills: 3 | Status: ON HOLD
Start: 2022-07-19 | End: 2022-07-25

## 2022-07-19 RX ORDER — CALCIUM ACETATE 667 MG/1
2001 TABLET ORAL
Status: CANCELLED | OUTPATIENT
Start: 2022-07-19

## 2022-07-19 RX ORDER — CALCIUM ACETATE 667 MG/1
2001 TABLET ORAL
Status: DISCONTINUED | OUTPATIENT
Start: 2022-07-20 | End: 2022-07-26 | Stop reason: HOSPADM

## 2022-07-19 RX ORDER — ECHINACEA PURPUREA EXTRACT 125 MG
2 TABLET ORAL PRN
Status: DISCONTINUED | OUTPATIENT
Start: 2022-07-19 | End: 2022-07-26 | Stop reason: HOSPADM

## 2022-07-19 RX ORDER — DEXTROSE MONOHYDRATE 25 G/50ML
25 INJECTION, SOLUTION INTRAVENOUS
Status: DISCONTINUED | OUTPATIENT
Start: 2022-07-19 | End: 2022-07-26 | Stop reason: HOSPADM

## 2022-07-19 RX ORDER — OXYCODONE HYDROCHLORIDE 5 MG/1
5 TABLET ORAL EVERY 4 HOURS PRN
Status: DISCONTINUED | OUTPATIENT
Start: 2022-07-19 | End: 2022-07-26 | Stop reason: HOSPADM

## 2022-07-19 RX ORDER — ONDANSETRON 4 MG/1
4 TABLET, ORALLY DISINTEGRATING ORAL 4 TIMES DAILY PRN
Status: DISCONTINUED | OUTPATIENT
Start: 2022-07-19 | End: 2022-07-26 | Stop reason: HOSPADM

## 2022-07-19 RX ORDER — CALCIUM ACETATE 667 MG/1
2001 TABLET ORAL
Qty: 180 TABLET | Status: ON HOLD
Start: 2022-07-19 | End: 2022-07-25 | Stop reason: SDUPTHER

## 2022-07-19 RX ORDER — METOPROLOL TARTRATE 100 MG/1
100 TABLET ORAL 2 TIMES DAILY
Status: CANCELLED | OUTPATIENT
Start: 2022-07-19

## 2022-07-19 RX ORDER — METOPROLOL TARTRATE 100 MG/1
100 TABLET ORAL 2 TIMES DAILY
Qty: 60 TABLET | Status: ON HOLD
Start: 2022-07-19 | End: 2022-07-25 | Stop reason: SDUPTHER

## 2022-07-19 RX ORDER — ATORVASTATIN CALCIUM 10 MG/1
20 TABLET, FILM COATED ORAL DAILY
Status: DISCONTINUED | OUTPATIENT
Start: 2022-07-20 | End: 2022-07-26 | Stop reason: HOSPADM

## 2022-07-19 RX ORDER — ATORVASTATIN CALCIUM 20 MG/1
20 TABLET, FILM COATED ORAL DAILY
Qty: 30 TABLET | Status: ON HOLD
Start: 2022-07-20 | End: 2022-07-25 | Stop reason: SDUPTHER

## 2022-07-19 RX ORDER — BISACODYL 10 MG
10 SUPPOSITORY, RECTAL RECTAL
Refills: 0 | Status: SHIPPED
Start: 2022-07-19

## 2022-07-19 RX ORDER — OMEPRAZOLE 20 MG/1
20 CAPSULE, DELAYED RELEASE ORAL DAILY
Qty: 30 CAPSULE | Status: ON HOLD
Start: 2022-07-20 | End: 2022-07-25

## 2022-07-19 RX ORDER — OMEPRAZOLE 20 MG/1
20 CAPSULE, DELAYED RELEASE ORAL DAILY
Status: DISCONTINUED | OUTPATIENT
Start: 2022-07-19 | End: 2022-07-26 | Stop reason: HOSPADM

## 2022-07-19 RX ORDER — DOCUSATE SODIUM 100 MG/1
100 CAPSULE, LIQUID FILLED ORAL 2 TIMES DAILY
Status: DISCONTINUED | OUTPATIENT
Start: 2022-07-20 | End: 2022-07-26 | Stop reason: HOSPADM

## 2022-07-19 RX ORDER — AMLODIPINE BESYLATE 5 MG/1
10 TABLET ORAL DAILY
Status: DISCONTINUED | OUTPATIENT
Start: 2022-07-20 | End: 2022-07-26 | Stop reason: HOSPADM

## 2022-07-19 RX ORDER — OXYCODONE HYDROCHLORIDE 10 MG/1
10 TABLET ORAL EVERY 4 HOURS PRN
Status: CANCELLED | OUTPATIENT
Start: 2022-07-19

## 2022-07-19 RX ORDER — ONDANSETRON 2 MG/ML
4 INJECTION INTRAMUSCULAR; INTRAVENOUS 4 TIMES DAILY PRN
Status: DISCONTINUED | OUTPATIENT
Start: 2022-07-19 | End: 2022-07-26 | Stop reason: HOSPADM

## 2022-07-19 RX ORDER — AMLODIPINE BESYLATE 10 MG/1
10 TABLET ORAL DAILY
Qty: 30 TABLET | Status: ON HOLD
Start: 2022-07-20 | End: 2022-07-25 | Stop reason: SDUPTHER

## 2022-07-19 RX ORDER — DOCUSATE SODIUM 100 MG/1
100 CAPSULE, LIQUID FILLED ORAL 2 TIMES DAILY
Status: CANCELLED | OUTPATIENT
Start: 2022-07-19

## 2022-07-19 RX ORDER — HYDRALAZINE HYDROCHLORIDE 10 MG/1
10 TABLET, FILM COATED ORAL EVERY 8 HOURS
Status: CANCELLED | OUTPATIENT
Start: 2022-07-19

## 2022-07-19 RX ORDER — PSEUDOEPHEDRINE HCL 30 MG
100 TABLET ORAL 2 TIMES DAILY
Qty: 60 CAPSULE | Status: ON HOLD
Start: 2022-07-19 | End: 2022-07-25 | Stop reason: SDUPTHER

## 2022-07-19 RX ORDER — PAROXETINE HYDROCHLORIDE 20 MG/1
40 TABLET, FILM COATED ORAL DAILY
Status: DISCONTINUED | OUTPATIENT
Start: 2022-07-20 | End: 2022-07-26 | Stop reason: HOSPADM

## 2022-07-19 RX ORDER — ALUMINA, MAGNESIA, AND SIMETHICONE 2400; 2400; 240 MG/30ML; MG/30ML; MG/30ML
20 SUSPENSION ORAL
Status: DISCONTINUED | OUTPATIENT
Start: 2022-07-19 | End: 2022-07-26 | Stop reason: HOSPADM

## 2022-07-19 RX ORDER — LANOLIN ALCOHOL/MO/W.PET/CERES
3 CREAM (GRAM) TOPICAL
Status: DISCONTINUED | OUTPATIENT
Start: 2022-07-19 | End: 2022-07-26 | Stop reason: HOSPADM

## 2022-07-19 RX ORDER — POLYVINYL ALCOHOL 14 MG/ML
1 SOLUTION/ DROPS OPHTHALMIC PRN
Status: DISCONTINUED | OUTPATIENT
Start: 2022-07-19 | End: 2022-07-26 | Stop reason: HOSPADM

## 2022-07-19 RX ORDER — PAROXETINE HYDROCHLORIDE 40 MG/1
40 TABLET, FILM COATED ORAL DAILY
Qty: 30 TABLET | Status: SHIPPED
Start: 2022-07-20 | End: 2023-09-13 | Stop reason: SDUPTHER

## 2022-07-19 RX ORDER — HYDRALAZINE HYDROCHLORIDE 10 MG/1
10 TABLET, FILM COATED ORAL EVERY 8 HOURS
Status: DISCONTINUED | OUTPATIENT
Start: 2022-07-19 | End: 2022-07-26 | Stop reason: HOSPADM

## 2022-07-19 RX ORDER — OXYCODONE HYDROCHLORIDE 5 MG/1
5 TABLET ORAL EVERY 4 HOURS PRN
Status: CANCELLED | OUTPATIENT
Start: 2022-07-19

## 2022-07-19 RX ORDER — HYDRALAZINE HYDROCHLORIDE 10 MG/1
TABLET, FILM COATED ORAL
Status: COMPLETED
Start: 2022-07-19 | End: 2022-07-19

## 2022-07-19 RX ORDER — ATORVASTATIN CALCIUM 20 MG/1
20 TABLET, FILM COATED ORAL DAILY
Status: CANCELLED | OUTPATIENT
Start: 2022-07-20

## 2022-07-19 RX ORDER — HYDRALAZINE HYDROCHLORIDE 10 MG/1
10 TABLET, FILM COATED ORAL EVERY 8 HOURS
Qty: 90 TABLET | Status: ON HOLD
Start: 2022-07-19 | End: 2022-07-25 | Stop reason: SDUPTHER

## 2022-07-19 RX ORDER — PAROXETINE HYDROCHLORIDE 20 MG/1
40 TABLET, FILM COATED ORAL DAILY
Status: CANCELLED | OUTPATIENT
Start: 2022-07-20

## 2022-07-19 RX ORDER — AMLODIPINE BESYLATE 5 MG/1
10 TABLET ORAL DAILY
Status: CANCELLED | OUTPATIENT
Start: 2022-07-20

## 2022-07-19 RX ADMIN — Medication 2001 MG: at 12:15

## 2022-07-19 RX ADMIN — METOPROLOL 100 MG: 100 TABLET ORAL at 17:04

## 2022-07-19 RX ADMIN — METOPROLOL 100 MG: 100 TABLET ORAL at 05:19

## 2022-07-19 RX ADMIN — Medication 2001 MG: at 08:04

## 2022-07-19 RX ADMIN — Medication 2001 MG: at 18:14

## 2022-07-19 RX ADMIN — ATORVASTATIN CALCIUM 20 MG: 20 TABLET, FILM COATED ORAL at 05:20

## 2022-07-19 RX ADMIN — PAROXETINE HYDROCHLORIDE 40 MG: 20 TABLET, FILM COATED ORAL at 05:18

## 2022-07-19 RX ADMIN — OXYCODONE 5 MG: 5 TABLET ORAL at 21:28

## 2022-07-19 RX ADMIN — HYDRALAZINE HYDROCHLORIDE 10 MG: 10 TABLET ORAL at 13:55

## 2022-07-19 RX ADMIN — INSULIN HUMAN 2 UNITS: 100 INJECTION, SOLUTION PARENTERAL at 12:13

## 2022-07-19 RX ADMIN — INSULIN HUMAN 2 UNITS: 100 INJECTION, SOLUTION PARENTERAL at 21:41

## 2022-07-19 RX ADMIN — HYDRALAZINE HYDROCHLORIDE 10 MG: 10 TABLET, FILM COATED ORAL at 21:29

## 2022-07-19 RX ADMIN — ACETAMINOPHEN 650 MG: 325 TABLET, FILM COATED ORAL at 05:19

## 2022-07-19 RX ADMIN — ACETAMINOPHEN 650 MG: 325 TABLET, FILM COATED ORAL at 12:15

## 2022-07-19 RX ADMIN — AMLODIPINE BESYLATE 10 MG: 10 TABLET ORAL at 05:31

## 2022-07-19 RX ADMIN — HYDRALAZINE HYDROCHLORIDE 10 MG: 10 TABLET ORAL at 21:29

## 2022-07-19 RX ADMIN — DOCUSATE SODIUM 100 MG: 100 CAPSULE, LIQUID FILLED ORAL at 05:20

## 2022-07-19 RX ADMIN — ACETAMINOPHEN 1000 MG: 500 TABLET, FILM COATED ORAL at 21:28

## 2022-07-19 RX ADMIN — HYDRALAZINE HYDROCHLORIDE 10 MG: 10 TABLET ORAL at 05:19

## 2022-07-19 RX ADMIN — OMEPRAZOLE 20 MG: 20 CAPSULE, DELAYED RELEASE ORAL at 05:19

## 2022-07-19 ASSESSMENT — COGNITIVE AND FUNCTIONAL STATUS - GENERAL
EATING MEALS: A LITTLE
DRESSING REGULAR UPPER BODY CLOTHING: A LITTLE
SUGGESTED CMS G CODE MODIFIER MOBILITY: CL
MOVING TO AND FROM BED TO CHAIR: A LOT
DRESSING REGULAR LOWER BODY CLOTHING: A LOT
HELP NEEDED FOR BATHING: A LOT
TURNING FROM BACK TO SIDE WHILE IN FLAT BAD: A LOT
STANDING UP FROM CHAIR USING ARMS: A LOT
SUGGESTED CMS G CODE MODIFIER DAILY ACTIVITY: CK
MOVING FROM LYING ON BACK TO SITTING ON SIDE OF FLAT BED: UNABLE
MOBILITY SCORE: 10
TOILETING: A LOT
PERSONAL GROOMING: A LITTLE
CLIMB 3 TO 5 STEPS WITH RAILING: TOTAL
WALKING IN HOSPITAL ROOM: A LOT
DAILY ACTIVITIY SCORE: 15

## 2022-07-19 ASSESSMENT — LIFESTYLE VARIABLES
TOTAL SCORE: 0
HAVE PEOPLE ANNOYED YOU BY CRITICIZING YOUR DRINKING: NO
HAVE YOU EVER FELT YOU SHOULD CUT DOWN ON YOUR DRINKING: NO
EVER HAD A DRINK FIRST THING IN THE MORNING TO STEADY YOUR NERVES TO GET RID OF A HANGOVER: NO
TOTAL SCORE: 0
ALCOHOL_USE: NO
AVERAGE NUMBER OF DAYS PER WEEK YOU HAVE A DRINK CONTAINING ALCOHOL: 0
ON A TYPICAL DAY WHEN YOU DRINK ALCOHOL HOW MANY DRINKS DO YOU HAVE: 0
TOTAL SCORE: 0
HOW MANY TIMES IN THE PAST YEAR HAVE YOU HAD 5 OR MORE DRINKS IN A DAY: 0
EVER FELT BAD OR GUILTY ABOUT YOUR DRINKING: NO
CONSUMPTION TOTAL: NEGATIVE

## 2022-07-19 ASSESSMENT — ENCOUNTER SYMPTOMS
WHEEZING: 0
WEIGHT LOSS: 0
VOMITING: 0
COUGH: 0
NECK PAIN: 1
CHILLS: 0
FEVER: 0
ABDOMINAL PAIN: 0
EYES NEGATIVE: 1
ORTHOPNEA: 0
SHORTNESS OF BREATH: 0
SINUS PAIN: 0
HEMOPTYSIS: 0
NAUSEA: 0
PALPITATIONS: 0

## 2022-07-19 ASSESSMENT — FIBROSIS 4 INDEX: FIB4 SCORE: 1.56

## 2022-07-19 ASSESSMENT — GAIT ASSESSMENTS
ASSISTIVE DEVICE: FRONT WHEEL WALKER
DISTANCE (FEET): 20
GAIT LEVEL OF ASSIST: MINIMAL ASSIST

## 2022-07-19 NOTE — DISCHARGE PLANNING
Agency/Facility Name: [facility]  Spoke To: Luis  Outcome: DPA was notified Pt is set up for transport to [facility] via [facility] at [phone]. Accepting Physician is Dr. Fuentes. MD and bedside RN aware, RN alerting spouse.     RN CM notified.

## 2022-07-19 NOTE — THERAPY
"Physical Therapy   Daily Treatment     Patient Name: Pj Britt  Age:  61 y.o., Sex:  male  Medical Record #: [id number]  Today's Date: 7/19/2022     Precautions  Precautions: Fall Risk;Cervical Collar  ;Spinal / Back Precautions   Comments: Turlock on AAT, legally blind    Assessment    Patient progressing with functional mobility but remains limited by functional weakness and decreased activity tolerance. He mobilized as detailed below. Spouse at bedside and supportive. Will continue to follow.    Plan    Continue current treatment plan.    DC Equipment Recommendations: Unable to determine at this time  Discharge Recommendations: Recommend post-acute placement for additional physical therapy services prior to discharge home      Subjective    \"My arm is moving better after surgery.\"     Objective       07/19/22 1135   Charge Group   Charges  Yes   PT Therapeutic Activities 1  (20 min)   Precautions   Precautions Fall Risk;Cervical Collar  ;Spinal / Back Precautions    Comments Turlock on AAT, legally blind   Vitals   O2 Delivery Device Silicone Nasal Cannula   Pain 0 - 10 Group   Therapist Pain Assessment   (no pain complaint during session)   Cognition    Cognition / Consciousness WDL   Level of Consciousness Alert   Comments pleasant, cooperative   Balance   Sitting Balance (Static) Fair   Sitting Balance (Dynamic) Fair   Standing Balance (Static) Poor +   Standing Balance (Dynamic) Poor   Weight Shift Sitting Fair   Weight Shift Standing Poor   Skilled Intervention Verbal Cuing;Compensatory Strategies;Facilitation   Comments with FWW, several small LOB that required hands on assist to recover   Gait Analysis   Gait Level Of Assist Minimal Assist   Assistive Device Front Wheel Walker   Distance (Feet) 20   # of Times Distance was Traveled 1   Deviation   (inconsistent BENNIE and step length, decreased shanthi)   # of Stairs Climbed 0   Weight Bearing Status no restrictions   Vision Deficits Impacting Mobility NT, " spouse at bedside reinforced patient blind   Skilled Intervention Verbal Cuing;Compensatory Strategies;Facilitation;Sequencing   Comments distance limited by fatigue   Bed Mobility    Supine to Sit   (NT, in chair prior to session)   Sit to Supine Minimal Assist  (for log roll)   Scooting Supervised  (seated)   Rolling Minimum Assist to Lt.   Skilled Intervention Verbal Cuing;Compensatory Strategies;Tactile Cuing;Sequencing   Comments used bed features, significant cueing for log roll   Functional Mobility   Sit to Stand Moderate Assist  (from chair)   Bed, Chair, Wheelchair Transfer Minimal Assist   Transfer Method Stand Step   Skilled Intervention Verbal Cuing;Facilitation;Compensatory Strategies   How much difficulty does the patient currently have...   Turning over in bed (including adjusting bedclothes, sheets and blankets)? 2   Sitting down on and standing up from a chair with arms (e.g., wheelchair, bedside commode, etc.) 1   Moving from lying on back to sitting on the side of the bed? 2   How much help from another person does the patient currently need...   Moving to and from a bed to a chair (including a wheelchair)? 2   Need to walk in a hospital room? 2   Climbing 3-5 steps with a railing? 1   6 clicks Mobility Score 10   Activity Tolerance   Sitting in Chair in chair prior   Sitting Edge of Bed 5 min   Standing 5-8 min total   Comments limited by fatigue   Patient / Family Goals    Patient / Family Goal #1 go to rehab   Short Term Goals    Short Term Goal # 1 Pt will perform supine <> sit with SPV in 6 visits to get in/out of bed   Goal Outcome # 1 goal not met   Short Term Goal # 2 Pt will perform functional transfers with SPV in 6 visits to increase independence   Goal Outcome # 2 Goal not met   Short Term Goal # 3 Pt will ambulate 10ft with FWW and SPV in 6 visits to return to baseline   Goal Outcome # 3 Goal not met   Short Term Goal # 4 Pt will ascend/descend 3 steps with min assist in 6 visits to  return to baseline for stairs in/out of home   Goal Outcome # 4 Goal not met   Anticipated Discharge Equipment and Recommendations   DC Equipment Recommendations Unable to determine at this time   Discharge Recommendations Recommend post-acute placement for additional physical therapy services prior to discharge home   Interdisciplinary Plan of Care Collaboration   IDT Collaboration with  Nursing;Family / Caregiver   Patient Position at End of Therapy In Bed;Call Light within Reach;Tray Table within Reach;Family / Friend in Room;Phone within Reach   Collaboration Comments RN aware of visit, response   Session Information   Date / Session Number  7/19-3 (3/4, 7/20)

## 2022-07-19 NOTE — PROGRESS NOTES
Nephrology Daily Progress Note    Date of Service  7/19/2022    Chief Complaint  61 y.o. male admitted 7/12/2022 with ESRD, status post elective cervical spine fusion.    Interval Problem Update  Patient is doing well today, complaining of mild pain in the surgery site  7/15 patient had hemodialysis earlier today  His neck pain is improving  7/16 patient feels better  Mild neck pain  Complain of difficulty swallowing solid food  7/17 patient is doing better, no new complaints  7/19 -doing well, no complaints  HD MWF  Review of Systems  Review of Systems   Constitutional: Negative for chills, fever, malaise/fatigue and weight loss.   HENT: Negative for congestion, hearing loss and sinus pain.    Eyes: Negative.    Respiratory: Negative for cough, hemoptysis, shortness of breath and wheezing.    Cardiovascular: Negative for chest pain, palpitations, orthopnea and leg swelling.   Gastrointestinal: Negative for abdominal pain, nausea and vomiting.   Musculoskeletal: Positive for neck pain.   Skin: Negative.    All other systems reviewed and are negative.       Physical Exam  Temp:  [35.9 °C (96.6 °F)-36.8 °C (98.2 °F)] 36.8 °C (98.2 °F)  Pulse:  [62-96] 64  Resp:  [16-18] 17  BP: (136-152)/(55-71) 143/57  SpO2:  [97 %-100 %] 99 %    Physical Exam  Constitutional:       General: He is not in acute distress.     Appearance: Normal appearance. He is well-developed. He is not diaphoretic.   HENT:      Head: Normocephalic and atraumatic.      Nose: Nose normal.      Mouth/Throat:      Mouth: Mucous membranes are moist.      Pharynx: Oropharynx is clear.   Eyes:      Extraocular Movements: Extraocular movements intact.      Conjunctiva/sclera: Conjunctivae normal.      Pupils: Pupils are equal, round, and reactive to light.   Cardiovascular:      Rate and Rhythm: Normal rate and regular rhythm.      Pulses: Normal pulses.      Heart sounds: Normal heart sounds.     No gallop.   Pulmonary:      Effort: Pulmonary effort is  normal. No respiratory distress.      Breath sounds: Normal breath sounds. No wheezing or rales.   Abdominal:      General: Bowel sounds are normal. There is no distension.      Palpations: Abdomen is soft.      Tenderness: There is no abdominal tenderness.   Musculoskeletal:      Cervical back: Normal range of motion and neck supple.      Right lower leg: No edema.      Left lower leg: No edema.   Skin:     General: Skin is warm.      Coloration: Skin is not pale.      Findings: No erythema or rash.   Neurological:      General: No focal deficit present.      Mental Status: He is alert and oriented to person, place, and time.      Cranial Nerves: No cranial nerve deficit.      Coordination: Coordination normal.   Psychiatric:         Mood and Affect: Mood normal.         Behavior: Behavior normal.         Thought Content: Thought content normal.         Judgment: Judgment normal.         Fluids    Intake/Output Summary (Last 24 hours) at 7/19/2022 1056  Last data filed at 7/18/2022 1857  Gross per 24 hour   Intake 1100 ml   Output 2500 ml   Net -1400 ml       Laboratory  Recent Labs     07/17/22 0338 07/18/22 0308 07/19/22 0147   WBC 7.4 6.9 5.2   RBC 2.86* 2.98* 3.21*   HEMOGLOBIN 8.9* 9.3* 9.9*   HEMATOCRIT 28.1* 28.8* 30.9*   MCV 98.3* 96.6 96.3   MCH 31.1 31.2 30.8   MCHC 31.7* 32.3* 32.0*   RDW 50.1* 47.8 47.8   PLATELETCT 173 207 228   MPV 10.7 10.5 9.8     Recent Labs     07/17/22 0338 07/18/22 0308 07/19/22 0147   SODIUM 133* 130* 134*   POTASSIUM 5.3 6.4* 5.1   CHLORIDE 94* 89* 95*   CO2 29 27 28   GLUCOSE 126* 156* 116*   BUN 45* 58* 28*   CREATININE 6.28* 7.37* 4.78*   CALCIUM 8.4* 8.5 8.9         No results for input(s): NTPROBNP in the last 72 hours.        Imaging  DX-PORTABLE FLUORO > 1 HOUR   Final Result      Intraoperative images intended for localization and not for diagnostic purposes demonstrate C4-C6 ACDF. Extensive posterior spinal fusion. See procedure report for details.       Fluoroscopy Time:  29 second.  6 fluoroscopic images were obtained.      DX-CERVICAL SPINE-2 OR 3 VIEWS   Final Result      Intraoperative images intended for localization and not for diagnostic purposes demonstrate C4-C6 ACDF. Extensive posterior spinal fusion. See procedure report for details.      Fluoroscopy Time:  29 second.  6 fluoroscopic images were obtained.            Assessment/Plan  1 ESRD/HD -on MWF schedule  2 anemia: Hb stable  3 electrolytes:  hyponatremia improving  4 HTN: BP well controlled  Recs;    no acute need for HD today  HD MWF  Renal diet  Labs on dialysis days  Renal dose all meds  Avoid nephrotoxins  Will follow

## 2022-07-19 NOTE — PROGRESS NOTES
Neurosurgery Progress Note    Subjective:  Pt awake, alert  OT at bedside  Pain tolerable     Objective:  Awake, alert  UE str  4+rt 5 left  Bi/tri right 4, left 5  Delt right 4-, left 5  Inc anterior c/d/i with dermabond, post c/d/i with timbo       BP  Min: 136/55  Max: 152/65  Pulse  Av.8  Min: 62  Max: 96  Resp  Av.2  Min: 16  Max: 18  Temp  Av.4 °C (97.6 °F)  Min: 35.9 °C (96.6 °F)  Max: 36.8 °C (98.2 °F)  SpO2  Av %  Min: 97 %  Max: 100 %    No data recorded    Recent Labs     22   WBC 7.4 6.9 5.2   RBC 2.86* 2.98* 3.21*   HEMOGLOBIN 8.9* 9.3* 9.9*   HEMATOCRIT 28.1* 28.8* 30.9*   MCV 98.3* 96.6 96.3   MCH 31.1 31.2 30.8   MCHC 31.7* 32.3* 32.0*   RDW 50.1* 47.8 47.8   PLATELETCT 173 207 228   MPV 10.7 10.5 9.8     Recent Labs     22   SODIUM 133* 130* 134*   POTASSIUM 5.3 6.4* 5.1   CHLORIDE 94* 89* 95*   CO2 29 27 28   GLUCOSE 126* 156* 116*   BUN 45* 58* 28*   CREATININE 6.28* 7.37* 4.78*   CALCIUM 8.4* 8.5 8.9               Intake/Output                       22 - 22 0659 22 07 - 22 0659      Total  Total                 Intake    P.O.  780  -- 780  --  -- --    P.O. 780 -- 780 -- -- --    Dialysis  500  -- 500  --  -- --    Dialysis Input (Dialysis Input / Output) 500 -- 500 -- -- --    Total Intake 1280 -- 1280 -- -- --       Output    Dialysis  2500  -- 2500  --  -- --    Dialysis Output (Dialysis Input / Output) 2500 -- 2500 -- -- --    Total Output 2500 -- 2500 -- -- --       Net I/O     -1220 -- -1220 -- -- --            Intake/Output Summary (Last 24 hours) at 2022 0917  Last data filed at 2022 1857  Gross per 24 hour   Intake 1280 ml   Output 2500 ml   Net -1220 ml            • insulin NPH  10 Units QAM INSULIN   • lactulose  30 mL TID PRN   • hydrALAZINE  10 mg Q8HRS   • menthol  1 Lozenge Q2HRS PRN   •  omeprazole  20 mg DAILY   • epoetin  3,000 Units MO, WE + FR   • oxyCODONE immediate-release  5 mg Q4HRS PRN    Or   • oxyCODONE immediate-release  10 mg Q4HRS PRN   • morphine injection  2 mg Q HOUR PRN   • acetaminophen  650 mg Q6HRS   • amLODIPine  10 mg DAILY   • atorvastatin  20 mg DAILY   • calcium acetate  2,001 mg TID WITH MEALS   • metoprolol tartrate  100 mg BID   • PARoxetine  40 mg DAILY   • Pharmacy Consult Request  1 Each PHARMACY TO DOSE   • MD ALERT...DO NOT ADMINISTER NSAIDS or ASPIRIN unless ORDERED By Neurosurgery  1 Each PRN   • docusate sodium  100 mg BID   • senna-docusate  1 Tablet Nightly   • senna-docusate  1 Tablet Q24HRS PRN   • polyethylene glycol/lytes  1 Packet BID PRN   • magnesium hydroxide  30 mL QDAY PRN   • bisacodyl  10 mg Q24HRS PRN   • sodium phosphate  1 Each Once PRN   • diphenhydrAMINE  25 mg Q6HRS PRN    Or   • diphenhydrAMINE  25 mg Q6HRS PRN   • ondansetron  4 mg Q4HRS PRN   • ondansetron  4 mg Q4HRS PRN   • promethazine  12.5-25 mg Q4HRS PRN   • promethazine  12.5-25 mg Q4HRS PRN   • prochlorperazine  5-10 mg Q4HRS PRN   • tizanidine  4 mg Q8HRS PRN   • hydrALAZINE  10 mg Q HOUR PRN   • insulin regular  2-9 Units 4X/DAY ACHS    And   • dextrose bolus  25 g Q15 MIN PRN       Assessment and Plan:  Hospital day # 7  POD# 6 anterior C5 corpectomy, 4-6 fusion, posterior C3-T1 lami fusion  Chemical prophylactic DVT therapy: No  Start date/time: TBD    Pain control-  PO oxy and tizanidine prn  Pt/ot - needs to be seen TODAY- discussed with RN  Collar at all times  Dialysis M, W, F- neph on board - thank you  Appreciate  IMs assistance with management  Bowel protocol - last bm 7/17   Rehab pending   Ok for rehab anytime from our perspective

## 2022-07-19 NOTE — PREADMISSION SCREENING NOTE
Pre-Admission Screening Form    Patient Information:   Name: Pj Britt     MRN: [id number]       : 1960      Age: 61 y.o.   Gender: male      Race:  or  [3]       Marital Status:  [2]  Family Contact: Lindsey Britt,Tio Jansen        Relationship: Spouse [17]  Daughter [2]  Son [15]  Home Phone:   [phone]             Cell Phone: [phone]  Advanced Directives: Yes  Code Status:  FULL  Current Attending Provider: Lucinda Parker M.D.  Referring Physician: Dr. Barr  Physiatrist Consult: Dr. Marie   Referral Date: 22  Primary Payor Source:  MEDICARE  Secondary Payor Source:      Medical Information:   Date of Admission to Acute Care [place]2022  Room Number: S155/00  Rehabilitation Diagnosis: 0004.1212 - Spinal Cord Dysfunction, Non-Traumatic: Quadriplegia, Incomplete C5-8  Immunization History   Administered Date(s) Administered   • Hepatitis B Vaccine (Adol/Adult) 2019   • Influenza (IM) Preservative Free - HISTORICAL DATA 2011, 2012, 2013   • Influenza Seasonal Injectable - Historical Data 10/09/2015   • Influenza Vaccine Adult HD 10/04/2018   • Influenza Vaccine Quad Inj (Preserved) 2020   • Influenza Vaccine Quad Nasal 2019   • MODERNA SARS-COV-2 VACCINE (12+) 2021, 2021, 2022   • Pneumococcal Conjugate Vaccine (Prevnar/PCV-13) 2015   • Pneumococcal polysaccharide vaccine (PPSV-23) 2013     Allergies   Allergen Reactions   • Aspirin Unspecified     Cannot have because on Dialysis     Past Medical History:   Diagnosis Date   • Anemia    • Blind in both eyes    • Diabetes (HCC)     insulin dependent   • Dialysis patient (Formerly [facility])     Earline KEATING   • ESRD (end stage renal disease) (Formerly [facility])     Dr. Haskins   • High cholesterol    • Hyperlipemia    • Hypertension    • Oxygen dependent     2 liters, LINCARE   • Snoring     no sleep study     Past Surgical  History:   Procedure Laterality Date   • CERVICAL DISK AND FUSION ANTERIOR  7/12/2022    Procedure: STAGE 1- ANTERIOR C5 CORPECTOMY, C4-6 FUSION STAGE 2- POSTERIOR C3-T1 LAMINECTOMY AND FUSION;  Surgeon: Ady Barr M.D.;  Location: [facility];  Service: Neurosurgery   • CERVICAL FUSION POSTERIOR  7/12/2022    Procedure: FUSION, SPINE, CERVICAL, POSTERIOR APPROACH;  Surgeon: Ady Barr M.D.;  Location: [facility];  Service: Neurosurgery   • CORPECTOMY  7/12/2022    Procedure: CORPECTOMY, SPINE;  Surgeon: Ady Barr M.D.;  Location: [facility];  Service: Neurosurgery   • CERVICAL LAMINECTOMY POSTERIOR  7/12/2022    Procedure: LAMINECTOMY, SPINE, CERVICAL, POSTERIOR APPROACH;  Surgeon: Ady Barr M.D.;  Location: [facility];  Service: Neurosurgery   • RECOVERY  4/19/2016    Procedure: VASCULAR CASE-ILEANA-LEFT ARM FISTULOGRAM WITH ANGIOPLASTY 90637, 89034, 01438-JDY STAGE RENAL DISEASE N18.6;  Surgeon: Recoveryonly Surgery;  Location: SURGERY PRE-POST PROC UNIT [facility];  Service:    • RECOVERY  2/24/2016    Procedure: IR1 VASCULAR CASE LEFT ARM FISTULOGRAM WITH ANGIOPLASTY;  Surgeon: Recoveryonly Surgery;  Location: SURGERY PRE-POST PROC UNIT [facility];  Service:    • CATH PLACEMENT Right 12/10/2015    Procedure: CATH PLACEMENT;  Surgeon: Lorene Baldwin M.D.;  Location: [facility];  Service:    • AV FISTULA CREATION Left 12/10/2015    Procedure: AV FISTULA CREATION;  Surgeon: Lorene Baldwin M.D.;  Location: [facility];  Service:    • NV CATARACT SURG W/IOL 1 STAGE WO ENDO  10/21/15   • OTHER  9/22/15    trabecular micro bypass stent- right eye   • OTHER  7/2015    take out blood of left eye   • OTHER  3/2015    take blood out of eye        History Leading to Admission, Conditions that Caused the Need for Rehab (CMS):     Dr. Foss (Critical care) recommendations:  History of Presenting Illness  61 y.o. male who  presented 7/12/2022 for scheduled spinal surgery.  He has a history of end-stage renal disease, on MWF dialysis, is blind in both eyes, generally wheelchair-bound but does use a walker around the house.  Recent MRI of the spine with severe central canal stenosis at essentially from C3-C7.  He presents to the hospital today for scheduled C3-T1 laminectomy and fusion.  Uncomplicated intraoperative course.   Assessment/Plan  * Spinal stenosis in cervical region- (present on admission)  Assessment & Plan  Now s/p C3-T1 laminectomy and fusion  Date of surgery: 7/12/22  Surgeon: MD Momo     Morphine PCA for pain control     ESRD on dialysis (HCC)- (present on admission)  Assessment & Plan  Seen by [facility]  Spoken to them today - they will arrange dialysis tomorrow     Type 2 diabetes mellitus with nephropathy, and retinopathy (HCC)- (present on admission)  Assessment & Plan  On NPH at home 20 units am / 15 units pm  Continue home NPH once tolerating diet  SSI in addition     Hypertension- (present on admission)  Assessment & Plan  Home metoprolol & amlodipine     Blindness- (present on admission)  Assessment & Plan  Secondary to diabetes & cataracts     Hyperlipidemia- (present on admission)  Assessment & Plan  Continue statin     DVT prophylaxis: No chemical DVT prophylaxis 2/2 high-risk of bleeding  PUD prophylaxis: Not indicated  Glycemic control: Basal insulin and sliding scale insulin  Nutrition: Diet  Lines: D/C arterial line  Morales: None  Mobility: Early mobility as tolerated  Goals of care: Full code  Disposition: ICU     Discussed patient condition and risk of morbidity and/or mortality with Family, RN, RT, Pharmacy, Code status disscussed, Charge nurse / hot rounds, Patient and nephrology and neurosurgery.      Dr. Najjar (Nephrology) recommendations:  ASSESSMENT:  1.  End-stage renal disease.  2.  Hyperkalemia.  3.  Hyponatremia.  4.  Anemia.     PLAN:  1.  We will plan urgent dialysis to manage  his hyperkalemia.  2.  Erythropoietin with dialysis.  3.  Renal diet.  4.  Evaluate daily for the need of dialysis.  5.  Prognosis is guarded.    Dr. Marie (Physiatry) recommendations:  ASSESSMENT:  Patient is a 61 y.o. male admitted with cervical myelopathy     [facility] Code / Diagnosis to Support: 0004.1212 - Spinal Cord Dysfunction, Non-Traumatic: Quadriplegia, Incomplete C5-8     Rehabilitation: Impaired ADLs and mobility  Patient is a good candidate for inpatient rehab based on needs for PT, OT, see disposition details below     Barriers to transfer include: Insurance authorization, TCCs to verify disposition, medical clearance and bed availability      Additional Recommendations:  Central cord syndrome, cervical myelopathy  - Patient with evidence of severe central cord stenosis on MRI, in May 2022  - 5/30/2022 MRI C-spine without dense of severe central canal narrowing at C4-C5 with myelomalacia  - Status post C3-T1 laminectomy and posterior fusion, performed by Dr. Barr  - C-collar on at all times  - Patient with upper extremity weakness greater than lower extremity weakness and right upper extremity greater than left  -Anterior VIKKI drain removed on 7/15  - Continue with PT/OT, will need to see progress with PT in order to ambulate as patient's home is not currently wheelchair accessible , needs updated therapy eval on 7/18     End-stage renal disease  - Nephrology following for hemodialysis  - HD on M WF     Bladder  - Patient is anuric, no need for neurogenic bladder management at this time     Bowels  - Monitor for neurogenic bowel  - Continue with bowel regimen  - If patient has no bowel movement in 24 hours recommend evening suppositories  - Last  7/17     Dispo:  - patient is currently functioning below their level of baseline, recommend post acute rehab  - recommend IRF level therapy with 3hr of therapy 5 days per week  - piror to acceptance to IRF, will evidence of improved gait with PT and  confirmation of physical assistance from wife  - TCC to assist with DC support            Medical Complexity:  Cervical myelopathy  Status post C3-T1 posterior fusion  Hypertension  Visual impairment  Upper extremity weakness  End-stage renal disease on HD  Hypertension  Type 2 diabetes  Impaired mobility and ADLs        DVT PPX: SCDs    Lizet Mata, A.P.N.   Nurse Practitioner   Surgery Neurosurgery   Progress Notes       Cosign Needed   Date of Service:  2022  9:17 AM                         []Hide copied text    []Hover for details    Neurosurgery Progress Note     Subjective:  Pt awake, alert  OT at bedside  Pain tolerable      Objective:  Awake, alert  UE str  4+rt 5 left  Bi/tri right 4, left 5  Delt right 4-, left 5  Inc anterior c/d/i with dermabond, post c/d/i with timbo         BP  Min: 136/55  Max: 152/65  Pulse  Av.8  Min: 62  Max: 96  Resp  Av.2  Min: 16  Max: 18  Temp  Av.4 °C (97.6 °F)  Min: 35.9 °C (96.6 °F)  Max: 36.8 °C (98.2 °F)  SpO2  Av %  Min: 97 %  Max: 100 %     No data recorded           Recent Labs     22  0338 22  0308 22  014   WBC 7.4 6.9 5.2   RBC 2.86* 2.98* 3.21*   HEMOGLOBIN 8.9* 9.3* 9.9*   HEMATOCRIT 28.1* 28.8* 30.9*   MCV 98.3* 96.6 96.3   MCH 31.1 31.2 30.8   MCHC 31.7* 32.3* 32.0*   RDW 50.1* 47.8 47.8   PLATELETCT 173 207 228   MPV 10.7 10.5 9.8            Recent Labs     22  0338 22  0308 22  014   SODIUM 133* 130* 134*   POTASSIUM 5.3 6.4* 5.1   CHLORIDE 94* 89* 95*   CO2 29 27 28   GLUCOSE 126* 156* 116*   BUN 45* 58* 28*   CREATININE 6.28* 7.37* 4.78*   CALCIUM 8.4* 8.5 8.9                            Intake/Output                          22 0700 - 22 - 22 0659       7599-4190 0131-2228 Total  Total                                  Intake     P.O.  780  -- 780  --  -- --     P.O. 780 -- 780 -- -- --     Dialysis  500  -- 500  --  -- --      Dialysis Input (Dialysis Input / Output) 500 -- 500 -- -- --     Total Intake 1280 -- 1280 -- -- --                Output     Dialysis  2500  -- 2500  --  -- --     Dialysis Output (Dialysis Input / Output) 2500 -- 2500 -- -- --     Total Output 2500 -- 2500 -- -- --                Net I/O       -1220 -- -1220 -- -- --                Intake/Output Summary (Last 24 hours) at 7/19/2022 0917  Last data filed at 7/18/2022 1857      Gross per 24 hour   Intake 1280 ml   Output 2500 ml   Net -1220 ml            • insulin NPH  10 Units QAM INSULIN   • lactulose  30 mL TID PRN   • hydrALAZINE  10 mg Q8HRS   • menthol  1 Lozenge Q2HRS PRN   • omeprazole  20 mg DAILY   • epoetin  3,000 Units MO, WE + FR   • oxyCODONE immediate-release  5 mg Q4HRS PRN     Or   • oxyCODONE immediate-release  10 mg Q4HRS PRN   • morphine injection  2 mg Q HOUR PRN   • acetaminophen  650 mg Q6HRS   • amLODIPine  10 mg DAILY   • atorvastatin  20 mg DAILY   • calcium acetate  2,001 mg TID WITH MEALS   • metoprolol tartrate  100 mg BID   • PARoxetine  40 mg DAILY   • Pharmacy Consult Request  1 Each PHARMACY TO DOSE   • MD ALERT...DO NOT ADMINISTER NSAIDS or ASPIRIN unless ORDERED By Neurosurgery  1 Each PRN   • docusate sodium  100 mg BID   • senna-docusate  1 Tablet Nightly   • senna-docusate  1 Tablet Q24HRS PRN   • polyethylene glycol/lytes  1 Packet BID PRN   • magnesium hydroxide  30 mL QDAY PRN   • bisacodyl  10 mg Q24HRS PRN   • sodium phosphate  1 Each Once PRN   • diphenhydrAMINE  25 mg Q6HRS PRN     Or   • diphenhydrAMINE  25 mg Q6HRS PRN   • ondansetron  4 mg Q4HRS PRN   • ondansetron  4 mg Q4HRS PRN   • promethazine  12.5-25 mg Q4HRS PRN   • promethazine  12.5-25 mg Q4HRS PRN   • prochlorperazine  5-10 mg Q4HRS PRN   • tizanidine  4 mg Q8HRS PRN   • hydrALAZINE  10 mg Q HOUR PRN   • insulin regular  2-9 Units 4X/DAY ACHS     And   • dextrose bolus  25 g Q15 MIN PRN         Assessment and Plan:  Hospital day # 7  POD# 6 anterior C5  corpectomy, 4-6 fusion, posterior C3-T1 lami fusion  Chemical prophylactic DVT therapy: No  Start date/time: TBD     Pain control-  PO oxy and tizanidine prn  Pt/ot - needs to be seen TODAY- discussed with RN  Collar at all times  Dialysis M, W, F- neph on board - thank you  Appreciate  [facility] assistance with management  Bowel protocol - last bm 7/17   Rehab pending   Ok for rehab anytime from our perspective           Mp Li M.D.   Physician   Hospital Medicine   Progress Notes      Signed   Date of Service:  7/18/2022  4:38 PM                         []Hide copied text    []Logan for details    Hospital Medicine Daily Progress Note     Date of Service  7/18/2022     Chief Complaint  Pj Britt is a 61 y.o. male admitted 7/12/2022 with neck pain and extremity weakness     Hospital Course     61 y.o. male who presented 7/12/2022 for planned C3-T1 laminectomy and fusion.  By the neurosurgical team for recent diagnosis of severe cervical spine central stenosis..  Procedure was completed on July 12.  He has a previous medical history that includes chronic kidney disease on dialysis, legally blind in both eyes, glaucoma, hypertension, dyslipidemia, insulin-dependent type 2 diabetes.          Interval Problem Update  Pt notes some pain in his shoulders which is unchanged.  Weakness in his arms, R>L also unchanged  Ant VIKKI drain removed 7/15: post ermoved 7/16.   Disposition planning discussed extensively with wife at bedside 7/17.    7/18: Updated PT noted appreciated, updated OT notes pending.  No acute overnight events.        Consultants/Specialty  nephrology and neurosurgery     Code Status  Full Code     Disposition  Patient is medically cleared for discharge.   Anticipate discharge to to an inpatient rehabilitation hospital or SNF pending PT/OT outcomes.  I have placed the appropriate orders for post-discharge needs.        Physical Exam  Temp:  [35.9 °C (96.6 °F)-36.9 °C (98.4 °F)] 36.4 °C (97.5  °F)  Pulse:  [62-96] 96  Resp:  [16-18] 18  BP: (125-150)/(55-73) 136/55  SpO2:  [94 %-100 %] 97 %     Physical Exam  Vitals reviewed.   Constitutional:       General: He is not in acute distress.     Appearance: Normal appearance. He is well-developed. He is not diaphoretic.   HENT:      Head: Normocephalic and atraumatic.   Eyes:      Conjunctiva/sclera: Conjunctivae normal.   Neck:      Vascular: No JVD.      Comments: In hard collar  Cardiovascular:      Rate and Rhythm: Normal rate.      Heart sounds: No murmur heard.    No gallop.   Pulmonary:      Effort: Pulmonary effort is normal. No respiratory distress.      Breath sounds: No stridor. No wheezing or rales.   Abdominal:      General: There is no distension.      Palpations: Abdomen is soft.      Tenderness: There is no abdominal tenderness. There is no guarding or rebound.   Musculoskeletal:         General: No tenderness.      Right lower leg: No edema.      Left lower leg: No edema.   Skin:     General: Skin is warm and dry.      Coloration: Skin is not jaundiced.      Findings: No rash.   Neurological:      Mental Status: He is alert and oriented to person, place, and time. Mental status is at baseline.      Comments: 2/5 R upper  3/5 L upper   Psychiatric:         Mood and Affect: Mood normal.         Thought Content: Thought content normal.         Current Facility-Administered Medications:   •  insulin NPH (HumuLIN N,NovoLIN N) injection, 10 Units, Subcutaneous, [facility] INSULIN, pM Li M.D.  •  lactulose 20 GM/30ML solution 30 mL, 30 mL, Oral, TID PRN, Jae Kenney D.O., 30 mL at 07/15/22 1742  •  hydrALAZINE (APRESOLINE) tablet 10 mg, 10 mg, Oral, Q8HRS, Jae Kenney D.O., 10 mg at 07/18/22 1512  •  menthol (Halls) lozenge 1 Lozenge, 1 Lozenge, Oral, Q2HRS PRN, Ady Barr M.D.  •  omeprazole (PRILOSEC) capsule 20 mg, 20 mg, Oral, DAILY, Jae Kenney D.O., 20 mg at 07/18/22 0518  •  epoetin (Retacrit)  injection (Dialysis Use Only) 3,000 Units, 3,000 Units, Intravenous, MO, WE + FR, Fadi Najjar, M.D., 3,000 Units at 07/18/22 1039  •  oxyCODONE immediate-release (ROXICODONE) tablet 5 mg, 5 mg, Oral, Q4HRS PRN, 5 mg at 07/13/22 1232 **OR** oxyCODONE immediate release (ROXICODONE) tablet 10 mg, 10 mg, Oral, Q4HRS PRN, Edi Owens, A.P.N., 10 mg at 07/15/22 2251  •  morphine 4 MG/ML injection 2 mg, 2 mg, Intravenous, Q HOUR PRN, Edi Owens, A.P.N.  •  acetaminophen (Tylenol) tablet 650 mg, 650 mg, Oral, Q6HRS, Jae Kenney D.O., 650 mg at 07/18/22 1213  •  amLODIPine (NORVASC) tablet 10 mg, 10 mg, Oral, DAILY, Ady Barr M.D., 10 mg at 07/18/22 0518  •  atorvastatin (LIPITOR) tablet 20 mg, 20 mg, Oral, DAILY, Ady Barr M.D., 20 mg at 07/18/22 0518  •  calcium acetate (PHOS-LO) 667 MG tablet 2,001 mg, 2,001 mg, Oral, TID WITH MEALS, Ady Barr M.D., 2,001 mg at 07/18/22 1212  •  metoprolol tartrate (LOPRESSOR) tablet 100 mg, 100 mg, Oral, BID, Ady Barr M.D., 100 mg at 07/18/22 0518  •  PARoxetine (PAXIL) tablet 40 mg, 40 mg, Oral, DAILY, Ady Barr M.D., 40 mg at 07/18/22 0518  •  Pharmacy Consult Request ...Pain Management Review 1 Each, 1 Each, Other, PHARMACY TO DOSE, Ady Barr M.D.  •  MD ALERT...DO NOT ADMINISTER NSAIDS or ASPIRIN unless ORDERED By Neurosurgery 1 Each, 1 Each, Other, PRN, Ady Barr M.D.  •  docusate sodium (COLACE) capsule 100 mg, 100 mg, Oral, BID, Ady Barr M.D., 100 mg at 07/18/22 0519  •  senna-docusate (PERICOLACE or SENOKOT S) 8.6-50 MG per tablet 1 Tablet, 1 Tablet, Oral, Nightly, Ady Barr M.D., 1 Tablet at 07/17/22 2152  •  senna-docusate (PERICOLACE or SENOKOT S) 8.6-50 MG per tablet 1 Tablet, 1 Tablet, Oral, Q24HRS PRN, Ady Barr M.D., 1 Tablet at 07/15/22 8021  •  polyethylene glycol/lytes (MIRALAX) PACKET 1 Packet, 1 Packet, Oral, BID PRN, Ady  GARCÍA Barr M.D.  •  magnesium hydroxide (MILK OF MAGNESIA) suspension 30 mL, 30 mL, Oral, QDAY PRN, Ady Barr M.D.  •  bisacodyl (DULCOLAX) suppository 10 mg, 10 mg, Rectal, Q24HRS PRN, Ady Barr M.D.  •  sodium phosphate (Fleet) enema 133 mL, 1 Each, Rectal, Once PRN, Ady Barr M.D.  •  diphenhydrAMINE (BENADRYL) tablet/capsule 25 mg, 25 mg, Oral, Q6HRS PRN **OR** diphenhydrAMINE (BENADRYL) injection 25 mg, 25 mg, Intravenous, Q6HRS PRN, Ady Barr M.D.  •  ondansetron (ZOFRAN) syringe/vial injection 4 mg, 4 mg, Intravenous, Q4HRS PRN, Ady Barr M.D., 4 mg at 07/15/22 1824  •  ondansetron (ZOFRAN ODT) dispertab 4 mg, 4 mg, Oral, Q4HRS PRN, Ady Barr M.D., 4 mg at 07/14/22 1306  •  promethazine (PHENERGAN) tablet 12.5-25 mg, 12.5-25 mg, Oral, Q4HRS PRN, Ady Barr M.D.  •  promethazine (PHENERGAN) suppository 12.5-25 mg, 12.5-25 mg, Rectal, Q4HRS PRN, Ady Barr M.D.  •  prochlorperazine (COMPAZINE) injection 5-10 mg, 5-10 mg, Intravenous, Q4HRS PRN, Ady Barr M.D.  •  tizanidine (ZANAFLEX) tablet 4 mg, 4 mg, Oral, Q8HRS PRN, Ady Barr M.D., 4 mg at 07/14/22 1738  •  hydrALAZINE (APRESOLINE) injection 10 mg, 10 mg, Intravenous, Q HOUR PRN, Ady Barr M.D., 10 mg at 07/13/22 0708  •  insulin regular (HumuLIN R,NovoLIN R) injection, 2-9 Units, Subcutaneous, 4X/DAY ACHS, 2 Units at 07/16/22 1158 **AND** POC blood glucose manual result, , , Q AC AND BEDTIME(S) **AND** NOTIFY MD and PharmD, , , Once **AND** Administer 20 grams of glucose (approximately 8 ounces of fruit juice) every 15 minutes PRN FSBG less than 70 mg/dL, , , PRN **AND** dextrose 50% (D50W) injection 25 g, 25 g, Intravenous, Q15 MIN PRN, Ady Barr M.D.        Fluids     Intake/Output Summary (Last 24 hours) at 7/18/2022 1638  Last data filed at 7/18/2022 1500      Gross per 24 hour   Intake 1190 ml   Output 2500 ml    Net -1310 ml         Laboratory        Recent Labs     07/16/22  0501 07/17/22  0338 07/18/22  0308   WBC 8.0 7.4 6.9   RBC 2.92* 2.86* 2.98*   HEMOGLOBIN 9.0* 8.9* 9.3*   HEMATOCRIT 28.6* 28.1* 28.8*   MCV 97.9* 98.3* 96.6   MCH 30.8 31.1 31.2   MCHC 31.5* 31.7* 32.3*   RDW 49.5 50.1* 47.8   PLATELETCT 149* 173 207   MPV 11.2 10.7 10.5            Recent Labs     07/16/22  0501 07/17/22  0338 07/18/22  0308   SODIUM 136 133* 130*   POTASSIUM 4.8 5.3 6.4*   CHLORIDE 97 94* 89*   CO2 27 29 27   GLUCOSE 68 126* 156*   BUN 31* 45* 58*   CREATININE 4.47* 6.28* 7.37*   CALCIUM 8.4* 8.4* 8.5                     Imaging  DX-PORTABLE FLUORO > 1 HOUR   Final Result       Intraoperative images intended for localization and not for diagnostic purposes demonstrate C4-C6 ACDF. Extensive posterior spinal fusion. See procedure report for details.       Fluoroscopy Time:  29 second.  6 fluoroscopic images were obtained.       DX-CERVICAL SPINE-2 OR 3 VIEWS   Final Result       Intraoperative images intended for localization and not for diagnostic purposes demonstrate C4-C6 ACDF. Extensive posterior spinal fusion. See procedure report for details.       Fluoroscopy Time:  29 second.  6 fluoroscopic images were obtained.             Assessment/Plan  * Spinal stenosis in cervical region- (present on admission)  Assessment & Plan  Now s/p surgical decompression and fusion  Hard collar  No chemical DVD prophylaxis  PT/OT consulted  Physical medicine and rehab would like to see increased exercise tolerance before he is a candidate for their facility.  Will reevaluate with PT and OT and once posterior drain is removed  Ant drain removed 7/15: post remains in place     ESRD on dialysis (HCC)- (present on admission)  Assessment & Plan  Nephrology following  Follow BMP  DC IVFs     Type 2 diabetes mellitus with nephropathy, and retinopathy (HCC)- (present on admission)  Assessment & Plan  outpt is on NPH 20 in am and 15 in hs with  preprandial short acting however wife will on occasion not give evening dose if his qhs BG is <140.  Given hypoglycemia overnight stop his evening NPH and decrease am dose to 15  Last A1c in our system was from 3 years ago; 5.9     Hypertension- (present on admission)  Assessment & Plan  On metoprolol 100 BID, amlodipine 10 as an outpt which we have continued with parameters  Still running 150s generally: add hydralazine 10     Blindness- (present on admission)  Assessment & Plan  chronic     Hyperlipidemia- (present on admission)  Assessment & Plan  statin        VTE prophylaxis: SCDs        DATE OF SERVICE:  07/12/2022      PREOPERATIVE DIAGNOSIS:  Cervical kyphosis and spondylolisthesis with critical   central canal stenosis with myelopathy.     POSTOPERATIVE DIAGNOSIS:  Cervical kyphosis and spondylolisthesis with   critical central canal stenosis with myelopathy.     PROCEDURES:  There are 2 procedures here.  PROCEDURE #1 as follows:  1.  Anterior cervical 4-5 and 5-6 diskectomy with interbody arthrodesis.  2.  Total corpectomy of cervical 5.  3.  Bilateral neural foraminotomies, cervical 5 and cervical 6 roots.  4.  Implantation of Globus expandable cage spanning cervical 4 through 6.  5.  Anterior cervical plating, cervical 4 through 6 using a Globus static   plate with 14 mm self-drilling, self-tapping screws.  6.  Use of operative microscope for microdissection.  7.  Use of intraoperative neuromonitoring.  8.  Use of locally harvested morselized autograft.     PROCEDURE #2:  1.  Posterior cervical 3 through thoracic 1 bilateral laminectomy and partial   facetectomy.  2.  Lateral mass screw instrumentation, cervical 3, 4, 5, 6 and 7.  3.  Pedicle screw instrumentation, thoracic 1.  Both these were bilateral   using Novavax AB system.  4.  Posterolateral arthrodesis, posterior cervical 3, 4, 5, 6, 7 and thoracic   1.  5.  Use of locally harvested morselized autograft.  6.  Use of allograft.  7.  Use of  intraoperative neuromonitoring.  8.  Use of modifier 22 for extensive difficulty with positioning the patient   using the David table.     SURGEON:  Ady Barr MD     ASSISTANT:  NIVIA Eng     ANESTHESIA:  General.     COMPLICATIONS:  None.     ESTIMATED BLOOD LOSS:  200 mL for both procedures.    Capri Chan M.D.   Physician   Nephrology   Procedures      Signed   Date of Service:  7/18/2022  2:45 PM                       []Hide copied text    []Hover for details    Nephrology/Hemodialysis note     Patient with ESRD/HD admitted for elective cervical fusion  Doing well, no complaints  Tolerates dialysis well  VS stable  Lab results reviewed  Please see dialysis flow sheet for details        C-Spine Imaging 07-12-22:  IMPRESSION:     Intraoperative images intended for localization and not for diagnostic purposes demonstrate C4-C6 ACDF. Extensive posterior spinal fusion.    Co-morbidities: See PMH  Potential Risk - Complications: Contractures, Deep Vein Thrombosis, Incontinence, Malnutrition, Pain, Paralysis, Perceptual Impairment, Pneumonia, Pressure Ulcer and Urinary Tract Infection  Level of Risk: High    Ongoing Medical Management Needed (Medical/Nursing Needs):   Patient Active Problem List    Diagnosis Date Noted   • Spinal stenosis in cervical region 07/12/2022   • Cervical spondylosis with myelopathy 07/12/2022   • On home oxygen therapy 04/03/2019   • Heart burn 04/03/2019   • Mood disorder ([facility]) 04/03/2019   • Pneumonitis 11/26/2018   • ESRD on dialysis ([facility]) 11/21/2018   • Adrenal incidentaloma ([facility]) 10/30/2016   • HCAP (healthcare-associated pneumonia) 10/30/2016   • Type 2 diabetes mellitus with nephropathy, and retinopathy ([facility]) 10/28/2016   • Anemia of chronic renal failure 10/28/2016   • Fever, unknown origin 10/28/2016   • Acute on chronic respiratory failure with hypoxia ([facility]) 10/28/2016   • Chronic abdominal pain 10/28/2016   • Hyperlipidemia 12/08/2015   • IDDM (insulin  "dependent diabetes mellitus) 12/08/2015   • Blindness 12/08/2015   • Hypertension 12/08/2015   • Acute renal failure (HCC) 12/06/2015   • Elevated troponin 12/06/2015   • Hypertensive crisis 12/06/2015   • Bilateral pulmonary infiltrates on chest x-ray 12/06/2015     A & O    Current Vital Signs:   Temperature: 36.8 °C (98.2 °F) Pulse: 64 Respiration: 17 Blood Pressure: (!) 143/57 (nurse notified)  Weight: 66 kg (145 lb 8.1 oz) Height: 170.2 cm (5' 7\")  Pulse Oximetry: 99 % O2 (LPM): 2      Completed Laboratory Reports:  Recent Labs     07/17/22  0338 07/18/22  0308 07/19/22  0147   WBC 7.4 6.9 5.2   HEMOGLOBIN 8.9* 9.3* 9.9*   HEMATOCRIT 28.1* 28.8* 30.9*   PLATELETCT 173 207 228   SODIUM 133* 130* 134*   POTASSIUM 5.3 6.4* 5.1   BUN 45* 58* 28*   CREATININE 6.28* 7.37* 4.78*   ALBUMIN 3.1* 3.4 3.1*   GLUCOSE 126* 156* 116*     Additional Labs: Not Applicable    Prior Living Situation:   Housing / Facility: 1 Story House  Steps Into Home: 3  Lives with - Patient's Self Care Capacity: Spouse, Adult Children  Equipment Owned: Wheelchair, 4-Wheel Walker    Prior Level of Function / Living Situation:   Physical Therapy: Prior Services: Intermittent Physical Support for ADL Per Family  Housing / Facility: 1 Story House  Steps Into Home: 3  Bathroom Set up: Walk In Shower, Shower Chair  Equipment Owned: Wheelchair, 4-Wheel Walker  Lives with - Patient's Self Care Capacity: Spouse, Adult Children  Bed Mobility: Independent  Transfer Status: Independent  Ambulation: Required Assist  Distance Ambulation (Feet): 10  Assistive Devices Used: 4-Wheel Walker  Stairs: Required Assist  Current Level of Function:   Gait Level Of Assist: Minimal Assist  Assistive Device: Front Wheel Walker  Distance (Feet): 5  Deviation: Increased Base Of Support, Bradykinetic, Shuffled Gait  Weight Bearing Status: no restrictions  Skilled Intervention: Verbal Cuing, Tactile Cuing, Sequencing, Facilitation, Compensatory Strategies  Supine to Sit: " Minimal Assist  Sit to Supine: Minimal Assist  Scooting: Contact Guard Assist  Rolling: Minimum Assist to Lt.  Skilled Intervention: Verbal Cuing, Tactile Cuing, Sequencing  Comments: cues for log roll, HOB flat  Sit to Stand: Minimal Assist  Bed, Chair, Wheelchair Transfer: Minimal Assist  Transfer Method: Stand Step  Skilled Intervention: Verbal Cuing, Tactile Cuing, Sequencing, Facilitation, Compensatory Strategies  Sitting in Chair: up post session  Sitting Edge of Bed: 20 min  Standin min  Occupational Therapy:   Self Feeding: Independent  Grooming / Hygiene: Independent  Bathing: Requires Assist  Dressing: Requires Assist  Toileting: Independent  Medication Management: Requires Assist  Laundry: Requires Assist  Kitchen Mobility: Requires Assist  Finances: Requires Assist  Home Management: Requires Assist  Shopping: Requires Assist  Prior Level Of Mobility: Independent With Device in Community  Prior Services: Intermittent Physical Support for ADL Per Family  Housing / Facility: [address] [place] [address]  Current Level of Function:   Eating: Moderate Assist  Upper Body Dressing: Maximal Assist (adjusting brace)  Lower Body Dressing: Moderate Assist (able to assume figure 4 position, difficulty maintaining BLTs)  Skilled Intervention: Verbal Cuing, Tactile Cuing, Sequencing  Speech Language Pathology:      Rehabilitation Prognosis/Potential: Good  Estimated Length of Stay: 10-14 days    Nursing:   Anuric    Scope/Intensity of Services Recommended:  Physical Therapy: 1.5 hr / day  5 days / week. Therapeutic Interventions Required: Maximize Endurance, Mobility, Strength and Safety  Occupational Therapy: 1.5 hr / day 5 days / week. Therapeutic Interventions Required: Maximize Self Care, ADLs, IADLs and Energy Conservation  Rehabilitation Nursin/. Therapeutic Interventions Required: Monitor Pain, Skin, Wound(s), Vital Signs, Intake and Output, Labs, Safety and Family Training  Rehabilitation Physician: 3 - 5 days /  week. Therapeutic Interventions Required: Medical Management  Respiratory Care: Pulmonary Toileting. Therapeutic Interventions Required: Pulmonary Toileting and O2 Weaning  Dietician: Nutritional Assessment/Education.. Therapeutic Interventions Required: .    He requires 24-hour rehabilitation nursing to manage skin care, surgical incision, nutrition and fluid intake, pulmonary hygiene, pain control, safety, medication management and patient/family goals. In addition, rehabilitation nursing will reiterate and reinforce therapy skills and equipment use, including ADLs, as well as provide education to the patient and family. Pj Britt is willing to participate in and is able to tolerate the proposed plan of care.    Rehabilitation Goals and Plan (Expected frequency & duration of treatment in the IRF):   Return to the Community, Modified Independent Level of Care and Family Able to Provide 24/7 Assistance  Anticipated Date of Rehabilitation Admission: 07-  Patient/Family oriented IRF level of care/facility/plan: Yes  Patient/Family willing to participate in IRF care/facility/plan: Yes  Patient able to tolerate IRF level of care proposed: Yes  Patient has potential to benefit IRF level of care proposed: Yes  Comments: Not Applicable    Special Needs or Precautions - Medical Necessity:  Safety Concerns/Precautions:  Fall Risk / High Risk for Falls, Balance and Bed / Chair Alarm  Complex Wound Care: Surgical  Precautions: Fall Risk, Cervical Collar  , Spinal / Back Precautions   Comments: [place] on AAT, legally blind  Pain Management  Language Preference other than English: Nepalese  IV Site:   Requires Oxygen  Current Medications:    Current Facility-Administered Medications Ordered in Epic   Medication Dose Route Frequency Provider Last Rate Last Admin   • insulin NPH (HumuLIN N,NovoLIN N) injection  10 Units Subcutaneous QAM INSULIN Mp Li M.D.       • lactulose 20 GM/30ML solution 30 mL  30 mL  Oral TID PRN Jae Kenney, D.O.   30 mL at 07/15/22 1742   • hydrALAZINE (APRESOLINE) tablet 10 mg  10 mg Oral Q8HRS Jae Kenney, D.O.   10 mg at 07/19/22 0519   • menthol ([place]alls) lozenge 1 Lozenge  1 Lozenge Oral Q2HRS PRN Ady Barr M.D.       • omeprazole (PRILOSEC) capsule 20 mg  20 mg Oral DAILY Jae Kenney, D.O.   20 mg at 07/19/22 0519   • epoetin (Retacrit) injection (Dialysis Use Only) 3,000 Units  3,000 Units Intravenous MOPRIYANKA + FR Fadi Najjar, M.D.   3,000 Units at 07/18/22 1039   • oxyCODONE immediate-release (ROXICODONE) tablet 5 mg  5 mg Oral Q4HRS PRN Edi Field, A.P.N.   5 mg at 07/13/22 1232    Or   • oxyCODONE immediate release (ROXICODONE) tablet 10 mg  10 mg Oral Q4HRS PRN Edi , A.P.N.   10 mg at 07/15/22 2251   • morphine 4 MG/ML injection 2 mg  2 mg Intravenous Q HOUR PRN Edi , A.P.N.       • acetaminophen (Tylenol) tablet 650 mg  650 mg Oral Q6HRS Jae Kenney, D.O.   650 mg at 07/19/22 0519   • amLODIPine (NORVASC) tablet 10 mg  10 mg Oral DAILY Ady Barr M.D.   10 mg at 07/19/22 0531   • atorvastatin (LIPITOR) tablet 20 mg  20 mg Oral DAILY Ady Barr M.D.   20 mg at 07/19/22 0520   • calcium acetate (PHOS-LO) 667 MG tablet 2,001 mg  2,001 mg Oral TID WITH MEALS Ady Barr M.D.   2,001 mg at 07/19/22 0804   • metoprolol tartrate (LOPRESSOR) tablet 100 mg  100 mg Oral BID Ady Barr M.D.   100 mg at 07/19/22 0519   • PARoxetine (PAXIL) tablet 40 mg  40 mg Oral DAILY Ady Barr M.D.   40 mg at 07/19/22 0518   • Pharmacy Consult Request ...Pain Management Review 1 Each  1 Each Other PHARMACY TO DOSE Ady Barr M.D.       • MD ALERT...DO NOT ADMINISTER NSAIDS or ASPIRIN unless ORDERED By Neurosurgery 1 Each  1 Each Other PRN Ady Barr M.D.       • docusate sodium (COLACE) capsule 100 mg  100 mg Oral BID Ady Barr M.D.   100 mg at  07/19/22 0520   • senna-docusate (PERICOLACE or SENOKOT S) 8.6-50 MG per tablet 1 Tablet  1 Tablet Oral Nightly Ady Barr M.D.   1 Tablet at 07/17/22 2152   • senna-docusate (PERICOLACE or SENOKOT S) 8.6-50 MG per tablet 1 Tablet  1 Tablet Oral Q24HRS PRN Ady Barr M.D.   1 Tablet at 07/15/22 2251   • polyethylene glycol/lytes (MIRALAX) PACKET 1 Packet  1 Packet Oral BID PRN Ady Barr M.D.       • magnesium hydroxide (MILK OF MAGNESIA) suspension 30 mL  30 mL Oral QDAY PRN Ady Barr M.D.       • bisacodyl (DULCOLAX) suppository 10 mg  10 mg Rectal Q24HRS PRN Ady Barr M.D.       • sodium phosphate (Fleet) enema 133 mL  1 Each Rectal Once PRN Ady Barr M.D.       • diphenhydrAMINE (BENADRYL) tablet/capsule 25 mg  25 mg Oral Q6HRS PRN Ady Barr M.D.        Or   • diphenhydrAMINE (BENADRYL) injection 25 mg  25 mg Intravenous Q6HRS PRN Ady Barr M.D.       • ondansetron (ZOFRAN) syringe/vial injection 4 mg  4 mg Intravenous Q4HRS PRN Ady Barr M.D.   4 mg at 07/15/22 1824   • ondansetron (ZOFRAN ODT) dispertab 4 mg  4 mg Oral Q4HRS PRN Ady Barr M.D.   4 mg at 07/14/22 1306   • promethazine (PHENERGAN) tablet 12.5-25 mg  12.5-25 mg Oral Q4HRS PRN Ady Barr M.D.       • promethazine (PHENERGAN) suppository 12.5-25 mg  12.5-25 mg Rectal Q4HRS PRN Ady Barr M.D.       • prochlorperazine (COMPAZINE) injection 5-10 mg  5-10 mg Intravenous Q4HRS PRN Ady Barr M.D.       • tizanidine (ZANAFLEX) tablet 4 mg  4 mg Oral Q8HRS PRN Ady Barr M.D.   4 mg at 07/14/22 1738   • hydrALAZINE (APRESOLINE) injection 10 mg  10 mg Intravenous Q HOUR PRN Ady Barr M.D.   10 mg at 07/13/22 0708   • insulin regular (HumuLIN R,NovoLIN R) injection  2-9 Units Subcutaneous 4X/DAY CARLOS Barr M.D.   2 Units at 07/18/22 1641    And   • dextrose 50% (D50W)  injection 25 g  25 g Intravenous Q15 MIN PRN Ady Barr M.D.         No current [facility]-ordered outpatient medications on file.     Diet:   DIET ORDERS (From admission to next 24h)     Start     Ordered    07/14/22 0735  Diet Order Diet: Consistent CHO (Diabetic); Second Modifier: (optional): Renal  START AT BREAKFAST        Question Answer Comment   Diet: Consistent CHO (Diabetic)    Second Modifier: (optional) Renal        07/14/22 0734                Anticipated Discharge Destination / Patient/Family Goal:  Destination: Home with Assistance Support System: Spouse  Anticipated home health services: OT and PT  Previously used  service/ provider: Not Applicable  Anticipated DME Needs: Oxygen, Walker and Life Line  Outpatient Services: OT and PT  Alternative resources to address additional identified needs:   Future Appointments   Date Time Provider Department Center   8/4/2022  3:40 PM Javon Flores M.D. [facility] None   9/27/2022  9:15 AM [facility] EXAM 9 [facility]   10/11/2022  9:15 AM Thomas Mcdermott M.D. [facility] None     Outpatient Dialysis Note     Confirmed patient is active at:     [facility]   [address] [place] [id number]     Schedule: Monday, Wednesday, Friday  Time: 5:15AM     Patient is seen by Dr. Haskins in HD clinic.     Spoke with April at facility who confirmed.     Forwarded records for review.     Lizet Maki- Senior Dialysis Coordinator # [phone]  [employer]  Pre-Screen Completed: 7/19/2022 10:36 AM Luis Thayer L.P.N.

## 2022-07-19 NOTE — CARE PLAN
The patient is Stable - Low risk of patient condition declining or worsening    Shift Goals  Clinical Goals: dialysis  Patient Goals: rest, comfort  Family Goals: comfort    Progress made toward(s) clinical / shift goals: Dialysis this morning, tolerated well. Incisions and dressing CDI. Aspen collar on at all times. Wife ok to stay at bedside overnight.

## 2022-07-19 NOTE — HOSPITAL COURSE
A 61-year-old man with h/o DM, HTN, HLD, ESRD on HD, legally b/l blind, glaucoma, severe cervical spine stenosis presented for elective C3-T1 laminectomy and fusion.  Procedure was completed on July 12.

## 2022-07-19 NOTE — THERAPY
Occupational Therapy  Daily Treatment     Patient Name: Pj Britt  Age:  61 y.o., Sex:  male  Medical Record #: [id number]  Today's Date: 7/19/2022     Precautions: Fall Risk, Cervical Collar  , Spinal / Back Precautions   Comments: [place] on AAT, legally blind    Assessment    Pt and spouse motivated for therapy session and demonstrated good progress. Pt tolerated increased activity today. He is primarily limited by BUE weakness, poor balance, low vision requiring compensatory techniques for novel tasks, and limited knowledge of spinal precautions. He required min/mod A with LB dressing, min A with G/H, min A with utensil use, and min A for functional txfs. Pt and spouse receptive to education and hopeful for rehab stay to maximize independence prior to DC. Continue to recommend intensive therapy prior to DC home.     Plan    Continue current treatment plan.    DC Equipment Recommendations: (P) Unable to determine at this time  Discharge Recommendations: (P)  (recommend intensive therapy prior to DC home)       07/19/22 0907   Cognition    Comments very pleasant and motivated, spouse provided translation per pt request   Other Treatments   Other Treatments Provided education on spinal precautions in context of ADLs   Balance   Sitting Balance (Static) Fair -   Sitting Balance (Dynamic) Fair -   Standing Balance (Static) Poor +   Standing Balance (Dynamic) Poor +   Weight Shift Sitting Fair   Weight Shift Standing Poor   Skilled Intervention Verbal Cuing;Postural Facilitation   Comments with FWW   Bed Mobility    Supine to Sit Minimal Assist   Scooting Contact Guard Assist   Skilled Intervention Verbal Cuing;Tactile Cuing;Sequencing   Comments cues for log roll, HOB flat   Activities of Daily Living   Eating Moderate Assist   Grooming Moderate Assist;Seated   Lower Body Dressing Moderate Assist  (able to assume figure 4 position, difficulty maintaining BLTs)   Skilled Intervention Verbal Cuing;Tactile  Cuing;Sequencing   How much help from another person does the patient currently need...   6 Clicks Daily Activity Score 15   Functional Mobility   Sit to Stand Minimal Assist   Bed, Chair, Wheelchair Transfer Minimal Assist   Activity Tolerance   Sitting in Chair up post session   Sitting Edge of Bed 20 min   Standing 5 min   Patient / Family Goals   Patient / Family Goal #1 to go to rehab   Short Term Goals   Short Term Goal # 1 Pt/spouse will verbalize spinal precautions without verbal prompting   Goal Outcome # 1 Progressing as expected   Short Term Goal # 2 Pt/spouse will demo FB dressing following precautions SPV   Goal Outcome # 2 Progressing as expected   Short Term Goal # 3 Pt will demo functional txfs SPV   Goal Outcome # 3 Progressing as expected   Education Group   Role of Occupational Therapist Patient Response Patient;Acceptance;Explanation;Family   Back Safety Patient Response Patient;Family;Acceptance;Explanation;Handout;Action Demonstration   Transfers Patient Response Patient;Family;Acceptance;Explanation;Reinforcement Needed   ADL Patient Response Patient;Family;Acceptance;Explanation;Handout   Anticipated Discharge Equipment and Recommendations   DC Equipment Recommendations Unable to determine at this time   Discharge Recommendations   (recommend intensive therapy prior to DC home)

## 2022-07-19 NOTE — CARE PLAN
The patient is Stable - Low risk of patient condition declining or worsening    Shift Goals  Clinical Goals: Rest  Patient Goals: Rest  Family Goals: Rest    Progress made toward(s) clinical / shift goals:    Problem: Fall Risk  Goal: Patient will remain free from falls  Outcome: Progressing     Problem: Knowledge Deficit - Standard  Goal: Patient and family/care givers will demonstrate understanding of plan of care, disease process/condition, diagnostic tests and medications  Outcome: Progressing

## 2022-07-19 NOTE — PROGRESS NOTES
Pt discharged from unit. To IP rehab. Report given to Lanette DAVISON. IV left in per RN. AVS reviewed with wife and pt, no further questions.

## 2022-07-19 NOTE — DISCHARGE SUMMARY
Discharge Summary    CHIEF COMPLAINT ON ADMISSION  For elective procedure    Reason for Admission  SPINAL STENOSIS IN CERVICAL REGION*     Admission Date  7/12/2022    CODE STATUS  Full Code    HPI & HOSPITAL COURSE  A 61-year-old man with h/o DM, HTN, HLD, ESRD on HD, legally b/l blind, glaucoma, severe cervical spine stenosis presented for elective C3-T1 laminectomy and fusion. Patient underwent anterior C5 corpectomy, C4-6 fusion; posterior C3-T1 laminectomy and fusion on 7/12/2022.   Postoperative period uneventful.   PT, OT recommended post-acute placement. Patient followed by nephrology and was on HD per schedule.  Patient was accepted to acute rehab.    Therefore, he is discharged in fair and stable condition to an inpatient rehabilitation hospital.    The patient met 2-midnight criteria for an inpatient stay at the time of discharge.    Discharge Date  7/19/2022    FOLLOW UP ITEMS POST DISCHARGE  Follow up with PCP  Follow up with neurosurgery    DISCHARGE DIAGNOSES  Principal Problem:    Spinal stenosis in cervical region POA: Yes  Active Problems:    Hyperlipidemia (Chronic) POA: Yes    Blindness (Chronic) POA: Yes    Hypertension (Chronic) POA: Yes    Type 2 diabetes mellitus with nephropathy, and retinopathy (HCC) (Chronic) POA: Yes    ESRD on dialysis (HCC) (Chronic) POA: Yes    Cervical spondylosis with myelopathy POA: Yes  Resolved Problems:    * No resolved hospital problems. *      FOLLOW UP  Future Appointments   Date Time Provider Department Center   8/4/2022  3:40 PM Javon Flores M.D. [facility] None   9/27/2022  9:15 AM [facility] EXAM 9 [facility]   10/11/2022  9:15 AM Thomas Mcdermott M.D. [facility] None     Faisal Germain M.D.  [address] [facility] [address]  [phone]    Follow up in 1 week(s)        MEDICATIONS ON DISCHARGE     Medication List      START taking these medications      Instructions   acetaminophen 325 MG Tabs  Commonly known as: Tylenol   Take 2 Tablets by  mouth every 6 hours.  Dose: 650 mg     bisacodyl 10 MG Supp  Commonly known as: DULCOLAX   Insert 1 Suppository into the rectum every 24 hours as needed (if sennosides, docusate, polyethylene glycol 3350, and/or magnesium hydroxide ineffective or not ordered).  Dose: 10 mg     calcium acetate 667 MG Tabs tablet  Commonly known as: PHOS-LO  Replaces: calcium acetate 667 MG Caps   Take 3 Tablets by mouth 3 times a day with meals.  Dose: 2,001 mg     docusate sodium 100 MG Caps   Take 100 mg by mouth 2 times a day.  Dose: 100 mg     epoetin 3000 UNIT/ML Soln  Start taking on: July 20, 2022  Commonly known as: Retacrit   Inject 1 mL under the skin every Monday, Wednesday, and Friday.  Dose: 3,000 Units     hydrALAZINE 10 MG Tabs  Commonly known as: APRESOLINE   Take 1 Tablet by mouth every 8 hours.  Dose: 10 mg     insulin regular 100 Unit/mL Soln  Commonly known as: HumuLIN R   Inject 2-9 Units under the skin 4 Times a Day,Before Meals and at Bedtime.  Dose: 2-9 Units     omeprazole 20 MG delayed-release capsule  Start taking on: July 20, 2022  Commonly known as: PRILOSEC   Take 1 Capsule by mouth every day.  Dose: 20 mg     tizanidine 4 MG Tabs  Commonly known as: ZANAFLEX   Take 1 Tablet by mouth every 8 hours as needed (muscle spasms).  Dose: 4 mg        CHANGE how you take these medications      Instructions   amLODIPine 10 MG Tabs  Start taking on: July 20, 2022  What changed:   · medication strength  · when to take this  Commonly known as: NORVASC   Take 1 Tablet by mouth every day.  Dose: 10 mg     atorvastatin 20 MG Tabs  Start taking on: July 20, 2022  What changed: when to take this  Commonly known as: LIPITOR   Take 1 Tablet by mouth every day.  Dose: 20 mg     insulin  UNIT/ML Susp  Start taking on: July 20, 2022  What changed:   · how much to take  · when to take this  · additional instructions  Commonly known as: HumuLIN/NovoLIN   Inject 10 Units under the skin every morning.  Dose: 10 Units      lactulose 20 GM/30ML Soln  What changed:   · medication strength  · when to take this  · reasons to take this   Take 30 mL by mouth 3 times a day as needed (constipation).  Dose: 30 mL        CONTINUE taking these medications      Instructions   metoprolol tartrate 100 MG Tabs  Commonly known as: LOPRESSOR   Take 1 Tablet by mouth 2 times a day.  Dose: 100 mg     paroxetine 40 MG tablet  Start taking on: July 20, 2022  Commonly known as: PAXIL   Take 1 Tablet by mouth every day.  Dose: 40 mg        STOP taking these medications    ascorbic acid 500 MG tablet  Commonly known as: Vitamin C     calcium acetate 667 MG Caps  Commonly known as: Phos-Lo  Replaced by: calcium acetate 667 MG Tabs tablet            Allergies  Allergies   Allergen Reactions   • Aspirin Unspecified     Cannot have because on Dialysis       DIET  Orders Placed This Encounter   Procedures   • Diet Order Diet: Consistent CHO (Diabetic); Second Modifier: (optional): Renal     Standing Status:   Standing     Number of Occurrences:   1     Order Specific Question:   Diet:     Answer:   Consistent CHO (Diabetic) [4]     Order Specific Question:   Second Modifier: (optional)     Answer:   Renal [8]       ACTIVITY  As tolerated.  Weight bearing as tolerated    CONSULTATIONS  Neurosurgery  Critical care  Nephrology  [facility] medicine  PMR    PROCEDURES  7/12: Anterior C5 corpectomy, C4-6 fusion. Posterior C3-T1 laminectomy and fusion.    LABORATORY  Lab Results   Component Value Date    SODIUM 134 (L) 07/19/2022    POTASSIUM 5.1 07/19/2022    CHLORIDE 95 (L) 07/19/2022    CO2 28 07/19/2022    GLUCOSE 116 (H) 07/19/2022    BUN 28 (H) 07/19/2022    CREATININE 4.78 (HH) 07/19/2022        Lab Results   Component Value Date    WBC 5.2 07/19/2022    HEMOGLOBIN 9.9 (L) 07/19/2022    HEMATOCRIT 30.9 (L) 07/19/2022    PLATELETCT 228 07/19/2022        Total time of the discharge process exceeds 35 minutes.

## 2022-07-19 NOTE — DISCHARGE PLANNING
Would appreciate an updated [place] evals once appropriate.  Msg placed to Dr. Parker-seeking medical clearance.  Case remains under review by Administration.     1045-Administration has approved an admission.  Case is under review by Dr. Fuentes for acceptance.     1118-Dr. Fuentes has accepted.  Transport has been arranged via GMT between 8119-2134. Spouse will accompany transport. Msg placed to Dr. Parker.  Sonya BSN, & Re ZHU are aware.  Sonya will notify spouse @ bedside.  Re will notify Coleen BYRD

## 2022-07-20 ENCOUNTER — APPOINTMENT (OUTPATIENT)
Dept: RADIOLOGY | Facility: REHABILITATION | Age: 62
DRG: 073 | End: 2022-07-20
Attending: PHYSICAL MEDICINE & REHABILITATION
Payer: MEDICARE

## 2022-07-20 PROBLEM — M54.12 CERVICAL RADICULOPATHY: Status: ACTIVE | Noted: 2022-07-20

## 2022-07-20 LAB
25(OH)D3 SERPL-MCNC: 16 NG/ML (ref 30–100)
ALBUMIN SERPL BCP-MCNC: 3 G/DL (ref 3.2–4.9)
ALBUMIN/GLOB SERPL: 1.1 G/DL
ALP SERPL-CCNC: 85 U/L (ref 30–99)
ALT SERPL-CCNC: <5 U/L (ref 2–50)
ANION GAP SERPL CALC-SCNC: 11 MMOL/L (ref 7–16)
AST SERPL-CCNC: 7 U/L (ref 12–45)
BASOPHILS # BLD AUTO: 0.5 % (ref 0–1.8)
BASOPHILS # BLD: 0.03 K/UL (ref 0–0.12)
BILIRUB SERPL-MCNC: 0.2 MG/DL (ref 0.1–1.5)
BUN SERPL-MCNC: 48 MG/DL (ref 8–22)
CALCIUM SERPL-MCNC: 8.8 MG/DL (ref 8.5–10.5)
CHLORIDE SERPL-SCNC: 93 MMOL/L (ref 96–112)
CO2 SERPL-SCNC: 28 MMOL/L (ref 20–33)
CREAT SERPL-MCNC: 6.55 MG/DL (ref 0.5–1.4)
EOSINOPHIL # BLD AUTO: 0.33 K/UL (ref 0–0.51)
EOSINOPHIL NFR BLD: 5.9 % (ref 0–6.9)
ERYTHROCYTE [DISTWIDTH] IN BLOOD BY AUTOMATED COUNT: 48.5 FL (ref 35.9–50)
FERRITIN SERPL-MCNC: 1236 NG/ML (ref 22–322)
GFR SERPLBLD CREATININE-BSD FMLA CKD-EPI: 9 ML/MIN/1.73 M 2
GLOBULIN SER CALC-MCNC: 2.8 G/DL (ref 1.9–3.5)
GLUCOSE BLD STRIP.AUTO-MCNC: 106 MG/DL (ref 65–99)
GLUCOSE BLD STRIP.AUTO-MCNC: 113 MG/DL (ref 65–99)
GLUCOSE BLD STRIP.AUTO-MCNC: 161 MG/DL (ref 65–99)
GLUCOSE BLD STRIP.AUTO-MCNC: 167 MG/DL (ref 65–99)
GLUCOSE SERPL-MCNC: 112 MG/DL (ref 65–99)
HCT VFR BLD AUTO: 30.3 % (ref 42–52)
HGB BLD-MCNC: 9.5 G/DL (ref 14–18)
IMM GRANULOCYTES # BLD AUTO: 0.05 K/UL (ref 0–0.11)
IMM GRANULOCYTES NFR BLD AUTO: 0.9 % (ref 0–0.9)
IRON SATN MFR SERPL: 24 % (ref 15–55)
IRON SERPL-MCNC: 28 UG/DL (ref 50–180)
LYMPHOCYTES # BLD AUTO: 0.91 K/UL (ref 1–4.8)
LYMPHOCYTES NFR BLD: 16.1 % (ref 22–41)
MAGNESIUM SERPL-MCNC: 2.7 MG/DL (ref 1.5–2.5)
MCH RBC QN AUTO: 30.7 PG (ref 27–33)
MCHC RBC AUTO-ENTMCNC: 31.4 G/DL (ref 33.7–35.3)
MCV RBC AUTO: 98.1 FL (ref 81.4–97.8)
MONOCYTES # BLD AUTO: 0.86 K/UL (ref 0–0.85)
MONOCYTES NFR BLD AUTO: 15.2 % (ref 0–13.4)
NEUTROPHILS # BLD AUTO: 3.46 K/UL (ref 1.82–7.42)
NEUTROPHILS NFR BLD: 61.4 % (ref 44–72)
NRBC # BLD AUTO: 0 K/UL
NRBC BLD-RTO: 0 /100 WBC
PHOSPHATE SERPL-MCNC: 3.6 MG/DL (ref 2.5–4.5)
PLATELET # BLD AUTO: 261 K/UL (ref 164–446)
PMV BLD AUTO: 10 FL (ref 9–12.9)
POTASSIUM SERPL-SCNC: 5.2 MMOL/L (ref 3.6–5.5)
PREALB SERPL-MCNC: 12.8 MG/DL (ref 18–38)
PROT SERPL-MCNC: 5.8 G/DL (ref 6–8.2)
RBC # BLD AUTO: 3.09 M/UL (ref 4.7–6.1)
SODIUM SERPL-SCNC: 132 MMOL/L (ref 135–145)
TIBC SERPL-MCNC: 117 UG/DL (ref 250–450)
UIBC SERPL-MCNC: 89 UG/DL (ref 110–370)
VIT B12 SERPL-MCNC: 563 PG/ML (ref 211–911)
WBC # BLD AUTO: 5.6 K/UL (ref 4.8–10.8)

## 2022-07-20 PROCEDURE — 700111 HCHG RX REV CODE 636 W/ 250 OVERRIDE (IP): Performed by: INTERNAL MEDICINE

## 2022-07-20 PROCEDURE — 90935 HEMODIALYSIS ONE EVALUATION: CPT

## 2022-07-20 PROCEDURE — 5A1D70Z PERFORMANCE OF URINARY FILTRATION, INTERMITTENT, LESS THAN 6 HOURS PER DAY: ICD-10-PCS | Performed by: INTERNAL MEDICINE

## 2022-07-20 PROCEDURE — 74018 RADEX ABDOMEN 1 VIEW: CPT

## 2022-07-20 PROCEDURE — 84100 ASSAY OF PHOSPHORUS: CPT

## 2022-07-20 PROCEDURE — 82306 VITAMIN D 25 HYDROXY: CPT

## 2022-07-20 PROCEDURE — 94760 N-INVAS EAR/PLS OXIMETRY 1: CPT

## 2022-07-20 PROCEDURE — 82962 GLUCOSE BLOOD TEST: CPT

## 2022-07-20 PROCEDURE — 83735 ASSAY OF MAGNESIUM: CPT

## 2022-07-20 PROCEDURE — 83540 ASSAY OF IRON: CPT

## 2022-07-20 PROCEDURE — 90935 HEMODIALYSIS ONE EVALUATION: CPT | Performed by: INTERNAL MEDICINE

## 2022-07-20 PROCEDURE — 700102 HCHG RX REV CODE 250 W/ 637 OVERRIDE(OP): Performed by: PHYSICAL MEDICINE & REHABILITATION

## 2022-07-20 PROCEDURE — 82607 VITAMIN B-12: CPT

## 2022-07-20 PROCEDURE — 84134 ASSAY OF PREALBUMIN: CPT

## 2022-07-20 PROCEDURE — 83550 IRON BINDING TEST: CPT

## 2022-07-20 PROCEDURE — 36415 COLL VENOUS BLD VENIPUNCTURE: CPT

## 2022-07-20 PROCEDURE — 80053 COMPREHEN METABOLIC PANEL: CPT

## 2022-07-20 PROCEDURE — 97162 PT EVAL MOD COMPLEX 30 MIN: CPT

## 2022-07-20 PROCEDURE — 700111 HCHG RX REV CODE 636 W/ 250 OVERRIDE (IP): Performed by: PHYSICAL MEDICINE & REHABILITATION

## 2022-07-20 PROCEDURE — 85025 COMPLETE CBC W/AUTO DIFF WBC: CPT

## 2022-07-20 PROCEDURE — 97535 SELF CARE MNGMENT TRAINING: CPT

## 2022-07-20 PROCEDURE — 97530 THERAPEUTIC ACTIVITIES: CPT

## 2022-07-20 PROCEDURE — 770010 HCHG ROOM/CARE - REHAB SEMI PRIVAT*

## 2022-07-20 PROCEDURE — 97166 OT EVAL MOD COMPLEX 45 MIN: CPT

## 2022-07-20 PROCEDURE — A9270 NON-COVERED ITEM OR SERVICE: HCPCS | Performed by: PHYSICAL MEDICINE & REHABILITATION

## 2022-07-20 PROCEDURE — 99223 1ST HOSP IP/OBS HIGH 75: CPT | Performed by: PHYSICAL MEDICINE & REHABILITATION

## 2022-07-20 PROCEDURE — 82728 ASSAY OF FERRITIN: CPT

## 2022-07-20 RX ORDER — ENEMA 19; 7 G/133ML; G/133ML
1 ENEMA RECTAL PRN
Status: DISCONTINUED | OUTPATIENT
Start: 2022-07-20 | End: 2022-07-26 | Stop reason: HOSPADM

## 2022-07-20 RX ORDER — ERGOCALCIFEROL 1.25 MG/1
50000 CAPSULE ORAL
Status: DISCONTINUED | OUTPATIENT
Start: 2022-07-20 | End: 2022-07-26 | Stop reason: HOSPADM

## 2022-07-20 RX ORDER — SENNOSIDES A AND B 8.6 MG/1
17.2 TABLET, FILM COATED ORAL
Status: DISCONTINUED | OUTPATIENT
Start: 2022-07-20 | End: 2022-07-26 | Stop reason: HOSPADM

## 2022-07-20 RX ORDER — POLYETHYLENE GLYCOL 3350 17 G/17G
1 POWDER, FOR SOLUTION ORAL 2 TIMES DAILY
Status: DISCONTINUED | OUTPATIENT
Start: 2022-07-20 | End: 2022-07-26 | Stop reason: HOSPADM

## 2022-07-20 RX ORDER — POLYETHYLENE GLYCOL 3350 17 G/17G
1 POWDER, FOR SOLUTION ORAL DAILY
Status: DISCONTINUED | OUTPATIENT
Start: 2022-07-20 | End: 2022-07-20

## 2022-07-20 RX ORDER — BISACODYL 10 MG/1
10 SUPPOSITORY RECTAL
Status: DISCONTINUED | OUTPATIENT
Start: 2022-07-20 | End: 2022-07-26 | Stop reason: HOSPADM

## 2022-07-20 RX ORDER — HEPARIN SODIUM 5000 [USP'U]/ML
5000 INJECTION, SOLUTION INTRAVENOUS; SUBCUTANEOUS EVERY 12 HOURS
Status: DISCONTINUED | OUTPATIENT
Start: 2022-07-20 | End: 2022-07-26 | Stop reason: HOSPADM

## 2022-07-20 RX ORDER — NORTRIPTYLINE HYDROCHLORIDE 25 MG/1
25 CAPSULE ORAL EVERY EVENING
Status: DISCONTINUED | OUTPATIENT
Start: 2022-07-20 | End: 2022-07-26 | Stop reason: HOSPADM

## 2022-07-20 RX ADMIN — INSULIN HUMAN 2 UNITS: 100 INJECTION, SOLUTION PARENTERAL at 21:04

## 2022-07-20 RX ADMIN — ERGOCALCIFEROL 50000 UNITS: 1.25 CAPSULE ORAL at 11:17

## 2022-07-20 RX ADMIN — HYDRALAZINE HYDROCHLORIDE 10 MG: 10 TABLET ORAL at 05:40

## 2022-07-20 RX ADMIN — ACETAMINOPHEN 1000 MG: 500 TABLET, FILM COATED ORAL at 21:00

## 2022-07-20 RX ADMIN — AMLODIPINE BESYLATE 10 MG: 5 TABLET ORAL at 08:19

## 2022-07-20 RX ADMIN — OMEPRAZOLE 20 MG: 20 CAPSULE, DELAYED RELEASE ORAL at 08:19

## 2022-07-20 RX ADMIN — ACETAMINOPHEN 1000 MG: 500 TABLET, FILM COATED ORAL at 08:19

## 2022-07-20 RX ADMIN — HYDRALAZINE HYDROCHLORIDE 10 MG: 10 TABLET ORAL at 21:01

## 2022-07-20 RX ADMIN — EPOETIN ALFA-EPBX 3000 UNITS: 3000 INJECTION, SOLUTION INTRAVENOUS; SUBCUTANEOUS at 15:45

## 2022-07-20 RX ADMIN — OXYCODONE 5 MG: 5 TABLET ORAL at 21:10

## 2022-07-20 RX ADMIN — POLYETHYLENE GLYCOL 3350 1 PACKET: 17 POWDER, FOR SOLUTION ORAL at 11:16

## 2022-07-20 RX ADMIN — DOCUSATE SODIUM 100 MG: 100 CAPSULE, LIQUID FILLED ORAL at 08:19

## 2022-07-20 RX ADMIN — HYDRALAZINE HYDROCHLORIDE 10 MG: 10 TABLET ORAL at 14:31

## 2022-07-20 RX ADMIN — METOPROLOL TARTRATE 100 MG: 25 TABLET, FILM COATED ORAL at 08:19

## 2022-07-20 RX ADMIN — Medication 2001 MG: at 08:20

## 2022-07-20 RX ADMIN — INSULIN HUMAN 2 UNITS: 100 INJECTION, SOLUTION PARENTERAL at 11:18

## 2022-07-20 RX ADMIN — DOCUSATE SODIUM 100 MG: 100 CAPSULE, LIQUID FILLED ORAL at 21:00

## 2022-07-20 RX ADMIN — Medication 2001 MG: at 11:17

## 2022-07-20 RX ADMIN — SENNOSIDES 17.2 MG: 8.6 TABLET, FILM COATED ORAL at 12:06

## 2022-07-20 RX ADMIN — PAROXETINE HYDROCHLORIDE 40 MG: 20 TABLET, FILM COATED ORAL at 08:19

## 2022-07-20 RX ADMIN — NORTRIPTYLINE HYDROCHLORIDE 25 MG: 25 CAPSULE ORAL at 21:10

## 2022-07-20 RX ADMIN — ATORVASTATIN CALCIUM 20 MG: 10 TABLET, FILM COATED ORAL at 08:19

## 2022-07-20 RX ADMIN — METOPROLOL TARTRATE 100 MG: 25 TABLET, FILM COATED ORAL at 21:00

## 2022-07-20 RX ADMIN — ACETAMINOPHEN 1000 MG: 500 TABLET, FILM COATED ORAL at 14:31

## 2022-07-20 RX ADMIN — Medication 2001 MG: at 18:42

## 2022-07-20 RX ADMIN — POLYETHYLENE GLYCOL 3350 1 PACKET: 17 POWDER, FOR SOLUTION ORAL at 20:55

## 2022-07-20 RX ADMIN — HEPARIN SODIUM 5000 UNITS: 5000 INJECTION, SOLUTION INTRAVENOUS; SUBCUTANEOUS at 14:31

## 2022-07-20 ASSESSMENT — BRIEF INTERVIEW FOR MENTAL STATUS (BIMS)
ASKED TO RECALL BLUE: YES, NO CUE REQUIRED
WHAT MONTH IS IT: ACCURATE WITHIN 5 DAYS
ASKED TO RECALL BED: YES, NO CUE REQUIRED
WHAT YEAR IS IT: CORRECT
INITIAL REPETITION OF BED BLUE SOCK - FIRST ATTEMPT: 3
WHAT DAY OF THE WEEK IS IT: CORRECT
ASKED TO RECALL SOCK: YES, NO CUE REQUIRED
BIMS SUMMARY SCORE: 15

## 2022-07-20 ASSESSMENT — GAIT ASSESSMENTS
ASSISTIVE DEVICE: 4 WHEEL WALKER
GAIT LEVEL OF ASSIST: TOTAL ASSIST
DEVIATION: ATAXIC;DECREASED BASE OF SUPPORT;BRADYKINETIC;DECREASED HEEL STRIKE
DISTANCE (FEET): 10

## 2022-07-20 ASSESSMENT — ACTIVITIES OF DAILY LIVING (ADL): TOILETING: INDEPENDENT

## 2022-07-20 ASSESSMENT — PAIN DESCRIPTION - PAIN TYPE: TYPE: ACUTE PAIN

## 2022-07-20 ASSESSMENT — FIBROSIS 4 INDEX: FIB4 SCORE: 1.56

## 2022-07-20 NOTE — PROGRESS NOTES
2 RN skin check done with admitting RN Duong and Charge RN Corinna. Face photo and skin photos documented in media. Appropriate LDAs opened.   Pt with Ashok score of 17, RN wound protocol ordered on 7/19, orders current. Prevention measures in place including, Low air loss mattress.

## 2022-07-20 NOTE — DISCHARGE PLANNING
CASE MANAGEMENT INITIAL ASSESSMENT    Admit Date:  7/12/2022     Patient is a  61 y.o. male transferred from [facility] where he was hospitalized from 7/12 to 7/19.   Permission given to pt's spouse [facility] to [facility]'s [facility]    Diagnosis: Spinal stenosis in cervical region [M48.02]  Cervical spondylosis with myelopathy M47.12]    7/12 s/p    PROCEDURES:  There are 2 procedures here.  PROCEDURE #1 as follows:  1.  Anterior cervical 4-5 and 5-6 diskectomy with interbody arthrodesis.  2.  Total corpectomy of cervical 5.  3.  Bilateral neural foraminotomies, cervical 5 and cervical 6 roots.  4.  Implantation of Globus expandable cage spanning cervical 4 through 6.  5.  Anterior cervical plating, cervical 4 through 6 using a Globus static   plate with 14 mm self-drilling, self-tapping screws.       PROCEDURE #2:  1.  Posterior cervical 3 through thoracic 1 bilateral laminectomy and partial   facetectomy.  2.  Lateral mass screw instrumentation, cervical 3, 4, 5, 6 and 7.  3.  Pedicle screw instrumentation, thoracic 1.  Both these were bilateral   using Adioso system.  4.  Posterolateral arthrodesis, posterior cervical 3, 4, 5, 6, 7 and thoracic     Co-morbidities:   Patient Active Problem List    Diagnosis Date Noted   • Cervical radiculopathy 07/20/2022   • Cervical myelopathy ([facility]) 07/19/2022   • Spinal stenosis in cervical region 07/12/2022   • Cervical spondylosis with myelopathy 07/12/2022   • On home oxygen therapy 04/03/2019   • Heart burn 04/03/2019   • Mood disorder ([facility]) 04/03/2019   • Pneumonitis 11/26/2018   • ESRD on dialysis ([facility]) 11/21/2018   • Adrenal incidentaloma ([facility]) 10/30/2016   • HCAP (healthcare-associated pneumonia) 10/30/2016   • Type 2 diabetes mellitus with nephropathy, and retinopathy ([facility]) 10/28/2016   • Anemia of chronic renal failure 10/28/2016   • Fever, unknown origin 10/28/2016   • Acute on chronic respiratory failure with hypoxia ([facility]) 10/28/2016   •  Chronic abdominal pain 10/28/2016   • Hyperlipidemia 12/08/2015   • IDDM (insulin dependent diabetes mellitus) 12/08/2015   • Blindness 12/08/2015   • Hypertension 12/08/2015   • Acute renal failure (HCC) 12/06/2015   • Elevated troponin 12/06/2015   • Hypertensive crisis 12/06/2015   • Bilateral pulmonary infiltrates on chest x-ray 12/06/2015     Prior Living Situation:  Housing / Facility: 1 Story House in [place]/ [facility]; walk in shower   Lives with - Patient's Self Care Capacity: Spouse, Adult Children    Prior Level of Function:  Medication Management: Requires Assist  Finances: Requires Assist  Home Management: Requires Assist  Shopping: Requires Assist  Prior Level Of Mobility: Independent With Device in Community    Support Systems:  Primary:  Spouse Guy Britt  / [phone]     Other support systems: Gabrielle Rivero dtr in [place]        Previous Services Utilized:   Equipment Owned: Wheelchair, 4-Wheel Walker; shower chair  Prior Services: Intermittent Physical Support for ADL Per Family    Other Information:  PCP Faisal Rodriguez MD  Surgeon Dr.Christopher Barr   Established pt @ [facility] MWF 5:15am   Dr Haskins @ Dialysis    Patient / Family Goal:  Return home w/ spouse and adult children.   Improve mobility/function.       Plan:  1. Continue to follow patient through hospitalization and provide discharge planning in collaboration with patient, family, physicians and ancillary services.     2. Utilize community resources to ensure a safe discharge.   Anticipate home health; follow up with pcp, dialysis, surgeon; family training.

## 2022-07-20 NOTE — FLOWSHEET NOTE
"   07/20/22 1040   Incentive Spirometry Treatment   Height 1.702 m (5' 7.01\")   Predicted Inspiratory Capacity 2750   60% of predicted IS capacity 1650 mL   40% of predicted IS capacity 1100 mL   Incentive Spirometer Volume 1250 mL     "

## 2022-07-20 NOTE — CARE PLAN
"The patient is Stable - Low risk of patient condition declining or worsening    Progress made toward(s) clinical / shift goals:      Problem: Infection  Goal: Patient will remain free from infection  Outcome: Progressing  Note: No s/s of infection present      Patient is not progressing towards the following goals:    Problem: Fall Risk - Rehab  Goal: Patient will remain free from falls  Outcome: Not Met  Note: Jazmine Marin Fall risk Assessment Score: 19    High fall risk Interventions   - Bed and strip alarm   - Yellow sign by the door   - Yellow wrist band \"Fall risk\"  - Do not leave patient unattended in the bathroom  - Fall risk education provided     Problem: Bladder / Voiding  Goal: Patient will establish and maintain regular urinary output  Outcome: Not Progressing  Note: Patient is ANURIC and is on dialysis     "

## 2022-07-20 NOTE — FLOWSHEET NOTE
07/20/22 0752   Patient History   Pulmonary Diagnosis Probable COPD: pulmonary function tests showed impaired airflow   Procedures Relevant to Respiratory Status C3-T1 laminectomy   Home O2 Yes   Oxygen liter flow 2   Oxygen at night only No   Nocturnal CPAP No   Home Treatments/Frequency No   Sleep Apnea Screening   Have you had a sleep study? No   Have you been diagnosed with sleep apnea? No   [facility]   [facility] Hyperinflation Protocol   Hyperinflation Protocol   Hyperinflation Protocol Indications   (cervical/thoracic surgery)   Hyperinflation Protocol Goals/Outcome Greater Than 60% of Predicted I.S. Volume x 24 hrs;Stable Vital Capacity x24 hrs and Patient Understands / uses I.S.

## 2022-07-20 NOTE — DISCHARGE PLANNING
Outpatient Dialysis Note     Confirmed patient is active at:     [facility]   [address] [place] [address]     Schedule: Monday, Wednesday, Friday  Time: 5:15AM     Patient is seen by Dr. Haskins in HD clinic.     Spoke with April at facility who confirmed.     Forwarded records for review.     Lizet Maki- Senior Dialysis Coordinator Ph# [phone]  [facility]

## 2022-07-20 NOTE — PROCEDURES
Nephrology/Hemodialysis note    Patient with ESRD/HD admitted for elective cervical surgery, s/p procedures, transferred to rehab  Seen and examined during dialysis  Tolerates well  VS stable  Lab results reviewed  Please see dialysis flow sheet for details

## 2022-07-20 NOTE — H&P
REHABILITATION HISTORY AND PHYSICAL/POST ADMISSION PHYSICAL EVALUATION    Date of Admission: 7/19/2022    [facility] Code / Diagnosis to Support: 0003.9 - Neurologic Conditions: Other Neurologic  Etiologic Diagnosis: Cervical radiculopathy    CC: right arm pain that is not controlled    This history was prepared after interviewing the patient and spouse, who was also present, and reviewing the patient's chart on [facility] [employer].    HPI:  The patient is a 61 y.o. male with a past medical history of neuropathic pain, diabetes mellitus type II, hypertension, ESRD on HD MWF and anuric, Visual impairment; now admitted for acute inpatient rehabilitation with severe functional debility due to cervical radiculopathy. Per spouse, who provided most of the history, pain and weakness in right arm started about three months ago. Pain in right arm continues to be uncontrolled. Patient started using walker in home and wheelchair outdoors about three years ago after he was diagnosed with encephalopathy due to his impaired balance.     Per documentation, MRI of cervical spine on May 30 showed grade I spondylolsithesis at Cervical 4,5 with multilevel degenerative changes causing severe central canal stenosis at C4-C5.    The patient underwent the following procedures on 7/12/2022 by Dr. Ady Barr MD:   PROCEDURE #1 as follows:  1.  Anterior cervical 4-5 and 5-6 diskectomy with interbody arthrodesis.  2.  Total corpectomy of cervical 5.  3.  Bilateral neural foraminotomies, cervical 5 and cervical 6 roots.  4.  Implantation of Globus expandable cage spanning cervical 4 through 6.  5.  Anterior cervical plating, cervical 4 through 6 using a Globus static   plate with 14 mm self-drilling, self-tapping screws.  6.  Use of operative microscope for microdissection.  7.  Use of intraoperative neuromonitoring.  8.  Use of locally harvested morselized autograft.     PROCEDURE #2:  1.  Posterior cervical 3 through thoracic 1 bilateral  laminectomy and partial   facetectomy.  2.  Lateral mass screw instrumentation, cervical 3, 4, 5, 6 and 7.  3.  Pedicle screw instrumentation, thoracic 1.  Both these were bilateral using [employer] system.  4.  Posterolateral arthrodesis, posterior cervical 3, 4, 5, 6, 7 and thoracic   5.  Use of locally harvested morselized autograft.  6.  Use of allograft.  7.  Use of intraoperative neuromonitoring.  8.  Use of modifier 22 for extensive difficulty with positioning the patient using the David table.    Recovery was complicated by: impaired mobility and ADLs; constipation; uncontrolled pain    Patient was evaluated by Rehab Medicine physician and Physical Therapy and Occupational Therapy and determined to be appropriate for acute inpatient rehab and was transferred to [facility] on 7/19/2022.    On admission the patient reports pain in right arm is not getting better and barrier to participation with therapies. Anuric with urine. Constipated of stool but continent. Last night sleep at baseline.    Goals for rehabilitation include:  improving independence and pain, reducing caregiver burden, and returning home safely    PSYCHOSOCIAL HISTORY:  Patient lives in a 1 story house in [place] with his spouse, spouse is able to provide assistance  3 SANDRA  At prior level of function patient ambulates very short distances with 4WW to access . Patient is mod I with .   With this acute therapeutic intervention, this patient hopes to improve his functional status, and return to independent living with the supportive care of spouse.    REVIEW OF SYSTEMS:     Constitutional: denies fevers and chills  Eyes: denies change in vision  Ears, nose, mouth, throat: denies sore throat  Cardiovascular: denies palpitations  Respiratory: denies respiratory discomfort  Gastrointestinal: continent, last BM 7/17  Genitourinary: +anuric  Musculoskeletal: admits to pain at surgical site and right arm  Integumentary: denies  pressure ulcer, has incision site from surgery  Neurological: denies spasms, admits to burning pain in right arm, denies headaches, admits to weakness in arms / legs  Psychiatric: denies change in mood  Endocrine: admits to diabetes mellitus  Hematologic/lymphatic: denies history of blood disorders  Allergic/immunologic: has the following allergies - Aspirin    PMH:  Past Medical History:   Diagnosis Date   • Anemia    • Blind in both eyes    • Diabetes ([facility])     insulin dependent   • Dialysis patient ([facility])     Earline KEATING   • ESRD (end stage renal disease) ([facility])     Dr. Haskins   • High cholesterol    • Hyperlipemia    • Hypertension    • Oxygen dependent     2 liters, LINCARE   • Snoring     no sleep study       PSH:  Past Surgical History:   Procedure Laterality Date   • CERVICAL DISK AND FUSION ANTERIOR  7/12/2022    Procedure: STAGE 1- ANTERIOR C5 CORPECTOMY, C4-6 FUSION STAGE 2- POSTERIOR C3-T1 LAMINECTOMY AND FUSION;  Surgeon: Ady Barr M.D.;  Location: [facility];  Service: Neurosurgery   • CERVICAL FUSION POSTERIOR  7/12/2022    Procedure: FUSION, SPINE, CERVICAL, POSTERIOR APPROACH;  Surgeon: Ady Barr M.D.;  Location: SURGERY [facility];  Service: Neurosurgery   • CORPECTOMY  7/12/2022    Procedure: CORPECTOMY, SPINE;  Surgeon: Ady Barr M.D.;  Location: [facility];  Service: Neurosurgery   • CERVICAL LAMINECTOMY POSTERIOR  7/12/2022    Procedure: LAMINECTOMY, SPINE, CERVICAL, POSTERIOR APPROACH;  Surgeon: Ady Barr M.D.;  Location: SURGERY [facility];  Service: Neurosurgery   • RECOVERY  4/19/2016    Procedure: VASCULAR CASE-ILEANA-LEFT ARM FISTULOGRAM WITH ANGIOPLASTY 23436, 23526, 22222-QIF STAGE RENAL DISEASE N18.6;  Surgeon: Recoveryonly Surgery;  Location: SURGERY PRE-POST [facility] UNIT [facility];  Service:    • RECOVERY  2/24/2016    Procedure: IR1 VASCULAR CASE LEFT ARM FISTULOGRAM WITH ANGIOPLASTY;  Surgeon: Recoveryonly Surgery;   [place] [facility] [facility];  Service:    • CATH PLACEMENT Right 12/10/2015    Procedure: CATH PLACEMENT;  Surgeon: Lorene Baldwin M.D.;  Location: SURGERY [facility];  Service:    • AV FISTULA CREATION Left 12/10/2015    Procedure: AV FISTULA CREATION;  Surgeon: Lorene Baldwin M.D.;  Location: SURGERY [facility];  Service:    • CT CATARACT SURG W/IOL 1 STAGE WO ENDO  10/21/15   • OTHER  9/22/15    trabecular micro bypass stent- right eye   • OTHER  7/2015    take out blood of left eye   • OTHER  3/2015    take blood out of eye        FAMILY HISTORY:   Family History   Problem Relation Age of Onset   • Alzheimer's Disease Mother 80        Lives in [place]   • Hypertension Father    • Diabetes Father    • Diabetes Brother        LEVEL OF FUNCTION PRIOR TO DISABILTY:  At prior level of function patient ambulates very short distances with 4WW to access WC. Patient is mod I with WC.     LEVEL OF FUNCTION PRIOR TO ADMISSION to [facility]:  Cognition    Cognition / Consciousness WDL   Level of Consciousness Alert   Comments pleasant, cooperative   Balance   Sitting Balance (Static) Fair   Sitting Balance (Dynamic) Fair   Standing Balance (Static) Poor +   Standing Balance (Dynamic) Poor   Weight Shift Sitting Fair   Weight Shift Standing Poor   Skilled Intervention Verbal Cuing;Compensatory Strategies;Facilitation   Comments with FWW, several small LOB that required hands on assist to recover   Gait Analysis   Gait Level Of Assist Minimal Assist   Assistive Device Front Wheel Walker   Distance (Feet) 20   # of Times Distance was Traveled 1   Deviation    (inconsistent BENNIE and step length, decreased shanthi)   # of Stairs Climbed 0   Weight Bearing Status no restrictions   Vision Deficits Impacting Mobility NT, spouse at bedside reinforced patient blind   Skilled Intervention Verbal Cuing;Compensatory Strategies;Facilitation;Sequencing   Comments distance limited by fatigue    Bed Mobility    Supine to Sit    (NT, in chair prior to session)   Sit to Supine Minimal Assist  (for log roll)   Scooting Supervised  (seated)   Rolling Minimum Assist to Lt.   Skilled Intervention Verbal Cuing;Compensatory Strategies;Tactile Cuing;Sequencing   Comments used bed features, significant cueing for log roll   Functional Mobility   Sit to Stand Moderate Assist  (from chair)   Bed, Chair, Wheelchair Transfer Minimal Assist   Transfer Method Stand Step   Skilled Intervention Verbal Cuing;Facilitation;Compensatory Strategies   How much difficulty does the patient currently have...   Turning over in bed (including adjusting bedclothes, sheets and blankets)? 2   Sitting down on and standing up from a chair with arms (e.g., wheelchair, bedside commode, etc.) 1   Moving from lying on back to sitting on the side of the bed? 2   How much help from another person does the patient currently need...   Moving to and from a bed to a chair (including a wheelchair)? 2   Need to walk in a hospital room? 2   Climbing 3-5 steps with a railing? 1   6 clicks Mobility Score 10   Activity Tolerance   Sitting in Chair in chair prior   Sitting Edge of Bed 5 min   Standing 5-8 min total   Comments limited by fatigue     Balance   Sitting Balance (Static) Fair -   Sitting Balance (Dynamic) Fair -   Standing Balance (Static) Poor +   Standing Balance (Dynamic) Poor +   Weight Shift Sitting Fair   Weight Shift Standing Poor   Skilled Intervention Verbal Cuing;Postural Facilitation   Comments with FWW   Bed Mobility    Supine to Sit Minimal Assist   Scooting Contact Guard Assist   Skilled Intervention Verbal Cuing;Tactile Cuing;Sequencing   Comments cues for log roll, HOB flat   Activities of Daily Living   Eating Moderate Assist   Grooming Moderate Assist;Seated   Lower Body Dressing Moderate Assist  (able to assume figure 4 position, difficulty maintaining BLTs)   Skilled Intervention Verbal Cuing;Tactile Cuing;Sequencing   How  much help from another person does the patient currently need...   6 Clicks Daily Activity Score 15   Functional Mobility   Sit to Stand Minimal Assist   Bed, Chair, Wheelchair Transfer Minimal Assist   Activity Tolerance   Sitting in Chair up post session   Sitting Edge of Bed 20 min   Standing 5 min     CURRENT LEVEL OF FUNCTION:   Same as level of function prior to admission to [facility]    MEDICATIONS:  Current Facility-Administered Medications   Medication Dose   • vitamin D2 (Ergocalciferol) (Drisdol) capsule 50,000 Units  50,000 Units   • polyethylene glycol/lytes (MIRALAX) PACKET 1 Packet  1 Packet   • Respiratory Therapy Consult     • Pharmacy Consult Request ...Pain Management Review 1 Each  1 Each   • omeprazole (PRILOSEC) capsule 20 mg  20 mg   • artificial tears ophthalmic solution 1 Drop  1 Drop   • benzocaine-menthol (Cepacol) lozenge 1 Lozenge  1 Lozenge   • mag hydrox-al hydrox-simeth (MAALOX PLUS ES or MYLANTA DS) suspension 20 mL  20 mL   • ondansetron (ZOFRAN ODT) dispertab 4 mg  4 mg    Or   • ondansetron (ZOFRAN) syringe/vial injection 4 mg  4 mg   • sodium chloride (OCEAN) 0.65 % nasal spray 2 Spray  2 Spray   • acetaminophen (TYLENOL) tablet 1,000 mg  1,000 mg   • melatonin tablet 3 mg  3 mg   • magnesium hydroxide (MILK OF MAGNESIA) suspension 30 mL  30 mL   • amLODIPine (NORVASC) tablet 10 mg  10 mg   • atorvastatin (LIPITOR) tablet 20 mg  20 mg   • calcium acetate (PHOS-LO) 667 MG tablet 2,001 mg  2,001 mg   • docusate sodium (COLACE) capsule 100 mg  100 mg   • hydrALAZINE (APRESOLINE) tablet 10 mg  10 mg   • insulin NPH (HumuLIN N,NovoLIN N) injection  10 Units   • insulin regular (HumuLIN R,NovoLIN R) injection  2-9 Units    And   • dextrose 50% (D50W) injection 25 g  25 g   • metoprolol tartrate (LOPRESSOR) tablet 100 mg  100 mg   • oxyCODONE immediate-release (ROXICODONE) tablet 5 mg  5 mg    Or   • oxyCODONE immediate release (ROXICODONE) tablet 10 mg  10 mg   •  "PARoxetine (PAXIL) tablet 40 mg  40 mg       PHYSICAL EXAM:     VITAL SIGNS:   height is 1.702 m (5' 7.01\") and weight is 64.5 kg (142 lb 3.2 oz). His oral temperature is 37 °C (98.6 °F). His blood pressure is 143/59 (abnormal) and his pulse is 72. His respiration is 18 and oxygen saturation is 99%.     General: well-groomed in no acute distress, spouse present  Eyes: no scleral icterus or conjunctival injection  Ears, nose, mouth and throat: moist oral mucosa  Cardiovascular: regular rate, good peripheral perfusion, negative James sign bilaterally  Respiratory: breathing comfortably without use of accessory muscles  Gastrointestinal: soft, nontender, nondistended  Genitourinary: no indwelling mcfarlane  Musculoskeletal: good symmetry in bilateral shoulders,  Skin: no wounds seen on exposed skin    Neurologic:  Mental status: answers questions appropriately follows commands  Speech: fluent, no aphasia or dysarthria  No clonus at bilateral ankles  Tone: no spasticity noted  Psychiatric: appropriate affect  Hematologic/lymphatic/immunologic: ++IV access, no bruises seen on exposed skin    LABS:  Recent Labs     07/18/22  0308 07/19/22  0147 07/20/22  0548   SODIUM 130* 134* 132*   POTASSIUM 6.4* 5.1 5.2   CHLORIDE 89* 95* 93*   CO2 27 28 28   GLUCOSE 156* 116* 112*   BUN 58* 28* 48*   CREATININE 7.37* 4.78* 6.55*   CALCIUM 8.5 8.9 8.8     Recent Labs     07/18/22  0308 07/19/22  0147 07/20/22  0548   WBC 6.9 5.2 5.6   RBC 2.98* 3.21* 3.09*   HEMOGLOBIN 9.3* 9.9* 9.5*   HEMATOCRIT 28.8* 30.9* 30.3*   MCV 96.6 96.3 98.1*   MCH 31.2 30.8 30.7   MCHC 32.3* 32.0* 31.4*   RDW 47.8 47.8 48.5   PLATELETCT 207 228 261   MPV 10.5 9.8 10.0             PRIMARY REHAB DIAGNOSIS:    This patient is a 61 y.o. male admitted for acute inpatient rehabilitation with Cervical radiculopathy.    IMPAIRMENTS:   ADLs/IADLs  Mobility    SECONDARY DIAGNOSIS/MEDICAL CO-MORBIDITIES AFFECTING FUNCTION:  cervical myelopathy, neuropathic pain, diabetes " mellitus, hypertension, ESRD on HD MWF, chronic upper extremity weakness right greater than left   Cervical myelopathy   Status post C3-T1 posterior fusion   Hypertension   Visual impairment   Upper extremity weakness   End-stage renal disease on HD   Hypertension   Type 2 diabetes with hyperglycemia   Impaired mobility and ADLs   Anuric   Blood loss anemia   hyponatremia     RELEVANT CHANGES SINCE PREADMISSION EVALUATION:   Status unchanged    The patient's rehabilitation potential is Good  The patient's medical prognosis is good    PLAN:   Discussion and Recommendations:   1. The patient requires an acute inpatient rehabilitation program with a coordinated program of care at an intensity and frequency not available at a lower level of care. This recommendation is substantiated by the patient's medical physicians who recommend that the patient's intervention and assessment of medical issues needs to be done at an acute level of care for patient's safety and maximum outcome.   2. A coordinated program of care will be supplied by an interdisciplinary team of physical therapy, occupational therapy, rehab physician, rehab nursing, and, if needed, speech therapy and rehab psychology. Rehab team presents a patient-specific rehabilitation and education program concentrating on prevention of future problems related to accessibility, mobility, skin, bowel, bladder, sexuality, and psychosocial and medical/surgical problems.   3. Need for Rehabilitation Physician: The rehab physician will be evaluating the patient on a multi-weekly basis to help coordinate the program of care. The rehab physician communicates between medical physicians, therapists, and nurses to maximize the patient's potential outcome. Specific areas in which the rehab physician will be providing daily assessment include the following:   A. Assessing the patient's heart rate and blood pressure response (vitals monitoring) to activity and making adjustments  in medications or conservative measures as needed.   B. The rehab physician will be assessing the frequency at which the program can be increased to allow the patient to reach optimal functional outcome.   C. The rehab physician will also provide assessments in daily skin care, especially in light of patient's impairments in mobility.   D. The rehab physician will provide special expertise in understanding how to work with functional impairment and recommend appropriate interventions, compensatory techniques, and education that will facilitate the patient's outcome.   4. Rehab R.N.   The rehab RN will be working with patient to carry over in room mobility and activities of daily living when the patient is not in 3 hours of skilled therapy. Rehab nursing will be working in conjunction with rehab physician to address all the medical issues above and continue to assess laboratory work and discuss abnormalities with the treating physicians, assess vitals, and response to activity, and discuss and report abnormalities with the rehab physician. Rehab RN will also continue daily skin care, supervise bladder/bowel program, instruct in medication administration, and ensure patient safety.   5. Rehab Therapy: Therapies to treat at intensity and frequency of (may change after completion of evaluation by all therapeutic disciplines):       PT:  Physical therapy to address mobility, transfer, gait training and evaluation for adaptive equipment needs 1.5 hour/day at least 5 days/week for the duration of the ELOS (see below)       OT:  Occupational therapy to address ADLs, self-care, home management training, functional mobility/transfers and assistive device evaluation, and community re-integration 1 .5 hour/day at least 5 days/week for the duration of the ELOS (see below).     Medical management / Rehabilitation Issues/ Adverse Potential as part of rehabilitation plan     REHABILITATION ISSUES/ADVERSE POTENTIAL and MEDICAL  CO-MORBIDITIES/ADVERSE POTENTIAL AFFECTING FUNCTION:    Data points:  Reviewed results of radiology tests  Reviewed clinical lab tests  Reviewed old records    ##MSK  #Impaired ADLs and mobility  Patient is admitted to [facility] for comprehensive rehabilitation therapy as described.   Rehabilitation nursing monitors bowel and bladder control, educates on medication administration, co-morbidities and monitors patient safety.  Expected Length of Stay: 10-16 days   Anticipated discharge date: TBD  Anticipated discharge destination: home with family  Prognosis: good  Equipment: TBD  Postdischarge therapies: TBD  Followup: Ady Barr MD (neurosurgery), PCP  Precautions: C collar at all times  Code: full resuscitation    #Postsurgical pain, acute, controlled  #Neuropathic pain, acute, uncontrolled  Ordered tylenol 1000mg TID, nortriptyline 25mg QHS, oxycodone 5-10mg Q4hr PRN pain    ##NEURO/PSYCH  #Cervical radiculopathy s/p anterior C5 corpectomy, 4-6 fusion, posterior C3-T1 lami fusion on 7/12/2022 by Dr. Ady Barr MD  Three month history of right arm weakness and neuropathic pain  Leg weakness started three months ago and not likely due to cervical   Anuric so unable to determine bowel control  Does have constipation  Monitor for possible spinal cord injury component    #Chronic psychiatric disordered  Ordered home paxil 40mg daily  Monitor    #RESP  Respiratory therapy: RT performs O2 management prn, breathing retraining, pulmonary hygiene and bronchospasm management prn to optimize participation in therapies.     ##CARDIAC  #Chronic hypertension  #Chronic hyperlipidemia  Ordered atorvastatin 20mg daily, hydralazine 10mg TID, metoprolol 100mg BID    DVT prophylaxis: Ordered heparin 5000 units BID. Encourage OOB. Monitor daily for signs and symptoms of DVT including but not limited to swelling and pain to prevent the development of DVT that may interfere with  therapies.    ##GI  GI prophylaxis:  Ordered prilosec 20mg daily to prevent gastritis/dyspepsia which may interfere with therapies.  #At risk of Neurogenic bowel  #At risk of opioid induced constipation  Goal of one continent BM daily  Ordered KUB to assess stool burden - personally visualized and shows significant old stool burden throughout the large intestine  Ordered docusate 100mg BID, miralax 17g BID, senna 17.2mg daily    #At high risk of malnutrition  Dietician monitors nutrient intake, recommend supplements prn and provide nutrition education to pt/family to promote optimal nutrition for wound healing/recovery.   Orders Placed This Encounter   Procedures   • Diet Order Diet: Consistent CHO (Diabetic); Second Modifier: (optional): Renal     Standing Status:   Standing     Number of Occurrences:   1     Order Specific Question:   Diet:     Answer:   Consistent CHO (Diabetic) [4]     Order Specific Question:   Second Modifier: (optional)     Answer:   Renal [8]     Ordered multivitamin daily  Order prealbumin levels    ##/RENAL  #ESRD on hemodialysis  #Anuric  MWF hemodialysis  Nephrology following  Ordered calcium acetate 2001mg TID    ##ENDO  #Vitamin D deficiency  VitD 25 OH 20  Ordered ergocalciferol 50,000 units weekly    #Diabetes mellitus Type II with renal and optho complications (ESRD and blindness)  Ordered insulin NPH 10 units daily, SSI, accuchecks ACHS    ##HEME  Monitor    ##SKIN  Skin/dermal ulcer prophylaxis: Monitor for new skin conditions with q.2 h. turns as required to prevent the development of skin breakdown.     I personally performed a complete drug regimen review and no potential clinically significant medication issues were identified.     Patient was seen for 75 minutes on unit/floor of which > 50% of time was spent on counseling and coordination of care regarding the above, including prognosis, risk reduction, benefits of treatment, and options for next stage of  care.    GOALS/EXPECTED LEVEL OF FUNCTION BASED ON CURRENT MEDICAL AND FUNCTIONAL STATUS (may change based on patient's medical status and rate of impairment recovery):  Transfers:   Minimal Assistance   Mobility/Gait:   Minimal Assistance   ADL's:   Minimal Assistance

## 2022-07-20 NOTE — FLOWSHEET NOTE
07/20/22 0754   Events/Summary/Plan   Events/Summary/Plan RT assessment   Vital Signs   Pulse 70   Respiration 18   Pulse Oximetry 99 %   $ Pulse Oximetry (Spot Check) Yes   Respiratory Assessment   Level of Consciousness Alert   Chest Exam   Work Of Breathing / Effort Within Normal Limits   Oxygen   O2 (LPM) 2   O2 Delivery Device Silicone Nasal Cannula

## 2022-07-20 NOTE — PROGRESS NOTES
Admit to [facility] from [facility].  Dr. Davis to follow for diagnosis of Cervical Myelopathy and Incomplete Quadraplegia.  Initial assessment initiated. Orientation to Rehabilitation Hospital process, safety in room, bathroom, and call light.    Client/family goal:  Strength training for ADLs and a safe recovery home.    The Rehabilitation Interdisciplinary Plan Of Care(s) intitiated are as follows:  Constipation, Dressing/Grooming Self-Care Deficit, Feeding Self-Care Deficit, Impaired Physical Mobility, Pain, Dialysis treatment, and Diabetic education.

## 2022-07-20 NOTE — DISCHARGE PLANNING
note:    RN CM received incoming call from SAMAN Hargorve, requesting GMT ETA update. Called GMT- updated ETA [phone]. Updated SAMAN Hargrove, via [employer] verbal.

## 2022-07-21 LAB
GLUCOSE BLD STRIP.AUTO-MCNC: 118 MG/DL (ref 65–99)
GLUCOSE BLD STRIP.AUTO-MCNC: 128 MG/DL (ref 65–99)
GLUCOSE BLD STRIP.AUTO-MCNC: 185 MG/DL (ref 65–99)

## 2022-07-21 PROCEDURE — 97535 SELF CARE MNGMENT TRAINING: CPT

## 2022-07-21 PROCEDURE — 97530 THERAPEUTIC ACTIVITIES: CPT

## 2022-07-21 PROCEDURE — 770010 HCHG ROOM/CARE - REHAB SEMI PRIVAT*

## 2022-07-21 PROCEDURE — 97110 THERAPEUTIC EXERCISES: CPT

## 2022-07-21 PROCEDURE — A9270 NON-COVERED ITEM OR SERVICE: HCPCS | Performed by: PHYSICAL MEDICINE & REHABILITATION

## 2022-07-21 PROCEDURE — 700102 HCHG RX REV CODE 250 W/ 637 OVERRIDE(OP): Performed by: PHYSICAL MEDICINE & REHABILITATION

## 2022-07-21 PROCEDURE — 94760 N-INVAS EAR/PLS OXIMETRY 1: CPT

## 2022-07-21 PROCEDURE — 97116 GAIT TRAINING THERAPY: CPT

## 2022-07-21 PROCEDURE — 99233 SBSQ HOSP IP/OBS HIGH 50: CPT | Performed by: PHYSICAL MEDICINE & REHABILITATION

## 2022-07-21 PROCEDURE — 97530 THERAPEUTIC ACTIVITIES: CPT | Mod: CQ

## 2022-07-21 PROCEDURE — 82962 GLUCOSE BLOOD TEST: CPT

## 2022-07-21 RX ADMIN — INSULIN HUMAN 2 UNITS: 100 INJECTION, SOLUTION PARENTERAL at 10:58

## 2022-07-21 RX ADMIN — ACETAMINOPHEN 1000 MG: 500 TABLET, FILM COATED ORAL at 15:08

## 2022-07-21 RX ADMIN — BISACODYL 10 MG: 10 SUPPOSITORY RECTAL at 05:27

## 2022-07-21 RX ADMIN — METOPROLOL TARTRATE 100 MG: 25 TABLET, FILM COATED ORAL at 21:03

## 2022-07-21 RX ADMIN — DOCUSATE SODIUM 100 MG: 100 CAPSULE, LIQUID FILLED ORAL at 21:03

## 2022-07-21 RX ADMIN — POLYETHYLENE GLYCOL 3350 1 PACKET: 17 POWDER, FOR SOLUTION ORAL at 21:00

## 2022-07-21 RX ADMIN — NORTRIPTYLINE HYDROCHLORIDE 25 MG: 25 CAPSULE ORAL at 21:03

## 2022-07-21 RX ADMIN — DOCUSATE SODIUM 100 MG: 100 CAPSULE, LIQUID FILLED ORAL at 08:07

## 2022-07-21 RX ADMIN — AMLODIPINE BESYLATE 10 MG: 5 TABLET ORAL at 08:08

## 2022-07-21 RX ADMIN — OXYCODONE 5 MG: 5 TABLET ORAL at 07:48

## 2022-07-21 RX ADMIN — HYDRALAZINE HYDROCHLORIDE 10 MG: 10 TABLET ORAL at 15:08

## 2022-07-21 RX ADMIN — METOPROLOL TARTRATE 100 MG: 25 TABLET, FILM COATED ORAL at 08:07

## 2022-07-21 RX ADMIN — HYDRALAZINE HYDROCHLORIDE 10 MG: 10 TABLET ORAL at 21:08

## 2022-07-21 RX ADMIN — ATORVASTATIN CALCIUM 20 MG: 10 TABLET, FILM COATED ORAL at 08:13

## 2022-07-21 RX ADMIN — HYDRALAZINE HYDROCHLORIDE 10 MG: 10 TABLET ORAL at 05:27

## 2022-07-21 RX ADMIN — ACETAMINOPHEN 1000 MG: 500 TABLET, FILM COATED ORAL at 08:06

## 2022-07-21 RX ADMIN — ACETAMINOPHEN 1000 MG: 500 TABLET, FILM COATED ORAL at 21:03

## 2022-07-21 RX ADMIN — PAROXETINE HYDROCHLORIDE 40 MG: 20 TABLET, FILM COATED ORAL at 08:07

## 2022-07-21 RX ADMIN — Medication 2001 MG: at 11:03

## 2022-07-21 RX ADMIN — OMEPRAZOLE 20 MG: 20 CAPSULE, DELAYED RELEASE ORAL at 08:13

## 2022-07-21 RX ADMIN — Medication 2001 MG: at 17:23

## 2022-07-21 RX ADMIN — SENNOSIDES 17.2 MG: 8.6 TABLET, FILM COATED ORAL at 11:03

## 2022-07-21 RX ADMIN — Medication 2001 MG: at 08:07

## 2022-07-21 ASSESSMENT — GAIT ASSESSMENTS
GAIT LEVEL OF ASSIST: CONTACT GUARD ASSIST
DEVIATION: ATAXIC;DECREASED BASE OF SUPPORT;BRADYKINETIC;DECREASED HEEL STRIKE
ASSISTIVE DEVICE: PARALLEL BARS

## 2022-07-21 ASSESSMENT — FIBROSIS 4 INDEX: FIB4 SCORE: 0.8

## 2022-07-21 NOTE — DISCHARGE PLANNING
Anand   idt held today.  Dc date set for Tuesday 7/26 w/ home health.   Follow up w/ pcp // surgeon.

## 2022-07-21 NOTE — THERAPY
Physical Therapy   Daily Treatment     Patient Name: Pj Britt  Age:  61 y.o., Sex:  male  Medical Record #: [id number]  Today's Date: 7/21/2022     Precautions  Precautions: Fall Risk, Cervical Collar  , Spinal / Back Precautions   Comments: Dialysis MWF, C-collar on at all times, Legally blind    Subjective    Pt found in w/c, ready for therapy.     Objective       07/21/22 0901   PT Charge Group   PT Gait Training 2   PT Therapeutic Exercise 2   PT Total Time Spent   PT Individual Total Time Spent (Mins) 60   Cognition    Ability To Follow Commands 1 Step   Gait Functional Level of Assist    Gait Level Of Assist Contact Guard Assist   Assistive Device Parallel Bars   Distance (Feet)   (20'x1, 60'x1, 20'x1, fwd/retro amb)   # of Times Distance was Traveled 3   Deviation Ataxic;Decreased Base Of Support;Bradykinetic;Decreased Heel Strike   Sitting Lower Body Exercises   Hip Abduction 2 sets of 10   Long Arc Quad 2 sets of 10   Marching 2 sets of 10;Reciprocal   Hamstring Curl 2 sets of 10   Other Exercises LE Motomed gear 1 x 10min   Comments Seated ex's performed with inc time requires cues for perofrmance and recall of ea, rest breaks required between ea set.   Neuro-Muscular Treatments   Neuro-Muscular Treatments Verbal Cuing;Tactile Cuing   Interdisciplinary Plan of Care Collaboration   IDT Collaboration with  Occupational Therapist   Patient Position at End of Therapy Seated;Self Releasing Lap Belt Applied   Collaboration Comments re: hand off of care       Assessment    Pt had decr assist with STS w/o working on them. Cont to be limited by endurance, vision.     Strengths: Able to follow instructions, Pleasant and cooperative, Supportive family, Willingly participates in therapeutic activities  Barriers: Decreased endurance, Generalized weakness, Impaired activity tolerance, Impaired balance, Impaired functional cognition, Limited mobility, Language barrier, non-fluent English, Pain    Plan    LE  "strengthening, endurance, gait training // bars vs FWW, standing tolerance, bed mobility, tx training    Passport items to be completed:  Get in/out of bed safely, in/out of a vehicle, safely use mobility device, walk or wheel around home/community, navigate up and down stairs, show how to get up/down from the ground, ensure home is accessible, demonstrate HEP, complete caregiver training    Physical Therapy Problems (Active)       Problem: Mobility       Dates: Start: 07/21/22         Goal: STG-Within one week, patient will ambulate household distance with FWW x 25' CGA       Dates: Start: 07/21/22            Goal: STG-Within one week, patient will ascend and descend four to six stairs, 4\" stairs with B HR's CGA       Dates: Start: 07/21/22               Problem: Mobility Transfers       Dates: Start: 07/21/22         Goal: STG-Within one week, patient will transfer bed to chair Gt SPT       Dates: Start: 07/21/22            Goal: STG-Within one week, patient will transfer in/out of car modA x1 into personal car       Dates: Start: 07/21/22               Problem: PT-Long Term Goals       Dates: Start: 07/21/22         Goal: LTG-By discharge, patient will ambulate with LRAD x 50' CGA       Dates: Start: 07/21/22            Goal: LTG-By discharge, patient will perform home exercise program Gt for LE strengthening & walking program       Dates: Start: 07/21/22            Goal: LTG-By discharge, patient will ambulate up/down 3 stairs B HR's into home       Dates: Start: 07/21/22              "

## 2022-07-21 NOTE — THERAPY
Physical Therapy   Daily Treatment     Patient Name: Pj Britt  Age:  61 y.o., Sex:  male  Medical Record #: [id number]  Today's Date: 7/21/2022     Precautions  Precautions: Fall Risk, Cervical Collar  , Spinal / Back Precautions   Comments: Dialysis MWF, C-collar on at all times, Legally blind    Subjective    Pt resting in bed, wife at bedside     Objective     07/21/22 1301   PT Charge Group   PT Therapeutic Exercise 1   PT Therapeutic Activities 1   Supervising Physical Therapist Chelo Boothe   PT Total Time Spent   PT Individual Total Time Spent (Mins) 30   Supine Lower Body Exercise   Supine Lower Body Exercises Yes   Bridges 1 set of 10;Two Legged   Bed Mobility    Supine to Sit Minimal Assist  (with bed features and HOB elevated)   Sit to Supine Contact Guard Assist   Sit to Stand Moderate Assist   Interdisciplinary Plan of Care Collaboration   IDT Collaboration with  Family / Caregiver   Patient Position at End of Therapy In Bed;Family / Friend in Room;Call Light within Reach   Collaboration Comments wife present for therapy, asked appropriate questions about spinal precautions     Seated EOB assisted change hospital gown change, pt requires Gt to maintain dynamic balance at EOB     2 STS from elevated bed <> 4WW with modA to come to stand, Gt to maintain standing balance with UE support    Assessment    Pt pleasant and cooperative with treatment with session, spouse is very supportive and was able to assist with interpretation. We reviewed spinal precaution as she was concerned bc patient was crossing his ankle while lying in the bed  Strengths: Able to follow instructions, Pleasant and cooperative, Supportive family, Willingly participates in therapeutic activities  Barriers: Decreased endurance, Generalized weakness, Impaired activity tolerance, Impaired balance, Impaired functional cognition, Limited mobility, Language barrier, non-fluent English, Pain    Plan    Bed mobility, transfer  "training, LE HEP stretching/strengthening program,initiate stairs as appropriate,    Physical Therapy Problems (Active)       Problem: Mobility       Dates: Start: 07/21/22         Goal: STG-Within one week, patient will ambulate household distance with FWW x 25' CGA       Dates: Start: 07/21/22            Goal: STG-Within one week, patient will ascend and descend four to six stairs, 4\" stairs with B HR's CGA       Dates: Start: 07/21/22               Problem: Mobility Transfers       Dates: Start: 07/21/22         Goal: STG-Within one week, patient will transfer bed to chair Gt SPT       Dates: Start: 07/21/22            Goal: STG-Within one week, patient will transfer in/out of car modA x1 into personal car       Dates: Start: 07/21/22               Problem: PT-Long Term Goals       Dates: Start: 07/21/22         Goal: LTG-By discharge, patient will ambulate with LRAD x 50' CGA       Dates: Start: 07/21/22            Goal: LTG-By discharge, patient will perform home exercise program Gt for LE strengthening & walking program       Dates: Start: 07/21/22            Goal: LTG-By discharge, patient will ambulate up/down 3 stairs B HR's into home       Dates: Start: 07/21/22              "

## 2022-07-21 NOTE — FLOWSHEET NOTE
07/21/22 0707   Events/Summary/Plan   Events/Summary/Plan o2 spot check,IS   Vital Signs   Pulse 62   Respiration 12   Pulse Oximetry 100 %   $ Pulse Oximetry (Spot Check) Yes   Respiratory Assessment   Level of Consciousness Alert   Chest Exam   Work Of Breathing / Effort Within Normal Limits   Oxygen   O2 (LPM) 2   O2 Delivery Device Silicone Nasal Cannula

## 2022-07-21 NOTE — PROGRESS NOTES
[facility] Progress Note     Hemodialysis treatment x 3 hours completed as ordered per Dr. Chan  Consent for treatment obtained, procedure explained, verbalized understanding  Treatment started at 1510 and ended at 1812       Net UF Removed:  1500 mL     Patient tolerated treatment well. UF goal reached  Patient stable during and post treatment, no complaints.   See Acute HD paper flow sheets for details.     Post tx access: 15 g cannulation needles removed from Left forearm AVF access sites, hemostasis established on each insertion site; verified no bleeding. (+) bruit/thrill post treatment. Covered with clean gauze dressings and secured with tape.     Please monitor for LUE AVF access bleeding. Should any breakthrough bleeding occur, please apply gauze and firm pressure on site until bleeding resolves, cover with gauze dressing and tape. Please observe LUE precautions- NO taking of BPs or blood draws to LUE extremity.    Please notify Nephrologist/Dialysis for follow-up.    1834 Report given to primary care nurse HYACINTH Strong RN.    1835 Patient transported back to room via bed by DEANGELO Gama assisted by SAMAN Dimas with O2 @ 2/NC. Patient awake and alert, stable.

## 2022-07-21 NOTE — CARE PLAN
"  Problem: Mobility  Goal: STG-Within one week, patient will ambulate household distance with FWW x 25' CGA  Outcome: Not Met  Goal: STG-Within one week, patient will ascend and descend four to six stairs, 4\" stairs with B HR's CGA  Outcome: Not Met     Problem: Mobility Transfers  Goal: STG-Within one week, patient will transfer bed to chair Gt SPT  Outcome: Not Met  Goal: STG-Within one week, patient will transfer in/out of car modA x1 into personal car  Outcome: Not Met   **pt recently evaluated, goals still appropriate  "

## 2022-07-21 NOTE — CARE PLAN
"The patient is Stable - Low risk of patient condition declining or worsening    Progress made toward(s) clinical / shift goals:      Problem: Pain - Standard  Goal: Alleviation of pain or a reduction in pain to the patient’s comfort goal  Outcome: Progressing  Note: 4/10 pain level this evening. Pain managed with Oxy 5mg.     Problem: Infection  Goal: Patient will remain free from infection  Outcome: Progressing  Note: No s/s of infection present      Patient is not progressing towards the following goals:    Problem: Fall Risk - Rehab  Goal: Patient will remain free from falls  Outcome: Not Met  Note: Jazmine Marin Fall risk Assessment Score: 22    High fall risk Interventions   - Bed and strip alarm   - Yellow sign by the door   - Yellow wrist band \"Fall risk\"  - Do not leave patient unattended in the bathroom  - Fall risk education provided     Problem: Bladder / Voiding  Goal: Patient will establish and maintain regular urinary output  Outcome: Not Progressing  Note: Patient is Anuric and does dialysis 3x a week     Problem: Bowel Elimination  Goal: Patient will participate in bowel management program  Outcome: Not Progressing  Note: Patient is constipated with last BM 7/17. Offering PRN suppository in the AM, patient was too tired after dialysis to do a suppository last evening.     Problem: VTE Prevention  Goal: Patient will remain free from venous thromboembolism (VTE)  Outcome: Not Progressing  Note: Patients wife refused the SC Heparin injection this shift. Note requesting medication consult sent to pharmacy.     Problem: Nutrition  Goal: Patient's nutritional and fluid intake will be adequate or improve  Note: Mani info printed for patient and family regarding diet for ESRD and diabetes. Dietary consult placed for further education.     "

## 2022-07-21 NOTE — THERAPY
Physical Therapy   Initial Evaluation     Patient Name: Pj Britt  Age:  61 y.o., Sex:  male  Medical Record #: [id number]  Today's Date: 7/21/2022     Subjective    Pt in bed, reporting being sleepy.  Agreeable to therapy.  Translation services used for evaluation.     Objective       07/20/22 1301   PT Charge Group   PT Therapeutic Activities 3   PT Evaluation PT Evaluation Mod   PT Total Time Spent   PT Individual Total Time Spent (Mins) 90   Prior Living Situation   Prior Services None   Housing / Facility 1 Story House   Steps Into Home 3   Steps In Home 0   Rail None   Equipment Owned Front-Wheel Walker;4-Wheel Walker;Wheelchair   Lives with - Patient's Self Care Capacity Spouse;Adult Children   Prior Level of Functional Mobility   Bed Mobility Required Assist   Transfer Status Required Assist   Ambulation Required Assist   Distance Ambulation (Feet)   (10-15ft)   Assistive Devices Used 4-Wheel Walker   Wheelchair Dependent   Stairs Required Assist   Comments Pt and spouse report pt requiring assist with all mobility, household amb and self care.  Utilizing 4WW in home 8-10ft primarily limited by shoulder pain/fatigue, blindness and endurance.  Manual w/c used outside of home.   Prior Functioning: Everyday Activities   Self Care Needed some help   Indoor Mobility (Ambulation) Needed some help   Stairs Needed some help   Functional Cognition Needed some help   Prior Device Use Walker   Pain 0 - 10 Group   Therapist Pain Assessment Prior to Activity;During Activity  (no complaints in supine, B shoulder discomfort no number given)   Cognition    Speech/ Communication Delayed Responses   Level of Consciousness Alert   Ability To Follow Commands 1 Step   Passive ROM Lower Body   Passive ROM Lower Body Not Tested   Active ROM Lower Body    Active ROM Lower Body  WDL   Strength Lower Body   Lower Body Strength  X   Rt Hip Flexion Strength 4 (G)   Rt Knee Extension Strength 4 (G)   Lt Hip Flexion Strength 3 (F)    Lt Knee Extension Strength 3 (F)   Comments assessed in supine   Sensation Lower Body   Lower Extremity Sensation   X   Rt Lower Extremity Proprioception Impaired   Lt Lower Extremity Proprioception Impaired   Paresthesia Present    Comments B mid calf down present d/t neuropathy, proprioception impaired at B great toes   Lower Body Muscle Tone   Lower Body Muscle Tone  WDL   Balance Assessment   Sitting Balance (Static) Fair -   Sitting Balance (Dynamic) Poor +   Standing Balance (Static) Fair -   Standing Balance (Dynamic) Poor +   Weight Shift Sitting Fair   Weight Shift Standing Poor   Comments assessed in sittin EOB and standing with BUE support with 4WW   Bed Mobility    Supine to Sit Minimal Assist   Sit to Supine Contact Guard Assist   Sit to Stand Moderate Assist   Scooting Moderate Assist   Rolling Standby Assist   Comments cues for log rolling and performance with rolling, assist with cues for sit<>sup   Neurological Concerns   Neurological Concerns Yes   Paresthesia Present   Comments d/t neuropathy, unable to elicit L4 reflexes bilaterally, S1 2/4 equal bilaterally.   Coordination Lower Body    Coordination Lower Body  Not Tested   Roll Left and Right   Assistance Needed Verbal cues   Physical Assistance Level No physical assistance   CARE Score - Roll Left and Right 4   Roll Left and Right Discharge Goal   Discharge Goal 6   Sit to Lying   Assistance Needed Physical assistance   Physical Assistance Level 25% or less   CARE Score - Sit to Lying 3   Sit to Lying Discharge Goal   Discharge Goal 6   Lying to Sitting on Side of Bed   Assistance Needed Physical assistance   Physical Assistance Level 25% or less   CARE Score - Lying to Sitting on Side of Bed 3   Lying to Sitting on Side of Bed Discharge Goal   Discharge Goal 6   Sit to Stand   Assistance Needed Physical assistance   Physical Assistance Level 26%-50%   CARE Score - Sit to Stand 3   Sit to Stand Discharge Goal   Discharge Goal 4    Chair/Bed-to-Chair Transfer   Assistance Needed Physical assistance   Physical Assistance Level 26%-50%   CARE Score - Chair/Bed-to-Chair Transfer 3   Chair/Bed-to-Chair Transfer Discharge Goal   Discharge Goal 4   Car Transfer   Assistance Needed Physical assistance   Physical Assistance Level Total assistance   Comment unable to fully complete transfer safely, totalA to maintain free from falling   CARE Score - Car Transfer 1   Car Transfer Discharge Goal   Discharge Goal 4   Walk 10 Feet   Assistance Needed Physical assistance   Physical Assistance Level 25% or less   CARE Score - Walk 10 Feet 3   Walk 10 Feet Discharge Goal   Discharge Goal 6   Walk 50 Feet with Two Turns   Reason if not Attempted Safety concerns   CARE Score - Walk 50 Feet with Two Turns 88   Walk 50 Feet with Two Turns Discharge Goal   Discharge Goal 4   Walk 150 Feet   Reason if not Attempted Safety concerns   CARE Score - Walk 150 Feet 88   Walk 150 Feet Discharge Goal   Discharge Goal 4   Walking 10 Feet on Uneven Surfaces   Reason if not Attempted Safety concerns   CARE Score - Walking 10 Feet on Uneven Surfaces 88   Walking 10 Feet on Uneven Surfaces Discharge Goal   Discharge Goal 3   1 Step (Curb)   Assistance Needed Physical assistance   Physical Assistance Level 25% or less   CARE Score - 1 Step (Curb) 3   1 Step (Curb) Discharge Goal   Discharge Goal 4   4 Steps   Assistance Needed Physical assistance   Physical Assistance Level 25% or less   CARE Score - 4 Steps 3   4 Steps Discharge Goal   Discharge Goal 4   12 Steps   Reason if not Attempted Safety concerns   CARE Score - 12 Steps 88   12 Steps Discharge Goal   Discharge Goal 4   Picking Up Object   Comment d/t legal blindness & spouse CG's for pt   Reason if not Attempted Activity not applicable   CARE Score - Picking Up Object 9   Picking Up Object Discharge Goal   Discharge Goal 9   Wheel 50 Feet with Two Turns   Reason if not Attempted Activity not applicable   CARE Score -  "Wheel 50 Feet with Two Turns 9   Wheel 50 Feet with Two Turns Discharge Goal   Discharge Goal 9   Wheel 150 Feet   Reason if not Attempted Activity not applicable   CARE Score - Wheel 150 Feet 9   Wheel 150 Feet Discharge Goal   Discharge Goal 9   Gait Functional Level of Assist    Gait Level Of Assist Total Assist  (Gt x1 with 4WW, w/c follow for safety)   Assistive Device 4 Wheel Walker   Distance (Feet) 10   # of Times Distance was Traveled 1   Deviation Ataxic;Decreased Base Of Support;Bradykinetic;Decreased Heel Strike   Stairs Functional Level of Assist   Level of Assist with Stairs Minimal Assist   # of Stairs Climbed 4  (up/down 4, 4\" stairs with B HR's)   Stairs Description Extra time;Hand rails;Safety concerns;Supervision for safety;Verbal cueing   Transfer Functional Level of Assist   Bed, Chair, Wheelchair Transfer Moderate Assist  (modA STS with SPT using 4WW,)   Bed Chair Wheelchair Transfer Description Requires lift;Verbal cueing;Supervision for safety  (sit<>sup Gt for BLE's and maintaining spinal prec)   Problem List    Problems Pain;Impaired Bed Mobility;Impaired Transfers;Impaired Ambulation;Functional Strength Deficit;Impaired Vision;Decreased Activity Tolerance;Safety Awareness Deficits / Cognition   Precautions   Precautions Fall Risk;Cervical Collar  ;Spinal / Back Precautions    Comments Dialysis MWF, C-collar on at all times, Legally blind   Current Discharge Plan   Current Discharge Plan Return to Prior Living Situation   Interdisciplinary Plan of Care Collaboration   IDT Collaboration with  Family / Caregiver;Occupational Therapist   Patient Position at End of Therapy In Bed;Call Light within Reach;Tray Table within Reach;Phone within Reach;Family / Friend in Room   Collaboration Comments re: CLOF, barriers, goals for rehab   Benefit   Therapy Benefit Patient Would Benefit from Inpatient Rehabilitation Physical Therapy to Maximize Functional [place] with ADLs, IADLs and Mobility. "   Strengths & Barriers   Strengths Able to follow instructions;Pleasant and cooperative;Supportive family;Willingly participates in therapeutic activities   Barriers Decreased endurance;Generalized weakness;Impaired activity tolerance;Impaired balance;Impaired functional cognition;Limited mobility;Language barrier, non-fluent English;Pain     Edu on rehab, discussed goals.   Edu on call light and bed controls, using hand over hand and tactile cues.  May benefit from ongoing adaptations in bed controls.    Assessment  Patient is 61 y.o. male with a diagnosis of 2 stage cervical fusion and diskectomy on 7/12 and 7/..C4-6 ant cervical diskectomy, and posterior cervical fusion, laminectomy and facetectomy.  Pt's legally blind, spouse reports CGing for him and currently is present and will be staying with pt in room.  Pt demo ataxic gait with impaired endurance and LE strength. Additional factors influencing patient status / progress (ie: cognitive factors, co-morbidities, social support, etc): PMHx: neuropathic pain, diabetes mellitus type II, hypertension, ESRD on HD MWF and anuric, Visual impairment     Plan  Recommend Physical Therapy  minutes per day 5-7 days per week for 10-14  days for the following treatments:  PT E Stim Attended, PT Gait Training, PT Therapeutic Exercises, PT TENS Application, PT Neuro Re-Ed/Balance, PT Therapeutic Activity and PT Manual Therapy.    Passport items to be completed:  Get in/out of bed safely, in/out of a vehicle, safely use mobility device, walk or wheel around home/community, navigate up and down stairs, show how to get up/down from the ground, ensure home is accessible, demonstrate HEP, complete caregiver training    Goals:  Long term and short term goals have been discussed with patient and they are in agreement.    Physical Therapy Problems (Active)       Problem: Mobility       Dates: Start: 07/21/22         Goal: STG-Within one week, patient will ambulate household  "distance with FWW x 25' CGA       Dates: Start: 07/21/22            Goal: STG-Within one week, patient will ascend and descend four to six stairs, 4\" stairs with B HR's CGA       Dates: Start: 07/21/22               Problem: Mobility Transfers       Dates: Start: 07/21/22         Goal: STG-Within one week, patient will transfer bed to chair Gt SPT       Dates: Start: 07/21/22            Goal: STG-Within one week, patient will transfer in/out of car modA x1 into personal car       Dates: Start: 07/21/22               Problem: PT-Long Term Goals       Dates: Start: 07/21/22         Goal: LTG-By discharge, patient will ambulate with LRAD x 50' CGA       Dates: Start: 07/21/22            Goal: LTG-By discharge, patient will perform home exercise program Gt for LE strengthening & walking program       Dates: Start: 07/21/22            Goal: LTG-By discharge, patient will ambulate up/down 3 stairs B HR's into home       Dates: Start: 07/21/22              "

## 2022-07-21 NOTE — FLOWSHEET NOTE
"   07/21/22 0709   Incentive Spirometry Treatment   Height 1.702 m (5' 7.01\")   Predicted Inspiratory Capacity 2750   60% of predicted IS capacity 1650 mL   40% of predicted IS capacity 1100 mL   Incentive Spirometer Volume 1500 mL     "

## 2022-07-21 NOTE — CARE PLAN
Problem: Eating  Goal: STG-Within one week, patient will feed self with Min A using AE/AD as needed  Outcome: Not Met  Note: Pt evaluated 7-20-22; goal still appropriate     Problem: Grooming  Goal: STG-Within one week, patient will complete grooming with SUP using AE/AD as needed  Outcome: Not Met  Note: Pt evaluated 7-20-22; goal still appropriate     Problem: Dressing  Goal: STG-Within one week, patient will dress UB with Min A using AE/AD as needed  Outcome: Not Met  Note: Pt evaluated 7-20-22; goal still appropriate  Goal: STG-Within one week, patient will dress LB with SBA using AE/AD as needed  Outcome: Not Met  Note: Pt evaluated 7-20-22; goal still appropriate

## 2022-07-21 NOTE — PROGRESS NOTES
REHABILITATION PROGRESS NOTE    Date of Admission: 7/19/2022    [facility] Code / Diagnosis to Support: 0003.9 - Neurologic Conditions: Other Neurologic  Etiologic Diagnosis: Cervical radiculopathy    SUBJECTIVE  Patient seen in room with spouse  GI: continent, last BM 7/21 with suppository  : anuric  Psych: slept well last night  MSK: pain in right shoulder much improved. Now reporting weakness in right arm impairing ability to eat    I have performed a physical exam and reviewed and updated history and plan today (7/21/2022).     I, Terra Fuentes D.O., was present and led the interdisciplinary team conference on 7/21/2022.  I led the IDT conference and agree with the IDT conference documentation and plan of care as noted below.   Intermittently refusing various medications. Ataxic bilaterally. Mobility almost baseline. Total assist with feeding. Using finger foods at home. Most other ADLs at baseline. 2L NC. 1500mL IS.     Anticipated discharge date: 7/26/2022  Anticipated discharge destination: home with family  Equipment: has all needed equipment  Postdischarge therapies: home health OT and PT  Followup: Ady Barr MD (neurosurgery), PCP  Precautions: C collar at all times    Patient was seen for 37 minutes on unit/floor of which > 50% of time was spent on counseling and coordination of care (including attending the interdisciplinary team conference) regarding the above, including prognosis, risk reduction, benefits of treatment, and options for next stage of care.      MEDICATIONS:  Current Facility-Administered Medications   Medication Dose   • vitamin D2 (Ergocalciferol) (Drisdol) capsule 50,000 Units  50,000 Units   • nortriptyline (PAMELOR) capsule 25 mg  25 mg   • polyethylene glycol/lytes (MIRALAX) PACKET 1 Packet  1 Packet   • sennosides (SENOKOT) 8.6 MG tablet 17.2 mg  17.2 mg   • bisacodyl (THE MAGIC BULLET) suppository 10 mg  10 mg   • sodium phosphate (Fleet) enema 133 mL  1 Each   •  "heparin injection 5,000 Units  5,000 Units   • epoetin (Retacrit) injection (Dialysis Use Only) 3,000 Units  3,000 Units   • Respiratory Therapy Consult     • Pharmacy Consult Request ...Pain Management Review 1 Each  1 Each   • omeprazole (PRILOSEC) capsule 20 mg  20 mg   • artificial tears ophthalmic solution 1 Drop  1 Drop   • benzocaine-menthol (Cepacol) lozenge 1 Lozenge  1 Lozenge   • mag hydrox-al hydrox-simeth (MAALOX PLUS ES or MYLANTA DS) suspension 20 mL  20 mL   • ondansetron (ZOFRAN ODT) dispertab 4 mg  4 mg    Or   • ondansetron (ZOFRAN) syringe/vial injection 4 mg  4 mg   • sodium chloride (OCEAN) 0.65 % nasal spray 2 Spray  2 Spray   • acetaminophen (TYLENOL) tablet 1,000 mg  1,000 mg   • melatonin tablet 3 mg  3 mg   • magnesium hydroxide (MILK OF MAGNESIA) suspension 30 mL  30 mL   • amLODIPine (NORVASC) tablet 10 mg  10 mg   • atorvastatin (LIPITOR) tablet 20 mg  20 mg   • calcium acetate (PHOS-LO) 667 MG tablet 2,001 mg  2,001 mg   • docusate sodium (COLACE) capsule 100 mg  100 mg   • hydrALAZINE (APRESOLINE) tablet 10 mg  10 mg   • insulin NPH (HumuLIN N,NovoLIN N) injection  10 Units   • insulin regular (HumuLIN R,NovoLIN R) injection  2-9 Units    And   • dextrose 50% (D50W) injection 25 g  25 g   • metoprolol tartrate (LOPRESSOR) tablet 100 mg  100 mg   • oxyCODONE immediate-release (ROXICODONE) tablet 5 mg  5 mg    Or   • oxyCODONE immediate release (ROXICODONE) tablet 10 mg  10 mg   • PARoxetine (PAXIL) tablet 40 mg  40 mg       PHYSICAL EXAM:     VITAL SIGNS:   height is 1.702 m (5' 7.01\") and weight is 64.5 kg (142 lb 3.2 oz). His temporal temperature is 36.7 °C (98.1 °F). His blood pressure is 141/65 (abnormal) and his pulse is 62. His respiration is 12 and oxygen saturation is 100%.     General: well-groomed in no acute distress, spouse present  Eyes: no scleral icterus or conjunctival injection  Ears, nose, mouth and throat: moist oral mucosa  Cardiovascular: regular rate, good " peripheral perfusion, negative James sign bilaterally  Respiratory: breathing comfortably without use of accessory muscles, no pallor, +2L NC  Gastrointestinal: soft, nontender, nondistended  Genitourinary: no indwelling mcfarlane  Musculoskeletal: good symmetry in bilateral shoulders,  Skin: no wounds seen on exposed skin    Neurologic:  Mental status: answers questions appropriately follows commands  Speech: fluent, no aphasia or dysarthria  No clonus at bilateral ankles  R C5-T1 motor tested today - 5/5 in all key muscles in this region except 3/5 in biceps and triceps (C7 and C5)  Tone: no spasticity noted  Psychiatric: appropriate affect  Hematologic/lymphatic/immunologic: no IV access, no bruises seen on exposed skin    LABS:  Recent Labs     07/19/22  0147 07/20/22  0548   SODIUM 134* 132*   POTASSIUM 5.1 5.2   CHLORIDE 95* 93*   CO2 28 28   GLUCOSE 116* 112*   BUN 28* 48*   CREATININE 4.78* 6.55*   CALCIUM 8.9 8.8     Recent Labs     07/19/22  0147 07/20/22  0548   WBC 5.2 5.6   RBC 3.21* 3.09*   HEMOGLOBIN 9.9* 9.5*   HEMATOCRIT 30.9* 30.3*   MCV 96.3 98.1*   MCH 30.8 30.7   MCHC 32.0* 31.4*   RDW 47.8 48.5   PLATELETCT 228 261   MPV 9.8 10.0             PRIMARY REHAB DIAGNOSIS:    This patient is a 61 y.o. male admitted for acute inpatient rehabilitation with Cervical radiculopathy.    Medical management / Rehabilitation Issues/ Adverse Potential as part of rehabilitation plan     REHABILITATION ISSUES/ADVERSE POTENTIAL and MEDICAL CO-MORBIDITIES/ADVERSE POTENTIAL AFFECTING FUNCTION:    ##MSK  #Impaired ADLs and mobility  Patient is admitted to [facility] for comprehensive rehabilitation therapy as described.   Rehabilitation nursing monitors bowel and bladder control, educates on medication administration, co-morbidities and monitors patient safety.  Anticipated discharge date: 7/26/2022  Anticipated discharge destination: home with family  Equipment: has all needed  equipment  Postdischarge therapies: home health OT and PT  Followup: Ayd Barr MD (neurosurgery), PCP  Precautions: C collar at all times  Code: full resuscitation    #Postsurgical pain, acute, controlled  #Neuropathic pain, acute, uncontrolled  Continue tylenol 1000mg TID, nortriptyline 25mg QHS, oxycodone 5-10mg Q4hr PRN pain    ##NEURO/PSYCH  #Cervical radiculopathy s/p anterior C5 corpectomy, 4-6 fusion, posterior C3-T1 lami fusion on 7/12/2022 by Dr. Ady Barr MD  Three month history of right arm weakness and neuropathic pain  Leg weakness started three months ago and not likely due to cervical   Anuric so unable to determine bowel control  Does have constipation  Monitor for possible spinal cord injury component    #Chronic psychiatric disordered  Continue home paxil 40mg daily  Monitor    #RESP  Respiratory therapy: RT performs O2 management prn, breathing retraining, pulmonary hygiene and bronchospasm management prn to optimize participation in therapies.     ##CARDIAC  #Chronic hypertension  #Chronic hyperlipidemia  Continue atorvastatin 20mg daily, hydralazine 10mg TID, metoprolol 100mg BID    DVT prophylaxis: Continue heparin 5000 units BID. Encourage OOB. Monitor daily for signs and symptoms of DVT including but not limited to swelling and pain to prevent the development of DVT that may interfere with therapies.    ##GI  GI prophylaxis:  Continue prilosec 20mg daily to prevent gastritis/dyspepsia which may interfere with therapies.  #At risk of Neurogenic bowel  #At risk of opioid induced constipation  Goal of one continent BM daily  Continue docusate 100mg BID, miralax 17g BID, senna 17.2mg daily    #At high risk of malnutrition  Dietician monitors nutrient intake, recommend supplements prn and provide nutrition education to pt/family to promote optimal nutrition for wound healing/recovery.   Orders Placed This Encounter   Procedures   • Diet Order Diet: Consistent CHO (Diabetic);  Second Modifier: (optional): Renal     Standing Status:   Standing     Number of Occurrences:   1     Order Specific Question:   Diet:     Answer:   Consistent CHO (Diabetic) [4]     Order Specific Question:   Second Modifier: (optional)     Answer:   Renal [8]     Continue multivitamin daily  prealbumin levels 12.8    ##/RENAL  #ESRD on hemodialysis  #Anuric  MWF hemodialysis  Nephrology following  Continue calcium acetate 2001mg TID    ##ENDO  #Vitamin D deficiency  VitD 25 OH 20  Continue ergocalciferol 50,000 units weekly    #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 turns as required to prevent the development of skin breakdown.     Terra Fuentes, DO  Physical Medicine and Rehabilitation

## 2022-07-21 NOTE — CARE PLAN
Problem: Knowledge Deficit - Standard  Goal: Patient and family/care givers will demonstrate understanding of plan of care, disease process/condition, diagnostic tests and medications  Outcome: Progressing     Problem: Skin Integrity  Goal: Skin integrity is maintained or improved  Outcome: Progressing     Problem: Fall Risk - Rehab  Goal: Patient will remain free from falls  Outcome: Progressing   The patient is Stable - Low risk of patient condition declining or worsening

## 2022-07-21 NOTE — THERAPY
Occupational Therapy  Daily Treatment     Patient Name: Pj Britt  Age:  61 y.o., Sex:  male  Medical Record #: [id number]  Today's Date: 7/21/2022     Precautions  Precautions: Fall Risk, Cervical Collar  , Spinal / Back Precautions   Comments: Dialysis MWF, C-collar on at all times, Legally blind         Subjective    Pt seen at 0701 for 60 min and 1001 for 30 min. See note below for details.    Utilized Slovenian interpreting service with pt during second session, pt declined use first session preferring wife to help with translation.     Objective    2L 02 utilized throughout both sessions     07/21/22 0701   OT Charge Group   OT Self Care / ADL 2   OT Therapy Activity 2   OT Total Time Spent   OT Individual Total Time Spent (Mins) 60   Functional Level of Assist   Eating Total Assist  (when fork placed in L hand pt attempted to bring food to mouth, but unable to get to mouth without A; wife states that he is limited in R arm d/t pain)   Grooming Standby Assist;Seated  (set-up for oral care)   Bed, Chair, Wheelchair Transfer Minimal Assist  (Min A for sit> stand; V/c for how far to pivot and when to sit d/t blindness)   Fine Motor / Dexterity    Comments  completed picking up dice 1 at a time in 1 hand and placing into cup; completed on both hands able to translate into palm, though had decrease translation back to fingers though could be impacted by instructions  (Completed opening and closing various sized caps including large apple sauce container, small spice container, small toothpaste container.)   Bed Mobility    Supine to Sit Minimal Assist  (VC for log roll technique)   Interdisciplinary Plan of Care Collaboration   IDT Collaboration with  Family / Caregiver;Nursing   Patient Position at End of Therapy Seated;Self Releasing Lap Belt Applied;Tray Table within Reach;Family / Friend in Room   Collaboration Comments RN for pain medication     Pt wife stated that pt typically will complete self-feeding  with finger foods at home and then leans over the plate to decrease overall mess. May benefit from finger foods to assess ability to complete self-feeding. Placed order for finger foods and would benefit from follow-up on self-feeding.       07/21/22 1001   OT Charge Group   OT Therapy Activity 1   OT Therapeutic Exercise  1   OT Total Time Spent   OT Individual Total Time Spent (Mins) 30   Functional Level of Assist   Bed, Chair, Wheelchair Transfer Minimal Assist  (Min A for sit> stand; V/c for how far to pivot)   Hand Strengthening   Comment Completed hand strengthening activities as follows: 3 sets of 10 hand squeezes with pink foam block on each hand. Req RB between sets. Completed clothespin squeezes with tripod pinch on alternating between hands. Then utilized graded clothespins (yellow adn red) for tripod pinches on R UE to increase overall strength in R hand.   Fine Motor / Dexterity    Comments  Completed picking up coins from flat table this session and placing them into cup individually.   Interdisciplinary Plan of Care Collaboration   Patient Position at End of Therapy In Bed;Call Light within Reach;Tray Table within Reach;Family / Friend in Room       Assessment    Pt tolerated session well though had difficulty staying awake during first session today. Pt wife stated that he is typically tired following dialysis and he got woken up early this morning. Pt demonstrated decreased ability to complete self-feeding, though may benefit from finger foods to see if this increases overall ability to self-feed. Pt may also benefit from trial of NMES proximal RUE to increase strength of contraction and overall functional use of RUE. During hand strengthening and FM activities pt had increased difficulty with RUE, though was able to complete all activities.    Strengths: Able to follow instructions, Alert and oriented, Independent prior level of function, Pleasant and cooperative, Supportive family, Willingly  participates in therapeutic activities  Barriers: Fatigue, Generalized weakness, Impaired activity tolerance, Impaired insight/denial of deficits, Pain, Limited mobility, Visual impairment, Hypertension, Impaired balance, Language barrier, non-fluent English, Decreased endurance    Plan    ADLs, trial functional NMES to proximal RUE for increase flexion/elevation (self-feeding and dressing concerns), tranfers, bed mobility, cont to assess self-feeding, Training significant other on c-collar wearing/management of pads      Occupational Therapy Goals (Active)       Problem: Dressing       Dates: Start: 07/20/22         Goal: STG-Within one week, patient will dress UB with Min A using AE/AD as needed       Dates: Start: 07/20/22         Goal Note filed on 07/21/22 0808 by Vee Ambrose OT       Pt evaluated 7-20-22; goal still appropriate              Goal: STG-Within one week, patient will dress LB with SBA using AE/AD as needed       Dates: Start: 07/20/22         Goal Note filed on 07/21/22 0808 by Vee Ambrose OT       Pt evaluated 7-20-22; goal still appropriate                 Problem: Eating       Dates: Start: 07/20/22         Goal: STG-Within one week, patient will feed self with Min A using AE/AD as needed       Dates: Start: 07/20/22         Goal Note filed on 07/21/22 0808 by Vee Ambrose OT       Pt evaluated 7-20-22; goal still appropriate                 Problem: Grooming       Dates: Start: 07/20/22         Goal: STG-Within one week, patient will complete grooming with SUP using AE/AD as needed       Dates: Start: 07/20/22         Goal Note filed on 07/21/22 0808 by Vee Ambrose OT       Pt evaluated 7-20-22; goal still appropriate                 Problem: OT Long Term Goals       Dates: Start: 07/20/22         Goal: LTG-By discharge, patient will complete basic self care tasks SBA/set-up assist using AE/AD as needed       Dates: Start: 07/20/22            Goal: LTG-By  discharge, patient will perform bathroom transfers SBA/set-up assist using AE/AD as needed       Dates: Start: 07/20/22

## 2022-07-21 NOTE — CARE PLAN
"  Problem: Skin Integrity  Goal: Skin integrity is maintained or improved  Outcome: Progressing  Note: Patient's skin remains intact and free from new or accidental injury this shift. Ashok score of 18 with low air loss mattress in place. Will continue to monitor.      Problem: Fall Risk - Rehab  Goal: Patient will remain free from falls  Outcome: Progressing  Note: Jazmine Marin Fall risk Assessment Score: 12    Moderate fall risk Interventions  - Bed and strip alarm   - Yellow sign by the door   - Yellow wrist band \"Fall risk\"  - Room near to the nurse station  - Do not leave patient unattended in the bathroom  - Fall risk education provided     "

## 2022-07-22 LAB
ANION GAP SERPL CALC-SCNC: 12 MMOL/L (ref 7–16)
BUN SERPL-MCNC: 37 MG/DL (ref 8–22)
CALCIUM SERPL-MCNC: 8.9 MG/DL (ref 8.5–10.5)
CHLORIDE SERPL-SCNC: 91 MMOL/L (ref 96–112)
CO2 SERPL-SCNC: 27 MMOL/L (ref 20–33)
CREAT SERPL-MCNC: 5.88 MG/DL (ref 0.5–1.4)
GFR SERPLBLD CREATININE-BSD FMLA CKD-EPI: 10 ML/MIN/1.73 M 2
GLUCOSE BLD STRIP.AUTO-MCNC: 114 MG/DL (ref 65–99)
GLUCOSE BLD STRIP.AUTO-MCNC: 130 MG/DL (ref 65–99)
GLUCOSE BLD STRIP.AUTO-MCNC: 143 MG/DL (ref 65–99)
GLUCOSE BLD STRIP.AUTO-MCNC: 152 MG/DL (ref 65–99)
GLUCOSE BLD STRIP.AUTO-MCNC: 162 MG/DL (ref 65–99)
GLUCOSE SERPL-MCNC: 137 MG/DL (ref 65–99)
POTASSIUM SERPL-SCNC: 5.3 MMOL/L (ref 3.6–5.5)
SODIUM SERPL-SCNC: 130 MMOL/L (ref 135–145)

## 2022-07-22 PROCEDURE — A9270 NON-COVERED ITEM OR SERVICE: HCPCS | Performed by: PHYSICAL MEDICINE & REHABILITATION

## 2022-07-22 PROCEDURE — 97116 GAIT TRAINING THERAPY: CPT

## 2022-07-22 PROCEDURE — 90935 HEMODIALYSIS ONE EVALUATION: CPT | Performed by: INTERNAL MEDICINE

## 2022-07-22 PROCEDURE — 97112 NEUROMUSCULAR REEDUCATION: CPT

## 2022-07-22 PROCEDURE — 97110 THERAPEUTIC EXERCISES: CPT | Mod: CQ

## 2022-07-22 PROCEDURE — 700102 HCHG RX REV CODE 250 W/ 637 OVERRIDE(OP): Performed by: PHYSICAL MEDICINE & REHABILITATION

## 2022-07-22 PROCEDURE — 94760 N-INVAS EAR/PLS OXIMETRY 1: CPT

## 2022-07-22 PROCEDURE — 80048 BASIC METABOLIC PNL TOTAL CA: CPT

## 2022-07-22 PROCEDURE — 82962 GLUCOSE BLOOD TEST: CPT

## 2022-07-22 PROCEDURE — 97535 SELF CARE MNGMENT TRAINING: CPT

## 2022-07-22 PROCEDURE — 99232 SBSQ HOSP IP/OBS MODERATE 35: CPT | Performed by: PHYSICAL MEDICINE & REHABILITATION

## 2022-07-22 PROCEDURE — 700111 HCHG RX REV CODE 636 W/ 250 OVERRIDE (IP): Performed by: INTERNAL MEDICINE

## 2022-07-22 PROCEDURE — 36415 COLL VENOUS BLD VENIPUNCTURE: CPT

## 2022-07-22 PROCEDURE — 770010 HCHG ROOM/CARE - REHAB SEMI PRIVAT*

## 2022-07-22 PROCEDURE — 90935 HEMODIALYSIS ONE EVALUATION: CPT

## 2022-07-22 PROCEDURE — 97110 THERAPEUTIC EXERCISES: CPT

## 2022-07-22 RX ADMIN — AMLODIPINE BESYLATE 10 MG: 5 TABLET ORAL at 08:18

## 2022-07-22 RX ADMIN — METOPROLOL TARTRATE 100 MG: 25 TABLET, FILM COATED ORAL at 21:33

## 2022-07-22 RX ADMIN — INSULIN HUMAN 2 UNITS: 100 INJECTION, SOLUTION PARENTERAL at 17:06

## 2022-07-22 RX ADMIN — POLYETHYLENE GLYCOL 3350 1 PACKET: 17 POWDER, FOR SOLUTION ORAL at 21:34

## 2022-07-22 RX ADMIN — PAROXETINE HYDROCHLORIDE 40 MG: 20 TABLET, FILM COATED ORAL at 08:18

## 2022-07-22 RX ADMIN — ACETAMINOPHEN 1000 MG: 500 TABLET, FILM COATED ORAL at 21:33

## 2022-07-22 RX ADMIN — NORTRIPTYLINE HYDROCHLORIDE 25 MG: 25 CAPSULE ORAL at 21:34

## 2022-07-22 RX ADMIN — DOCUSATE SODIUM 100 MG: 100 CAPSULE, LIQUID FILLED ORAL at 21:34

## 2022-07-22 RX ADMIN — DOCUSATE SODIUM 100 MG: 100 CAPSULE, LIQUID FILLED ORAL at 08:17

## 2022-07-22 RX ADMIN — Medication 2001 MG: at 11:18

## 2022-07-22 RX ADMIN — HYDRALAZINE HYDROCHLORIDE 10 MG: 10 TABLET ORAL at 06:06

## 2022-07-22 RX ADMIN — POLYETHYLENE GLYCOL 3350 1 PACKET: 17 POWDER, FOR SOLUTION ORAL at 08:18

## 2022-07-22 RX ADMIN — ACETAMINOPHEN 1000 MG: 500 TABLET, FILM COATED ORAL at 08:17

## 2022-07-22 RX ADMIN — ATORVASTATIN CALCIUM 20 MG: 10 TABLET, FILM COATED ORAL at 08:17

## 2022-07-22 RX ADMIN — Medication 2001 MG: at 08:18

## 2022-07-22 RX ADMIN — OMEPRAZOLE 20 MG: 20 CAPSULE, DELAYED RELEASE ORAL at 08:17

## 2022-07-22 RX ADMIN — ACETAMINOPHEN 1000 MG: 500 TABLET, FILM COATED ORAL at 15:11

## 2022-07-22 RX ADMIN — HYDRALAZINE HYDROCHLORIDE 10 MG: 10 TABLET ORAL at 21:34

## 2022-07-22 RX ADMIN — EPOETIN ALFA-EPBX 3000 UNITS: 3000 INJECTION, SOLUTION INTRAVENOUS; SUBCUTANEOUS at 18:00

## 2022-07-22 RX ADMIN — SENNOSIDES 17.2 MG: 8.6 TABLET, FILM COATED ORAL at 11:18

## 2022-07-22 RX ADMIN — Medication 3 MG: at 21:33

## 2022-07-22 RX ADMIN — METOPROLOL TARTRATE 100 MG: 25 TABLET, FILM COATED ORAL at 08:17

## 2022-07-22 RX ADMIN — Medication 2001 MG: at 17:08

## 2022-07-22 ASSESSMENT — GAIT ASSESSMENTS
ASSISTIVE DEVICE: FRONT WHEEL WALKER
GAIT LEVEL OF ASSIST: MINIMAL ASSIST
DEVIATION: ATAXIC;DECREASED BASE OF SUPPORT;BRADYKINETIC;DECREASED HEEL STRIKE
GAIT LEVEL OF ASSIST: MINIMAL ASSIST
ASSISTIVE DEVICE: FRONT WHEEL WALKER

## 2022-07-22 ASSESSMENT — ACTIVITIES OF DAILY LIVING (ADL)
BED_CHAIR_WHEELCHAIR_TRANSFER_DESCRIPTION: ADAPTIVE EQUIPMENT;INCREASED TIME;SET-UP OF EQUIPMENT;SUPERVISION FOR SAFETY;VERBAL CUEING;INITIAL PREPARATION FOR TASK

## 2022-07-22 ASSESSMENT — PAIN DESCRIPTION - PAIN TYPE: TYPE: ACUTE PAIN

## 2022-07-22 ASSESSMENT — FIBROSIS 4 INDEX: FIB4 SCORE: 0.8

## 2022-07-22 NOTE — CARE PLAN
The patient is Stable - Low risk of patient condition declining or worsening    Shift Goals  Clinical Goals: Monitor BG, pain control  Patient Goals: Rest    Progress made toward(s) clinical / shift goals:  Scheduled Tylenol administered as ordered and with stated pain relief.    Patient is not progressing towards the following goals:      Problem: Skin Integrity  Goal: Skin integrity is maintained or improved  Outcome: Progressing  Note: Surgeon's office requested updated pictures of posterior surgical site due to staple removal. Pictures taken and uploaded to chart for viewing. Surgical incision is approximated and without drainage. New steri-strips applied post photo.     Problem: Diabetes Management  Goal: Patient's ability to maintain appropriate glucose levels will be maintained or improve  Outcome: Progressing  Note: Patient's wife declined insulin NPH (HumuLIN N,NovoLIN N) injectionDose: 10 Units : Subcutaneous : EVERY AM INSULIN. Nurse provided education regarding the use of the medication. Finger stick BG readings today: 130 & 143.

## 2022-07-22 NOTE — FLOWSHEET NOTE
07/22/22 0930   Events/Summary/Plan   Events/Summary/Plan o2 spot check,IS   Vital Signs   Pulse 74   Respiration 14   Pulse Oximetry 100 %   $ Pulse Oximetry (Spot Check) Yes   Respiratory Assessment   Level of Consciousness Alert   Chest Exam   Work Of Breathing / Effort Within Normal Limits   Oxygen   O2 (LPM) 2   O2 Delivery Device Silicone Nasal Cannula

## 2022-07-22 NOTE — THERAPY
Physical Therapy   Daily Treatment     Patient Name: Pj Britt  Age:  61 y.o., Sex:  male  Medical Record #: [id number]  Today's Date: 7/22/2022     Precautions  Precautions: Fall Risk, Cervical Collar  , Spinal / Back Precautions   Comments: Dialysis MWF, C-collar on at all times, Legally blind    Subjective    Pt seated in w/c upon arrival, used of  during session and pt's agreeable to session.     Objective       07/22/22 0931   PT Charge Group   PT Gait Training 2   PT Therapeutic Exercise 2   Supervising Physical Therapist Chelo Boothe   PT Total Time Spent   PT Individual Total Time Spent (Mins) 60   Pain 0 - 10 Group   Location Shoulder   Therapist Pain Assessment Post Activity Pain Same as Prior to Activity  (provided hot pack during session)   Cognition    Level of Consciousness Alert   Gait Functional Level of Assist    Gait Level Of Assist Minimal Assist   Assistive Device Front Wheel Walker   Distance (Feet)   (60 ft in // bars w/ CGA/SBA, 12+20ft w/ FWW w/ Gt for direction)   Deviation Ataxic;Decreased Base Of Support;Bradykinetic;Decreased Heel Strike   Transfer Functional Level of Assist   Bed, Chair, Wheelchair Transfer Contact Guard Assist   Bed Chair Wheelchair Transfer Description Adaptive equipment;Increased time;Set-up of equipment;Supervision for safety;Verbal cueing;Initial preparation for task  (FWW)   Sitting Lower Body Exercises   Sitting Lower Body Exercises   (green resistance band)   Ankle Pumps 2 sets of 10;Bilateral   Hip Abduction 2 sets of 10;Bilateral   Hip Adduction 2 sets of 10;Bilateral   Long Arc Quad 2 sets of 10;Bilateral   Marching 2 sets of 10   Hamstring Curl 2 sets of 10;Bilateral   Bed Mobility    Sit to Supine Standby Assist   Sit to Stand Contact Guard Assist   Scooting Contact Guard Assist   Interdisciplinary Plan of Care Collaboration   IDT Collaboration with  Physical Therapist;Family / Caregiver   Patient Position at End of Therapy In  "Bed;Call Light within Reach;Tray Table within Reach;Phone within Reach;Family / Friend in Room   Collaboration Comments POC w/ PT, wife remains in room during session     Provided French version of seated LE HEP(out of English version at the moment).    Assessment    Pt tolerated session well and is very motivated in ambulation training during session. Demonstrated good understanding in seated LE HEP program and understands that why we're using FWW at this moment instead of 4WW.    Strengths: Able to follow instructions, Pleasant and cooperative, Supportive family, Willingly participates in therapeutic activities  Barriers: Decreased endurance, Generalized weakness, Impaired activity tolerance, Impaired balance, Impaired functional cognition, Limited mobility, Language barrier, non-fluent English, Pain    Plan    Provide seated LE HEP in English version, LE strengthening, endurance, gait training // bars vs FWW, standing tolerance, bed mobility, tx training     Passport items to be completed:  Get in/out of bed safely, in/out of a vehicle, safely use mobility device, walk or wheel around home/community, navigate up and down stairs, show how to get up/down from the ground, ensure home is accessible, demonstrate HEP, complete caregiver training    Physical Therapy Problems (Active)       Problem: Mobility       Dates: Start: 07/21/22         Goal: STG-Within one week, patient will ambulate household distance with FWW x 25' CGA       Dates: Start: 07/21/22            Goal: STG-Within one week, patient will ascend and descend four to six stairs, 4\" stairs with B HR's CGA       Dates: Start: 07/21/22               Problem: Mobility Transfers       Dates: Start: 07/21/22         Goal: STG-Within one week, patient will transfer bed to chair Gt SPT       Dates: Start: 07/21/22            Goal: STG-Within one week, patient will transfer in/out of car modA x1 into personal car       Dates: Start: 07/21/22               " Problem: PT-Long Term Goals       Dates: Start: 07/21/22         Goal: LTG-By discharge, patient will ambulate with LRAD x 50' CGA       Dates: Start: 07/21/22            Goal: LTG-By discharge, patient will perform home exercise program Gt for LE strengthening & walking program       Dates: Start: 07/21/22            Goal: LTG-By discharge, patient will ambulate up/down 3 stairs B HR's into home       Dates: Start: 07/21/22

## 2022-07-22 NOTE — CARE PLAN
Problem: Knowledge Deficit - Standard  Goal: Patient and family/care givers will demonstrate understanding of plan of care, disease process/condition, diagnostic tests and medications  Outcome: Progressing     Problem: Skin Integrity  Goal: Skin integrity is maintained or improved  Outcome: Progressing   The patient is Stable - Low risk of patient condition declining or worsening    Shift Goals  Patient Goals: sleep well    Progress made toward(s) clinical / shift goals:  Patient is resting wife at bedside - no distress    Patient is not progressing towards the following goals:

## 2022-07-22 NOTE — THERAPY
Physical Therapy   Daily Treatment     Patient Name: Pj Britt  Age:  61 y.o., Sex:  male  Medical Record #: [id number]  Today's Date: 7/22/2022     Precautions  Precautions: Cervical Collar  , Spinal / Back Precautions , Fall Risk  Comments: Dialysis MWF, C-collar on at all times, Legally blind, Taiwanese-speaking    Subjective    Patient agreeable to PT.     Objective       07/22/22 1301   PT Charge Group   PT Gait Training 2   PT Total Time Spent   PT Individual Total Time Spent (Mins) 30   Precautions   Precautions Cervical Collar  ;Spinal / Back Precautions ;Fall Risk   Comments Dialysis MWF, C-collar on at all times, Legally blind, Taiwanese-speaking   Vitals   O2 (LPM) 2   O2 Delivery Device Silicone Nasal Cannula   Vitals Comments RT card: 2L no wean. Pt reports uses 2 lpm oxygen at home.   Gait Functional Level of Assist    Gait Level Of Assist Minimal Assist   Assistive Device Front Wheel Walker   Distance (Feet)   (60 ft, 70 ft, and 75 ft indoors)   # of Times Distance was Traveled   (per above)   Deviation Ataxic;Bradykinetic;Decreased Heel Strike;Other (Comment)  (Min A for more FWW steering than occasional trunk/balance assist; cueing for visual deficits)   Sitting Lower Body Exercises   Sitting Lower Body Exercises   (provided seated HEP handout and placed in pt's room)   Bed Mobility    Sit to Stand Minimal Assist  (3 reps, FWW, gait belt, setup)   Interdisciplinary Plan of Care Collaboration   Patient Position at End of Therapy Seated;Chair Alarm On;Self Releasing Lap Belt Applied;Call Light within Reach;Tray Table within Reach;Family / Friend in Room       Assessment    Patient has improving endurance with ambulation along with increasing activity tolerance today; required Min A with all mobility as per above; pt receptive to education during rest breaks within structured activity pacing.    Strengths: Able to follow instructions, Pleasant and cooperative, Supportive family, Willingly participates  "in therapeutic activities  Barriers: Decreased endurance, Generalized weakness, Impaired activity tolerance, Impaired balance, Impaired functional cognition, Limited mobility, Language barrier, non-fluent English, Pain    Plan    LE strengthening, endurance, gait training with FWW, standing tolerance, bed mobility, transfer training    Passport items to be completed:  Get in/out of bed safely, in/out of a vehicle, safely use mobility device, walk or wheel around home/community, navigate up and down stairs, show how to get up/down from the ground, ensure home is accessible, demonstrate HEP, complete caregiver training    Physical Therapy Problems (Active)       Problem: Mobility       Dates: Start: 07/21/22         Goal: STG-Within one week, patient will ambulate household distance with FWW x 25' CGA       Dates: Start: 07/21/22            Goal: STG-Within one week, patient will ascend and descend four to six stairs, 4\" stairs with B HR's CGA       Dates: Start: 07/21/22               Problem: Mobility Transfers       Dates: Start: 07/21/22         Goal: STG-Within one week, patient will transfer bed to chair Gt SPT       Dates: Start: 07/21/22            Goal: STG-Within one week, patient will transfer in/out of car modA x1 into personal car       Dates: Start: 07/21/22               Problem: PT-Long Term Goals       Dates: Start: 07/21/22         Goal: LTG-By discharge, patient will ambulate with LRAD x 50' CGA       Dates: Start: 07/21/22            Goal: LTG-By discharge, patient will perform home exercise program Gt for LE strengthening & walking program       Dates: Start: 07/21/22            Goal: LTG-By discharge, patient will ambulate up/down 3 stairs B HR's into home       Dates: Start: 07/21/22              "

## 2022-07-22 NOTE — THERAPY
Occupational Therapy  Daily Treatment     Patient Name: Pj Britt  Age:  61 y.o., Sex:  male  Medical Record #: [id number]  Today's Date: 7/22/2022     Precautions  Precautions: Fall Risk, Cervical Collar  , Spinal / Back Precautions   Comments: Dialysis MWF, C-collar on at all times, Legally blind         Subjective    Pt seen at 0831 for 30 min and 1401 for 60 min. Pt with increased fatigue at second session. Pt pleasant and agreeable to OT sessions. See not below for details .    Utilized interpreting service when out of the room during second session ([phone]) as wife prefers to translate.        Objective       07/22/22 0831   OT Charge Group   OT Self Care / ADL 2   OT Total Time Spent   OT Individual Total Time Spent (Mins) 30   Functional Level of Assist   Eating Stand by Assist  (with finger foods, orientation to where items were, and SBA from wife for holding medications prior to taking them during session)   Grooming Standby Assist  (set-up for oral care, VC for orientation)   Upper Body Dressing Moderate Assist  (don/doff gown,)   Bed, Chair, Wheelchair Transfer Contact Guard Assist   Bed Chair Wheelchair Transfer Description   (at w/c level)   Interdisciplinary Plan of Care Collaboration   Patient Position at End of Therapy Seated;Self Releasing Lap Belt Applied;Call Light within Reach;Tray Table within Reach;Family / Friend in Room        07/22/22 1401   OT Charge Group   OT Self Care / ADL 2   OT Neuromuscular Re-education / Balance 1   OT Therapeutic Exercise  1   OT Total Time Spent   OT Individual Total Time Spent (Mins) 60   Pain 0 - 10 Group   Therapist Pain Assessment   (pt c/o neck pain upon arrival, 4/10, notified RN)   Functional Level of Assist   Lower Body Dressing Contact Guard Assist  (seated in w/c for threading and standing at FWW. Pt able to get to figure 4 position to maintain spinal precautions. Required CGA overall, but required occasional increased A for threading d/t  tread on socks.)   Bed, Chair, Wheelchair Transfer Contact Guard Assist  (from w/c>bed)   Neuro-Muscular Treatments   Neuro-Muscular Treatments Verbal Cuing;Vibration   Comments completed elbow flexion with RUE with and without vibration wtih verbal cueing to place hand on table an then bring hand to mouth. Pt noted to have increase smoothness of elbow flexion motion with vibration present.   Bed Mobility    Sit to Supine Standby Assist  (VC for positioning)   Interdisciplinary Plan of Care Collaboration   Patient Position at End of Therapy In Bed;Family / Friend in Room   Collaboration Comments Pt wife in room duirng session, though A with LB dressing at end of session     Completed towel slides on table for shoulder flexion this session 2 sets of 10 on each arm.    Completed full fist squeezes with pink foam block 2 sets of 10 on each hand.    Assessment    Pt tolerated session well, though was limited during second session by fatigue. Throughout the day pt was able to complete all of dressing routine, pt may not require as much A with UB dressing though wife jumped into support pt during session. Pt able to complete LB dressing routine at FWW level with CGA-Mnin A this session, though wife states that pt was getting dressed in bed, which may increase overall success.    Strengths: Able to follow instructions, Alert and oriented, Independent prior level of function, Pleasant and cooperative, Supportive family, Willingly participates in therapeutic activities  Barriers: Fatigue, Generalized weakness, Impaired activity tolerance, Impaired insight/denial of deficits, Pain, Limited mobility, Visual impairment, Hypertension, Impaired balance, Language barrier, non-fluent English, Decreased endurance    Plan    Training significant other on c-collar wearing/management of pads, Shower routine, ADLs, trial functional NMES to proximal RUE for increase flexion/elevation (self-feeding and dressing concerns), transfers, bed  mobility, cont to assess self-feeding    Occupational Therapy Goals (Active)       Problem: Dressing       Dates: Start: 07/20/22         Goal: STG-Within one week, patient will dress UB with Min A using AE/AD as needed       Dates: Start: 07/20/22         Goal Note filed on 07/21/22 0808 by Vee Ambrose, OT       Pt evaluated 7-20-22; goal still appropriate              Goal: STG-Within one week, patient will dress LB with SBA using AE/AD as needed       Dates: Start: 07/20/22         Goal Note filed on 07/21/22 0808 by Vee Ambrose, OT       Pt evaluated 7-20-22; goal still appropriate                 Problem: Eating       Dates: Start: 07/20/22         Goal: STG-Within one week, patient will feed self with Min A using AE/AD as needed       Dates: Start: 07/20/22         Goal Note filed on 07/21/22 0808 by Vee Ambrose, OT       Pt evaluated 7-20-22; goal still appropriate                 Problem: Grooming       Dates: Start: 07/20/22         Goal: STG-Within one week, patient will complete grooming with SUP using AE/AD as needed       Dates: Start: 07/20/22         Goal Note filed on 07/21/22 0808 by Vee Ambrose OT       Pt evaluated 7-20-22; goal still appropriate                 Problem: OT Long Term Goals       Dates: Start: 07/20/22         Goal: LTG-By discharge, patient will complete basic self care tasks SBA/set-up assist using AE/AD as needed       Dates: Start: 07/20/22            Goal: LTG-By discharge, patient will perform bathroom transfers SBA/set-up assist using AE/AD as needed       Dates: Start: 07/20/22

## 2022-07-22 NOTE — PROGRESS NOTES
Assumed care at 0700 following night nurse report, assessment completed. Patient is A&O X4. Patient complains of 4/10 pain at this time, medication administered as ordered. Fall precautions and appropriate signs in place. Call light/phone system and RN extension updated on whiteboard. Bed alarm is in use, patient is Min/Mod assist. Patient denies any additional needs at this time, Hourly rounding in place.

## 2022-07-22 NOTE — FLOWSHEET NOTE
"   07/22/22 0928   Incentive Spirometry Treatment   Height 1.702 m (5' 7.01\")   Predicted Inspiratory Capacity 2750   60% of predicted IS capacity 1650 mL   40% of predicted IS capacity 1100 mL   Incentive Spirometer Volume 1600 mL     "

## 2022-07-22 NOTE — DIETARY
Nutrition Services: Diet Education Consult for Diabetes and ESRD on HD  Day 3 of admit.  Pj Britt is a 61 y.o. male with admitting DX of Cervical myelopathy (HCC) [G95.9]    Consult received from RN for diet education for renal and consistent carbohydrate diets. RD visited pt at bedside and spoke with pt's wife Lindsey. Lindsey kindly declined diet education, stating that they have been following current diet modifications for the past 7 years and have followed with an RD at dialysis. Pt's wife w/ no questions at this time. Pts POC glucose labs this admit have been within acceptable limits; most recent A1c 3 yrs ago (5.9%).    Please re-consult RD as indicated.

## 2022-07-22 NOTE — PROGRESS NOTES
1720-Staples to posterior neck removes, site well approximated no signs of infection cleansed with NS pat dried stirri-strips applied procedure well tolerated.

## 2022-07-22 NOTE — PROGRESS NOTES
REHABILITATION PROGRESS NOTE    Date of Admission: 7/19/2022    [facility] Code / Diagnosis to Support: 0003.9 - Neurologic Conditions: Other Neurologic  Etiologic Diagnosis: Cervical radiculopathy    SUBJECTIVE  Patient seen in room resting in bed. Spouse present  GI: continent, last BM 7/21  : anuric  Psych: slept well at night, sleepy during the day due to increased activity from baseline  MSK: pain at incision site is well controlled  Skin: staples out on 7/21 per verbal order by Dr. Barr (determined on review of the chart orders). Did ask nurse and nurse supervisor who were unaware.    I have performed a physical exam and reviewed and updated history and plan today (7/22/2022).     MEDICATIONS:  Current Facility-Administered Medications   Medication Dose   • vitamin D2 (Ergocalciferol) (Drisdol) capsule 50,000 Units  50,000 Units   • nortriptyline (PAMELOR) capsule 25 mg  25 mg   • polyethylene glycol/lytes (MIRALAX) PACKET 1 Packet  1 Packet   • sennosides (SENOKOT) 8.6 MG tablet 17.2 mg  17.2 mg   • bisacodyl (THE MAGIC BULLET) suppository 10 mg  10 mg   • sodium phosphate (Fleet) enema 133 mL  1 Each   • heparin injection 5,000 Units  5,000 Units   • epoetin (Retacrit) injection (Dialysis Use Only) 3,000 Units  3,000 Units   • Respiratory Therapy Consult     • Pharmacy Consult Request ...Pain Management Review 1 Each  1 Each   • omeprazole (PRILOSEC) capsule 20 mg  20 mg   • artificial tears ophthalmic solution 1 Drop  1 Drop   • benzocaine-menthol (Cepacol) lozenge 1 Lozenge  1 Lozenge   • mag hydrox-al hydrox-simeth (MAALOX PLUS ES or MYLANTA DS) suspension 20 mL  20 mL   • ondansetron (ZOFRAN ODT) dispertab 4 mg  4 mg    Or   • ondansetron (ZOFRAN) syringe/vial injection 4 mg  4 mg   • sodium chloride (OCEAN) 0.65 % nasal spray 2 Spray  2 Spray   • acetaminophen (TYLENOL) tablet 1,000 mg  1,000 mg   • melatonin tablet 3 mg  3 mg   • magnesium hydroxide (MILK OF MAGNESIA) suspension 30 mL  30 mL   •  "amLODIPine (NORVASC) tablet 10 mg  10 mg   • atorvastatin (LIPITOR) tablet 20 mg  20 mg   • calcium acetate (PHOS-LO) 667 MG tablet 2,001 mg  2,001 mg   • docusate sodium (COLACE) capsule 100 mg  100 mg   • hydrALAZINE (APRESOLINE) tablet 10 mg  10 mg   • insulin NPH (HumuLIN N,NovoLIN N) injection  10 Units   • insulin regular (HumuLIN R,NovoLIN R) injection  2-9 Units    And   • dextrose 50% (D50W) injection 25 g  25 g   • metoprolol tartrate (LOPRESSOR) tablet 100 mg  100 mg   • oxyCODONE immediate-release (ROXICODONE) tablet 5 mg  5 mg    Or   • oxyCODONE immediate release (ROXICODONE) tablet 10 mg  10 mg   • PARoxetine (PAXIL) tablet 40 mg  40 mg       PHYSICAL EXAM:     VITAL SIGNS:   height is 1.702 m (5' 7.01\") and weight is 64.5 kg (142 lb 3.2 oz). His temporal temperature is 36.7 °C (98 °F). His blood pressure is 125/63 and his pulse is 74. His respiration is 14 and oxygen saturation is 100%.     General: well-groomed in no acute distress, spouse present  Eyes: no scleral icterus or conjunctival injection  Ears, nose, mouth and throat: moist oral mucosa  Cardiovascular: good peripheral perfusion  Respiratory: breathing comfortably without use of accessory muscles, no pallor, +2L NC  Gastrointestinal: soft, nontender, nondistended  Genitourinary: no indwelling mcfarlane  Musculoskeletal: good symmetry in bilateral shoulders  Skin: no wounds seen on exposed skin, incision site healing well    Neurologic:  Mental status: answers questions appropriately follows commands  Speech: fluent, no aphasia or dysarthria  Tone: no spasticity noted  Psychiatric: appropriate affect  Hematologic/lymphatic/immunologic: no IV access, no bruises seen on exposed skin    LABS:  Recent Labs     07/20/22  0548 07/22/22  0533   SODIUM 132* 130*   POTASSIUM 5.2 5.3   CHLORIDE 93* 91*   CO2 28 27   GLUCOSE 112* 137*   BUN 48* 37*   CREATININE 6.55* 5.88*   CALCIUM 8.8 8.9     Recent Labs     07/20/22  0548   WBC 5.6   RBC 3.09* "   HEMOGLOBIN 9.5*   HEMATOCRIT 30.3*   MCV 98.1*   MCH 30.7   MCHC 31.4*   RDW 48.5   PLATELETCT 261   MPV 10.0             PRIMARY REHAB DIAGNOSIS:    This patient is a 61 y.o. male admitted for acute inpatient rehabilitation with Cervical radiculopathy.    Medical management / Rehabilitation Issues/ Adverse Potential as part of rehabilitation plan     REHABILITATION ISSUES/ADVERSE POTENTIAL and MEDICAL CO-MORBIDITIES/ADVERSE POTENTIAL AFFECTING FUNCTION:    ##MSK  #Impaired ADLs and mobility  Patient is admitted to [facility] for comprehensive rehabilitation therapy as described.   Rehabilitation nursing monitors bowel and bladder control, educates on medication administration, co-morbidities and monitors patient safety.  Anticipated discharge date: 7/26/2022  Anticipated discharge destination: home with family  Equipment: has all needed equipment  Postdischarge therapies: home health OT and PT  Followup: Ady Barr MD (neurosurgery), PCP  Precautions: C collar at all times  Code: full resuscitation    #Postsurgical pain, acute, controlled  #Neuropathic pain, acute, uncontrolled  Continue tylenol 1000mg TID, nortriptyline 25mg QHS, oxycodone 5-10mg Q4hr PRN pain    ##NEURO/PSYCH  #Cervical radiculopathy s/p anterior C5 corpectomy, 4-6 fusion, posterior C3-T1 lami fusion on 7/12/2022 by Dr. Ady Barr MD  Three month history of right arm weakness and neuropathic pain  R C5-T1 5/5 in all key muscles except 3/5 in biceps and triceps (C7 and C5)  Leg weakness started three months ago and not likely due to cervical   Anuric so unable to determine bowel control  Does have constipation  Monitor for possible spinal cord injury component    #Chronic psychiatric disordered  Continue home paxil 40mg daily  Monitor    #RESP  Respiratory therapy: RT performs O2 management prn, breathing retraining, pulmonary hygiene and bronchospasm management prn to optimize participation in therapies.      ##CARDIAC  #Chronic hypertension  #Chronic hyperlipidemia  Continue atorvastatin 20mg daily, hydralazine 10mg TID, metoprolol 100mg BID    DVT prophylaxis: Continue heparin 5000 units BID. Encourage OOB. Monitor daily for signs and symptoms of DVT including but not limited to swelling and pain to prevent the development of DVT that may interfere with therapies.    ##GI  GI prophylaxis:  Continue prilosec 20mg daily to prevent gastritis/dyspepsia which may interfere with therapies.  #At risk of Neurogenic bowel  #At risk of opioid induced constipation  Goal of one continent BM daily  Continue docusate 100mg BID, miralax 17g BID, senna 17.2mg daily    #At high risk of malnutrition  Dietician monitors nutrient intake, recommend supplements prn and provide nutrition education to pt/family to promote optimal nutrition for wound healing/recovery.   Orders Placed This Encounter   Procedures   • Diet Order Diet: Consistent CHO (Diabetic); Second Modifier: (optional): Renal; Miscellaneous modifications: (optional): Finger Foods     Standing Status:   Standing     Number of Occurrences:   1     Order Specific Question:   Diet:     Answer:   Consistent CHO (Diabetic) [4]     Order Specific Question:   Second Modifier: (optional)     Answer:   Renal [8]     Order Specific Question:   Miscellaneous modifications: (optional)     Answer:   Finger Foods  [2]     Continue multivitamin daily  prealbumin levels 12.8    ##/RENAL  #ESRD on hemodialysis  #Anuric  MWF hemodialysis  Nephrology following  Continue calcium acetate 2001mg TID    ##ENDO  #Vitamin D deficiency  VitD 25 OH 20  Continue ergocalciferol 50,000 units weekly    #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 turns as required to prevent the development of skin  breakdown.     Terra Fuentes, DO  Physical Medicine and Rehabilitation

## 2022-07-23 LAB
GLUCOSE BLD STRIP.AUTO-MCNC: 135 MG/DL (ref 65–99)
GLUCOSE BLD STRIP.AUTO-MCNC: 142 MG/DL (ref 65–99)
GLUCOSE BLD STRIP.AUTO-MCNC: 185 MG/DL (ref 65–99)

## 2022-07-23 PROCEDURE — 97116 GAIT TRAINING THERAPY: CPT

## 2022-07-23 PROCEDURE — A9270 NON-COVERED ITEM OR SERVICE: HCPCS | Performed by: PHYSICAL MEDICINE & REHABILITATION

## 2022-07-23 PROCEDURE — 97530 THERAPEUTIC ACTIVITIES: CPT

## 2022-07-23 PROCEDURE — 770010 HCHG ROOM/CARE - REHAB SEMI PRIVAT*

## 2022-07-23 PROCEDURE — 82962 GLUCOSE BLOOD TEST: CPT | Mod: 91

## 2022-07-23 PROCEDURE — 94760 N-INVAS EAR/PLS OXIMETRY 1: CPT

## 2022-07-23 PROCEDURE — 700102 HCHG RX REV CODE 250 W/ 637 OVERRIDE(OP): Performed by: PHYSICAL MEDICINE & REHABILITATION

## 2022-07-23 PROCEDURE — 97110 THERAPEUTIC EXERCISES: CPT

## 2022-07-23 PROCEDURE — 97535 SELF CARE MNGMENT TRAINING: CPT

## 2022-07-23 RX ADMIN — Medication 2001 MG: at 08:01

## 2022-07-23 RX ADMIN — METOPROLOL TARTRATE 100 MG: 25 TABLET, FILM COATED ORAL at 21:00

## 2022-07-23 RX ADMIN — PAROXETINE HYDROCHLORIDE 40 MG: 20 TABLET, FILM COATED ORAL at 08:00

## 2022-07-23 RX ADMIN — OMEPRAZOLE 20 MG: 20 CAPSULE, DELAYED RELEASE ORAL at 08:01

## 2022-07-23 RX ADMIN — POLYETHYLENE GLYCOL 3350 1 PACKET: 17 POWDER, FOR SOLUTION ORAL at 20:59

## 2022-07-23 RX ADMIN — INSULIN HUMAN 2 UNITS: 100 INJECTION, SOLUTION PARENTERAL at 11:22

## 2022-07-23 RX ADMIN — AMLODIPINE BESYLATE 10 MG: 5 TABLET ORAL at 08:01

## 2022-07-23 RX ADMIN — ACETAMINOPHEN 1000 MG: 500 TABLET, FILM COATED ORAL at 08:01

## 2022-07-23 RX ADMIN — ACETAMINOPHEN 1000 MG: 500 TABLET, FILM COATED ORAL at 14:42

## 2022-07-23 RX ADMIN — HYDRALAZINE HYDROCHLORIDE 10 MG: 10 TABLET ORAL at 05:35

## 2022-07-23 RX ADMIN — SENNOSIDES 17.2 MG: 8.6 TABLET, FILM COATED ORAL at 11:20

## 2022-07-23 RX ADMIN — DOCUSATE SODIUM 100 MG: 100 CAPSULE, LIQUID FILLED ORAL at 21:01

## 2022-07-23 RX ADMIN — Medication 2001 MG: at 17:11

## 2022-07-23 RX ADMIN — ACETAMINOPHEN 1000 MG: 500 TABLET, FILM COATED ORAL at 21:00

## 2022-07-23 RX ADMIN — HYDRALAZINE HYDROCHLORIDE 10 MG: 10 TABLET ORAL at 21:01

## 2022-07-23 RX ADMIN — DOCUSATE SODIUM 100 MG: 100 CAPSULE, LIQUID FILLED ORAL at 08:00

## 2022-07-23 RX ADMIN — POLYETHYLENE GLYCOL 3350 1 PACKET: 17 POWDER, FOR SOLUTION ORAL at 08:00

## 2022-07-23 RX ADMIN — ATORVASTATIN CALCIUM 20 MG: 10 TABLET, FILM COATED ORAL at 08:01

## 2022-07-23 RX ADMIN — HYDRALAZINE HYDROCHLORIDE 10 MG: 10 TABLET ORAL at 14:42

## 2022-07-23 RX ADMIN — NORTRIPTYLINE HYDROCHLORIDE 25 MG: 25 CAPSULE ORAL at 21:10

## 2022-07-23 RX ADMIN — Medication 2001 MG: at 11:20

## 2022-07-23 RX ADMIN — METOPROLOL TARTRATE 100 MG: 25 TABLET, FILM COATED ORAL at 08:02

## 2022-07-23 ASSESSMENT — ACTIVITIES OF DAILY LIVING (ADL)
BED_CHAIR_WHEELCHAIR_TRANSFER_DESCRIPTION: ADAPTIVE EQUIPMENT
BED_CHAIR_WHEELCHAIR_TRANSFER_DESCRIPTION: ADAPTIVE EQUIPMENT;INITIAL PREPARATION FOR TASK;SET-UP OF EQUIPMENT;SUPERVISION FOR SAFETY;VERBAL CUEING
TUB_SHOWER_TRANSFER_DESCRIPTION: GRAB BAR;INCREASED TIME;INITIAL PREPARATION FOR TASK;SET-UP OF EQUIPMENT;VERBAL CUEING;SUPERVISION FOR SAFETY

## 2022-07-23 ASSESSMENT — GAIT ASSESSMENTS
DISTANCE (FEET): 245
GAIT LEVEL OF ASSIST: MINIMAL ASSIST
GAIT LEVEL OF ASSIST: MINIMAL ASSIST
DEVIATION: ATAXIC;BRADYKINETIC
ASSISTIVE DEVICE: FRONT WHEEL WALKER
DISTANCE (FEET): 95
DEVIATION: ATAXIC;BRADYKINETIC;DECREASED HEEL STRIKE
ASSISTIVE DEVICE: FRONT WHEEL WALKER

## 2022-07-23 ASSESSMENT — FIBROSIS 4 INDEX: FIB4 SCORE: 0.8

## 2022-07-23 ASSESSMENT — PAIN DESCRIPTION - PAIN TYPE: TYPE: ACUTE PAIN

## 2022-07-23 NOTE — CARE PLAN
Problem: Knowledge Deficit - Standard  Goal: Patient and family/care givers will demonstrate understanding of plan of care, disease process/condition, diagnostic tests and medications  Outcome: Progressing     Problem: Skin Integrity  Goal: Skin integrity is maintained or improved  Outcome: Progressing     Problem: Fall Risk - Rehab  Goal: Patient will remain free from falls  Outcome: Progressing   The patient is Stable - Low risk of patient condition declining or worsening    Shift Goals  Clinical Goals: Monitor BG, pain control  Patient Goals: sleep    Progress made toward(s) clinical / shift goals:  Patient is resting in bed denies pain or discomfort    Patient is not progressing towards the following goals:

## 2022-07-23 NOTE — CARE PLAN
The patient is Stable - Low risk of patient condition declining or worsening    Shift Goals  Clinical Goals: Monitor BG, shower, support family education  Patient Goals: Rest    Progress made toward(s) clinical / shift goals:  Patient received shower this morning.    Patient is not progressing towards the following goals:      Problem: Nutrition  Goal: Patient's nutritional and fluid intake will be adequate or improve  Outcome: Progressing  Note: Wife stays bedside around the clock and assists with feeding. Patient eats >75% of meals.     Problem: Dialysis  Goal: Patient will maintain stable vital signs and fluid balance  Outcome: Progressing  Note: Patient receives dialysis M-W-F. Friday 7/22 post dialysis 2 liter removal patient slept well.     Problem: Diabetes Management  Goal: Patient's ability to maintain appropriate glucose levels will be maintained or improve  Outcome: Progressing  Note: /185, no Long acting insulin administered due to wife's request. Spouse refuses insulin and Heparin regularly.

## 2022-07-23 NOTE — PROCEDURES
Nephrology/Hemodialysis note    Patient with ESRD/HD, s/p cervical surgery in rehab  Doing well, no complaints  Seen and examined during dialysis  Tolerates well  VS stable  Lab results reviewed  Please see dialysis flow sheet for details

## 2022-07-23 NOTE — PROGRESS NOTES
Assumed care at 0700 following night nurse report, assessment completed. Patient is A&O X4. Patient complains of 0/10 pain at this time, medication administered as ordered. Fall precautions and appropriate signs in place. Call light/phone system and RN extension updated on whiteboard. Bed alarm is in use, patient is Min/Mod assist. Patient denies any additional needs at this time, Hourly rounding in place.

## 2022-07-23 NOTE — FLOWSHEET NOTE
07/23/22 0832   Events/Summary/Plan   Events/Summary/Plan o2 spot check   Vital Signs   Pulse 70   Respiration 18   Pulse Oximetry 100 %   $ Pulse Oximetry (Spot Check) Yes   Respiratory Assessment   Level of Consciousness Alert   Chest Exam   Work Of Breathing / Effort Within Normal Limits   Oxygen   O2 Delivery Device Silicone Nasal Cannula

## 2022-07-23 NOTE — PROGRESS NOTES
[facility] Progress Note     Hemodialysis treatment x 3 hours completed as ordered per Dr. Chan  Treatment started at 1750 and ended at 2050.       Net UF Removed:  2000 mL     Patient tolerated treatment well without issues. UF goal reached  Patient VS stable during and post treatment, no complaints.   See Acute HD paper flow sheets for details.     Post tx access: 15 g cannulation needles removed from left forearm AVF access sites, hemostasis established on each insertion site within 5 mins each; verified no bleeding. (+) bruit/thrill post treatment. Covered with clean gauze dressings and secured with tape.     Please monitor for AVF access bleeding. Should any breakthrough bleeding occur, please apply gauze and firm pressure on site until bleeding resolves, cover with gauze dressing and tape. Please observe LUE precautions- NO taking of BPs or blood draws to LUE.    Please notify Nephrologist/Dialysis for follow-up.    2115 Report given to primary care nurse TRACE Hassan RN.    2118 Patient transported back to room via bed by PCN with O2 @ 2L/NC. Patient awake, alert and stable.

## 2022-07-23 NOTE — THERAPY
Physical Therapy   Daily Treatment     Patient Name: Pj Britt  Age:  61 y.o., Sex:  male  Medical Record #: [id number]  Today's Date: 7/23/2022     Precautions  Precautions: Cervical Collar  , Spinal / Back Precautions , Fall Risk  Comments: Dialysis MWF, C-collar on at all times, Legally blind, Hebrew-speaking    Subjective    Patient willing to participate in therapy; surprised himself by being able to meet transfer, stairs, and ambulation distance STG     Objective       07/23/22 1124   PT Charge Group   PT Gait Training 2   PT Therapeutic Activities 2   PT Total Time Spent   PT Individual Total Time Spent (Mins) 60   Precautions   Precautions Cervical Collar  ;Spinal / Back Precautions ;Fall Risk   Comments Dialysis MWF, C-collar on at all times, Legally blind, Hebrew-speaking   Vitals   O2 (LPM) 2   O2 Delivery Device Silicone Nasal Cannula   Vitals Comments RT card: 2L no wean. Pt reports uses 2 lpm oxygen at home.   Gait Functional Level of Assist    Gait Level Of Assist Minimal Assist   Assistive Device Front Wheel Walker   Distance (Feet) 95   # of Times Distance was Traveled   (75ft, 95ft)   Deviation Ataxic;Bradykinetic;Decreased Heel Strike  (min assist FWW steering, O2 management, tactile and verbal cueing for turning FWW)   Stairs Functional Level of Assist   Level of Assist with Stairs Minimal Assist   # of Stairs Climbed 4  (4 6in stair bilateral railings, hand over hand placement on rails)   Stairs Description Extra time;Hand rails;Requires incidental assist  (step-to patterning)   Transfer Functional Level of Assist   Bed, Chair, Wheelchair Transfer Standby Assist  (car transfer with CGA, verbal cues for sequencing)   Bed Chair Wheelchair Transfer Description Adaptive equipment  (hand over hand cueing)   Interdisciplinary Plan of Care Collaboration   IDT Collaboration with  Family / Caregiver;Other (See Comments)   Collaboration Comments present at start and end of session, stayed in room;  "used  services to discuss stairs plan       Assessment    Patient able to ambulate 95ft FWW min assist, self-limits distance based on general fatigue    Strengths: Able to follow instructions, Pleasant and cooperative, Supportive family, Willingly participates in therapeutic activities  Barriers: Decreased endurance, Generalized weakness, Impaired activity tolerance, Impaired balance, Impaired functional cognition, Limited mobility, Language barrier, non-fluent English, Pain    Plan    LE strengthening, endurance, gait training with FWW, standing tolerance, bed mobility, transfer training     Passport items to be completed:  Get in/out of bed safely, in/out of a vehicle, safely use mobility device, walk or wheel around home/community, navigate up and down stairs, show how to get up/down from the ground, ensure home is accessible, demonstrate HEP, complete caregiver training    Physical Therapy Problems (Active)       Problem: Mobility       Dates: Start: 07/21/22         Goal: STG-Within one week, patient will ambulate household distance with FWW x 25' CGA       Dates: Start: 07/21/22            Goal: STG-Within one week, patient will ascend and descend four to six stairs, 4\" stairs with B HR's CGA       Dates: Start: 07/21/22               Problem: Mobility Transfers       Dates: Start: 07/21/22         Goal: STG-Within one week, patient will transfer bed to chair Gt SPT       Dates: Start: 07/21/22            Goal: STG-Within one week, patient will transfer in/out of car modA x1 into personal car       Dates: Start: 07/21/22               Problem: PT-Long Term Goals       Dates: Start: 07/21/22         Goal: LTG-By discharge, patient will ambulate with LRAD x 50' CGA       Dates: Start: 07/21/22            Goal: LTG-By discharge, patient will perform home exercise program Gt for LE strengthening & walking program       Dates: Start: 07/21/22            Goal: LTG-By discharge, patient will ambulate " up/down 3 stairs B HR's into home       Dates: Start: 07/21/22

## 2022-07-23 NOTE — THERAPY
Physical Therapy   Daily Treatment     Patient Name: Pj Britt  Age:  61 y.o., Sex:  male  Medical Record #: [id number]  Today's Date: 7/23/2022     Precautions  Precautions: Cervical Collar  , Spinal / Back Precautions , Fall Risk  Comments: Dialysis MWF, C-collar on at all times, Legally blind, Greek-speaking    Subjective    Patient resting in bed; asking to practice walking for therapy time     Objective       07/23/22 1331   PT Charge Group   PT Gait Training 2   PT Total Time Spent   PT Individual Total Time Spent (Mins) 30   Precautions   Precautions Cervical Collar  ;Spinal / Back Precautions ;Fall Risk   Gait Functional Level of Assist    Gait Level Of Assist Minimal Assist  (min assist to steer FWW)   Assistive Device Front Wheel Walker   Distance (Feet) 245   # of Times Distance was Traveled 1   Deviation Ataxic;Bradykinetic   Interdisciplinary Plan of Care Collaboration   IDT Collaboration with  Other (See Comments)   Collaboration Comments  services to explain walking distance task       Assessment    Self-limits ambulation distance to 245ft with FWW due to leg fatigue; requires intermittent min assist steering/CGA at waist for balance, constant verbal cues for vision  Strengths: Able to follow instructions, Pleasant and cooperative, Supportive family, Willingly participates in therapeutic activities  Barriers: Decreased endurance, Generalized weakness, Impaired activity tolerance, Impaired balance, Impaired functional cognition, Limited mobility, Language barrier, non-fluent English, Pain    Plan    LE strengthening, endurance, gait training with FWW, standing tolerance, bed mobility, transfer training; continue stair training     Passport items to be completed:  Get in/out of bed safely, in/out of a vehicle, safely use mobility device, walk or wheel around home/community, navigate up and down stairs, show how to get up/down from the ground, ensure home is accessible, demonstrate HEP,  "complete caregiver training    Physical Therapy Problems (Active)       Problem: Mobility       Dates: Start: 07/21/22         Goal: STG-Within one week, patient will ambulate household distance with FWW x 25' CGA       Dates: Start: 07/21/22            Goal: STG-Within one week, patient will ascend and descend four to six stairs, 4\" stairs with B HR's CGA       Dates: Start: 07/21/22               Problem: Mobility Transfers       Dates: Start: 07/21/22         Goal: STG-Within one week, patient will transfer bed to chair Gt SPT       Dates: Start: 07/21/22            Goal: STG-Within one week, patient will transfer in/out of car modA x1 into personal car       Dates: Start: 07/21/22               Problem: PT-Long Term Goals       Dates: Start: 07/21/22         Goal: LTG-By discharge, patient will ambulate with LRAD x 50' CGA       Dates: Start: 07/21/22            Goal: LTG-By discharge, patient will perform home exercise program Gt for LE strengthening & walking program       Dates: Start: 07/21/22            Goal: LTG-By discharge, patient will ambulate up/down 3 stairs B HR's into home       Dates: Start: 07/21/22              "

## 2022-07-23 NOTE — THERAPY
"Occupational Therapy  Daily Treatment     Patient Name: Pj Britt  Age:  61 y.o., Sex:  male  Medical Record #: [id number]  Today's Date: 7/23/2022     Precautions  Precautions: Cervical Collar  , Spinal / Back Precautions , Fall Risk  Comments: Dialysis MWF, C-collar on at all times, Legally blind, Citizen of the [place]-speaking         Subjective    \"I forget.\" Pt stated regarding cervical precautions.     Objective    Wife was  during session per pt request.     07/23/22 0701   OT Charge Group   OT Self Care / ADL 4   OT Total Time Spent   OT Individual Total Time Spent (Mins) 60   Vitals   O2 (LPM) 2   O2 Delivery Device Silicone Nasal Cannula   Pain   Intervention Declines   Pain 0 - 10 Group   Pain Rating Scale (NPRS) 0   Non Verbal Descriptors   Non Verbal Scale  Calm   Cognition    Level of Consciousness Alert   Ability To Follow Commands 1 Step   Functional Level of Assist   Bathing Minimal Assist   Bathing Description Grab bar;Hand held shower;Tub bench;Increased time;Initial preparation for task;Set-up of equipment;Supervision for safety;Verbal cueing  (FT with wife for bathing. Wife assisted with rinsing and washing hair. Educated on level of cues and techniques to assist with maintaining cervical precautions. Pt able to reach all parts for wash/dry with lateral lean for rear. Mod cues for precautions.)   Upper Body Dressing Moderate Assist   Upper Body Dressing Description Application of orthotic or brace;Assit with threading arms through sleeves;Increased time;Initial preparation for task;Set-up of equipment;Supervision for safety;Verbal cueing  (To don/doff hospital gown. Dep to don/doff cervical collar, exchanging pads for dry ones following bathing.)   Lower Body Dressing Moderate Assist   Lower Body Dressing Description Grab bar;Assist with threading into pant leg;Increased time;Initial preparation for task;Set-up of equipment;Supervision for safety;Verbal cueing  (Wife initiated doffing clothes " prior to therapists cues to allow pt to attempt and completed draw down and doffing of pant/socks/UW with pt holding GB. Pt then setup with [place] for donning using sink for stability with min cues for precautions.)   Bed, Chair, Wheelchair Transfer Contact Guard Assist   Bed Chair Wheelchair Transfer Description Adaptive equipment;Initial preparation for task;Set-up of equipment;Supervision for safety;Verbal cueing  (Stand step using FWW. CGA for log rolling with cues prior to transfer with minimal use of bed rail.)   Tub / Shower Transfers Minimal Assist   Tub Shower Transfer Description Grab bar;Increased time;Initial preparation for task;Set-up of equipment;Verbal cueing;Supervision for safety  (Stand step used with GB and [place] for hand placement with cues for precautions.)   Interdisciplinary Plan of Care Collaboration   IDT Collaboration with  Nursing;Family / Caregiver   Patient Position at End of Therapy Seated;Chair Alarm On;Call Light within Reach;Tray Table within Reach;Family / Friend in Room;Other (Comments)  (Nursing present at end of session to administer medications.)   Collaboration Comments Nursing checked blood sugar at beginning of session. FT completed with wife for bathing, dressing, and changing cervical collar pads. Continued education required for pt safety upon DC.         Assessment    FT for showers initiated with wife, who demonstrated fair level of cues for precautions during ADLs, but would benefit from continued trails for improved pt safety. She would also benefit from trial of exchanging collar pads for improved carryover at home. Pt tolerated session well, but did require consistent cues for cervical precautions with several attempts to turn and tuck chin during ADL's, as well as reaching above head for hair washing. Wife was able to complete hair during bathing.    Strengths: Able to follow instructions, Alert and oriented, Independent prior level of function, Pleasant and  cooperative, Supportive family, Willingly participates in therapeutic activities  Barriers: Fatigue, Generalized weakness, Impaired activity tolerance, Impaired insight/denial of deficits, Pain, Limited mobility, Visual impairment, Hypertension, Impaired balance, Language barrier, non-fluent English, Decreased endurance    Plan    Continue with FT for maintaining cervical precautions during ADLs and functional transfers, overall strengthening, static and dynamic standing balance while incorporating precautions in functional reaching.        Occupational Therapy Goals (Active)       Problem: Dressing       Dates: Start: 07/20/22         Goal: STG-Within one week, patient will dress UB with Min A using AE/AD as needed       Dates: Start: 07/20/22         Goal Note filed on 07/21/22 0808 by Vee Ambrose OT       Pt evaluated 7-20-22; goal still appropriate              Goal: STG-Within one week, patient will dress LB with SBA using AE/AD as needed       Dates: Start: 07/20/22         Goal Note filed on 07/21/22 0808 by Vee Ambrose OT       Pt evaluated 7-20-22; goal still appropriate                 Problem: Eating       Dates: Start: 07/20/22         Goal: STG-Within one week, patient will feed self with Min A using AE/AD as needed       Dates: Start: 07/20/22         Goal Note filed on 07/21/22 0808 by Vee Ambrose OT       Pt evaluated 7-20-22; goal still appropriate                 Problem: Grooming       Dates: Start: 07/20/22         Goal: STG-Within one week, patient will complete grooming with SUP using AE/AD as needed       Dates: Start: 07/20/22         Goal Note filed on 07/21/22 0808 by Vee Ambrose OT       Pt evaluated 7-20-22; goal still appropriate                 Problem: OT Long Term Goals       Dates: Start: 07/20/22         Goal: LTG-By discharge, patient will complete basic self care tasks SBA/set-up assist using AE/AD as needed       Dates: Start: 07/20/22             Goal: LTG-By discharge, patient will perform bathroom transfers SBA/set-up assist using AE/AD as needed       Dates: Start: 07/20/22

## 2022-07-23 NOTE — THERAPY
"Occupational Therapy  Daily Treatment     Patient Name: Pj Britt  Age:  61 y.o., Sex:  male  Medical Record #: [id number]  Today's Date: 7/23/2022     Precautions  Precautions: Cervical Collar  , Spinal / Back Precautions , Fall Risk  Comments: Dialysis MWF, C-collar on at all times, Legally blind, [place]-speaking         Subjective    \"This doesn't hurt my neck, it's just my arm is getting tired.\"     Objective       07/23/22 1401   OT Charge Group   OT Therapeutic Exercise  2   OT Total Time Spent   OT Individual Total Time Spent (Mins) 30   Vitals   O2 (LPM) 2   O2 Delivery Device Silicone Nasal Cannula   Pain   Pain Scales 0 to 10 Scale    Intervention Repositioned;Rest   Pain 0 - 10 Group   Location Neck   Pain Rating Scale (NPRS) 3   Description Aching   Comfort Goal Comfort with Movement;Perform Activity   Therapist Pain Assessment Prior to Activity   Sitting Upper Body Exercises   Internal Shoulder Rotation 2 sets of 10;Bilateral;Weight (See Comments for lbs)   External Shoulder Rotation 2 sets of 10;Bilateral;Weight (See Comments for lbs)   Bicep Curls 2 sets of 10;Bilateral;Weight (See Comments for lbs)   Pronation / Supination 2 sets of 10;Bilateral;Weight (See Comments for lbs)   Comments R-hand used 1lb hand weight with manual support in motion, LUE used 2lb hand weight. Pt educated and consistently cued to relax trunk with noted extension when attempting to produce RUE movement due to overall weakness. Extra time for breaks between all sets, completing one hand at a time.   Interdisciplinary Plan of Care Collaboration   IDT Collaboration with  Family / Caregiver   Patient Position at End of Therapy Seated;Chair Alarm On;Call Light within Reach;Tray Table within Reach   Collaboration Comments Wife present and translating during entire session per pt request.     Educated pt and wife on importance of incorporating UB strengthening into daily routine using weights under 5lbs for precautions with " both verbalizing understanding. At end of session, pt transported outside per request for brief roll around outdoor garden area for some sunlight.     Assessment    Pt observed to have significant R-proximal weakness with UB strengthening. As session progressed, his muscle fatigue increased with increased need for support from OT.     Strengths: Able to follow instructions, Alert and oriented, Independent prior level of function, Pleasant and cooperative, Supportive family, Willingly participates in therapeutic activities  Barriers: Fatigue, Generalized weakness, Impaired activity tolerance, Impaired insight/denial of deficits, Pain, Limited mobility, Visual impairment, Hypertension, Impaired balance, Language barrier, non-fluent English, Decreased endurance    Plan    Continue with overall strengthening within precations, standing balance/endurance, continue FT with wife for maintaining cervical precautions during ADLs and functional transfers.    Occupational Therapy Goals (Active)       Problem: Dressing       Dates: Start: 07/20/22         Goal: STG-Within one week, patient will dress UB with Min A using AE/AD as needed       Dates: Start: 07/20/22         Goal Note filed on 07/21/22 0808 by Vee Ambrose, OT       Pt evaluated 7-20-22; goal still appropriate              Goal: STG-Within one week, patient will dress LB with SBA using AE/AD as needed       Dates: Start: 07/20/22         Goal Note filed on 07/21/22 0808 by Vee Ambrose OT       Pt evaluated 7-20-22; goal still appropriate                 Problem: Eating       Dates: Start: 07/20/22         Goal: STG-Within one week, patient will feed self with Min A using AE/AD as needed       Dates: Start: 07/20/22         Goal Note filed on 07/21/22 0808 by Vee Ambrose OT       Pt evaluated 7-20-22; goal still appropriate                 Problem: Grooming       Dates: Start: 07/20/22         Goal: STG-Within one week, patient will  complete grooming with SUP using AE/AD as needed       Dates: Start: 07/20/22         Goal Note filed on 07/21/22 0808 by Vee Ambrose OT       Pt evaluated 7-20-22; goal still appropriate                 Problem: OT Long Term Goals       Dates: Start: 07/20/22         Goal: LTG-By discharge, patient will complete basic self care tasks SBA/set-up assist using AE/AD as needed       Dates: Start: 07/20/22            Goal: LTG-By discharge, patient will perform bathroom transfers SBA/set-up assist using AE/AD as needed       Dates: Start: 07/20/22

## 2022-07-24 LAB
GLUCOSE BLD STRIP.AUTO-MCNC: 105 MG/DL (ref 65–99)
GLUCOSE BLD STRIP.AUTO-MCNC: 119 MG/DL (ref 65–99)
GLUCOSE BLD STRIP.AUTO-MCNC: 127 MG/DL (ref 65–99)
GLUCOSE BLD STRIP.AUTO-MCNC: 172 MG/DL (ref 65–99)
GLUCOSE BLD STRIP.AUTO-MCNC: 83 MG/DL (ref 65–99)

## 2022-07-24 PROCEDURE — 770010 HCHG ROOM/CARE - REHAB SEMI PRIVAT*

## 2022-07-24 PROCEDURE — 99232 SBSQ HOSP IP/OBS MODERATE 35: CPT | Performed by: INTERNAL MEDICINE

## 2022-07-24 PROCEDURE — 94760 N-INVAS EAR/PLS OXIMETRY 1: CPT

## 2022-07-24 PROCEDURE — 97110 THERAPEUTIC EXERCISES: CPT

## 2022-07-24 PROCEDURE — 97112 NEUROMUSCULAR REEDUCATION: CPT

## 2022-07-24 PROCEDURE — 82962 GLUCOSE BLOOD TEST: CPT | Mod: 91

## 2022-07-24 PROCEDURE — 97140 MANUAL THERAPY 1/> REGIONS: CPT

## 2022-07-24 PROCEDURE — 700102 HCHG RX REV CODE 250 W/ 637 OVERRIDE(OP): Performed by: PHYSICAL MEDICINE & REHABILITATION

## 2022-07-24 PROCEDURE — A9270 NON-COVERED ITEM OR SERVICE: HCPCS | Performed by: PHYSICAL MEDICINE & REHABILITATION

## 2022-07-24 RX ADMIN — PAROXETINE HYDROCHLORIDE 40 MG: 20 TABLET, FILM COATED ORAL at 08:59

## 2022-07-24 RX ADMIN — ACETAMINOPHEN 1000 MG: 500 TABLET, FILM COATED ORAL at 14:25

## 2022-07-24 RX ADMIN — NORTRIPTYLINE HYDROCHLORIDE 25 MG: 25 CAPSULE ORAL at 21:30

## 2022-07-24 RX ADMIN — ACETAMINOPHEN 1000 MG: 500 TABLET, FILM COATED ORAL at 08:59

## 2022-07-24 RX ADMIN — METOPROLOL TARTRATE 100 MG: 25 TABLET, FILM COATED ORAL at 09:00

## 2022-07-24 RX ADMIN — INSULIN HUMAN 2 UNITS: 100 INJECTION, SOLUTION PARENTERAL at 21:32

## 2022-07-24 RX ADMIN — DOCUSATE SODIUM 100 MG: 100 CAPSULE, LIQUID FILLED ORAL at 21:30

## 2022-07-24 RX ADMIN — OMEPRAZOLE 20 MG: 20 CAPSULE, DELAYED RELEASE ORAL at 09:00

## 2022-07-24 RX ADMIN — Medication 3 MG: at 21:30

## 2022-07-24 RX ADMIN — SENNOSIDES 17.2 MG: 8.6 TABLET, FILM COATED ORAL at 11:32

## 2022-07-24 RX ADMIN — AMLODIPINE BESYLATE 10 MG: 5 TABLET ORAL at 09:00

## 2022-07-24 RX ADMIN — HYDRALAZINE HYDROCHLORIDE 10 MG: 10 TABLET ORAL at 21:30

## 2022-07-24 RX ADMIN — Medication 2001 MG: at 17:09

## 2022-07-24 RX ADMIN — DOCUSATE SODIUM 100 MG: 100 CAPSULE, LIQUID FILLED ORAL at 09:00

## 2022-07-24 RX ADMIN — Medication 2001 MG: at 11:32

## 2022-07-24 RX ADMIN — ACETAMINOPHEN 1000 MG: 500 TABLET, FILM COATED ORAL at 21:30

## 2022-07-24 RX ADMIN — HYDRALAZINE HYDROCHLORIDE 10 MG: 10 TABLET ORAL at 06:49

## 2022-07-24 RX ADMIN — HYDRALAZINE HYDROCHLORIDE 10 MG: 10 TABLET ORAL at 14:25

## 2022-07-24 RX ADMIN — Medication 2001 MG: at 09:00

## 2022-07-24 RX ADMIN — ATORVASTATIN CALCIUM 20 MG: 10 TABLET, FILM COATED ORAL at 09:00

## 2022-07-24 RX ADMIN — INSULIN HUMAN 10 UNITS: 100 INJECTION, SUSPENSION SUBCUTANEOUS at 07:56

## 2022-07-24 RX ADMIN — POLYETHYLENE GLYCOL 3350 1 PACKET: 17 POWDER, FOR SOLUTION ORAL at 09:03

## 2022-07-24 ASSESSMENT — PAIN DESCRIPTION - PAIN TYPE
TYPE: ACUTE PAIN
TYPE: ACUTE PAIN

## 2022-07-24 ASSESSMENT — FIBROSIS 4 INDEX: FIB4 SCORE: 0.8

## 2022-07-24 ASSESSMENT — ENCOUNTER SYMPTOMS
FEVER: 0
WHEEZING: 0
NECK PAIN: 1
ORTHOPNEA: 0
CHILLS: 0
COUGH: 0
NAUSEA: 0
SINUS PAIN: 0
ABDOMINAL PAIN: 0
MYALGIAS: 0
SHORTNESS OF BREATH: 0
HEMOPTYSIS: 0
WEIGHT LOSS: 0
EYES NEGATIVE: 1
PALPITATIONS: 0
VOMITING: 0

## 2022-07-24 ASSESSMENT — PATIENT HEALTH QUESTIONNAIRE - PHQ9
SUM OF ALL RESPONSES TO PHQ9 QUESTIONS 1 AND 2: 0
2. FEELING DOWN, DEPRESSED, IRRITABLE, OR HOPELESS: NOT AT ALL
1. LITTLE INTEREST OR PLEASURE IN DOING THINGS: NOT AT ALL

## 2022-07-24 NOTE — CARE PLAN
The patient is Stable - Low risk of patient condition declining or worsening    Shift Goals  Clinical Goals: Pain management  Patient Goals: Rest    Progress made toward(s) clinical / shift goals:    Problem: Pain - Standard  Goal: Alleviation of pain or a reduction in pain to the patient’s comfort goal  Outcome: Progressing   Patient able to verbalize pain level and verbalize an acceptable level of pain.

## 2022-07-24 NOTE — CARE PLAN
"  Problem: Fall Risk - Rehab  Goal: Patient will remain free from falls  Outcome: Progressing  Note: Jazmine Marin Fall risk Assessment Score: 15    High fall risk Interventions     - Wander guard  - Bed and strip alarm   - Yellow sign by the door   - Yellow wrist band \"Fall risk\"  - Room near to the nurse station  - Do not leave patient unattended in the bathroom  - Fall risk education provided      Problem: Dialysis  Goal: Patient will maintain stable vital signs and fluid balance  Outcome: Progressing  Note: Hemodialysis schedule MWF , Left AV fistula with positive thrill and bruit.     Problem: Diabetes Management  Goal: Patient's ability to maintain appropriate glucose levels will be maintained or improve  Outcome: Progressing  Note: Finger stick at hs- 142 , no coverage needed . No s/s/ of hypo and hyperglycemia noted.   The patient is Watcher - Medium risk of patient condition declining or worsening    Shift Goals  Clinical Goals: Monitor BG, shower, support family education  Patient Goals: Rest    Progress made toward(s) clinical / shift goals:  Pt free from fall and injury.    "

## 2022-07-24 NOTE — THERAPY
"Occupational Therapy  Daily Treatment     Patient Name: Pj Britt  Age:  61 y.o., Sex:  male  Medical Record #: [id number]  Today's Date: 7/24/2022     Precautions  Precautions: Cervical Collar  , Spinal / Back Precautions , Fall Risk  Comments: Dialysis MWF, C-collar on at all times, Legally blind, Frisian-speaking         Subjective    Pt seated in w/c upon arrival with SO present. Pt agreeable to therapy. \"No pain, I feel okay.\"     Objective       07/24/22 0931   Sitting Upper Body Exercises   Chest Press 1 set of 15;Bilateral;Other Resistance (See Comments)  (Against gravity)   Internal Shoulder Rotation 1 set of 15;Bilateral;Other Resistance (See Comments)  (Against gravity)   External Shoulder Rotation 1 set of 15;Bilateral;Other Resistance (See Comments)  (Against gravity)   Bed Mobility    Sit to Supine Contact Guard Assist   Interdisciplinary Plan of Care Collaboration   IDT Collaboration with  Family / Caregiver   Patient Position at End of Therapy In Bed;Call Light within Reach;Tray Table within Reach;Phone within Reach;Family / Friend in Room   Collaboration Comments SO present and translating during entire session per pt request     Manual therapy to BUE's:  - Slow passive shoulder muscle mobility as preparatory    - STM to upper traps, R with significant tightness    Neuro reeducation   - Active scapula protraction/retraction using Saebo arm assist, 2# weighted dowel  - Muscle vibrator used for tension release and muscle stimulation  - Progressed to Standing (CGA) UB therex by pushing wheeled table forward/back (1 set of 10) and forward arm slides (2 sets of 10) to facilitate bilateral functional movement (shoulder: protraction/retraction, flexion, and elbow flexion/extension). No LOB demo.  - NMES applied to B rhomboids, deltoids, supraspinatus, infraspinatus throughout all exercises. Trialed various [place] default settings however none produced strong enough muscle contraction, sensory " stimulation high.     Assessment    Pt cont's to show progress in functional movement but still limited by cervical collar which has been causing muscle stiffness d/t limited motion in neck muscles. Pt receptive to passive/active ROM techniques to reduce shoulder/neck discomfort. Pt would benefit from cont edu on muscle tension relief, family edu, and practice of ADLs/IADLs to ensure safe transition home.     Strengths: Able to follow instructions, Alert and oriented, Independent prior level of function, Pleasant and cooperative, Supportive family, Willingly participates in therapeutic activities  Barriers: Fatigue, Generalized weakness, Impaired activity tolerance, Impaired insight/denial of deficits, Pain, Limited mobility, Visual impairment, Hypertension, Impaired balance, Language barrier, non-fluent English, Decreased endurance    Plan    Training significant other on c-collar wearing/management of pads, Shower routine, ADLs, trial functional NMES to proximal RUE for increase flexion/elevation (self-feeding and dressing concerns), transfers, bed mobility, cont to assess self-feeding      Occupational Therapy Goals (Active)       Problem: Dressing       Dates: Start: 07/20/22         Goal: STG-Within one week, patient will dress UB with Min A using AE/AD as needed       Dates: Start: 07/20/22         Goal Note filed on 07/21/22 0808 by Vee Ambrose OT       Pt evaluated 7-20-22; goal still appropriate              Goal: STG-Within one week, patient will dress LB with SBA using AE/AD as needed       Dates: Start: 07/20/22         Goal Note filed on 07/21/22 0808 by Vee Ambrose OT       Pt evaluated 7-20-22; goal still appropriate                 Problem: Eating       Dates: Start: 07/20/22         Goal: STG-Within one week, patient will feed self with Min A using AE/AD as needed       Dates: Start: 07/20/22         Goal Note filed on 07/21/22 0808 by Vee Ambrose OT       Pt evaluated  7-20-22; goal still appropriate                 Problem: Grooming       Dates: Start: 07/20/22         Goal: STG-Within one week, patient will complete grooming with SUP using AE/AD as needed       Dates: Start: 07/20/22         Goal Note filed on 07/21/22 0808 by Vee Ambrose OT       Pt evaluated 7-20-22; goal still appropriate                 Problem: OT Long Term Goals       Dates: Start: 07/20/22         Goal: LTG-By discharge, patient will complete basic self care tasks SBA/set-up assist using AE/AD as needed       Dates: Start: 07/20/22            Goal: LTG-By discharge, patient will perform bathroom transfers SBA/set-up assist using AE/AD as needed       Dates: Start: 07/20/22

## 2022-07-24 NOTE — PROGRESS NOTES
Nephrology Daily Progress Note    Date of Service  7/24/2022    Chief Complaint  61 y.o. male with ESRD/HD, s/p cervical surgery,admitted 7/19/2022 for rehabilitation    Interval Problem Update  7/24 -doing well, no complaints  HD MWF    Review of Systems  Review of Systems   Constitutional: Positive for malaise/fatigue. Negative for chills, fever and weight loss.   HENT: Negative for congestion, hearing loss and sinus pain.    Eyes: Negative.    Respiratory: Negative for cough, hemoptysis, shortness of breath and wheezing.    Cardiovascular: Negative for chest pain, palpitations, orthopnea and leg swelling.   Gastrointestinal: Negative for abdominal pain, nausea and vomiting.   Musculoskeletal: Positive for neck pain. Negative for joint pain and myalgias.   Skin: Negative.    All other systems reviewed and are negative.       Physical Exam  Temp:  [36.8 °C (98.2 °F)-37.1 °C (98.8 °F)] 37.1 °C (98.8 °F)  Pulse:  [59-84] 59  Resp:  [16-18] 18  BP: (128-141)/(63-75) 138/63  SpO2:  [96 %-100 %] 99 %    Physical Exam  Constitutional:       General: He is not in acute distress.     Appearance: Normal appearance. He is well-developed. He is not diaphoretic.   HENT:      Head: Normocephalic and atraumatic.      Nose: Nose normal.      Mouth/Throat:      Mouth: Mucous membranes are moist.      Pharynx: Oropharynx is clear.   Eyes:      Extraocular Movements: Extraocular movements intact.      Conjunctiva/sclera: Conjunctivae normal.      Pupils: Pupils are equal, round, and reactive to light.   Neck:      Comments: In collar  Cardiovascular:      Rate and Rhythm: Normal rate and regular rhythm.      Pulses: Normal pulses.      Heart sounds: Normal heart sounds.   Pulmonary:      Effort: Pulmonary effort is normal. No respiratory distress.      Breath sounds: Normal breath sounds. No wheezing or rales.   Abdominal:      General: Bowel sounds are normal. There is no distension.      Palpations: Abdomen is soft. There is no  mass.      Tenderness: There is no abdominal tenderness. There is no right CVA tenderness or left CVA tenderness.   Musculoskeletal:      Cervical back: Normal range of motion and neck supple.      Right lower leg: No edema.      Left lower leg: No edema.   Skin:     General: Skin is warm.      Coloration: Skin is not pale.      Findings: No erythema or rash.   Neurological:      General: No focal deficit present.      Mental Status: He is alert and oriented to person, place, and time.      Cranial Nerves: No cranial nerve deficit.      Coordination: Coordination normal.   Psychiatric:         Mood and Affect: Mood normal.         Behavior: Behavior normal.         Thought Content: Thought content normal.         Judgment: Judgment normal.         Fluids    Intake/Output Summary (Last 24 hours) at 7/24/2022 1147  Last data filed at 7/23/2022 1400  Gross per 24 hour   Intake 360 ml   Output --   Net 360 ml       Laboratory      Recent Labs     07/22/22  0533   SODIUM 130*   POTASSIUM 5.3   CHLORIDE 91*   CO2 27   GLUCOSE 137*   BUN 37*   CREATININE 5.88*   CALCIUM 8.9                   Assessment/Plan  1.ESRD/HD -on MWF schedule  2.HTN: BP well controlled -to monitor  3.Electrolytes: hyponatremia -to avoid hypotonic solutions  4.Anemia: Hb to monitor, Epogen with HD on MWF  5.Volume: well controlled    Recs;  Continue current treatment  No need for dialysis today  HD MWF  Lab tests on dialysis days  Will follow

## 2022-07-24 NOTE — FLOWSHEET NOTE
07/24/22 1358   Events/Summary/Plan   Events/Summary/Plan SpO2 check   Vital Signs   Pulse (!) 58   Respiration 16   Pulse Oximetry 100 %   $ Pulse Oximetry (Spot Check) Yes   Respiratory Assessment   Level of Consciousness Alert   Chest Exam   Work Of Breathing / Effort Within Normal Limits   Oxygen   O2 (LPM) 2   O2 Delivery Device Nasal Cannula

## 2022-07-25 DIAGNOSIS — Z99.2 ESRD ON DIALYSIS (HCC): Chronic | ICD-10-CM

## 2022-07-25 DIAGNOSIS — N18.6 ESRD ON DIALYSIS (HCC): Chronic | ICD-10-CM

## 2022-07-25 LAB
ANION GAP SERPL CALC-SCNC: 10 MMOL/L (ref 7–16)
BASOPHILS # BLD AUTO: 0.4 % (ref 0–1.8)
BASOPHILS # BLD: 0.03 K/UL (ref 0–0.12)
BUN SERPL-MCNC: 52 MG/DL (ref 8–22)
CALCIUM SERPL-MCNC: 8.8 MG/DL (ref 8.5–10.5)
CHLORIDE SERPL-SCNC: 90 MMOL/L (ref 96–112)
CO2 SERPL-SCNC: 29 MMOL/L (ref 20–33)
CREAT SERPL-MCNC: 6.96 MG/DL (ref 0.5–1.4)
EOSINOPHIL # BLD AUTO: 0.32 K/UL (ref 0–0.51)
EOSINOPHIL NFR BLD: 4.5 % (ref 0–6.9)
ERYTHROCYTE [DISTWIDTH] IN BLOOD BY AUTOMATED COUNT: 49.6 FL (ref 35.9–50)
GFR SERPLBLD CREATININE-BSD FMLA CKD-EPI: 8 ML/MIN/1.73 M 2
GLUCOSE BLD STRIP.AUTO-MCNC: 125 MG/DL (ref 65–99)
GLUCOSE BLD STRIP.AUTO-MCNC: 140 MG/DL (ref 65–99)
GLUCOSE BLD STRIP.AUTO-MCNC: 150 MG/DL (ref 65–99)
GLUCOSE BLD STRIP.AUTO-MCNC: 210 MG/DL (ref 65–99)
GLUCOSE SERPL-MCNC: 121 MG/DL (ref 65–99)
HCT VFR BLD AUTO: 26.8 % (ref 42–52)
HGB BLD-MCNC: 8.5 G/DL (ref 14–18)
IMM GRANULOCYTES # BLD AUTO: 0.08 K/UL (ref 0–0.11)
IMM GRANULOCYTES NFR BLD AUTO: 1.1 % (ref 0–0.9)
LYMPHOCYTES # BLD AUTO: 0.91 K/UL (ref 1–4.8)
LYMPHOCYTES NFR BLD: 12.7 % (ref 22–41)
MCH RBC QN AUTO: 31.1 PG (ref 27–33)
MCHC RBC AUTO-ENTMCNC: 31.7 G/DL (ref 33.7–35.3)
MCV RBC AUTO: 98.2 FL (ref 81.4–97.8)
MONOCYTES # BLD AUTO: 0.63 K/UL (ref 0–0.85)
MONOCYTES NFR BLD AUTO: 8.8 % (ref 0–13.4)
NEUTROPHILS # BLD AUTO: 5.17 K/UL (ref 1.82–7.42)
NEUTROPHILS NFR BLD: 72.5 % (ref 44–72)
NRBC # BLD AUTO: 0 K/UL
NRBC BLD-RTO: 0 /100 WBC
PHOSPHATE SERPL-MCNC: 2.8 MG/DL (ref 2.5–4.5)
PLATELET # BLD AUTO: 259 K/UL (ref 164–446)
PMV BLD AUTO: 9.5 FL (ref 9–12.9)
POTASSIUM SERPL-SCNC: 6.4 MMOL/L (ref 3.6–5.5)
RBC # BLD AUTO: 2.73 M/UL (ref 4.7–6.1)
SODIUM SERPL-SCNC: 129 MMOL/L (ref 135–145)
WBC # BLD AUTO: 7.1 K/UL (ref 4.8–10.8)

## 2022-07-25 PROCEDURE — A9270 NON-COVERED ITEM OR SERVICE: HCPCS | Performed by: PHYSICAL MEDICINE & REHABILITATION

## 2022-07-25 PROCEDURE — 770010 HCHG ROOM/CARE - REHAB SEMI PRIVAT*

## 2022-07-25 PROCEDURE — 36415 COLL VENOUS BLD VENIPUNCTURE: CPT

## 2022-07-25 PROCEDURE — 700102 HCHG RX REV CODE 250 W/ 637 OVERRIDE(OP): Performed by: PHYSICAL MEDICINE & REHABILITATION

## 2022-07-25 PROCEDURE — 82962 GLUCOSE BLOOD TEST: CPT

## 2022-07-25 PROCEDURE — RXMED WILLOW AMBULATORY MEDICATION CHARGE: Performed by: PHYSICAL MEDICINE & REHABILITATION

## 2022-07-25 PROCEDURE — 84100 ASSAY OF PHOSPHORUS: CPT

## 2022-07-25 PROCEDURE — 700111 HCHG RX REV CODE 636 W/ 250 OVERRIDE (IP): Performed by: INTERNAL MEDICINE

## 2022-07-25 PROCEDURE — 90935 HEMODIALYSIS ONE EVALUATION: CPT

## 2022-07-25 PROCEDURE — 99232 SBSQ HOSP IP/OBS MODERATE 35: CPT | Performed by: PHYSICAL MEDICINE & REHABILITATION

## 2022-07-25 PROCEDURE — 97535 SELF CARE MNGMENT TRAINING: CPT

## 2022-07-25 PROCEDURE — 94760 N-INVAS EAR/PLS OXIMETRY 1: CPT

## 2022-07-25 PROCEDURE — 90935 HEMODIALYSIS ONE EVALUATION: CPT | Performed by: INTERNAL MEDICINE

## 2022-07-25 PROCEDURE — 85025 COMPLETE CBC W/AUTO DIFF WBC: CPT

## 2022-07-25 PROCEDURE — 80048 BASIC METABOLIC PNL TOTAL CA: CPT

## 2022-07-25 RX ORDER — HYDRALAZINE HYDROCHLORIDE 10 MG/1
10 TABLET, FILM COATED ORAL EVERY 8 HOURS
Qty: 90 TABLET | Refills: 2 | Status: SHIPPED | OUTPATIENT
Start: 2022-07-25

## 2022-07-25 RX ORDER — POLYETHYLENE GLYCOL 3350 17 G/17G
17 POWDER, FOR SOLUTION ORAL 2 TIMES DAILY
Qty: 10 EACH | Refills: 3 | Status: SHIPPED | OUTPATIENT
Start: 2022-07-25

## 2022-07-25 RX ORDER — ATORVASTATIN CALCIUM 20 MG/1
20 TABLET, FILM COATED ORAL DAILY
Qty: 30 TABLET | Refills: 2 | Status: SHIPPED | OUTPATIENT
Start: 2022-07-25

## 2022-07-25 RX ORDER — PSEUDOEPHEDRINE HCL 30 MG
100 TABLET ORAL 2 TIMES DAILY
Qty: 60 CAPSULE | Refills: 2 | Status: SHIPPED | OUTPATIENT
Start: 2022-07-25

## 2022-07-25 RX ORDER — CALCIUM ACETATE 667 MG/1
2001 TABLET ORAL
Qty: 180 TABLET | Refills: 2 | Status: SHIPPED | OUTPATIENT
Start: 2022-07-25 | End: 2022-07-25 | Stop reason: SDUPTHER

## 2022-07-25 RX ORDER — ACETAMINOPHEN 500 MG
500 TABLET ORAL 3 TIMES DAILY
Status: DISCONTINUED | OUTPATIENT
Start: 2022-07-25 | End: 2022-07-26 | Stop reason: HOSPADM

## 2022-07-25 RX ORDER — METOPROLOL TARTRATE 100 MG/1
100 TABLET ORAL 2 TIMES DAILY
Qty: 60 TABLET | Refills: 2 | Status: SHIPPED | OUTPATIENT
Start: 2022-07-25

## 2022-07-25 RX ORDER — ACETAMINOPHEN 500 MG
500 TABLET ORAL EVERY 6 HOURS PRN
Qty: 30 TABLET | Refills: 0 | Status: SHIPPED | OUTPATIENT
Start: 2022-07-25

## 2022-07-25 RX ORDER — NORTRIPTYLINE HYDROCHLORIDE 25 MG/1
25 CAPSULE ORAL EVERY EVENING
Qty: 30 CAPSULE | Refills: 2 | Status: SHIPPED | OUTPATIENT
Start: 2022-07-25

## 2022-07-25 RX ORDER — ERGOCALCIFEROL 1.25 MG/1
50000 CAPSULE ORAL
Qty: 5 CAPSULE | Refills: 0 | Status: SHIPPED | OUTPATIENT
Start: 2022-07-27

## 2022-07-25 RX ORDER — ACETAMINOPHEN 500 MG
500 TABLET ORAL EVERY 6 HOURS PRN
Status: DISCONTINUED | OUTPATIENT
Start: 2022-07-25 | End: 2022-07-26 | Stop reason: HOSPADM

## 2022-07-25 RX ORDER — AMLODIPINE BESYLATE 10 MG/1
10 TABLET ORAL DAILY
Qty: 30 TABLET | Refills: 2 | Status: SHIPPED | OUTPATIENT
Start: 2022-07-25

## 2022-07-25 RX ORDER — CALCIUM ACETATE 667 MG/1
2001 TABLET ORAL
Qty: 810 TABLET | Refills: 3 | Status: SHIPPED | OUTPATIENT
Start: 2022-07-25

## 2022-07-25 RX ADMIN — HYDRALAZINE HYDROCHLORIDE 10 MG: 10 TABLET ORAL at 05:13

## 2022-07-25 RX ADMIN — ACETAMINOPHEN 1000 MG: 500 TABLET, FILM COATED ORAL at 07:58

## 2022-07-25 RX ADMIN — METOPROLOL TARTRATE 100 MG: 25 TABLET, FILM COATED ORAL at 21:05

## 2022-07-25 RX ADMIN — INSULIN HUMAN 3 UNITS: 100 INJECTION, SOLUTION PARENTERAL at 21:07

## 2022-07-25 RX ADMIN — SENNOSIDES 17.2 MG: 8.6 TABLET, FILM COATED ORAL at 11:34

## 2022-07-25 RX ADMIN — OMEPRAZOLE 20 MG: 20 CAPSULE, DELAYED RELEASE ORAL at 07:59

## 2022-07-25 RX ADMIN — Medication 2001 MG: at 11:33

## 2022-07-25 RX ADMIN — NORTRIPTYLINE HYDROCHLORIDE 25 MG: 25 CAPSULE ORAL at 21:05

## 2022-07-25 RX ADMIN — ACETAMINOPHEN 500 MG: 500 TABLET, FILM COATED ORAL at 21:05

## 2022-07-25 RX ADMIN — Medication 2001 MG: at 17:28

## 2022-07-25 RX ADMIN — HYDRALAZINE HYDROCHLORIDE 10 MG: 10 TABLET ORAL at 14:00

## 2022-07-25 RX ADMIN — PAROXETINE HYDROCHLORIDE 40 MG: 20 TABLET, FILM COATED ORAL at 07:59

## 2022-07-25 RX ADMIN — POLYETHYLENE GLYCOL 3350 1 PACKET: 17 POWDER, FOR SOLUTION ORAL at 21:06

## 2022-07-25 RX ADMIN — DOCUSATE SODIUM 100 MG: 100 CAPSULE, LIQUID FILLED ORAL at 08:00

## 2022-07-25 RX ADMIN — Medication 2001 MG: at 07:58

## 2022-07-25 RX ADMIN — ATORVASTATIN CALCIUM 20 MG: 10 TABLET, FILM COATED ORAL at 07:59

## 2022-07-25 RX ADMIN — Medication 3 MG: at 21:05

## 2022-07-25 RX ADMIN — DOCUSATE SODIUM 100 MG: 100 CAPSULE, LIQUID FILLED ORAL at 21:05

## 2022-07-25 RX ADMIN — HYDRALAZINE HYDROCHLORIDE 10 MG: 10 TABLET ORAL at 21:05

## 2022-07-25 RX ADMIN — EPOETIN ALFA-EPBX 3000 UNITS: 3000 INJECTION, SOLUTION INTRAVENOUS; SUBCUTANEOUS at 10:15

## 2022-07-25 RX ADMIN — ACETAMINOPHEN 500 MG: 500 TABLET, FILM COATED ORAL at 15:10

## 2022-07-25 ASSESSMENT — ACTIVITIES OF DAILY LIVING (ADL)
TOILET_TRANSFER_LEVEL_OF_ASSIST: REQUIRES PHYSICAL ASSIST WITH TOILET TRANSFER
SHOWER_TRANSFER_LEVEL_OF_ASSIST: REQUIRES PHYSICAL ASSIST WITH SHOWER TRANSFER

## 2022-07-25 ASSESSMENT — PAIN DESCRIPTION - PAIN TYPE
TYPE: ACUTE PAIN
TYPE: ACUTE PAIN

## 2022-07-25 ASSESSMENT — FIBROSIS 4 INDEX: FIB4 SCORE: 0.8

## 2022-07-25 NOTE — PROGRESS NOTES
REHABILITATION PROGRESS NOTE    Date of Admission: 7/19/2022    [facility] Code / Diagnosis to Support: 0003.9 - Neurologic Conditions: Other Neurologic  Etiologic Diagnosis: Cervical radiculopathy    SUBJECTIVE  Patient seen in room working with therapy. Spouse present  GI: continent, last BM 7/24  : anuric  Psych: sleeping well at night  MSK: pain well controlled - nor requiring narcotics  Kidneys: potassium 6.4 today. Originally scheduled for HD later in the day but moved up to AM due to high potassium. Patient reports eating hospital food and not outside food.  I have performed a physical exam and reviewed and updated history and plan today (7/25/2022).     MEDICATIONS:  Current Facility-Administered Medications   Medication Dose   • acetaminophen (TYLENOL) tablet 500 mg  500 mg   • acetaminophen (TYLENOL) tablet 500 mg  500 mg   • vitamin D2 (Ergocalciferol) (Drisdol) capsule 50,000 Units  50,000 Units   • nortriptyline (PAMELOR) capsule 25 mg  25 mg   • polyethylene glycol/lytes (MIRALAX) PACKET 1 Packet  1 Packet   • sennosides (SENOKOT) 8.6 MG tablet 17.2 mg  17.2 mg   • bisacodyl (THE MAGIC BULLET) suppository 10 mg  10 mg   • sodium phosphate (Fleet) enema 133 mL  1 Each   • heparin injection 5,000 Units  5,000 Units   • epoetin (Retacrit) injection (Dialysis Use Only) 3,000 Units  3,000 Units   • Respiratory Therapy Consult     • Pharmacy Consult Request ...Pain Management Review 1 Each  1 Each   • omeprazole (PRILOSEC) capsule 20 mg  20 mg   • artificial tears ophthalmic solution 1 Drop  1 Drop   • benzocaine-menthol (Cepacol) lozenge 1 Lozenge  1 Lozenge   • mag hydrox-al hydrox-simeth (MAALOX PLUS ES or MYLANTA DS) suspension 20 mL  20 mL   • ondansetron (ZOFRAN ODT) dispertab 4 mg  4 mg    Or   • ondansetron (ZOFRAN) syringe/vial injection 4 mg  4 mg   • sodium chloride (OCEAN) 0.65 % nasal spray 2 Spray  2 Spray   • melatonin tablet 3 mg  3 mg   • magnesium hydroxide (MILK OF MAGNESIA) suspension 30 mL   "30 mL   • amLODIPine (NORVASC) tablet 10 mg  10 mg   • atorvastatin (LIPITOR) tablet 20 mg  20 mg   • calcium acetate (PHOS-LO) 667 MG tablet 2,001 mg  2,001 mg   • docusate sodium (COLACE) capsule 100 mg  100 mg   • hydrALAZINE (APRESOLINE) tablet 10 mg  10 mg   • insulin NPH (HumuLIN N,NovoLIN N) injection  10 Units   • insulin regular (HumuLIN R,NovoLIN R) injection  2-9 Units    And   • dextrose 50% (D50W) injection 25 g  25 g   • metoprolol tartrate (LOPRESSOR) tablet 100 mg  100 mg   • oxyCODONE immediate-release (ROXICODONE) tablet 5 mg  5 mg    Or   • oxyCODONE immediate release (ROXICODONE) tablet 10 mg  10 mg   • PARoxetine (PAXIL) tablet 40 mg  40 mg       PHYSICAL EXAM:     VITAL SIGNS:   height is 1.702 m (5' 7.01\") and weight is 66 kg (145 lb 8.1 oz). His oral temperature is 37.1 °C (98.8 °F). His blood pressure is 132/60 and his pulse is 61. His respiration is 18 and oxygen saturation is 99%.     General: well-groomed in no acute distress, spouse present  Eyes: no scleral icterus or conjunctival injection  Ears, nose, mouth and throat: moist oral mucosa  Cardiovascular: good peripheral perfusion  Respiratory: breathing comfortably without use of accessory muscles, +2L NC  Gastrointestinal: soft, nontender, nondistended  Genitourinary: no indwelling mcfarlane  Skin: no wounds seen on exposed skin, incision site healing well    Neurologic:  Mental status: answers questions appropriately follows commands  Speech: fluent, no aphasia or dysarthria  Tone: no spasticity noted  Psychiatric: appropriate affect  Hematologic/lymphatic/immunologic: no IV access, no bruises seen on exposed skin    LABS:  Recent Labs     07/25/22  0529   SODIUM 129*   POTASSIUM 6.4*   CHLORIDE 90*   CO2 29   GLUCOSE 121*   BUN 52*   CREATININE 6.96*   CALCIUM 8.8     Recent Labs     07/25/22  0529   WBC 7.1   RBC 2.73*   HEMOGLOBIN 8.5*   HEMATOCRIT 26.8*   MCV 98.2*   MCH 31.1   MCHC 31.7*   RDW 49.6   PLATELETCT 259   MPV 9.5       "       PRIMARY REHAB DIAGNOSIS:    This patient is a 61 y.o. male admitted for acute inpatient rehabilitation with Cervical radiculopathy.    Medical management / Rehabilitation Issues/ Adverse Potential as part of rehabilitation plan     REHABILITATION ISSUES/ADVERSE POTENTIAL and MEDICAL CO-MORBIDITIES/ADVERSE POTENTIAL AFFECTING FUNCTION:    ##MSK  #Impaired ADLs and mobility  Patient is admitted to [facility] for comprehensive rehabilitation therapy as described.   Rehabilitation nursing monitors bowel and bladder control, educates on medication administration, co-morbidities and monitors patient safety.  Anticipated discharge date: 7/26/2022  Anticipated discharge destination: home with family  Equipment: has all needed equipment  Postdischarge therapies: home health OT and PT  Followup: Ady Barr MD (neurosurgery), PCP  Precautions: C collar at all times  Code: full resuscitation    #Postsurgical pain, acute, controlled  #Neuropathic pain, acute, uncontrolled  Ordered decrease to tylenol 500mg TID +PRN pain  Continue nortriptyline 25mg QHS, oxycodone 5-10mg Q4hr PRN pain (not discharged with oxycodone)    ##NEURO/PSYCH  #Cervical radiculopathy s/p anterior C5 corpectomy, 4-6 fusion, posterior C3-T1 lami fusion on 7/12/2022 by Dr. Ady Barr MD  Three month history of right arm weakness and neuropathic pain  R C5-T1 5/5 in all key muscles except 3/5 in biceps and triceps (C7 and C5)  Leg weakness started three months ago and not likely due to cervical   Anuric so unable to determine bowel control  Does have constipation  Monitor for possible spinal cord injury component    #Chronic psychiatric disordered  Continue home paxil 40mg daily  Monitor    #RESP  Respiratory therapy: RT performs O2 management prn, breathing retraining, pulmonary hygiene and bronchospasm management prn to optimize participation in therapies.     ##CARDIAC  #Chronic hypertension  #Chronic  hyperlipidemia  Continue atorvastatin 20mg daily, hydralazine 10mg TID, metoprolol 100mg BID    DVT prophylaxis: Continue heparin 5000 units BID. Encourage OOB. Monitor daily for signs and symptoms of DVT including but not limited to swelling and pain to prevent the development of DVT that may interfere with therapies.    ##GI  GI prophylaxis:  Continue prilosec 20mg daily to prevent gastritis/dyspepsia which may interfere with therapies.  #At risk of Neurogenic bowel  #At risk of opioid induced constipation  Goal of one continent BM daily  Continue docusate 100mg BID, miralax 17g BID, senna 17.2mg daily    #At high risk of malnutrition  Dietician monitors nutrient intake, recommend supplements prn and provide nutrition education to pt/family to promote optimal nutrition for wound healing/recovery.   Orders Placed This Encounter   Procedures   • Diet Order Diet: Consistent CHO (Diabetic); Second Modifier: (optional): Renal; Miscellaneous modifications: (optional): Finger Foods     Standing Status:   Standing     Number of Occurrences:   1     Order Specific Question:   Diet:     Answer:   Consistent CHO (Diabetic) [4]     Order Specific Question:   Second Modifier: (optional)     Answer:   Renal [8]     Order Specific Question:   Miscellaneous modifications: (optional)     Answer:   Finger Foods  [2]     Continue multivitamin daily  prealbumin levels 12.8    ##/RENAL  #ESRD on hemodialysis  #Anuric  MWF hemodialysis  Nephrology following  Continue calcium acetate 2001mg TID    ##ENDO  #Vitamin D deficiency  VitD 25 OH 20  Continue ergocalciferol 50,000 units weekly    #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 turns as required to prevent the development of skin breakdown.     Terra Fuentes, DO  Physical  Medicine and Rehabilitation

## 2022-07-25 NOTE — PROGRESS NOTES
Hemodialysis done today, started @ 0927 and ended @ 1229 with net UF= 2000 ml. Noted an old bruise on venous side of AVF prior to cannulation, Dr Haskins informed. Report given to SAMAN Obrien. See flow sheet for details

## 2022-07-25 NOTE — PROGRESS NOTES
Received bedside shift report from Lissy RIZO RN regarding patient and assumed care. Patient awake, calm and stable, currently positioned in bed for comfort and safety; call light within reach. Denies pain or discomfort at this time. Will continue to monitor.

## 2022-07-25 NOTE — CARE PLAN
"  Problem: Knowledge Deficit - Standard  Goal: Patient and family/care givers will demonstrate understanding of plan of care, disease process/condition, diagnostic tests and medications  Outcome: Progressing  Note: Pt agrees with plan of care tonight regarding medications and safety.  Will continue to monitor patient.      Problem: Fall Risk - Rehab  Goal: Patient will remain free from falls  Outcome: Progressing  Note: Jazmine Marin Fall risk Assessment Score:  15    High fall risk Interventions   - Alarming seatbelt  - Wander guard  - Bed and strip alarm   - Yellow sign by the door   - Yellow wrist band \"Fall risk\"  - Room near to the nurse station  - Do not leave patient unattended in the bathroom  - Fall risk education provided           The patient is Stable - Low risk of patient condition declining or worsening    Shift Goals  Clinical Goals: Safety  Patient Goals: Sleep well    Progress made toward(s) clinical / shift goals:  progressing          "

## 2022-07-25 NOTE — DISCHARGE PLANNING
Case management  Met w/ pt's spouse reviewing all dc instructions.   She prefers Out PT Th  She is not interested in home health indicating she has 2 chucky bulls who stay in the house; she prefers out pt therapy @ [facility] in [place] fo PT/OT.    She prefers to leave @ 1000 tomorrow; friend providing transportation home.   Reviewed signed copy of IMM; no questions re letter; in agreement w/ dc tomorrow.   Spouse has made all follow up appts with cardiology, neurology, and surgeon.      Dc date /disposition: 7/26 Home with out pt therapy after confirming Dr Fuentes

## 2022-07-25 NOTE — CARE PLAN
Problem: Dressing  Goal: STG-Within one week, patient will dress UB with Min A using AE/AD as needed  Outcome: Not Met  Note: Requires Assistance with Cervical Collar d/t decreased sight     Problem: OT Long Term Goals  Goal: LTG-By discharge, patient will complete basic self care tasks SBA/set-up assist using AE/AD as needed  Outcome: Not Met  Note: D/t cervical collar, pt requires additional assistance  Goal: LTG-By discharge, patient will perform bathroom transfers SBA/set-up assist using AE/AD as needed  Outcome: Not Met  Note: D/t decreased sight, pt required assistance with transfers     Problem: Eating  Goal: STG-Within one week, patient will feed self with Min A using AE/AD as needed  Outcome: Met     Problem: Grooming  Goal: STG-Within one week, patient will complete grooming with SUP using AE/AD as needed  Outcome: Met     Problem: Dressing  Goal: STG-Within one week, patient will dress LB with SBA using AE/AD as needed  Outcome: Met

## 2022-07-25 NOTE — THERAPY
"Occupational Therapy  Daily Treatment     Patient Name: Pj Britt  Age:  61 y.o., Sex:  male  Medical Record #: [id number]  Today's Date: 7/25/2022     Precautions  Precautions: Cervical Collar  , Spinal / Back Precautions , Fall Risk  Comments: Dialysis MWF, C-collar on at all times, Legally blind, Maced[place]-speaking      Subjective    Pt in bed after dialysis upon arrival. SO present and expressed that pt may be too tired for shower but pt motivated for last shower before d/c. \"I'm okay, we can do.\"     Objective       07/25/22 1301   Vitals   O2 (LPM) 2   O2 Delivery Device Nasal Cannula   Vitals Comments O2 concentrator for shower   Functional Level of Assist   Grooming Standby Assist;Seated   Grooming Description Seated in wheelchair at sink;Supervision for safety  (Performed face/hair wash)   Bathing Minimal Assist   Bathing Description Grab bar;Hand held shower;Increased time;Set-up of equipment;Set up for wound protection;Supervision for safety  (SO provided assistance with shampooing)   Upper Body Dressing Moderate Assist   Upper Body Dressing Description Assit with threading arms through sleeves;Increased time;Application of orthotic or brace;Supervision for safety  (SO provided assistance with donning shirt. FT with don/doff of cervical collar)   Lower Body Dressing Minimal Assist   Lower Body Dressing Description Grab bar;Increased time  (Assistance with standing balance)   Tub / Shower Transfers Minimal Assist   Tub Shower Transfer Description Grab bar;Shower bench;Increased time;Requires lift;Supervision for safety  (w/c <> shower bench, stand step transfer)   Interdisciplinary Plan of Care Collaboration   IDT Collaboration with  Family / Caregiver;Physician   Patient Position at End of Therapy In Bed;Call Light within Reach;Tray Table within Reach;Phone within Reach;Family / Friend in Room   Collaboration Comments SO present and assisted during session to prepare for d/c. Physician checked in " w/family before d/c.       Assessment    Pt tolerated session well, despite having just finished dialysis. Pt showed decreased neck pain compared to initial sessions and will have sufficient home support from SO and son. Pt limited by decreased sight but has good insight on pacing activity tasks. Therapist provided video of don/doff, cleaning process, and padding change for cervical collar to SO to ensure home adherence and safe transition home.    Strengths: Able to follow instructions, Alert and oriented, Independent prior level of function, Pleasant and cooperative, Supportive family, Willingly participates in therapeutic activities  Barriers: Fatigue, Generalized weakness, Impaired activity tolerance, Impaired insight/denial of deficits, Pain, Limited mobility, Visual impairment, Hypertension, Impaired balance, Language barrier, non-fluent English, Decreased endurance    Plan    Prepare for d/c    Occupational Therapy Goals (Active)       Problem: Dressing       Dates: Start: 07/20/22         Goal: STG-Within one week, patient will dress UB with Min A using AE/AD as needed       Dates: Start: 07/20/22         Goal Note filed on 07/21/22 0808 by Vee Ambrose OT       Pt evaluated 7-20-22; goal still appropriate              Goal: STG-Within one week, patient will dress LB with SBA using AE/AD as needed       Dates: Start: 07/20/22         Goal Note filed on 07/21/22 0808 by Vee Ambrose OT       Pt evaluated 7-20-22; goal still appropriate                 Problem: Eating       Dates: Start: 07/20/22         Goal: STG-Within one week, patient will feed self with Min A using AE/AD as needed       Dates: Start: 07/20/22         Goal Note filed on 07/21/22 0808 by Vee Ambrose OT       Pt evaluated 7-20-22; goal still appropriate                 Problem: Grooming       Dates: Start: 07/20/22         Goal: STG-Within one week, patient will complete grooming with SUP using AE/AD as needed        Dates: Start: 07/20/22         Goal Note filed on 07/21/22 0808 by Vee Ambrose OT       Pt evaluated 7-20-22; goal still appropriate                 Problem: OT Long Term Goals       Dates: Start: 07/20/22         Goal: LTG-By discharge, patient will complete basic self care tasks SBA/set-up assist using AE/AD as needed       Dates: Start: 07/20/22            Goal: LTG-By discharge, patient will perform bathroom transfers SBA/set-up assist using AE/AD as needed       Dates: Start: 07/20/22

## 2022-07-25 NOTE — DISCHARGE INSTRUCTIONS
Occupational Therapy Discharge Instructions for Pj Britt    7/25/2022    Level of Assist Required for Eating: Requires Supervision with Eating  Level of Assist Required for Grooming: Requires Supervision with Grooming  Level of Assist Required for Dressing: Requires Physical Assist with Dressing  Level of Assist Required for Toilet Transfer: Requires Physical Assist with Toilet Transfer  Equipment for Toilet Transfer: Grab Bars by Toilet  Level of Assist Required for Shower Transfer: Requires Physical Assist with Shower Transfer  Equipment for Shower Transfer: Grab Bars in Tub / Shower, Tub Transfer Bench  Level of Assist Required for Home Mgmt: Requires Physical Assist with Home Management  Level of Assist Required for Meal Prep: Requires Physical Assist with Meal Preparation  Driving: Please Contact Physician Prior to Driving  Home Exercise Program: None Issued

## 2022-07-25 NOTE — FLOWSHEET NOTE
07/25/22 1524   Events/Summary/Plan   Events/Summary/Plan SpO2 check   Vital Signs   Pulse 66   Respiration 16   Pulse Oximetry 99 %   $ Pulse Oximetry (Spot Check) Yes   Respiratory Assessment   Respiratory Pattern Within Normal Limits   Level of Consciousness Alert   Chest Exam   Work Of Breathing / Effort Within Normal Limits   Oxygen   O2 (LPM) 2   O2 Delivery Device Nasal Cannula

## 2022-07-25 NOTE — PROCEDURES
Diagnosis: End-Stage Renal Disease with recent cervical spine neurosurgery on 7/12/2022, admitted to rehabilitation [facility] for [facility]. Patient seen and examined on hemodialysis during treatment. Patient is stable, tolerating hemodialysis. Denies chest pain and shortness of breath. Orders updated as needed. Please refer to flowsheet for full details.    Access: Left radiocephalic AV fistula  UF goal: 2 L    Plan: Dialysis today per usual schedule.  Low potassium diet.    Denys Haskins MD  Nephrology

## 2022-07-26 ENCOUNTER — PHARMACY VISIT (OUTPATIENT)
Dept: PHARMACY | Facility: MEDICAL CENTER | Age: 62
End: 2022-07-26
Payer: COMMERCIAL

## 2022-07-26 VITALS
SYSTOLIC BLOOD PRESSURE: 148 MMHG | BODY MASS INDEX: 22.32 KG/M2 | RESPIRATION RATE: 12 BRPM | TEMPERATURE: 98 F | HEART RATE: 68 BPM | WEIGHT: 142.2 LBS | HEIGHT: 67 IN | DIASTOLIC BLOOD PRESSURE: 64 MMHG | OXYGEN SATURATION: 99 %

## 2022-07-26 LAB
ANION GAP SERPL CALC-SCNC: 10 MMOL/L (ref 7–16)
BUN SERPL-MCNC: 35 MG/DL (ref 8–22)
CALCIUM SERPL-MCNC: 8.8 MG/DL (ref 8.5–10.5)
CHLORIDE SERPL-SCNC: 93 MMOL/L (ref 96–112)
CO2 SERPL-SCNC: 29 MMOL/L (ref 20–33)
CREAT SERPL-MCNC: 4.49 MG/DL (ref 0.5–1.4)
GFR SERPLBLD CREATININE-BSD FMLA CKD-EPI: 14 ML/MIN/1.73 M 2
GLUCOSE BLD STRIP.AUTO-MCNC: 111 MG/DL (ref 65–99)
GLUCOSE SERPL-MCNC: 117 MG/DL (ref 65–99)
POTASSIUM SERPL-SCNC: 5.2 MMOL/L (ref 3.6–5.5)
SODIUM SERPL-SCNC: 132 MMOL/L (ref 135–145)

## 2022-07-26 PROCEDURE — 82962 GLUCOSE BLOOD TEST: CPT

## 2022-07-26 PROCEDURE — 99239 HOSP IP/OBS DSCHRG MGMT >30: CPT | Performed by: PHYSICAL MEDICINE & REHABILITATION

## 2022-07-26 PROCEDURE — 80048 BASIC METABOLIC PNL TOTAL CA: CPT

## 2022-07-26 PROCEDURE — 700102 HCHG RX REV CODE 250 W/ 637 OVERRIDE(OP): Performed by: PHYSICAL MEDICINE & REHABILITATION

## 2022-07-26 PROCEDURE — 94760 N-INVAS EAR/PLS OXIMETRY 1: CPT

## 2022-07-26 PROCEDURE — 36415 COLL VENOUS BLD VENIPUNCTURE: CPT

## 2022-07-26 PROCEDURE — A9270 NON-COVERED ITEM OR SERVICE: HCPCS | Performed by: PHYSICAL MEDICINE & REHABILITATION

## 2022-07-26 RX ADMIN — METOPROLOL TARTRATE 100 MG: 25 TABLET, FILM COATED ORAL at 08:19

## 2022-07-26 RX ADMIN — Medication 2001 MG: at 08:19

## 2022-07-26 RX ADMIN — DOCUSATE SODIUM 100 MG: 100 CAPSULE, LIQUID FILLED ORAL at 08:19

## 2022-07-26 RX ADMIN — HYDRALAZINE HYDROCHLORIDE 10 MG: 10 TABLET ORAL at 05:21

## 2022-07-26 RX ADMIN — PAROXETINE HYDROCHLORIDE 40 MG: 20 TABLET, FILM COATED ORAL at 08:20

## 2022-07-26 RX ADMIN — AMLODIPINE BESYLATE 10 MG: 5 TABLET ORAL at 08:20

## 2022-07-26 RX ADMIN — ACETAMINOPHEN 500 MG: 500 TABLET, FILM COATED ORAL at 08:19

## 2022-07-26 RX ADMIN — OMEPRAZOLE 20 MG: 20 CAPSULE, DELAYED RELEASE ORAL at 08:20

## 2022-07-26 RX ADMIN — ATORVASTATIN CALCIUM 20 MG: 10 TABLET, FILM COATED ORAL at 08:20

## 2022-07-26 ASSESSMENT — FIBROSIS 4 INDEX: FIB4 SCORE: 0.8

## 2022-07-26 ASSESSMENT — PAIN DESCRIPTION - PAIN TYPE: TYPE: ACUTE PAIN

## 2022-07-26 NOTE — CARE PLAN
The patient is Stable - Low risk of patient condition declining or worsening    Shift Goals  Clinical Goals: Pain management  Patient Goals: Sleep well    Progress made toward(s) clinical / shift goals:    Problem: Pain - Standard  Goal: Alleviation of pain or a reduction in pain to the patient’s comfort goal  Outcome: Progressing   Patient able to verbalize pain level and verbalize an acceptable level of pain.

## 2022-07-26 NOTE — DISCHARGE SUMMARY
Rehab Discharge Summary    Admission Date: 7/19/2022    Discharge Date: 7/26/2022    Attending Physician: Terra Fuentes DO    Admission Diagnosis:   Active Hospital Problems    Diagnosis    • *Cervical radiculopathy    • Cervical myelopathy (HCC)    • Spinal stenosis in cervical region    • Cervical spondylosis with myelopathy    • Mood disorder (HCC)    • ESRD on dialysis (HCC)    • Type 2 diabetes mellitus with nephropathy, and retinopathy (HCC)    • Anemia of chronic renal failure    • Acute on chronic respiratory failure with hypoxia (HCC)    • Hyperlipidemia    • Blindness    • Hypertension        Discharge Diagnosis:  Active Hospital Problems    Diagnosis    • *Cervical radiculopathy    • Cervical myelopathy (HCC)    • Spinal stenosis in cervical region    • Cervical spondylosis with myelopathy    • Mood disorder (HCC)    • ESRD on dialysis (HCC)    • Type 2 diabetes mellitus with nephropathy, and retinopathy (HCC)    • Anemia of chronic renal failure    • Acute on chronic respiratory failure with hypoxia (HCC)    • Hyperlipidemia    • Blindness    • Hypertension        HPI per H&P:  The patient is a 61 y.o. male with a past medical history of neuropathic pain, diabetes mellitus type II, hypertension, ESRD on HD MWF and anuric, Visual impairment; now admitted for acute inpatient rehabilitation with severe functional debility due to cervical radiculopathy. Per spouse, who provided most of the history, pain and weakness in right arm started about three months ago. Pain in right arm continues to be uncontrolled. Patient started using walker in home and wheelchair outdoors about three years ago after he was diagnosed with encephalopathy due to his impaired balance.      Per documentation, MRI of cervical spine on May 30 showed grade I spondylolsithesis at Cervical 4,5 with multilevel degenerative changes causing severe central canal stenosis at C4-C5.     The patient underwent the following procedures on  7/12/2022 by Dr. Ady Barr MD:   PROCEDURE #1 as follows:  1.  Anterior cervical 4-5 and 5-6 diskectomy with interbody arthrodesis.  2.  Total corpectomy of cervical 5.  3.  Bilateral neural foraminotomies, cervical 5 and cervical 6 roots.  4.  Implantation of Globus expandable cage spanning cervical 4 through 6.  5.  Anterior cervical plating, cervical 4 through 6 using a Globus static   plate with 14 mm self-drilling, self-tapping screws.  6.  Use of operative microscope for microdissection.  7.  Use of intraoperative neuromonitoring.  8.  Use of locally harvested morselized autograft.     PROCEDURE #2:  1.  Posterior cervical 3 through thoracic 1 bilateral laminectomy and partial   facetectomy.  2.  Lateral mass screw instrumentation, cervical 3, 4, 5, 6 and 7.  3.  Pedicle screw instrumentation, thoracic 1.  Both these were bilateral using Prescient system.  4.  Posterolateral arthrodesis, posterior cervical 3, 4, 5, 6, 7 and thoracic   5.  Use of locally harvested morselized autograft.  6.  Use of allograft.  7.  Use of intraoperative neuromonitoring.  8.  Use of modifier 22 for extensive difficulty with positioning the patient using the David table.     Recovery was complicated by: impaired mobility and ADLs; constipation; uncontrolled pain     Patient was evaluated by Rehab Medicine physician and Physical Therapy and Occupational Therapy and determined to be appropriate for acute inpatient rehab and was transferred to [facility] on 7/19/2022.    Discharge physical examination  Vitals:    07/26/22 0802   BP:    Pulse: 68   Resp: 12   Temp:    SpO2: 99%     General: well-groomed in no acute distress, spouse present  Eyes: no scleral icterus or conjunctival injection  Ears, nose, mouth and throat: moist oral mucosa  Cardiovascular: good peripheral perfusion, no pallor  Respiratory: breathing comfortably without use of accessory muscles, +2L NC  Gastrointestinal: soft, nontender,  nondistended  Genitourinary: no indwelling mcfarlane  Skin: no wounds seen on exposed skin, incision site healing well     Neurologic:  Mental status: answers questions appropriately follows commands  Speech: fluent, no aphasia or dysarthria  Tone: no spasticity noted  Psychiatric: appropriate affect  Hematologic/lymphatic/immunologic: no IV access, no bruises seen on exposed skin, Left wrist AV fistula    Hospital Course by Problem List:  Patient participated in aggressive occupational and physical therapy and progressed as anticipated.     On the day of discharge, the patient was being treated for the following:   ##MSK  #Impaired ADLs and mobility  Patient is admitted to [facility] for comprehensive rehabilitation therapy as described.   Rehabilitation nursing monitors bowel and bladder control, educates on medication administration, co-morbidities and monitors patient safety.  Anticipated discharge date: 7/26/2022  Anticipated discharge destination: home with family  Equipment: has all needed equipment  Postdischarge therapies: home health OT and PT  Followup: Ady Barr MD (neurosurgery), PCP  Precautions: C collar at all times  Code: full resuscitation     #Postsurgical pain, acute, controlled  #Neuropathic pain, acute, uncontrolled  Continue tylenol 500mg TID +PRN pain, nortriptyline 25mg QHS, oxycodone 5-10mg Q4hr PRN pain (not discharged with oxycodone)     ##NEURO/PSYCH  #Cervical radiculopathy s/p anterior C5 corpectomy, 4-6 fusion, posterior C3-T1 lami fusion on 7/12/2022 by Dr. Ady Barr MD  Three month history of right arm weakness and neuropathic pain  R C5-T1 5/5 in all key muscles except 3/5 in biceps and triceps (C7 and C5)  Leg weakness started three months ago and not likely due to cervical   Anuric so unable to determine bowel control  Does have constipation  Monitor for possible spinal cord injury component     #Chronic psychiatric disordered  Continue home  paxil 40mg daily  Monitor     #RESP  Respiratory therapy: RT performs O2 management prn, breathing retraining, pulmonary hygiene and bronchospasm management prn to optimize participation in therapies.      ##CARDIAC  #Chronic hypertension  #Chronic hyperlipidemia  Continue atorvastatin 20mg daily, hydralazine 10mg TID, metoprolol 100mg BID     DVT prophylaxis: Continue heparin 5000 units BID. Encourage OOB. Monitor daily for signs and symptoms of DVT including but not limited to swelling and pain to prevent the development of DVT that may interfere with therapies.     ##GI  GI prophylaxis:  Continue prilosec 20mg daily to prevent gastritis/dyspepsia which may interfere with therapies.  #At risk of Neurogenic bowel  #At risk of opioid induced constipation  Goal of one continent BM daily  Continue docusate 100mg BID, miralax 17g BID, senna 17.2mg daily     #At high risk of malnutrition  Dietician monitors nutrient intake, recommend supplements prn and provide nutrition education to pt/family to promote optimal nutrition for wound healing/recovery.         Orders Placed This Encounter   Procedures   • Diet Order Diet: Consistent CHO (Diabetic); Second Modifier: (optional): Renal; Miscellaneous modifications: (optional): Finger Foods       Standing Status:   Standing       Number of Occurrences:   1       Order Specific Question:   Diet:       Answer:   Consistent CHO (Diabetic) [4]       Order Specific Question:   Second Modifier: (optional)       Answer:   Renal [8]       Order Specific Question:   Miscellaneous modifications: (optional)       Answer:   Finger Foods  [2]      Continue multivitamin daily  prealbumin levels 12.8     ##/RENAL  #ESRD on hemodialysis  #Anuric  MWF hemodialysis  Nephrology following  Continue calcium acetate 2001mg TID     ##ENDO  #Vitamin D deficiency  VitD 25 OH 20  Continue ergocalciferol 50,000 units weekly     #Diabetes mellitus Type II with renal and optho complications (ESRD and  blindness)  Continue insulin NPH 10 units daily, SSI, accuchecks ACHS     ##HEME  Monitor     ##SKIN  Skin/dermal ulcer prophylaxis: Monitor for new skin conditions with q.2 h. turns as required to prevent the development of skin breakdown.     Functional Status at Discharge  Eating:  Stand by Assist (with finger foods, orientation to where items were, and SBA from wife for holding medications prior to taking them during session)  Eating Description:  Increased time, Needs help bringing food to mouth, Needs help scooping food, Set-up of equipment or meal/tube feeding, Verbal cueing (SO provided feeding assistance to pt)  Grooming:  Standby Assist, Seated  Grooming Description:  Seated in wheelchair at sink, Supervision for safety (Performed face/hair wash)  Bathing:  Minimal Assist  Bathing Description:  Grab bar, Hand held shower, Increased time, Set-up of equipment, Set up for wound protection, Supervision for safety (SO provided assistance with shampooing)  Upper Body Dressing:  Moderate Assist  Upper Body Dressing Description:  Assit with threading arms through sleeves, Increased time, Application of orthotic or brace, Supervision for safety (SO provided assistance with donning shirt. FT with don/doff of cervical collar)  Lower Body Dressing:  Minimal Assist  Lower Body Dressing Description:  Grab bar, Increased time (Assistance with standing balance)  Discharge Location : Home  Patient Discharging with Assist of: Family ;Spouse / Significant Other  Level of Supervision Required: 24 Hour Supervision  Recommended Equipment for Discharge: Front-Wheeled Walker;Manual Wheelchair;Grab Bars by Toilet;Grab Bars in Tub / Shower;Hand Held Shower Head;Shower Chair  Recommended Services Upon Discharge: Home Health Occupational Therapy  Long Term Goals Met: 0  Long Term Goals Not Met: 2, pt did not meet SBA for basic self-care and bathroom transfers.  Reason(s) for Goals Not Met: Pt requires additional assitance d/t  cervical collar and decreased sight.  Criteria for Termination of Services: Maximum Function Achieved for Inpatient Rehabilitation  Walk:  Minimal Assist (min assist to steer FWW)  Distance Walked:  245  Number of Times Distance Was Traveled:  1  Assistive Device:  Front Wheel Walker  Gait Deviation:  Ataxic, Bradykinetic  Wheelchair:     Distance Propelled:      Wheelchair Description:     Stairs Minimal Assist  Stairs Description Extra time, Hand rails, Requires incidental assist (step-to patterning)     Comprehension:  Modified Independent  Comprehension Description:  Verbal cues ()  Expression:  Independent  Expression Description:  Verbal cueing  Social Interaction:     Social Interaction Description:     Problem Solving:  Modified Independent  Problem Solving Description:  Increased time, Supervision, Verbal cueing (SO helped translate)  Memory:  Independent  Memory Description:  Supervision, Verbal cueing       Terra RODRIGUEZ D.O., personally performed a complete drug regimen review and no potential clinically significant medication issues were identified.   Discharge Medication:     Medication List      START taking these medications      Instructions   nortriptyline 25 MG Caps  Commonly known as: PAMELOR   Take 1 Capsule by mouth every evening.  Dose: 25 mg     polyethylene glycol/lytes 17 g Pack  Commonly known as: MIRALAX   Mix and drink 1 Packet by mouth 2 times a day.  Dose: 17 g     vitamin D2 (Ergocalciferol) 1.25 MG (91461 UT) Caps capsule  Start taking on: July 27, 2022  Commonly known as: Drisdol   Take 1 Capsule by mouth every 7 days.  Dose: 50,000 Units        CHANGE how you take these medications      Instructions   Acetaminophen Extra Strength 500 MG Tabs  What changed:   · medication strength  · how much to take  · when to take this  · reasons to take this  Generic drug: acetaminophen   Take 1 Tablet by mouth every 6 hours as needed for Mild Pain or Moderate Pain.  Dose: 500  mg        CONTINUE taking these medications      Instructions   amLODIPine 10 MG Tabs  Commonly known as: NORVASC   [place] 1 tableta por vía oral diariamente.  (Take 1 Tablet by mouth every day.)  Dose: 10 mg     atorvastatin 20 MG Tabs  Commonly known as: LIPITOR   [place] 1 tableta por vía oral diariamente.  (Take 1 Tablet by mouth every day.)  Dose: 20 mg     bisacodyl 10 MG Supp  Commonly known as: DULCOLAX   Insert 1 Suppository into the rectum every 24 hours as needed (if sennosides, docusate, polyethylene glycol 3350, and/or magnesium hydroxide ineffective or not ordered).  Dose: 10 mg     calcium acetate 667 MG Tabs tablet  Commonly known as: PHOS-LO   Doctor's comments: Patient takes 2 or 3 capsules depending on size of the meal.  Take 3 Tablets by mouth 3 times a day with meals.  Dose: 2,001 mg     docusate sodium 100 MG Caps  Commonly known as: COLACE   [place] 1 cápsula por vía oral 2 veces al día.  (Take 1 capsule by mouth 2 times a day.)  Dose: 100 mg     epoetin 3000 UNIT/ML Soln  Commonly known as: Retacrit   Inject 1 mL under the skin every Monday, Wednesday, and Friday.  Dose: 3,000 Units     HumuLIN N 100 UNIT/ML Susp  Generic drug: insulin NPH   Inject 10 Units under the skin every morning.  Dose: 10 Units     hydrALAZINE 10 MG Tabs  Commonly known as: APRESOLINE   Take 1 Tablet by mouth every 8 hours.  Dose: 10 mg     metoprolol tartrate 100 MG Tabs  Commonly known as: LOPRESSOR   [place] 1 tableta por vía oral 2 veces al día.  (Take 1 Tablet by mouth 2 times a day.)  Dose: 100 mg     paroxetine 40 MG tablet  Commonly known as: PAXIL   Take 1 Tablet by mouth every day.  Dose: 40 mg        STOP taking these medications    insulin regular 100 Unit/mL Soln  Commonly known as: HumuLIN R     lactulose 20 GM/30ML Soln     omeprazole 20 MG delayed-release capsule  Commonly known as: PRILOSEC     tizanidine 4 MG Tabs  Commonly known as: ZANAFLEX            Discharge Diet:  American Diabetic Association diet  recommendations  Finger foods as he is independent with feeding in this manner    Discharge Activity:  As tolerated with safety precautions as directed by therapies  Precautions: C collar at all times    Disposition:  Patient to discharge home with family support and community resources.     Equipment:  No new equipment    Follow-up & Discharge Instructions:  Follow up with your primary care provider (PCP) within 7-10 days of discharge to review your medications and take over your care.     Ady Barr MD (neurosurgery)    If you develop chest pain, fever, chills, change in neurologic function (weakness, sensation changes, vision changes), or other concerning sxs, seek immediate medical attention or call 911.      Condition on Discharge:  Good    More than 35 minutes was spent on discharging this patient, including face-to-face time, prescription management, and the dictation of this note.    Terra Fuentes D.O.    Date of Service: 7/26/2022

## 2022-07-26 NOTE — CARE PLAN
The patient is Stable - Low risk of patient condition declining or worsening      Problem: Knowledge Deficit - Standard  Goal: Patient and family/care givers will demonstrate understanding of plan of care, disease process/condition, diagnostic tests and medications  Outcome: Met     Problem: Pain - Standard  Goal: Alleviation of pain or a reduction in pain to the patient’s comfort goal  Outcome: Met     Problem: Skin Integrity  Goal: Skin integrity is maintained or improved  Outcome: Met     Problem: Fall Risk - Rehab  Goal: Patient will remain free from falls  Outcome: Met     Problem: Hemodynamics  Goal: Patient's hemodynamics, fluid balance and neurologic status will be stable or improve  Outcome: Met     Problem: Bladder / Voiding  Goal: Patient will establish and maintain regular urinary output  Outcome: Met     Problem: Bowel Elimination  Goal: Patient will participate in bowel management program  Outcome: Met     Problem: Nutrition  Goal: Patient's nutritional and fluid intake will be adequate or improve  Outcome: Met     Problem: Infection  Goal: Patient will remain free from infection  Outcome: Met     Problem: VTE Prevention  Goal: Patient will remain free from venous thromboembolism (VTE)  Outcome: Met     Problem: Dialysis  Goal: Patient will maintain stable vital signs and fluid balance  Outcome: Met     Problem: Diabetes Management  Goal: Patient's ability to maintain appropriate glucose levels will be maintained or improve  Outcome: Met

## 2022-07-26 NOTE — FLOWSHEET NOTE
07/26/22 0802   Events/Summary/Plan   Events/Summary/Plan o2 spot check   Vital Signs   Pulse 68   Respiration 12   Pulse Oximetry 99 %   $ Pulse Oximetry (Spot Check) Yes   Respiratory Assessment   Level of Consciousness Alert   Chest Exam   Work Of Breathing / Effort Within Normal Limits   Oxygen   O2 (LPM) 2   O2 Delivery Device Silicone Nasal Cannula

## 2022-07-26 NOTE — PROGRESS NOTES
Patient discharged to home per order.  Discharge instructions reviewed with patient's spouse who verbalized understanding. Signed copies placed in chart.  Patient has all belongings. Patient left facility at 10:25 in wheel chair accompanied by rehab staff, wife  and son.  Have enjoyed working with this pleasant patient.

## 2022-07-26 NOTE — THERAPY
Missed Therapy     Patient Name: Pj Britt  Age:  61 y.o., Sex:  male  Medical Record #: [id number]  Today's Date: 7/26/2022    Discussed missed therapy with  Dr Fuentes  and scheduling     07/25/22 1001   Therapy Missed   Missed Therapy (Minutes) 60   Reason For Missed Therapy Medical - Patient on Hold from Therapy      Patient was unable to participate due to having Stat  Dialysis.    Therapist  forgot  to ask for hold order at the time    Requested the hold from  the Dr wayne and it was approved and entered in the chart with clarification

## 2022-07-26 NOTE — PROGRESS NOTES
Received bedside shift report from Lissy RIZO RN regarding patient and assumed care. Patient awake, calm and stable, currently positioned in bed for comfort and safety; call light within reach. Denies pain or discomfort at this time. Will continue to monitor

## 2022-07-27 NOTE — THERAPY
Missed Therapy     Patient Name: Pj Britt  Age:  61 y.o., Sex:  male  Medical Record #: [id number]  Today's Date: 7/27/2022    Discussed missed therapy with , RN, MD    Pt on hold d/t stat dialysis

## 2022-07-31 NOTE — ED NOTES
Report from Slim DAVISON, [facility].    Sec to CHF  Cont tx per CHF/elevated trop

## 2022-08-04 ENCOUNTER — OFFICE VISIT (OUTPATIENT)
Dept: NEUROLOGY | Facility: MEDICAL CENTER | Age: 62
End: 2022-08-04
Attending: PSYCHIATRY & NEUROLOGY
Payer: MEDICARE

## 2022-08-04 VITALS
DIASTOLIC BLOOD PRESSURE: 60 MMHG | SYSTOLIC BLOOD PRESSURE: 118 MMHG | WEIGHT: 139.77 LBS | TEMPERATURE: 97.6 F | BODY MASS INDEX: 21.94 KG/M2 | OXYGEN SATURATION: 100 % | HEIGHT: 67 IN | HEART RATE: 64 BPM

## 2022-08-04 DIAGNOSIS — E11.42 DIABETIC POLYNEUROPATHY ASSOCIATED WITH TYPE 2 DIABETES MELLITUS (HCC): ICD-10-CM

## 2022-08-04 DIAGNOSIS — M48.02 SPINAL STENOSIS IN CERVICAL REGION: Primary | ICD-10-CM

## 2022-08-04 PROCEDURE — 99212 OFFICE O/P EST SF 10 MIN: CPT | Performed by: PSYCHIATRY & NEUROLOGY

## 2022-08-04 PROCEDURE — 99213 OFFICE O/P EST LOW 20 MIN: CPT | Performed by: PSYCHIATRY & NEUROLOGY

## 2022-08-04 ASSESSMENT — ENCOUNTER SYMPTOMS
NECK PAIN: 1
FOCAL WEAKNESS: 1
FALLS: 0
TINGLING: 1

## 2022-08-04 ASSESSMENT — FIBROSIS 4 INDEX: FIB4 SCORE: 0.8

## 2022-08-05 NOTE — PROGRESS NOTES
"Subjective     Pj Britt is a 61 y.o. male who presents with his wife and daughter, who provide assistance in translation (they refused translation services that were offered, signing the appropriate form for an exception), now status post cervical decompressive surgery for critical stenosis and right upper extremity radiculopathy.     HPI    Since last seen, imaging of the cervical spine revealed a critical multilevel central stenosis as well as multilevel foraminal narrowing, most notably at the C5/6/7 levels.  EMG confirmed the presence of a right sided active C5/6 radiculopathy as well as a significant length dependent axonal polyneuropathy.    Since his surgery on July 12, 2022, the patient states that the right arm has improved significantly.  Even his balance has improved notably.  He is able to use the right arm in its entirety with less weakness, though there is still some loss of dexterity with the fingers.  Lifting the arm over his head is much easier.  The left upper extremity remains asymptomatic.  The sensory loss in the right hand and fingers also has improved notably.  He is still an active rehabilitation.    Medical, surgical and family histories are reviewed, there are no new drug allergies.  He is still on nortriptyline 25 mg every evening, vitamin D with calcium, various laxatives and stool softeners, Lipitor, PhosLo, Novolin insulin, Lopressor, hydralazine, Paxil and Norvasc.    Review of Systems   Musculoskeletal: Positive for neck pain. Negative for falls.   Neurological: Positive for tingling and focal weakness.   All other systems reviewed and are negative.    Objective     /60 (BP Location: Right arm, Patient Position: Sitting, BP Cuff Size: Adult)   Pulse 64   Temp 36.4 °C (97.6 °F) (Temporal)   Ht 1.702 m (5' 7\")   Wt 63.4 kg (139 lb 12.4 oz) Comment: Patient Stated  SpO2 100%   BMI 21.89 kg/m²      Physical Exam    He appears in no acute distress.  Vital signs are " stable. What there is no malar rash.  In a hard cervical collar, range of motion obviously cannot be assessed.  Cardiac evaluation is unremarkable.     Neurological Exam    He is fully oriented, there is no aphasia.    PERRLA/EOMI, there is only light vision bilaterally, thus visual fields could not be assessed.  Facial movements are symmetric, sensory exam is intact to temperature.  The tongue and uvula are midline.  Hard cervical collar prevents assessment of head rotation.    Musculoskeletal exam reveals the atrophy of the distal hand musculature is bilaterally.  Strength assessment actually shows improved strength in the right upper extremity, though there is still weakness of the biceps at 4/5, triceps near normal, grasp only slightly weakened at 4+/5.  Left upper extremity is normal at 5/5 throughout.  In the lower extremities there is still some weakness with hip stabilizers, though quadriceps as well as dorsiflexion and plantar flexion bilaterally are normal.    There is still some asymmetry, the biceps reflex on the right diminished when compared to the left, triceps are symmetric, brachial radialis absent bilaterally.  Knee jerks and ankle jerks are absent bilaterally.  Toes are equivocal bilaterally.    Repetitive movements with the right hand are only slightly impaired when compared to the left.  These are symmetric but slowed in both feet.    Sensory exam reveals a stocking pattern decrement of vibration in the distal lower extremities below the knees, temperature below the ankles.  All these modalities are now intact in the right upper extremity, now symmetric to the left.    Assessment & Plan     1. Spinal stenosis in cervical region  He has done incredibly well following his cervical decompressive surgery.  He will continue aggressive physical and occupational therapies.  The polyneuropathy seen on his EMG/NCV study is not surprising, given his diabetes.  This does not mandate treatment at this  time.    For now there is no need for long-term neurologic follow-up.    Time: 20 minutes in total spent on patient care including precharting, record review, discussion with healthcare staff and documentation.  This includes face-to-face time with the patient for exam, review, discussion, as well as counseling and coordinating care.

## 2022-08-18 ENCOUNTER — NON-PROVIDER VISIT (OUTPATIENT)
Dept: CARDIOLOGY | Facility: MEDICAL CENTER | Age: 62
End: 2022-08-18
Attending: INTERNAL MEDICINE
Payer: MEDICARE

## 2022-08-18 DIAGNOSIS — I45.5 SINUS PAUSE: ICD-10-CM

## 2022-08-18 DIAGNOSIS — I48.91 ATRIAL FIBRILLATION, UNSPECIFIED TYPE (HCC): ICD-10-CM

## 2022-08-18 DIAGNOSIS — I48.0 PAROXYSMAL ATRIAL FIBRILLATION (HCC): ICD-10-CM

## 2022-08-18 NOTE — PROGRESS NOTES
Patient enrolled in the 14 day ePatch Holter monitoring program, per Thomas Mcdermott M.D.  >In clinic hook up, monitor S/N [id number].  >Pending EOS.

## 2022-09-09 ENCOUNTER — TELEPHONE (OUTPATIENT)
Dept: CARDIOLOGY | Facility: MEDICAL CENTER | Age: 62
End: 2022-09-09
Payer: MEDICARE

## 2022-09-09 PROCEDURE — 93228 REMOTE 30 DAY ECG REV/REPORT: CPT | Performed by: INTERNAL MEDICINE

## 2022-09-27 ENCOUNTER — HOSPITAL ENCOUNTER (OUTPATIENT)
Dept: CARDIOLOGY | Facility: MEDICAL CENTER | Age: 62
End: 2022-09-27
Attending: INTERNAL MEDICINE
Payer: MEDICARE

## 2022-09-27 DIAGNOSIS — E11.21 TYPE 2 DIABETES MELLITUS WITH NEPHROPATHY (HCC): Chronic | ICD-10-CM

## 2022-09-27 DIAGNOSIS — I10 HTN (HYPERTENSION), MALIGNANT: ICD-10-CM

## 2022-09-27 DIAGNOSIS — I48.0 PAROXYSMAL ATRIAL FIBRILLATION (HCC): ICD-10-CM

## 2022-09-27 DIAGNOSIS — Z91.89 OTHER SPECIFIED PERSONAL RISK FACTORS, NOT ELSEWHERE CLASSIFIED: ICD-10-CM

## 2022-09-27 LAB
LV EJECT FRACT  99904: 65
LV EJECT FRACT MOD 2C 99903: 35.39
LV EJECT FRACT MOD 4C 99902: 70.64
LV EJECT FRACT MOD BP 99901: 56.95

## 2022-09-27 PROCEDURE — 93306 TTE W/DOPPLER COMPLETE: CPT

## 2022-09-27 PROCEDURE — 93306 TTE W/DOPPLER COMPLETE: CPT | Mod: 26 | Performed by: INTERNAL MEDICINE

## 2022-12-12 ENCOUNTER — OFFICE VISIT (OUTPATIENT)
Dept: CARDIOLOGY | Facility: MEDICAL CENTER | Age: 62
End: 2022-12-12
Payer: MEDICARE

## 2022-12-12 VITALS
OXYGEN SATURATION: 97 % | DIASTOLIC BLOOD PRESSURE: 50 MMHG | SYSTOLIC BLOOD PRESSURE: 110 MMHG | WEIGHT: 137.13 LBS | BODY MASS INDEX: 21.52 KG/M2 | HEART RATE: 66 BPM | HEIGHT: 67 IN | RESPIRATION RATE: 16 BRPM

## 2022-12-12 DIAGNOSIS — D68.69 HYPERCOAGULABILITY DUE TO ATRIAL FIBRILLATION (HCC): ICD-10-CM

## 2022-12-12 DIAGNOSIS — I48.91 HYPERCOAGULABILITY DUE TO ATRIAL FIBRILLATION (HCC): ICD-10-CM

## 2022-12-12 DIAGNOSIS — Z99.2 ESRD ON DIALYSIS (HCC): ICD-10-CM

## 2022-12-12 DIAGNOSIS — Z99.81 ON HOME OXYGEN THERAPY: ICD-10-CM

## 2022-12-12 DIAGNOSIS — I10 HTN (HYPERTENSION), MALIGNANT: ICD-10-CM

## 2022-12-12 DIAGNOSIS — I48.3 TYPICAL ATRIAL FLUTTER (HCC): ICD-10-CM

## 2022-12-12 DIAGNOSIS — N18.6 ESRD ON DIALYSIS (HCC): ICD-10-CM

## 2022-12-12 DIAGNOSIS — E11.21 TYPE 2 DIABETES MELLITUS WITH NEPHROPATHY (HCC): ICD-10-CM

## 2022-12-12 LAB — EKG IMPRESSION: NORMAL

## 2022-12-12 PROCEDURE — 99214 OFFICE O/P EST MOD 30 MIN: CPT | Mod: 25 | Performed by: INTERNAL MEDICINE

## 2022-12-12 PROCEDURE — 93000 ELECTROCARDIOGRAM COMPLETE: CPT | Performed by: INTERNAL MEDICINE

## 2022-12-12 ASSESSMENT — ENCOUNTER SYMPTOMS
PALPITATIONS: 0
ORTHOPNEA: 0
PND: 0
COUGH: 0
BRUISES/BLEEDS EASILY: 0
CLAUDICATION: 0
DEPRESSION: 0
DIZZINESS: 0
SHORTNESS OF BREATH: 0
EYE REDNESS: 0
BLOOD IN STOOL: 0
FALLS: 0
SORE THROAT: 0

## 2022-12-12 ASSESSMENT — FIBROSIS 4 INDEX: FIB4 SCORE: 0.82

## 2022-12-12 NOTE — PROGRESS NOTES
Chief Complaint   Patient presents with    Atrial Fibrillation       Subjective     Pj Britt is a 62 y.o. male who presents today for cardiac care and evaluation due to suspicion for new onset atrial fibrillation recently when he was seen at [facility].  Patient also has a history of diabetes, end-stage renal disease on hemodialysis, hypertension.  Patient does not have any prior cardiac problems that he knows of.  No prior cardiac procedures or surgeries.    I personally interpreted the EKG tracings at the outside facility which showed evidence of paroxysmal atrial fibrillation.  Patient is largely asymptomatic.  Of note, patient cannot be on anticoagulation due to his wife's report.  It is unclear at this time.    Today, I personally interpreted the EKG tracing which showed atrial flutter with 4 1 AV block.    Upon further reviewing the tracing of his bio tell, there is suspicion for atrial flutter with variable AV block as well.    I have independently interpreted and reviewed echocardiogram's actual images with patient which showed normal left ventricular systolic function. No wall motion abnormality. No evidence of pulmonary hypertension. No significant valvular disease.    Negative nuclear stress test. I personally interpreted the images of stress test.    No family history of sudden cardiac death.    Of note, patient is on oxygen supplementation and mostly immobile due to blindness.    Past Medical History:   Diagnosis Date    Anemia     Blind in both eyes     Diabetes ([facility])     insulin dependent    Dialysis patient ([facility])     Earline KEATING    ESRD (end stage renal disease) ([facility])     Dr. Haskins    High cholesterol     Hyperlipemia     Hypertension     Oxygen dependent     2 liters, [place]    Snoring     no sleep study     Past Surgical History:   Procedure Laterality Date    CERVICAL DISK AND FUSION ANTERIOR  7/12/2022    Procedure: STAGE 1- ANTERIOR C5 CORPECTOMY, C4-6 FUSION STAGE 2-  POSTERIOR C3-T1 LAMINECTOMY AND FUSION;  Surgeon: Ady Barr M.D.;  Location: SURGERY [facility];  Service: Neurosurgery    CERVICAL FUSION POSTERIOR  7/12/2022    Procedure: FUSION, SPINE, CERVICAL, POSTERIOR APPROACH;  Surgeon: Ady Barr M.D.;  Location: SURGERY [facility];  Service: Neurosurgery    CORPECTOMY  7/12/2022    Procedure: CORPECTOMY, SPINE;  Surgeon: Ady Barr M.D.;  Location: SURGERY [facility];  Service: Neurosurgery    CERVICAL LAMINECTOMY POSTERIOR  7/12/2022    Procedure: LAMINECTOMY, SPINE, CERVICAL, POSTERIOR APPROACH;  Surgeon: Ady Barr M.D.;  Location: SURGERY [facility];  Service: Neurosurgery    RECOVERY  4/19/2016    Procedure: VASCULAR CASE-ILEANA-LEFT ARM FISTULOGRAM WITH ANGIOPLASTY 84690, 16663, 04325-NAI STAGE RENAL DISEASE N18.6;  Surgeon: [facility];  [place] [facility] [place]  [place]:     RECOVERY  2/24/2016    Procedure: IR1 VASCULAR CASE LEFT ARM FISTULOGRAM WITH ANGIOPLASTY;  Surgeon: [facility];  Location: SURGERY PRE-POST PROC UNIT [facility];  Service:     CATH PLACEMENT Right 12/10/2015    Procedure: CATH PLACEMENT;  Surgeon: Lorene Baldwin M.D.;  Location: SURGERY [facility];  Service:     AV FISTULA CREATION Left 12/10/2015    Procedure: AV FISTULA CREATION;  Surgeon: Lorene Baldwin M.D.;  Location: SURGERY [facility];  Service:     GA CATARACT SURG W/IOL 1 STAGE WO ENDO  10/21/15    OTHER  9/22/15    trabecular micro bypass stent- right eye    OTHER  7/2015    take out blood of left eye    OTHER  3/2015    take blood out of eye      Family History   Problem Relation Age of Onset    Alzheimer's Disease Mother 80        Lives in [place]    Hypertension Father     Diabetes Father     Diabetes Brother      Social History     Socioeconomic History    Marital status:      Spouse name: Not on file    Number of children: Not on file    Years of education: Not on file     Highest education level: Not on file   Occupational History    Not on file   Tobacco Use    Smoking status: Never    Smokeless tobacco: Never   Vaping Use    Vaping Use: Never used   Substance and Sexual Activity    Alcohol use: No     Alcohol/week: 0.0 oz    Drug use: No    Sexual activity: Not on file   Other Topics Concern     Service Not Asked    Blood Transfusions Not Asked    Caffeine Concern Not Asked    Occupational Exposure Not Asked    Hobby Hazards Not Asked    Sleep Concern Not Asked    Stress Concern Not Asked    Weight Concern No    Special Diet Yes    Back Care Not Asked    Exercise Not Asked    Bike Helmet Not Asked    Seat Belt Not Asked    Self-Exams Not Asked   Social History Narrative    Not on file     Social Determinants of Health     Financial Resource Strain: Not on file   Food Insecurity: Not on file   Transportation Needs: Not on file   Physical Activity: Not on file   Stress: Not on file   Social Connections: Not on file   Intimate Partner Violence: Not on file   Housing Stability: Not on file     Allergies   Allergen Reactions    Aspirin Unspecified     Cannot have because on Dialysis     Outpatient Encounter Medications as of 12/12/2022   Medication Sig Dispense Refill    acetaminophen (TYLENOL) 500 MG Tab Take 1 Tablet by mouth every 6 hours as needed for Mild Pain or Moderate Pain. 30 Tablet 0    amLODIPine (NORVASC) 10 MG Tab Take 1 Tablet by mouth every day. 30 Tablet 2    atorvastatin (LIPITOR) 20 MG Tab Take 1 Tablet by mouth every day. 30 Tablet 2    docusate sodium 100 MG Cap Take 1 capsule by mouth 2 times a day. 60 Capsule 2    hydrALAZINE (APRESOLINE) 10 MG Tab Take 1 Tablet by mouth every 8 hours. 90 Tablet 2    insulin NPH (HUMULIN/NOVOLIN) 100 UNIT/ML Suspension Inject 10 Units under the skin every morning. 10 mL 2    nortriptyline (PAMELOR) 25 MG Cap Take 1 Capsule by mouth every evening. 30 Capsule 2    polyethylene glycol/lytes (MIRALAX) 17 g Pack Mix and drink 1  "Packet by mouth 2 times a day. 10 Each 3    vitamin D2, Ergocalciferol, (DRISDOL) 1.25 MG (39682 UT) Cap capsule Take 1 Capsule by mouth every 7 days. 5 Capsule 0    metoprolol tartrate (LOPRESSOR) 100 MG Tab Take 1 Tablet by mouth 2 times a day. 60 Tablet 2    calcium acetate (PHOS-LO) 667 MG Tab tablet Take 3 Tablets by mouth 3 times a day with meals. 810 Tablet 3    bisacodyl (DULCOLAX) 10 MG Suppos Insert 1 Suppository into the rectum every 24 hours as needed (if sennosides, docusate, polyethylene glycol 3350, and/or magnesium hydroxide ineffective or not ordered).  0    PARoxetine (PAXIL) 40 MG tablet Take 1 Tablet by mouth every day. 30 Tablet      No facility-administered encounter medications on file as of 12/12/2022.     Review of Systems   Constitutional:  Negative for malaise/fatigue.   HENT:  Negative for sore throat.    Eyes:  Negative for redness.   Respiratory:  Negative for cough and shortness of breath.    Cardiovascular:  Negative for chest pain, palpitations, orthopnea, claudication, leg swelling and PND.   Gastrointestinal:  Negative for blood in stool and melena.   Musculoskeletal:  Negative for falls.   Skin:  Negative for rash.   Neurological:  Negative for dizziness.   Endo/Heme/Allergies:  Does not bruise/bleed easily.   Psychiatric/Behavioral:  Negative for depression.             Objective     /50 (BP Location: Left arm, Patient Position: Sitting, BP Cuff Size: Adult)   Pulse 66   Resp 16   Ht 1.702 m (5' 7\")   Wt 62.2 kg (137 lb 2 oz)   SpO2 97%   BMI 21.48 kg/m²     Physical Exam  Vitals and nursing note reviewed.   Constitutional:       General: He is not in acute distress.     Appearance: He is not diaphoretic.   HENT:      Head: Normocephalic and atraumatic.      Right Ear: External ear normal.      Left Ear: External ear normal.      Nose: No congestion or rhinorrhea.   Eyes:      General:         Right eye: No discharge.         Left eye: No discharge.   Neck:      " Thyroid: No thyromegaly.      Vascular: No JVD.   Cardiovascular:      Rate and Rhythm: Normal rate and regular rhythm.      Pulses: Normal pulses.   Pulmonary:      Effort: No respiratory distress.      Comments: Patient is dependent on supplemental oxygen.    Abdominal:      General: There is no distension.      Tenderness: There is no abdominal tenderness.   Musculoskeletal:         General: No swelling or tenderness.      Right lower leg: No edema.      Left lower leg: No edema.   Skin:     General: Skin is warm and dry.   Neurological:      Mental Status: He is alert and oriented to person, place, and time.      Cranial Nerves: No cranial nerve deficit.   Psychiatric:         Behavior: Behavior normal.              Assessment & Plan     1. Typical atrial flutter (HCC)  EKG    Referral to Anticoagulation Monitoring      2. Hypercoagulability due to atrial fibrillation (HCC)        3. HTN (hypertension), malignant        4. Type 2 diabetes mellitus with nephropathy, and retinopathy (HCC)        5. ESRD on dialysis (HCC)        6. On home oxygen therapy            Medical Decision Making: Today's Assessment/Status/Plan:   Blood pressure is well controlled.  At this time, with the finding of atrial flutter, I would like to send patient to our EP service for further evaluation of possible ablation treatments.  Heart rate is controlled at this time.  Continue dialysis with nephrology.  In terms of atrial flutter, we will refer patient to anticoagulation clinic to start anticoagulation with Warfarin.

## 2022-12-13 ENCOUNTER — ANTICOAGULATION MONITORING (OUTPATIENT)
Dept: VASCULAR LAB | Facility: MEDICAL CENTER | Age: 62
End: 2022-12-13
Payer: MEDICARE

## 2022-12-13 ENCOUNTER — TELEMEDICINE2 (OUTPATIENT)
Dept: VASCULAR LAB | Facility: MEDICAL CENTER | Age: 62
End: 2022-12-13
Payer: MEDICARE

## 2022-12-13 ENCOUNTER — DOCUMENTATION (OUTPATIENT)
Dept: VASCULAR LAB | Facility: MEDICAL CENTER | Age: 62
End: 2022-12-13

## 2022-12-13 VITALS
SYSTOLIC BLOOD PRESSURE: 120 MMHG | HEART RATE: 64 BPM | OXYGEN SATURATION: 100 % | DIASTOLIC BLOOD PRESSURE: 50 MMHG | TEMPERATURE: 97.5 F

## 2022-12-13 DIAGNOSIS — N18.6 ESRD ON DIALYSIS (HCC): Chronic | ICD-10-CM

## 2022-12-13 DIAGNOSIS — I48.91 ATRIAL FIBRILLATION, UNSPECIFIED TYPE (HCC): ICD-10-CM

## 2022-12-13 DIAGNOSIS — Z99.2 ESRD ON DIALYSIS (HCC): Chronic | ICD-10-CM

## 2022-12-13 PROCEDURE — 99213 OFFICE O/P EST LOW 20 MIN: CPT

## 2022-12-13 PROCEDURE — 99999 PR NO CHARGE: CPT | Performed by: INTERNAL MEDICINE

## 2022-12-13 RX ORDER — WARFARIN SODIUM 2.5 MG/1
2.5 TABLET ORAL DAILY
Qty: 60 TABLET | Refills: 3 | Status: SHIPPED | OUTPATIENT
Start: 2022-12-13 | End: 2023-04-25

## 2022-12-13 ASSESSMENT — CHA2DS2 SCORE
VASCULAR DISEASE: NO
CHF OR LEFT VENTRICULAR DYSFUNCTION: NO
PRIOR STROKE OR TIA OR THROMBOEMBOLISM: NO
DIABETES: YES
AGE 75 OR GREATER: NO
HYPERTENSION: YES
AGE 65 TO 74: NO
CHA2DS2 VASC SCORE: 2
SEX: MALE

## 2022-12-13 NOTE — PROGRESS NOTES
Episodes of care updated.    CHADSVASC = 2, however he is 63 YO and on dialysis.  Cardiology has requested start.   HAS-BLED = 2    Suhas Johnson, RivkaD

## 2022-12-14 NOTE — PROGRESS NOTES
.This evaluation was conducted via Telemed using secure and encrypted videoconferencing technology. The patient was physically located at [facility] in [place], [place]. The patient was presented by a medical professional at the originating site.   The patient's identity was confirmed and verbal consent was obtained for this telemedicine encounter.           Anticoagulation Summary  As of 12/13/2022      INR goal:  2.0-3.0   TTR:  --   INR used for dosing:  No new INR was available at the time of this encounter.   Warfarin maintenance plan:  2.5 mg (2.5 mg x 1) every day; Starting 12/13/2022   Weekly warfarin total:  17.5 mg   Plan last modified:  HAYDEN Singh (12/13/2022)   Next INR check:  12/16/2022   Target end date:  Indefinite    Indications    ESRD on dialysis (HCC) [N18.6  Z99.2]  Atrial fibrillation (HCC) [I48.91]                 Anticoagulation Episode Summary       INR check location:      Preferred lab:      Send INR reminders to:      Comments:            Anticoagulation Care Providers       Provider Role Specialty Phone number    Thomas Mcdermott M.D. Referring Cardiovascular Disease (Cardiology) [phone]    [facility] Anticoagulation Services Responsible  [phone]          Pt history and information given by pt wife.      Anticoagulation Patient Findings      PCP Dr Faisal Germain   Cardiologist Dr Smalls To   Pt hx Afib, DMII, ESRD on dialysis, HTN,   CHADSVASC = 2, however he is 63yo and on dialysis, cardiology has requested start  HAS-BLED = 2  DOAC = may consider in future, concerned about cost    Pt is new to warfarin and new to [facility]. Discussed:   *pt and wife given warfarin medication guide to review  Indication for warfarin therapy and INR goal range.   Importance of monitoring and compliance.   Monitoring parameters, signs and symptoms of bleeding or clotting.  Warfarin therapy, side effects, potential DDIs, OTC medications  Hormonal therapy   DDI yes  Pt is not on  antiplatelet/NSAID therapy.   Importance of diet consistency, ie vitamin K intake, supplements  Lifestyle safety, ie smoking, ETOH, hobby safety, fall safety/prevention  Procedures for missed doses or suspected missed doses, surgeries/procedures, travel, dental work, any medication changes    Pt denies any unusual s/s of bleeding, bruising, clotting or any changes to diet or medications.    INR is unable to be checked today, x3 attempts.   Will continue to titrate pt's warfarin dose. Pt has not been taking any anticoagulation   Pt to take warfarin as follows  Pt to start warfarin tonight with taking 1 2.5mg  tablet every night.    Recheck INR in 3 days, standing order placed at lab.  Pt to check INR on friday.  And has another appt set up with telemed on Tuesday 12/20/22 @3:30pm  -will review above information again at next appt.      LAWRENCE Singh.      Added [facility] to care team yes  Send to [facility] yes

## 2022-12-14 NOTE — PROGRESS NOTES
Initial anticoag note and most recent cards note reviewed.  Patient with afib and esrc    Pending further recommendations, we will continue with indefinite anticoagulation with warfarin as directed by cards    Will defer all management of rhythm, rate, and other cv issues, aside from anticoagulation, to cards    Michael [facility], MD  Anticoagulation Clinic    Cc: T Costa;

## 2022-12-15 ENCOUNTER — ANTICOAGULATION MONITORING (OUTPATIENT)
Dept: VASCULAR LAB | Facility: MEDICAL CENTER | Age: 62
End: 2022-12-15
Payer: MEDICARE

## 2022-12-15 DIAGNOSIS — Z99.2 ESRD ON DIALYSIS (HCC): Chronic | ICD-10-CM

## 2022-12-15 DIAGNOSIS — N18.6 ESRD ON DIALYSIS (HCC): Chronic | ICD-10-CM

## 2022-12-15 NOTE — PROGRESS NOTES
[facility]    Pt son called stating the pt hasn't started warfarin. They therefore didn't get an INR today.  I requested they start warfarin on 12/17 and keep appt for 12/20 for initial INR.    Suhas Johnson, RivkaD

## 2022-12-20 ENCOUNTER — ANTICOAGULATION MONITORING (OUTPATIENT)
Dept: VASCULAR LAB | Facility: MEDICAL CENTER | Age: 62
End: 2022-12-20

## 2022-12-20 ENCOUNTER — NON-PROVIDER VISIT (OUTPATIENT)
Dept: MEDICAL GROUP | Facility: PHYSICIAN GROUP | Age: 62
End: 2022-12-20
Payer: MEDICARE

## 2022-12-20 VITALS
RESPIRATION RATE: 12 BRPM | HEART RATE: 62 BPM | DIASTOLIC BLOOD PRESSURE: 60 MMHG | TEMPERATURE: 97.8 F | SYSTOLIC BLOOD PRESSURE: 126 MMHG | OXYGEN SATURATION: 100 %

## 2022-12-20 DIAGNOSIS — Z99.2 ESRD ON DIALYSIS (HCC): Chronic | ICD-10-CM

## 2022-12-20 DIAGNOSIS — N18.6 ESRD ON DIALYSIS (HCC): Chronic | ICD-10-CM

## 2022-12-20 DIAGNOSIS — I48.91 ATRIAL FIBRILLATION, UNSPECIFIED TYPE (HCC): ICD-10-CM

## 2022-12-20 DIAGNOSIS — Z79.01 CHRONIC ANTICOAGULATION: ICD-10-CM

## 2022-12-20 LAB
INR BLD: 1 (ref 0.9–1.2)
INR PPP: 1 (ref 2–3.5)
POC PROTIME: NORMAL

## 2022-12-20 PROCEDURE — 85610 PROTHROMBIN TIME: CPT | Performed by: FAMILY MEDICINE

## 2022-12-20 PROCEDURE — 99213 OFFICE O/P EST LOW 20 MIN: CPT

## 2022-12-20 NOTE — PROGRESS NOTES
This evaluation was conducted via Telemed using secure and encrypted videoconferencing technology. The patient was physically located at [facility] in [place], [place]. The patient was presented by a medical professional at the originating site.   The patient's identity was confirmed and verbal consent was obtained for this telemedicine encounter.      OP Anticoagulation Service Note    Date: 2022  Blood Pressure: 126/60  Pulse: 62  Respiration: 12    Anticoagulation Summary  As of 2022      INR goal:  2.0-3.0   TTR:  --   INR used for dosin.00 (2022)   Warfarin maintenance plan:  5 mg (2.5 mg x 2) every day; Starting 2022   Weekly warfarin total:  35 mg   Plan last modified:  HAYDEN Singh (2022)   Next INR check:  2022   Target end date:  Indefinite    Indications    ESRD on dialysis ([facility]) [N18.6  Z99.2]  Atrial fibrillation ([facility]) [I48.91]                 Anticoagulation Episode Summary       INR check location:      Preferred lab:      Send INR reminders to:      Comments:            Anticoagulation Care Providers       Provider Role Specialty Phone number    Thomas Mcdermott M.D. Referring Cardiovascular Disease (Cardiology) [phone]    [facility] Responsible  [phone]          Anticoagulation Patient Findings  Patient Findings       Negatives:  Signs/symptoms of thrombosis, Signs/symptoms of bleeding, Laboratory test error suspected, Change in health, Change in alcohol use, Change in activity, Upcoming invasive procedure, Emergency department visit, Upcoming dental procedure, Missed doses, Extra doses, Change in medications, Change in diet/appetite, Hospital admission, Bruising, Other complaints            THIS VISIT CONDUCTED WITH PRESENTER VIA TELEMEDICINE UTILIZING SECURE AND ENCRYPTED VIDEOCONFERENCING EQUIPMENT  ROS:    Pulm: Denies SOB, chest pain.    Card: Denies syncope, edema, palpitations.    Extremities: Denies  redness, pain.     PE:    Pulm: No SOB, even and unlabored.    Card: Normal rate and rhythm.    Extremities: No redness, or edema.     INR  sub-therapeutic.    Denies signs/symptoms of bleeding and/or thrombosis.    Denies changes to diet or medications.   Follow up appt in 1 weeks     Plan:  increase to 2 tablets a day, will recheck in 1 week    Medication: Warfarin (Coumadin)          Next Appointment: Tuesday, 12/27/22 @4pm     Pt is not on antiplatelet therapy     Review all of your home medications and newly ordered medications with your doctor and / or pharmacist. Follow medication instructions as directed by your doctor and / or pharmacist. Please keep your medication list with you and share with your physician. Update the information when medications are discontinued, doses are changed, or new medications (including over-the-counter products) are added; and carry medication information at all times in the event of emergency situations.      The patient is on a high risk medication and is sub- therapeutic. This could lead to clot formation or risk of stroke. Therefore this patient requires close monitoring and follow up.      CHEST guidelines recommend frequent INR monitoring at regular intervals (a few days up to a max of 12 weeks) to ensure they are on the proper dose of warfarin and not having any complications from therapy.  INRs can dramatically change over a short time period due to diet, medications, and medical conditions.   The patient instructed to go to the ER for falls with a head injury,  blood in urine or stool or any bleeding that last longer than 20 min.   For questions, please contact Outpatient Anticoagulation Service [phone].     LAWRENCE Singh.

## 2022-12-22 ENCOUNTER — ANTICOAGULATION MONITORING (OUTPATIENT)
Dept: VASCULAR LAB | Facility: MEDICAL CENTER | Age: 62
End: 2022-12-22
Payer: MEDICARE

## 2022-12-22 DIAGNOSIS — N18.6 ESRD ON DIALYSIS (HCC): Chronic | ICD-10-CM

## 2022-12-22 DIAGNOSIS — Z99.2 ESRD ON DIALYSIS (HCC): Chronic | ICD-10-CM

## 2022-12-22 NOTE — PROGRESS NOTES
[facility]    Unable to reach pt.  Spoke with his family who report he will obtain an INR tomorrow at the lab. Pt's INR is not controlled  yet, will need to follow up every 48-72 hours until therapeutic INR is established.    Suhas Johnson, PharmD

## 2022-12-22 NOTE — Clinical Note
Hey just as a heads up, our protocol requires us to get an INR every 48 to 72 hours until patients are stable.  I see you had him coming back in a week, but I think 3 days is probably better in this situtation.  Happy to talk about it though!  Thanks and hope you had a bassam whitt :)

## 2022-12-23 ENCOUNTER — DOCUMENTATION (OUTPATIENT)
Dept: MEDICAL GROUP | Facility: PHYSICIAN GROUP | Age: 62
End: 2022-12-23
Payer: MEDICARE

## 2022-12-23 ENCOUNTER — HOSPITAL ENCOUNTER (OUTPATIENT)
Dept: LAB | Facility: MEDICAL CENTER | Age: 62
End: 2022-12-23
Payer: MEDICARE

## 2022-12-23 DIAGNOSIS — I48.91 ATRIAL FIBRILLATION, UNSPECIFIED TYPE (HCC): ICD-10-CM

## 2022-12-23 LAB
INR PPP: 2.8 (ref 0.87–1.13)
PROTHROMBIN TIME: 28.6 SEC (ref 12–14.6)

## 2022-12-23 PROCEDURE — 85610 PROTHROMBIN TIME: CPT

## 2022-12-23 PROCEDURE — 36415 COLL VENOUS BLD VENIPUNCTURE: CPT

## 2022-12-23 NOTE — PROGRESS NOTES
Spoke to patient's wife today.  As soon as their son comes to town he will take his father to the lab for his INR.    Until the results post, I recommended he continue 5 mg of warfarin daily    They also have an appointment scheduled 12/27 for a telemed coag appointment

## 2022-12-24 ENCOUNTER — ANTICOAGULATION MONITORING (OUTPATIENT)
Dept: MEDICAL GROUP | Facility: PHYSICIAN GROUP | Age: 62
End: 2022-12-24
Payer: MEDICARE

## 2022-12-24 DIAGNOSIS — Z99.2 ESRD ON DIALYSIS (HCC): Chronic | ICD-10-CM

## 2022-12-24 DIAGNOSIS — N18.6 ESRD ON DIALYSIS (HCC): Chronic | ICD-10-CM

## 2022-12-25 NOTE — PROGRESS NOTES
OP Anticoagulation Service Note    Date: 2022    Anticoagulation Summary  As of 2022      INR goal:  2.0-3.0   TTR:  --   INR used for dosin.80 (2022)   Warfarin maintenance plan:  2.5 mg (2.5 mg x 1) every day   Weekly warfarin total:  17.5 mg   Plan last modified:  Jong Woody, PharmD (2022)   Next INR check:  2022   Target end date:  Indefinite    Indications    ESRD on dialysis ([facility]) [N18.6  Z99.2]  Atrial fibrillation (HCC) [I48.91]                 Anticoagulation Episode Summary       INR check location:      Preferred lab:      Send INR reminders to:      Comments:            Anticoagulation Care Providers       Provider Role Specialty Phone number    Thomas Mcdermott M.D. Referring Cardiovascular Disease (Cardiology) [phone]    [facility] Anticoagulation Services Responsible  [phone]          Anticoagulation Patient Findings        Spoke with his wife   Patient's preferred phone number:  [phone]        HPI:   The reason for today's call is to prevent morbidity and mortality from a blood clot and/or stroke and to reduce the risk of bleeding while on a anticoagulant.     PCP:  Faisal Germain M.D.  [address], [place], operated by [facility] [address]    Assessment:     INR  therapeutic, but up to fast.     Lab Results   Component Value Date/Time    BUN 35 (H) 2022 05:35 AM    CREATININE 4.49 (HH) 2022 05:35 AM     Lab Results   Component Value Date/Time    HEMOGLOBIN 8.5 (L) 2022 05:29 AM    HEMATOCRIT 26.8 (L) 2022 05:29 AM    PLATELETCT 259 2022 05:29 AM    ALKPHOSPHAT 85 2022 05:48 AM    ASTSGOT 7 (L) 2022 05:48 AM    ALTSGPT <5 2022 05:48 AM          Current Outpatient Medications:     warfarin, 2.5 mg, Oral, DAILY    acetaminophen, 500 mg, Oral, Q6HRS PRN    amLODIPine, 10 mg, Oral, DAILY    atorvastatin, 20 mg, Oral, DAILY    docusate sodium, 100 mg, Oral, BID    hydrALAZINE, 10 mg, Oral, Q8HRS    insulin NPH, 10  Units, Subcutaneous, QAM    nortriptyline, 25 mg, Oral, Q EVENING    polyethylene glycol/lytes, 17 g, Oral, BID    vitamin D2 (Ergocalciferol), 50,000 Units, Oral, Q7 DAYS    metoprolol tartrate, 100 mg, Oral, BID    calcium acetate, 2,001 mg, Oral, TID WITH MEALS    bisacodyl, 10 mg, Rectal, Q24HRS PRN    PARoxetine, 40 mg, Oral, DAILY      Plan:     Decrease weekly warfarin dose as noted above.   INR went from 1 to 2.8 in 3 days, dose of warfarin is too much.     Follow-up:     test in 3 days         Additional information discussed with patient:     Asked patient to please call the anticoagulation clinic if they have any signs/symptoms of bleeding and/or thrombosis or any changes to diet or medications.      National recommendations regarding anticoagulation therapy:     The CHEST guidelines recommends frequent INR monitoring at regular intervals (a few days up to a max of 12 weeks) to ensure patients are on the proper dose of warfarin, and patients are not having any complications from therapy.  INRs can dramatically change over a short time period due to diet, medications, and medical conditions.       Jong Woody, PharmD, MS, BCACP, LCC  [facility]  Phone: [phone]  Fax: [phone]  On call: [phone]  General scheduling/information [phone]  For emergencies please dial [phone]  Please do not use [employer] for urgent matters, call the phone numbers listed above.    This note was created using voice recognition software (Dragon). The accuracy of the dictation is limited by the abilities of the software. I have reviewed the note prior to signing, however some errors in grammar and context are still possible. If you have any questions related to this note please do not hesitate to contact our office.

## 2022-12-27 ENCOUNTER — NON-PROVIDER VISIT (OUTPATIENT)
Dept: MEDICAL GROUP | Facility: PHYSICIAN GROUP | Age: 62
End: 2022-12-27
Payer: MEDICARE

## 2022-12-27 ENCOUNTER — APPOINTMENT (OUTPATIENT)
Dept: VASCULAR LAB | Facility: MEDICAL CENTER | Age: 62
End: 2022-12-27
Payer: MEDICARE

## 2022-12-27 ENCOUNTER — ANTICOAGULATION MONITORING (OUTPATIENT)
Dept: VASCULAR LAB | Facility: MEDICAL CENTER | Age: 62
End: 2022-12-27

## 2022-12-27 VITALS
TEMPERATURE: 97.3 F | SYSTOLIC BLOOD PRESSURE: 118 MMHG | OXYGEN SATURATION: 98 % | RESPIRATION RATE: 14 BRPM | DIASTOLIC BLOOD PRESSURE: 64 MMHG | HEART RATE: 63 BPM

## 2022-12-27 DIAGNOSIS — Z99.2 ESRD ON DIALYSIS (HCC): Chronic | ICD-10-CM

## 2022-12-27 DIAGNOSIS — N18.6 ESRD ON DIALYSIS (HCC): Chronic | ICD-10-CM

## 2022-12-27 DIAGNOSIS — I48.91 ATRIAL FIBRILLATION, UNSPECIFIED TYPE (HCC): ICD-10-CM

## 2022-12-27 DIAGNOSIS — Z79.01 CHRONIC ANTICOAGULATION: ICD-10-CM

## 2022-12-27 LAB
INR BLD: 2.9 (ref 0.9–1.2)
INR PPP: 2.9 (ref 2–3.5)
POC PROTIME: ABNORMAL

## 2022-12-27 PROCEDURE — 99213 OFFICE O/P EST LOW 20 MIN: CPT

## 2022-12-27 PROCEDURE — 85610 PROTHROMBIN TIME: CPT | Performed by: FAMILY MEDICINE

## 2022-12-27 PROCEDURE — 99999 PR NO CHARGE: CPT | Performed by: FAMILY MEDICINE

## 2022-12-27 NOTE — PROGRESS NOTES
This evaluation was conducted via Telemed using secure and encrypted videoconferencing technology. The patient was physically located at [facility] in [place], [place]. The patient was presented by a medical professional at the originating site.   The patient's identity was confirmed and verbal consent was obtained for this telemedicine encounter.      OP Anticoagulation Service Note    Date: 2022       Anticoagulation Summary  As of 2022      INR goal:  2.0-3.0   TTR:  100.0 % (4 d)   INR used for dosin.90 (2022)   Warfarin maintenance plan:  2.5 mg (2.5 mg x 1) every day   Weekly warfarin total:  17.5 mg   Plan last modified:  Jong Woody, PharmD (2022)   Next INR check:  1/3/2023   Target end date:  Indefinite    Indications    ESRD on dialysis (HCC) [N18.6  Z99.2]  Atrial fibrillation (HCC) [I48.91]                 Anticoagulation Episode Summary       INR check location:      Preferred lab:      Send INR reminders to:      Comments:            Anticoagulation Care Providers       Provider Role Specialty Phone number    Thomas Mcdermott M.D. Referring Cardiovascular Disease (Cardiology) [phone]    [facility] Anticoagulation Services Responsible  [phone]          Anticoagulation Patient Findings  Patient Findings       Negatives:  Signs/symptoms of thrombosis, Signs/symptoms of bleeding, Laboratory test error suspected, Change in health, Change in alcohol use, Change in activity, Upcoming invasive procedure, Emergency department visit, Upcoming dental procedure, Missed doses, Extra doses, Change in medications, Change in diet/appetite, Hospital admission, Bruising, Other complaints            THIS VISIT CONDUCTED WITH PRESENTER VIA TELEMEDICINE UTILIZING SECURE AND ENCRYPTED VIDEOCONFERENCING EQUIPMENT  ROS:    Pulm: Denies SOB, chest pain.    Card: Denies syncope, edema, palpitations.    Extremities: Denies redness, pain.     PE:    Pulm: No SOB, even and unlabored.     Card: Normal rate and rhythm.    Extremities: No redness, or edema.     INR  is-therapeutic.    Denies signs/symptoms of bleeding and/or thrombosis.    Denies changes to diet or medications.   Follow up appt in 1 weeks     Plan:  continue with current dosing regimen, 1 tablet per day.      Medication: Warfarin (Coumadin)        Next Appointment: Tuesday, 1/3/2023 @4pm     Pt is not on antiplatelet therapy    Review all of your home medications and newly ordered medications with your doctor and / or pharmacist. Follow medication instructions as directed by your doctor and / or pharmacist. Please keep your medication list with you and share with your physician. Update the information when medications are discontinued, doses are changed, or new medications (including over-the-counter products) are added; and carry medication information at all times in the event of emergency situations.       Patient is on a high risk medication and therefore requires close monitoring and follow up.     CHEST guidelines recommend frequent INR monitoring at regular intervals (a few days up to a max of 12 weeks) to ensure they are on the proper dose of warfarin and not having any complications from therapy.  INRs can dramatically change over a short time period due to diet, medications, and medical conditions.   The patient instructed to go to the ER for falls with a head injury,  blood in urine or stool or any bleeding that last longer than 20 min.   For questions, please contact Outpatient Anticoagulation Service [phone].     LAWRENCE Singh.

## 2023-01-03 ENCOUNTER — ANTICOAGULATION MONITORING (OUTPATIENT)
Dept: VASCULAR LAB | Facility: MEDICAL CENTER | Age: 63
End: 2023-01-03

## 2023-01-03 ENCOUNTER — NON-PROVIDER VISIT (OUTPATIENT)
Dept: MEDICAL GROUP | Facility: PHYSICIAN GROUP | Age: 63
End: 2023-01-03
Payer: MEDICARE

## 2023-01-03 ENCOUNTER — APPOINTMENT (OUTPATIENT)
Dept: VASCULAR LAB | Facility: MEDICAL CENTER | Age: 63
End: 2023-01-03
Payer: MEDICARE

## 2023-01-03 VITALS
HEART RATE: 61 BPM | RESPIRATION RATE: 12 BRPM | SYSTOLIC BLOOD PRESSURE: 110 MMHG | DIASTOLIC BLOOD PRESSURE: 50 MMHG | TEMPERATURE: 97.6 F | OXYGEN SATURATION: 100 %

## 2023-01-03 DIAGNOSIS — Z79.01 CHRONIC ANTICOAGULATION: ICD-10-CM

## 2023-01-03 DIAGNOSIS — Z99.2 ESRD ON DIALYSIS (HCC): Chronic | ICD-10-CM

## 2023-01-03 DIAGNOSIS — N18.6 ESRD ON DIALYSIS (HCC): Chronic | ICD-10-CM

## 2023-01-03 DIAGNOSIS — I48.91 ATRIAL FIBRILLATION, UNSPECIFIED TYPE (HCC): ICD-10-CM

## 2023-01-03 LAB
INR BLD: 2.9 (ref 0.9–1.2)
INR PPP: 2.9 (ref 2–3.5)
POC PROTIME: ABNORMAL

## 2023-01-03 PROCEDURE — 99999 PR NO CHARGE: CPT | Performed by: NURSE PRACTITIONER

## 2023-01-03 PROCEDURE — 99213 OFFICE O/P EST LOW 20 MIN: CPT

## 2023-01-03 PROCEDURE — 85610 PROTHROMBIN TIME: CPT | Performed by: FAMILY MEDICINE

## 2023-01-04 NOTE — PROGRESS NOTES
This evaluation was conducted via Telemed using secure and encrypted videoconferencing technology. The patient was physically located at [facility] in [place], [place]. The patient was presented by a medical professional at the originating site.   The patient's identity was confirmed and verbal consent was obtained for this telemedicine encounter.      OP Anticoagulation Service Note    Date: 1/3/2023       Anticoagulation Summary  As of 1/3/2023      INR goal:  2.0-3.0   TTR:  100.0 % (1.6 wk)   INR used for dosin.90 (1/3/2023)   Warfarin maintenance plan:  2.5 mg (2.5 mg x 1) every day   Weekly warfarin total:  17.5 mg   Plan last modified:  Jong Woody, PharmD (2022)   Next INR check:  2023   Target end date:  Indefinite    Indications    ESRD on dialysis (HCC) [N18.6  Z99.2]  Atrial fibrillation (HCC) [I48.91]                 Anticoagulation Episode Summary       INR check location:      Preferred lab:      Send INR reminders to:      Comments:            Anticoagulation Care Providers       Provider Role Specialty Phone number    Thomas Mcdermott M.D. Referring Cardiovascular Disease (Cardiology) [phone]    [facility] Anticoagulation Services Responsible  [phone]          Anticoagulation Patient Findings  Patient Findings       Negatives:  Signs/symptoms of thrombosis, Signs/symptoms of bleeding, Laboratory test error suspected, Change in health, Change in alcohol use, Change in activity, Upcoming invasive procedure, Emergency department visit, Upcoming dental procedure, Missed doses, Extra doses, Change in medications, Change in diet/appetite, Hospital admission, Bruising, Other complaints            THIS VISIT CONDUCTED WITH PRESENTER [employer]IA TELE[employer] UTILIZING SECURE AND ENCRYPTED VIDEOCONFERENCING EQUIPMENT  ROS:    Pulm: Denies SOB, chest pain.    Card: Denies syncope, edema, palpitations.    Extremities: Denies redness, pain.     PE:    Pulm: No SOB, even and unlabored.     Card: Normal rate and rhythm.    Extremities: No redness, or edema.     INR  is-therapeutic.    Denies signs/symptoms of bleeding and/or thrombosis.    Denies changes to diet or medications.   Follow up appt in 2 weeks     Plan:  continue with current dosing regimen    Medication: Warfarin (Coumadin)     Sunday Monday Tuesday Wednesday Thursday Friday Saturday      2.5 mg    2.5 mg    2.5 mg    2.5 mg    2.5 mg    2.5 mg    2.5 mg      1 tab(s)    1 tab(s)    1 tab(s)    1 tab(s)    1 tab(s)    1 tab(s)  1 tab(s)     Next Appointment: Tuesday, 1/17/2023 @3pm     Pt is not on antiplatelet therapy     Review all of your home medications and newly ordered medications with your doctor and / or pharmacist. Follow medication instructions as directed by your doctor and / or pharmacist. Please keep your medication list with you and share with your physician. Update the information when medications are discontinued, doses are changed, or new medications (including over-the-counter products) are added; and carry medication information at all times in the event of emergency situations.       Patient is on a high risk medication and therefore requires close monitoring and follow up.     CHEST guidelines recommend frequent INR monitoring at regular intervals (a few days up to a max of 12 weeks) to ensure they are on the proper dose of warfarin and not having any complications from therapy.  INRs can dramatically change over a short time period due to diet, medications, and medical conditions.   The patient instructed to go to the ER for falls with a head injury,  blood in urine or stool or any bleeding that last longer than 20 min.   For questions, please contact Outpatient Anticoagulation Service [phone].     LAWRENCE Singh.

## 2023-01-17 ENCOUNTER — APPOINTMENT (OUTPATIENT)
Dept: VASCULAR LAB | Facility: MEDICAL CENTER | Age: 63
End: 2023-01-17
Payer: MEDICARE

## 2023-01-17 ENCOUNTER — NON-PROVIDER VISIT (OUTPATIENT)
Dept: MEDICAL GROUP | Facility: PHYSICIAN GROUP | Age: 63
End: 2023-01-17
Payer: MEDICARE

## 2023-01-17 ENCOUNTER — ANTICOAGULATION MONITORING (OUTPATIENT)
Dept: VASCULAR LAB | Facility: MEDICAL CENTER | Age: 63
End: 2023-01-17

## 2023-01-17 VITALS
DIASTOLIC BLOOD PRESSURE: 52 MMHG | TEMPERATURE: 97.1 F | RESPIRATION RATE: 12 BRPM | HEART RATE: 60 BPM | OXYGEN SATURATION: 99 % | SYSTOLIC BLOOD PRESSURE: 130 MMHG

## 2023-01-17 DIAGNOSIS — Z79.01 CHRONIC ANTICOAGULATION: ICD-10-CM

## 2023-01-17 DIAGNOSIS — I48.91 ATRIAL FIBRILLATION, UNSPECIFIED TYPE (HCC): ICD-10-CM

## 2023-01-17 DIAGNOSIS — N18.6 ESRD ON DIALYSIS (HCC): Chronic | ICD-10-CM

## 2023-01-17 DIAGNOSIS — Z99.2 ESRD ON DIALYSIS (HCC): Chronic | ICD-10-CM

## 2023-01-17 LAB
INR BLD: 2.4 (ref 0.9–1.2)
INR PPP: 2.4 (ref 2–3.5)
POC PROTIME: ABNORMAL

## 2023-01-17 PROCEDURE — 99999 PR NO CHARGE: CPT | Performed by: NURSE PRACTITIONER

## 2023-01-17 PROCEDURE — 85610 PROTHROMBIN TIME: CPT | Performed by: FAMILY MEDICINE

## 2023-01-17 PROCEDURE — 99213 OFFICE O/P EST LOW 20 MIN: CPT

## 2023-01-17 NOTE — PROGRESS NOTES
This evaluation was conducted via Telemed using secure and encrypted videoconferencing technology. The patient was physically located at [facility] in [place], [place]. The patient was presented by a medical professional at the originating site.   The patient's identity was confirmed and verbal consent was obtained for this telemedicine encounter.      OP Anticoagulation Service Note    Date: 2023       Anticoagulation Summary  As of 2023      INR goal:  2.0-3.0   TTR:  100.0 % (3.6 wk)   INR used for dosin.40 (2023)   Warfarin maintenance plan:  2.5 mg (2.5 mg x 1) every day   Weekly warfarin total:  17.5 mg   Plan last modified:  Jong Woody, PharmD (2022)   Next INR check:  2023   Target end date:  Indefinite    Indications    ESRD on dialysis (HCC) [N18.6  Z99.2]  Atrial fibrillation (HCC) [I48.91]                 Anticoagulation Episode Summary       INR check location:      Preferred lab:      Send INR reminders to:      Comments:            Anticoagulation Care Providers       Provider Role Specialty Phone number    Thomas Mcdermott M.D. Referring Cardiovascular Disease (Cardiology) [phone]    [facility] Anticoagulation Services Responsible  [phone]          Anticoagulation Patient Findings  Patient Findings       Negatives:  Signs/symptoms of thrombosis, Signs/symptoms of bleeding, Laboratory test error suspected, Change in health, Change in alcohol use, Change in activity, Upcoming invasive procedure, Emergency department visit, Upcoming dental procedure, Missed doses, Extra doses, Change in medications, Change in diet/appetite, Hospital admission, Bruising, Other complaints            THIS VISIT CONDUCTED WITH PRESENTER [employer]IA TELE[employer] UTILIZING SECURE AND ENCRYPTED VIDEOCONFERENCING EQUIPMENT  ROS:    Pulm: Denies SOB, chest pain.    Card: Denies syncope, edema, palpitations.    Extremities: Denies redness, pain.     PE:    Pulm: No SOB, even and unlabored.     Card: Normal rate and rhythm.    Extremities: No redness, or edema.     INR  is-therapeutic.    Denies signs/symptoms of bleeding and/or thrombosis.    Denies changes to diet or medications.   Follow up appt in 3 weeks     Plan:  continue with current dosing regimen    Medication: Warfarin (Coumadin)        Next Appointment: Tuesday, 2/7/23 @3pm     Pt is not on antiplatelet therapy     Review all of your home medications and newly ordered medications with your doctor and / or pharmacist. Follow medication instructions as directed by your doctor and / or pharmacist. Please keep your medication list with you and share with your physician. Update the information when medications are discontinued, doses are changed, or new medications (including over-the-counter products) are added; and carry medication information at all times in the event of emergency situations.       Patient is on a high risk medication and therefore requires close monitoring and follow up.     CHEST guidelines recommend frequent INR monitoring at regular intervals (a few days up to a max of 12 weeks) to ensure they are on the proper dose of warfarin and not having any complications from therapy.  INRs can dramatically change over a short time period due to diet, medications, and medical conditions.   The patient instructed to go to the ER for falls with a head injury,  blood in urine or stool or any bleeding that last longer than 20 min.   For questions, please contact Outpatient Anticoagulation Service [phone].     LAWRENCE Singh.

## 2023-01-29 NOTE — PROGRESS NOTES
Received shift report and assumed care of patient.  Patient awake, calm and stable, currently positioned in bed for comfort and safety; call light within reach.  Denies pain or discomfort at this time.  Will continue to monitor.   Spontaneous, unlabored and symmetrical

## 2023-02-07 ENCOUNTER — APPOINTMENT (OUTPATIENT)
Dept: VASCULAR LAB | Facility: MEDICAL CENTER | Age: 63
End: 2023-02-07
Payer: MEDICARE

## 2023-02-07 ENCOUNTER — ANTICOAGULATION MONITORING (OUTPATIENT)
Dept: VASCULAR LAB | Facility: MEDICAL CENTER | Age: 63
End: 2023-02-07

## 2023-02-07 ENCOUNTER — NON-PROVIDER VISIT (OUTPATIENT)
Dept: MEDICAL GROUP | Facility: PHYSICIAN GROUP | Age: 63
End: 2023-02-07
Payer: MEDICARE

## 2023-02-07 VITALS
SYSTOLIC BLOOD PRESSURE: 102 MMHG | DIASTOLIC BLOOD PRESSURE: 46 MMHG | TEMPERATURE: 97.5 F | HEART RATE: 61 BPM | RESPIRATION RATE: 12 BRPM | OXYGEN SATURATION: 100 %

## 2023-02-07 DIAGNOSIS — N18.6 ESRD ON DIALYSIS (HCC): Chronic | ICD-10-CM

## 2023-02-07 DIAGNOSIS — Z79.01 CHRONIC ANTICOAGULATION: ICD-10-CM

## 2023-02-07 DIAGNOSIS — Z99.2 ESRD ON DIALYSIS (HCC): Chronic | ICD-10-CM

## 2023-02-07 DIAGNOSIS — I48.91 ATRIAL FIBRILLATION, UNSPECIFIED TYPE (HCC): ICD-10-CM

## 2023-02-07 LAB
INR BLD: 1.2 (ref 0.9–1.2)
INR PPP: 1.2 (ref 2–3.5)
LOT NUMBER   180167: NORMAL
POC PROTIME: 1.2
POCT INT CON NEG: NEGATIVE
POCT INT CON POS: POSITIVE

## 2023-02-07 PROCEDURE — 85610 PROTHROMBIN TIME: CPT | Performed by: FAMILY MEDICINE

## 2023-02-07 PROCEDURE — 99213 OFFICE O/P EST LOW 20 MIN: CPT

## 2023-02-07 PROCEDURE — 99999 PR NO CHARGE: CPT | Performed by: NURSE PRACTITIONER

## 2023-02-07 NOTE — PROGRESS NOTES
This evaluation was conducted via Telemed using secure and encrypted videoconferencing technology. The patient was physically located at [facility] in [place], [place]. The patient was presented by a medical professional at the originating site.   The patient's identity was confirmed and verbal consent was obtained for this telemedicine encounter.      OP Anticoagulation Service Note    Date: 2023       Anticoagulation Summary  As of 2023      INR goal:  2.0-3.0   TTR:  69.6 % (1.5 mo)   INR used for dosin.20 (2023)   Warfarin maintenance plan:  2.5 mg (2.5 mg x 1) every day   Weekly warfarin total:  17.5 mg   Plan last modified:  Jong Woody, PharmD (2022)   Next INR check:  2023   Target end date:  Indefinite    Indications    ESRD on dialysis (HCC) [N18.6  Z99.2]  Atrial fibrillation (HCC) [I48.91]                 Anticoagulation Episode Summary       INR check location:      Preferred lab:      Send INR reminders to:      Comments:            Anticoagulation Care Providers       Provider Role Specialty Phone number    Thomas Mcdermott M.D. Referring Cardiovascular Disease (Cardiology) [phone]    [facility] Anticoagulation Services Responsible  [phone]          Anticoagulation Patient Findings  Patient Findings       Negatives:  Signs/symptoms of thrombosis, Signs/symptoms of bleeding, Laboratory test error suspected, Change in health, Change in alcohol use, Change in activity, Upcoming invasive procedure, Emergency department visit, Upcoming dental procedure, Missed doses, Extra doses, Change in medications, Change in diet/appetite, Hospital admission, Bruising, Other complaints            THIS VISIT CONDUCTED WITH PRESENTER [employer]IA TELE[employer] UTILIZING SECURE AND ENCRYPTED VIDEOCONFERENCING EQUIPMENT  ROS:    Pulm: Denies SOB, chest pain.    Card: Denies syncope, edema, palpitations.    Extremities: Denies redness, pain.     PE:    Pulm: No SOB, even and unlabored.     Card: Normal rate and rhythm.    Extremities: No redness, or edema.     INR  sub-therapeutic.    Denies signs/symptoms of bleeding and/or thrombosis.    Denies changes to diet or medications.   Follow up appt in 1 weeks     Plan:  take 2 full tablets tonight and tomorrow, got to lab and get INR check on Thursday.  Take 1 tablet on Thursday, and will call results to pt on Friday.      Medication: Warfarin (Coumadin)        Next Appointment: Tuesday, 2/14/2023 @2:45     Pt is not on antiplatelet therapy.    Review all of your home medications and newly ordered medications with your doctor and / or pharmacist. Follow medication instructions as directed by your doctor and / or pharmacist. Please keep your medication list with you and share with your physician. Update the information when medications are discontinued, doses are changed, or new medications (including over-the-counter products) are added; and carry medication information at all times in the event of emergency situations.      The patient is on a high risk medication and is sub- therapeutic. This could lead to clot formation or risk of stroke. Therefore this patient requires close monitoring and follow up.      CHEST guidelines recommend frequent INR monitoring at regular intervals (a few days up to a max of 12 weeks) to ensure they are on the proper dose of warfarin and not having any complications from therapy.  INRs can dramatically change over a short time period due to diet, medications, and medical conditions.   The patient instructed to go to the ER for falls with a head injury,  blood in urine or stool or any bleeding that last longer than 20 min.   For questions, please contact Outpatient Anticoagulation Service [phone].     LAWRENCE Singh.

## 2023-02-09 ENCOUNTER — HOSPITAL ENCOUNTER (OUTPATIENT)
Dept: LAB | Facility: MEDICAL CENTER | Age: 63
End: 2023-02-09
Payer: MEDICARE

## 2023-02-09 DIAGNOSIS — I48.91 ATRIAL FIBRILLATION, UNSPECIFIED TYPE (HCC): ICD-10-CM

## 2023-02-09 LAB
INR PPP: 2.06 (ref 0.87–1.13)
PROTHROMBIN TIME: 22.6 SEC (ref 12–14.6)

## 2023-02-09 PROCEDURE — 85610 PROTHROMBIN TIME: CPT

## 2023-02-09 PROCEDURE — 36415 COLL VENOUS BLD VENIPUNCTURE: CPT

## 2023-02-10 ENCOUNTER — ANTICOAGULATION MONITORING (OUTPATIENT)
Dept: VASCULAR LAB | Facility: MEDICAL CENTER | Age: 63
End: 2023-02-10
Payer: MEDICARE

## 2023-02-10 DIAGNOSIS — N18.6 ESRD ON DIALYSIS (HCC): Chronic | ICD-10-CM

## 2023-02-10 DIAGNOSIS — Z99.2 ESRD ON DIALYSIS (HCC): Chronic | ICD-10-CM

## 2023-02-10 DIAGNOSIS — I48.91 ATRIAL FIBRILLATION, UNSPECIFIED TYPE (HCC): ICD-10-CM

## 2023-02-10 NOTE — PROGRESS NOTES
Anticoagulation Summary  As of 2/10/2023      INR goal:  2.0-3.0   TTR:  66.2 % (1.6 mo)   INR used for dosing:     Plan last modified:  Jong Woody, PharmD (12/24/2022)   Next INR check:     Target end date:  Indefinite    Indications    ESRD on dialysis (HCC) [N18.6  Z99.2]  Atrial fibrillation (HCC) [I48.91]                 Anticoagulation Episode Summary       INR check location:      Preferred lab:      Send INR reminders to:      Comments:            Anticoagulation Care Providers       Provider Role Specialty Phone number    Thomas Mcdermott M.D. Referring Cardiovascular Disease (Cardiology) [phone]    [facility] Anticoagulation Services Responsible  [phone]            Refer to Anticoagulation Patient Findings for HPI      Spoke with patient wife to report a therapeutic INR.      Pt is NOT on antiplatelet therapy .    Pt instructed to continue with current warfarin dosing regimen, confirms dosing.   Will follow up in 1 week(s) in telemed clinic.     LAWRENCE Singh.,

## 2023-02-14 ENCOUNTER — NON-PROVIDER VISIT (OUTPATIENT)
Dept: MEDICAL GROUP | Facility: PHYSICIAN GROUP | Age: 63
End: 2023-02-14
Payer: MEDICARE

## 2023-02-14 ENCOUNTER — APPOINTMENT (OUTPATIENT)
Dept: VASCULAR LAB | Facility: MEDICAL CENTER | Age: 63
End: 2023-02-14
Payer: MEDICARE

## 2023-02-14 ENCOUNTER — ANTICOAGULATION MONITORING (OUTPATIENT)
Dept: VASCULAR LAB | Facility: MEDICAL CENTER | Age: 63
End: 2023-02-14

## 2023-02-14 VITALS
TEMPERATURE: 97.6 F | RESPIRATION RATE: 12 BRPM | HEART RATE: 63 BPM | OXYGEN SATURATION: 99 % | SYSTOLIC BLOOD PRESSURE: 110 MMHG | DIASTOLIC BLOOD PRESSURE: 50 MMHG

## 2023-02-14 DIAGNOSIS — Z99.2 ESRD ON DIALYSIS (HCC): Chronic | ICD-10-CM

## 2023-02-14 DIAGNOSIS — I48.91 ATRIAL FIBRILLATION, UNSPECIFIED TYPE (HCC): ICD-10-CM

## 2023-02-14 DIAGNOSIS — Z79.01 CHRONIC ANTICOAGULATION: ICD-10-CM

## 2023-02-14 DIAGNOSIS — N18.6 ESRD ON DIALYSIS (HCC): Chronic | ICD-10-CM

## 2023-02-14 LAB
INR BLD: 5.1 (ref 0.9–1.2)
INR PPP: 5.1 (ref 2–3.5)
LOT NUMBER   180167: ABNORMAL
POC PROTIME: 5.1
POCT INT CON NEG: NEGATIVE
POCT INT CON POS: POSITIVE

## 2023-02-14 PROCEDURE — 99999 PR NO CHARGE: CPT | Performed by: NURSE PRACTITIONER

## 2023-02-14 PROCEDURE — 99213 OFFICE O/P EST LOW 20 MIN: CPT

## 2023-02-14 PROCEDURE — 85610 PROTHROMBIN TIME: CPT | Performed by: FAMILY MEDICINE

## 2023-02-14 NOTE — PROGRESS NOTES
This evaluation was conducted via Telemed using secure and encrypted videoconferencing technology. The patient was physically located at [facility] in [place], [place]. The patient was presented by a medical professional at the originating site.   The patient's identity was confirmed and verbal consent was obtained for this telemedicine encounter.      OP Anticoagulation Service Note    Date: 2023       Anticoagulation Summary  As of 2023      INR goal:  2.0-3.0   TTR:  63.3 % (1.8 mo)   INR used for dosin.10 (2023)   Warfarin maintenance plan:  2.5 mg (2.5 mg x 1) every day   Weekly warfarin total:  17.5 mg   Plan last modified:  Jong Woody, PharmD (2022)   Next INR check:  2023   Target end date:  Indefinite    Indications    ESRD on dialysis (HCC) [N18.6  Z99.2]  Atrial fibrillation (HCC) [I48.91]                 Anticoagulation Episode Summary       INR check location:      Preferred lab:      Send INR reminders to:      Comments:            Anticoagulation Care Providers       Provider Role Specialty Phone number    Thomas Mcdermott M.D. Referring Cardiovascular Disease (Cardiology) [phone]    [facility] Anticoagulation Services Responsible  [phone]          Anticoagulation Patient Findings  Patient Findings       Negatives:  Signs/symptoms of thrombosis, Signs/symptoms of bleeding, Laboratory test error suspected, Change in health, Change in alcohol use, Change in activity, Upcoming invasive procedure, Emergency department visit, Upcoming dental procedure, Missed doses, Extra doses, Change in medications, Change in diet/appetite, Hospital admission, Bruising, Other complaints            THIS VISIT CONDUCTED WITH PRESENTER VIA TELEMEDICINE UTILIZING SECURE AND ENCRYPTED VIDEOCONFERENCING EQUIPMENT  ROS:    Pulm: Denies SOB, chest pain.    Card: Denies syncope, edema, palpitations.    Extremities: Denies redness, pain.     PE:    Pulm: No SOB, even and unlabored.     Card: Normal rate and rhythm.    Extremities: No redness, or edema.     INR  supra-therapeutic.    Denies signs/symptoms of bleeding and/or thrombosis.    Denies changes to diet or medications.   Follow up appt in 1 weeks     Plan:  hold dose tonight 2/14/23 and Wednesday 2/15/23, take 1/2 tablet on Thursday, and go to lab to have INR drawn.  Will call results on Friday once available.      Medication: Warfarin (Coumadin)    *pt son misunderstood the instructions given on Friday 2/10/23 to pt wife.  Son was giving pt 2 full tablets (5mg) over the weekend, receiving last dose of 5mg 2/13/2023.    Next Appointment: Tuesday, 2/21/2023 @3:15       Pt is not on antiplatelet therapy .    Review all of your home medications and newly ordered medications with your doctor and / or pharmacist. Follow medication instructions as directed by your doctor and / or pharmacist. Please keep your medication list with you and share with your physician. Update the information when medications are discontinued, doses are changed, or new medications (including over-the-counter products) are added; and carry medication information at all times in the event of emergency situations.      The patient is on a high risk medication and is supra-therapeudic. This increases the patients risk of bleeding and therefore requires frequent monitoring and follow up.     CHEST guidelines recommend frequent INR monitoring at regular intervals (a few days up to a max of 12 weeks) to ensure they are on the proper dose of warfarin and not having any complications from therapy.  INRs can dramatically change over a short time period due to diet, medications, and medical conditions.   The patient is instructed to go to the ER for falls with a head injury,  blood in urine or stool or any bleeding that last longer than 20 min.   For questions, please contact Outpatient Anticoagulation Service [phone].     LAWRENCE Singh.

## 2023-02-16 ENCOUNTER — HOSPITAL ENCOUNTER (OUTPATIENT)
Dept: LAB | Facility: MEDICAL CENTER | Age: 63
End: 2023-02-16
Payer: MEDICARE

## 2023-02-16 DIAGNOSIS — I48.91 ATRIAL FIBRILLATION, UNSPECIFIED TYPE (HCC): ICD-10-CM

## 2023-02-16 LAB
INR PPP: 2.47 (ref 0.87–1.13)
PROTHROMBIN TIME: 25.9 SEC (ref 12–14.6)

## 2023-02-16 PROCEDURE — 85610 PROTHROMBIN TIME: CPT

## 2023-02-16 PROCEDURE — 36415 COLL VENOUS BLD VENIPUNCTURE: CPT

## 2023-02-17 ENCOUNTER — ANTICOAGULATION MONITORING (OUTPATIENT)
Dept: VASCULAR LAB | Facility: MEDICAL CENTER | Age: 63
End: 2023-02-17
Payer: MEDICARE

## 2023-02-17 DIAGNOSIS — N18.6 ESRD ON DIALYSIS (HCC): Chronic | ICD-10-CM

## 2023-02-17 DIAGNOSIS — Z99.2 ESRD ON DIALYSIS (HCC): Chronic | ICD-10-CM

## 2023-02-17 DIAGNOSIS — I48.91 ATRIAL FIBRILLATION, UNSPECIFIED TYPE (HCC): ICD-10-CM

## 2023-02-17 NOTE — PROGRESS NOTES
Anticoagulation Summary  As of 2023      INR goal:  2.0-3.0   TTR:  61.3 % (1.8 mo)   INR used for dosin.47 (2023)   Warfarin maintenance plan:  2.5 mg (2.5 mg x 1) every day   Weekly warfarin total:  17.5 mg   Plan last modified:  Jong Woody, PharmD (2022)   Next INR check:  2023   Target end date:  Indefinite    Indications    ESRD on dialysis (HCC) [N18.6  Z99.2]  Atrial fibrillation (HCC) [I48.91]                 Anticoagulation Episode Summary       INR check location:      Preferred lab:      Send INR reminders to:      Comments:            Anticoagulation Care Providers       Provider Role Specialty Phone number    Thomas Mcdermott M.D. Referring Cardiovascular Disease (Cardiology) [phone]    [facility] Anticoagulation Services Responsible  [phone]            Refer to Anticoagulation Patient Findings for HPI      Left VM for patient  to report a therapeutic INR.      Pt is NOT on antiplatelet therapy     Pt instructed to continue with current warfarin dosing regimen, confirms dosing.   Will follow up in 1 week(s), on 2023.     LAWRENCE Singh.,

## 2023-02-21 ENCOUNTER — NON-PROVIDER VISIT (OUTPATIENT)
Dept: MEDICAL GROUP | Facility: PHYSICIAN GROUP | Age: 63
End: 2023-02-21
Payer: MEDICARE

## 2023-02-21 ENCOUNTER — APPOINTMENT (OUTPATIENT)
Dept: VASCULAR LAB | Facility: MEDICAL CENTER | Age: 63
End: 2023-02-21
Payer: MEDICARE

## 2023-02-21 ENCOUNTER — ANTICOAGULATION MONITORING (OUTPATIENT)
Dept: VASCULAR LAB | Facility: MEDICAL CENTER | Age: 63
End: 2023-02-21

## 2023-02-21 VITALS
OXYGEN SATURATION: 98 % | SYSTOLIC BLOOD PRESSURE: 120 MMHG | TEMPERATURE: 98 F | HEART RATE: 62 BPM | DIASTOLIC BLOOD PRESSURE: 56 MMHG | RESPIRATION RATE: 12 BRPM

## 2023-02-21 DIAGNOSIS — I48.91 ATRIAL FIBRILLATION, UNSPECIFIED TYPE (HCC): ICD-10-CM

## 2023-02-21 DIAGNOSIS — Z79.01 CHRONIC ANTICOAGULATION: ICD-10-CM

## 2023-02-21 DIAGNOSIS — N18.6 ESRD ON DIALYSIS (HCC): Chronic | ICD-10-CM

## 2023-02-21 DIAGNOSIS — Z99.2 ESRD ON DIALYSIS (HCC): Chronic | ICD-10-CM

## 2023-02-21 LAB
INR BLD: 1.5 (ref 0.9–1.2)
INR PPP: 1.5 (ref 2–3.5)
LOT NUMBER   180167: ABNORMAL
POC PROTIME: 1.5
POCT INT CON NEG: NEGATIVE
POCT INT CON POS: POSITIVE

## 2023-02-21 PROCEDURE — 99999 PR NO CHARGE: CPT | Performed by: NURSE PRACTITIONER

## 2023-02-21 PROCEDURE — 99213 OFFICE O/P EST LOW 20 MIN: CPT

## 2023-02-21 PROCEDURE — 85610 PROTHROMBIN TIME: CPT | Performed by: FAMILY MEDICINE

## 2023-02-21 NOTE — PROGRESS NOTES
This evaluation was conducted via Telemed using secure and encrypted videoconferencing technology. The patient was physically located at [facility] in [place], [place]. The patient was presented by a medical professional at the originating site.   The patient's identity was confirmed and verbal consent was obtained for this telemedicine encounter.      OP Anticoagulation Service Note    Date: 2023       Anticoagulation Summary  As of 2023      INR goal:  2.0-3.0   TTR:  60.3 % (2 mo)   INR used for dosin.50 (2023)   Warfarin maintenance plan:  2.5 mg (2.5 mg x 1) every day   Weekly warfarin total:  17.5 mg   Plan last modified:  Jong Woody, PharmD (2022)   Next INR check:  2023   Target end date:  Indefinite    Indications    ESRD on dialysis (HCC) [N18.6  Z99.2]  Atrial fibrillation (HCC) [I48.91]                 Anticoagulation Episode Summary       INR check location:      Preferred lab:      Send INR reminders to:      Comments:            Anticoagulation Care Providers       Provider Role Specialty Phone number    Thomas Mcdermott M.D. Referring Cardiovascular Disease (Cardiology) [phone]    [facility] Anticoagulation Services Responsible  [phone]          Anticoagulation Patient Findings  Patient Findings       Negatives:  Signs/symptoms of thrombosis, Signs/symptoms of bleeding, Laboratory test error suspected, Change in health, Change in alcohol use, Change in activity, Upcoming invasive procedure, Emergency department visit, Upcoming dental procedure, Missed doses, Extra doses, Change in medications, Change in diet/appetite, Hospital admission, Bruising, Other complaints            THIS VISIT CONDUCTED WITH PRESENTER [employer]IA TELE[employer]IC[employer] UTILIZING SECURE AND ENCRYPTED VIDEOCONFERENCING EQUIPMENT  ROS:    Pulm: Denies SOB, chest pain.    Card: Denies syncope, edema, palpitations.    Extremities: Denies redness, pain.     PE:    Pulm: No SOB, even and unlabored.     Card: Normal rate and rhythm.    Extremities: No redness, or edema.     INR  sub-therapeutic.    Denies signs/symptoms of bleeding and/or thrombosis.    Denies changes to diet or medications.   Follow up appt in 1 weeks     Plan:  take 2 tablets tonight, 2/21, and 2 tablets tomorrow 2/22,  Get INR checked at lab on Thursday 2/23.  Will call with results on Friday 2/24    Medication: Warfarin (Coumadin)        Next Appointment: Tuesday, 2/28/23 @2:30       Pt is not on antiplatelet therapy    Review all of your home medications and newly ordered medications with your doctor and / or pharmacist. Follow medication instructions as directed by your doctor and / or pharmacist. Please keep your medication list with you and share with your physician. Update the information when medications are discontinued, doses are changed, or new medications (including over-the-counter products) are added; and carry medication information at all times in the event of emergency situations.      The patient is on a high risk medication and is sub- therapeutic. This could lead to clot formation or risk of stroke. Therefore this patient requires close monitoring and follow up.     CHEST guidelines recommend frequent INR monitoring at regular intervals (a few days up to a max of 12 weeks) to ensure they are on the proper dose of warfarin and not having any complications from therapy.  INRs can dramatically change over a short time period due to diet, medications, and medical conditions.   The patient is instructed to go to the ER for falls with a head injury,  blood in urine or stool or any bleeding that last longer than 20 min.   For questions, please contact Outpatient Anticoagulation Service [phone].     LAWRENCE Singh.

## 2023-02-23 ENCOUNTER — HOSPITAL ENCOUNTER (OUTPATIENT)
Dept: LAB | Facility: MEDICAL CENTER | Age: 63
End: 2023-02-23
Payer: MEDICARE

## 2023-02-23 DIAGNOSIS — I48.91 ATRIAL FIBRILLATION, UNSPECIFIED TYPE (HCC): ICD-10-CM

## 2023-02-23 LAB
INR PPP: 1.6 (ref 0.87–1.13)
PROTHROMBIN TIME: 18.7 SEC (ref 12–14.6)

## 2023-02-23 PROCEDURE — 85610 PROTHROMBIN TIME: CPT

## 2023-02-23 PROCEDURE — 36415 COLL VENOUS BLD VENIPUNCTURE: CPT

## 2023-02-24 ENCOUNTER — ANTICOAGULATION MONITORING (OUTPATIENT)
Dept: VASCULAR LAB | Facility: MEDICAL CENTER | Age: 63
End: 2023-02-24
Payer: MEDICARE

## 2023-02-24 DIAGNOSIS — I48.91 ATRIAL FIBRILLATION, UNSPECIFIED TYPE (HCC): ICD-10-CM

## 2023-02-24 DIAGNOSIS — Z99.2 ESRD ON DIALYSIS (HCC): Chronic | ICD-10-CM

## 2023-02-24 DIAGNOSIS — N18.6 ESRD ON DIALYSIS (HCC): Chronic | ICD-10-CM

## 2023-02-25 NOTE — PROGRESS NOTES
Anticoagulation Summary  As of 2023      INR goal:  2.0-3.0   TTR:  57.8 % (2.1 mo)   INR used for dosin.60 (2023)   Warfarin maintenance plan:  2.5 mg (2.5 mg x 1) every day   Weekly warfarin total:  17.5 mg   Plan last modified:  Jong Woody, PharmD (2022)   Next INR check:  2023   Target end date:  Indefinite    Indications    ESRD on dialysis ([facility]) [N18.6  Z99.2]  Atrial fibrillation (HCC) [I48.91]                 Anticoagulation Episode Summary       INR check location:      Preferred lab:      Send INR reminders to:      Comments:            Anticoagulation Care Providers       Provider Role Specialty Phone number    Thomas Mcdermott M.D. Referring Cardiovascular Disease (Cardiology) [phone]    [facility] Anticoagulation Services Responsible  [phone]            Refer to Anticoagulation Patient Findings for HPI      Spoke with pt wife, Lindsey.  INR is sub therapeutic.     Pt verifies warfarin weekly dosing.     Pt is NOT on antiplatelet therapy .    Will have pt take 1.5 tablets tonight then follow normal dosing regimen over weekend.  Will recheck pt on Tuesday in telemed.    Repeat INR in 1 week(s).     LAWRENCE Singh.,

## 2023-02-28 ENCOUNTER — NON-PROVIDER VISIT (OUTPATIENT)
Dept: MEDICAL GROUP | Facility: PHYSICIAN GROUP | Age: 63
End: 2023-02-28
Payer: MEDICARE

## 2023-02-28 ENCOUNTER — APPOINTMENT (OUTPATIENT)
Dept: VASCULAR LAB | Facility: MEDICAL CENTER | Age: 63
End: 2023-02-28
Payer: MEDICARE

## 2023-02-28 ENCOUNTER — ANTICOAGULATION MONITORING (OUTPATIENT)
Dept: VASCULAR LAB | Facility: MEDICAL CENTER | Age: 63
End: 2023-02-28

## 2023-02-28 VITALS
TEMPERATURE: 97.1 F | DIASTOLIC BLOOD PRESSURE: 48 MMHG | OXYGEN SATURATION: 99 % | HEART RATE: 60 BPM | SYSTOLIC BLOOD PRESSURE: 126 MMHG | RESPIRATION RATE: 12 BRPM

## 2023-02-28 DIAGNOSIS — Z99.2 ESRD ON DIALYSIS (HCC): Chronic | ICD-10-CM

## 2023-02-28 DIAGNOSIS — N18.6 ESRD ON DIALYSIS (HCC): Chronic | ICD-10-CM

## 2023-02-28 DIAGNOSIS — I48.91 ATRIAL FIBRILLATION, UNSPECIFIED TYPE (HCC): ICD-10-CM

## 2023-02-28 DIAGNOSIS — Z79.01 CHRONIC ANTICOAGULATION: ICD-10-CM

## 2023-02-28 LAB
INR BLD: 2.3 (ref 0.9–1.2)
INR PPP: 2.3 (ref 2–3.5)
LOT NUMBER   180167: ABNORMAL
POC PROTIME: 2.3
POCT INT CON NEG: NEGATIVE
POCT INT CON POS: POSITIVE

## 2023-02-28 PROCEDURE — 85610 PROTHROMBIN TIME: CPT | Performed by: FAMILY MEDICINE

## 2023-02-28 NOTE — PROGRESS NOTES
This evaluation was conducted via Telemed using secure and encrypted videoconferencing technology. The patient was physically located at [facility] in [place], [place]. The patient was presented by a medical professional at the originating site.   The patient's identity was confirmed and verbal consent was obtained for this telemedicine encounter.      OP Anticoagulation Service Note    Date: 2023  Blood Pressure: 126/48  Pulse: 60  Respiration: 12    Anticoagulation Summary  As of 2023      INR goal:  2.0-3.0   TTR:  56.8 % (2.2 mo)   INR used for dosin.30 (2023)   Warfarin maintenance plan:  2.5 mg (2.5 mg x 1) every day   Weekly warfarin total:  17.5 mg   Plan last modified:  Jong Woody, PharmD (2022)   Next INR check:  3/14/2023   Target end date:  Indefinite    Indications    ESRD on dialysis ([facility]) [N18.6  Z99.2]  Atrial fibrillation ([facility]) [I48.91]                 Anticoagulation Episode Summary       INR check location:      Preferred lab:      Send INR reminders to:      Comments:            Anticoagulation Care Providers       Provider Role Specialty Phone number    Thomas Mcdermott M.D. Referring Cardiovascular Disease (Cardiology) [phone]    [facility] Anticoagulation Services Responsible  [phone]          Anticoagulation Patient Findings  Patient Findings       Negatives:  Signs/symptoms of thrombosis, Signs/symptoms of bleeding, Laboratory test error suspected, Change in health, Change in alcohol use, Change in activity, Upcoming invasive procedure, Emergency department visit, Upcoming dental procedure, Missed doses, Extra doses, Change in medications, Change in diet/appetite, Hospital admission, Bruising, Other complaints            THIS VISIT CONDUCTED WITH [employer] UTIL[employer]ING SECURE AND ENCRYPTED VIDEOCONFERENCING EQUIPMENT  ROS:    Pulm: Denies SOB, chest pain.    Card: Denies syncope, edema, palpitations.    Extremities: Denies redness,  pain.     PE:    Pulm: No SOB, even and unlabored.    Card: Normal rate and rhythm.    Extremities: No redness, or edema.     INR  is -therapeutic.    Denies signs/symptoms of bleeding and/or thrombosis.    Denies changes to diet or medications.   Follow up appt in 2 weeks     Plan:  continue current dosing regimen    Medication: Warfarin (Coumadin)        Next Appointment: Tuesday, 3/14/2023 @2:30       Pt is not on antiplatelet therapy .    Review all of your home medications and newly ordered medications with your doctor and / or pharmacist. Follow medication instructions as directed by your doctor and / or pharmacist. Please keep your medication list with you and share with your physician. Update the information when medications are discontinued, doses are changed, or new medications (including over-the-counter products) are added; and carry medication information at all times in the event of emergency situations.       Patient is on a high risk medication and therefore requires close monitoring and follow up.    CHEST guidelines recommend frequent INR monitoring at regular intervals (a few days up to a max of 12 weeks) to ensure they are on the proper dose of warfarin and not having any complications from therapy.  INRs can dramatically change over a short time period due to diet, medications, and medical conditions.   The patient is instructed to go to the ER for falls with a head injury,  blood in urine or stool or any bleeding that last longer than 20 min.   For questions, please contact Outpatient Anticoagulation Service [phone].     LAWRENCE Singh.

## 2023-03-14 ENCOUNTER — NON-PROVIDER VISIT (OUTPATIENT)
Dept: MEDICAL GROUP | Facility: PHYSICIAN GROUP | Age: 63
End: 2023-03-14
Payer: MEDICARE

## 2023-03-14 ENCOUNTER — ANTICOAGULATION MONITORING (OUTPATIENT)
Dept: VASCULAR LAB | Facility: MEDICAL CENTER | Age: 63
End: 2023-03-14

## 2023-03-14 ENCOUNTER — APPOINTMENT (OUTPATIENT)
Dept: VASCULAR LAB | Facility: MEDICAL CENTER | Age: 63
End: 2023-03-14
Payer: MEDICARE

## 2023-03-14 VITALS
TEMPERATURE: 97.3 F | SYSTOLIC BLOOD PRESSURE: 124 MMHG | RESPIRATION RATE: 12 BRPM | HEART RATE: 77 BPM | DIASTOLIC BLOOD PRESSURE: 52 MMHG | OXYGEN SATURATION: 98 %

## 2023-03-14 DIAGNOSIS — Z79.01 CHRONIC ANTICOAGULATION: ICD-10-CM

## 2023-03-14 DIAGNOSIS — I48.91 ATRIAL FIBRILLATION, UNSPECIFIED TYPE (HCC): ICD-10-CM

## 2023-03-14 DIAGNOSIS — D68.59 HYPERCOAGULABLE STATE (HCC): ICD-10-CM

## 2023-03-14 DIAGNOSIS — N18.6 ESRD ON DIALYSIS (HCC): Chronic | ICD-10-CM

## 2023-03-14 DIAGNOSIS — Z99.2 ESRD ON DIALYSIS (HCC): Chronic | ICD-10-CM

## 2023-03-14 LAB
INR BLD: 1.6 (ref 0.9–1.2)
INR PPP: 1.6 (ref 2–3.5)
LOT NUMBER   180167: ABNORMAL
POC PROTIME: 1.6
POCT INT CON NEG: NEGATIVE
POCT INT CON POS: POSITIVE

## 2023-03-14 PROCEDURE — 99999 PR NO CHARGE: CPT | Performed by: NURSE PRACTITIONER

## 2023-03-14 PROCEDURE — 85610 PROTHROMBIN TIME: CPT | Performed by: FAMILY MEDICINE

## 2023-03-14 PROCEDURE — 99214 OFFICE O/P EST MOD 30 MIN: CPT

## 2023-03-14 NOTE — PROGRESS NOTES
This evaluation was conducted via Telemed using secure and encrypted videoconferencing technology. The patient was physically located at [facility] in [place], [place]. The patient was presented by a medical professional at the originating site.   The patient's identity was confirmed and verbal consent was obtained for this telemedicine encounter.      OP Anticoagulation Service Note    Date: 3/14/2023  Blood Pressure: 124/52  Pulse: 77  Respiration: 12    Anticoagulation Summary  As of 3/14/2023      INR goal:  2.0-3.0   TTR:  54.4 % (2.7 mo)   INR used for dosin.60 (3/14/2023)   Warfarin maintenance plan:  2.5 mg (2.5 mg x 1) every day   Weekly warfarin total:  17.5 mg   Plan last modified:  Jong Woody, PharmD (2022)   Next INR check:  3/16/2023   Target end date:  Indefinite    Indications    ESRD on dialysis ([facility]) [N18.6  Z99.2]  Atrial fibrillation ([facility]) [I48.91]                 Anticoagulation Episode Summary       INR check location:      Preferred lab:      Send INR reminders to:      Comments:            Anticoagulation Care Providers       Provider Role Specialty Phone number    Thomas Mcdermott M.D. Referring Cardiovascular Disease (Cardiology) [phone]    [facility] Anticoagulation Services Responsible  [phone]          Anticoagulation Patient Findings  Patient Findings       Negatives:  Signs/symptoms of thrombosis, Signs/symptoms of bleeding, Laboratory test error suspected, Change in health, Change in alcohol use, Change in activity, Upcoming invasive procedure, Emergency department visit, Upcoming dental procedure, Missed doses, Extra doses, Change in medications, Change in diet/appetite, Hospital admission, Bruising, Other complaints            THIS VISIT CONDUCTED WITH [employer] UTILIZING SECURE AND ENCRYPTED VIDEOCONFERENCING EQUIPMENT  ROS:    Pulm: Denies SOB, chest pain.    Card: Denies syncope, edema, palpitations.    Extremities: Denies redness,  pain.     PE:    Pulm: No SOB, even and unlabored.    Card: Normal rate and rhythm.    Extremities: No redness, or edema.     INR  sub-therapeutic.    Denies signs/symptoms of bleeding and/or thrombosis.    Denies changes to diet or medications.   Follow up appt in 1 weeks     Plan:  take 2 tablets tonight,  Take 2 tablets Wednesday  Take 1.5 tablets Thursday, get INR checked at lab  Will call with results on Friday and instructions over the weekend.      Medication: Warfarin (Coumadin)         Sunday Monday Tuesday Wednesday Thursday Friday Saturday      2.5 mg    2.5 mg    5 mg    5 mg    3.75 mg    2.5 mg    2.5 mg      1 tab(s)    1 tab(s)    2 tab(s)    2 tab(s)    1.5 tab(s)    1 tab(s)  1 tab(s)     Next Appointment: Tuesday, 3/21/2023 @3pm       Pt is not on antiplatelet therapy .    Review all of your home medications and newly ordered medications with your doctor and / or pharmacist. Follow medication instructions as directed by your doctor and / or pharmacist. Please keep your medication list with you and share with your physician. Update the information when medications are discontinued, doses are changed, or new medications (including over-the-counter products) are added; and carry medication information at all times in the event of emergency situations.      The patient is on a high risk medication and is sub- therapeutic. This could lead to clot formation or risk of stroke. Therefore this patient requires close monitoring and follow up.     CHEST guidelines recommend frequent INR monitoring at regular intervals (a few days up to a max of 12 weeks) to ensure they are on the proper dose of warfarin and not having any complications from therapy.  INRs can dramatically change over a short time period due to diet, medications, and medical conditions.   The patient is instructed to go to the ER for falls with a head injury,  blood in urine or stool or any bleeding that last longer than 20 min.    For questions, please contact Outpatient Anticoagulation Service [phone].     LAWRENCE Singh.

## 2023-03-16 ENCOUNTER — HOSPITAL ENCOUNTER (OUTPATIENT)
Dept: LAB | Facility: MEDICAL CENTER | Age: 63
End: 2023-03-16
Payer: MEDICARE

## 2023-03-16 DIAGNOSIS — I48.91 ATRIAL FIBRILLATION, UNSPECIFIED TYPE (HCC): ICD-10-CM

## 2023-03-16 LAB
INR PPP: 2.41 (ref 0.87–1.13)
PROTHROMBIN TIME: 25.5 SEC (ref 12–14.6)

## 2023-03-16 PROCEDURE — 36415 COLL VENOUS BLD VENIPUNCTURE: CPT

## 2023-03-16 PROCEDURE — 85610 PROTHROMBIN TIME: CPT

## 2023-03-17 ENCOUNTER — ANTICOAGULATION MONITORING (OUTPATIENT)
Dept: VASCULAR LAB | Facility: MEDICAL CENTER | Age: 63
End: 2023-03-17
Payer: MEDICARE

## 2023-03-17 DIAGNOSIS — I48.91 ATRIAL FIBRILLATION, UNSPECIFIED TYPE (HCC): ICD-10-CM

## 2023-03-17 DIAGNOSIS — N18.6 ESRD ON DIALYSIS (HCC): Chronic | ICD-10-CM

## 2023-03-17 DIAGNOSIS — Z99.2 ESRD ON DIALYSIS (HCC): Chronic | ICD-10-CM

## 2023-03-17 NOTE — PROGRESS NOTES
Anticoagulation Summary  As of 3/17/2023      INR goal:  2.0-3.0   TTR:  54.3 % (2.8 mo)   INR used for dosin.41 (3/16/2023)   Warfarin maintenance plan:  2.5 mg (2.5 mg x 1) every day   Weekly warfarin total:  17.5 mg   Plan last modified:  Jong Woody, PharmD (2022)   Next INR check:  3/21/2023   Target end date:  Indefinite    Indications    ESRD on dialysis ([facility]) [N18.6  Z99.2]  Atrial fibrillation (HCC) [I48.91]                 Anticoagulation Episode Summary       INR check location:      Preferred lab:      Send INR reminders to:      Comments:            Anticoagulation Care Providers       Provider Role Specialty Phone number    Thomas Mcdermott M.D. Referring Cardiovascular Disease (Cardiology) [phone]    [facility] Anticoagulation Services Responsible  [phone]            Refer to Anticoagulation Patient Findings for HPI      Spoke with patient wife to report a therapeutic INR.      Pt is NOT on antiplatelet therapy .    Pt instructed to continue with current warfarin dosing regimen, confirms dosing.   Will follow up in 1 week(s).     LAWRENCE Singh.,

## 2023-03-21 ENCOUNTER — NON-PROVIDER VISIT (OUTPATIENT)
Dept: MEDICAL GROUP | Facility: PHYSICIAN GROUP | Age: 63
End: 2023-03-21
Payer: MEDICARE

## 2023-03-21 ENCOUNTER — ANTICOAGULATION MONITORING (OUTPATIENT)
Dept: VASCULAR LAB | Facility: MEDICAL CENTER | Age: 63
End: 2023-03-21

## 2023-03-21 ENCOUNTER — APPOINTMENT (OUTPATIENT)
Dept: VASCULAR LAB | Facility: MEDICAL CENTER | Age: 63
End: 2023-03-21
Payer: MEDICARE

## 2023-03-21 VITALS
SYSTOLIC BLOOD PRESSURE: 114 MMHG | OXYGEN SATURATION: 100 % | HEART RATE: 62 BPM | RESPIRATION RATE: 12 BRPM | DIASTOLIC BLOOD PRESSURE: 70 MMHG | TEMPERATURE: 97.5 F

## 2023-03-21 DIAGNOSIS — N18.6 ESRD ON DIALYSIS (HCC): Chronic | ICD-10-CM

## 2023-03-21 DIAGNOSIS — I48.91 ATRIAL FIBRILLATION, UNSPECIFIED TYPE (HCC): ICD-10-CM

## 2023-03-21 DIAGNOSIS — Z99.2 ESRD ON DIALYSIS (HCC): Chronic | ICD-10-CM

## 2023-03-21 DIAGNOSIS — Z79.01 CHRONIC ANTICOAGULATION: ICD-10-CM

## 2023-03-21 PROCEDURE — 99213 OFFICE O/P EST LOW 20 MIN: CPT

## 2023-03-21 PROCEDURE — 99999 PR NO CHARGE: CPT | Performed by: NURSE PRACTITIONER

## 2023-03-21 PROCEDURE — 85610 PROTHROMBIN TIME: CPT | Performed by: FAMILY MEDICINE

## 2023-03-21 NOTE — PROGRESS NOTES
This evaluation was conducted via Telemed using secure and encrypted videoconferencing technology. The patient was physically located at [facility] in [place], [place]. The patient was presented by a medical professional at the originating site.   The patient's identity was confirmed and verbal consent was obtained for this telemedicine encounter.      OP Anticoagulation Service Note    Date: 3/21/2023  Blood Pressure: 114/70  Pulse: 62  Respiration: 12    Anticoagulation Summary  As of 3/21/2023      INR goal:  2.0-3.0   TTR:  54.1 % (2.9 mo)   INR used for dosin.60 (3/21/2023)   Warfarin maintenance plan:  2.5 mg (2.5 mg x 1) every day   Weekly warfarin total:  17.5 mg   Plan last modified:  Jong Woody, PharmD (2022)   Next INR check:  3/27/2023   Target end date:  Indefinite    Indications    ESRD on dialysis ([facility]) [N18.6  Z99.2]  Atrial fibrillation ([facility]) [I48.91]                 Anticoagulation Episode Summary       INR check location:      Preferred lab:      Send INR reminders to:      Comments:            Anticoagulation Care Providers       Provider Role Specialty Phone number    Thomas Mcdermott M.D. Referring Cardiovascular Disease (Cardiology) [phone]    [facility] Anticoagulation Services Responsible  [phone]          Anticoagulation Patient Findings  Patient Findings       Positives:  Missed doses    Negatives:  Signs/symptoms of thrombosis, Signs/symptoms of bleeding, Laboratory test error suspected, Change in health, Change in alcohol use, Change in activity, Upcoming invasive procedure, Emergency department visit, Upcoming dental procedure, Extra doses, Change in medications, Change in diet/appetite, Hospital admission, Bruising, Other complaints            THIS VISIT CONDUCTED WITH [employer] UTILIZING SECURE AND ENCRYPTED VIDEOCONFERENCING EQUIPMENT  ROS:    Pulm: Denies SOB, chest pain.    Card: Denies syncope, edema, palpitations.    Extremities:  Denies redness, pain.     PE:    Pulm: No SOB, even and unlabored.    Card: Normal rate and rhythm.    Extremities: No redness, or edema.     INR  sub-therapeutic.    Denies signs/symptoms of bleeding and/or thrombosis.    Denies changes to diet or medications.   Follow up appt in 1 weeks     Plan:  take 2 full tablets tonight, and Wednesday night,   Then follow normal dosing regimen.  Go to lab to have INR checked on Monday 3/27/2023, will update on Tuesday with further instructions.      Medication: Warfarin (Coumadin)        Next Appointment: Tuesday, 4/4/2023 @3pm       Pt is not on antiplatelet therapy .    Review all of your home medications and newly ordered medications with your doctor and / or pharmacist. Follow medication instructions as directed by your doctor and / or pharmacist. Please keep your medication list with you and share with your physician. Update the information when medications are discontinued, doses are changed, or new medications (including over-the-counter products) are added; and carry medication information at all times in the event of emergency situations.       Patient is on a high risk medication and therefore requires close monitoring and follow up.    CHEST guidelines recommend frequent INR monitoring at regular intervals (a few days up to a max of 12 weeks) to ensure they are on the proper dose of warfarin and not having any complications from therapy.  INRs can dramatically change over a short time period due to diet, medications, and medical conditions.   The patient is instructed to go to the ER for falls with a head injury,  blood in urine or stool or any bleeding that last longer than 20 min.   For questions, please contact Outpatient Anticoagulation Service [phone].     LAWRENCE Singh.

## 2023-03-27 ENCOUNTER — HOSPITAL ENCOUNTER (OUTPATIENT)
Dept: LAB | Facility: MEDICAL CENTER | Age: 63
End: 2023-03-27
Payer: MEDICARE

## 2023-03-27 DIAGNOSIS — I48.91 ATRIAL FIBRILLATION, UNSPECIFIED TYPE (HCC): ICD-10-CM

## 2023-03-27 LAB
INR PPP: 1.97 (ref 0.87–1.13)
PROTHROMBIN TIME: 21.9 SEC (ref 12–14.6)

## 2023-03-27 PROCEDURE — 85610 PROTHROMBIN TIME: CPT

## 2023-03-27 PROCEDURE — 36415 COLL VENOUS BLD VENIPUNCTURE: CPT

## 2023-03-28 ENCOUNTER — ANTICOAGULATION MONITORING (OUTPATIENT)
Dept: VASCULAR LAB | Facility: MEDICAL CENTER | Age: 63
End: 2023-03-28
Payer: MEDICARE

## 2023-03-28 DIAGNOSIS — N18.6 ESRD ON DIALYSIS (HCC): Chronic | ICD-10-CM

## 2023-03-28 DIAGNOSIS — I48.91 ATRIAL FIBRILLATION, UNSPECIFIED TYPE (HCC): ICD-10-CM

## 2023-03-28 DIAGNOSIS — Z99.2 ESRD ON DIALYSIS (HCC): Chronic | ICD-10-CM

## 2023-03-28 NOTE — PROGRESS NOTES
Anticoagulation Summary  As of 3/28/2023      INR goal:  2.0-3.0   TTR:  50.4 % (3.1 mo)   INR used for dosin.97 (3/27/2023)   Warfarin maintenance plan:  5 mg (2.5 mg x 2) every Tue, Fri; 2.5 mg (2.5 mg x 1) all other days   Weekly warfarin total:  22.5 mg   Plan last modified:  Laurent Raymond (3/28/2023)   Next INR check:  2023   Target end date:  Indefinite    Indications    ESRD on dialysis ([facility]) [N18.6  Z99.2]  Atrial fibrillation ([facility]) [I48.91]                 Anticoagulation Episode Summary       INR check location:      Preferred lab:      Send INR reminders to:      Comments:            Anticoagulation Care Providers       Provider Role Specialty Phone number    Thomas Mcdermott M.D. Referring Cardiovascular Disease (Cardiology) [phone]    [facility] Anticoagulation Services Responsible  [phone]          Anticoagulation Patient Findings          Left voicemail message to report a SUBtherapeutic INR of 1.97.  Requested pt contact the clinic for any s/s of unusual bleeding, bruising, clotting or any changes to diet or medication.     Patient advised to increase weekly dose as detailed above.    Pt is not on antiplatelet therapy    Discussed plan with Dr. Rik DOSHI 1 week.  Laurent Raymond

## 2023-04-04 ENCOUNTER — ANTICOAGULATION MONITORING (OUTPATIENT)
Dept: VASCULAR LAB | Facility: MEDICAL CENTER | Age: 63
End: 2023-04-04

## 2023-04-04 ENCOUNTER — APPOINTMENT (OUTPATIENT)
Dept: VASCULAR LAB | Facility: MEDICAL CENTER | Age: 63
End: 2023-04-04
Payer: MEDICARE

## 2023-04-04 ENCOUNTER — NON-PROVIDER VISIT (OUTPATIENT)
Dept: MEDICAL GROUP | Facility: PHYSICIAN GROUP | Age: 63
End: 2023-04-04
Payer: MEDICARE

## 2023-04-04 VITALS
DIASTOLIC BLOOD PRESSURE: 50 MMHG | SYSTOLIC BLOOD PRESSURE: 110 MMHG | HEART RATE: 59 BPM | OXYGEN SATURATION: 98 % | RESPIRATION RATE: 12 BRPM | TEMPERATURE: 97.7 F

## 2023-04-04 DIAGNOSIS — N18.6 ESRD ON DIALYSIS (HCC): Chronic | ICD-10-CM

## 2023-04-04 DIAGNOSIS — Z79.01 CHRONIC ANTICOAGULATION: ICD-10-CM

## 2023-04-04 DIAGNOSIS — Z99.2 ESRD ON DIALYSIS (HCC): Chronic | ICD-10-CM

## 2023-04-04 DIAGNOSIS — D68.59 HYPERCOAGULABLE STATE (HCC): ICD-10-CM

## 2023-04-04 LAB
INR BLD: 2.6 (ref 0.9–1.2)
INR PPP: 2.6 (ref 2–3.5)
LOT NUMBER   180167: ABNORMAL
POC PROTIME: 2.6
POCT INT CON NEG: NEGATIVE
POCT INT CON POS: POSITIVE

## 2023-04-04 PROCEDURE — 99213 OFFICE O/P EST LOW 20 MIN: CPT

## 2023-04-04 PROCEDURE — 85610 PROTHROMBIN TIME: CPT | Performed by: FAMILY MEDICINE

## 2023-04-04 NOTE — PROGRESS NOTES
This evaluation was conducted via Telemed using secure and encrypted videoconferencing technology. The patient was physically located at [facility] in [place], [place]. The patient was presented by a medical professional at the originating site.   The patient's identity was confirmed and verbal consent was obtained for this telemedicine encounter.      OP Anticoagulation Service Note    Date: 2023  Blood Pressure: 110/50  Pulse: (!) 59  Respiration: 12    Anticoagulation Summary  As of 2023      INR goal:  2.0-3.0   TTR:  53.6 % (3.4 mo)   INR used for dosin.60 (2023)   Warfarin maintenance plan:  5 mg (2.5 mg x 2) every e, Fri; 2.5 mg (2.5 mg x 1) all other days   Weekly warfarin total:  22.5 mg   Plan last modified:  Laurent Raymond (3/28/2023)   Next INR check:  2023   Target end date:  Indefinite    Indications    ESRD on dialysis ([facility]) [N18.6  Z99.2]  Atrial fibrillation ([facility]) [I48.91]                 Anticoagulation Episode Summary       INR check location:      Preferred lab:      Send INR reminders to:      Comments:            Anticoagulation Care Providers       Provider Role Specialty Phone number    Thomas Mcdermott M.D. Referring Cardiovascular Disease (Cardiology) [phone]    [facility] Anticoagulation Services Responsible  [phone]          Anticoagulation Patient Findings  Patient Findings       Negatives:  Signs/symptoms of thrombosis, Signs/symptoms of bleeding, Laboratory test error suspected, Change in health, Change in alcohol use, Change in activity, Upcoming invasive procedure, Emergency department visit, Upcoming dental procedure, Missed doses, Extra doses, Change in medications, Change in diet/appetite, Hospital admission, Bruising, Other complaints            THIS VISIT CONDUCTED WITH [employer] TELE[employer] UTILIZING SECURE AND ENCRYPTED VIDEOCONFERENCING EQUIPMENT  ROS:    Pulm: Denies SOB, chest pain.    Card: Denies syncope, edema,  palpitations.    Extremities: Denies redness, pain.     PE:    Pulm: No SOB, even and unlabored.    Card: Normal rate and rhythm.    Extremities: No redness, or edema.     INR  is-therapeutic.    Denies signs/symptoms of bleeding and/or thrombosis.    Denies changes to diet or medications.   Follow up appt in 2 weeks     Plan:  continue with current dosing regimen    Medication: Warfarin (Coumadin)        Next Appointment: Tuesday, 4/18/2023 @3pm       Pt is not on antiplatelet therapy .    Review all of your home medications and newly ordered medications with your doctor and / or pharmacist. Follow medication instructions as directed by your doctor and / or pharmacist. Please keep your medication list with you and share with your physician. Update the information when medications are discontinued, doses are changed, or new medications (including over-the-counter products) are added; and carry medication information at all times in the event of emergency situations.       Patient is on a high risk medication and therefore requires close monitoring and follow up.    CHEST guidelines recommend frequent INR monitoring at regular intervals (a few days up to a max of 12 weeks) to ensure they are on the proper dose of warfarin and not having any complications from therapy.  INRs can dramatically change over a short time period due to diet, medications, and medical conditions.   The patient is instructed to go to the ER for falls with a head injury,  blood in urine or stool or any bleeding that last longer than 20 min.   For questions, please contact Outpatient Anticoagulation Service [phone].     LAWRENCE Singh.

## 2023-04-18 ENCOUNTER — NON-PROVIDER VISIT (OUTPATIENT)
Dept: MEDICAL GROUP | Facility: PHYSICIAN GROUP | Age: 63
End: 2023-04-18
Payer: MEDICARE

## 2023-04-18 ENCOUNTER — APPOINTMENT (OUTPATIENT)
Dept: VASCULAR LAB | Facility: MEDICAL CENTER | Age: 63
End: 2023-04-18
Payer: MEDICARE

## 2023-04-18 ENCOUNTER — ANTICOAGULATION MONITORING (OUTPATIENT)
Dept: VASCULAR LAB | Facility: MEDICAL CENTER | Age: 63
End: 2023-04-18

## 2023-04-18 VITALS
OXYGEN SATURATION: 97 % | HEART RATE: 61 BPM | TEMPERATURE: 97.9 F | SYSTOLIC BLOOD PRESSURE: 120 MMHG | RESPIRATION RATE: 12 BRPM | DIASTOLIC BLOOD PRESSURE: 52 MMHG

## 2023-04-18 DIAGNOSIS — I48.91 ATRIAL FIBRILLATION, UNSPECIFIED TYPE (HCC): ICD-10-CM

## 2023-04-18 DIAGNOSIS — Z79.01 CHRONIC ANTICOAGULATION: ICD-10-CM

## 2023-04-18 DIAGNOSIS — N18.6 ESRD ON DIALYSIS (HCC): Chronic | ICD-10-CM

## 2023-04-18 DIAGNOSIS — Z99.2 ESRD ON DIALYSIS (HCC): Chronic | ICD-10-CM

## 2023-04-18 LAB
INR BLD: 2.2 (ref 0.9–1.2)
INR PPP: 2.2 (ref 2–3.5)
LOT NUMBER   180167: ABNORMAL
POC PROTIME: 2.2
POCT INT CON NEG: NEGATIVE
POCT INT CON POS: POSITIVE

## 2023-04-18 PROCEDURE — 85610 PROTHROMBIN TIME: CPT | Performed by: FAMILY MEDICINE

## 2023-04-18 PROCEDURE — 99999 PR NO CHARGE: CPT | Performed by: NURSE PRACTITIONER

## 2023-04-18 PROCEDURE — 99213 OFFICE O/P EST LOW 20 MIN: CPT

## 2023-04-18 NOTE — PROGRESS NOTES
This evaluation was conducted via Telemed using secure and encrypted videoconferencing technology. The patient was physically located at [facility] in [place], [place]. The patient was presented by a medical professional at the originating site.   The patient's identity was confirmed and verbal consent was obtained for this telemedicine encounter.      OP Anticoagulation Service Note    Date: 2023  Blood Pressure: 120/52  Pulse: 61  Respiration: 12    Anticoagulation Summary  As of 2023      INR goal:  2.0-3.0   TTR:  59.2 % (3.9 mo)   INR used for dosin.20 (2023)   Warfarin maintenance plan:  5 mg (2.5 mg x 2) every Tue, Fri; 2.5 mg (2.5 mg x 1) all other days   Weekly warfarin total:  22.5 mg   Plan last modified:  Laurent Raymond (3/28/2023)   Next INR check:  2023   Target end date:  Indefinite    Indications    ESRD on dialysis ([facility]) [N18.6  Z99.2]  Atrial fibrillation ([facility]) [I48.91]                 Anticoagulation Episode Summary       INR check location:      Preferred lab:      Send INR reminders to:      Comments:            Anticoagulation Care Providers       Provider Role Specialty Phone number    Thomas Mcdermott M.D. Referring Cardiovascular Disease (Cardiology) [phone]    [facility] Anticoagulation Services Responsible  [phone]          Anticoagulation Patient Findings  Patient Findings       Negatives:  Signs/symptoms of thrombosis, Signs/symptoms of bleeding, Laboratory test error suspected, Change in health, Change in alcohol use, Change in activity, Upcoming invasive procedure, Emergency department visit, Upcoming dental procedure, Missed doses, Extra doses, Change in medications, Change in diet/appetite, Hospital admission, Bruising, Other complaints            THIS VISIT CONDUCTED WITH PRESENTER [employer]IA TELE[employer]ICINE UTILIZING SECURE AND ENCRYPTED VIDEOCONFERENCING EQUIPMENT  ROS:    Pulm: Denies SOB, chest pain.    Card: Denies syncope, edema,  palpitations.    Extremities: Denies redness, pain.     PE:    Pulm: No SOB, even and unlabored.    Card: Normal rate and rhythm.    Extremities: No redness, or edema.     INR  is-therapeutic.    Denies signs/symptoms of bleeding and/or thrombosis.    Denies changes to diet or medications.   Follow up appt in 3 weeks     Plan:  continue with current dosing regimen    Medication: Warfarin (Coumadin)        Next Appointment: Tuesday, 5/9/2023 @3pm       Pt is not on antiplatelet therapy.    Review all of your home medications and newly ordered medications with your doctor and / or pharmacist. Follow medication instructions as directed by your doctor and / or pharmacist. Please keep your medication list with you and share with your physician. Update the information when medications are discontinued, doses are changed, or new medications (including over-the-counter products) are added; and carry medication information at all times in the event of emergency situations.       Patient is on a high risk medication and therefore requires close monitoring and follow up.    CHEST guidelines recommend frequent INR monitoring at regular intervals (a few days up to a max of 12 weeks) to ensure they are on the proper dose of warfarin and not having any complications from therapy.  INRs can dramatically change over a short time period due to diet, medications, and medical conditions.   The patient is instructed to go to the ER for falls with a head injury,  blood in urine or stool or any bleeding that last longer than 20 min.   For questions, please contact Outpatient Anticoagulation Service [phone].     LAWRENCE Singh.

## 2023-05-09 ENCOUNTER — NON-PROVIDER VISIT (OUTPATIENT)
Dept: MEDICAL GROUP | Facility: PHYSICIAN GROUP | Age: 63
End: 2023-05-09
Payer: MEDICARE

## 2023-05-09 ENCOUNTER — APPOINTMENT (OUTPATIENT)
Dept: VASCULAR LAB | Facility: MEDICAL CENTER | Age: 63
End: 2023-05-09
Payer: MEDICARE

## 2023-05-09 ENCOUNTER — ANTICOAGULATION MONITORING (OUTPATIENT)
Dept: VASCULAR LAB | Facility: MEDICAL CENTER | Age: 63
End: 2023-05-09

## 2023-05-09 VITALS
RESPIRATION RATE: 12 BRPM | TEMPERATURE: 98 F | HEART RATE: 64 BPM | SYSTOLIC BLOOD PRESSURE: 110 MMHG | DIASTOLIC BLOOD PRESSURE: 50 MMHG | OXYGEN SATURATION: 96 %

## 2023-05-09 DIAGNOSIS — N18.6 ESRD ON DIALYSIS (HCC): Chronic | ICD-10-CM

## 2023-05-09 DIAGNOSIS — Z99.2 ESRD ON DIALYSIS (HCC): Chronic | ICD-10-CM

## 2023-05-09 DIAGNOSIS — Z79.01 CHRONIC ANTICOAGULATION: ICD-10-CM

## 2023-05-09 DIAGNOSIS — I48.91 ATRIAL FIBRILLATION, UNSPECIFIED TYPE (HCC): ICD-10-CM

## 2023-05-09 LAB
INR BLD: 1.9 (ref 0.9–1.2)
INR PPP: 1.9 (ref 2–3.5)
LOT NUMBER   180167: ABNORMAL
POC PROTIME: 1.9
POCT INT CON NEG: NEGATIVE
POCT INT CON POS: POSITIVE

## 2023-05-09 PROCEDURE — 85610 PROTHROMBIN TIME: CPT | Performed by: FAMILY MEDICINE

## 2023-05-09 PROCEDURE — 99999 PR NO CHARGE: CPT | Performed by: NURSE PRACTITIONER

## 2023-05-09 PROCEDURE — 99214 OFFICE O/P EST MOD 30 MIN: CPT

## 2023-05-09 NOTE — PROGRESS NOTES
This evaluation was conducted via Telemed using secure and encrypted videoconferencing technology. The patient was physically located at [facility] in [place], [place]. The patient was presented by a medical professional at the originating site.   The patient's identity was confirmed and verbal consent was obtained for this telemedicine encounter.      OP Anticoagulation Service Note    Date: 2023  Blood Pressure: 110/50  Pulse: 64  Respiration: 12    Anticoagulation Summary  As of 2023      INR goal:  2.0-3.0   TTR:  60.3 % (4.6 mo)   INR used for dosin.90 (2023)   Warfarin maintenance plan:  5 mg (2.5 mg x 2) every Tue, Fri; 2.5 mg (2.5 mg x 1) all other days   Weekly warfarin total:  22.5 mg   Plan last modified:  Laurent Raymond (3/28/2023)   Next INR check:  2023   Target end date:  Indefinite    Indications    ESRD on dialysis ([facility]) [N18.6  Z99.2]  Atrial fibrillation ([facility]) [I48.91]                 Anticoagulation Episode Summary       INR check location:      Preferred lab:      Send INR reminders to:      Comments:            Anticoagulation Care Providers       Provider Role Specialty Phone number    Thomas Mcdermott M.D. Referring Cardiovascular Disease (Cardiology) [phone]    [facility] Anticoagulation Services Responsible  [phone]          Anticoagulation Patient Findings  Patient Findings       Negatives:  Signs/symptoms of thrombosis, Signs/symptoms of bleeding, Laboratory test error suspected, Change in health, Change in alcohol use, Change in activity, Upcoming invasive procedure, Emergency department visit, Upcoming dental procedure, Missed doses, Extra doses, Change in medications, Change in diet/appetite, Hospital admission, Bruising, Other complaints            THIS VISIT CONDUCTED WITH PRES[employer] UTILIZING SECURE AND ENCRYPTED VIDEOCONFERENCING EQUIPMENT  ROS:    Pulm: Denies SOB, chest pain.    Card: Denies syncope, edema,  palpitations.    Extremities: Denies redness, pain.     PE:    Pulm: No SOB, even and unlabored.    Card: Normal rate and rhythm.    Extremities: No redness, or edema.     INR  sub-therapeutic.    Denies signs/symptoms of bleeding and/or thrombosis.    Denies changes to diet or medications.   Follow up appt in 1 weeks     Plan:  take 2.5 tablets tonight, then follow normal dosing regimen    Medication: Warfarin (Coumadin)        Next Appointment: Tuesday, 5/16/2023 @3pm       Pt is not on antiplatelet therapy .    Review all of your home medications and newly ordered medications with your doctor and / or pharmacist. Follow medication instructions as directed by your doctor and / or pharmacist. Please keep your medication list with you and share with your physician. Update the information when medications are discontinued, doses are changed, or new medications (including over-the-counter products) are added; and carry medication information at all times in the event of emergency situations.      The patient is on a high risk medication and is sub- therapeutic. This could lead to clot formation or risk of stroke. Therefore this patient requires close monitoring and follow up.     CHEST guidelines recommend frequent INR monitoring at regular intervals (a few days up to a max of 12 weeks) to ensure they are on the proper dose of warfarin and not having any complications from therapy.  INRs can dramatically change over a short time period due to diet, medications, and medical conditions.   The patient is instructed to go to the ER for falls with a head injury,  blood in urine or stool or any bleeding that last longer than 20 min.   For questions, please contact Outpatient Anticoagulation Service [phone].     LAWRENCE Singh.

## 2023-05-16 ENCOUNTER — NON-PROVIDER VISIT (OUTPATIENT)
Dept: MEDICAL GROUP | Facility: PHYSICIAN GROUP | Age: 63
End: 2023-05-16
Payer: MEDICARE

## 2023-05-16 ENCOUNTER — TELEMEDICINE2 (OUTPATIENT)
Dept: VASCULAR LAB | Facility: MEDICAL CENTER | Age: 63
End: 2023-05-16
Attending: NURSE PRACTITIONER
Payer: MEDICARE

## 2023-05-16 VITALS
DIASTOLIC BLOOD PRESSURE: 52 MMHG | HEART RATE: 66 BPM | RESPIRATION RATE: 12 BRPM | SYSTOLIC BLOOD PRESSURE: 114 MMHG | TEMPERATURE: 98.4 F | OXYGEN SATURATION: 93 %

## 2023-05-16 DIAGNOSIS — I48.91 ATRIAL FIBRILLATION, UNSPECIFIED TYPE (HCC): ICD-10-CM

## 2023-05-16 DIAGNOSIS — Z99.2 ESRD ON DIALYSIS (HCC): Chronic | ICD-10-CM

## 2023-05-16 DIAGNOSIS — Z79.01 CHRONIC ANTICOAGULATION: ICD-10-CM

## 2023-05-16 DIAGNOSIS — N18.6 ESRD ON DIALYSIS (HCC): Chronic | ICD-10-CM

## 2023-05-16 PROCEDURE — 99999 PR NO CHARGE: CPT | Performed by: INTERNAL MEDICINE

## 2023-05-16 PROCEDURE — 85610 PROTHROMBIN TIME: CPT | Performed by: FAMILY MEDICINE

## 2023-05-16 PROCEDURE — 3074F SYST BP LT 130 MM HG: CPT | Performed by: INTERNAL MEDICINE

## 2023-05-16 PROCEDURE — 3078F DIAST BP <80 MM HG: CPT | Performed by: INTERNAL MEDICINE

## 2023-05-16 NOTE — PROGRESS NOTES
OP Anticoagulation Service Note    THIS VISIT CONDUCTED WITH PRES[employer] TELE[employer] UTILIZING SECURE AND ENCRYPTED VIDEOCONFERENCING EQUIPMENT    This evaluation was conducted via Telemed using secure and encrypted videoconferencing technology. The patient was physically located at [facility] in [place], [place]. The patient was presented by a medical professional at the originating site.   The patient's identity was confirmed and verbal consent was obtained for this telemedicine encounter.     ROS:      Anticoagulation Summary  As of 2023      INR goal:  2.0-3.0   TTR:  57.4 % (4.8 mo)   INR used for dosin.90 (2023)   Warfarin maintenance plan:  5 mg (2.5 mg x 2) every Mon, Wed, Fri; 2.5 mg (2.5 mg x 1) all other days   Weekly warfarin total:  25 mg   Plan last modified:  Suhas Johnson, PharmD (2023)   Next INR check:  2023   Target end date:  Indefinite    Indications    ESRD on dialysis ([facility]) [N18.6  Z99.2]  Atrial fibrillation ([facility]) [I48.91]                 Anticoagulation Episode Summary       INR check location:      Preferred lab:      Send INR reminders to:      Comments:            Anticoagulation Care Providers       Provider Role Specialty Phone number    Thomas Mcdermott M.D. Referring Cardiovascular Disease (Cardiology) [phone]    [facility] Anticoagulation Services Responsible  [phone]          Anticoagulation Patient Findings                HPI:  Pj Britt, on anticoagulation therapy with warfarin for Afib.  Changes to current medical/health status since last appt: none  Denies signs/symptoms of bleeding and/or thrombosis since the last appt.    Denies any interval changes to diet  Denies any interval changes to medications since last appt.   Denies any complications or cost restrictions with current therapy.     A/P   INR  SUB-therapeutic.   Begin increased warfarin regimen as shown below:     Next INR in 2 week(s).        Medication: Warfarin  (Coumadin)    Next Appointment:  5/30 at 3 pm     Review all of your home medications and newly ordered medications with your doctor and / or pharmacist. Follow medication instructions as directed by your doctor and / or pharmacist. Please keep your medication list with you and share with your physician. Update the information when medications are discontinued, doses are changed, or new medications (including over-the-counter products) are added; and carry medication information at all times in the event of emergency situations.      For questions, please contact Outpatient Anticoagulation Service [phone].     Suhas Johnson, PharmD

## 2023-05-30 ENCOUNTER — ANTICOAGULATION MONITORING (OUTPATIENT)
Dept: VASCULAR LAB | Facility: MEDICAL CENTER | Age: 63
End: 2023-05-30

## 2023-05-30 ENCOUNTER — NON-PROVIDER VISIT (OUTPATIENT)
Dept: MEDICAL GROUP | Facility: PHYSICIAN GROUP | Age: 63
End: 2023-05-30
Payer: MEDICARE

## 2023-05-30 ENCOUNTER — APPOINTMENT (OUTPATIENT)
Dept: VASCULAR LAB | Facility: MEDICAL CENTER | Age: 63
End: 2023-05-30
Payer: MEDICARE

## 2023-05-30 VITALS
OXYGEN SATURATION: 99 % | RESPIRATION RATE: 12 BRPM | HEART RATE: 80 BPM | DIASTOLIC BLOOD PRESSURE: 58 MMHG | SYSTOLIC BLOOD PRESSURE: 126 MMHG | TEMPERATURE: 98.2 F

## 2023-05-30 DIAGNOSIS — Z79.01 CHRONIC ANTICOAGULATION: ICD-10-CM

## 2023-05-30 DIAGNOSIS — N18.6 ESRD ON DIALYSIS (HCC): Chronic | ICD-10-CM

## 2023-05-30 DIAGNOSIS — Z99.2 ESRD ON DIALYSIS (HCC): Chronic | ICD-10-CM

## 2023-05-30 DIAGNOSIS — I48.91 ATRIAL FIBRILLATION, UNSPECIFIED TYPE (HCC): ICD-10-CM

## 2023-05-30 PROCEDURE — 3074F SYST BP LT 130 MM HG: CPT

## 2023-05-30 PROCEDURE — 85610 PROTHROMBIN TIME: CPT | Performed by: FAMILY MEDICINE

## 2023-05-30 PROCEDURE — 3078F DIAST BP <80 MM HG: CPT

## 2023-05-30 PROCEDURE — 99213 OFFICE O/P EST LOW 20 MIN: CPT

## 2023-05-30 PROCEDURE — 99999 PR NO CHARGE: CPT | Performed by: NURSE PRACTITIONER

## 2023-05-30 NOTE — PROGRESS NOTES
This evaluation was conducted via Telemed using secure and encrypted videoconferencing technology. The patient was physically located at [facility] in [place], [place]. The patient was presented by a medical professional at the originating site.   The patient's identity was confirmed and verbal consent was obtained for this telemedicine encounter.      OP Anticoagulation Service Note    Date: 2023  Blood Pressure: 126/58  Pulse: 80  Respiration: 12    Anticoagulation Summary  As of 2023      INR goal:  2.0-3.0   TTR:  59.0 % (5.3 mo)   INR used for dosin.30 (2023)   Warfarin maintenance plan:  5 mg (2.5 mg x 2) every Mon, Wed, Fri; 2.5 mg (2.5 mg x 1) all other days   Weekly warfarin total:  25 mg   Plan last modified:  Suhas Johnson, PharmD (2023)   Next INR check:  2023   Target end date:  Indefinite    Indications    ESRD on dialysis ([facility]) [N18.6  Z99.2]  Atrial fibrillation ([facility]) [I48.91]                 Anticoagulation Episode Summary       INR check location:      Preferred lab:      Send INR reminders to:      Comments:            Anticoagulation Care Providers       Provider Role Specialty Phone number    Thomas Mcdermott M.D. Referring Cardiovascular Disease (Cardiology) [phone]    [facility] Anticoagulation Services Responsible  [phone]          Anticoagulation Patient Findings  Patient Findings       Negatives:  Signs/symptoms of thrombosis, Signs/symptoms of bleeding, Laboratory test error suspected, Change in health, Change in alcohol use, Change in activity, Upcoming invasive procedure, Emergency department visit, Upcoming dental procedure, Missed doses, Extra doses, Change in medications, Change in diet/appetite, Hospital admission, Bruising, Other complaints            THIS VISIT CONDUCTED WITH PRESENTER [employer]IA TELE[employer] UTILIZING SECURE AND ENCRYPTED VIDEOCONFERENCING EQUIPMENT  ROS:    Pulm: Denies SOB, chest pain.    Card: Denies syncope, edema,  palpitations.    Extremities: Denies redness, pain.     PE:    Pulm: No SOB, even and unlabored.    Card: Normal rate and rhythm.    Extremities: No redness, or edema.     INR  is -therapeutic.    Denies signs/symptoms of bleeding and/or thrombosis.    Denies changes to diet or medications.   Follow up appt in 3 weeks     Plan:  continue with current dosing regimen  GO to lab to have INR checked  Anticoagulation clinic will call with instructions day after      Medication: Warfarin (Coumadin)        Next Appointment:   1)  GO to lab to have INR checked 6/20/2023  2)  Tuesday, 7/11/2023 @3pm in [facility]       Pt is not on antiplatelet therapy .    Review all of your home medications and newly ordered medications with your doctor and / or pharmacist. Follow medication instructions as directed by your doctor and / or pharmacist. Please keep your medication list with you and share with your physician. Update the information when medications are discontinued, doses are changed, or new medications (including over-the-counter products) are added; and carry medication information at all times in the event of emergency situations.       Patient is on a high risk medication and therefore requires close monitoring and follow up.    CHEST guidelines recommend frequent INR monitoring at regular intervals (a few days up to a max of 12 weeks) to ensure they are on the proper dose of warfarin and not having any complications from therapy.  INRs can dramatically change over a short time period due to diet, medications, and medical conditions.   The patient is instructed to go to the ER for falls with a head injury,  blood in urine or stool or any bleeding that last longer than 20 min.   For questions, please contact Outpatient Anticoagulation Service [phone].     LAWRENCE Singh.

## 2023-06-13 ENCOUNTER — HOSPITAL ENCOUNTER (OUTPATIENT)
Dept: LAB | Facility: MEDICAL CENTER | Age: 63
End: 2023-06-13
Payer: MEDICARE

## 2023-06-13 DIAGNOSIS — I48.91 ATRIAL FIBRILLATION, UNSPECIFIED TYPE (HCC): ICD-10-CM

## 2023-06-13 LAB
INR PPP: 5 (ref 0.87–1.13)
PROTHROMBIN TIME: 44.3 SEC (ref 12–14.6)

## 2023-06-13 PROCEDURE — 85610 PROTHROMBIN TIME: CPT

## 2023-06-13 PROCEDURE — 36415 COLL VENOUS BLD VENIPUNCTURE: CPT

## 2023-06-14 ENCOUNTER — ANTICOAGULATION MONITORING (OUTPATIENT)
Dept: VASCULAR LAB | Facility: MEDICAL CENTER | Age: 63
End: 2023-06-14
Payer: MEDICARE

## 2023-06-14 DIAGNOSIS — N18.6 ESRD ON DIALYSIS (HCC): Chronic | ICD-10-CM

## 2023-06-14 DIAGNOSIS — I48.91 ATRIAL FIBRILLATION, UNSPECIFIED TYPE (HCC): ICD-10-CM

## 2023-06-14 DIAGNOSIS — Z99.2 ESRD ON DIALYSIS (HCC): Chronic | ICD-10-CM

## 2023-06-14 NOTE — PROGRESS NOTES
Anticoagulation Summary  As of 2023      INR goal:  2.0-3.0   TTR:  56.3 % (5.7 mo)   INR used for dosin.00 (2023)   Warfarin maintenance plan:  5 mg (2.5 mg x 2) every Mon, Wed, Fri; 2.5 mg (2.5 mg x 1) all other days   Weekly warfarin total:  25 mg   Plan last modified:  Suhas Johnson, PharmD (2023)   Next INR check:  2023   Target end date:  Indefinite    Indications    ESRD on dialysis ([facility]) [N18.6  Z99.2]  Atrial fibrillation ([facility]) [I48.91]                 Anticoagulation Episode Summary       INR check location:      Preferred lab:      Send INR reminders to:      Comments:            Anticoagulation Care Providers       Provider Role Specialty Phone number    Thomas Mcdermott M.D. Referring Cardiovascular Disease (Cardiology) [phone]    [facility] Anticoagulation Services Responsible  [phone]            Refer to Anticoagulation Patient Findings for HPI  Patient Findings       Positives:  Emergency department visit (went to the ER yesterday for cough - was diagnosed w/ PNA), Change in medications (doxycycline + Augmentin)    Negatives:  Signs/symptoms of thrombosis, Signs/symptoms of bleeding, Laboratory test error suspected, Change in health, Change in alcohol use, Change in activity, Upcoming invasive procedure, Upcoming dental procedure, Missed doses, Extra doses, Change in diet/appetite, Hospital admission, Bruising, Other complaints            Spoke with pt's wife.  INR is supra therapeutic.     Pt verifies warfarin weekly dosing.     Will have pt hold warfarin x 2 days    Repeat INR in 2 day(s).     Rosita Jolly, PharmD

## 2023-06-21 ENCOUNTER — HOSPITAL ENCOUNTER (OUTPATIENT)
Dept: LAB | Facility: MEDICAL CENTER | Age: 63
End: 2023-06-21
Payer: MEDICARE

## 2023-06-21 DIAGNOSIS — I48.91 ATRIAL FIBRILLATION, UNSPECIFIED TYPE (HCC): ICD-10-CM

## 2023-06-21 LAB
INR PPP: 1.37 (ref 0.87–1.13)
PROTHROMBIN TIME: 16.6 SEC (ref 12–14.6)

## 2023-06-21 PROCEDURE — 36415 COLL VENOUS BLD VENIPUNCTURE: CPT

## 2023-06-21 PROCEDURE — 85610 PROTHROMBIN TIME: CPT

## 2023-06-22 ENCOUNTER — ANTICOAGULATION MONITORING (OUTPATIENT)
Dept: VASCULAR LAB | Facility: MEDICAL CENTER | Age: 63
End: 2023-06-22
Payer: MEDICARE

## 2023-06-22 DIAGNOSIS — I48.91 ATRIAL FIBRILLATION, UNSPECIFIED TYPE (HCC): ICD-10-CM

## 2023-06-22 DIAGNOSIS — Z99.2 ESRD ON DIALYSIS (HCC): Chronic | ICD-10-CM

## 2023-06-22 DIAGNOSIS — N18.6 ESRD ON DIALYSIS (HCC): Chronic | ICD-10-CM

## 2023-06-22 NOTE — PROGRESS NOTES
Anticoagulation Summary  As of 2023      INR goal:  2.0-3.0   TTR:  55.0 % (6 mo)   INR used for dosin.37 (2023)   Warfarin maintenance plan:  5 mg (2.5 mg x 2) every Mon, Wed, Fri; 2.5 mg (2.5 mg x 1) all other days   Weekly warfarin total:  25 mg   Plan last modified:  Suhas Johnson PharmD (2023)   Next INR check:  2023   Target end date:  Indefinite    Indications    ESRD on dialysis ([facility]) [N18.6  Z99.2]  Atrial fibrillation ([facility]) [I48.91]                 Anticoagulation Episode Summary       INR check location:      Preferred lab:      Send INR reminders to:      Comments:            Anticoagulation Care Providers       Provider Role Specialty Phone number    Thomas Mcdermott M.D. Referring Cardiovascular Disease (Cardiology) [phone]    [facility] Anticoagulation Services Responsible  [phone]          Anticoagulation Patient Findings          Left voicemail message to report a SUB therapeutic INR of 1.37.  Pt to [place] today then continue with current warfarin dosing regimen. Requested pt contact the clinic for any s/s of unusual bleeding, bruising, clotting or any changes to diet or medication. FU 4 days.     Suhas Johnson, RivkaD

## 2023-06-26 ENCOUNTER — HOSPITAL ENCOUNTER (OUTPATIENT)
Dept: LAB | Facility: MEDICAL CENTER | Age: 63
End: 2023-06-26
Payer: MEDICARE

## 2023-06-26 DIAGNOSIS — I48.91 ATRIAL FIBRILLATION, UNSPECIFIED TYPE (HCC): ICD-10-CM

## 2023-06-26 LAB
INR PPP: 1.74 (ref 0.87–1.13)
PROTHROMBIN TIME: 19.9 SEC (ref 12–14.6)

## 2023-06-26 PROCEDURE — 85610 PROTHROMBIN TIME: CPT

## 2023-06-26 PROCEDURE — 36415 COLL VENOUS BLD VENIPUNCTURE: CPT

## 2023-07-11 ENCOUNTER — ANTICOAGULATION MONITORING (OUTPATIENT)
Dept: VASCULAR LAB | Facility: MEDICAL CENTER | Age: 63
End: 2023-07-11

## 2023-07-11 ENCOUNTER — NON-PROVIDER VISIT (OUTPATIENT)
Dept: MEDICAL GROUP | Facility: PHYSICIAN GROUP | Age: 63
End: 2023-07-11
Payer: MEDICARE

## 2023-07-11 ENCOUNTER — TELEMEDICINE2 (OUTPATIENT)
Dept: VASCULAR LAB | Facility: MEDICAL CENTER | Age: 63
End: 2023-07-11
Payer: MEDICARE

## 2023-07-11 VITALS
TEMPERATURE: 97 F | DIASTOLIC BLOOD PRESSURE: 58 MMHG | SYSTOLIC BLOOD PRESSURE: 110 MMHG | OXYGEN SATURATION: 97 % | HEART RATE: 67 BPM | RESPIRATION RATE: 14 BRPM

## 2023-07-11 VITALS
SYSTOLIC BLOOD PRESSURE: 100 MMHG | TEMPERATURE: 98.2 F | HEART RATE: 67 BPM | DIASTOLIC BLOOD PRESSURE: 52 MMHG | RESPIRATION RATE: 14 BRPM | OXYGEN SATURATION: 99 %

## 2023-07-11 DIAGNOSIS — Z99.2 ESRD ON DIALYSIS (HCC): Chronic | ICD-10-CM

## 2023-07-11 DIAGNOSIS — I48.91 ATRIAL FIBRILLATION, UNSPECIFIED TYPE (HCC): ICD-10-CM

## 2023-07-11 DIAGNOSIS — Z79.01 CHRONIC ANTICOAGULATION: ICD-10-CM

## 2023-07-11 DIAGNOSIS — N18.6 ESRD ON DIALYSIS (HCC): Chronic | ICD-10-CM

## 2023-07-11 LAB — INR PPP: 2.8 (ref 2–3.5)

## 2023-07-11 PROCEDURE — 3078F DIAST BP <80 MM HG: CPT

## 2023-07-11 PROCEDURE — 99213 OFFICE O/P EST LOW 20 MIN: CPT | Mod: 95

## 2023-07-11 PROCEDURE — 99999 PR NO CHARGE: CPT

## 2023-07-11 PROCEDURE — 3074F SYST BP LT 130 MM HG: CPT

## 2023-07-11 NOTE — PROGRESS NOTES
This evaluation was conducted via Telemed using secure and encrypted videoconferencing technology. The patient was physically located at [facility] in [place], [place]. The patient was presented by a medical professional at the originating site.   The patient's identity was confirmed and verbal consent was obtained for this telemedicine encounter.      OP Anticoagulation Service Note    Date: 2023  Blood Pressure: 100/52  Pulse: 67  Respiration: 14    Anticoagulation Summary  As of 2023      INR goal:  2.0-3.0   TTR:  55.2 % (6.7 mo)   INR used for dosin.80 (2023)   Warfarin maintenance plan:  5 mg (2.5 mg x 2) every Mon, Wed, Fri; 2.5 mg (2.5 mg x 1) all other days   Weekly warfarin total:  25 mg   Plan last modified:  Suhas Johnson, PharmD (2023)   Next INR check:  2023   Target end date:  Indefinite    Indications    ESRD on dialysis ([facility]) [N18.6  Z99.2]  Atrial fibrillation ([facility]) [I48.91]                 Anticoagulation Episode Summary       INR check location:      Preferred lab:      Send INR reminders to:      Comments:            Anticoagulation Care Providers       Provider Role Specialty Phone number    Thomas Mcdermott M.D. Referring Cardiovascular Disease (Cardiology) [phone]    [facility] Anticoagulation Services Responsible  [phone]          Anticoagulation Patient Findings  Patient Findings       Negatives:  Signs/symptoms of thrombosis, Signs/symptoms of bleeding, Laboratory test error suspected, Change in health, Change in alcohol use, Change in activity, Upcoming invasive procedure, Emergency department visit, Upcoming dental procedure, Missed doses, Extra doses, Change in medications, Change in diet/appetite, Hospital admission, Bruising, Other complaints            THIS VISIT CONDUCTED WITH PRES[employer]IA TELE[employer]IC[employer] UTILIZING SECURE AND ENCRYPTED VIDEOCONFERENCING EQUIPMENT  ROS:    Pulm: Denies SOB, chest pain.    Card: Denies syncope, edema,  palpitations.    Extremities: Denies redness, pain.     PE:    Pulm: No SOB, even and unlabored.    Card: Normal rate and rhythm.    Extremities: No redness, or edema.     INR  is-therapeutic.    Denies signs/symptoms of bleeding and/or thrombosis.    Denies changes to diet or medications.   Follow up appt in 2 weeks     Plan:  continue with current dosing regimen    Medication: Warfarin (Coumadin)        Next Appointment: Tuesday, 7/25/23 @1:15       Pt is not on antiplatelet therapy     Review all of your home medications and newly ordered medications with your doctor and / or pharmacist. Follow medication instructions as directed by your doctor and / or pharmacist. Please keep your medication list with you and share with your physician. Update the information when medications are discontinued, doses are changed, or new medications (including over-the-counter products) are added; and carry medication information at all times in the event of emergency situations.       Patient is on a high risk medication and therefore requires close monitoring and follow up.    CHEST guidelines recommend frequent INR monitoring at regular intervals (a few days up to a max of 12 weeks) to ensure they are on the proper dose of warfarin and not having any complications from therapy.  INRs can dramatically change over a short time period due to diet, medications, and medical conditions.   The patient is instructed to go to the ER for falls with a head injury,  blood in urine or stool or any bleeding that last longer than 20 min.   For questions, please contact Outpatient Anticoagulation Service [phone].     LAWRENCE Singh.

## 2023-07-11 NOTE — PROGRESS NOTES
TomasEdward Britt is a 62 y.o. male here for a non-provider visit for pt/inr    If abnormal was an in office provider notified today (if so, indicate provider)? No    Routed to PCP? No

## 2023-08-22 ENCOUNTER — APPOINTMENT (OUTPATIENT)
Dept: VASCULAR LAB | Facility: MEDICAL CENTER | Age: 63
End: 2023-08-22
Payer: MEDICARE

## 2023-08-22 ENCOUNTER — NON-PROVIDER VISIT (OUTPATIENT)
Dept: MEDICAL GROUP | Facility: PHYSICIAN GROUP | Age: 63
End: 2023-08-22
Payer: MEDICARE

## 2023-08-22 ENCOUNTER — ANTICOAGULATION MONITORING (OUTPATIENT)
Dept: VASCULAR LAB | Facility: MEDICAL CENTER | Age: 63
End: 2023-08-22

## 2023-08-22 VITALS
DIASTOLIC BLOOD PRESSURE: 50 MMHG | HEART RATE: 61 BPM | RESPIRATION RATE: 12 BRPM | OXYGEN SATURATION: 97 % | TEMPERATURE: 97.4 F | SYSTOLIC BLOOD PRESSURE: 108 MMHG

## 2023-08-22 DIAGNOSIS — I48.91 ATRIAL FIBRILLATION, UNSPECIFIED TYPE (HCC): ICD-10-CM

## 2023-08-22 DIAGNOSIS — N18.6 ESRD ON DIALYSIS (HCC): Chronic | ICD-10-CM

## 2023-08-22 DIAGNOSIS — Z79.01 CHRONIC ANTICOAGULATION: ICD-10-CM

## 2023-08-22 DIAGNOSIS — Z99.2 ESRD ON DIALYSIS (HCC): Chronic | ICD-10-CM

## 2023-08-22 LAB
INR BLD: 5.7 (ref 0.9–1.2)
INR PPP: 5.7 (ref 2–3.5)
LOT NUMBER   180167: ABNORMAL
POC PROTIME: 5.7
POCT INT CON NEG: NEGATIVE
POCT INT CON POS: POSITIVE

## 2023-08-22 PROCEDURE — 3074F SYST BP LT 130 MM HG: CPT

## 2023-08-22 PROCEDURE — 85610 PROTHROMBIN TIME: CPT | Performed by: FAMILY MEDICINE

## 2023-08-22 PROCEDURE — 3078F DIAST BP <80 MM HG: CPT

## 2023-08-22 PROCEDURE — 99214 OFFICE O/P EST MOD 30 MIN: CPT | Mod: 95

## 2023-08-22 NOTE — PROGRESS NOTES
Pj Britt is a 62 y.o. male here for a non-provider visit for INR check    If abnormal was an in office provider notified today (if so, indicate provider)? Yes    Routed to PCP? No

## 2023-08-22 NOTE — PROGRESS NOTES
This evaluation was conducted via Telemed using secure and encrypted videoconferencing technology. The patient was physically located at [facility] in [place], [place]. The patient was presented by a medical professional at the originating site.   The patient's identity was confirmed and verbal consent was obtained for this telemedicine encounter.      OP Anticoagulation Service Note    Date: 2023  Blood Pressure: 108/50  Pulse: 61  Respiration: 12    Anticoagulation Summary  As of 2023      INR goal:  2.0-3.0   TTR:  46.7 % (8.1 mo)   INR used for dosin.70 (2023)   Warfarin maintenance plan:  5 mg (2.5 mg x 2) every Mon, Wed, Fri; 2.5 mg (2.5 mg x 1) all other days   Weekly warfarin total:  25 mg   Plan last modified:  Suhas Johnson, PharmD (2023)   Next INR check:  2023   Target end date:  Indefinite    Indications    ESRD on dialysis ([facility]) [N18.6  Z99.2]  Atrial fibrillation ([facility]) [I48.91]                 Anticoagulation Episode Summary       INR check location:      Preferred lab:      Send INR reminders to:      Comments:            Anticoagulation Care Providers       Provider Role Specialty Phone number    Thomas Mcdermott M.D. Referring Cardiovascular Disease (Cardiology) [phone]    [facility] Anticoagulation Services Responsible  [phone]          Anticoagulation Patient Findings  Patient Findings       Negatives:  Signs/symptoms of thrombosis, Signs/symptoms of bleeding, Laboratory test error suspected, Change in health, Change in alcohol use, Change in activity, Upcoming invasive procedure, Emergency department visit, Upcoming dental procedure, Missed doses, Extra doses, Change in medications, Change in diet/appetite, Hospital admission, Bruising, Other complaints            THIS VISIT CONDUCTED WITH PRESENTER VIA TELEMEDICINE UTILIZING SECURE AND ENCRYPTED VIDEOCONFERENCING EQUIPMENT  ROS:    Pulm: Denies SOB, chest pain.    Card: Denies syncope, edema,  palpitations.    Extremities: Denies redness, pain.     PE:    Pulm: No SOB, even and unlabored.    Card: Normal rate and rhythm.    Extremities: No redness, or edema.     INR  supra-therapeutic.    Denies signs/symptoms of bleeding and/or thrombosis.    Denies changes to diet or medications.   Augmentin and doxycycline noted on med rec, pt and son state pt is NOT taking these.  NO significant changes in diet or appetite.    Son will follow up with wife to ensure pt is not taking double doses of warfarin.  Follow up appt in 1 week in tele med    Plan:  Pt is supra therapeutic.  HOLD dose tonight (Tuesday), Wednesday and Thursday.  Go to lab on Thursday to have INR checked.        Medication: Warfarin (Coumadin)        Next Appointment: Tuesday, 8/29/2023 @2:30pm       Pt is not on antiplatelet therapy     Review all of your home medications and newly ordered medications with your doctor and / or pharmacist. Follow medication instructions as directed by your doctor and / or pharmacist. Please keep your medication list with you and share with your physician. Update the information when medications are discontinued, doses are changed, or new medications (including over-the-counter products) are added; and carry medication information at all times in the event of emergency situations.      The patient is on a high risk medication and is supra-therapeudic. This increases the patients risk of bleeding and therefore requires frequent monitoring and follow up.     CHEST guidelines recommend frequent INR monitoring at regular intervals (a few days up to a max of 12 weeks) to ensure they are on the proper dose of warfarin and not having any complications from therapy.  INRs can dramatically change over a short time period due to diet, medications, and medical conditions.   The patient is instructed to go to the ER for falls with a head injury,  blood in urine or stool or any bleeding that last longer than 20 min.   For  questions, please contact Outpatient Anticoagulation Service [phone].     LAWRENCE Singh.

## 2023-08-24 ENCOUNTER — HOSPITAL ENCOUNTER (OUTPATIENT)
Dept: LAB | Facility: MEDICAL CENTER | Age: 63
End: 2023-08-24
Payer: MEDICARE

## 2023-08-24 DIAGNOSIS — I48.91 ATRIAL FIBRILLATION, UNSPECIFIED TYPE (HCC): ICD-10-CM

## 2023-08-24 LAB
INR PPP: 2.16 (ref 0.87–1.13)
PROTHROMBIN TIME: 24.3 SEC (ref 12–14.6)

## 2023-08-24 PROCEDURE — 85610 PROTHROMBIN TIME: CPT

## 2023-08-25 ENCOUNTER — ANTICOAGULATION MONITORING (OUTPATIENT)
Dept: VASCULAR LAB | Facility: MEDICAL CENTER | Age: 63
End: 2023-08-25
Payer: MEDICARE

## 2023-08-25 DIAGNOSIS — Z99.2 ESRD ON DIALYSIS (HCC): Chronic | ICD-10-CM

## 2023-08-25 DIAGNOSIS — I48.91 ATRIAL FIBRILLATION, UNSPECIFIED TYPE (HCC): ICD-10-CM

## 2023-08-25 DIAGNOSIS — N18.6 ESRD ON DIALYSIS (HCC): Chronic | ICD-10-CM

## 2023-08-26 NOTE — PROGRESS NOTES
Anticoagulation Summary  As of 2023      INR goal:  2.0-3.0   TTR:  46.5 % (8.1 mo)   INR used for dosin.16 (2023)   Warfarin maintenance plan:  5 mg (2.5 mg x 2) every Mon, Wed, Fri; 2.5 mg (2.5 mg x 1) all other days   Weekly warfarin total:  25 mg   Plan last modified:  Suhas Johnson, PharmD (2023)   Next INR check:  2023   Target end date:  Indefinite    Indications    ESRD on dialysis ([facility]) [N18.6  Z99.2]  Atrial fibrillation ([facility]) [I48.91]                 Anticoagulation Episode Summary       INR check location:      Preferred lab:      Send INR reminders to:      Comments:            Anticoagulation Care Providers       Provider Role Specialty Phone number    Thomas Mcdermott M.D. Referring Cardiovascular Disease (Cardiology) [phone]    [facility] Anticoagulation Services Responsible  [phone]            Refer to Anticoagulation Patient Findings for HPI      Spoke with patient wife to report a therapeutic INR.      Pt is NOT on antiplatelet therapy .    Pt instructed to continue with current warfarin dosing regimen, confirms dosing.   Will follow up in 1 week(s) in telemed.     HAYDEN Singh

## 2023-08-29 ENCOUNTER — NON-PROVIDER VISIT (OUTPATIENT)
Dept: MEDICAL GROUP | Facility: PHYSICIAN GROUP | Age: 63
End: 2023-08-29
Payer: MEDICARE

## 2023-08-29 ENCOUNTER — APPOINTMENT (OUTPATIENT)
Dept: VASCULAR LAB | Facility: MEDICAL CENTER | Age: 63
End: 2023-08-29
Payer: MEDICARE

## 2023-08-29 ENCOUNTER — ANTICOAGULATION MONITORING (OUTPATIENT)
Dept: VASCULAR LAB | Facility: MEDICAL CENTER | Age: 63
End: 2023-08-29

## 2023-08-29 VITALS
OXYGEN SATURATION: 100 % | HEART RATE: 63 BPM | DIASTOLIC BLOOD PRESSURE: 50 MMHG | SYSTOLIC BLOOD PRESSURE: 118 MMHG | TEMPERATURE: 98.4 F

## 2023-08-29 DIAGNOSIS — Z79.01 CHRONIC ANTICOAGULATION: ICD-10-CM

## 2023-08-29 DIAGNOSIS — I48.91 HYPERCOAGULABLE STATE DUE TO ATRIAL FIBRILLATION, UNSPECIFIED TYPE (HCC): ICD-10-CM

## 2023-08-29 DIAGNOSIS — D68.69 HYPERCOAGULABLE STATE DUE TO ATRIAL FIBRILLATION, UNSPECIFIED TYPE (HCC): ICD-10-CM

## 2023-08-29 DIAGNOSIS — Z99.2 ESRD ON DIALYSIS (HCC): Chronic | ICD-10-CM

## 2023-08-29 DIAGNOSIS — I48.91 ATRIAL FIBRILLATION, UNSPECIFIED TYPE (HCC): ICD-10-CM

## 2023-08-29 DIAGNOSIS — N18.6 ESRD ON DIALYSIS (HCC): Chronic | ICD-10-CM

## 2023-08-29 LAB
INR BLD: 1.7 (ref 0.9–1.2)
INR PPP: 1.7 (ref 2–3.5)
LOT NUMBER   180167: ABNORMAL
POC PROTIME: 1.7
POCT INT CON NEG: NEGATIVE
POCT INT CON POS: POSITIVE

## 2023-08-29 PROCEDURE — 99214 OFFICE O/P EST MOD 30 MIN: CPT | Mod: 95

## 2023-08-29 PROCEDURE — 3078F DIAST BP <80 MM HG: CPT

## 2023-08-29 PROCEDURE — 85610 PROTHROMBIN TIME: CPT | Performed by: FAMILY MEDICINE

## 2023-08-29 PROCEDURE — 3074F SYST BP LT 130 MM HG: CPT

## 2023-08-29 NOTE — PROGRESS NOTES
This evaluation was conducted via Telemed using secure and encrypted videoconferencing technology. The patient was physically located at [facility] in [place], [place]. The patient was presented by a medical professional at the originating site.   The patient's identity was confirmed and verbal consent was obtained for this telemedicine encounter.      OP Anticoagulation Service Note    Date: 2023  Blood Pressure: 118/50  Pulse: 63    Anticoagulation Summary  As of 2023      INR goal:  2.0-3.0   TTR:  46.2 % (8.3 mo)   INR used for dosin.70 (2023)   Warfarin maintenance plan:  5 mg (2.5 mg x 2) every Mon, Wed, Fri; 2.5 mg (2.5 mg x 1) all other days   Weekly warfarin total:  25 mg   Plan last modified:  Suhas Johnson, PharmD (2023)   Next INR check:  2023   Target end date:  Indefinite    Indications    ESRD on dialysis ([facility]) [N18.6  Z99.2]  Atrial fibrillation ([facility]) [I48.91]                 Anticoagulation Episode Summary       INR check location:      Preferred lab:      Send INR reminders to:      Comments:            Anticoagulation Care Providers       Provider Role Specialty Phone number    Thomas Mcdermott M.D. Referring Cardiovascular Disease (Cardiology) [phone]    [facility] Anticoagulation Services Responsible  [phone]          Anticoagulation Patient Findings  Patient Findings       Negatives:  Signs/symptoms of thrombosis, Signs/symptoms of bleeding, Laboratory test error suspected, Change in health, Change in alcohol use, Change in activity, Upcoming invasive procedure, Emergency department visit, Upcoming dental procedure, Missed doses, Extra doses, Change in medications, Change in diet/appetite, Hospital admission, Bruising, Other complaints            THIS VISIT CONDUCTED WITH PRESENTER [employer]IA TELE[employer] UTILIZING SECURE AND ENCRYPTED VIDEOCONFERENCING EQUIPMENT  ROS:    Pulm: Denies SOB, chest pain.    Card: Denies syncope, edema, palpitations.     Extremities: Denies redness, pain.     PE:    Pulm: No SOB, even and unlabored.    Card: Normal rate and rhythm.    Extremities: No redness, or edema.     INR  sub-therapeutic.    Denies signs/symptoms of bleeding and/or thrombosis.    Denies changes to diet or medications.   Follow up appt in 1 weeks     Plan:  increase tonight dose to 1.5 tablets, then resume normal dosing regimen    Medication: Warfarin (Coumadin)          Next Appointment: Tuesday, 9/5/2023 @3:15pm       Pt is not on antiplatelet therapy .    Review all of your home medications and newly ordered medications with your doctor and / or pharmacist. Follow medication instructions as directed by your doctor and / or pharmacist. Please keep your medication list with you and share with your physician. Update the information when medications are discontinued, doses are changed, or new medications (including over-the-counter products) are added; and carry medication information at all times in the event of emergency situations.      The patient is on a high risk medication and is sub- therapeutic. This could lead to clot formation or risk of stroke. Therefore this patient requires close monitoring and follow up.     CHEST guidelines recommend frequent INR monitoring at regular intervals (a few days up to a max of 12 weeks) to ensure they are on the proper dose of warfarin and not having any complications from therapy.  INRs can dramatically change over a short time period due to diet, medications, and medical conditions.   The patient is instructed to go to the ER for falls with a head injury,  blood in urine or stool or any bleeding that last longer than 20 min.   For questions, please contact Outpatient Anticoagulation Service [phone].     LAWRENCE Singh.

## 2023-09-05 ENCOUNTER — ANTICOAGULATION MONITORING (OUTPATIENT)
Dept: VASCULAR LAB | Facility: MEDICAL CENTER | Age: 63
End: 2023-09-05

## 2023-09-05 ENCOUNTER — NON-PROVIDER VISIT (OUTPATIENT)
Dept: MEDICAL GROUP | Facility: PHYSICIAN GROUP | Age: 63
End: 2023-09-05
Payer: MEDICARE

## 2023-09-05 ENCOUNTER — APPOINTMENT (OUTPATIENT)
Dept: VASCULAR LAB | Facility: MEDICAL CENTER | Age: 63
End: 2023-09-05
Payer: MEDICARE

## 2023-09-05 VITALS
RESPIRATION RATE: 12 BRPM | TEMPERATURE: 98.1 F | SYSTOLIC BLOOD PRESSURE: 102 MMHG | OXYGEN SATURATION: 98 % | DIASTOLIC BLOOD PRESSURE: 50 MMHG | HEART RATE: 65 BPM

## 2023-09-05 DIAGNOSIS — Z79.01 CHRONIC ANTICOAGULATION: ICD-10-CM

## 2023-09-05 DIAGNOSIS — N18.6 ESRD ON DIALYSIS (HCC): Chronic | ICD-10-CM

## 2023-09-05 DIAGNOSIS — Z99.2 ESRD ON DIALYSIS (HCC): Chronic | ICD-10-CM

## 2023-09-05 DIAGNOSIS — I48.91 ATRIAL FIBRILLATION, UNSPECIFIED TYPE (HCC): ICD-10-CM

## 2023-09-05 PROCEDURE — 3078F DIAST BP <80 MM HG: CPT

## 2023-09-05 PROCEDURE — 99214 OFFICE O/P EST MOD 30 MIN: CPT | Mod: 95

## 2023-09-05 PROCEDURE — 3074F SYST BP LT 130 MM HG: CPT

## 2023-09-05 PROCEDURE — 85610 PROTHROMBIN TIME: CPT | Performed by: FAMILY MEDICINE

## 2023-09-05 NOTE — PROGRESS NOTES
This evaluation was conducted via Telemed using secure and encrypted videoconferencing technology. The patient was physically located at [facility] in [place], [place]. The patient was presented by a medical professional at the originating site.   The patient's identity was confirmed and verbal consent was obtained for this telemedicine encounter.      OP Anticoagulation Service Note    Date: 2023  Blood Pressure: 102/50  Pulse: 65  Respiration: 12    Anticoagulation Summary  As of 2023      INR goal:  2.0-3.0   TTR:  45.0 % (8.5 mo)   INR used for dosin.60 (2023)   Warfarin maintenance plan:  5 mg (2.5 mg x 2) every Fri; 3.75 mg (2.5 mg x 1.5) all other days   Weekly warfarin total:  27.5 mg   Plan last modified:  HAYDEN Singh (2023)   Next INR check:  2023   Target end date:  Indefinite    Indications    ESRD on dialysis ([facility]) [N18.6  Z99.2]  Atrial fibrillation ([facility]) [I48.91]                 Anticoagulation Episode Summary       INR check location:      Preferred lab:      Send INR reminders to:      Comments:            Anticoagulation Care Providers       Provider Role Specialty Phone number    Thomas Mcdermott M.D. Referring Cardiovascular Disease (Cardiology) [phone]    [facility] Anticoagulation Services Responsible  [phone]          Anticoagulation Patient Findings  Patient Findings       Negatives:  Signs/symptoms of thrombosis, Signs/symptoms of bleeding, Laboratory test error suspected, Change in health, Change in alcohol use, Change in activity, Upcoming invasive procedure, Emergency department visit, Upcoming dental procedure, Missed doses, Extra doses, Change in medications, Change in diet/appetite, Hospital admission, Bruising, Other complaints            THIS VISIT CONDUCTED WITH PRESENTER VIA TELEMEDICINE UTILIZING SECURE AND ENCRYPTED VIDEOCONFERENCING EQUIPMENT  ROS:    Pulm: Denies SOB, chest pain.    Card: Denies syncope, edema, palpitations.     Extremities: Denies redness, pain.     PE:    Pulm: No SOB, even and unlabored.    Card: Normal rate and rhythm.    Extremities: No redness, or edema.     INR  sub-therapeutic.    Denies signs/symptoms of bleeding and/or thrombosis.    Denies changes to diet or medications.   Follow up appt in 1 weeks     Plan:  increase dose tonight to 2 full tablets, and take 2 full tablets on Fridays  Take 1.5 tablets the rest of the week  Go to lab to have INR drawn on 9/12/2023  We will call with instructions on Wednesday 9/13      Medication: Warfarin (Coumadin)        Next Appointment: GO TO LAB on Tuesday, 9/12/2023 Wednesday 9/20/2023 @3:30pm in telemed       Pt is not on antiplatelet therapy .    Review all of your home medications and newly ordered medications with your doctor and / or pharmacist. Follow medication instructions as directed by your doctor and / or pharmacist. Please keep your medication list with you and share with your physician. Update the information when medications are discontinued, doses are changed, or new medications (including over-the-counter products) are added; and carry medication information at all times in the event of emergency situations.      The patient is on a high risk medication and is sub- therapeutic. This could lead to clot formation or risk of stroke. Therefore this patient requires close monitoring and follow up.     CHEST guidelines recommend frequent INR monitoring at regular intervals (a few days up to a max of 12 weeks) to ensure they are on the proper dose of warfarin and not having any complications from therapy.  INRs can dramatically change over a short time period due to diet, medications, and medical conditions.   The patient is instructed to go to the ER for falls with a head injury,  blood in urine or stool or any bleeding that last longer than 20 min.   For questions, please contact Outpatient Anticoagulation Service [phone].     Nat Paredes,  HAYDEN

## 2023-09-12 ENCOUNTER — HOSPITAL ENCOUNTER (OUTPATIENT)
Dept: LAB | Facility: MEDICAL CENTER | Age: 63
End: 2023-09-12
Payer: MEDICARE

## 2023-09-12 DIAGNOSIS — I48.91 ATRIAL FIBRILLATION, UNSPECIFIED TYPE (HCC): ICD-10-CM

## 2023-09-12 LAB
INR PPP: 1.8 (ref 0.87–1.13)
PROTHROMBIN TIME: 21.1 SEC (ref 12–14.6)

## 2023-09-12 PROCEDURE — 36415 COLL VENOUS BLD VENIPUNCTURE: CPT

## 2023-09-12 PROCEDURE — 85610 PROTHROMBIN TIME: CPT

## 2023-09-13 ENCOUNTER — ANTICOAGULATION MONITORING (OUTPATIENT)
Dept: VASCULAR LAB | Facility: MEDICAL CENTER | Age: 63
End: 2023-09-13
Payer: MEDICARE

## 2023-09-13 DIAGNOSIS — F39 MOOD DISORDER (HCC): Chronic | ICD-10-CM

## 2023-09-13 DIAGNOSIS — I48.91 ATRIAL FIBRILLATION, UNSPECIFIED TYPE (HCC): ICD-10-CM

## 2023-09-13 DIAGNOSIS — Z99.2 ESRD ON DIALYSIS (HCC): Chronic | ICD-10-CM

## 2023-09-13 DIAGNOSIS — N18.6 ESRD ON DIALYSIS (HCC): Chronic | ICD-10-CM

## 2023-09-13 RX ORDER — PAROXETINE HYDROCHLORIDE 40 MG/1
40 TABLET, FILM COATED ORAL DAILY
Qty: 90 TABLET | Refills: 3 | Status: SHIPPED | OUTPATIENT
Start: 2023-09-13

## 2023-09-13 NOTE — PROGRESS NOTES
Refilling paxil per patient's request.     Denys Haskins MD  Nephrology  [facility]

## 2023-09-13 NOTE — PROGRESS NOTES
Anticoagulation Summary  As of 2023      INR goal:  2.0-3.0   TTR:  43.7 % (8.8 mo)   INR used for dosin.80 (2023)   Warfarin maintenance plan:  5 mg (2.5 mg x 2) every Mon, Wed, Fri; 2.5 mg (2.5 mg x 1) all other days   Weekly warfarin total:  25 mg   Plan last modified:  Harriett Hurst, Pharmacy Intern (2023)   Next INR check:  2023   Target end date:  Indefinite    Indications    ESRD on dialysis ([facility]) [N18.6  Z99.2]  Atrial fibrillation ([facility]) [I48.91]                 Anticoagulation Episode Summary       INR check location:      Preferred lab:      Send INR reminders to:      Comments:            Anticoagulation Care Providers       Provider Role Specialty Phone number    Thomas Mcdermott M.D. Referring Cardiovascular Disease (Cardiology) [phone]    [facility] Anticoagulation Services Responsible  [phone]            Refer to Anticoagulation Patient Findings for HPI  Patient Findings       Positives:  Missed doses (was instructed to take 1.5 tabs, only took 1 d/t struggling to break)    Negatives:  Signs/symptoms of thrombosis, Signs/symptoms of bleeding, Laboratory test error suspected, Change in health, Change in alcohol use, Change in activity, Upcoming invasive procedure, Emergency department visit, Upcoming dental procedure, Extra doses, Change in medications, Change in diet/appetite, Hospital admission, Bruising, Other complaints            Spoke with patient's wife, Lindsey.  INR is subtherapeutic     Pt verifies warfarin weekly dosing.     Patient is NOT on antiplatelet therapy    Will have pt INCREASE weekly dosage to 5mg MWF, 2.5mg AOD.     Repeat INR in 1 week(s) at telemedicine visit.     Plan discussed with Rosita Jloly, PharmD.    Harriett Hurst, Pharmacy Intern

## 2023-09-20 ENCOUNTER — NON-PROVIDER VISIT (OUTPATIENT)
Dept: MEDICAL GROUP | Facility: PHYSICIAN GROUP | Age: 63
End: 2023-09-20
Payer: MEDICARE

## 2023-09-20 ENCOUNTER — ANTICOAGULATION MONITORING (OUTPATIENT)
Dept: VASCULAR LAB | Facility: MEDICAL CENTER | Age: 63
End: 2023-09-20

## 2023-09-20 VITALS — SYSTOLIC BLOOD PRESSURE: 110 MMHG | OXYGEN SATURATION: 93 % | DIASTOLIC BLOOD PRESSURE: 60 MMHG | HEART RATE: 97 BPM

## 2023-09-20 DIAGNOSIS — N18.6 ESRD ON DIALYSIS (HCC): Chronic | ICD-10-CM

## 2023-09-20 DIAGNOSIS — D68.69 SECONDARY HYPERCOAGULABLE STATE (HCC): ICD-10-CM

## 2023-09-20 DIAGNOSIS — Z79.01 CHRONIC ANTICOAGULATION: ICD-10-CM

## 2023-09-20 DIAGNOSIS — I48.91 ATRIAL FIBRILLATION, UNSPECIFIED TYPE (HCC): ICD-10-CM

## 2023-09-20 DIAGNOSIS — Z99.2 ESRD ON DIALYSIS (HCC): Chronic | ICD-10-CM

## 2023-09-20 PROCEDURE — 3078F DIAST BP <80 MM HG: CPT | Performed by: NURSE PRACTITIONER

## 2023-09-20 PROCEDURE — 85610 PROTHROMBIN TIME: CPT | Performed by: FAMILY MEDICINE

## 2023-09-20 PROCEDURE — 3074F SYST BP LT 130 MM HG: CPT | Performed by: NURSE PRACTITIONER

## 2023-09-20 PROCEDURE — 99214 OFFICE O/P EST MOD 30 MIN: CPT | Mod: 95 | Performed by: NURSE PRACTITIONER

## 2023-09-20 RX ORDER — WARFARIN SODIUM 2.5 MG/1
TABLET ORAL
Qty: 60 TABLET | Refills: 3 | Status: SHIPPED | OUTPATIENT
Start: 2023-09-20 | End: 2023-10-25

## 2023-09-20 NOTE — PROGRESS NOTES
[facility] Telemedicine Anticoagulation Service Note    23    This evaluation was conducted via telemedicine visit using secure and encrypted videoconferencing technology. The patient was physically located at [facility] in [place], [place]. The patient was presented by a medical professional at the originating site. The patient's identity was confirmed and verbal consent for the telemedicine encounter was obtained.    Anticoagulation Summary  As of 2023      INR goal:  2.0-3.0   TTR:  42.5 % (9 mo)   INR used for dosin.70 (2023)   Warfarin maintenance plan:  5 mg (2.5 mg x 2) every Mon, Wed, Fri; 2.5 mg (2.5 mg x 1) all other days   Weekly warfarin total:  25 mg   Plan last modified:  Harriett Hurst, Pharmacy Intern (2023)   Next INR check:  2023   Target end date:  Indefinite    Indications    ESRD on dialysis ([facility]) [N18.6  Z99.2]  Atrial fibrillation ([facility]) [I48.91]  Secondary hypercoagulable state ([facility]) [D68.69]                 Anticoagulation Episode Summary       INR check location:      Preferred lab:      Send INR reminders to:      Comments:            Anticoagulation Care Providers       Provider Role Specialty Phone number    Thomas Mcdermott M.D. Referring Cardiovascular Disease (Cardiology) [phone]    [facility] Anticoagulation Services Responsible  [phone]                Refer to Patient Findings for HPI:  Patient Findings       Positives:  Change in health, Change in medications, Other complaints    Negatives:  Signs/symptoms of thrombosis, Signs/symptoms of bleeding, Laboratory test error suspected, Change in alcohol use, Change in activity, Upcoming invasive procedure, Emergency department visit, Upcoming dental procedure, Missed doses, Extra doses, Change in diet/appetite, Hospital admission, Bruising    Comments:  He had a GLF on Saturday. His daughter  took him to [place] [facility]. He reportedly had some broken ribs but otherwise work up was neg. Taking oxycodone for pain. Also on Augmentin. His family did not give him his warfarin Sunday, Monday or Tuesday because he was taking acetaminophen for pain and they worried it would increase his INR.            Vitals:    09/20/23 1511   BP: 110/60   Pulse: 97   SpO2: 93%       Verified current warfarin dosing schedule.    Pt is not on antiplatelet therapy.    Review of Systems   HENT: Negative for nosebleeds.   Respiratory: Negative for shortness of breath.  Gastrointestinal: Negative for blood in stool.   Genitourinary: Negative for hematuria.   Psychiatric/Behavioral: The patient is not nervous/anxious.     Physical Exam   Constitutional: Oriented to person, place, and time. Appears well-developed and well-nourished.   Pulmonary/Chest: Effort normal. No respiratory distress.   Skin: Not diaphoretic.  Psychiatric: Normal mood and affect. Behavior is normal.    A/P   INR is subtherapeutic. Let pt and his family know that acetaminophen is okay to take while on warfarin. We recommend he avoid all NSAIDs and aspirin therapy. Pt's family verbalizes understanding.     Take 7.5 mg (3 tablets) tonight then resume previous dose as outlined below:       Sunday Monday Tuesday Wednesday Thursday Friday Saturday      2.5 mg    5 mg    2.5 mg    5 mg    2.5 mg    5 mg    2.5 mg      1 tab(s)    2 tab(s)    1 tab(s)    2 tab(s)    1 tab(s)    2 tab(s)  1 tab(s)     Next Appointment: Wednesday, September 27, 2023 at 3:00 pm.     Pt educated to contact our clinic with any changes in medications or s/s of bleeding or thrombosis. Pt is aware to seek immediate medical attention for falls, head injury or deep cuts. Review all of your home medications and newly ordered medications with your doctor and / or pharmacist. Follow medication instructions as directed by your doctor and / or pharmacist. Please keep your medication list with  you and share with your physician. Update the information when medications are discontinued, doses are changed, or new medications (including over-the-counter products) are added; and carry medication information at all times in the event of emergency situations.    CHEST guidelines recommend frequent INR monitoring at regular intervals (a few days up to a max of 12 weeks) to ensure they are on the proper dose of warfarin and not having any complications from therapy.  INRs can dramatically change over a short time period due to diet, medications, and medical conditions.   The patient is instructed to go to the ER for falls with a head injury, blood in urine or stool or any bleeding that last longer than 20 min.   For questions, please contact Outpatient Anticoagulation Service [phone].         MUNIR Garcia.  [facility]  [phone]

## 2023-09-22 ENCOUNTER — HOSPITAL ENCOUNTER (EMERGENCY)
Facility: MEDICAL CENTER | Age: 63
End: 2023-09-22
Attending: EMERGENCY MEDICINE
Payer: MEDICARE

## 2023-09-22 ENCOUNTER — APPOINTMENT (OUTPATIENT)
Dept: RADIOLOGY | Facility: MEDICAL CENTER | Age: 63
End: 2023-09-22
Attending: EMERGENCY MEDICINE
Payer: MEDICARE

## 2023-09-22 VITALS
SYSTOLIC BLOOD PRESSURE: 140 MMHG | DIASTOLIC BLOOD PRESSURE: 65 MMHG | BODY MASS INDEX: 21.5 KG/M2 | RESPIRATION RATE: 14 BRPM | TEMPERATURE: 98 F | WEIGHT: 137 LBS | HEART RATE: 82 BPM | OXYGEN SATURATION: 99 % | HEIGHT: 67 IN

## 2023-09-22 DIAGNOSIS — H91.93 BILATERAL HEARING LOSS, UNSPECIFIED HEARING LOSS TYPE: ICD-10-CM

## 2023-09-22 LAB
ALBUMIN SERPL BCP-MCNC: 4.8 G/DL (ref 3.2–4.9)
ALBUMIN/GLOB SERPL: 1.7 G/DL
ALP SERPL-CCNC: 103 U/L (ref 30–99)
ALT SERPL-CCNC: 244 U/L (ref 2–50)
ANION GAP SERPL CALC-SCNC: 15 MMOL/L (ref 7–16)
APTT PPP: 44.9 SEC (ref 24.7–36)
AST SERPL-CCNC: 167 U/L (ref 12–45)
BASOPHILS # BLD AUTO: 0.6 % (ref 0–1.8)
BASOPHILS # BLD: 0.04 K/UL (ref 0–0.12)
BILIRUB SERPL-MCNC: 0.4 MG/DL (ref 0.1–1.5)
BUN SERPL-MCNC: 14 MG/DL (ref 8–22)
CALCIUM ALBUM COR SERPL-MCNC: 8.7 MG/DL (ref 8.5–10.5)
CALCIUM SERPL-MCNC: 9.3 MG/DL (ref 8.5–10.5)
CHLORIDE SERPL-SCNC: 91 MMOL/L (ref 96–112)
CO2 SERPL-SCNC: 32 MMOL/L (ref 20–33)
CREAT SERPL-MCNC: 2.6 MG/DL (ref 0.5–1.4)
EKG IMPRESSION: NORMAL
EOSINOPHIL # BLD AUTO: 0.04 K/UL (ref 0–0.51)
EOSINOPHIL NFR BLD: 0.6 % (ref 0–6.9)
ERYTHROCYTE [DISTWIDTH] IN BLOOD BY AUTOMATED COUNT: 43.1 FL (ref 35.9–50)
GFR SERPLBLD CREATININE-BSD FMLA CKD-EPI: 27 ML/MIN/1.73 M 2
GLOBULIN SER CALC-MCNC: 2.8 G/DL (ref 1.9–3.5)
GLUCOSE SERPL-MCNC: 186 MG/DL (ref 65–99)
HCT VFR BLD AUTO: 29.1 % (ref 42–52)
HGB BLD-MCNC: 9.6 G/DL (ref 14–18)
IMM GRANULOCYTES # BLD AUTO: 0.06 K/UL (ref 0–0.11)
IMM GRANULOCYTES NFR BLD AUTO: 0.9 % (ref 0–0.9)
INR PPP: 2.58 (ref 0.87–1.13)
LYMPHOCYTES # BLD AUTO: 0.63 K/UL (ref 1–4.8)
LYMPHOCYTES NFR BLD: 9.5 % (ref 22–41)
MCH RBC QN AUTO: 30.8 PG (ref 27–33)
MCHC RBC AUTO-ENTMCNC: 33 G/DL (ref 32.3–36.5)
MCV RBC AUTO: 93.3 FL (ref 81.4–97.8)
MONOCYTES # BLD AUTO: 0.72 K/UL (ref 0–0.85)
MONOCYTES NFR BLD AUTO: 10.9 % (ref 0–13.4)
NEUTROPHILS # BLD AUTO: 5.12 K/UL (ref 1.82–7.42)
NEUTROPHILS NFR BLD: 77.5 % (ref 44–72)
NRBC # BLD AUTO: 0.02 K/UL
NRBC BLD-RTO: 0.3 /100 WBC (ref 0–0.2)
PLATELET # BLD AUTO: 134 K/UL (ref 164–446)
PMV BLD AUTO: 11.1 FL (ref 9–12.9)
POTASSIUM SERPL-SCNC: 4 MMOL/L (ref 3.6–5.5)
PROT SERPL-MCNC: 7.6 G/DL (ref 6–8.2)
PROTHROMBIN TIME: 28.1 SEC (ref 12–14.6)
RBC # BLD AUTO: 3.12 M/UL (ref 4.7–6.1)
SODIUM SERPL-SCNC: 138 MMOL/L (ref 135–145)
WBC # BLD AUTO: 6.6 K/UL (ref 4.8–10.8)

## 2023-09-22 PROCEDURE — 85610 PROTHROMBIN TIME: CPT

## 2023-09-22 PROCEDURE — 99284 EMERGENCY DEPT VISIT MOD MDM: CPT

## 2023-09-22 PROCEDURE — 70450 CT HEAD/BRAIN W/O DYE: CPT

## 2023-09-22 PROCEDURE — 85730 THROMBOPLASTIN TIME PARTIAL: CPT

## 2023-09-22 PROCEDURE — 80053 COMPREHEN METABOLIC PANEL: CPT

## 2023-09-22 PROCEDURE — 85025 COMPLETE CBC W/AUTO DIFF WBC: CPT

## 2023-09-22 PROCEDURE — 93005 ELECTROCARDIOGRAM TRACING: CPT | Performed by: EMERGENCY MEDICINE

## 2023-09-22 PROCEDURE — 700111 HCHG RX REV CODE 636 W/ 250 OVERRIDE (IP): Performed by: EMERGENCY MEDICINE

## 2023-09-22 PROCEDURE — 36415 COLL VENOUS BLD VENIPUNCTURE: CPT

## 2023-09-22 RX ORDER — PREDNISONE 20 MG/1
TABLET ORAL
Qty: 20 TABLET | Refills: 0 | Status: SHIPPED | OUTPATIENT
Start: 2023-09-23 | End: 2023-10-05

## 2023-09-22 RX ORDER — PREDNISONE 20 MG/1
60 TABLET ORAL ONCE
Status: COMPLETED | OUTPATIENT
Start: 2023-09-22 | End: 2023-09-22

## 2023-09-22 RX ADMIN — PREDNISONE 60 MG: 20 TABLET ORAL at 15:27

## 2023-09-22 ASSESSMENT — FIBROSIS 4 INDEX: FIB4 SCORE: 0.82

## 2023-09-22 NOTE — ED TRIAGE NOTES
"Chief Complaint   Patient presents with    Hearing Loss     Bilat hearing loss that started this morning. Pt has been on antibiotic since 9/19 for tooth infection and oxycodone for pain. Pt also has been \"tired\". Hx of blindness and kidney failure. Pt goes to dialysis M/W/F.     /61   Pulse 91   Temp 36.6 °C (97.8 °F) (Temporal)   Resp 18   Ht 1.702 m (5' 7\")   Wt 62.1 kg (137 lb)   SpO2 92%   BMI 21.46 kg/m²     "

## 2023-09-22 NOTE — ED NOTES
Pt to [facility] via wheel chair, family in the room, pt placed on monitors. 2 L O2 placed by triage RN.     ERP at bedside.

## 2023-09-22 NOTE — ED NOTES
All discharge instructions given to pt.   Pt verbalized understanding of all discharge instructions. All lines removed prior to discharge. All questions answered. All personal belongings sent with pt. Pt ambulatory to [facility].

## 2023-09-22 NOTE — DISCHARGE INSTRUCTIONS
You need to see Dr. Bach next week.    Please watch your blood sugar.  Eat a low carbohydrate diet.  Correct elevated blood sugar as needed

## 2023-09-27 ENCOUNTER — ANTICOAGULATION MONITORING (OUTPATIENT)
Dept: VASCULAR LAB | Facility: MEDICAL CENTER | Age: 63
End: 2023-09-27

## 2023-09-27 ENCOUNTER — NON-PROVIDER VISIT (OUTPATIENT)
Dept: MEDICAL GROUP | Facility: PHYSICIAN GROUP | Age: 63
End: 2023-09-27
Payer: MEDICARE

## 2023-09-27 VITALS
SYSTOLIC BLOOD PRESSURE: 132 MMHG | TEMPERATURE: 97.9 F | OXYGEN SATURATION: 96 % | DIASTOLIC BLOOD PRESSURE: 60 MMHG | HEART RATE: 71 BPM | RESPIRATION RATE: 14 BRPM

## 2023-09-27 DIAGNOSIS — D68.69 SECONDARY HYPERCOAGULABLE STATE (HCC): ICD-10-CM

## 2023-09-27 DIAGNOSIS — Z99.2 ESRD ON DIALYSIS (HCC): Chronic | ICD-10-CM

## 2023-09-27 DIAGNOSIS — Z79.01 CHRONIC ANTICOAGULATION: ICD-10-CM

## 2023-09-27 DIAGNOSIS — I48.91 ATRIAL FIBRILLATION, UNSPECIFIED TYPE (HCC): ICD-10-CM

## 2023-09-27 DIAGNOSIS — N18.6 ESRD ON DIALYSIS (HCC): Chronic | ICD-10-CM

## 2023-09-27 LAB
INR BLD: 4.3 (ref 0.9–1.2)
INR PPP: 4.3 (ref 2–3.5)
LOT NUMBER   180167: ABNORMAL
POC PROTIME: 4.3
POCT INT CON NEG: NEGATIVE
POCT INT CON POS: POSITIVE

## 2023-09-27 PROCEDURE — 3075F SYST BP GE 130 - 139MM HG: CPT | Performed by: NURSE PRACTITIONER

## 2023-09-27 PROCEDURE — 3078F DIAST BP <80 MM HG: CPT | Performed by: NURSE PRACTITIONER

## 2023-09-27 PROCEDURE — 99214 OFFICE O/P EST MOD 30 MIN: CPT | Mod: 95 | Performed by: NURSE PRACTITIONER

## 2023-09-27 PROCEDURE — 85610 PROTHROMBIN TIME: CPT | Performed by: FAMILY MEDICINE

## 2023-09-27 NOTE — PROGRESS NOTES
[facility] Tele[facility] Anticoagulation Service Note    09/27/23    This evaluation was conducted via telemedicine visit using secure and encrypted videoconferencing technology. The patient was physically located at [facility] in [place], [place]. The patient was presented by a medical professional at the originating site. The patient's identity was confirmed and verbal consent for the telemedicine encounter was obtained for this telemedicine encounter.    Anticoagulation Summary  As of 9/27/2023      INR goal:  2.0-3.0   TTR:  42.7 % (9.1 mo)   INR used for dosing:     Warfarin maintenance plan:  5 mg (2.5 mg x 2) every Mon, Wed, Fri; 2.5 mg (2.5 mg x 1) all other days   Weekly warfarin total:  25 mg   Plan last modified:  Harriett Hurst, Pharmacy Intern (9/13/2023)   Next INR check:     Target end date:  Indefinite    Indications    ESRD on dialysis (HCC) [N18.6  Z99.2]  Atrial fibrillation (HCC) [I48.91]  Secondary hypercoagulable state (HCC) [D68.69]                 Anticoagulation Episode Summary       INR check location:      Preferred lab:      Send INR reminders to:      Comments:            Anticoagulation Care Providers       Provider Role Specialty Phone number    Thomas Mcdermott M.D. Referring Cardiovascular Disease (Cardiology) [phone]    [facility] Anticoagulation Services Responsible  [phone]                Refer to Patient Findings for HPI:      There were no vitals filed for this visit.    Verified current warfarin dosing schedule.    Pt is not on antiplatelet therapy.    Review of Systems   HENT: Negative for nosebleeds.   Respiratory: Negative for shortness of breath.  Gastrointestinal: Negative for blood in stool.   Genitourinary: Negative for hematuria.   Psychiatric/Behavioral: The patient is not nervous/anxious.     Physical Exam   Constitutional: Oriented to person, place, and  time. Appears well-developed and well-nourished.   Pulmonary/Chest: Effort normal. No respiratory distress.   Skin: Not diaphoretic.  Psychiatric: Normal mood and affect. Behavior is normal.    A/P   INR is supratherapeutic, likely due to prednisone. Will decrease his regimen by ~35 % this week. Pt will go to the lab the next two weeks due to telemed schedule conflict and patient appointment conflict. Will send message to [facility] staff to watch for INR on 10/4/23 and to call pt with dosing instructions.      Warfarin dosing recommendation:     TONIGHT (9/27/23) - NO WARFARIN  TOMORROW (9/28/23) - TAKE 1/2 TABLET (1.25 MG) THEN AS OUTLINED BELOW.        Follow up appointment in 1 week(s).    GO TO THE LAB ON WEDNESDAY, 10/4/23 - WE WILL CALL YOU WITH DOSING INSTRUCTIONS  GO TO THE LAB AGAIN THE WEEK OF OCTOBER 12TH.  NEXT TELEMED APPOINTMENT IS WEDNESDAY, OCTOBER 18TH AT 3:00 PM.    Pt educated to contact our clinic with any changes in medications or s/s of bleeding or thrombosis. Pt is aware to seek immediate medical attention for falls, head injury or deep cuts. Review all of your home medications and newly ordered medications with your doctor and / or pharmacist. Follow medication instructions as directed by your doctor and / or pharmacist. Please keep your medication list with you and share with your physician. Update the information when medications are discontinued, doses are changed, or new medications (including over-the-counter products) are added; and carry medication information at all times in the event of emergency situations.      CHEST guidelines recommend frequent INR monitoring at regular intervals (a few days up to a max of 12 weeks) to ensure they are on the proper dose of warfarin and not having any complications from therapy.  INRs can dramatically change over a short time period due to diet, medications, and medical conditions.   The patient is instructed to go to the ER for falls with a head  injury, blood in urine or stool or any bleeding that last longer than 20 min.   For questions, please contact Outpatient Anticoagulation Service [phone].     MUNIR Garcia.  [facility]  [phone]

## 2023-10-04 ENCOUNTER — HOSPITAL ENCOUNTER (OUTPATIENT)
Dept: LAB | Facility: MEDICAL CENTER | Age: 63
End: 2023-10-04
Attending: NURSE PRACTITIONER
Payer: MEDICARE

## 2023-10-04 ENCOUNTER — TELEPHONE (OUTPATIENT)
Dept: VASCULAR LAB | Facility: MEDICAL CENTER | Age: 63
End: 2023-10-04
Payer: MEDICARE

## 2023-10-04 DIAGNOSIS — I48.91 ATRIAL FIBRILLATION, UNSPECIFIED TYPE (HCC): ICD-10-CM

## 2023-10-04 DIAGNOSIS — N18.6 ESRD ON DIALYSIS (HCC): Chronic | ICD-10-CM

## 2023-10-04 DIAGNOSIS — D68.69 SECONDARY HYPERCOAGULABLE STATE (HCC): ICD-10-CM

## 2023-10-04 DIAGNOSIS — Z99.2 ESRD ON DIALYSIS (HCC): Chronic | ICD-10-CM

## 2023-10-04 LAB
INR PPP: 4.67 (ref 0.87–1.13)
PROTHROMBIN TIME: 44.8 SEC (ref 12–14.6)

## 2023-10-04 PROCEDURE — 36415 COLL VENOUS BLD VENIPUNCTURE: CPT

## 2023-10-04 PROCEDURE — 85610 PROTHROMBIN TIME: CPT

## 2023-10-04 NOTE — TELEPHONE ENCOUNTER
Called patient and spoke with spouse. They are planning to have INR drawn today at the lab.     Rivka GoodwinD

## 2023-10-05 ENCOUNTER — ANTICOAGULATION MONITORING (OUTPATIENT)
Dept: VASCULAR LAB | Facility: MEDICAL CENTER | Age: 63
End: 2023-10-05
Payer: MEDICARE

## 2023-10-05 DIAGNOSIS — N18.6 ESRD ON DIALYSIS (HCC): Chronic | ICD-10-CM

## 2023-10-05 DIAGNOSIS — D68.69 SECONDARY HYPERCOAGULABLE STATE (HCC): ICD-10-CM

## 2023-10-05 DIAGNOSIS — I48.91 ATRIAL FIBRILLATION, UNSPECIFIED TYPE (HCC): ICD-10-CM

## 2023-10-05 DIAGNOSIS — Z99.2 ESRD ON DIALYSIS (HCC): Chronic | ICD-10-CM

## 2023-10-05 NOTE — PROGRESS NOTES
[facility] Telephone Anticoagulation Service Note    Date: 10/5/2023      Anticoagulation Summary  As of 10/5/2023      INR goal:  2.0-3.0   TTR:  41.3 % (9.5 mo)   INR used for dosin.67 (10/4/2023)   Warfarin maintenance plan:  5 mg (2.5 mg x 2) every Mon, Fri; 2.5 mg (2.5 mg x 1) all other days   Weekly warfarin total:  22.5 mg   Plan last modified:  Moreno Pierce, PharmD (10/5/2023)   Next INR check:  10/11/2023   Target end date:  Indefinite    Indications    ESRD on dialysis (HCC) [N18.6  Z99.2]  Atrial fibrillation (HCC) [I48.91]  Secondary hypercoagulable state (HCC) [D68.69]                 Anticoagulation Episode Summary       INR check location:      Preferred lab:      Send INR reminders to:      Comments:  Pt is not on antiplatelet tx,   Wife = Lindsey,   Dialysis M-W-F          Anticoagulation Care Providers       Provider Role Specialty Phone number    Thomas Mcdermott M.D. Referring Cardiovascular Disease (Cardiology) [phone]    [facility] Anticoagulation Services Responsible  [phone]          Anticoagulation Patient Findings      [facility]    Date: 10/4/23  INR Goal: 2-3  INR Today: 4.67    Due to your results we would like you to:  Today: take no warfarin x 2 days, then follow dosing as outlined below    Medication: Warfarin (Coumadin)     2.5 mg 5 mg 2.5 mg 2.5 mg 2.5 mg 5 mg 2.5 mg   1 tab(s) 2 tab(s) 1 tab(s) 1 tab(s) 1 tab(s) 2 tab(s) 1 tab(s)       We have your next INR lab test set for 10/11/23    Please contact us at [phone] with any questions or concerns Monday through Friday 8 AM to 5 pm.    Please notify our clinic if you have had:  Any changes to your diet, dietary supplements or medications.  Any signs or symptoms of bleeding or bruising.  If you are having any abnormal bleeding, blood in your urine (pink urine), blood in your stool or a dark or tarry stool you can notify our clinic but please  seek medical attention immediately.      Moreno Pierce, PharmD, BCACP

## 2023-10-12 ENCOUNTER — HOSPITAL ENCOUNTER (OUTPATIENT)
Dept: LAB | Facility: MEDICAL CENTER | Age: 63
End: 2023-10-12
Attending: NURSE PRACTITIONER
Payer: MEDICARE

## 2023-10-12 DIAGNOSIS — I48.91 ATRIAL FIBRILLATION, UNSPECIFIED TYPE (HCC): ICD-10-CM

## 2023-10-12 DIAGNOSIS — D68.69 SECONDARY HYPERCOAGULABLE STATE (HCC): ICD-10-CM

## 2023-10-12 DIAGNOSIS — Z99.2 ESRD ON DIALYSIS (HCC): Chronic | ICD-10-CM

## 2023-10-12 DIAGNOSIS — N18.6 ESRD ON DIALYSIS (HCC): Chronic | ICD-10-CM

## 2023-10-12 LAB
INR PPP: 5.99 (ref 0.87–1.13)
PROTHROMBIN TIME: 54.4 SEC (ref 12–14.6)

## 2023-10-12 PROCEDURE — 36415 COLL VENOUS BLD VENIPUNCTURE: CPT

## 2023-10-12 PROCEDURE — 85610 PROTHROMBIN TIME: CPT

## 2023-10-13 ENCOUNTER — ANTICOAGULATION MONITORING (OUTPATIENT)
Dept: MEDICAL GROUP | Facility: PHYSICIAN GROUP | Age: 63
End: 2023-10-13
Payer: MEDICARE

## 2023-10-13 DIAGNOSIS — D68.69 SECONDARY HYPERCOAGULABLE STATE (HCC): ICD-10-CM

## 2023-10-13 DIAGNOSIS — Z99.2 ESRD ON DIALYSIS (HCC): Chronic | ICD-10-CM

## 2023-10-13 DIAGNOSIS — N18.6 ESRD ON DIALYSIS (HCC): Chronic | ICD-10-CM

## 2023-10-13 DIAGNOSIS — I48.91 ATRIAL FIBRILLATION, UNSPECIFIED TYPE (HCC): ICD-10-CM

## 2023-10-13 NOTE — PROGRESS NOTES
Anticoagulation Summary  As of 10/13/2023      INR goal:  2.0-3.0   TTR:  40.2 % (9.8 mo)   INR used for dosin.99 (10/12/2023)   Warfarin maintenance plan:  5 mg (2.5 mg x 2) every Mon, Fri; 2.5 mg (2.5 mg x 1) all other days   Weekly warfarin total:  22.5 mg   Plan last modified:  Rivka FoxD (10/5/2023)   Next INR check:  10/16/2023   Target end date:  Indefinite    Indications    ESRD on dialysis (HCC) [N18.6  Z99.2]  Atrial fibrillation (HCC) [I48.91]  Secondary hypercoagulable state (HCC) [D68.69]                 Anticoagulation Episode Summary       INR check location:      Preferred lab:      Send INR reminders to:      Comments:  Pt is not on antiplatelet tx,   Wife = Lindsey,   Dialysis M-W-F          Anticoagulation Care Providers       Provider Role Specialty Phone number    Thomas Mcdermott M.D. Referring Cardiovascular Disease (Cardiology) [phone]    [facility] Anticoagulation Services Responsible  [phone]          Anticoagulation Patient Findings  Patient Findings       Negatives:  Signs/symptoms of thrombosis, Signs/symptoms of bleeding, Laboratory test error suspected, Change in health, Change in alcohol use, Change in activity, Upcoming invasive procedure, Emergency department visit, Upcoming dental procedure, Missed doses, Extra doses, Change in medications, Change in diet/appetite, Hospital admission, Bruising, Other complaints            Called and spoke to patient's wife.    INR supratherapeutic likely due to dose deviation. Will hold x 2 as historically INR has decreased from 5 to 2 with two days of holding.    Warfarin Plan: Hold today and tomorrow, then take 1.25 mg on Sun. Resume regimen thereafter.    Next INR in 5 days (appt already scheduled).    Michaela Arroyo, RivkaD, BCACP

## 2023-10-18 ENCOUNTER — NON-PROVIDER VISIT (OUTPATIENT)
Dept: MEDICAL GROUP | Facility: PHYSICIAN GROUP | Age: 63
End: 2023-10-18
Payer: MEDICARE

## 2023-10-18 ENCOUNTER — ANTICOAGULATION MONITORING (OUTPATIENT)
Dept: VASCULAR LAB | Facility: MEDICAL CENTER | Age: 63
End: 2023-10-18

## 2023-10-18 VITALS
SYSTOLIC BLOOD PRESSURE: 110 MMHG | DIASTOLIC BLOOD PRESSURE: 50 MMHG | HEART RATE: 68 BPM | OXYGEN SATURATION: 94 % | RESPIRATION RATE: 16 BRPM | TEMPERATURE: 98.3 F

## 2023-10-18 DIAGNOSIS — N18.6 ESRD ON DIALYSIS (HCC): Chronic | ICD-10-CM

## 2023-10-18 DIAGNOSIS — Z99.2 ESRD ON DIALYSIS (HCC): Chronic | ICD-10-CM

## 2023-10-18 DIAGNOSIS — D68.69 SECONDARY HYPERCOAGULABLE STATE (HCC): ICD-10-CM

## 2023-10-18 DIAGNOSIS — Z79.01 CHRONIC ANTICOAGULATION: ICD-10-CM

## 2023-10-18 DIAGNOSIS — I48.91 ATRIAL FIBRILLATION, UNSPECIFIED TYPE (HCC): ICD-10-CM

## 2023-10-18 PROCEDURE — 99214 OFFICE O/P EST MOD 30 MIN: CPT | Mod: 95 | Performed by: NURSE PRACTITIONER

## 2023-10-18 PROCEDURE — 3078F DIAST BP <80 MM HG: CPT | Performed by: NURSE PRACTITIONER

## 2023-10-18 PROCEDURE — 3074F SYST BP LT 130 MM HG: CPT | Performed by: NURSE PRACTITIONER

## 2023-10-18 PROCEDURE — 85610 PROTHROMBIN TIME: CPT | Performed by: FAMILY MEDICINE

## 2023-10-18 NOTE — Clinical Note
Hi Dr Haskins, Would you have any objections to me switching this patient from warfarin to Eliquis for his AF w/ regards to his CKD? His weight is 62 kg, so planning to put him on 5 mg BID. Thanks for your time, Deysi

## 2023-10-18 NOTE — Clinical Note
Mo Gardiner! Would you mind letting me know the cost of Eliquis 5 mg for this patient when you get a chance? Thank you so much! Deysi

## 2023-10-18 NOTE — PROGRESS NOTES
TomasEdward Britt is a 63 y.o. male here for a non-provider visit for inr check    If abnormal was an in office provider notified today (if so, indicate provider)? No    Routed to PCP? No

## 2023-10-18 NOTE — PROGRESS NOTES
[facility] Tele[facility]icine Anticoagulation Service Note    10/18/23    This evaluation was conducted via telemedicine visit using secure and encrypted videoconferencing technology. The patient was physically located at [facility] in [place], [place]. The patient was presented by a medical professional at the originating site. The patient's identity was confirmed and verbal consent for the telemedicine encounter was obtained for this telemedicine encounter.    Anticoagulation Summary  As of 10/18/2023      INR goal:  2.0-3.0   TTR:  39.9 % (10 mo)   INR used for dosin.70 (10/18/2023)   Warfarin maintenance plan:  2.5 mg (2.5 mg x 1) every day   Weekly warfarin total:  17.5 mg   Plan last modified:  Rivka HagerD (10/13/2023)   Next INR check:  10/25/2023   Target end date:  Indefinite    Indications    ESRD on dialysis (HCC) [N18.6  Z99.2]  Atrial fibrillation (HCC) [I48.91]  Secondary hypercoagulable state (HCC) [D68.69]                 Anticoagulation Episode Summary       INR check location:      Preferred lab:      Send INR reminders to:      Comments:  Pt is not on antiplatelet tx,   Wife = Lindsey,   Dialysis M-W-F          Anticoagulation Care Providers       Provider Role Specialty Phone number    Thomas Mcdermott M.D. Referring Cardiovascular Disease (Cardiology) [phone]    [facility] Anticoagulation Services Responsible  [phone]                Refer to Patient Findings for HPI:  Patient Findings       Positives:  Signs/symptoms of bleeding, Missed doses, Change in diet/appetite    Negatives:  Signs/symptoms of thrombosis, Laboratory test error suspected, Change in health, Change in alcohol use, Change in activity, Upcoming invasive procedure, Emergency department visit, Upcoming dental procedure, Extra doses, Change in medications, Hospital admission, Bruising, Other complaints     Comments:  Family reports a decreased appetite. Also has slightly more bleeding than usual from his fistula site. He held two doses of warfarin as instructed by then missed two doses earlier this week.            Vitals:    10/18/23 1452   BP: 110/50   Pulse: 68   Resp: 16   Temp: 36.8 °C (98.3 °F)   SpO2: 94%       Verified current warfarin dosing schedule.    Pt is not on antiplatelet therapy.    Review of Systems   HENT: Negative for nosebleeds.   Respiratory: Negative for shortness of breath.  Gastrointestinal: Negative for blood in stool.   Genitourinary: Negative for hematuria.   Psychiatric/Behavioral: The patient is not nervous/anxious.     Physical Exam   Constitutional: Oriented to person, place, and time. Appears well-developed and well-nourished.   Pulmonary/Chest: Effort normal. No respiratory distress.   Skin: Not diaphoretic.  Psychiatric: Normal mood and affect. Behavior is normal.    A/P   INR is subtherapeutic at 1.7 but down from 5.99 last week. INRs labile lately. Discussed option of Eliquis which he and his family are interested in transitioning to. Let him know I will check the cost to make sure affordable. In the meantime, he will continue warfarin as outlined below.       Warfarin dosing recommendation:        Sunday Monday Tuesday Wednesday Thursday Friday Saturday      2.5 mg    1.25 mg    2.5 mg    1.25 mg    2.5 mg    1.25 mg    2.5 mg      1 tab(s)    1/2 tab(s)    1 tab(s)    1/2 tab(s)    1 tab(s)    1/2 tab(s)  1 tab(s)     Follow up appointment in 1 week(s).    Next Appointment: Wednesday, October 25, 2023 at 3:45 pm.     Pt educated to contact our clinic with any changes in medications or s/s of bleeding or thrombosis. Pt is aware to seek immediate medical attention for falls, head injury or deep cuts. Review all of your home medications and newly ordered medications with your doctor and / or pharmacist. Follow medication instructions as directed by your doctor and  / or pharmacist. Please keep your medication list with you and share with your physician. Update the information when medications are discontinued, doses are changed, or new medications (including over-the-counter products) are added; and carry medication information at all times in the event of emergency situations.      CHEST guidelines recommend frequent INR monitoring at regular intervals (a few days up to a max of 12 weeks) to ensure they are on the proper dose of warfarin and not having any complications from therapy.  INRs can dramatically change over a short time period due to diet, medications, and medical conditions.   The patient is instructed to go to the ER for falls with a head injury, blood in urine or stool or any bleeding that last longer than 20 min.   For questions, please contact Outpatient Anticoagulation Service [phone].     SINTIA Garcia  [facility]  [phone]    Cc: Dr Haskins

## 2023-10-25 ENCOUNTER — NON-PROVIDER VISIT (OUTPATIENT)
Dept: MEDICAL GROUP | Facility: PHYSICIAN GROUP | Age: 63
End: 2023-10-25
Payer: MEDICARE

## 2023-10-25 ENCOUNTER — ANTICOAGULATION MONITORING (OUTPATIENT)
Dept: VASCULAR LAB | Facility: MEDICAL CENTER | Age: 63
End: 2023-10-25

## 2023-10-25 VITALS
HEART RATE: 70 BPM | SYSTOLIC BLOOD PRESSURE: 124 MMHG | DIASTOLIC BLOOD PRESSURE: 60 MMHG | OXYGEN SATURATION: 92 % | TEMPERATURE: 96.9 F | RESPIRATION RATE: 18 BRPM

## 2023-10-25 DIAGNOSIS — Z99.2 ESRD ON DIALYSIS (HCC): Chronic | ICD-10-CM

## 2023-10-25 DIAGNOSIS — D68.69 SECONDARY HYPERCOAGULABLE STATE (HCC): ICD-10-CM

## 2023-10-25 DIAGNOSIS — Z79.01 CHRONIC ANTICOAGULATION: ICD-10-CM

## 2023-10-25 DIAGNOSIS — I48.91 ATRIAL FIBRILLATION, UNSPECIFIED TYPE (HCC): ICD-10-CM

## 2023-10-25 DIAGNOSIS — N18.6 ESRD ON DIALYSIS (HCC): Chronic | ICD-10-CM

## 2023-10-25 LAB
INR BLD: 2.8 (ref 0.9–1.2)
INR PPP: 2.8 (ref 2–3.5)
LOT NUMBER   180167: ABNORMAL
POC PROTIME: 2.8
POCT INT CON NEG: NEGATIVE
POCT INT CON POS: POSITIVE

## 2023-10-25 PROCEDURE — 3078F DIAST BP <80 MM HG: CPT | Performed by: NURSE PRACTITIONER

## 2023-10-25 PROCEDURE — 3074F SYST BP LT 130 MM HG: CPT | Performed by: NURSE PRACTITIONER

## 2023-10-25 PROCEDURE — 99214 OFFICE O/P EST MOD 30 MIN: CPT | Mod: 95 | Performed by: NURSE PRACTITIONER

## 2023-10-25 PROCEDURE — 85610 PROTHROMBIN TIME: CPT | Performed by: NURSE PRACTITIONER

## 2023-10-25 NOTE — PROGRESS NOTES
[facility] Telemedicine Anticoagulation Service Note    10/25/23    This evaluation was conducted via telemedicine visit using secure and encrypted videoconferencing technology. The patient was physically located at [facility] in [place], [place]. The patient was presented by a medical professional at the originating site. The patient's identity was confirmed and verbal consent for the telemedicine encounter was obtained for this telemedicine encounter.    Anticoagulation Summary  As of 10/25/2023      INR goal:  2.0-3.0   TTR:  40.6 % (10.2 mo)   INR used for dosin.80 (10/25/2023)   Warfarin maintenance plan:  No maintenance plan   Plan last modified:  Michaela Arroyo PharmD (10/13/2023)   Next INR check:  2023   Target end date:  Indefinite    Indications    ESRD on dialysis (HCC) [N18.6  Z99.2]  Atrial fibrillation (HCC) [I48.91]  Secondary hypercoagulable state (HCC) [D68.69]                 Anticoagulation Episode Summary       INR check location:      Preferred lab:      Send INR reminders to:      Comments:  Pt is not on antiplatelet tx,   Wife = Lindsey,   Dialysis M-W-F          Anticoagulation Care Providers       Provider Role Specialty Phone number    Thomas Mcdermott M.D. Referring Cardiovascular Disease (Cardiology) [phone]    [facility] Anticoagulation Services Responsible  [phone]                Refer to Patient Findings for HPI:      Vitals:    10/25/23 1521   BP: 124/60   Pulse: 70   Resp: 18   Temp: 36.1 °C (96.9 °F)   SpO2: 92%       Verified current warfarin dosing schedule.    Pt is not on antiplatelet therapy.    Review of Systems   HENT: Negative for nosebleeds.   Respiratory: Negative for shortness of breath.  Gastrointestinal: Negative for blood in stool.   Genitourinary: Negative for hematuria.   Psychiatric/Behavioral: The patient is not nervous/anxious.      Physical Exam   Constitutional: Oriented to person, place, and time. Appears well-developed and well-nourished.   Pulmonary/Chest: Effort normal. No respiratory distress.   Skin: Not diaphoretic.  Psychiatric: Normal mood and affect. Behavior is normal.    Weight 62.1 kg per pt's wife from dialysis.    A/P   INR is therapeutic today at 2.8 which is up from 1.7 last week. I dicussed transitioning patient to Eliquis with Dr Haskins which he is okay with. Based on age and weight, will have him transition to 5 mg BID. I have already confirmed his copay will be $10.35 for a 90 day supply which is affordable.     Warfarin dosing recommendation: STOP warfarin    On Friday, October 27th, began taking Eliquis 5 mg by mouth twice daily.    Follow up appointment in 3 week(s) to assess tolerability.    Next Appointment: Wednesday, November 15, 2023 at 3:00 pm.    Pt educated to contact our clinic with any changes in medications or s/s of bleeding or thrombosis. Pt is aware to seek immediate medical attention for falls, head injury or deep cuts. Review all of your home medications and newly ordered medications with your doctor and / or pharmacist. Follow medication instructions as directed by your doctor and / or pharmacist. Please keep your medication list with you and share with your physician. Update the information when medications are discontinued, doses are changed, or new medications (including over-the-counter products) are added; and carry medication information at all times in the event of emergency situations.      CHEST guidelines recommend frequent INR monitoring at regular intervals (a few days up to a max of 12 weeks) to ensure they are on the proper dose of warfarin and not having any complications from therapy.  INRs can dramatically change over a short time period due to diet, medications, and medical conditions.   The patient is instructed to go to the ER for falls with a head injury, blood in urine or stool  or any bleeding that last longer than 20 min.   For questions, please contact Outpatient Anticoagulation Service [phone].     MUNIR Garcia.  [facility] Anticoagulation [facility]  [phone]

## 2023-11-15 ENCOUNTER — ANTICOAGULATION MONITORING (OUTPATIENT)
Dept: VASCULAR LAB | Facility: MEDICAL CENTER | Age: 63
End: 2023-11-15

## 2023-11-15 ENCOUNTER — NON-PROVIDER VISIT (OUTPATIENT)
Dept: MEDICAL GROUP | Facility: PHYSICIAN GROUP | Age: 63
End: 2023-11-15
Payer: MEDICARE

## 2023-11-15 VITALS
SYSTOLIC BLOOD PRESSURE: 100 MMHG | DIASTOLIC BLOOD PRESSURE: 50 MMHG | RESPIRATION RATE: 16 BRPM | HEART RATE: 66 BPM | TEMPERATURE: 97.5 F | OXYGEN SATURATION: 95 %

## 2023-11-15 DIAGNOSIS — I48.91 ATRIAL FIBRILLATION, UNSPECIFIED TYPE (HCC): ICD-10-CM

## 2023-11-15 DIAGNOSIS — Z99.2 ESRD ON DIALYSIS (HCC): Chronic | ICD-10-CM

## 2023-11-15 DIAGNOSIS — N18.6 ESRD ON DIALYSIS (HCC): Chronic | ICD-10-CM

## 2023-11-15 DIAGNOSIS — D68.69 SECONDARY HYPERCOAGULABLE STATE (HCC): ICD-10-CM

## 2023-11-15 PROCEDURE — 3078F DIAST BP <80 MM HG: CPT | Performed by: NURSE PRACTITIONER

## 2023-11-15 PROCEDURE — 99214 OFFICE O/P EST MOD 30 MIN: CPT | Mod: 95 | Performed by: NURSE PRACTITIONER

## 2023-11-15 PROCEDURE — 3074F SYST BP LT 130 MM HG: CPT | Performed by: NURSE PRACTITIONER

## 2023-11-15 NOTE — PROGRESS NOTES
Pj Britt is a 63 y.o. male here for a non-provider visit for inr check- eliquis no need for number    If abnormal was an in office provider notified today (if so, indicate provider)? No    Routed to PCP? No

## 2023-11-15 NOTE — PROGRESS NOTES
[facility] Anticoagulation Service Note    11/15/23    This evaluation was conducted via telemedicine visit using secure and encrypted videoconferencing technology. The patient was physically located at [facility] in [place], [place]. The patient was presented by a medical professional at the originating site. The patient's identity was confirmed and verbal consent for the telemedicine encounter was obtained for this telemedicine encounter.    Diagnosis: AF  Drug: Eliquis 5 mg BID  LOT: indefinite  CHADSVASC: 3 (age, htn, dm)  HAS-BLED: 3 (age, htn, ckd)    Anticoagulation Summary  As of 11/15/2023      INR goal:  2.0-3.0   TTR:  40.6 % (10.2 mo)   INR used for dosing:     Plan last modified:  Rivka HagerD (10/13/2023)   Next INR check:     Target end date:  Indefinite    Indications    ESRD on dialysis (HCC) [N18.6  Z99.2]  Atrial fibrillation (HCC) [I48.91]  Secondary hypercoagulable state (HCC) [D68.69]                 Anticoagulation Episode Summary       INR check location:      Preferred lab:      Send INR reminders to:      Comments:  Pt is not on antiplatelet tx. 10/25/23;  Warfarin to [facility]  Wife = Lindsey,   Dialysis M-W-F          Anticoagulation Care Providers       Provider Role Specialty Phone number    Thomas Mcdermott M.D. Referring Cardiovascular Disease (Cardiology) [phone]    [facility] Anticoagulation Services Responsible  [phone]                Refer to Patient Findings for HPI:      Vitals:    11/15/23 1502   BP: 100/50   Pulse: 66   Resp: 16   Temp: 36.4 °C (97.5 °F)   SpO2: 95%       Health Status Since Last Assessment  Any new relevant medical problems, ED visits/hospitalizations - no  Any embolic events (stroke / TIA / systemic embolism) - no    Doing well on Eliquis. No problems with bleeding. No s/sx of CVA/TIA. Copay affordable.    Adherence with  DOAC Therapy  0 missed dose(s)  BLEEDING RISK ASSESSMENT NB:    Bleeding Risk Assessment  Severe epistaxis - no   Hemoptysis - no  Excessive or unusual bruising / hematomas - no  GIB/melena/BRBPR/hematemesis - no  Hematuria - no   Abnormal vaginal bleeding - n/a  Concerning daily headache or subdural hematoma symptoms - no  Decreasing hemoglobin or new anemia - no  Falls, presyncope, syncope, or seizures - no  Platelets: 134 (259 prior - will monitor)  Latest hemoglobin:     Lab Results   Component Value Date/Time    WBC 6.6 09/22/2023 01:29 PM    RBC 3.12 (L) 09/22/2023 01:29 PM    HEMOGLOBIN 9.6 (L) 09/22/2023 01:29 PM    HEMATOCRIT 29.1 (L) 09/22/2023 01:29 PM    MCV 93.3 09/22/2023 01:29 PM    MCH 30.8 09/22/2023 01:29 PM    MCHC 33.0 09/22/2023 01:29 PM    MPV 11.1 09/22/2023 01:29 PM    NEUTSPOLYS 77.50 (H) 09/22/2023 01:29 PM    LYMPHOCYTES 9.50 (L) 09/22/2023 01:29 PM    MONOCYTES 10.90 09/22/2023 01:29 PM    EOSINOPHILS 0.60 09/22/2023 01:29 PM    BASOPHILS 0.60 09/22/2023 01:29 PM    HYPOCHROMIA 1+ 12/30/2013 09:18 AM        HAS-BLED:  Hypertension (uncontrolled, >160 mmHg systolic) - yes  Renal disease (dialysis, transplant, Cr >2.26 mg/dL or >200 µmol/L) - yes  Liver disease (cirrhosis or bilirubin >2x normal with AST/ALT/AP >3x normal) - no  Stroke history - no  Prior major bleeding or predisposition to bleeding - no  Labile INR Unstable/high INRs, time in therapeutic range <60% - n/a  Age >65 - yes  Medication usage predisposing to bleeding (aspirin, clopidogrel, NSAIDs) - no  Alcohol use  ?8 drinks/week - no    Creatinine Clearance/Renal Function  Latest eGFR / creatinine:  Lab Results   Component Value Date/Time    SODIUM 138 09/22/2023 01:29 PM    POTASSIUM 4.0 09/22/2023 01:29 PM    CHLORIDE 91 (L) 09/22/2023 01:29 PM    CO2 32 09/22/2023 01:29 PM    GLUCOSE 186 (H) 09/22/2023 01:29 PM    BUN 14 09/22/2023 01:29 PM    CREATININE 2.60 (H) 09/22/2023 01:29 PM       Is eGFR less than 50ml/min  yes,  CKD. GFR 12  Cr 2.6  CrCl 25 - Dr Haskins aware pt on Eliquis    If YES, calculate CrCl (see back)  Any recent dehydrating illness or medications added/changed? i.e. Diuretics    Drug Interactions  ASA/antiplatelets - avoids  NSAID - avoids   Other drug interactions - none per Epic (Review med list / OTCs;)  Current Outpatient Medications on File Prior to Visit   Medication Sig Dispense Refill    apixaban (ELIQUIS) 5mg Tab Take 1 Tablet by mouth 2 times a day. 180 Tablet 1    paroxetine (PAXIL) 40 MG tablet Take 1 Tablet by mouth every day. 90 Tablet 3    acetaminophen (TYLENOL) 500 MG Tab Take 1 Tablet by mouth every 6 hours as needed for Mild Pain or Moderate Pain. 30 Tablet 0    amLODIPine (NORVASC) 10 MG Tab Take 1 Tablet by mouth every day. 30 Tablet 2    atorvastatin (LIPITOR) 20 MG Tab Take 1 Tablet by mouth every day. 30 Tablet 2    docusate sodium 100 MG Cap Take 1 capsule by mouth 2 times a day. 60 Capsule 2    hydrALAZINE (APRESOLINE) 10 MG Tab Take 1 Tablet by mouth every 8 hours. 90 Tablet 2    insulin NPH (HUMULIN/NOVOLIN) 100 UNIT/ML Suspension Inject 10 Units under the skin every morning. 10 mL 2    nortriptyline (PAMELOR) 25 MG Cap Take 1 Capsule by mouth every evening. 30 Capsule 2    polyethylene glycol/lytes (MIRALAX) 17 g Pack Mix and drink 1 Packet by mouth 2 times a day. 10 Each 3    vitamin D2, Ergocalciferol, (DRISDOL) 1.25 MG (69584 UT) Cap capsule Take 1 Capsule by mouth every 7 days. 5 Capsule 0    metoprolol tartrate (LOPRESSOR) 100 MG Tab Take 1 Tablet by mouth 2 times a day. 60 Tablet 2    calcium acetate (PHOS-LO) 667 MG Tab tablet Take 3 Tablets by mouth 3 times a day with meals. 810 Tablet 3    bisacodyl (DULCOLAX) 10 MG Suppos Insert 1 Suppository into the rectum every 24 hours as needed (if sennosides, docusate, polyethylene glycol 3350, and/or magnesium hydroxide ineffective or not ordered).  0     No current facility-administered medications on file prior to visit.     Verified no  anticonvulsant or azole therapy, education provided for future use.    Review of Systems   HENT: Negative for nosebleeds.   Respiratory: Negative for shortness of breath.  Gastrointestinal: Negative for blood in stool.   Genitourinary: Negative for hematuria.   Psychiatric/Behavioral: The patient is not nervous/anxious.     Physical Exam   Constitutional: Oriented to person, place, and time. Appears well-developed and well-nourished.   Pulmonary/Chest: Effort normal. No respiratory distress.   Skin: Not diaphoretic.  Psychiatric: Normal mood and affect. Behavior is normal.  Significant gait impairment / imbalance / high risk for falls - yes due to age, in a w/c    Final Assessment and Recommendations:  Patient appears stable from the anticoagulation standpoint  Benefits of continued DOAC therapy outweigh risks for this patient  Recommend continue current DOAC at same dose  CBC, CMP prior to next visit.  Confirmed current weight is 62 kg as he was weight today at dialysis.  Based on age, weight and creatinine he still qualifies for 5 mg. Will monitor weight and labs.    - CONTINUE TAKING ELIQUIS 5 MG BY MOUTH TWICE DAILY    Other Actions:   The rationale for continued DOAC therapy  The potential for minor, major or life-threatening bleeding  Dosing instructions, adherence, risks of non-adherence, handling missed doses  Avoiding OTC ASA & NSAIDs & minimizing EtOH to reduce bleeding risks  Dosing around upcoming procedure / surgery if applicable (see back)    Follow up:  Will follow up with patient in 3 months with lab work done prior    Next appointment: Wednesday, February 21, 2024 at 3:00 pm.       Deysi Hoffman APRN    Addendum (11/29/23) - spoke with pt's wife to let her know we are monitoring the patient's weight closely. If his weight becomes </= 60 kg, we would reduce his Eliquis to 2.5 mg. She verbalizes understanding. He will continue to be weighed weekly at dialysis. F/u in February.

## 2024-01-02 NOTE — ASSESSMENT & PLAN NOTE
On NPH 15 units qam and 20 units qpm at home.  Hgb A1c 7.8 in Nov 2018.  Endorses good medication and dietary compliance.     Plan:  NPH 10 units BID while inpatient  Low SSI  Hypoglycemia protocol   Diabetic diet   Detail Level: Detailed

## 2024-02-28 ENCOUNTER — DOCUMENTATION (OUTPATIENT)
Dept: VASCULAR LAB | Facility: MEDICAL CENTER | Age: 64
End: 2024-02-28

## 2024-02-28 ENCOUNTER — APPOINTMENT (OUTPATIENT)
Dept: MEDICAL GROUP | Facility: PHYSICIAN GROUP | Age: 64
End: 2024-02-28
Payer: MEDICARE

## 2024-02-28 NOTE — PROGRESS NOTES
[facility] Called pt and left a vm letting him know I rescheduled him for next Wednesday, March 6th at 3:00 pm in the [facility] office in [place]. Asked pt to call me at [phone] if this day or time do not work for him.    Deysi HSIEH

## 2024-03-06 ENCOUNTER — DOCUMENTATION (OUTPATIENT)
Dept: VASCULAR LAB | Facility: MEDICAL CENTER | Age: 64
End: 2024-03-06

## 2024-03-06 NOTE — PROGRESS NOTES
Spoke with pt's son by phone. [facility] appt rescheduled to next Wednesday, March 13th at 1:15 pm. Will follow up with pt then.    Deysi HSIEH

## 2024-03-13 ENCOUNTER — NON-PROVIDER VISIT (OUTPATIENT)
Dept: MEDICAL GROUP | Facility: PHYSICIAN GROUP | Age: 64
End: 2024-03-13
Payer: MEDICARE

## 2024-03-13 ENCOUNTER — ANTICOAGULATION MONITORING (OUTPATIENT)
Dept: VASCULAR LAB | Facility: MEDICAL CENTER | Age: 64
End: 2024-03-13

## 2024-03-13 VITALS
OXYGEN SATURATION: 95 % | WEIGHT: 138.89 LBS | HEART RATE: 66 BPM | BODY MASS INDEX: 21.75 KG/M2 | SYSTOLIC BLOOD PRESSURE: 95 MMHG | TEMPERATURE: 97.6 F | DIASTOLIC BLOOD PRESSURE: 55 MMHG

## 2024-03-13 DIAGNOSIS — Z99.2 ESRD ON DIALYSIS (HCC): Chronic | ICD-10-CM

## 2024-03-13 DIAGNOSIS — N18.6 ESRD ON DIALYSIS (HCC): Chronic | ICD-10-CM

## 2024-03-13 DIAGNOSIS — D68.69 SECONDARY HYPERCOAGULABLE STATE (HCC): ICD-10-CM

## 2024-03-13 DIAGNOSIS — I48.91 ATRIAL FIBRILLATION, UNSPECIFIED TYPE (HCC): ICD-10-CM

## 2024-03-13 PROCEDURE — 99214 OFFICE O/P EST MOD 30 MIN: CPT | Mod: 95 | Performed by: NURSE PRACTITIONER

## 2024-03-13 PROCEDURE — 3078F DIAST BP <80 MM HG: CPT | Mod: 95 | Performed by: NURSE PRACTITIONER

## 2024-03-13 PROCEDURE — 3074F SYST BP LT 130 MM HG: CPT | Mod: 95 | Performed by: NURSE PRACTITIONER

## 2024-03-13 PROCEDURE — 99999 PR NO CHARGE: CPT | Performed by: FAMILY MEDICINE

## 2024-03-13 ASSESSMENT — FIBROSIS 4 INDEX: FIB4 SCORE: 5.15

## 2024-03-13 NOTE — PROGRESS NOTES
[facility] Anticoagulation Telemed Visit      Began taking Eliquis 2.5 mg by mouth twice daily.      Next appointment: Wednesday, June 19, 2024 at 1:15 pm.    Deysi HSIEH  [facility] Anticoagulation [facility]  [phone]

## 2024-03-13 NOTE — PROGRESS NOTES
[facility] Anticoagulation Service Note    03/13/24    This evaluation was conducted via telemedicine visit using secure and encrypted videoconferencing technology. The patient was physically located at [facility] in [place], [place]. The patient was presented by a medical professional (Gracy Walker MA) at the originating site. The patient's identity was confirmed and verbal consent for the telemedicine encounter was obtained for this telemedicine encounter.    Diagnosis: AF  Drug: Eliquis 5 mg BID  LOT: indefinite  CHADSVASC: 3 (age, htn, dm)  HAS-BLED: 2-3 (age, ckd, predisop to bleeding due to low platelets)    Anticoagulation Summary  As of 3/13/2024      INR goal:  2.0-3.0   TTR:  40.6% (10.2 mo)   INR used for dosing:     Plan last modified:  Michaela Arroyo, PharmD (10/13/2023)   Next INR check:     Target end date:  Indefinite    Indications    ESRD on dialysis (HCC) [N18.6  Z99.2]  Atrial fibrillation (HCC) [I48.91]  Secondary hypercoagulable state (HCC) [D68.69]                 Anticoagulation Episode Summary       INR check location:      Preferred lab:      Send INR reminders to:      Comments:  Pt is not on antiplatelet tx. 10/25/23;  Warfarin to [facility]  Wife = Lindsey,   Dialysis M-W-F          Anticoagulation Care Providers       Provider Role Specialty Phone number    Thomas Mcdermott M.D. Referring Cardiovascular Disease (Cardiology) [phone]    [facility] Anticoagulation Services Responsible  [phone]                Refer to Patient Findings for HPI:      Vitals:    03/13/24 1326   BP: 95/55   Pulse: 66   Temp: 36.4 °C (97.6 °F)   SpO2: 95%       Health Status Since Last Assessment  Any new relevant medical problems, ED visits/hospitalizations - He was in the ER 2/21/24 for weakness. No changes to his medications. He is taking Eliquis 5 mg BID as instructed. Most recent  weight today is 65 kg. Cr 2.6. No problems with bleeding or excessive bruising.   Any embolic events (stroke / TIA / systemic embolism) - no    Adherence with DOAC Therapy  0 missed dose(s)  BLEEDING RISK ASSESSMENT:    Bleeding Risk Assessment  Severe epistaxis - no   Hemoptysis - no  Excessive or unusual bruising / hematomas - no  GIB/melena/BRBPR/hematemesis - no  Hematuria - no   Abnormal vaginal bleeding - n/a  Concerning daily headache or subdural hematoma symptoms - no  Decreasing hemoglobin or new anemia - yes, trending low (last 8.8, 8.3 prior)  Falls, presyncope, syncope, or seizures - no  Platelets: 135 (107, 134 prior)  Latest hemoglobin:     Lab Results   Component Value Date/Time    WBC 6.6 09/22/2023 01:29 PM    RBC 2.87 (L) 02/21/2024 03:40 PM    RBC 3.12 (L) 09/22/2023 01:29 PM    HEMOGLOBIN 8.8 (L) 02/21/2024 03:40 PM    HEMOGLOBIN 9.6 (L) 09/22/2023 01:29 PM    HEMATOCRIT 26.1 (L) 02/21/2024 03:40 PM    HEMATOCRIT 29.1 (L) 09/22/2023 01:29 PM    MCV 91.0 02/21/2024 03:40 PM    MCV 93.3 09/22/2023 01:29 PM    MCH 30.7 02/21/2024 03:40 PM    MCH 30.8 09/22/2023 01:29 PM    MCHC 33.8 02/21/2024 03:40 PM    MCHC 33.0 09/22/2023 01:29 PM    MPV 9.2 02/21/2024 03:40 PM    MPV 11.1 09/22/2023 01:29 PM    NEUTSPOLYS 71.9 02/21/2024 03:40 PM    NEUTSPOLYS 77.50 (H) 09/22/2023 01:29 PM    LYMPHOCYTES 10.6 (L) 02/21/2024 03:40 PM    LYMPHOCYTES 9.50 (L) 09/22/2023 01:29 PM    MONOCYTES 10.9 02/21/2024 03:40 PM    MONOCYTES 10.90 09/22/2023 01:29 PM    EOSINOPHILS 5.3 02/21/2024 03:40 PM    EOSINOPHILS 0.60 09/22/2023 01:29 PM    BASOPHILS 1.3 02/21/2024 03:40 PM    BASOPHILS 0.60 09/22/2023 01:29 PM    HYPOCHROMIA 1+ 12/30/2013 09:18 AM        HAS-BLED:  Hypertension (uncontrolled, >160 mmHg systolic) - no  Renal disease (dialysis, transplant, Cr >2.26 mg/dL or >200 µmol/L) - yes  Liver disease (cirrhosis or bilirubin >2x normal with AST/ALT/AP >3x normal) - yes  Stroke history - no  Prior major bleeding or  predisposition to bleeding - no  Labile INR Unstable/high INRs, time in therapeutic range <60% - n/a  Age >65 - yes  Medication usage predisposing to bleeding (aspirin, clopidogrel, NSAIDs) - no  Alcohol use  ?8 drinks/week - no    Creatinine Clearance/Renal Function  Latest eGFR / creatinine:  Lab Results   Component Value Date/Time    SODIUM 138 09/22/2023 01:29 PM    POTASSIUM 4.0 09/22/2023 01:29 PM    CHLORIDE 91 (L) 09/22/2023 01:29 PM    CO2 32 09/22/2023 01:29 PM    GLUCOSE 186 (H) 09/22/2023 01:29 PM    BUN 14 09/22/2023 01:29 PM    CREATININE 2.60 (H) 09/22/2023 01:29 PM       Is eGFR less than 50ml/min  yes, ckd on dialysis  Cr cl ~26 ml/min    If YES, calculate CrCl (see back)  Any recent dehydrating illness or medications added/changed? i.e. Diuretics    AST/ALT 13/18    Drug Interactions  ASA/antiplatelets - avoids  NSAID - avoids  Other drug interactions - no significant interactions per epic (Review med list / OTCs;)  Current Outpatient Medications on File Prior to Visit   Medication Sig Dispense Refill    apixaban (ELIQUIS) 5mg Tab Take 1 Tablet by mouth 2 times a day. 180 Tablet 1    paroxetine (PAXIL) 40 MG tablet Take 1 Tablet by mouth every day. 90 Tablet 3    acetaminophen (TYLENOL) 500 MG Tab Take 1 Tablet by mouth every 6 hours as needed for Mild Pain or Moderate Pain. 30 Tablet 0    amLODIPine (NORVASC) 10 MG Tab Take 1 Tablet by mouth every day. 30 Tablet 2    atorvastatin (LIPITOR) 20 MG Tab Take 1 Tablet by mouth every day. 30 Tablet 2    docusate sodium 100 MG Cap Take 1 capsule by mouth 2 times a day. 60 Capsule 2    hydrALAZINE (APRESOLINE) 10 MG Tab Take 1 Tablet by mouth every 8 hours. 90 Tablet 2    insulin NPH (HUMULIN/NOVOLIN) 100 UNIT/ML Suspension Inject 10 Units under the skin every morning. 10 mL 2    nortriptyline (PAMELOR) 25 MG Cap Take 1 Capsule by mouth every evening. 30 Capsule 2    polyethylene glycol/lytes (MIRALAX) 17 g Pack Mix and drink 1 Packet by mouth 2 times a  day. 10 Each 3    vitamin D2, Ergocalciferol, (DRISDOL) 1.25 MG (71596 UT) Cap capsule Take 1 Capsule by mouth every 7 days. 5 Capsule 0    metoprolol tartrate (LOPRESSOR) 100 MG Tab Take 1 Tablet by mouth 2 times a day. 60 Tablet 2    calcium acetate (PHOS-LO) 667 MG Tab tablet Take 3 Tablets by mouth 3 times a day with meals. 810 Tablet 3    bisacodyl (DULCOLAX) 10 MG Suppos Insert 1 Suppository into the rectum every 24 hours as needed (if sennosides, docusate, polyethylene glycol 3350, and/or magnesium hydroxide ineffective or not ordered).  0     No current facility-administered medications on file prior to visit.     Verified no anticonvulsant or azole therapy, education provided for future use.    Review of Systems   HENT: Negative for nosebleeds.   Respiratory: Negative for shortness of breath.  Gastrointestinal: Negative for blood in stool.   Genitourinary: Negative for hematuria.   Psychiatric/Behavioral: The patient is not nervous/anxious.     Physical Exam   Constitutional: Oriented to person, place, and time. Appears well-developed and well-nourished.   Pulmonary/Chest: Effort normal. No respiratory distress.   Skin: Not diaphoretic.  Psychiatric: Normal mood and affect. Behavior is normal.  Significant gait impairment / imbalance / high risk for falls - in a w/c    Final Assessment and Recommendations:  Patient appears stable from the anticoagulation standpoint  Benefits of continued DOAC therapy outweigh risks for this patient  Given HAS BLED score = 2-3 which is considered mod/high risk for bleeding and low CHADVASC = 3 w/o hx of CVA/TIA, I recommend reducing his Eliquis to 2.5 mg BID. His platelet count is low which can increase his risk of bleeding. Wt is close to 60 mg (currently 63 kg). Pt in agreement with plan.  Stressed the importance of monitoring for any prolonged or abnormal bleeding and making sure all his providers know he takes Eliquis.  New rx for 2.5 mg sent to his pharmacy.   Also,  spoke with his wife by phone to confirm she understands the dose change for Pj.  CBC, CMP prior to next visit    - Began taking Eliquis 2.5 mg by mouth twice daily (pt can cut 5 mg tablet in half)    Other Actions:   The rationale for continued DOAC therapy  The potential for minor, major or life-threatening bleeding  Dosing instructions, adherence, risks of non-adherence, handling missed doses  Avoiding OTC ASA & NSAIDs & minimizing EtOH to reduce bleeding risks  Dosing around upcoming procedure / surgery if applicable (see back)    Follow up:  Will follow up with patient in 12 weeks    Next appointment: Wednesday, June 19, 2024 at 1:15 pm.       Deysi HSIEH

## 2024-05-09 DIAGNOSIS — I48.91 ATRIAL FIBRILLATION, UNSPECIFIED TYPE (HCC): ICD-10-CM

## 2024-05-22 ENCOUNTER — APPOINTMENT (OUTPATIENT)
Dept: RADIOLOGY | Facility: MEDICAL CENTER | Age: 64
DRG: 640 | End: 2024-05-22
Attending: NURSE PRACTITIONER
Payer: MEDICARE

## 2024-05-22 ENCOUNTER — APPOINTMENT (OUTPATIENT)
Dept: RADIOLOGY | Facility: MEDICAL CENTER | Age: 64
DRG: 640 | End: 2024-05-22
Attending: INTERNAL MEDICINE
Payer: MEDICARE

## 2024-05-22 ENCOUNTER — HOSPITAL ENCOUNTER (INPATIENT)
Facility: MEDICAL CENTER | Age: 64
LOS: 3 days | DRG: 640 | End: 2024-05-25
Attending: EMERGENCY MEDICINE | Admitting: STUDENT IN AN ORGANIZED HEALTH CARE EDUCATION/TRAINING PROGRAM
Payer: MEDICARE

## 2024-05-22 ENCOUNTER — APPOINTMENT (OUTPATIENT)
Dept: RADIOLOGY | Facility: MEDICAL CENTER | Age: 64
DRG: 640 | End: 2024-05-22
Attending: EMERGENCY MEDICINE
Payer: MEDICARE

## 2024-05-22 DIAGNOSIS — T82.9XXA DIALYSIS COMPLICATION, INITIAL ENCOUNTER: ICD-10-CM

## 2024-05-22 DIAGNOSIS — M48.02 SPINAL STENOSIS IN CERVICAL REGION: ICD-10-CM

## 2024-05-22 DIAGNOSIS — J90 PLEURAL EFFUSION: ICD-10-CM

## 2024-05-22 DIAGNOSIS — N18.6 ESRD ON DIALYSIS (HCC): Chronic | ICD-10-CM

## 2024-05-22 DIAGNOSIS — E11.42 DIABETIC POLYNEUROPATHY ASSOCIATED WITH TYPE 2 DIABETES MELLITUS (HCC): ICD-10-CM

## 2024-05-22 DIAGNOSIS — R09.02 HYPOXIA: ICD-10-CM

## 2024-05-22 DIAGNOSIS — E87.70 HYPERVOLEMIA, UNSPECIFIED HYPERVOLEMIA TYPE: ICD-10-CM

## 2024-05-22 DIAGNOSIS — Z99.2 ESRD ON DIALYSIS (HCC): Chronic | ICD-10-CM

## 2024-05-22 DIAGNOSIS — G95.9 CERVICAL MYELOPATHY (HCC): ICD-10-CM

## 2024-05-22 PROBLEM — Z71.89 GOALS OF CARE, COUNSELING/DISCUSSION: Status: ACTIVE | Noted: 2024-05-22

## 2024-05-22 LAB
ALBUMIN SERPL BCP-MCNC: 4 G/DL (ref 3.2–4.9)
ALBUMIN/GLOB SERPL: 1.5 G/DL
ALP SERPL-CCNC: 137 U/L (ref 30–99)
ALT SERPL-CCNC: 16 U/L (ref 2–50)
AMYLASE FLD-CCNC: 19 U/L
ANION GAP SERPL CALC-SCNC: 13 MMOL/L (ref 7–16)
APPEARANCE FLD: CLEAR
AST SERPL-CCNC: 16 U/L (ref 12–45)
BASOPHILS # BLD AUTO: 0.7 % (ref 0–1.8)
BASOPHILS # BLD: 0.05 K/UL (ref 0–0.12)
BILIRUB SERPL-MCNC: 0.6 MG/DL (ref 0.1–1.5)
BODY FLD TYPE: NORMAL
BUN SERPL-MCNC: 32 MG/DL (ref 8–22)
CALCIUM ALBUM COR SERPL-MCNC: 9.6 MG/DL (ref 8.5–10.5)
CALCIUM SERPL-MCNC: 9.6 MG/DL (ref 8.5–10.5)
CELLS FLD: 2
CHLORIDE SERPL-SCNC: 93 MMOL/L (ref 96–112)
CO2 SERPL-SCNC: 31 MMOL/L (ref 20–33)
COLOR FLD: YELLOW
CREAT SERPL-MCNC: 4.2 MG/DL (ref 0.5–1.4)
CYTOLOGY REG CYTOL: NORMAL
EKG IMPRESSION: NORMAL
EOSINOPHIL # BLD AUTO: 0.08 K/UL (ref 0–0.51)
EOSINOPHIL NFR BLD: 1.2 % (ref 0–6.9)
ERYTHROCYTE [DISTWIDTH] IN BLOOD BY AUTOMATED COUNT: 54 FL (ref 35.9–50)
EXTRA TUBE BLU BLU: NORMAL
FUNGUS SPEC FUNGUS STN: NORMAL
GFR SERPLBLD CREATININE-BSD FMLA CKD-EPI: 15 ML/MIN/1.73 M 2
GLOBULIN SER CALC-MCNC: 2.7 G/DL (ref 1.9–3.5)
GLUCOSE BLD STRIP.AUTO-MCNC: 131 MG/DL (ref 65–99)
GLUCOSE BLD STRIP.AUTO-MCNC: 148 MG/DL (ref 65–99)
GLUCOSE FLD-MCNC: 206 MG/DL
GLUCOSE SERPL-MCNC: 208 MG/DL (ref 65–99)
GRAM STN SPEC: NORMAL
HAV IGM SERPL QL IA: NORMAL
HBV CORE IGM SER QL: NORMAL
HBV SURFACE AB SERPL IA-ACNC: <3.5 MIU/ML (ref 0–10)
HBV SURFACE AG SER QL: NORMAL
HCT VFR BLD AUTO: 31.4 % (ref 42–52)
HCV AB SER QL: NORMAL
HGB BLD-MCNC: 10.3 G/DL (ref 14–18)
IMM GRANULOCYTES # BLD AUTO: 0.04 K/UL (ref 0–0.11)
IMM GRANULOCYTES NFR BLD AUTO: 0.6 % (ref 0–0.9)
LDH FLD L TO P-CCNC: 63 U/L
LYMPHOCYTES # BLD AUTO: 0.5 K/UL (ref 1–4.8)
LYMPHOCYTES NFR BLD: 7.5 % (ref 22–41)
LYMPHOCYTES NFR FLD: 44 %
MAGNESIUM SERPL-MCNC: 2.7 MG/DL (ref 1.5–2.5)
MCH RBC QN AUTO: 30.2 PG (ref 27–33)
MCHC RBC AUTO-ENTMCNC: 32.8 G/DL (ref 32.3–36.5)
MCV RBC AUTO: 92.1 FL (ref 81.4–97.8)
MONOCYTES # BLD AUTO: 0.61 K/UL (ref 0–0.85)
MONOCYTES NFR BLD AUTO: 9.1 % (ref 0–13.4)
MONOS+MACROS NFR FLD MANUAL: 46 %
NEUTROPHILS # BLD AUTO: 5.4 K/UL (ref 1.82–7.42)
NEUTROPHILS NFR BLD: 80.9 % (ref 44–72)
NEUTROPHILS NFR FLD: 8 %
NRBC # BLD AUTO: 0 K/UL
NRBC BLD-RTO: 0 /100 WBC (ref 0–0.2)
NT-PROBNP SERPL IA-MCNC: ABNORMAL PG/ML (ref 0–125)
NUC CELL # FLD: 159 CELLS/UL
PH FLD: 8 [PH]
PHOSPHATE SERPL-MCNC: 1.9 MG/DL (ref 2.5–4.5)
PLATELET # BLD AUTO: 130 K/UL (ref 164–446)
PMV BLD AUTO: 12.5 FL (ref 9–12.9)
POTASSIUM SERPL-SCNC: 5 MMOL/L (ref 3.6–5.5)
PROCALCITONIN SERPL-MCNC: 0.19 NG/ML
PROT FLD-MCNC: 2.4 G/DL
PROT SERPL-MCNC: 6.7 G/DL (ref 6–8.2)
RBC # BLD AUTO: 3.41 M/UL (ref 4.7–6.1)
RBC # FLD: <2000 CELLS/UL
SIGNIFICANT IND 70042: NORMAL
SIGNIFICANT IND 70042: NORMAL
SITE SITE: NORMAL
SITE SITE: NORMAL
SODIUM SERPL-SCNC: 137 MMOL/L (ref 135–145)
SOURCE SOURCE: NORMAL
SOURCE SOURCE: NORMAL
WBC # BLD AUTO: 6.7 K/UL (ref 4.8–10.8)

## 2024-05-22 PROCEDURE — 87070 CULTURE OTHR SPECIMN AEROBIC: CPT

## 2024-05-22 PROCEDURE — 770000 HCHG ROOM/CARE - INTERMEDIATE ICU *

## 2024-05-22 PROCEDURE — 99291 CRITICAL CARE FIRST HOUR: CPT | Performed by: STUDENT IN AN ORGANIZED HEALTH CARE EDUCATION/TRAINING PROGRAM

## 2024-05-22 PROCEDURE — 88112 CYTOPATH CELL ENHANCE TECH: CPT

## 2024-05-22 PROCEDURE — 99291 CRITICAL CARE FIRST HOUR: CPT | Mod: 25 | Performed by: INTERNAL MEDICINE

## 2024-05-22 PROCEDURE — 84157 ASSAY OF PROTEIN OTHER: CPT

## 2024-05-22 PROCEDURE — 86706 HEP B SURFACE ANTIBODY: CPT

## 2024-05-22 PROCEDURE — 89051 BODY FLUID CELL COUNT: CPT

## 2024-05-22 PROCEDURE — 700102 HCHG RX REV CODE 250 W/ 637 OVERRIDE(OP): Performed by: STUDENT IN AN ORGANIZED HEALTH CARE EDUCATION/TRAINING PROGRAM

## 2024-05-22 PROCEDURE — 700111 HCHG RX REV CODE 636 W/ 250 OVERRIDE (IP): Mod: JZ | Performed by: STUDENT IN AN ORGANIZED HEALTH CARE EDUCATION/TRAINING PROGRAM

## 2024-05-22 PROCEDURE — 700105 HCHG RX REV CODE 258: Performed by: EMERGENCY MEDICINE

## 2024-05-22 PROCEDURE — 87077 CULTURE AEROBIC IDENTIFY: CPT | Mod: 91

## 2024-05-22 PROCEDURE — 87206 SMEAR FLUORESCENT/ACID STAI: CPT

## 2024-05-22 PROCEDURE — 87102 FUNGUS ISOLATION CULTURE: CPT

## 2024-05-22 PROCEDURE — 71045 X-RAY EXAM CHEST 1 VIEW: CPT

## 2024-05-22 PROCEDURE — 83735 ASSAY OF MAGNESIUM: CPT

## 2024-05-22 PROCEDURE — 700111 HCHG RX REV CODE 636 W/ 250 OVERRIDE (IP): Mod: JZ | Performed by: EMERGENCY MEDICINE

## 2024-05-22 PROCEDURE — 85025 COMPLETE CBC W/AUTO DIFF WBC: CPT

## 2024-05-22 PROCEDURE — 87205 SMEAR GRAM STAIN: CPT

## 2024-05-22 PROCEDURE — 700105 HCHG RX REV CODE 258: Performed by: STUDENT IN AN ORGANIZED HEALTH CARE EDUCATION/TRAINING PROGRAM

## 2024-05-22 PROCEDURE — 83986 ASSAY PH BODY FLUID NOS: CPT

## 2024-05-22 PROCEDURE — 90935 HEMODIALYSIS ONE EVALUATION: CPT

## 2024-05-22 PROCEDURE — 82945 GLUCOSE OTHER FLUID: CPT

## 2024-05-22 PROCEDURE — 87040 BLOOD CULTURE FOR BACTERIA: CPT

## 2024-05-22 PROCEDURE — 80053 COMPREHEN METABOLIC PANEL: CPT

## 2024-05-22 PROCEDURE — 82150 ASSAY OF AMYLASE: CPT

## 2024-05-22 PROCEDURE — 99285 EMERGENCY DEPT VISIT HI MDM: CPT

## 2024-05-22 PROCEDURE — 84100 ASSAY OF PHOSPHORUS: CPT

## 2024-05-22 PROCEDURE — 93005 ELECTROCARDIOGRAM TRACING: CPT

## 2024-05-22 PROCEDURE — 83615 LACTATE (LD) (LDH) ENZYME: CPT

## 2024-05-22 PROCEDURE — 82962 GLUCOSE BLOOD TEST: CPT

## 2024-05-22 PROCEDURE — 36415 COLL VENOUS BLD VENIPUNCTURE: CPT

## 2024-05-22 PROCEDURE — 87116 MYCOBACTERIA CULTURE: CPT

## 2024-05-22 PROCEDURE — 32554 ASPIRATE PLEURA W/O IMAGING: CPT

## 2024-05-22 PROCEDURE — 0W9B3ZZ DRAINAGE OF LEFT PLEURAL CAVITY, PERCUTANEOUS APPROACH: ICD-10-PCS | Performed by: INTERNAL MEDICINE

## 2024-05-22 PROCEDURE — 88305 TISSUE EXAM BY PATHOLOGIST: CPT

## 2024-05-22 PROCEDURE — 83880 ASSAY OF NATRIURETIC PEPTIDE: CPT

## 2024-05-22 PROCEDURE — 32555 ASPIRATE PLEURA W/ IMAGING: CPT | Mod: FS | Performed by: NURSE PRACTITIONER

## 2024-05-22 PROCEDURE — A9270 NON-COVERED ITEM OR SERVICE: HCPCS | Performed by: STUDENT IN AN ORGANIZED HEALTH CARE EDUCATION/TRAINING PROGRAM

## 2024-05-22 PROCEDURE — 96365 THER/PROPH/DIAG IV INF INIT: CPT

## 2024-05-22 PROCEDURE — 84145 PROCALCITONIN (PCT): CPT

## 2024-05-22 PROCEDURE — 80074 ACUTE HEPATITIS PANEL: CPT

## 2024-05-22 RX ORDER — ACETAMINOPHEN 325 MG/1
650 TABLET ORAL EVERY 6 HOURS PRN
Status: DISCONTINUED | OUTPATIENT
Start: 2024-05-22 | End: 2024-05-25 | Stop reason: HOSPADM

## 2024-05-22 RX ORDER — AMLODIPINE BESYLATE 10 MG/1
10 TABLET ORAL 2 TIMES DAILY
COMMUNITY

## 2024-05-22 RX ORDER — ATORVASTATIN CALCIUM 20 MG/1
20 TABLET, FILM COATED ORAL DAILY
Status: DISCONTINUED | OUTPATIENT
Start: 2024-05-22 | End: 2024-05-25 | Stop reason: HOSPADM

## 2024-05-22 RX ORDER — INSULIN LISPRO 100 [IU]/ML
1-6 INJECTION, SOLUTION INTRAVENOUS; SUBCUTANEOUS EVERY 6 HOURS
Status: DISCONTINUED | OUTPATIENT
Start: 2024-05-22 | End: 2024-05-23

## 2024-05-22 RX ORDER — METOPROLOL TARTRATE 50 MG/1
100 TABLET, FILM COATED ORAL 2 TIMES DAILY
Status: DISCONTINUED | OUTPATIENT
Start: 2024-05-22 | End: 2024-05-25 | Stop reason: HOSPADM

## 2024-05-22 RX ORDER — DEXTROSE MONOHYDRATE 25 G/50ML
25 INJECTION, SOLUTION INTRAVENOUS
Status: DISCONTINUED | OUTPATIENT
Start: 2024-05-22 | End: 2024-05-23

## 2024-05-22 RX ORDER — MULTIVIT WITH MINERALS/LUTEIN
1000 TABLET ORAL DAILY
Status: ON HOLD | COMMUNITY
End: 2024-05-25

## 2024-05-22 RX ORDER — SODIUM CHLORIDE 9 MG/ML
250 INJECTION, SOLUTION INTRAVENOUS
Status: DISCONTINUED | OUTPATIENT
Start: 2024-05-22 | End: 2024-05-25 | Stop reason: HOSPADM

## 2024-05-22 RX ORDER — HEPARIN SODIUM 5000 [USP'U]/ML
5000 INJECTION, SOLUTION INTRAVENOUS; SUBCUTANEOUS EVERY 8 HOURS
Status: DISCONTINUED | OUTPATIENT
Start: 2024-05-22 | End: 2024-05-23

## 2024-05-22 RX ADMIN — AMPICILLIN AND SULBACTAM 3 G: 1; 2 INJECTION, POWDER, FOR SOLUTION INTRAMUSCULAR; INTRAVENOUS at 17:53

## 2024-05-22 RX ADMIN — HEPARIN SODIUM 5000 UNITS: 5000 INJECTION, SOLUTION INTRAVENOUS; SUBCUTANEOUS at 21:38

## 2024-05-22 RX ADMIN — METOPROLOL TARTRATE 100 MG: 50 TABLET, FILM COATED ORAL at 17:46

## 2024-05-22 RX ADMIN — ATORVASTATIN CALCIUM 20 MG: 20 TABLET, FILM COATED ORAL at 13:50

## 2024-05-22 RX ADMIN — AMPICILLIN AND SULBACTAM 3 G: 1; 2 INJECTION, POWDER, FOR SOLUTION INTRAMUSCULAR; INTRAVENOUS at 10:34

## 2024-05-22 RX ADMIN — METOPROLOL TARTRATE 100 MG: 50 TABLET, FILM COATED ORAL at 13:50

## 2024-05-22 RX ADMIN — DIBASIC SODIUM PHOSPHATE, MONOBASIC POTASSIUM PHOSPHATE AND MONOBASIC SODIUM PHOSPHATE 250 MG: 852; 155; 130 TABLET ORAL at 21:38

## 2024-05-22 RX ADMIN — INSULIN GLARGINE-YFGN 13 UNITS: 100 INJECTION, SOLUTION SUBCUTANEOUS at 17:46

## 2024-05-22 ASSESSMENT — ENCOUNTER SYMPTOMS
STRIDOR: 0
BLURRED VISION: 0
CHILLS: 0
COUGH: 1
VOMITING: 0
NAUSEA: 0
FOCAL WEAKNESS: 0
SHORTNESS OF BREATH: 1
ABDOMINAL PAIN: 0
SPUTUM PRODUCTION: 0
MYALGIAS: 0
DIZZINESS: 0
SENSORY CHANGE: 0
FEVER: 0

## 2024-05-22 ASSESSMENT — FIBROSIS 4 INDEX
FIB4 SCORE: 5.15
FIB4 SCORE: 2

## 2024-05-22 ASSESSMENT — COPD QUESTIONNAIRES
HAVE YOU SMOKED AT LEAST 100 CIGARETTES IN YOUR ENTIRE LIFE: NO/DON'T KNOW
DURING THE PAST 4 WEEKS HOW MUCH DID YOU FEEL SHORT OF BREATH: SOME OF THE TIME
COPD SCREENING SCORE: 3
DO YOU EVER COUGH UP ANY MUCUS OR PHLEGM?: NO/ONLY WITH OCCASIONAL COLDS OR INFECTIONS

## 2024-05-22 ASSESSMENT — PAIN DESCRIPTION - PAIN TYPE: TYPE: ACUTE PAIN

## 2024-05-22 NOTE — ED NOTES
Bedside report given top SAMAN Garcia. Patient will be transported to [facility]. Family remains with patient. Patient remains AOX4 and stable on RA

## 2024-05-22 NOTE — ASSESSMENT & PLAN NOTE
Pulmonary edema with elevated BNP and large effusions and end-stage renal disease patient  Nephrology contacted by [facility] for HD

## 2024-05-22 NOTE — ASSESSMENT & PLAN NOTE
ESRD hemodialysis, Monday Wednesday Friday  Continue dialysis as per nephrology.  Nephrology has been following him for dialysis.  He underwent dialysis today again.

## 2024-05-22 NOTE — CONSULTS
Critical Care Consultation    Date of consult: 5/22/2024    Referring Physician  Alexx Mtz D.O.    Reason for Consultation  Hypoxia, volume overload    History of Presenting Illness  63 y.o. male with past medical history of diabetes, blindness, ESRD on HD MWF, hypercholesterolemia, hypertension, chronic hypoxia on oxygen who presented 5/22/2024 with increasing dyspnea and hypoxia.  Per the patient's family he does require low-flow nasal cannula oxygen 24/7 at home however this morning was saturating in the 40s.  They brought him to the ER where he was found to be saturating 60% on 5 L nasal cannula.  A chest x-ray at the outside hospital showed moderate to large bilateral effusions and pulmonary edema and he was transferred to our facility for hemodialysis for volume overload.  His last chest x-ray in epic was February of this year which noted small bilateral effusions however his effusions today are markedly increased.  Dialysis is pending, the patient is to be admitted to the IMCU however he remains on 100% FiO2 and a therapeutic thoracentesis was offered for symptom palliation pending dialysis.  The patient and his family discussed and agreed to this procedure.  He is on Eliquis at home however his last dose was yesterday morning.    Code Status  Full Code    Review of Systems  Review of Systems   Constitutional:  Positive for malaise/fatigue. Negative for chills and fever.   Eyes:  Negative for blurred vision.   Respiratory:  Positive for cough and shortness of breath. Negative for sputum production and stridor.    Cardiovascular:  Negative for chest pain.   Gastrointestinal:  Negative for abdominal pain, nausea and vomiting.   Musculoskeletal:  Negative for myalgias.   Skin:  Negative for rash.   Neurological:  Negative for dizziness, sensory change and focal weakness.       Past Medical History   has a past medical history of Anemia, Blind in both eyes, Diabetes (HCC), Dialysis patient ([facility]), ESRD  (end stage renal disease) (HCC), High cholesterol, Hyperlipemia, Hypertension, Oxygen dependent, and Snoring.    He has no past medical history of Disorder of thyroid or Myocardial infarct (HCC).    Surgical History   has a past surgical history that includes pr cataract surg w/iol 1 stage wo endo (10/21/15); other (3/2015); other (7/2015); other (9/22/15); cath placement (Right, 12/10/2015); av fistula creation (Left, 12/10/2015); recovery (2/24/2016); recovery (4/19/2016); cervical disk and fusion anterior (7/12/2022); cervical fusion posterior (7/12/2022); corpectomy (7/12/2022); and cervical laminectomy posterior (7/12/2022).    Family History  family history includes Alzheimer's Disease (age of onset: 80) in his mother; Diabetes in his brother and father; Hypertension in his father.    Social History   reports that he has never smoked. He has never used smokeless tobacco. He reports that he does not drink alcohol and does not use drugs.    Medications  Home Medications       Reviewed by Armin Lutz R.N. (Registered Nurse) on 05/22/24 at 0850  Med List Status: Partial     Medication Last Dose Status   acetaminophen (TYLENOL) 500 MG Tab  Active   amLODIPine (NORVASC) 10 MG Tab  Active   apixaban (ELIQUIS) 2.5mg Tab  Active   atorvastatin (LIPITOR) 20 MG Tab  Active   bisacodyl (DULCOLAX) 10 MG Suppos  Active   calcium acetate (PHOS-LO) 667 MG Tab tablet  Active   docusate sodium 100 MG Cap  Active   hydrALAZINE (APRESOLINE) 10 MG Tab  Active   insulin NPH (HUMULIN/NOVOLIN) 100 UNIT/ML Suspension  Active   metoprolol tartrate (LOPRESSOR) 100 MG Tab  Active   nortriptyline (PAMELOR) 25 MG Cap  Active   paroxetine (PAXIL) 40 MG tablet  Active   polyethylene glycol/lytes (MIRALAX) 17 g Pack  Active   vitamin D2, Ergocalciferol, (DRISDOL) 1.25 MG (63583 UT) Cap capsule  Active                  Audit from Redirected Encounters    **Home medications have not yet been reviewed for this encounter**       Current  Facility-Administered Medications   Medication Dose Route Frequency Provider Last Rate Last Admin    ampicillin/sulbactam (Unasyn) 3 g in  mL IVPB  3 g Intravenous Once RAHUL Mcmullen.JÚNIOR 200 mL/hr at 05/22/24 1034 3 g at 05/22/24 1034     Current Outpatient Medications   Medication Sig Dispense Refill    apixaban (ELIQUIS) 2.5mg Tab Take 1 Tablet by mouth 2 times a day. 60 Tablet 5    paroxetine (PAXIL) 40 MG tablet Take 1 Tablet by mouth every day. 90 Tablet 3    acetaminophen (TYLENOL) 500 MG Tab Take 1 Tablet by mouth every 6 hours as needed for Mild Pain or Moderate Pain. 30 Tablet 0    amLODIPine (NORVASC) 10 MG Tab Take 1 Tablet by mouth every day. 30 Tablet 2    atorvastatin (LIPITOR) 20 MG Tab Take 1 Tablet by mouth every day. 30 Tablet 2    docusate sodium 100 MG Cap Take 1 capsule by mouth 2 times a day. 60 Capsule 2    hydrALAZINE (APRESOLINE) 10 MG Tab Take 1 Tablet by mouth every 8 hours. 90 Tablet 2    insulin NPH (HUMULIN/NOVOLIN) 100 UNIT/ML Suspension Inject 10 Units under the skin every morning. 10 mL 2    nortriptyline (PAMELOR) 25 MG Cap Take 1 Capsule by mouth every evening. 30 Capsule 2    polyethylene glycol/lytes (MIRALAX) 17 g Pack Mix and drink 1 Packet by mouth 2 times a day. 10 Each 3    vitamin D2, Ergocalciferol, (DRISDOL) 1.25 MG (60714 UT) Cap capsule Take 1 Capsule by mouth every 7 days. 5 Capsule 0    metoprolol tartrate (LOPRESSOR) 100 MG Tab Take 1 Tablet by mouth 2 times a day. 60 Tablet 2    calcium acetate (PHOS-LO) 667 MG Tab tablet Take 3 Tablets by mouth 3 times a day with meals. 810 Tablet 3    bisacodyl (DULCOLAX) 10 MG Suppos Insert 1 Suppository into the rectum every 24 hours as needed (if sennosides, docusate, polyethylene glycol 3350, and/or magnesium hydroxide ineffective or not ordered).  0       Allergies  Allergies   Allergen Reactions    Aspirin Unspecified     Cannot have because on Dialysis       Vital Signs last 24 hours  Temp:  [36.8 °C (98.2 °F)]  36.8 °C (98.2 °F)  Pulse:  [61] 61  Resp:  [25] 25  BP: (142)/(65) 142/65  SpO2:  [84 %] 84 %    Physical Exam  Physical Exam  Vitals and nursing note reviewed.   Constitutional:       General: He is in acute distress.      Appearance: Normal appearance. He is well-developed and underweight. He is ill-appearing.      Interventions: Face mask in place.   HENT:      Head: Normocephalic and atraumatic.      Right Ear: External ear normal.      Left Ear: External ear normal.      Nose: Nose normal.      Mouth/Throat:      Mouth: Mucous membranes are moist.   Eyes:      Extraocular Movements: Extraocular movements intact.      Conjunctiva/sclera: Conjunctivae normal.   Neck:      Vascular: No JVD.      Trachea: No tracheal deviation.   Cardiovascular:      Rate and Rhythm: Normal rate and regular rhythm.      Pulses: Normal pulses.   Pulmonary:      Effort: Pulmonary effort is normal. Tachypnea present.      Breath sounds: Examination of the right-upper field reveals rales. Examination of the left-upper field reveals rales. Examination of the right-middle field reveals decreased breath sounds. Examination of the left-middle field reveals decreased breath sounds. Examination of the right-lower field reveals decreased breath sounds. Examination of the left-lower field reveals decreased breath sounds. Decreased breath sounds and rales present.   Abdominal:      General: Bowel sounds are normal. There is no distension.      Palpations: Abdomen is soft.   Musculoskeletal:         General: No tenderness.      Cervical back: Neck supple.      Right lower leg: No edema.      Left lower leg: No edema.      Comments: Left forearm AV fistula with palpable thrill   Skin:     General: Skin is warm and dry.      Capillary Refill: Capillary refill takes less than 2 seconds.      Findings: No rash.   Neurological:      General: No focal deficit present.      Mental Status: He is alert and oriented to person, place, and time.       Cranial Nerves: No cranial nerve deficit.      Sensory: No sensory deficit.      Motor: No weakness.   Psychiatric:         Mood and Affect: Mood normal.         Behavior: Behavior normal.         Fluids  No intake or output data in the 24 hours ending 05/22/24 1040    Laboratory  Recent Results (from the past 48 hour(s))   CBC with Differential    Collection Time: 05/22/24  8:50 AM   Result Value Ref Range    WBC 6.7 4.8 - 10.8 K/uL    RBC 3.41 (L) 4.70 - 6.10 M/uL    Hemoglobin 10.3 (L) 14.0 - 18.0 g/dL    Hematocrit 31.4 (L) 42.0 - 52.0 %    MCV 92.1 81.4 - 97.8 fL    MCH 30.2 27.0 - 33.0 pg    MCHC 32.8 32.3 - 36.5 g/dL    RDW 54.0 (H) 35.9 - 50.0 fL    Platelet Count 130 (L) 164 - 446 K/uL    MPV 12.5 9.0 - 12.9 fL    Neutrophils-Polys 80.90 (H) 44.00 - 72.00 %    Lymphocytes 7.50 (L) 22.00 - 41.00 %    Monocytes 9.10 0.00 - 13.40 %    Eosinophils 1.20 0.00 - 6.90 %    Basophils 0.70 0.00 - 1.80 %    Immature Granulocytes 0.60 0.00 - 0.90 %    Nucleated RBC 0.00 0.00 - 0.20 /100 WBC    Neutrophils (Absolute) 5.40 1.82 - 7.42 K/uL    Lymphs (Absolute) 0.50 (L) 1.00 - 4.80 K/uL    Monos (Absolute) 0.61 0.00 - 0.85 K/uL    Eos (Absolute) 0.08 0.00 - 0.51 K/uL    Baso (Absolute) 0.05 0.00 - 0.12 K/uL    Immature Granulocytes (abs) 0.04 0.00 - 0.11 K/uL    NRBC (Absolute) 0.00 K/uL   Comp Metabolic Panel    Collection Time: 05/22/24  8:50 AM   Result Value Ref Range    Sodium 137 135 - 145 mmol/L    Potassium 5.0 3.6 - 5.5 mmol/L    Chloride 93 (L) 96 - 112 mmol/L    Co2 31 20 - 33 mmol/L    Anion Gap 13.0 7.0 - 16.0    Glucose 208 (H) 65 - 99 mg/dL    Bun 32 (H) 8 - 22 mg/dL    Creatinine 4.20 (H) 0.50 - 1.40 mg/dL    Calcium 9.6 8.5 - 10.5 mg/dL    Correct Calcium 9.6 8.5 - 10.5 mg/dL    AST(SGOT) 16 12 - 45 U/L    ALT(SGPT) 16 2 - 50 U/L    Alkaline Phosphatase 137 (H) 30 - 99 U/L    Total Bilirubin 0.6 0.1 - 1.5 mg/dL    Albumin 4.0 3.2 - 4.9 g/dL    Total Protein 6.7 6.0 - 8.2 g/dL    Globulin 2.7 1.9 - 3.5  g/dL    A-G Ratio 1.5 g/dL   EXTRA TUBE,THEO    Collection Time: 24  8:50 AM   Result Value Ref Range    Extra Tube, Blue SORTED    proBrain Natriuretic Peptide, NT    Collection Time: 24  8:50 AM   Result Value Ref Range    NT-proBNP 03040 (H) 0 - 125 pg/mL   ESTIMATED GFR    Collection Time: 24  8:50 AM   Result Value Ref Range    GFR (CKD-EPI) 15 (A) >60 mL/min/1.73 m 2   EKG    Collection Time: 24  8:56 AM   Result Value Ref Range    Report       [facility]    Test Date:  2024  Pt Name:    CARLY LIGHT                  Department: ER  MRN:        [id number]                      Room:       [facility]  Gender:     Male                         Technician: [id number]  :        [id number]                   Requested By:ER TRIAGE PROTOCOL  Order #:    [id number]                    Reading MD:    Measurements  Intervals                                Axis  Rate:       62                           P:          0  CT:         0                            QRS:        20  QRSD:       84                           T:          53  QT:         461  QTc:        469    Interpretive Statements  Atrial flutter with predominant 4:1 AV block  Low voltage, precordial leads  Borderline ST depression, lateral leads  Compared to ECG 2023 13:44:02  AV block, advanced (high-grade) now present  Low QRS voltage now present  ST (T wave) deviation now present  T-wave abnormality no longer present  Prolonged QT interval no  longer present         Imaging  DX-CHEST-PORTABLE (1 VIEW)   Final Result      1.  Moderate right and moderate/large left pleural effusion with adjacent airspace disease.   2.  Diffuse interstitial thickening, likely edema.      US-THORACENTESIS PUNCTURE LEFT    (Results Pending)   DX-CHEST-PORTABLE (1 VIEW)    (Results Pending)       Assessment/Plan  * Volume overload- (present on admission)  Assessment & Plan  Pulmonary edema with elevated BNP and large effusions  and end-stage renal disease patient  Nephrology contacted by ERP for HD    Pleural effusion- (present on admission)  Assessment & Plan  Bilateral moderate to large pleural effusions, likely due to volume overload in the setting of ESRD.  Will perform a left-sided thoracentesis for symptom palliation at this time  Recommend initiating HD for further volume control  May need to consider a right-sided thoracentesis in the coming days    Secondary hypercoagulable state (HCC)- (present on admission)  Assessment & Plan  Due to Eliquis use, has not taken a dose since the morning of 5/21/2024    Atrial fibrillation (HCC)- (present on admission)  Assessment & Plan  On Eliquis at home, restart tomorrow if no right thoracentesis needed  Continue metoprolol    ESRD on dialysis (HCC)- (present on admission)  Assessment & Plan  Left forearm AV fistula  Receives dialysis Monday, Wednesday and Friday  Nephrology contacted in the ER    Acute on chronic respiratory failure with hypoxia (HCC)- (present on admission)  Assessment & Plan  Chronic hypoxia on 5 L of oxygen at home, now requiring 100% FiO2  Likely worsened due to large pleural effusions  Left thoracentesis will be completed today, may need right thoracentesis in the coming days  Nephrology contacted for HD  RT/O2 Protocols  Titrate supplemental FiO2 to maintain SpO2 >88%    Type 2 diabetes mellitus with nephropathy, and retinopathy (HCC)- (present on admission)  Assessment & Plan  Goal blood glucose 140-180  basal insulin, sliding scale insulin, accuchecks  hypoglycemia protocol      [facility] admission approved, hospitalist will remain primary.  We will complete a left-sided thoracentesis for symptomatic hypoxia today.  Please contact [facility] for any higher level of care needs.    Discussed patient condition and risk of morbidity and/or mortality with Family, RN, RT, Pharmacy, Charge nurse / hot rounds, Patient, and ERP .      The patient remains critically ill.   Critical care time = 33 minutes in directly providing and coordinating critical care and extensive data review.  No time overlap and excludes procedures.    Please note that this dictation was created using voice recognition software. The accuracy of the dictation is limited to the abilities of the software. I have made every reasonable attempt to correct obvious errors, but I expect that there are errors of grammar and possibly content that I did not discover before finalizing the note.

## 2024-05-22 NOTE — ASSESSMENT & PLAN NOTE
Current rate controlled  Continue metoprolol  Now resume Eliquis as it was on hold due to thoracentesis.

## 2024-05-22 NOTE — H&P
Hospital Medicine History & Physical Note    Date of Service  5/22/2024    Primary Care Physician  Faisal Germain M.D.    Consultants  critical care    Specialist Names: Dr Pressley     Code Status  Prior    Chief Complaint  Chief Complaint   Patient presents with    Shortness of Breath     BIB EMS transfer from [facility]. Patient presented with SOB. EMS reports 60% on home O2 of 5L. Patient dx with L pleural effusion and transported to [facility] for continuation of care.        History of Presenting Illness  Pj Britt is a 63 y.o. male with past medical history of diabetes mellitus type 2, hypertension, blindness, spinal stenosis, ESRD on hemodialysis  MWF who presented as transfer on 5/22/2024 with worsening shortness of breath and cough for last 1 week.  He denies fever.On arrival to ED he is requiring 2 L oxygen to maintain saturation over 90.  Labs noted normal white count, hemoglobin 10.3, sodium 133, glucose 208, chemistry consistent with ESRD, BNP 12156, chest x-ray noted with moderate right and moderate/large pleural effusion, diffuse interstitial thickening.  Intensivist Dr. Tran consulted and recommended IMCU admission.  Nephrology has been consulted for send dialysis.    I spoke and discussed the case with the ER physician, Dr. see .Patient will be admitted to the hospital for further evaluation and management for acute on chronic hypoxic respite failure in setting of volume overload, bilateral pleural effusion.       I discussed the plan of care with patient and family.    Review of Systems  Review of Systems   Constitutional:  Positive for malaise/fatigue.   Respiratory:  Positive for cough and shortness of breath.    Gastrointestinal:  Negative for nausea and vomiting.       Past Medical History   has a past medical history of Anemia, Blind in both eyes, Diabetes ([facility]), Dialysis patient ([facility]), ESRD (end stage renal disease) ([facility]), High cholesterol, Hyperlipemia, Hypertension, Oxygen  dependent, and Snoring.    Surgical History   has a past surgical history that includes pr cataract surg w/iol 1 stage wo endo (10/21/15); other (3/2015); other (7/2015); other (9/22/15); cath placement (Right, 12/10/2015); av fistula creation (Left, 12/10/2015); recovery (2/24/2016); recovery (4/19/2016); cervical disk and fusion anterior (7/12/2022); cervical fusion posterior (7/12/2022); corpectomy (7/12/2022); and cervical laminectomy posterior (7/12/2022).     Family History  family history includes Alzheimer's Disease (age of onset: 80) in his mother; Diabetes in his brother and father; Hypertension in his father.   Family history reviewed with patient. There is no family history that is pertinent to the chief complaint.     Social History   reports that he has never smoked. He has never used smokeless tobacco. He reports that he does not drink alcohol and does not use drugs.    Allergies  Allergies   Allergen Reactions    Aspirin Unspecified     Cannot have because on Dialysis       Medications  Prior to Admission Medications   Prescriptions Last Dose Informant Patient Reported? Taking?   Ascorbic Acid (VITAMIN C) 1000 MG Tab 5/21/2024 at am  Yes Yes   Sig: Take 1,000 mg by mouth every day.   Methoxy PEG-Epoetin Beta (MIRCERA INJ) 5/20/2024 at unk  Yes Yes   Sig: Inject 175 mcg as directed every 14 days. Info obtained by staff at [facility] [phone]   amLODIPine (NORVASC) 10 MG Tab 5/21/2024 at hs  Yes Yes   Sig: Take 10 mg by mouth 2 times a day.   apixaban (ELIQUIS) 2.5mg Tab 5/21/2024 at hs  No Yes   Sig: Take 1 Tablet by mouth 2 times a day.   atorvastatin (LIPITOR) 20 MG Tab 5/21/2024 at unk  No Yes   Sig: Take 1 Tablet by mouth every day.   calcium acetate (PHOS-LO) 667 MG Tab tablet 5/21/2024 at pm  No Yes   Sig: Take 3 Tablets by mouth 3 times a day with meals.   insulin NPH (HUMULIN/NOVOLIN N) 100 UNIT/ML Suspension 5/21/2024 at am  Yes Yes   Sig: Inject 20 Units under the skin  "every morning.   metoprolol tartrate (LOPRESSOR) 100 MG Tab 5/21/2024 at pm  No Yes   Sig: Take 1 Tablet by mouth 2 times a day.   paroxetine (PAXIL) 40 MG tablet 5/21/2024 at am  No Yes   Sig: Take 1 Tablet by mouth every day.      Facility-Administered Medications: None       Physical Exam  Temp:  [36.8 °C (98.2 °F)] 36.8 °C (98.2 °F)  Pulse:  [61-62] 62  Resp:  [18-25] 18  BP: (142-151)/(65-71) 151/71  SpO2:  [84 %-90 %] 90 %  Blood Pressure : (!) 151/71   Temperature: 36.8 °C (98.2 °F)   Pulse: 62   Respiration: 18   Pulse Oximetry: 90 %       Physical Exam  Constitutional:       Appearance: He is ill-appearing.   Cardiovascular:      Rate and Rhythm: Normal rate.   Pulmonary:      Breath sounds: Rales present.   Abdominal:      General: Abdomen is flat. There is no distension.   Musculoskeletal:      Right lower leg: No edema.      Left lower leg: No edema.   Neurological:      General: No focal deficit present.      Mental Status: He is alert and oriented to person, place, and time.   Psychiatric:         Mood and Affect: Mood normal.         Laboratory:  Recent Labs     05/22/24  0850   WBC 6.7   RBC 3.41*   HEMOGLOBIN 10.3*   HEMATOCRIT 31.4*   MCV 92.1   MCH 30.2   MCHC 32.8   RDW 54.0*   PLATELETCT 130*   MPV 12.5     Recent Labs     05/22/24  0850   SODIUM 137   POTASSIUM 5.0   CHLORIDE 93*   CO2 31   GLUCOSE 208*   BUN 32*   CREATININE 4.20*   CALCIUM 9.6     Recent Labs     05/22/24  0850   ALTSGPT 16   ASTSGOT 16   ALKPHOSPHAT 137*   TBILIRUBIN 0.6   GLUCOSE 208*         Recent Labs     05/22/24  0850   NTPROBNP 52032*         No results for input(s): \"TROPONINT\" in the last 72 hours.    Imaging:  DX-CHEST-PORTABLE (1 VIEW)   Final Result      1.  Moderate right and moderate/large left pleural effusion with adjacent airspace disease.   2.  Diffuse interstitial thickening, likely edema.          X-Ray:  I have personally reviewed the images and compared with prior images.  EKG:  I have personally " reviewed the images and compared with prior images.    Assessment/Plan:  Justification for Admission Status  I anticipate this patient will require at least two midnights for appropriate medical management, necessitating inpatient admission  for further evaluation and management for acute on chronic hypoxic respite failure in setting of volume overload, bilateral pleural effusion.  He is requiring urgent hemodialysis.  Requiring urgent thoracocentesis.  Need close monitoring of blood counts, electrolytes and renal function due to potential of organ dysfunction due to ongoing volume overload and severe hypoxic respiratory failure.    High risk of deterioration /Arrhythmias need to be monitor closely under IMCU .         * Volume overload- (present on admission)  Assessment & Plan  Volume overload in setting of ESRD  Need urgent hemodialysis  Nephrology consulted  Ultrasound-guided thoracocentesis  Monitor oxygen requirement closely    He is requiring urgent hemodialysis.  Requiring urgent thoracocentesis.  Need close monitoring of blood counts, electrolytes and renal function due to potential of organ dysfunction due to ongoing volume overload and severe hypoxic respiratory failure.    High risk of deterioration /Arrhythmias need to be monitor closely under IMCU .             Pleural effusion  Assessment & Plan  chest x-ray noted with moderate right and moderate/large pleural effusion, diffuse interstitial thickening.  I have ordered urgent thoracocentesis.  IV antibiotic started for suspected pneumonia  Check procalcitonin    Goals of care, counseling/discussion  Assessment & Plan  I had a prolonged discussion with the patient  and family  regarding goals of care, diagnoses, prognosis, and CODE STATUS. We discussed his  prognosis and comorbidities.   Discussed full resuscitation to include chest compressions, medications, defibrillation, and intubation.  He requested to remain full code  Total time spent in ACP  discussion 15 minutes, which is separate from the time spent completing the evaluation and management visit      Atrial fibrillation (HCC)- (present on admission)  Assessment & Plan  Current rate controlled  Continue metoprolol  Hold Eliquis for thoracocentesis     ESRD on dialysis (HCC)- (present on admission)  Assessment & Plan  ESRD hemodialysis, Monday Wednesday Friday  Nephrology has been consulted  Monitor oxygen requirement      Acute on chronic respiratory failure with hypoxia (HCC)- (present on admission)  Assessment & Plan  Currently on 15 L NRB M  Respiratory failure in setting of volume overload  Continue to  monitor oxygen saturation closely  Incentive spirometry  Urgent hemodialysis, thoracentesis  Monitor oxygen requirement closely  Low threshold for BiPAP/intubation    Type 2 diabetes mellitus with nephropathy, and retinopathy (HCC)- (present on admission)  Assessment & Plan  Continue on insulin regimen  Monitor blood glucose closely        VTE prophylaxis: SCDs/TEDs and therapeutic anticoagulation with eliquis       Patient is critically ill.   The patient continues to have: Shortness of breath, in respiratory status, 15 L NRBM.  The vital organ system that is affected is the: In volume load, pneumonia, acute on chronic hypoxic failure  If untreated there is a high chance of deterioration into: Fulminant respiratory failure, cardiovascular collapse  And eventually death.   The critical care that I am providing today is: Monitor oxygen requirement closely, monitoring labs, coordinating getting urgent hemodialysis, ordered urgent left-sided thoracocentesis.  If continue to worsen likely need urgent BiPAP/intubation.  Frequently monitoring respiratory status.  The critical that has been undertaken is medically complex.   There has been no overlap in critical care time.   Critical Care Time not including procedures: 33 min

## 2024-05-22 NOTE — CONSULTS
"[facility] -  CONSULTATION NOTE               Author: HAYDEN Haines Date & Time: 5/22/2024  12:51 PM       REASON FOR CONSULTATION:   - Inpatient hemodialysis management.    CHIEF COMPLAINT:   -  \" SOB \"    HISTORY OF PRESENT ILLNESS:    64 yo M PMH ESRD MWF iHD in [place] at [facility], HTN, type 1 DM, anemia of CKD, and CKD-MBD who presents to [facility] after presenting to [place] ER with shortness of breath on 60% saturation on 5L home oxygen.  Family and patient report he has needed an increase in his home oxygen over the previous few months, but the shortness of breath only became bothersome today.  He reports he has not missed any HD recently, last tx was MON.  He hasn't been eating or drinking well due to poor appetite.   CXR showed moderate right and moderate/large left pleural effusion and edema.    Labs revealed hemoglobin 10.3, sodium 137, K 5.0, glucose 208, phos 1.9, Mg 2.7, BNP 31058.  Ultrasound-guided thoracocentesis is pending.         No F/C/N/V/CP  No melena, hematochezia, hematemesis.  No HA, visual changes, or abdominal pain.    REVIEW OF SYSTEMS:    10 point ROS was performed and is as per HPI or otherwise negative    PAST MEDICAL HISTORY:   - ESRD  - HTN  - Anemia of CKD  - CKD-MBD  Past Medical History:   Diagnosis Date    Anemia     Blind in both eyes     Diabetes (HCC)     insulin dependent    Dialysis patient (HCC)     MWF, [facility]    ESRD (end stage renal disease) ([facility]     Dr. Haskins    High cholesterol     Hyperlipemia     Hypertension     Oxygen dependent     2 liters, [place]    Snoring     no sleep study        PAST SURGICAL HISTORY:   -   Past Surgical History:   Procedure Laterality Date    CERVICAL DISK AND FUSION ANTERIOR  7/12/2022    Procedure: STAGE 1- ANTERIOR C5 CORPECTOMY, C4-6 FUSION STAGE 2- POSTERIOR C3-T1 LAMINECTOMY AND FUSION;  Surgeon: Ady Barr M.D.;  Location: SURGERY [facility];  Service: Neurosurgery    CERVICAL " "FUSION POSTERIOR  7/12/2022    Procedure: FUSION, SPINE, CERVICAL, POSTERIOR APPROACH;  Surgeon: Ady Barr M.D.;  Location: SURGERY [facility];  Service: Neurosurgery    CORPECTOMY  7/12/2022    Procedure: CORPECTOMY, SPINE;  Surgeon: Ady Barr M.D.;  Location: SURGERY [facility];  Service: Neurosurgery    CERVICAL LAMINECTOMY POSTERIOR  7/12/2022    Procedure: LAMINECTOMY, SPINE, CERVICAL, POSTERIOR APPROACH;  Surgeon: Ady Barr M.D.;  Location: SURGERY [facility];  Service: Neurosurgery    RECOVERY  4/19/2016    Procedure: VASCULAR CASE-ILEANA-LEFT ARM FISTULOGRAM WITH ANGIOPLASTY 61978, 13123, 79189-GEC STAGE RENAL DISEASE N18.6;  [facility];  [facility]: SURG[facility];  Service:     RECOVERY  2/24/2016    Procedure: IR1 VASCULAR CASE LEFT ARM FISTULOGRAM WITH ANGIOPLASTY;  Surgeon: [facility];  Location: SURGERY [facility];  Service:     CATH PLACEMENT Right 12/10/2015    Procedure: CATH PLACEMENT;  Surgeon: Lorene Baldwin M.D.;  Location: SURGERY [facility];  Service:     AV FISTULA CREATION Left 12/10/2015    Procedure: AV FISTULA CREATION;  Surgeon: Lorene Baldwin M.D.;  Location: [facility];  Service:     [place] CATARACT SURG W/IOL 1 STAGE WO ENDO  10/21/15    OTHER  9/22/15    trabecular micro bypass stent- right eye    OTHER  7/2015    take out blood of left eye    OTHER  3/2015    take blood out of eye         FAMILY HISTORY:   - Reviewed and non contributory to current illness    SOCIAL HISTORY:   - No tobacco  - No EtOH  - No illicits    HOME MEDICATIONS:   - Reviewed and documented in chart    LABORATORY STUDIES:   - Reviewed and documented in chart    ALLERGIES:  Aspirin    VS:  BP (!) 151/71   Pulse 62   Temp 36.8 °C (98.2 °F) (Temporal)   Resp 18   Ht 1.702 m (5' 7\")   Wt 63 kg (138 lb 14.2 oz)   SpO2 90%   BMI 21.75 kg/m²   Physical Exam  Nursing note reviewed. "   Constitutional:       Appearance: Normal appearance.   Eyes:      General: No scleral icterus.  Cardiovascular:      Rate and Rhythm: Normal rate.      Comments: No edema      +LFA AVF +b/t aneurysmal   Pulmonary:      Comments: Crackles left side, diminished R   Abdominal:      General: There is no distension.   Musculoskeletal:         General: No deformity.      Right lower leg: No edema.      Left lower leg: No edema.   Skin:     Findings: No rash.   Neurological:      General: No focal deficit present.      Mental Status: He is alert.   Psychiatric:         Mood and Affect: Mood normal.         Behavior: Behavior normal. Behavior is cooperative.            FLUID BALANCE:  No intake/output data recorded.    LABS:  Recent Labs     05/22/24  0850   SODIUM 137   POTASSIUM 5.0   CHLORIDE 93*   CO2 31   GLUCOSE 208*   BUN 32*   CREATININE 4.20*   CALCIUM 9.6        IMAGING:  - All imaging reviewed from admission to present day    IMPRESSION:  # ESRD  - Etiology likely 2/2 HTN/DM  - MWF iHD at [facility]  - via AVF(+thrill/bruit)  # Acute resp meet chronic resp failure with hypoxia  - 5L baseline oxygen   - likely fluid contributing   # Pleural effusion   # HTN  - Goal BP < 140/90  - At goal  # Anemia of CKD  - Goal Hgb 10-11  - At goal  # CKD-MBD  - Phos low 1.9  # Type II diabetes   # a fib   - On eliquis          PLAN:  - HD today WED, con MWF iHD and PRN   - UF as tolerated  - No dietary protein restrictions  - Dose all meds per ESRD  - Renal diet   - Phos repletion per primary team     Thank you for the consultation!

## 2024-05-22 NOTE — ASSESSMENT & PLAN NOTE
Chronic hypoxia on 5 L of oxygen at home, now requiring 100% FiO2  Likely worsened due to large pleural effusions  Left thoracentesis will be completed today, may need right thoracentesis in the coming days  Nephrology contacted for HD  RT/O2 Protocols  Titrate supplemental FiO2 to maintain SpO2 >88%

## 2024-05-22 NOTE — PROCEDURES
Thoracentesis    Date/Time: 5/22/2024 12:19 PM    Performed by: HAYDEN Simon  Authorized by: HAYDEN Simon    Consent:     Consent obtained:  Verbal and written    Consent given by:  Patient    Risks, benefits, and alternatives were discussed: yes      Risks discussed:  Bleeding, infection, pneumothorax and pain  Universal protocol:     Patient identity confirmed:  Verbally with patient and arm band  Sedation:     Sedation type:  None  Anesthesia:     Anesthesia method:  Local infiltration    Local anesthetic:  Lidocaine 1% w/o epi  Procedure details:     Preparation: Patient was prepped and draped in usual sterile fashion      Patient position:  Sitting    Location:  L posterior    Intercostal space:  8th    Puncture method:  Over-the-needle catheter    Ultrasound guidance: yes      Indwelling catheter placed: no      Needle gauge:  18    Number of attempts:  1    Drainage characteristics:  Clear  Post-procedure details:     Chest x-ray performed: yes      Chest x-ray findings:  Pleural effusion improved    Procedure completion:  Tolerated well, no immediate complications    HAYDEN Simon

## 2024-05-22 NOTE — ED PROVIDER NOTES
ED Provider Note    CHIEF COMPLAINT  Chief Complaint   Patient presents with    Shortness of Breath     BIB EMS transfer from [facility]. Patient presented with SOB. EMS reports 60% on home O2 of 5L. Patient dx with L pleural effusion and transported to [facility] for continuation of care.        EXTERNAL RECORDS REVIEWED  Outpatient Notes   vascular medicine, Shay MCELROY/JESSICA  LIMITATION TO HISTORY   Select: : None  OUTSIDE HISTORIAN(S):  None    Pj Britt is a 63 y.o. male who presents this is a transfer from [facility] for need of dialysis. The pt states that he had dialysis on Monday, but has been having persistent sob for about a week or two.  He was seen at an outlying facility for the same. Noted to have 'fluid overload.'  Pt has no cp, no sob, no vomiting, no back pain.  The pt has some effusion as well, on outlying x ray.        PAST MEDICAL HISTORY   has a past medical history of Anemia, Blind in both eyes, Diabetes ([facility]), Dialysis patient ([facility]), ESRD (end stage renal disease) ([facility]), High cholesterol, Hyperlipemia, Hypertension, Oxygen dependent, and Snoring.    SURGICAL HISTORY   has a past surgical history that includes cataract surg w/iol 1 stage wo endo (10/21/15); other (3/2015); other (7/2015); other (9/22/15); cath placement (Right, 12/10/2015); av fistula creation (Left, 12/10/2015); recovery (2/24/2016); recovery (4/19/2016); cervical disk and fusion anterior (7/12/2022); cervical fusion posterior (7/12/2022); corpectomy (7/12/2022); and cervical laminectomy posterior (7/12/2022).    FAMILY HISTORY  Family History   Problem Relation Age of Onset    Alzheimer's Disease Mother 80        Lives in [place]    Hypertension Father     Diabetes Father     Diabetes Brother        SOCIAL HISTORY  Social History     Tobacco Use    Smoking status: Never    Smokeless tobacco: Never   Vaping Use    Vaping status: Never Used   Substance and Sexual Activity    Alcohol use: No      "Alcohol/week: 0.0 oz    Drug use: No    Sexual activity: Not on file       CURRENT MEDICATIONS  Home Medications       Reviewed by Armin Lutz R.N. (Registered Nurse) on 05/22/24 at 0850  Med List Status: Partial     Medication Last Dose Status   acetaminophen (TYLENOL) 500 MG Tab  Active   amLODIPine (NORVASC) 10 MG Tab  Active   apixaban (ELIQUIS) 2.5mg Tab  Active   atorvastatin (LIPITOR) 20 MG Tab  Active   bisacodyl (DULCOLAX) 10 MG Suppos  Active   calcium acetate (PHOS-LO) 667 MG Tab tablet  Active   docusate sodium 100 MG Cap  Active   hydrALAZINE (APRESOLINE) 10 MG Tab  Active   insulin NPH (HUMULIN/NOVOLIN) 100 UNIT/ML Suspension  Active   metoprolol tartrate (LOPRESSOR) 100 MG Tab  Active   nortriptyline (PAMELOR) 25 MG Cap  Active   paroxetine (PAXIL) 40 MG tablet  Active   polyethylene glycol/lytes (MIRALAX) 17 g Pack  Active   vitamin D2, Ergocalciferol, (DRISDOL) 1.25 MG (69362 UT) Cap capsule  Active                  Audit from Redirected Encounters    **Home medications have not yet been reviewed for this encounter**         ALLERGIES  Allergies   Allergen Reactions    Aspirin Unspecified     Cannot have because on Dialysis       PHYSICAL EXAM  VITAL SIGNS: BP (!) 142/65   Pulse 61   Temp 36.8 °C (98.2 °F) (Temporal)   Resp (!) 25   Ht 1.702 m (5' 7\")   Wt 63 kg (138 lb 14.2 oz)   SpO2 (!) 84%   BMI 21.75 kg/m²    Constitutional: Well developed, well nourished. moderate acute distress.  HEENT: Normocephalic, atraumatic. Posterior pharynx clear and moist.  Eyes:  EOMI. Normal sclera.  Neck: Supple, Full range of motion, nontender.  Chest/Pulmonary: diminished breath sounds, equal expansion   Cardio: Regular rate and rhythm with no murmur.   Abdomen: Soft, nontender. No peritoneal signs. No guarding.  Musculoskeletal: No deformity, no edema, neurovascular intact.  Left upper extremity fistula noted.  Thrill noted.  Neuro: Clear speech, appropriate, cooperative, cranial nerves II-XII " grossly intact.  Psych: Normal mood and affect      EKG/LABS  Results for orders placed or performed during the hospital encounter of 05/22/24   CBC with Differential   Result Value Ref Range    WBC 6.7 4.8 - 10.8 K/uL    RBC 3.41 (L) 4.70 - 6.10 M/uL    Hemoglobin 10.3 (L) 14.0 - 18.0 g/dL    Hematocrit 31.4 (L) 42.0 - 52.0 %    MCV 92.1 81.4 - 97.8 fL    MCH 30.2 27.0 - 33.0 pg    MCHC 32.8 32.3 - 36.5 g/dL    RDW 54.0 (H) 35.9 - 50.0 fL    Platelet Count 130 (L) 164 - 446 K/uL    MPV 12.5 9.0 - 12.9 fL    Neutrophils-Polys 80.90 (H) 44.00 - 72.00 %    Lymphocytes 7.50 (L) 22.00 - 41.00 %    Monocytes 9.10 0.00 - 13.40 %    Eosinophils 1.20 0.00 - 6.90 %    Basophils 0.70 0.00 - 1.80 %    Immature Granulocytes 0.60 0.00 - 0.90 %    Nucleated RBC 0.00 0.00 - 0.20 /100 WBC    Neutrophils (Absolute) 5.40 1.82 - 7.42 K/uL    Lymphs (Absolute) 0.50 (L) 1.00 - 4.80 K/uL    Monos (Absolute) 0.61 0.00 - 0.85 K/uL    Eos (Absolute) 0.08 0.00 - 0.51 K/uL    Baso (Absolute) 0.05 0.00 - 0.12 K/uL    Immature Granulocytes (abs) 0.04 0.00 - 0.11 K/uL    NRBC (Absolute) 0.00 K/uL   Comp Metabolic Panel   Result Value Ref Range    Sodium 137 135 - 145 mmol/L    Potassium 5.0 3.6 - 5.5 mmol/L    Chloride 93 (L) 96 - 112 mmol/L    Co2 31 20 - 33 mmol/L    Anion Gap 13.0 7.0 - 16.0    Glucose 208 (H) 65 - 99 mg/dL    Bun 32 (H) 8 - 22 mg/dL    Creatinine 4.20 (H) 0.50 - 1.40 mg/dL    Calcium 9.6 8.5 - 10.5 mg/dL    Correct Calcium 9.6 8.5 - 10.5 mg/dL    AST(SGOT) 16 12 - 45 U/L    ALT(SGPT) 16 2 - 50 U/L    Alkaline Phosphatase 137 (H) 30 - 99 U/L    Total Bilirubin 0.6 0.1 - 1.5 mg/dL    Albumin 4.0 3.2 - 4.9 g/dL    Total Protein 6.7 6.0 - 8.2 g/dL    Globulin 2.7 1.9 - 3.5 g/dL    A-G Ratio 1.5 g/dL   EXTRA TUBE,THEO   Result Value Ref Range    Extra Tube, Blue SORTED    proBrain Natriuretic Peptide, NT   Result Value Ref Range    NT-proBNP 43423 (H) 0 - 125 pg/mL   ESTIMATED GFR   Result Value Ref Range    GFR (CKD-EPI) 15 (A)  >60 mL/min/1.73 m 2   EKG   Result Value Ref Range    Report       [facility] Emergency Dept[facility]    Test Date:  2024  Pt Name:    CARLY LIGHT                  Department: ER  MRN:        [id number]                      Room:       RD 08  Gender:     Male                         Technician: [id number]  :        1960                   Requested By:ER TRIAGE PROTOCOL  Order #:    [id number]                    Reading MD:    Measurements  Intervals                                Axis  Rate:       62                           P:          0  WA:         0                            QRS:        20  QRSD:       84                           T:          53  QT:         461  QTc:        469    Interpretive Statements  Atrial flutter with predominant 4:1 AV block  Low voltage, precordial leads  Borderline ST depression, lateral leads  Compared to ECG 2023 13:44:02  AV block, advanced (high-grade) now present  Low QRS voltage now present  ST (T wave) deviation now present  T-wave abnormality no longer present  Prolonged QT interval no  longer present       EKG; normal sinus rhythm at a rate of 62.  No ST ovation no ST depression.  QTc is 469.  Compared to EKG from 2023.    RADIOLOGY/PROCEDURES   I have independently interpreted the diagnostic imaging associated with this visit and am waiting the final reading from the radiologist.   My preliminary interpretation is as follows: See below    Radiologist interpretation:  DX-CHEST-PORTABLE (1 VIEW)   Final Result      1.  Moderate right and moderate/large left pleural effusion with adjacent airspace disease.   2.  Diffuse interstitial thickening, likely edema.          COURSE & MEDICAL DECISION MAKING    ASSESSMENT, COURSE AND PLAN  Care Narrative: This is a 63-year-old male in need of dialysis.  Patient is noted to have bilateral pleural effusions, and a BNP of over 50,000.  Patient arrived on a nonrebreather mask, but we are starting to wean that  down, and he is remaining at 90%.  Patient initially was noted to be 60% on room air with no found, improving to around mid 80s while here.    10:15 am  Pt comfortable on non rebreather mask    10:34 am  Dr. Hodge will put in IMCU  Unable to ween pt in er.     11:15 AM  Hospitalist in the room.   Pt at baseline     11:34 AM  Dr. Chan states the pt is from [facility] nephrology, so they will see the pt.  She has already called dialysis.     CRITICAL CARE  The very real possibility of a deterioration of this patient's condition required the highest level of my preparedness for sudden, emergent intervention.  I provided critical care services, which included medication orders, frequent reevaluations of the patient's condition and response to treatment, ordering and reviewing test results, and discussing the case with various consultants.  The critical care time associated with the care of the patient was 45 minutes. Review chart for interventions. This time is exclusive of any other billable procedures.         DISPOSITION AND DISCUSSIONS  I have discussed management of the patient with the following physicians and JESUS's: Hospitalist, nephrology        FINAL DIAGNOSIS  1. Dialysis complication, initial encounter    2. Hypoxia    3.      Critical care 45 minutes       Electronically signed by: Alexx Mtz D.O., 5/22/2024 9:03 AM

## 2024-05-22 NOTE — ED TRIAGE NOTES
Chief Complaint   Patient presents with    Shortness of Breath     BIB EMS transfer from [facility]. Patient presented with SOB. EMS reports 60% on home O2 of 5L. Patient dx with L pleural effusion and transported to [facility] for continuation of care.      Patient AOX4, legally blind, currently requiring 15L non-rebreather to maintain SPO2 @ 90%. Dialysis patient MWF. Was not accepted to dialysis today due to SOB. Hx of COPD, renal failure, CHF.

## 2024-05-22 NOTE — ASSESSMENT & PLAN NOTE
I had a prolonged discussion with the patient  and family  regarding goals of care, diagnoses, prognosis, and CODE STATUS. We discussed his  prognosis and comorbidities.   Discussed full resuscitation to include chest compressions, medications, defibrillation, and intubation.  He requested to remain full code  Total time spent in ACP discussion 15 minutes, which is separate from the time spent completing the evaluation and management visit

## 2024-05-22 NOTE — ASSESSMENT & PLAN NOTE
Volume overload in setting of ESRD  Need urgent hemodialysis  Nephrology consulted  Ultrasound-guided thoracocentesis  Monitor oxygen requirement closely  S/p thoracentesis.  He is getting hemodialysis that will help him and his fluid overload.  I discussed plan of care with him and his family at the bedside.  Physical therapy evaluated him and recommended SNF placement.

## 2024-05-22 NOTE — ASSESSMENT & PLAN NOTE
Bilateral moderate to large pleural effusions, likely due to volume overload in the setting of ESRD.  Will perform a left-sided thoracentesis for symptom palliation at this time  Recommend initiating HD for further volume control  May need to consider a right-sided thoracentesis in the coming days

## 2024-05-22 NOTE — ASSESSMENT & PLAN NOTE
Left forearm AV fistula  Receives dialysis Monday, Wednesday and Friday  Nephrology contacted in the ER

## 2024-05-22 NOTE — ASSESSMENT & PLAN NOTE
chest x-ray noted with moderate right and moderate/large pleural effusion, diffuse interstitial thickening.  I have ordered urgent thoracocentesis.  Thoracentesis fluid did not show any organisms.  I discontinued antibiotics.  Follow-up on culture results.  Cultures remain negative.

## 2024-05-22 NOTE — PROGRESS NOTES
4 Eyes Skin Assessment Completed by SAMAN Fox and SAMAN Mcgowan.    Head WDL  Ears WDL  Nose WDL  Mouth WDL  Neck WDL  Breast/Chest WDL  Shoulder Blades surgical scar upper back   Spine WDL Left thoracentesis site   (R) Arm/Elbow/Hand right lower arm fistula, bruising   (L) Arm/Elbow/Hand Bruising  Abdomen WDL  Groin discoloration non blanching   Scrotum/Coccyx/Buttocks Discoloration  (R) Leg Bruising  (L) Leg Bruising scattered scabs   (R) Heel/Foot/Toe Scab right second toe  (L) Heel/Foot/Toe WDL          Devices In Places non rebreather, SPO2, BP cuff, telemetry stickers       Interventions In Place Heel Mepilex, Sacral Mepilex, TAP System, Pillows, Q2 Turns, Low Air Loss Mattress, and Heels Loaded W/Pillows    Possible Skin Injury No    Pictures Uploaded Into Epic Yes  Wound Consult Placed N/A  RN Wound Prevention Protocol Ordered No

## 2024-05-22 NOTE — DISCHARGE PLANNING
Outpatient Dialysis Note     Confirmed patient is active at:     [facility]  [address] [place] [address]     Schedule: Mon, Wed, Fri   Time: 5:15 AM     Patient is followed by Dr. Newell.      Spoke with Noreen at facility who confirmed patient information.   Forwarded records for review.     Xitlaly Reyes   Dialysis Coordinator / [facility]  Ph: [phone]

## 2024-05-22 NOTE — ED NOTES
Med rec complete per pt's family at bedside.    Eliquis was last taken on 5/21/24 at [facility]    Mircera 175 mcg every 2 weeks was last given to pt on 5/20/24 per [facility] [phone]

## 2024-05-22 NOTE — ASSESSMENT & PLAN NOTE
Now oxygen requirement has been trending down currently is requiring 4 L of oxygen to maintain oxygen saturation   At home he uses 2 to 4 L of oxygen.  Respiratory failure in setting of volume overload  Continue to  monitor oxygen saturation closely  Incentive spirometry  S/p thoracentesis  Continue to titrate down oxygen as tolerated.  Now plan is to transfer him out from [facility].

## 2024-05-22 NOTE — ASSESSMENT & PLAN NOTE
Goal blood glucose 140-180  basal insulin, sliding scale insulin, accuchecks  hypoglycemia protocol

## 2024-05-22 NOTE — PROGRESS NOTES
Assumed patient care from Zaynab DAVISON, Patient is stable, alert and oriented, vital signs stable. Has no questions or concerns at this time.

## 2024-05-23 ENCOUNTER — APPOINTMENT (OUTPATIENT)
Dept: RADIOLOGY | Facility: MEDICAL CENTER | Age: 64
DRG: 640 | End: 2024-05-23
Attending: NURSE PRACTITIONER
Payer: MEDICARE

## 2024-05-23 LAB
ALBUMIN SERPL BCP-MCNC: 3.4 G/DL (ref 3.2–4.9)
ALBUMIN/GLOB SERPL: 1.5 G/DL
ALP SERPL-CCNC: 103 U/L (ref 30–99)
ALT SERPL-CCNC: 12 U/L (ref 2–50)
ANION GAP SERPL CALC-SCNC: 11 MMOL/L (ref 7–16)
AST SERPL-CCNC: 13 U/L (ref 12–45)
BILIRUB SERPL-MCNC: 0.5 MG/DL (ref 0.1–1.5)
BUN SERPL-MCNC: 20 MG/DL (ref 8–22)
CALCIUM ALBUM COR SERPL-MCNC: 9 MG/DL (ref 8.5–10.5)
CALCIUM SERPL-MCNC: 8.5 MG/DL (ref 8.5–10.5)
CHLORIDE SERPL-SCNC: 98 MMOL/L (ref 96–112)
CO2 SERPL-SCNC: 29 MMOL/L (ref 20–33)
CREAT SERPL-MCNC: 2.86 MG/DL (ref 0.5–1.4)
ERYTHROCYTE [DISTWIDTH] IN BLOOD BY AUTOMATED COUNT: 53.5 FL (ref 35.9–50)
GFR SERPLBLD CREATININE-BSD FMLA CKD-EPI: 24 ML/MIN/1.73 M 2
GLOBULIN SER CALC-MCNC: 2.3 G/DL (ref 1.9–3.5)
GLUCOSE BLD STRIP.AUTO-MCNC: 137 MG/DL (ref 65–99)
GLUCOSE BLD STRIP.AUTO-MCNC: 137 MG/DL (ref 65–99)
GLUCOSE BLD STRIP.AUTO-MCNC: 168 MG/DL (ref 65–99)
GLUCOSE BLD STRIP.AUTO-MCNC: 192 MG/DL (ref 65–99)
GLUCOSE BLD STRIP.AUTO-MCNC: 43 MG/DL (ref 65–99)
GLUCOSE BLD STRIP.AUTO-MCNC: 79 MG/DL (ref 65–99)
GLUCOSE SERPL-MCNC: 116 MG/DL (ref 65–99)
HCT VFR BLD AUTO: 33 % (ref 42–52)
HGB BLD-MCNC: 10.8 G/DL (ref 14–18)
MAGNESIUM SERPL-MCNC: 2.2 MG/DL (ref 1.5–2.5)
MCH RBC QN AUTO: 29.8 PG (ref 27–33)
MCHC RBC AUTO-ENTMCNC: 32.7 G/DL (ref 32.3–36.5)
MCV RBC AUTO: 90.9 FL (ref 81.4–97.8)
MYCOBACTERIUM SPEC CULT: NORMAL
PHOSPHATE SERPL-MCNC: 2 MG/DL (ref 2.5–4.5)
PHOSPHATE SERPL-MCNC: 2.4 MG/DL (ref 2.5–4.5)
PLATELET # BLD AUTO: 159 K/UL (ref 164–446)
PMV BLD AUTO: 13.1 FL (ref 9–12.9)
POTASSIUM SERPL-SCNC: 4.2 MMOL/L (ref 3.6–5.5)
PROT SERPL-MCNC: 5.7 G/DL (ref 6–8.2)
RBC # BLD AUTO: 3.63 M/UL (ref 4.7–6.1)
RHODAMINE-AURAMINE STN SPEC: NORMAL
RHODAMINE-AURAMINE STN SPEC: NORMAL
SIGNIFICANT IND 70042: NORMAL
SIGNIFICANT IND 70042: NORMAL
SITE SITE: NORMAL
SITE SITE: NORMAL
SODIUM SERPL-SCNC: 138 MMOL/L (ref 135–145)
SOURCE SOURCE: NORMAL
SOURCE SOURCE: NORMAL
WBC # BLD AUTO: 6.8 K/UL (ref 4.8–10.8)

## 2024-05-23 PROCEDURE — 99232 SBSQ HOSP IP/OBS MODERATE 35: CPT | Performed by: INTERNAL MEDICINE

## 2024-05-23 PROCEDURE — 84100 ASSAY OF PHOSPHORUS: CPT | Mod: 91

## 2024-05-23 PROCEDURE — 5A1D70Z PERFORMANCE OF URINARY FILTRATION, INTERMITTENT, LESS THAN 6 HOURS PER DAY: ICD-10-PCS | Performed by: INTERNAL MEDICINE

## 2024-05-23 PROCEDURE — 85027 COMPLETE CBC AUTOMATED: CPT

## 2024-05-23 PROCEDURE — 32554 ASPIRATE PLEURA W/O IMAGING: CPT

## 2024-05-23 PROCEDURE — 700102 HCHG RX REV CODE 250 W/ 637 OVERRIDE(OP): Performed by: INTERNAL MEDICINE

## 2024-05-23 PROCEDURE — 90935 HEMODIALYSIS ONE EVALUATION: CPT

## 2024-05-23 PROCEDURE — 700111 HCHG RX REV CODE 636 W/ 250 OVERRIDE (IP): Performed by: STUDENT IN AN ORGANIZED HEALTH CARE EDUCATION/TRAINING PROGRAM

## 2024-05-23 PROCEDURE — 82962 GLUCOSE BLOOD TEST: CPT | Mod: 91

## 2024-05-23 PROCEDURE — 83735 ASSAY OF MAGNESIUM: CPT

## 2024-05-23 PROCEDURE — 770000 HCHG ROOM/CARE - INTERMEDIATE ICU *

## 2024-05-23 PROCEDURE — 700102 HCHG RX REV CODE 250 W/ 637 OVERRIDE(OP): Performed by: STUDENT IN AN ORGANIZED HEALTH CARE EDUCATION/TRAINING PROGRAM

## 2024-05-23 PROCEDURE — A9270 NON-COVERED ITEM OR SERVICE: HCPCS | Performed by: STUDENT IN AN ORGANIZED HEALTH CARE EDUCATION/TRAINING PROGRAM

## 2024-05-23 PROCEDURE — 71045 X-RAY EXAM CHEST 1 VIEW: CPT

## 2024-05-23 PROCEDURE — 80053 COMPREHEN METABOLIC PANEL: CPT

## 2024-05-23 PROCEDURE — A9270 NON-COVERED ITEM OR SERVICE: HCPCS | Performed by: INTERNAL MEDICINE

## 2024-05-23 PROCEDURE — 97163 PT EVAL HIGH COMPLEX 45 MIN: CPT

## 2024-05-23 RX ORDER — INSULIN LISPRO 100 [IU]/ML
1-6 INJECTION, SOLUTION INTRAVENOUS; SUBCUTANEOUS
Status: DISCONTINUED | OUTPATIENT
Start: 2024-05-24 | End: 2024-05-25 | Stop reason: HOSPADM

## 2024-05-23 RX ADMIN — ATORVASTATIN CALCIUM 20 MG: 20 TABLET, FILM COATED ORAL at 05:33

## 2024-05-23 RX ADMIN — INSULIN LISPRO 1 UNITS: 100 INJECTION, SOLUTION INTRAVENOUS; SUBCUTANEOUS at 17:47

## 2024-05-23 RX ADMIN — METOPROLOL TARTRATE 100 MG: 50 TABLET, FILM COATED ORAL at 05:33

## 2024-05-23 RX ADMIN — HEPARIN SODIUM 5000 UNITS: 5000 INJECTION, SOLUTION INTRAVENOUS; SUBCUTANEOUS at 05:34

## 2024-05-23 RX ADMIN — METOPROLOL TARTRATE 100 MG: 50 TABLET, FILM COATED ORAL at 17:35

## 2024-05-23 RX ADMIN — APIXABAN 2.5 MG: 2.5 TABLET, FILM COATED ORAL at 17:35

## 2024-05-23 RX ADMIN — DIBASIC SODIUM PHOSPHATE, MONOBASIC POTASSIUM PHOSPHATE AND MONOBASIC SODIUM PHOSPHATE 250 MG: 852; 155; 130 TABLET ORAL at 05:33

## 2024-05-23 ASSESSMENT — CHA2DS2 SCORE
VASCULAR DISEASE: NO
CHA2DS2 VASC SCORE: 4
AGE 65 TO 74: YES
CHF OR LEFT VENTRICULAR DYSFUNCTION: YES
PRIOR STROKE OR TIA OR THROMBOEMBOLISM: NO
SEX: MALE
AGE 75 OR GREATER: NO
DIABETES: YES
HYPERTENSION: YES

## 2024-05-23 ASSESSMENT — COGNITIVE AND FUNCTIONAL STATUS - GENERAL
WALKING IN HOSPITAL ROOM: A LOT
MOBILITY SCORE: 15
TURNING FROM BACK TO SIDE WHILE IN FLAT BAD: A LITTLE
STANDING UP FROM CHAIR USING ARMS: A LITTLE
MOVING FROM LYING ON BACK TO SITTING ON SIDE OF FLAT BED: A LITTLE
MOVING TO AND FROM BED TO CHAIR: A LOT
CLIMB 3 TO 5 STEPS WITH RAILING: A LOT
SUGGESTED CMS G CODE MODIFIER MOBILITY: CK

## 2024-05-23 ASSESSMENT — PAIN DESCRIPTION - PAIN TYPE
TYPE: ACUTE PAIN

## 2024-05-23 ASSESSMENT — GAIT ASSESSMENTS
DISTANCE (FEET): 5
DEVIATION: DECREASED BASE OF SUPPORT;DECREASED HEEL STRIKE;DECREASED TOE OFF
ASSISTIVE DEVICE: FRONT WHEEL WALKER
GAIT LEVEL OF ASSIST: MODERATE ASSIST

## 2024-05-23 ASSESSMENT — FIBROSIS 4 INDEX: FIB4 SCORE: 1.64

## 2024-05-23 NOTE — PROGRESS NOTES
"[facility] -  PROGRESS NOTE               Author: Francis Madera D.O. Date & Time: 5/23/2024  10:46 AM     HPI:  2 yo M PMH ESRD MWF iHD in [place] at [facility], HTN, type 1 DM, anemia of CKD, and CKD-MBD who presents to [facility] after presenting to [place] ER with shortness of breath on 60% saturation on 5L home oxygen.  Family and patient report he has needed an increase in his home oxygen over the previous few months, but the shortness of breath only became bothersome today.  He reports he has not missed any HD recently, last tx was MON.  He hasn't been eating or drinking well due to poor appetite.   CXR showed moderate right and moderate/large left pleural effusion and edema.    Labs revealed hemoglobin 10.3, sodium 137, K 5.0, glucose 208, phos 1.9, Mg 2.7, BNP 42911.  Ultrasound-guided thoracocentesis is pending.      DAILY NEPHROLOGY SUMMARY:  5/22: Consult done  5/23: pt feels better after dialysis and thoracentesis yesterday. Remains on oxygen mask today.     REVIEW OF SYSTEMS:    SOB improving. 10 point ROS reviewed and is as per HPI/daily summary or otherwise negative    PMH/PSH/SH/FH: Reviewed and unchanged since admission note    CURRENT MEDICATIONS:   Scheduled Medications   Medication Dose Frequency    [START ON 5/24/2024] epoetin  3,000 Units MO, WE + FR    apixaban  2.5 mg BID    atorvastatin  20 mg DAILY    metoprolol tartrate  100 mg BID    insulin lispro  1-6 Units Q6HRS       VS:  BP (!) 142/64   Pulse 66   Temp 36.7 °C (98.1 °F) (Temporal)   Resp (!) 25   Ht 1.702 m (5' 7\")   Wt 59.6 kg (131 lb 6.3 oz)   SpO2 95%   BMI 20.58 kg/m²     Physical Exam  HENT:      Head: Normocephalic and atraumatic.      Mouth/Throat:      Mouth: Mucous membranes are moist.   Cardiovascular:      Rate and Rhythm: Normal rate and regular rhythm.   Pulmonary:      Effort: Pulmonary effort is normal.      Comments: On face mask  Abdominal:      General: There is no distension.   Skin:     " General: Skin is warm.         Access:  +LFA AVF +b/t aneurysmal       Fluids:  In: 500 [Dialysis:500]  Out: 3500     LABS:  Recent Labs     05/22/24  0850 05/23/24  0806   SODIUM 137 138   POTASSIUM 5.0 4.2   CHLORIDE 93* 98   CO2 31 29   GLUCOSE 208* 116*   BUN 32* 20   CREATININE 4.20* 2.86*   CALCIUM 9.6 8.5       IMAGING:  - Imaging studies reviewed     ASSESSMENTS:    # ESRD  - Etiology likely 2/2 HTN/DM  - MWF iHD at [facility]  - via L AVF(+thrill/bruit)  # Acute resp meet chronic resp failure with hypoxia  - 5L baseline oxygen   - likely fluid contributing   # Pleural effusion   # HTN  - Goal BP < 140/90  - At goal  # Anemia of CKD  - Goal Hgb 10-11  - At goal  # CKD-MBD  - Phos low 1.9  # Type II diabetes   # a fib   - On eliquis            PLAN:  - ordered additional PUF for today (Thu) for more fluid removal  - resume MWF iHD tomorrow and assess for PRN needs  - UF as tolerated  - No dietary protein restrictions  - Dose all meds per ESRD  - Renal diet   - Phos repletion per primary team       Please page nephrology with any questions or concerns.

## 2024-05-23 NOTE — PROGRESS NOTES
Hypoglycemia Intervention    Hypoglycemia protocol intervention:  Blood glucose 46 at 0035.  Intervention: 8 oz of fruit juice   Repeat blood glucose 79 at 0105.  Intervention: Carb/Protein snack given, recheck blood glucose in 1 hour    Additional interventions needed: 8 oz juice  Dr. Marjorie Townsend notified of the above at 0110.

## 2024-05-23 NOTE — PROGRESS NOTES
[facility] Progress Note         PUF today x 3 hours per Dr. Madera.  Tx initiated at 1018 and ended at 1318    NET UF: 2000 ml    Patient tolerated tx. No complications encountered. All blood was returned aseptically. HD needles removed from LUE AVF. Dry gauze applied and changed without bleeding issue.(+) Bruit and thrill pre/post tx. Should breakthrough bleeding occur, staff RN to apply pressure to access sites until bleeding resolved. Notify Dialysis and Nephrologist for follow-up.See eflow sheets for further details.    Report given to ELGIN Aviles RN

## 2024-05-23 NOTE — THERAPY
Physical Therapy   Initial Evaluation     Patient Name: Pj Britt  Age:  63 y.o., Sex:  male  Medical Record #: [id number]  Today's Date: 5/23/2024     Precautions  Precautions: Fall Risk  Comments: Blindness in both eyes (R worse than L)    Assessment    Patient is a 63-y.o. male who presents to acute as a transfer on 5/22/24 from [facility] with worsening shortness of breath and cough for the past week. CXR showed moderate right and moderate/large left pleural effusion and edema. Patient has a PMHx of DM II, HTN, blindness, spinal stenosis, and ESRD on hemodialysis MWF.    Patient presents to acute physical therapy with decreased activity tolerance, blindness, and balance deficits that are limiting the patient from mobilizing at his prior level of function. Pt mobilized as detailed below. Patient needed Min A with FWW for standing. Patient needed multiple tactile and verbal cues for FWW management for ambulation and transfers, given unfamiliar environment. Pt educated on the benefits of continued mobility while admitted. Recommend post-acute placement at this time. Will follow.    Plan    Physical Therapy Initial Treatment Plan   Treatment Plan : Bed Mobility, Equipment, Gait Training, Neuro Re-Education / Balance, Self Care / Home Evaluation, Stair Training, Therapeutic Exercise, Therapeutic Activities  Treatment Frequency: 4 Times per Week  Duration: Until Therapy Goals Met    DC Equipment Recommendations: Unable to determine at this time  Discharge Recommendations: Recommend post-acute placement for additional physical therapy services prior to discharge home       Subjective    RN cleared pt for visit. Pt received in bed. Pt agreeable to PT.     Objective       05/23/24 0842   Cosignature   Documentation Review Approved with modifications made by preceptor in flowsheet   Charge Group   PT Evaluation PT Evaluation High   Total Time Spent   PT Total Time Yes   PT Evaluation Time Spent (Mins) 32   PT  Total Time Spent (Calculated) 32    Services   Is patient using  services for this encounter? No   Initial Contact Note    Initial Contact Note Order Received and Verified, Physical Therapy Evaluation in Progress with Full Report to Follow.   Precautions   Precautions Fall Risk   Comments Blindness in both eyes (R worse than L)   Vitals   Pulse 66   Patient BP Position Supine   Blood Pressure  (!) 142/64   Pulse Oximetry 95 %   O2 (LPM) 12   O2 Delivery Device Oxymask   Vitals Comments Pt's vitals remained stable throughout session. Pt reported dizziness when sitting up that resolve after 15 seconds.   Pain 0 - 10 Group   Therapist Pain Assessment Post Activity Pain Same as Prior to Activity;Nurse Notified   Prior Living Situation   Prior Services None   Housing / Facility 1 Story House   Steps Into Home 0   Steps In Home 0   Rail None   Bathroom Set up Walk In Shower;Grab Bars   Equipment Owned Wheelchair;4-Wheel Walker;Other (Comments)  (Blind cane)   Lives with - Patient's Self Care Capacity Spouse   Comments Pt lives in [place]. Pt reported spouse is also blind but has been able to assist the patient as needed. Pt also reported having a son who lives nearby who can assist the patient as needed.   Prior Level of Functional Mobility   Bed Mobility Independent   Transfer Status Independent   Ambulation Independent   Ambulation Distance Community   Assistive Devices Used 4-Wheel Walker   Wheelchair Independent   Stairs Independent   Comments Pt reported that he does not use an AD for household distances. Pt reported that he does not use his WC often. Pt is retired.   Cognition    Cognition / Consciousness WDL   Level of Consciousness Alert   Comments Patient pleasant and cooperative.   Active ROM Upper Body   Active ROM Upper Body  WDL   Dominant Hand Right   Comments Pt moved BUE functionally   Passive ROM Lower Body   Passive ROM Lower Body WDL   Active ROM Lower Body    Active ROM Lower Body   WDL   Strength Lower Body   Lower Body Strength  WDL   Comments BLE grossly 4/5. Bilateral knee flexion 3+/5.   Sensation Lower Body   Lower Extremity Sensation   WDL   Coordination Lower Body    Coordination Lower Body  WDL   Vision   Vision Comments Blindness in both eyes. Right worse than left.   Balance Assessment   Sitting Balance (Static) Fair   Sitting Balance (Dynamic) Fair   Standing Balance (Static) Fair -   Standing Balance (Dynamic) Poor +   Weight Shift Sitting Fair   Weight Shift Standing Poor   Comments Used FWW. No overt LOB. Pt appeared unsteady when taking steps towards the chair.   Bed Mobility    Supine to Sit Standby Assist   Sit to Supine   (Pt left up in chair.)   Scooting Minimal Assist  (EOB scooting)   Rolling Standby Assist   Comments HOB slightly elevated. L bed rail used.   Gait Analysis   Gait Level Of Assist Moderate Assist   Assistive Device Front Wheel Walker   Distance (Feet) 5   # of Times Distance was Traveled 2   Deviation Decreased Base Of Support;Decreased Heel Strike;Decreased Toe Off  (Decreased shanthi; decreased step/stride length)   # of Stairs Climbed 0   Weight Bearing Status no restrictions   Vision Deficits Impacting Mobility Blindness in both eyes; R worse than L.   Comments Pt needed multiple cues for management of FWW during transfer. Pt limited in further ambulation due to fatigue.   Functional Mobility   Sit to Stand Minimal Assist   Bed, Chair, Wheelchair Transfer Moderate Assist   Transfer Method Stand Step   Mobility supine > EOB > stand > ambulation within room > chair > ambulation > chair   Comments Pt used FWW. Multiple cues for hand placement onto armrests on chairs.   6 Clicks Assessment - How much HELP from from another person do you currently need... (If the patient hasn't done an activity recently, how much help from another person do you think he/she would need if he/she tried?)   Turning from your back to your side while in a flat bed without using  bedrails? 3   Moving from lying on your back to sitting on the side of a flat bed without using bedrails? 3   Moving to and from a bed to a chair (including a wheelchair)? 2   Standing up from a chair using your arms (e.g., wheelchair, or bedside chair)? 3   Walking in hospital room? 2   Climbing 3-5 steps with a railing? 2   6 clicks Mobility Score 15   Activity Tolerance   Comments Pt limited by fatigue.   Patient / Family Goals    Patient / Family Goal #1 To go home   Short Term Goals    Short Term Goal # 1 Patient will demonstrate supine <> sit with HOB flat, no bed rails with supervision within 6 visits to progress bed mobility.   Short Term Goal # 2 Patient will demonstrate sit<>stand with FWW and supervision within 6 visits to progress transfers.   Short Term Goal # 3 Patient will ambulate 150 ft with FWW with supervision within 6 visits to progress return to household ambulation distances.   Education Group   Education Provided Role of Physical Therapist   Role of Physical Therapist Patient Response Patient;Acceptance;Explanation;Verbal Demonstration;Action Demonstration   Physical Therapy Initial Treatment Plan    Treatment Plan  Bed Mobility;Equipment;Gait Training;Neuro Re-Education / Balance;Self Care / Home Evaluation;Stair Training;Therapeutic Exercise;Therapeutic Activities   Treatment Frequency 4 Times per Week   Duration Until Therapy Goals Met   Problem List    Problems Impaired Bed Mobility;Impaired Transfers;Impaired Ambulation;Impaired Balance;Impaired Vision;Functional Strength Deficit;Decreased Activity Tolerance   Anticipated Discharge Equipment and Recommendations   DC Equipment Recommendations Unable to determine at this time   Discharge Recommendations Recommend post-acute placement for additional physical therapy services prior to discharge home   Interdisciplinary Plan of Care Collaboration   IDT Collaboration with  Nursing   Patient Position at End of Therapy Seated;Chair Alarm On;Call  Light within Reach;Tray Table within Reach;Phone within Reach   Collaboration Comments RN updated.   Session Information   Date / Session Number  5/23 - 1(1/4, 5/29)

## 2024-05-23 NOTE — PROGRESS NOTES
[facility] Progress Note     Hemodialysis treatment ordered today per Dr. Madera x3  hours.   Treatment initiated at 1430 ended at 1730.      Patient tolerated tx; see e flow sheet for details.      Net UF 3000 mL.      Needles removed from access site. Dressings applied and sites held x10  minutes; verified no bleeding. Positive bruit/thrill post tx.   Staff RN to monitor AVF/G for breakthrough bleeding. Should breakthrough bleeding occur, staff RN to apply pressure to access sites until bleeding resolved.   Notify Dialysis and Nephrologist for follow-up.     Report given to Primary RN

## 2024-05-23 NOTE — CARE PLAN
The patient is Watcher - Medium risk of patient condition declining or worsening    Shift Goals  Clinical Goals: wean down o2 demands, hemodynamic stability  Patient Goals: get better  Family Goals: POOJA    Progress made toward(s) clinical / shift goals:        Problem: Knowledge Deficit - Standard  Goal: Patient and family/care givers will demonstrate understanding of plan of care, disease process/condition, diagnostic tests and medications  5/22/2024 2258 by Kerline Richardson R.N.  Outcome: Progressing  5/22/2024 2258 by Kerline Richardson R.N.  Outcome: Progressing     Problem: Skin Integrity  Goal: Skin integrity is maintained or improved  5/22/2024 2258 by Kerline Richardson R.N.  Outcome: Progressing  5/22/2024 2258 by Kerline Richardson R.N.  Outcome: Progressing       Patient is not progressing towards the following goals:

## 2024-05-24 PROBLEM — R78.81 POSITIVE BLOOD CULTURE: Status: ACTIVE | Noted: 2024-05-24

## 2024-05-24 LAB
ALBUMIN SERPL BCP-MCNC: 3.3 G/DL (ref 3.2–4.9)
ALBUMIN/GLOB SERPL: 1.3 G/DL
ALP SERPL-CCNC: 103 U/L (ref 30–99)
ALT SERPL-CCNC: 12 U/L (ref 2–50)
ANION GAP SERPL CALC-SCNC: 10 MMOL/L (ref 7–16)
AST SERPL-CCNC: 20 U/L (ref 12–45)
BACTERIA BLD CULT: ABNORMAL
BACTERIA BLD CULT: ABNORMAL
BILIRUB SERPL-MCNC: 0.5 MG/DL (ref 0.1–1.5)
BUN SERPL-MCNC: 35 MG/DL (ref 8–22)
CALCIUM ALBUM COR SERPL-MCNC: 9 MG/DL (ref 8.5–10.5)
CALCIUM SERPL-MCNC: 8.4 MG/DL (ref 8.5–10.5)
CHLORIDE SERPL-SCNC: 95 MMOL/L (ref 96–112)
CO2 SERPL-SCNC: 29 MMOL/L (ref 20–33)
CREAT SERPL-MCNC: 4 MG/DL (ref 0.5–1.4)
ERYTHROCYTE [DISTWIDTH] IN BLOOD BY AUTOMATED COUNT: 53.1 FL (ref 35.9–50)
GFR SERPLBLD CREATININE-BSD FMLA CKD-EPI: 16 ML/MIN/1.73 M 2
GLOBULIN SER CALC-MCNC: 2.5 G/DL (ref 1.9–3.5)
GLUCOSE BLD STRIP.AUTO-MCNC: 101 MG/DL (ref 65–99)
GLUCOSE BLD STRIP.AUTO-MCNC: 163 MG/DL (ref 65–99)
GLUCOSE BLD STRIP.AUTO-MCNC: 172 MG/DL (ref 65–99)
GLUCOSE BLD STRIP.AUTO-MCNC: 216 MG/DL (ref 65–99)
GLUCOSE SERPL-MCNC: 138 MG/DL (ref 65–99)
HCT VFR BLD AUTO: 29.9 % (ref 42–52)
HGB BLD-MCNC: 9.6 G/DL (ref 14–18)
LACTATE SERPL-SCNC: 0.9 MMOL/L (ref 0.5–2)
MAGNESIUM SERPL-MCNC: 2.3 MG/DL (ref 1.5–2.5)
MCH RBC QN AUTO: 29.6 PG (ref 27–33)
MCHC RBC AUTO-ENTMCNC: 32.1 G/DL (ref 32.3–36.5)
MCV RBC AUTO: 92.3 FL (ref 81.4–97.8)
PHOSPHATE SERPL-MCNC: 2.8 MG/DL (ref 2.5–4.5)
PLATELET # BLD AUTO: 146 K/UL (ref 164–446)
PMV BLD AUTO: 12.2 FL (ref 9–12.9)
POTASSIUM SERPL-SCNC: 4.9 MMOL/L (ref 3.6–5.5)
PROT SERPL-MCNC: 5.8 G/DL (ref 6–8.2)
RBC # BLD AUTO: 3.24 M/UL (ref 4.7–6.1)
SIGNIFICANT IND 70042: ABNORMAL
SITE SITE: ABNORMAL
SODIUM SERPL-SCNC: 134 MMOL/L (ref 135–145)
SOURCE SOURCE: ABNORMAL
WBC # BLD AUTO: 4.9 K/UL (ref 4.8–10.8)

## 2024-05-24 PROCEDURE — A9270 NON-COVERED ITEM OR SERVICE: HCPCS | Performed by: STUDENT IN AN ORGANIZED HEALTH CARE EDUCATION/TRAINING PROGRAM

## 2024-05-24 PROCEDURE — 770020 HCHG ROOM/CARE - TELE (206)

## 2024-05-24 PROCEDURE — 99233 SBSQ HOSP IP/OBS HIGH 50: CPT | Performed by: INTERNAL MEDICINE

## 2024-05-24 PROCEDURE — 700102 HCHG RX REV CODE 250 W/ 637 OVERRIDE(OP): Performed by: INTERNAL MEDICINE

## 2024-05-24 PROCEDURE — 83735 ASSAY OF MAGNESIUM: CPT

## 2024-05-24 PROCEDURE — 84100 ASSAY OF PHOSPHORUS: CPT

## 2024-05-24 PROCEDURE — A9270 NON-COVERED ITEM OR SERVICE: HCPCS | Performed by: INTERNAL MEDICINE

## 2024-05-24 PROCEDURE — 82962 GLUCOSE BLOOD TEST: CPT | Mod: 91

## 2024-05-24 PROCEDURE — 85027 COMPLETE CBC AUTOMATED: CPT

## 2024-05-24 PROCEDURE — 700111 HCHG RX REV CODE 636 W/ 250 OVERRIDE (IP): Mod: JZ | Performed by: INTERNAL MEDICINE

## 2024-05-24 PROCEDURE — 80053 COMPREHEN METABOLIC PANEL: CPT

## 2024-05-24 PROCEDURE — 700102 HCHG RX REV CODE 250 W/ 637 OVERRIDE(OP): Performed by: STUDENT IN AN ORGANIZED HEALTH CARE EDUCATION/TRAINING PROGRAM

## 2024-05-24 PROCEDURE — 90935 HEMODIALYSIS ONE EVALUATION: CPT

## 2024-05-24 PROCEDURE — 83605 ASSAY OF LACTIC ACID: CPT

## 2024-05-24 RX ORDER — AMLODIPINE BESYLATE 10 MG/1
10 TABLET ORAL
Status: DISCONTINUED | OUTPATIENT
Start: 2024-05-24 | End: 2024-05-25 | Stop reason: HOSPADM

## 2024-05-24 RX ADMIN — ATORVASTATIN CALCIUM 20 MG: 20 TABLET, FILM COATED ORAL at 04:24

## 2024-05-24 RX ADMIN — METOPROLOL TARTRATE 100 MG: 50 TABLET, FILM COATED ORAL at 04:24

## 2024-05-24 RX ADMIN — INSULIN LISPRO 1 UNITS: 100 INJECTION, SOLUTION INTRAVENOUS; SUBCUTANEOUS at 12:27

## 2024-05-24 RX ADMIN — AMLODIPINE BESYLATE 10 MG: 10 TABLET ORAL at 08:42

## 2024-05-24 RX ADMIN — APIXABAN 2.5 MG: 2.5 TABLET, FILM COATED ORAL at 04:24

## 2024-05-24 RX ADMIN — EPOETIN ALFA 3000 UNITS: 3000 SOLUTION INTRAVENOUS; SUBCUTANEOUS at 11:56

## 2024-05-24 RX ADMIN — INSULIN LISPRO 1 UNITS: 100 INJECTION, SOLUTION INTRAVENOUS; SUBCUTANEOUS at 16:29

## 2024-05-24 RX ADMIN — INSULIN LISPRO 2 UNITS: 100 INJECTION, SOLUTION INTRAVENOUS; SUBCUTANEOUS at 21:03

## 2024-05-24 RX ADMIN — METOPROLOL TARTRATE 100 MG: 50 TABLET, FILM COATED ORAL at 16:25

## 2024-05-24 RX ADMIN — APIXABAN 2.5 MG: 2.5 TABLET, FILM COATED ORAL at 16:25

## 2024-05-24 ASSESSMENT — LIFESTYLE VARIABLES
SUBSTANCE_ABUSE: 0
CONSUMPTION TOTAL: NEGATIVE
TOTAL SCORE: 0
HAVE PEOPLE ANNOYED YOU BY CRITICIZING YOUR DRINKING: NO
ALCOHOL_USE: NO
DOES PATIENT WANT TO STOP DRINKING: NO
ON A TYPICAL DAY WHEN YOU DRINK ALCOHOL HOW MANY DRINKS DO YOU HAVE: 0
AVERAGE NUMBER OF DAYS PER WEEK YOU HAVE A DRINK CONTAINING ALCOHOL: 0
EVER FELT BAD OR GUILTY ABOUT YOUR DRINKING: NO
EVER HAD A DRINK FIRST THING IN THE MORNING TO STEADY YOUR NERVES TO GET RID OF A HANGOVER: NO
TOTAL SCORE: 0
TOTAL SCORE: 0
HOW MANY TIMES IN THE PAST YEAR HAVE YOU HAD 5 OR MORE DRINKS IN A DAY: 0
HAVE YOU EVER FELT YOU SHOULD CUT DOWN ON YOUR DRINKING: NO

## 2024-05-24 ASSESSMENT — ENCOUNTER SYMPTOMS
CHILLS: 0
BLURRED VISION: 0
PALPITATIONS: 0
PHOTOPHOBIA: 0
TINGLING: 0
MYALGIAS: 0
BACK PAIN: 0
SPEECH CHANGE: 0
TREMORS: 0
DOUBLE VISION: 0
NAUSEA: 0
SENSORY CHANGE: 0
CONSTIPATION: 0
HALLUCINATIONS: 0
FOCAL WEAKNESS: 0
DIZZINESS: 0
DIARRHEA: 0
VOMITING: 0
FEVER: 0
ABDOMINAL PAIN: 0
SPUTUM PRODUCTION: 0
NECK PAIN: 0
COUGH: 0
HEADACHES: 0
WEIGHT LOSS: 0
ORTHOPNEA: 0
SHORTNESS OF BREATH: 1
EYE PAIN: 0

## 2024-05-24 ASSESSMENT — COGNITIVE AND FUNCTIONAL STATUS - GENERAL
DRESSING REGULAR LOWER BODY CLOTHING: A LOT
HELP NEEDED FOR BATHING: A LOT
SUGGESTED CMS G CODE MODIFIER MOBILITY: CL
DRESSING REGULAR UPPER BODY CLOTHING: A LITTLE
STANDING UP FROM CHAIR USING ARMS: A LOT
TOILETING: A LOT
MOVING FROM LYING ON BACK TO SITTING ON SIDE OF FLAT BED: A LITTLE
MOBILITY SCORE: 14
CLIMB 3 TO 5 STEPS WITH RAILING: A LOT
WALKING IN HOSPITAL ROOM: A LOT
PERSONAL GROOMING: A LITTLE
MOVING TO AND FROM BED TO CHAIR: A LOT
EATING MEALS: A LITTLE
SUGGESTED CMS G CODE MODIFIER DAILY ACTIVITY: CK
DAILY ACTIVITIY SCORE: 15
TURNING FROM BACK TO SIDE WHILE IN FLAT BAD: A LITTLE

## 2024-05-24 ASSESSMENT — FIBROSIS 4 INDEX
FIB4 SCORE: 2.57
FIB4 SCORE: 2.57
FIB4 SCORE: 1.53

## 2024-05-24 ASSESSMENT — PAIN DESCRIPTION - PAIN TYPE
TYPE: ACUTE PAIN

## 2024-05-24 NOTE — PROGRESS NOTES
Hospital Medicine Daily Progress Note    Date of Service  5/23/2024    Chief Complaint  Pj Britt is a 63 y.o. male admitted 5/22/2024 with shortness of breath    Hospital Course    Pj Britt is a 63 y.o. male with past medical history of diabetes mellitus type 2, hypertension, blindness, spinal stenosis, ESRD on hemodialysis  MWF who presented as transfer on 5/22/2024 with worsening shortness of breath and cough for last 1 week.  He denies fever.On arrival to ED he is requiring 2 L oxygen to maintain saturation over 90.  Labs noted normal white count, hemoglobin 10.3, sodium 133, glucose 208, chemistry consistent with ESRD, BNP 70805, chest x-ray noted with moderate right and moderate/large pleural effusion, diffuse interstitial thickening.  Intensivist Dr. Tran consulted and recommended [facility] admission.  Nephrology has been consulted for send dialysis.     Patient admitted to [facility] for close monitoring.    Interval Problem Update    05/23/24    I evaluated and examined him at the bedside.  He reported that overall he is feeling better.  At the time of evaluation he is getting hemodialysis.  I discussed with him with the help of in person  bedside RN.  At time of evaluation he is requiring 12 L of oxygen via OxyMask to maintain oxygen saturation.  He does not use oxygen at baseline.  I discussed plan of care with him and I answered all his questions.  Discussed plan of care with patient's family ember at the bedside.      I have discussed this patient's plan of care and discharge plan at IDT rounds today with Case Management, Nursing, Nursing leadership, and other members of the IDT team.    Consultants/Specialty  nephrology    Code Status  Full Code    Disposition  <MEDICALLYCLEARED>  I have placed the appropriate orders for post-discharge needs.    Review of Systems  ROS     Physical Exam  Temp:  [36.7 °C (98.1 °F)-37 °C (98.6 °F)] 36.8 °C (98.2 °F)  Pulse:  [62-84] 67  Resp:   [12-45] 17  BP: (122-157)/(58-76) 131/63  SpO2:  [90 %-99 %] 97 %    Physical Exam    Fluids    Intake/Output Summary (Last 24 hours) at 5/23/2024 1745  Last data filed at 5/23/2024 1400  Gross per 24 hour   Intake 950 ml   Output 2500 ml   Net -1550 ml       Laboratory  Recent Labs     05/22/24  0850 05/23/24  0350   WBC 6.7 6.8   RBC 3.41* 3.63*   HEMOGLOBIN 10.3* 10.8*   HEMATOCRIT 31.4* 33.0*   MCV 92.1 90.9   MCH 30.2 29.8   MCHC 32.8 32.7   RDW 54.0* 53.5*   PLATELETCT 130* 159*   MPV 12.5 13.1*     Recent Labs     05/22/24  0850 05/23/24  0806   SODIUM 137 138   POTASSIUM 5.0 4.2   CHLORIDE 93* 98   CO2 31 29   GLUCOSE 208* 116*   BUN 32* 20   CREATININE 4.20* 2.86*   CALCIUM 9.6 8.5                   Imaging  DX-CHEST-PORTABLE (1 VIEW)   Final Result      Hypoinflation, pulmonary edema, and increasing left basilar opacity.      DX-CHEST-PORTABLE (1 VIEW)   Final Result      1.  Questionable tiny left apical pneumothorax post thoracentesis. No significant residual left pleural effusion.   2.  Hazy left mid and lower lung opacities.   3.  Moderate right pleural effusion with adjacent airspace disease.   4.  Interstitial infiltrates and/or edema.      Findings conveyed to МАРИЯ Anna at 5/22/2024 12:46 PM via Voalte messaging.      DX-CHEST-PORTABLE (1 VIEW)   Final Result      1.  Moderate right and moderate/large left pleural effusion with adjacent airspace disease.   2.  Diffuse interstitial thickening, likely edema.           Assessment/Plan  * Volume overload- (present on admission)  Assessment & Plan  Volume overload in setting of ESRD  Need urgent hemodialysis  Nephrology consulted  Ultrasound-guided thoracocentesis  Monitor oxygen requirement closely  S/p thoracentesis.  He is getting hemodialysis that will help him and his fluid overload.  I discussed plan of care with him and his family at the bedside.          Pleural effusion- (present on admission)  Assessment & Plan  chest x-ray noted with  moderate right and moderate/large pleural effusion, diffuse interstitial thickening.  I have ordered urgent thoracocentesis.  Thoracentesis fluid did not show any organisms.  I discontinued antibiotics.  Follow-up on culture results.  Continue monitoring closely in IMCU.    Goals of care, counseling/discussion  Assessment & Plan  I had a prolonged discussion with the patient  and family  regarding goals of care, diagnoses, prognosis, and CODE STATUS. We discussed his  prognosis and comorbidities.   Discussed full resuscitation to include chest compressions, medications, defibrillation, and intubation.  He requested to remain full code  Total time spent in ACP discussion 15 minutes, which is separate from the time spent completing the evaluation and management visit      Atrial fibrillation (HCC)- (present on admission)  Assessment & Plan  Current rate controlled  Continue metoprolol  Now resume Eliquis as it was on hold due to thoracentesis.    ESRD on dialysis (HCC)- (present on admission)  Assessment & Plan  ESRD hemodialysis, Monday Wednesday Friday  Continue dialysis as per nephrology.  Respiratory failure is most likely secondary to fluid overload.      Acute on chronic respiratory failure with hypoxia ([facility])- (present on admission)  Assessment & Plan  At time of evaluation is requiring 12 L of oxygen via OxyMask to maintain oxygen saturation.  Respiratory failure in setting of volume overload  Continue to  monitor oxygen saturation closely  Incentive spirometry  S/p thoracentesis  Continue to titrate down oxygen as tolerated.  Continue to monitor his respiratory status closely in IMCU.      Type 2 diabetes mellitus with nephropathy, and retinopathy ([facility])- (present on admission)  Assessment & Plan  Continue insulin sliding scale with hypoglycemia protocol.         I discussed plan of care during multidisciplinary rounds regarding patient's current medical condition and plan of care.      VTE prophylaxis:     therapeutic anticoagulation with eliquis 2.5 mg BID      I have performed a physical exam and reviewed and updated ROS and Plan today (5/23/2024). In review of yesterday's note (5/22/2024), there are no changes except as documented above.

## 2024-05-24 NOTE — PROGRESS NOTES
[facility]     Dialysis treatment started at 0710 and completed at 1010. Nephrologist DR. Madera notified of treatment start.     NET UF: 3000 mL     Patient is A&Ox4, no pain and discomfort reported. VS within normal limits. Left FA AVF has Bruitt and thrill and no signs and symptoms of infection. AVF cannulated and no access issues encountered. Lines are patent w/ good blood flow. Lab results and orders reviewed prior to treatment start. DR Madera seen patient at bedside     Patient completed and tolerated dialysis treatment well w/o complications. VS stayed within normal limits. Left FA AVF Deaccessed, No bleeding noted after needle removal. Manual access pressure applied x 10mins. Patient and PCN instructed to monitor Left FA AVF site for bleeding and to re-enforce pressure dressing if needed.     1030 Report given to PCMAXWELL Nina RN    See Dialysis treatment flow sheet for details.

## 2024-05-24 NOTE — CARE PLAN
The patient is Stable - Low risk of patient condition declining or worsening    Shift Goals  Clinical Goals: Monitor labs, vitals, respiratory status  Patient Goals: Rest  Family Goals: POOJA    Progress made toward(s) clinical / shift goals:        Problem: Knowledge Deficit - Standard  Goal: Patient and family/care givers will demonstrate understanding of plan of care, disease process/condition, diagnostic tests and medications  Outcome: Progressing  Note: Patient verbally demonstrates understanding of POC and disease process. All patient and family questions answered.     Problem: Fall Risk  Goal: Patient will remain free from falls  Outcome: Progressing  Note: All fall precautions in place and patient educated to use the call light before ambulation. Patient educated that staff need to be present for ambulation.

## 2024-05-24 NOTE — PROGRESS NOTES
Patient transferred to [facility] via hospital bed at 1205. All personal belongings sent with patient.

## 2024-05-24 NOTE — PROGRESS NOTES
4 Eyes Skin Assessment Completed by SAMAN Glover and SAMAN Salgado.    Head WDL  Ears Redness and Non-Blanching  Nose WDL  Mouth WDL  Neck WDL  Breast/Chest WDL  Shoulder Blades WDL  Spine WDL  (R) Arm/Elbow/Hand Redness and Blanching  (L) Arm/Elbow/Hand Fistula, Redness and Blanching  Abdomen WDL  Groin Discoloration  Scrotum/Coccyx/Buttocks Redness, Blanching, and Discoloration  (R) Leg Scab and Bruising  (L) Leg Scab and Bruising  (R) Heel/Foot/Toe Redness, Blanching, Boggy, and Scab  (L) Heel/Foot/Toe Redness, Blanching, and Boggy          Devices In Places Tele Box, Blood Pressure Cuff, Pulse Ox, and Oxy Mask      Interventions In Place Heel Mepilex, Sacral Mepilex, TAP System, Pillows, Elbow Mepilex, Q2 Turns, Barrier Cream, Dri-Kailash Pads, Heels Loaded W/Pillows, and Pressure Redistribution Mattress    Possible Skin Injury Yes    Pictures Uploaded Into Epic Yes  Wound Consult Placed Yes  RN Wound Prevention Protocol Ordered Yes

## 2024-05-24 NOTE — PROGRESS NOTES
"[facility] -  PROGRESS NOTE               Author: Francis Madera D.O. Date & Time: 5/24/2024  1:46 PM     HPI:  2 yo M PMH ESRD MWF iHD in [place] at [facility], HTN, type 1 DM, anemia of CKD, and CKD-MBD who presents to [facility] after presenting to [place] [facility] with shortness of breath on 60% saturation on 5L home oxygen.  Family and patient report he has needed an increase in his home oxygen over the previous few months, but the shortness of breath only became bothersome today.  He reports he has not missed any HD recently, last tx was MON.  He hasn't been eating or drinking well due to poor appetite.   CXR showed moderate right and moderate/large left pleural effusion and edema.    Labs revealed hemoglobin 10.3, sodium 137, K 5.0, glucose 208, phos 1.9, Mg 2.7, BNP 13386.  Ultrasound-guided thoracocentesis is pending.      DAILY NEPHROLOGY SUMMARY:  5/22: Consult done  5/23: pt feels better after dialysis and thoracentesis yesterday. Remains on oxygen mask today.   5/24: Pt received PUF yesterday with 3L removed. Seen on HD again today. On 3L oxy mask.      REVIEW OF SYSTEMS:    SOB improving. 10 point ROS reviewed and is as per HPI/daily summary or otherwise negative    PMH/PSH/SH/FH: Reviewed and unchanged since admission note    CURRENT MEDICATIONS:   Scheduled Medications   Medication Dose Frequency    amLODIPine  10 mg Q DAY    insulin lispro  1-6 Units 4X/DAY ACHS    epoetin  3,000 Units MO, WE + FR    apixaban  2.5 mg BID    atorvastatin  20 mg DAILY    metoprolol tartrate  100 mg BID       VS:  /57   Pulse 71   Temp 36.7 °C (98 °F) (Temporal)   Resp 19   Ht 1.702 m (5' 7\")   Wt 56.8 kg (125 lb 3.5 oz)   SpO2 97%   BMI 19.61 kg/m²     Physical Exam  HENT:      Head: Normocephalic and atraumatic.      Mouth/Throat:      Mouth: Mucous membranes are moist.   Cardiovascular:      Rate and Rhythm: Normal rate and regular rhythm.   Pulmonary:      Effort: Pulmonary effort is normal.     "  Comments: On face mask  Abdominal:      General: There is no distension.   Skin:     General: Skin is warm.         Access:  +LFA AVF +b/t aneurysmal       Fluids:  In: 1300 [P.O.:800; Dialysis:500]  Out: 2500     LABS:  Recent Labs     05/22/24  0850 05/23/24  0806 05/24/24  0430   SODIUM 137 138 134*   POTASSIUM 5.0 4.2 4.9   CHLORIDE 93* 98 95*   CO2 31 29 29   GLUCOSE 208* 116* 138*   BUN 32* 20 35*   CREATININE 4.20* 2.86* 4.00*   CALCIUM 9.6 8.5 8.4*       IMAGING:  - Imaging studies reviewed     ASSESSMENTS:    # ESRD  - Etiology likely 2/2 HTN/DM  - MWF iHD at [facility]  - via L AVF(+thrill/bruit)  # Acute resp meet chronic resp failure with hypoxia  - 5L baseline oxygen   - likely fluid contributing   # Pleural effusion   # HTN  - Goal BP < 140/90  - At goal  # Anemia of CKD  - Goal Hgb 10-11  - At goal  # CKD-MBD  - Phos low 1.9  # Type II diabetes   # a fib   - On eliquis            PLAN:    - pt seen tolerating HD today  - likely resume MWF iHD at this time but will assess for PRN needs  - UF with HD as tolerated  - No dietary protein restrictions  - Dose all meds per ESRD  - Renal diet   - Phos repletion per primary team     Dispo: can DC from renal standpoint if dyspnea/volume is controlled. Will eval daily for HD needs while inpt    Please page nephrology with any questions or concerns.

## 2024-05-24 NOTE — PROGRESS NOTES
4 Eyes Skin Assessment Completed by SAMAN Almeida and SAMAN Feliciano.    Head WDL  Ears WDL  Nose WDL  Mouth WDL  Neck WDL  Breast/Chest WDL  Shoulder Blades WDL  Spine Incision covered with band aid thoracentesis site  (R) Arm/Elbow/Hand Redness and Blanching  (L) Arm/Elbow/Hand Redness and Blanching  Abdomen WDL  Groin Rash dry brown   Scrotum/Coccyx/Buttocks Redness and Blanching  (R) Leg Scar and Scab  (L) Leg Scar and Scab  (R) Heel/Foot/Toe Redness and Blanching  (L) Heel/Foot/Toe Redness and Blanching          Devices In Places ECG, Blood Pressure Cuff, and Pulse Ox      Interventions In Place Gray Ear Foams, Heel Float Boots, and Q2 Turns    Possible Skin Injury No    Pictures Uploaded Into Epic N/A  Wound Consult Placed N/A  RN Wound Prevention Protocol Ordered No

## 2024-05-24 NOTE — CARE PLAN
The patient is Stable - Low risk of patient condition declining or worsening    Shift Goals  Clinical Goals: Titrate O2  Patient Goals: Sleep  Family Goals: POOJA    Progress made toward(s) clinical / shift goals: Assumed care of patient at 2200. Patient is A&Ox4.  in place. Bed is low and locked, bed alarm is on and call light is within reach.     Problem: Respiratory  Goal: Patient will achieve/maintain optimum respiratory ventilation and gas exchange  Outcome: Progressing  Note: Able to successfully titrate patient to 4L of O2.  in place. IS at bedside.      Problem: Fall Risk  Goal: Patient will remain free from falls  Outcome: Progressing     Problem: Knowledge Deficit - Standard  Goal: Patient and family/care givers will demonstrate understanding of plan of care, disease process/condition, diagnostic tests and medications  Outcome: Progressing     Patient is not progressing towards the following goals: N/A

## 2024-05-24 NOTE — PROGRESS NOTES
4 Eyes Skin Assessment Completed by SAMAN Guerrero and SAMAN Worthy.    Head WDL  Ears Redness and Blanching  Nose WDL  Mouth WDL  Neck WDL  Breast/Chest WDL  Shoulder Blades scar  Spine WDL  (R) Arm/Elbow/Hand Bruising  (L) Arm/Elbow/Hand Bruising, fistula  AbdomenWDL  Groin discoloration, nonblanching  Scrotum/Coccyx/Buttocks Discoloration  (R) Leg WDL  (L) Leg skin tear scabbed  (R) Heel/Foot/Toe Scab to the right second toe  (L) Heel/Foot/Toe WDL          Devices In Places ECG, Blood Pressure Cuff, Pulse Ox, and Oxy Mask      Interventions In Place Heel Mepilex, Sacral Mepilex, TAP System, Pillows, Low Air Loss Mattress, and Heels Loaded W/Pillows    Possible Skin Injury No    Pictures Uploaded Into Epic NA  Wound Consult Placed N/A  RN Wound Prevention Protocol Ordered No      Cryogen Time (Ms): 30

## 2024-05-25 VITALS
WEIGHT: 134.48 LBS | TEMPERATURE: 97 F | HEART RATE: 66 BPM | HEIGHT: 67 IN | RESPIRATION RATE: 18 BRPM | SYSTOLIC BLOOD PRESSURE: 127 MMHG | BODY MASS INDEX: 21.11 KG/M2 | DIASTOLIC BLOOD PRESSURE: 65 MMHG | OXYGEN SATURATION: 98 %

## 2024-05-25 LAB
ALBUMIN SERPL BCP-MCNC: 3.6 G/DL (ref 3.2–4.9)
ALBUMIN/GLOB SERPL: 1.4 G/DL
ALP SERPL-CCNC: 114 U/L (ref 30–99)
ALT SERPL-CCNC: 16 U/L (ref 2–50)
ANION GAP SERPL CALC-SCNC: 12 MMOL/L (ref 7–16)
AST SERPL-CCNC: 15 U/L (ref 12–45)
BACTERIA FLD AEROBE CULT: NORMAL
BILIRUB SERPL-MCNC: 0.6 MG/DL (ref 0.1–1.5)
BUN SERPL-MCNC: 26 MG/DL (ref 8–22)
CALCIUM ALBUM COR SERPL-MCNC: 9.1 MG/DL (ref 8.5–10.5)
CALCIUM SERPL-MCNC: 8.8 MG/DL (ref 8.5–10.5)
CHLORIDE SERPL-SCNC: 96 MMOL/L (ref 96–112)
CO2 SERPL-SCNC: 28 MMOL/L (ref 20–33)
CREAT SERPL-MCNC: 3.25 MG/DL (ref 0.5–1.4)
ERYTHROCYTE [DISTWIDTH] IN BLOOD BY AUTOMATED COUNT: 54.2 FL (ref 35.9–50)
GFR SERPLBLD CREATININE-BSD FMLA CKD-EPI: 20 ML/MIN/1.73 M 2
GLOBULIN SER CALC-MCNC: 2.5 G/DL (ref 1.9–3.5)
GLUCOSE BLD STRIP.AUTO-MCNC: 107 MG/DL (ref 65–99)
GLUCOSE SERPL-MCNC: 106 MG/DL (ref 65–99)
GRAM STN SPEC: NORMAL
HCT VFR BLD AUTO: 31.7 % (ref 42–52)
HGB BLD-MCNC: 10.3 G/DL (ref 14–18)
MAGNESIUM SERPL-MCNC: 2.2 MG/DL (ref 1.5–2.5)
MCH RBC QN AUTO: 29.8 PG (ref 27–33)
MCHC RBC AUTO-ENTMCNC: 32.5 G/DL (ref 32.3–36.5)
MCV RBC AUTO: 91.6 FL (ref 81.4–97.8)
PHOSPHATE SERPL-MCNC: 2.6 MG/DL (ref 2.5–4.5)
PLATELET # BLD AUTO: 162 K/UL (ref 164–446)
PMV BLD AUTO: 11.6 FL (ref 9–12.9)
POTASSIUM SERPL-SCNC: 4.6 MMOL/L (ref 3.6–5.5)
PROT SERPL-MCNC: 6.1 G/DL (ref 6–8.2)
RBC # BLD AUTO: 3.46 M/UL (ref 4.7–6.1)
SIGNIFICANT IND 70042: NORMAL
SITE SITE: NORMAL
SODIUM SERPL-SCNC: 136 MMOL/L (ref 135–145)
SOURCE SOURCE: NORMAL
WBC # BLD AUTO: 4.2 K/UL (ref 4.8–10.8)

## 2024-05-25 PROCEDURE — 84100 ASSAY OF PHOSPHORUS: CPT

## 2024-05-25 PROCEDURE — A9270 NON-COVERED ITEM OR SERVICE: HCPCS | Performed by: INTERNAL MEDICINE

## 2024-05-25 PROCEDURE — 80053 COMPREHEN METABOLIC PANEL: CPT

## 2024-05-25 PROCEDURE — 82962 GLUCOSE BLOOD TEST: CPT

## 2024-05-25 PROCEDURE — A9270 NON-COVERED ITEM OR SERVICE: HCPCS | Performed by: STUDENT IN AN ORGANIZED HEALTH CARE EDUCATION/TRAINING PROGRAM

## 2024-05-25 PROCEDURE — 700102 HCHG RX REV CODE 250 W/ 637 OVERRIDE(OP): Performed by: INTERNAL MEDICINE

## 2024-05-25 PROCEDURE — 83735 ASSAY OF MAGNESIUM: CPT

## 2024-05-25 PROCEDURE — 700102 HCHG RX REV CODE 250 W/ 637 OVERRIDE(OP): Performed by: STUDENT IN AN ORGANIZED HEALTH CARE EDUCATION/TRAINING PROGRAM

## 2024-05-25 PROCEDURE — 85027 COMPLETE CBC AUTOMATED: CPT

## 2024-05-25 PROCEDURE — 99239 HOSP IP/OBS DSCHRG MGMT >30: CPT | Performed by: HOSPITALIST

## 2024-05-25 RX ORDER — ACETAMINOPHEN 500 MG
650 TABLET ORAL EVERY 6 HOURS PRN
COMMUNITY
Start: 2024-05-25

## 2024-05-25 RX ADMIN — APIXABAN 2.5 MG: 2.5 TABLET, FILM COATED ORAL at 06:07

## 2024-05-25 RX ADMIN — METOPROLOL TARTRATE 100 MG: 50 TABLET, FILM COATED ORAL at 06:07

## 2024-05-25 RX ADMIN — AMLODIPINE BESYLATE 10 MG: 10 TABLET ORAL at 06:07

## 2024-05-25 RX ADMIN — ATORVASTATIN CALCIUM 20 MG: 20 TABLET, FILM COATED ORAL at 06:07

## 2024-05-25 ASSESSMENT — FIBROSIS 4 INDEX: FIB4 SCORE: 2.32

## 2024-05-25 NOTE — ASSESSMENT & PLAN NOTE
He found to have positive blood culture most likely contaminant.  Holding antibiotic administration.

## 2024-05-25 NOTE — PROGRESS NOTES
Bedside report received from off going RN/tech: Peyton assumed care of patient.     Fall Risk Score: HIGH RISK  Fall risk interventions in place: Place yellow fall risk ID band on patient, Provide patient/family education based on risk assessment, Educate patient/family to call staff for assistance when getting out of bed, Place fall precaution signage outside patient door, Utilize bed/chair fall alarm, and Bed alarm connected correctly  Bed type: Regular (Ashok Score less than 17 interventions in place)  Patient on cardiac monitor: Yes  IVF/IV medications: Not Applicable   Oxygen: How many liters 3L  Bedside sitter: Not Applicable   Isolation: Not applicable

## 2024-05-25 NOTE — PROGRESS NOTES
Hospital Medicine Daily Progress Note    Date of Service  5/24/2024    Chief Complaint  Pj Britt is a 63 y.o. male admitted 5/22/2024 with shortness of breath    Hospital Course    Pj Britt is a 63 y.o. male with past medical history of diabetes mellitus type 2, hypertension, blindness, spinal stenosis, ESRD on hemodialysis  MWF who presented as transfer on 5/22/2024 with worsening shortness of breath and cough for last 1 week.  He denies fever.On arrival to ED he is requiring 2 L oxygen to maintain saturation over 90.  Labs noted normal white count, hemoglobin 10.3, sodium 133, glucose 208, chemistry consistent with ESRD, BNP 98828, chest x-ray noted with moderate right and moderate/large pleural effusion, diffuse interstitial thickening.  Intensivist Dr. Tran consulted and recommended [facility] admission.  Nephrology has been consulted for send dialysis.     Patient admitted to [facility] for close monitoring.    Interval Problem Update    05/23/24    I evaluated and examined him at the bedside.  He reported that overall he is feeling better.  At the time of evaluation he is getting hemodialysis.  I discussed with him with the help of in person  bedside RN.  At time of evaluation he is requiring 12 L of oxygen via OxyMask to maintain oxygen saturation.  He does not use oxygen at baseline.  I discussed plan of care with him and I answered all his questions.  Discussed plan of care with patient's family ember at the bedside.    05/24/24    I evaluated and examined him at the bedside.  I used video  to discuss plan of care with patient.  At time of evaluation he is getting hemodialysis  Oxygen requirement has been trending down  I discussed plan of care with him and I answered all his questions.  I discussed plan of care with patient's family at the bedside.  Plan is to transfer him out from [facility].    I have discussed this patient's plan of care and discharge plan at [facility]  rounds today with Case Management, Nursing, Nursing leadership, and other members of the IDT team.    Consultants/Specialty  nephrology    Code Status  Full Code    Disposition  The patient is not medically cleared for discharge to home or a post-acute facility.      I have placed the appropriate orders for post-discharge needs.    Review of Systems  Review of Systems   Constitutional:  Positive for malaise/fatigue. Negative for chills, fever and weight loss.   HENT:  Negative for hearing loss and tinnitus.    Eyes:  Negative for blurred vision, double vision, photophobia and pain.   Respiratory:  Positive for shortness of breath (Improved). Negative for cough and sputum production.    Cardiovascular:  Negative for chest pain, palpitations, orthopnea and leg swelling.   Gastrointestinal:  Negative for abdominal pain, constipation, diarrhea, nausea and vomiting.   Genitourinary:  Negative for dysuria, frequency and urgency.   Musculoskeletal:  Negative for back pain, joint pain, myalgias and neck pain.   Skin:  Negative for rash.   Neurological:  Negative for dizziness, tingling, tremors, sensory change, speech change, focal weakness and headaches.   Psychiatric/Behavioral:  Negative for hallucinations and substance abuse.    All other systems reviewed and are negative.       Physical Exam  Temp:  [36.7 °C (98 °F)-36.8 °C (98.2 °F)] 36.7 °C (98 °F)  Pulse:  [67-81] 71  Resp:  [14-23] 20  BP: (108-155)/(45-87) 122/52  SpO2:  [90 %-97 %] 95 %    Physical Exam  Vitals reviewed.   Constitutional:       General: He is not in acute distress.  HENT:      Head: Normocephalic and atraumatic. No contusion.      Right Ear: External ear normal.      Left Ear: External ear normal.      Nose: Nose normal.      Comments: Oxygen nasal cannula     Mouth/Throat:      Pharynx: No oropharyngeal exudate.   Eyes:      General:         Right eye: No discharge.         Left eye: No discharge.      Pupils: Pupils are equal, round, and  reactive to light.   Cardiovascular:      Rate and Rhythm: Normal rate and regular rhythm.      Heart sounds: No murmur heard.     No friction rub. No gallop.   Pulmonary:      Effort: Pulmonary effort is normal.      Breath sounds: No wheezing or rhonchi.   Abdominal:      General: Bowel sounds are normal. There is no distension.      Palpations: Abdomen is soft.      Tenderness: There is no abdominal tenderness. There is no rebound.   Musculoskeletal:         General: No swelling or tenderness. Normal range of motion.      Cervical back: No rigidity. No muscular tenderness.   Skin:     General: Skin is warm and dry.      Coloration: Skin is not jaundiced.   Neurological:      General: No focal deficit present.      Mental Status: He is alert.      Cranial Nerves: No cranial nerve deficit.      Sensory: No sensory deficit.   Psychiatric:         Mood and Affect: Mood normal.         Fluids    Intake/Output Summary (Last 24 hours) at 5/24/2024 1838  Last data filed at 5/24/2024 1400  Gross per 24 hour   Intake 1210 ml   Output 3500 ml   Net -2290 ml       Laboratory  Recent Labs     05/22/24  0850 05/23/24  0350 05/24/24  0430   WBC 6.7 6.8 4.9   RBC 3.41* 3.63* 3.24*   HEMOGLOBIN 10.3* 10.8* 9.6*   HEMATOCRIT 31.4* 33.0* 29.9*   MCV 92.1 90.9 92.3   MCH 30.2 29.8 29.6   MCHC 32.8 32.7 32.1*   RDW 54.0* 53.5* 53.1*   PLATELETCT 130* 159* 146*   MPV 12.5 13.1* 12.2     Recent Labs     05/22/24  0850 05/23/24  0806 05/24/24  0430   SODIUM 137 138 134*   POTASSIUM 5.0 4.2 4.9   CHLORIDE 93* 98 95*   CO2 31 29 29   GLUCOSE 208* 116* 138*   BUN 32* 20 35*   CREATININE 4.20* 2.86* 4.00*   CALCIUM 9.6 8.5 8.4*                   Imaging  DX-CHEST-PORTABLE (1 VIEW)   Final Result      Hypoinflation, pulmonary edema, and increasing left basilar opacity.      DX-CHEST-PORTABLE (1 VIEW)   Final Result      1.  Questionable tiny left apical pneumothorax post thoracentesis. No significant residual left pleural effusion.   2.   Hazy left mid and lower lung opacities.   3.  Moderate right pleural effusion with adjacent airspace disease.   4.  Interstitial infiltrates and/or edema.      Findings conveyed to МАРИЯ Anna at 5/22/2024 12:46 PM via Voalte messaging.      DX-CHEST-PORTABLE (1 VIEW)   Final Result      1.  Moderate right and moderate/large left pleural effusion with adjacent airspace disease.   2.  Diffuse interstitial thickening, likely edema.           Assessment/Plan  * Volume overload- (present on admission)  Assessment & Plan  Volume overload in setting of ESRD  Need urgent hemodialysis  Nephrology consulted  Ultrasound-guided thoracocentesis  Monitor oxygen requirement closely  S/p thoracentesis.  He is getting hemodialysis that will help him and his fluid overload.  I discussed plan of care with him and his family at the bedside.  Physical therapy evaluated him and recommended SNF placement.          Positive blood culture  Assessment & Plan  He found to have positive blood culture most likely contaminant.  Holding antibiotic administration.    Pleural effusion- (present on admission)  Assessment & Plan  chest x-ray noted with moderate right and moderate/large pleural effusion, diffuse interstitial thickening.  I have ordered urgent thoracocentesis.  Thoracentesis fluid did not show any organisms.  I discontinued antibiotics.  Follow-up on culture results.  Cultures remain negative.    Goals of care, counseling/discussion  Assessment & Plan  I had a prolonged discussion with the patient  and family  regarding goals of care, diagnoses, prognosis, and CODE STATUS. We discussed his  prognosis and comorbidities.   Discussed full resuscitation to include chest compressions, medications, defibrillation, and intubation.  He requested to remain full code  Total time spent in ACP discussion 15 minutes, which is separate from the time spent completing the evaluation and management visit      Atrial fibrillation (HCC)- (present on  admission)  Assessment & Plan  Current rate controlled  Continue metoprolol  Now resume Eliquis as it was on hold due to thoracentesis.    ESRD on dialysis (HCC)- (present on admission)  Assessment & Plan  ESRD hemodialysis, Monday Wednesday Friday  Continue dialysis as per nephrology.  Nephrology has been following him for dialysis.  He underwent dialysis today again.    Acute on chronic respiratory failure with hypoxia (HCC)- (present on admission)  Assessment & Plan  Now oxygen requirement has been trending down currently is requiring 4 L of oxygen to maintain oxygen saturation   At home he uses 2 to 4 L of oxygen.  Respiratory failure in setting of volume overload  Continue to  monitor oxygen saturation closely  Incentive spirometry  S/p thoracentesis  Continue to titrate down oxygen as tolerated.  Now plan is to transfer him out from [facility] [place].      Type 2 diabetes mellitus with nephropathy, and retinopathy (HCC)- (present on admission)  Assessment & Plan  Continue insulin sliding scale with hypoglycemia protocol.         I discussed plan of care with patient with the help of a  and answered all his questions.    I discussed plan of care with patient family at the bedside.    I reviewed PT note and I placed order for skilled nursing facility placement.    >51 minutes total time spent in records review, lab/radiology assessment, patient history and assessment, and directing plan of care with high level medical decision making complexity. See orders.        VTE prophylaxis:    therapeutic anticoagulation with eliquis 2.5 mg BID      I have performed a physical exam and reviewed and updated ROS and Plan today (5/24/2024). In review of yesterday's note (5/23/2024), there are no changes except as documented above.

## 2024-05-25 NOTE — PROGRESS NOTES
"[facility] -  PROGRESS NOTE               Author: Francis Madera D.O. Date & Time: 5/25/2024  10:24 AM     HPI:  2 yo M PMH ESRD MWF iHD in [place] at [facility], HTN, type 1 DM, anemia of CKD, and CKD-MBD who presents to [facility] after presenting to [facility] with shortness of breath on 60% saturation on 5L home oxygen.  Family and patient report he has needed an increase in his home oxygen over the previous few months, but the shortness of breath only became bothersome today.  He reports he has not missed any HD recently, last tx was MON.  He hasn't been eating or drinking well due to poor appetite.   CXR showed moderate right and moderate/large left pleural effusion and edema.    Labs revealed hemoglobin 10.3, sodium 137, K 5.0, glucose 208, phos 1.9, Mg 2.7, BNP 04613.  Ultrasound-guided thoracocentesis is pending.      DAILY NEPHROLOGY SUMMARY:  5/22: Consult done  5/23: pt feels better after dialysis and thoracentesis yesterday. Remains on oxygen mask today.   5/24: Pt received PUF yesterday with 3L removed. Seen on HD again today. On 3L oxy mask.    5/25: pt seen this AM resting comfortably. On 3L NC. Son states home O2 is 2L-4L.     REVIEW OF SYSTEMS:    Denies SOB. 10 point ROS reviewed and is as per HPI/daily summary or otherwise negative    PMH/PSH/SH/FH: Reviewed and unchanged since admission note    CURRENT MEDICATIONS:   Scheduled Medications   Medication Dose Frequency    amLODIPine  10 mg Q DAY    insulin lispro  1-6 Units 4X/DAY ACHS    epoetin  3,000 Units MO, WE + FR    apixaban  2.5 mg BID    atorvastatin  20 mg DAILY    metoprolol tartrate  100 mg BID       VS:  /59   Pulse 67   Temp 37 °C (98.6 °F) (Temporal)   Resp 18   Ht 1.702 m (5' 7\")   Wt 61 kg (134 lb 7.7 oz)   SpO2 97%   BMI 21.06 kg/m²     Physical Exam  HENT:      Head: Normocephalic and atraumatic.      Mouth/Throat:      Mouth: Mucous membranes are moist.   Cardiovascular:      Rate and Rhythm: Normal " rate and regular rhythm.   Pulmonary:      Effort: Pulmonary effort is normal.      Comments: On face mask  Abdominal:      General: There is no distension.   Skin:     General: Skin is warm.         Access:  +LFA AVF +b/t aneurysmal       Fluids:  In: 1260 [P.O.:760; Dialysis:500]  Out: 3500     LABS:  Recent Labs     05/23/24  0806 05/24/24  0430 05/25/24  0350   SODIUM 138 134* 136   POTASSIUM 4.2 4.9 4.6   CHLORIDE 98 95* 96   CO2 29 29 28   GLUCOSE 116* 138* 106*   BUN 20 35* 26*   CREATININE 2.86* 4.00* 3.25*   CALCIUM 8.5 8.4* 8.8       IMAGING:  - Imaging studies reviewed     ASSESSMENTS:    # ESRD  - Etiology likely 2/2 HTN/DM  - MWF iHD at [facility]  - via L AVF(+thrill/bruit)  # Acute resp meet chronic resp failure with hypoxia  - 2-4L baseline oxygen   - likely fluid contributing   # Pleural effusion   # HTN  - Goal BP < 140/90  - At goal  # Anemia of CKD  - Goal Hgb 10-11  - At goal  # CKD-MBD  - Phos low 1.9  # Type II diabetes   # a fib   - On eliquis            PLAN:    - no indication for additional HD today (sat)  - resume MWF iHD schedule at this time   - will assess for PRN needs as well  - UF with HD as tolerated  - No dietary protein restrictions  - Dose all meds per ESRD  - Renal diet   - Phos repletion per primary team     Dispo: can DC from renal standpoint, outpt HD can continue challenging his dry weight/fluid removal.     Please page nephrology with any questions or concerns.

## 2024-05-25 NOTE — PROGRESS NOTES
Patient is being discharged home from the discharge lounge. Patient is A&Ox4. IV removed on unit. Discharge instructions provided to patient and reviewed with him and his family; verbalized understanding and all questions and concerns were addressed. Patient escorted to vehicle by discharge lounge staff.

## 2024-05-25 NOTE — DISCHARGE PLANNING
Case Management Discharge Planning    Admission Date: 5/22/2024  GMLOS: 3.6  ALOS: 3    6-Clicks ADL Score: 15  6-Clicks Mobility Score: 14  PT and/or OT Eval ordered: Yes  Post-acute Referrals Ordered: Yes  Post-acute Choice Obtained: Yes  Has referral(s) been sent to post-acute provider:  Yes      Anticipated Discharge Dispo: Discharge Disposition: D/T to home under [facility] care in anticipation of covered skilled care (06)    DME Needed: No    Action(s) Taken: Choice obtained    Patient discussed in IDT rounds, patient is requesting to discharge home with home health. RN GERBER met with patient and family at bedside to discuss. Choice obtained for [facility] and faxed to [facility]. Referral sent, awaiting response.     12:59 PM- Accepted on service by Mitzi . Notified patient and family at bedside.    Escalations Completed: None    Medically Clear: Yes    Next Steps: Family to provide transport home.    Barriers to Discharge: Outpatient referrals pending    Is the patient up for discharge tomorrow: No    Care Transition Team Assessment    Information Source  Orientation Level: Oriented X4  Information Given By: Patient  Informant's Name: Pj Britt  Who is responsible for making decisions for patient? : Patient    Readmission Evaluation  Is this a readmission?: No    Elopement Risk  Legal Hold: No  Ambulatory or Self Mobile in Wheelchair: Yes  Disoriented: No  Psychiatric Symptoms: None  History of Wandering: No  Elopement this Admit: No  Vocalizing Wanting to Leave: No  Displays Behaviors, Body Language Wanting to Leave: No-Not at Risk for Elopement  Elopement Risk: Not at Risk for Elopement    Interdisciplinary Discharge Planning  Lives with - Patient's Self Care Capacity: Spouse  Patient or legal guardian wants to designate a caregiver: No  Support Systems: Spouse / Significant Other  Housing / Facility: [address] [facility]  Prior Services: None    Discharge Preparedness  What is your plan after discharge?: Home health  care  What are your discharge supports?: Spouse  Prior Functional Level: Needs Assist with Activities of Daily Living, Needs Assist with Medication Management    Functional Assesment  Prior Functional Level: Needs Assist with Activities of Daily Living, Needs Assist with Medication Management    Finances  Financial Barriers to Discharge: No  Prescription Coverage: Yes    Vision / Hearing Impairment  Vision Impairment : Yes  Right Eye Vision: Blind  Left Eye Vision: Blind  Hearing Impairment : No    Advance Directive  Advance Directive?: DPOA for Health Care  Durable Power of  Name and Contact :  (TONYA LIGHT)    Domestic Abuse  Have you ever been the victim of abuse or violence?: No  Possible Abuse/Neglect Reported to:: Not Applicable    Psychological Assessment  History of Substance Abuse: None  History of Psychiatric Problems: No    Discharge Risks or Barriers  Discharge risks or barriers?: Complex medical needs  Patient risk factors: Complex medical needs    Anticipated Discharge Information  Discharge Disposition: D/T to home under [facility] care in anticipation of covered skilled care (06)

## 2024-05-25 NOTE — PROGRESS NOTES
Bedside report received from off going RN/tech: Belen, assumed care of patient.     Fall Risk Score: HIGH RISK  Fall risk interventions in place: Place yellow fall risk ID band on patient, Provide patient/family education based on risk assessment, Educate patient/family to call staff for assistance when getting out of bed, Place fall precaution signage outside patient door, Place patient in room close to nursing station, Utilize bed/chair fall alarm, Notify charge of high risk for huddle, and Bed alarm connected correctly  Bed type: Regular (Ashok Score less than 17 interventions in place) - No MONISHA pumps available at this time  Patient on cardiac monitor: Yes  IVF/IV medications: Not Applicable   Oxygen: How many liters 4L  Bedside sitter: Not Applicable   Isolation: Not applicable       1925: Bedside report received from day RN, pt care assumed, assessment completed. Pt is A&Ox4, pain 0/10, a. flutter on the monitor. Updated on POC, questions answered. Bed in lowest, locked position, treaded socks on, call light and belongings within reach. Fall precautions in place.

## 2024-05-25 NOTE — DISCHARGE SUMMARY
Discharge Summary    CHIEF COMPLAINT ON ADMISSION  Chief Complaint   Patient presents with    Shortness of Breath     BIB EMS transfer from [facility] Patient presented with SOB. EMS reports 60% on home O2 of 5L. Patient dx with L pleural effusion and transported to [facility] for continuation of care.        Reason for Admission  EMS     Admission Date  5/22/2024    CODE STATUS  Full Code    HPI & HOSPITAL COURSE      Pj Britt is a 63 y.o. male with past medical history of diabetes mellitus type 2, hypertension, blindness, spinal stenosis, ESRD on hemodialysis MWF who presented as transfer on 5/22/2024 with worsening shortness of breath and cough for last 1 week. He denies fever.On arrival to ED he is requiring 2 L oxygen to maintain saturation over 90. Labs noted normal white count, hemoglobin 10.3, sodium 133, glucose 208, chemistry consistent with ESRD, BNP 19033, chest x-ray noted with moderate right and moderate/large pleural effusion, diffuse interstitial thickening. Intensivist Dr. Tran consulted and recommended [facility] admission.     The patient was admitted to [facility] evaluated by nephrology and continued on hemodialysis.  He underwent left thoracentesis on 5/22/2024.  The patient clinically improved his oxygen requirements are back to his baseline.  He was evaluated by PT with recommendation for postacute placement however after further discussion with the patient this morning he wishes to go home he has continued support from his wife and son at home.  I ordered home health services.  I discussed with nephrology and patient is cleared from their standpoint to go home and resume outpatient hemodialysis on Monday.  Reviewed with the patient and his family importance of complying with fluid and sodium restriction and close outpatient follow-up with nephrology and PCP.      Therefore, he is discharged in good and stable condition to home with close outpatient follow-up.    The patient met 2-midnight  criteria for an inpatient stay at the time of discharge.    Discharge Date  5/25/2024    FOLLOW UP ITEMS POST DISCHARGE  Follow-up with PCP next week  Follow-up with nephrology at hemodialysis on Monday    DISCHARGE DIAGNOSES  Principal Problem:    Volume overload (POA: Yes)  Active Problems:    Type 2 diabetes mellitus with nephropathy, and retinopathy (HCC) (Chronic) (POA: Yes)    Acute on chronic respiratory failure with hypoxia (HCC) (POA: Yes)    ESRD on dialysis (HCC) (Chronic) (POA: Yes)    Atrial fibrillation (HCC) (POA: Yes)    Secondary hypercoagulable state (HCC) (POA: Yes)    Goals of care, counseling/discussion (POA: Unknown)    Pleural effusion (POA: Yes)    Positive blood culture (POA: Unknown)  Resolved Problems:    * No resolved hospital problems. *      FOLLOW UP  Future Appointments   Date Time Provider Department Center   6/19/2024  1:15 PM [facility] EXAM 7 VMED None     Faisal Germain M.D.  [address] [facility] [address]  [phone]    Follow up        MEDICATIONS ON DISCHARGE     Medication List        START taking these medications        Instructions   acetaminophen 500 MG Tabs  Commonly known as: Tylenol   Take 1.5 Tablets by mouth every 6 hours as needed for Mild Pain or Moderate Pain.  Dose: 750 mg            CONTINUE taking these medications        Instructions   amLODIPine 10 MG Tabs  Commonly known as: Norvasc   Take 10 mg by mouth 2 times a day.  Dose: 10 mg     apixaban 2.5mg Tabs  Commonly known as: Eliquis   Take 1 Tablet by mouth 2 times a day.  Dose: 2.5 mg     atorvastatin 20 MG Tabs  Commonly known as: Lipitor   [place] 1 tableta por vía oral diariamente.  (Take 1 Tablet by mouth every day.)  Dose: 20 mg     calcium acetate 667 MG Tabs tablet  Commonly known as: Phos-Lo   Doctor's comments: Patient takes 2 or 3 capsules depending on size of the meal.  Take 3 Tablets by mouth 3 times a day with meals.  Dose: 2,001 mg     insulin  UNIT/ML Susp  Commonly known as:  HumuLIN/NovoLIN N   Inject 20 Units under the skin every morning.  Dose: 20 Units     metoprolol tartrate 100 MG Tabs  Commonly known as: Lopressor   [place] 1 tableta por vía oral 2 veces al día.  (Take 1 Tablet by mouth 2 times a day.)  Dose: 100 mg     MIRCERA INJ   Inject 175 mcg as directed every 14 days. Info obtained by staff at [facility] [phone]  Dose: 175 mcg     paroxetine 40 MG tablet  Commonly known as: Paxil   Take 1 Tablet by mouth every day.  Dose: 40 mg            STOP taking these medications      Vitamin C 1000 MG Tabs              Allergies  Allergies   Allergen Reactions    Aspirin Unspecified     Cannot have because on Dialysis       DIET  Orders Placed This Encounter   Procedures    Diet Order Diet: Renal     Standing Status:   Standing     Number of Occurrences:   1     Order Specific Question:   Diet:     Answer:   Renal [8]       ACTIVITY  As tolerated.  Weight bearing as tolerated    CONSULTATIONS  Nephrology, critical care    PROCEDURES  Left thoracentesis 5/22/2024    LABORATORY  Lab Results   Component Value Date    SODIUM 136 05/25/2024    POTASSIUM 4.6 05/25/2024    CHLORIDE 96 05/25/2024    CO2 28 05/25/2024    GLUCOSE 106 (H) 05/25/2024    BUN 26 (H) 05/25/2024    CREATININE 3.25 (H) 05/25/2024        Lab Results   Component Value Date    WBC 4.2 (L) 05/25/2024    HEMOGLOBIN 10.3 (L) 05/25/2024    HEMATOCRIT 31.7 (L) 05/25/2024    PLATELETCT 162 (L) 05/25/2024        Total time of the discharge process exceeds 35 minutes.

## 2024-05-25 NOTE — PROGRESS NOTES
Monitor Summary  Rhythm: a. flutter  Rate: 66-92, dropped down to 44  Ectopy: n/a  .- / .07 / .-

## 2024-05-25 NOTE — CARE PLAN
The patient is Stable - Low risk of patient condition declining or worsening    Shift Goals  Clinical Goals: monitor vitals, monitor respiratory status, monitor labs, safety  Patient Goals: rest  Family Goals: kira    Progress made toward(s) clinical / shift goals:    Problem: Knowledge Deficit - Standard  Goal: Patient and family/care givers will demonstrate understanding of plan of care, disease process/condition, diagnostic tests and medications  Outcome: Progressing  Note: Pt educated on POC, medications, and treatment plan. Pt verbalizes understanding and all questions answered.      Problem: Skin Integrity  Goal: Skin integrity is maintained or improved  Outcome: Progressing  Note: Pt turns self from side to side. Skin prevention measures in place to prevent further breakdown. No MONISHA pumps available at this time.      Problem: Fall Risk  Goal: Patient will remain free from falls  Outcome: Progressing

## 2024-05-27 LAB
BACTERIA BLD CULT: NORMAL
SIGNIFICANT IND 70042: NORMAL
SITE SITE: NORMAL
SOURCE SOURCE: NORMAL

## 2024-05-29 LAB
FUNGUS SPEC CULT: NORMAL
FUNGUS SPEC FUNGUS STN: NORMAL
SIGNIFICANT IND 70042: NORMAL
SITE SITE: NORMAL
SOURCE SOURCE: NORMAL

## 2024-06-19 ENCOUNTER — ANTICOAGULATION MONITORING (OUTPATIENT)
Dept: VASCULAR LAB | Facility: MEDICAL CENTER | Age: 64
End: 2024-06-19
Payer: MEDICARE

## 2024-06-19 DIAGNOSIS — Z99.2 ESRD ON DIALYSIS (HCC): Chronic | ICD-10-CM

## 2024-06-19 DIAGNOSIS — D68.69 SECONDARY HYPERCOAGULABLE STATE (HCC): ICD-10-CM

## 2024-06-19 DIAGNOSIS — I48.91 ATRIAL FIBRILLATION, UNSPECIFIED TYPE (HCC): ICD-10-CM

## 2024-06-19 DIAGNOSIS — N18.6 ESRD ON DIALYSIS (HCC): Chronic | ICD-10-CM

## 2024-06-19 NOTE — PROGRESS NOTES
Pt missed his anticoagulation clinic follow up in the telemedicine clinic today. Called pt and left him a vm letting him know I rescheduled him for next Wednesday, June 26th at 2pm but to call me if this day or time doesn't work for him.    Deysi HSIEH

## 2024-07-05 LAB
MYCOBACTERIUM SPEC CULT: NORMAL
RHODAMINE-AURAMINE STN SPEC: NORMAL
SIGNIFICANT IND 70042: NORMAL
SITE SITE: NORMAL
SOURCE SOURCE: NORMAL

## 2024-09-13 DIAGNOSIS — I48.91 ATRIAL FIBRILLATION, UNSPECIFIED TYPE (HCC): ICD-10-CM

## 2024-09-13 DIAGNOSIS — D68.69 SECONDARY HYPERCOAGULABLE STATE (HCC): ICD-10-CM

## 2024-12-12 PROBLEM — M25.571 PAIN IN RIGHT ANKLE: Status: ACTIVE | Noted: 2024-12-12

## 2024-12-12 PROBLEM — M79.671 PAIN IN RIGHT FOOT: Status: ACTIVE | Noted: 2024-12-12

## 2025-03-27 PROBLEM — Z47.89 ORTHOPEDIC AFTERCARE: Status: ACTIVE | Noted: 2025-03-27

## 2025-03-27 PROBLEM — E11.610: Status: ACTIVE | Noted: 2025-03-27

## 2025-03-27 PROBLEM — T84.84XA PAINFUL ORTHOPAEDIC HARDWARE (HCC): Status: ACTIVE | Noted: 2025-03-27

## 2025-05-19 ENCOUNTER — HOSPITAL ENCOUNTER (OUTPATIENT)
Dept: RADIOLOGY | Facility: MEDICAL CENTER | Age: 65
End: 2025-05-19
Payer: MEDICARE

## 2025-05-20 ENCOUNTER — APPOINTMENT (OUTPATIENT)
Dept: RADIOLOGY | Facility: MEDICAL CENTER | Age: 65
DRG: 186 | End: 2025-05-20
Attending: STUDENT IN AN ORGANIZED HEALTH CARE EDUCATION/TRAINING PROGRAM
Payer: MEDICARE

## 2025-05-20 ENCOUNTER — HOSPITAL ENCOUNTER (INPATIENT)
Facility: MEDICAL CENTER | Age: 65
LOS: 2 days | DRG: 186 | End: 2025-05-22
Attending: STUDENT IN AN ORGANIZED HEALTH CARE EDUCATION/TRAINING PROGRAM | Admitting: STUDENT IN AN ORGANIZED HEALTH CARE EDUCATION/TRAINING PROGRAM
Payer: MEDICARE

## 2025-05-20 DIAGNOSIS — J98.4 PNEUMONITIS: ICD-10-CM

## 2025-05-20 DIAGNOSIS — E11.610: ICD-10-CM

## 2025-05-20 DIAGNOSIS — E27.8 ADRENAL INCIDENTALOMA (HCC): Chronic | ICD-10-CM

## 2025-05-20 DIAGNOSIS — Z79.01 CHRONIC ANTICOAGULATION: ICD-10-CM

## 2025-05-20 DIAGNOSIS — E11.21 TYPE 2 DIABETES MELLITUS WITH NEPHROPATHY (HCC): Chronic | ICD-10-CM

## 2025-05-20 DIAGNOSIS — R79.89 ELEVATED TROPONIN: ICD-10-CM

## 2025-05-20 DIAGNOSIS — J90 PLEURAL EFFUSION: ICD-10-CM

## 2025-05-20 DIAGNOSIS — Z71.89 GOALS OF CARE, COUNSELING/DISCUSSION: ICD-10-CM

## 2025-05-20 DIAGNOSIS — R12 HEARTBURN: Chronic | ICD-10-CM

## 2025-05-20 DIAGNOSIS — M48.02 SPINAL STENOSIS IN CERVICAL REGION: ICD-10-CM

## 2025-05-20 DIAGNOSIS — J96.01 ACUTE HYPOXEMIC RESPIRATORY FAILURE (HCC): ICD-10-CM

## 2025-05-20 DIAGNOSIS — I16.9 HYPERTENSIVE CRISIS: ICD-10-CM

## 2025-05-20 DIAGNOSIS — R10.9 CHRONIC ABDOMINAL PAIN: ICD-10-CM

## 2025-05-20 DIAGNOSIS — G95.9 CERVICAL MYELOPATHY (HCC): ICD-10-CM

## 2025-05-20 DIAGNOSIS — J96.21 ACUTE ON CHRONIC RESPIRATORY FAILURE WITH HYPOXIA (HCC): Primary | ICD-10-CM

## 2025-05-20 DIAGNOSIS — M47.12 CERVICAL SPONDYLOSIS WITH MYELOPATHY: ICD-10-CM

## 2025-05-20 DIAGNOSIS — F39 MOOD DISORDER (HCC): Chronic | ICD-10-CM

## 2025-05-20 DIAGNOSIS — M54.12 CERVICAL RADICULOPATHY: ICD-10-CM

## 2025-05-20 DIAGNOSIS — J18.9 HCAP (HEALTHCARE-ASSOCIATED PNEUMONIA): ICD-10-CM

## 2025-05-20 DIAGNOSIS — Z99.81 ON HOME OXYGEN THERAPY: Chronic | ICD-10-CM

## 2025-05-20 DIAGNOSIS — Z99.2 ESRD ON HEMODIALYSIS (HCC): ICD-10-CM

## 2025-05-20 DIAGNOSIS — M79.671 PAIN IN RIGHT FOOT: ICD-10-CM

## 2025-05-20 DIAGNOSIS — E11.42 DIABETIC POLYNEUROPATHY ASSOCIATED WITH TYPE 2 DIABETES MELLITUS (HCC): ICD-10-CM

## 2025-05-20 DIAGNOSIS — G89.29 CHRONIC ABDOMINAL PAIN: ICD-10-CM

## 2025-05-20 DIAGNOSIS — T84.84XA PAINFUL ORTHOPAEDIC HARDWARE (HCC): ICD-10-CM

## 2025-05-20 DIAGNOSIS — R78.81 POSITIVE BLOOD CULTURE: ICD-10-CM

## 2025-05-20 DIAGNOSIS — N18.6 ESRD ON HEMODIALYSIS (HCC): ICD-10-CM

## 2025-05-20 DIAGNOSIS — Z47.89 ORTHOPEDIC AFTERCARE: ICD-10-CM

## 2025-05-20 DIAGNOSIS — R50.9 FEVER, UNKNOWN ORIGIN: ICD-10-CM

## 2025-05-20 DIAGNOSIS — D68.69 SECONDARY HYPERCOAGULABLE STATE (HCC): ICD-10-CM

## 2025-05-20 DIAGNOSIS — J90 BILATERAL PLEURAL EFFUSION: ICD-10-CM

## 2025-05-20 DIAGNOSIS — R91.8 BILATERAL PULMONARY INFILTRATES ON CHEST X-RAY: ICD-10-CM

## 2025-05-20 LAB
ALBUMIN SERPL BCP-MCNC: 3.5 G/DL (ref 3.2–4.9)
ALBUMIN/GLOB SERPL: 1.3 G/DL
ALP SERPL-CCNC: 170 U/L (ref 30–99)
ALT SERPL-CCNC: 6 U/L (ref 2–50)
AMYLASE FLD-CCNC: 25 U/L
ANION GAP SERPL CALC-SCNC: 10 MMOL/L (ref 7–16)
APPEARANCE FLD: NORMAL
AST SERPL-CCNC: 14 U/L (ref 12–45)
BASOPHILS # BLD AUTO: 1.2 % (ref 0–1.8)
BASOPHILS # BLD: 0.05 K/UL (ref 0–0.12)
BILIRUB SERPL-MCNC: 0.5 MG/DL (ref 0.1–1.5)
BODY FLD TYPE: NORMAL
BUN SERPL-MCNC: 23 MG/DL (ref 8–22)
CALCIUM ALBUM COR SERPL-MCNC: 9 MG/DL (ref 8.5–10.5)
CALCIUM SERPL-MCNC: 8.6 MG/DL (ref 8.5–10.5)
CHLORIDE SERPL-SCNC: 92 MMOL/L (ref 96–112)
CO2 SERPL-SCNC: 32 MMOL/L (ref 20–33)
COLOR FLD: YELLOW
CREAT SERPL-MCNC: 2.88 MG/DL (ref 0.5–1.4)
CSF COMMENTS 1658: NORMAL
EKG IMPRESSION: NORMAL
EOSINOPHIL # BLD AUTO: 0.25 K/UL (ref 0–0.51)
EOSINOPHIL NFR BLD: 6 % (ref 0–6.9)
ERYTHROCYTE [DISTWIDTH] IN BLOOD BY AUTOMATED COUNT: 54 FL (ref 35.9–50)
FUNGUS SPEC FUNGUS STN: NORMAL
GFR SERPLBLD CREATININE-BSD FMLA CKD-EPI: 23 ML/MIN/1.73 M 2
GLOBULIN SER CALC-MCNC: 2.7 G/DL (ref 1.9–3.5)
GLUCOSE BLD STRIP.AUTO-MCNC: 101 MG/DL (ref 65–99)
GLUCOSE BLD STRIP.AUTO-MCNC: 117 MG/DL (ref 65–99)
GLUCOSE BLD STRIP.AUTO-MCNC: 118 MG/DL (ref 65–99)
GLUCOSE BLD STRIP.AUTO-MCNC: 165 MG/DL (ref 65–99)
GLUCOSE FLD-MCNC: 129 MG/DL
GLUCOSE SERPL-MCNC: 111 MG/DL (ref 65–99)
GRAM STN SPEC: NORMAL
HCT VFR BLD AUTO: 29.3 % (ref 42–52)
HGB BLD-MCNC: 9 G/DL (ref 14–18)
IMM GRANULOCYTES # BLD AUTO: 0.01 K/UL (ref 0–0.11)
IMM GRANULOCYTES NFR BLD AUTO: 0.2 % (ref 0–0.9)
INR PPP: 1.21 (ref 0.87–1.13)
LDH FLD L TO P-CCNC: 52 U/L
LYMPHOCYTES # BLD AUTO: 0.69 K/UL (ref 1–4.8)
LYMPHOCYTES NFR BLD: 16.6 % (ref 22–41)
LYMPHOCYTES NFR FLD: 44 %
MCH RBC QN AUTO: 27.9 PG (ref 27–33)
MCHC RBC AUTO-ENTMCNC: 30.7 G/DL (ref 32.3–36.5)
MCV RBC AUTO: 90.7 FL (ref 81.4–97.8)
MONOCYTES # BLD AUTO: 0.55 K/UL (ref 0–0.85)
MONOCYTES NFR BLD AUTO: 13.2 % (ref 0–13.4)
MONOS+MACROS NFR FLD MANUAL: 56 %
NEUTROPHILS # BLD AUTO: 2.61 K/UL (ref 1.82–7.42)
NEUTROPHILS NFR BLD: 62.8 % (ref 44–72)
NEUTROPHILS NFR FLD: 0 %
NRBC # BLD AUTO: 0 K/UL
NRBC BLD-RTO: 0 /100 WBC (ref 0–0.2)
NT-PROBNP SERPL IA-MCNC: ABNORMAL PG/ML (ref 0–125)
NUC CELL # FLD: 58 CELLS/UL
PH FLD: 7 [PH]
PLATELET # BLD AUTO: 121 K/UL (ref 164–446)
PMV BLD AUTO: 11.6 FL (ref 9–12.9)
POTASSIUM SERPL-SCNC: 3.7 MMOL/L (ref 3.6–5.5)
PROT FLD-MCNC: 2.6 G/DL
PROT SERPL-MCNC: 6.2 G/DL (ref 6–8.2)
PROTHROMBIN TIME: 15.3 SEC (ref 12–14.6)
PTH-INTACT SERPL-MCNC: 140 PG/ML (ref 14–72)
RBC # BLD AUTO: 3.23 M/UL (ref 4.7–6.1)
RBC # FLD: <2000 CELLS/UL
SIGNIFICANT IND 70042: NORMAL
SIGNIFICANT IND 70042: NORMAL
SITE SITE: NORMAL
SITE SITE: NORMAL
SODIUM SERPL-SCNC: 134 MMOL/L (ref 135–145)
SOURCE SOURCE: NORMAL
SOURCE SOURCE: NORMAL
WBC # BLD AUTO: 4.2 K/UL (ref 4.8–10.8)

## 2025-05-20 PROCEDURE — 88112 CYTOPATH CELL ENHANCE TECH: CPT | Performed by: PATHOLOGY

## 2025-05-20 PROCEDURE — 99285 EMERGENCY DEPT VISIT HI MDM: CPT

## 2025-05-20 PROCEDURE — 770020 HCHG ROOM/CARE - TELE (206)

## 2025-05-20 PROCEDURE — 36415 COLL VENOUS BLD VENIPUNCTURE: CPT

## 2025-05-20 PROCEDURE — 82962 GLUCOSE BLOOD TEST: CPT | Mod: 91

## 2025-05-20 PROCEDURE — 87102 FUNGUS ISOLATION CULTURE: CPT

## 2025-05-20 PROCEDURE — 85610 PROTHROMBIN TIME: CPT

## 2025-05-20 PROCEDURE — 88112 CYTOPATH CELL ENHANCE TECH: CPT | Mod: 26 | Performed by: PATHOLOGY

## 2025-05-20 PROCEDURE — 82945 GLUCOSE OTHER FLUID: CPT

## 2025-05-20 PROCEDURE — 700102 HCHG RX REV CODE 250 W/ 637 OVERRIDE(OP): Performed by: STUDENT IN AN ORGANIZED HEALTH CARE EDUCATION/TRAINING PROGRAM

## 2025-05-20 PROCEDURE — 83970 ASSAY OF PARATHORMONE: CPT

## 2025-05-20 PROCEDURE — A9270 NON-COVERED ITEM OR SERVICE: HCPCS | Performed by: STUDENT IN AN ORGANIZED HEALTH CARE EDUCATION/TRAINING PROGRAM

## 2025-05-20 PROCEDURE — 93005 ELECTROCARDIOGRAM TRACING: CPT | Mod: TC | Performed by: STUDENT IN AN ORGANIZED HEALTH CARE EDUCATION/TRAINING PROGRAM

## 2025-05-20 PROCEDURE — 83615 LACTATE (LD) (LDH) ENZYME: CPT

## 2025-05-20 PROCEDURE — 84157 ASSAY OF PROTEIN OTHER: CPT

## 2025-05-20 PROCEDURE — 88305 TISSUE EXAM BY PATHOLOGIST: CPT | Mod: 26 | Performed by: PATHOLOGY

## 2025-05-20 PROCEDURE — 99223 1ST HOSP IP/OBS HIGH 75: CPT | Mod: AI | Performed by: STUDENT IN AN ORGANIZED HEALTH CARE EDUCATION/TRAINING PROGRAM

## 2025-05-20 PROCEDURE — 89051 BODY FLUID CELL COUNT: CPT

## 2025-05-20 PROCEDURE — 87116 MYCOBACTERIA CULTURE: CPT

## 2025-05-20 PROCEDURE — 87015 SPECIMEN INFECT AGNT CONCNTJ: CPT

## 2025-05-20 PROCEDURE — 0W993ZZ DRAINAGE OF RIGHT PLEURAL CAVITY, PERCUTANEOUS APPROACH: ICD-10-PCS

## 2025-05-20 PROCEDURE — 87070 CULTURE OTHR SPECIMN AEROBIC: CPT

## 2025-05-20 PROCEDURE — 82150 ASSAY OF AMYLASE: CPT

## 2025-05-20 PROCEDURE — 85025 COMPLETE CBC W/AUTO DIFF WBC: CPT

## 2025-05-20 PROCEDURE — 71045 X-RAY EXAM CHEST 1 VIEW: CPT

## 2025-05-20 PROCEDURE — 88305 TISSUE EXAM BY PATHOLOGIST: CPT | Performed by: PATHOLOGY

## 2025-05-20 PROCEDURE — 83880 ASSAY OF NATRIURETIC PEPTIDE: CPT

## 2025-05-20 PROCEDURE — 83986 ASSAY PH BODY FLUID NOS: CPT

## 2025-05-20 PROCEDURE — C1729 CATH, DRAINAGE: HCPCS

## 2025-05-20 PROCEDURE — 80053 COMPREHEN METABOLIC PANEL: CPT

## 2025-05-20 PROCEDURE — 87205 SMEAR GRAM STAIN: CPT | Mod: 91

## 2025-05-20 RX ORDER — ATORVASTATIN CALCIUM 20 MG/1
20 TABLET, FILM COATED ORAL DAILY
Status: DISCONTINUED | OUTPATIENT
Start: 2025-05-20 | End: 2025-05-22 | Stop reason: HOSPADM

## 2025-05-20 RX ORDER — DEXTROSE MONOHYDRATE 25 G/50ML
25 INJECTION, SOLUTION INTRAVENOUS
Status: DISCONTINUED | OUTPATIENT
Start: 2025-05-20 | End: 2025-05-21

## 2025-05-20 RX ORDER — INSULIN LISPRO 100 [IU]/ML
1-6 INJECTION, SOLUTION INTRAVENOUS; SUBCUTANEOUS EVERY 6 HOURS
Status: DISCONTINUED | OUTPATIENT
Start: 2025-05-20 | End: 2025-05-21

## 2025-05-20 RX ORDER — ONDANSETRON 4 MG/1
4 TABLET, ORALLY DISINTEGRATING ORAL EVERY 4 HOURS PRN
Status: DISCONTINUED | OUTPATIENT
Start: 2025-05-20 | End: 2025-05-22 | Stop reason: HOSPADM

## 2025-05-20 RX ORDER — HYDROMORPHONE HYDROCHLORIDE 1 MG/ML
0.25 INJECTION, SOLUTION INTRAMUSCULAR; INTRAVENOUS; SUBCUTANEOUS
Status: DISCONTINUED | OUTPATIENT
Start: 2025-05-20 | End: 2025-05-22 | Stop reason: HOSPADM

## 2025-05-20 RX ORDER — MULTIVIT WITH MINERALS/LUTEIN
1000 TABLET ORAL DAILY
Status: ON HOLD | COMMUNITY
End: 2025-05-22

## 2025-05-20 RX ORDER — AMLODIPINE BESYLATE 10 MG/1
10 TABLET ORAL 2 TIMES DAILY
Status: DISCONTINUED | OUTPATIENT
Start: 2025-05-20 | End: 2025-05-22 | Stop reason: HOSPADM

## 2025-05-20 RX ORDER — METOPROLOL TARTRATE 50 MG
100 TABLET ORAL 2 TIMES DAILY
Status: DISCONTINUED | OUTPATIENT
Start: 2025-05-20 | End: 2025-05-22 | Stop reason: HOSPADM

## 2025-05-20 RX ORDER — ACETAMINOPHEN 325 MG/1
650 TABLET ORAL EVERY 6 HOURS PRN
Status: DISCONTINUED | OUTPATIENT
Start: 2025-05-20 | End: 2025-05-22 | Stop reason: HOSPADM

## 2025-05-20 RX ORDER — ONDANSETRON 2 MG/ML
4 INJECTION INTRAMUSCULAR; INTRAVENOUS EVERY 4 HOURS PRN
Status: DISCONTINUED | OUTPATIENT
Start: 2025-05-20 | End: 2025-05-22 | Stop reason: HOSPADM

## 2025-05-20 RX ORDER — LABETALOL HYDROCHLORIDE 5 MG/ML
10 INJECTION, SOLUTION INTRAVENOUS EVERY 4 HOURS PRN
Status: DISCONTINUED | OUTPATIENT
Start: 2025-05-20 | End: 2025-05-22 | Stop reason: HOSPADM

## 2025-05-20 RX ORDER — PAROXETINE 20 MG/1
40 TABLET, FILM COATED ORAL DAILY
Status: DISCONTINUED | OUTPATIENT
Start: 2025-05-20 | End: 2025-05-22 | Stop reason: HOSPADM

## 2025-05-20 RX ORDER — OXYCODONE HYDROCHLORIDE 5 MG/1
5 TABLET ORAL
Refills: 0 | Status: DISCONTINUED | OUTPATIENT
Start: 2025-05-20 | End: 2025-05-22 | Stop reason: HOSPADM

## 2025-05-20 RX ORDER — OXYCODONE HYDROCHLORIDE 5 MG/1
2.5 TABLET ORAL
Refills: 0 | Status: DISCONTINUED | OUTPATIENT
Start: 2025-05-20 | End: 2025-05-22 | Stop reason: HOSPADM

## 2025-05-20 RX ADMIN — AMLODIPINE BESYLATE 10 MG: 10 TABLET ORAL at 21:11

## 2025-05-20 RX ADMIN — INSULIN LISPRO 1 UNITS: 100 INJECTION, SOLUTION INTRAVENOUS; SUBCUTANEOUS at 23:32

## 2025-05-20 RX ADMIN — PAROXETINE HYDROCHLORIDE 40 MG: 20 TABLET, FILM COATED ORAL at 08:29

## 2025-05-20 RX ADMIN — ATORVASTATIN CALCIUM 20 MG: 20 TABLET, FILM COATED ORAL at 08:29

## 2025-05-20 RX ADMIN — METOPROLOL TARTRATE 100 MG: 50 TABLET, FILM COATED ORAL at 18:17

## 2025-05-20 RX ADMIN — AMLODIPINE BESYLATE 10 MG: 10 TABLET ORAL at 08:29

## 2025-05-20 RX ADMIN — METOPROLOL TARTRATE 100 MG: 50 TABLET, FILM COATED ORAL at 08:28

## 2025-05-20 SDOH — ECONOMIC STABILITY: TRANSPORTATION INSECURITY
IN THE PAST 12 MONTHS, HAS THE LACK OF TRANSPORTATION KEPT YOU FROM MEDICAL APPOINTMENTS OR FROM GETTING MEDICATIONS?: NO

## 2025-05-20 SDOH — ECONOMIC STABILITY: TRANSPORTATION INSECURITY
IN THE PAST 12 MONTHS, HAS LACK OF RELIABLE TRANSPORTATION KEPT YOU FROM MEDICAL APPOINTMENTS, MEETINGS, WORK OR FROM GETTING THINGS NEEDED FOR DAILY LIVING?: NO

## 2025-05-20 ASSESSMENT — SOCIAL DETERMINANTS OF HEALTH (SDOH)
WITHIN THE LAST YEAR, HAVE YOU BEEN HUMILIATED OR EMOTIONALLY ABUSED IN OTHER WAYS BY YOUR PARTNER OR EX-PARTNER?: NO
WITHIN THE LAST YEAR, HAVE TO BEEN RAPED OR FORCED TO HAVE ANY KIND OF SEXUAL ACTIVITY BY YOUR PARTNER OR EX-PARTNER?: NO
WITHIN THE LAST YEAR, HAVE YOU BEEN AFRAID OF YOUR PARTNER OR EX-PARTNER?: NO
WITHIN THE PAST 12 MONTHS, YOU WORRIED THAT YOUR FOOD WOULD RUN OUT BEFORE YOU GOT THE MONEY TO BUY MORE: NEVER TRUE
WITHIN THE LAST YEAR, HAVE YOU BEEN KICKED, HIT, SLAPPED, OR OTHERWISE PHYSICALLY HURT BY YOUR PARTNER OR EX-PARTNER?: NO
WITHIN THE PAST 12 MONTHS, THE FOOD YOU BOUGHT JUST DIDN'T LAST AND YOU DIDN'T HAVE MONEY TO GET MORE: NEVER TRUE
IN THE PAST 12 MONTHS, HAS THE ELECTRIC, GAS, OIL, OR WATER COMPANY THREATENED TO SHUT OFF SERVICE IN YOUR HOME?: NO

## 2025-05-20 ASSESSMENT — COGNITIVE AND FUNCTIONAL STATUS - GENERAL
MOVING TO AND FROM BED TO CHAIR: A LOT
DRESSING REGULAR UPPER BODY CLOTHING: A LOT
CLIMB 3 TO 5 STEPS WITH RAILING: TOTAL
SUGGESTED CMS G CODE MODIFIER MOBILITY: CL
SUGGESTED CMS G CODE MODIFIER DAILY ACTIVITY: CL
PERSONAL GROOMING: A LOT
EATING MEALS: A LITTLE
TOILETING: A LOT
HELP NEEDED FOR BATHING: A LOT
WALKING IN HOSPITAL ROOM: TOTAL
MOBILITY SCORE: 10
DAILY ACTIVITIY SCORE: 13
TURNING FROM BACK TO SIDE WHILE IN FLAT BAD: A LOT
DRESSING REGULAR LOWER BODY CLOTHING: A LOT
MOVING FROM LYING ON BACK TO SITTING ON SIDE OF FLAT BED: A LOT
STANDING UP FROM CHAIR USING ARMS: A LOT

## 2025-05-20 ASSESSMENT — LIFESTYLE VARIABLES
TOTAL SCORE: 0
EVER HAD A DRINK FIRST THING IN THE MORNING TO STEADY YOUR NERVES TO GET RID OF A HANGOVER: NO
DOES PATIENT WANT TO STOP DRINKING: CANNOT ASSESS
HAVE YOU EVER FELT YOU SHOULD CUT DOWN ON YOUR DRINKING: NO
EVER FELT BAD OR GUILTY ABOUT YOUR DRINKING: NO
ALCOHOL_USE: NO
TOTAL SCORE: 0
TOTAL SCORE: 0
CONSUMPTION TOTAL: NEGATIVE
HOW MANY TIMES IN THE PAST YEAR HAVE YOU HAD 5 OR MORE DRINKS IN A DAY: 0
HAVE PEOPLE ANNOYED YOU BY CRITICIZING YOUR DRINKING: NO
ON A TYPICAL DAY WHEN YOU DRINK ALCOHOL HOW MANY DRINKS DO YOU HAVE: 0
AVERAGE NUMBER OF DAYS PER WEEK YOU HAVE A DRINK CONTAINING ALCOHOL: 0

## 2025-05-20 ASSESSMENT — FIBROSIS 4 INDEX
FIB4 SCORE: 1.527777777777777778
FIB4 SCORE: 3.12

## 2025-05-20 ASSESSMENT — PAIN DESCRIPTION - PAIN TYPE
TYPE: ACUTE PAIN

## 2025-05-20 NOTE — CARE PLAN
The patient is Stable - Low risk of patient condition declining or worsening    Shift Goals  Clinical Goals: monitor O2 requirements, safety, maintain skin integrity  Patient Goals: rest, sleep    Progress made toward(s) clinical / shift goals:    Problem: Knowledge Deficit - Standard  Goal: Patient and family/care givers will demonstrate understanding of plan of care, disease process/condition, diagnostic tests and medications  Outcome: Progressing     Problem: Skin Integrity  Goal: Skin integrity is maintained or improved  Outcome: Progressing     Problem: Fall Risk  Goal: Patient will remain free from falls  Outcome: Progressing       Patient is not progressing towards the following goals:

## 2025-05-20 NOTE — ED TRIAGE NOTES
"Chief Complaint   Patient presents with    Sent by MD    Shortness of Breath     BIBA transfer from [facility] for SOB with associated chest pain. AAOx4. Denies other complaints     BP (!) 191/86   Pulse 77   Temp 36.6 °C (97.8 °F) (Temporal)   Resp 16   Ht 1.702 m (5' 7\")   Wt 61 kg (134 lb 7.7 oz)   SpO2 97%   BMI 21.06 kg/m²     "

## 2025-05-20 NOTE — ED NOTES
Call to US. Will expedite pt. Will call RN back when staff is ready. Advised they only perform one side per day.

## 2025-05-20 NOTE — PROGRESS NOTES
Patient arrived as [facility] bed via transport.     Fall Risk Score: HIGH RISK  Fall risk interventions in place: Place yellow fall risk ID band on patient, Provide patient/family education based on risk assessment, Educate patient/family to call staff for assistance when getting out of bed, Place fall precaution signage outside patient door, Utilize bed/chair fall alarm, Notify charge of high risk for huddle, and Bed alarm connected correctly  Bed type: Regular (Ashok Score less than 17 interventions in place)  Patient on cardiac monitor: Yes  IVF/IV medications: Not Applicable   Oxygen: How many liters 3L, Traced the line to wall oxygen, and No oxygen tank in room  Bedside sitter: Not Applicable   Isolation: Not applicable

## 2025-05-20 NOTE — ASSESSMENT & PLAN NOTE
Noted to have hypoxemia, above 90% with nasal cannula  Chest x-ray at outside hospital showing bilateral pleural effusions, left side greater than right.  Had previous thoracentesis in the past, negative for malignancy. This is the second episode  S/p thoracentesis right side 5/20.  Plan for left-sided thoracentesis today.  Check echocardiogram.  Follow studies.  Elevate head of bed  Oxygen as needed, keep O2 >90%

## 2025-05-20 NOTE — CONSULTS
".[facility] -  CONSULTATION NOTE               Author: HAYDEN Salazar Date & Time: 5/20/2025  2:40 PM       REASON FOR CONSULTATION:   - Evaluation and management of acute and chronic conditions related to Nephrology    CHIEF COMPLAINT:   -  \"Shortness of Breath\"    HISTORY OF PRESENT ILLNESS:    65 yo M PMH ESRD MWF iHD in [place] at [facility], HTN, Type 2 DM, anemia of CKD, and CKD-MBD who presents to [facility] after presenting to [facility] ER with shortness of breath associated with Left-sided rib pain that worsens with deep inspiration. On arrival to ED he was tachypneic on 5L NC. He reports he has not missed any HD recently, last tx was MON.  He hasn't been eating or drinking well due to poor appetite.  CXR at OSH showed bilateral pleural effusions, Left greater than right. Resting in bed comfortably on 3L NC.   Labs revealed hemoglobin 10.3, sodium 137, K 5.0, glucose 208, phos 1.9, Mg 2.7, BNP 65828.  Ultrasound-guided Right sided thoracocentesis performed in the ED with 1.7L fluid removed.     We have been consulted for acute and chronic nephrology needs and hemodialysis management.      No F/C/N/V/CP.  No melena, hematochezia, hematemesis.  No HA, visual changes, or abdominal pain.    REVIEW OF SYSTEMS:    10 point ROS was performed and is as per HPI or otherwise negative    PAST MEDICAL HISTORY:   Past Medical History[1]    PAST SURGICAL HISTORY:   Past Surgical History[2]    FAMILY HISTORY:   - Reviewed and non contributory to current illness    SOCIAL HISTORY:   Tobacco Use History[3]  Social History     Substance and Sexual Activity   Alcohol Use No    Alcohol/week: 0.0 oz     Social History     Substance and Sexual Activity   Drug Use No        HOME MEDICATIONS:   - Reviewed and documented in chart    LABORATORY STUDIES:   - Reviewed and documented in chart    ALLERGIES:  Aspirin    VS:  /65   Pulse 63   Temp 36.2 °C (97.2 °F) (Temporal)   Resp 18   Ht " "1.702 m (5' 7\")   Wt 61 kg (134 lb 7.7 oz)   SpO2 93%   BMI 21.06 kg/m²   Physical Exam  Nursing note reviewed.   Constitutional:       Appearance: Normal appearance.   Eyes:      General: No scleral icterus.  Cardiovascular:      Rate and Rhythm: Normal rate.      Comments: No edema      +LFA AVF +b/t aneurysmal   Pulmonary:      Effort: Pulmonary effort is normal. No respiratory distress.      Breath sounds: Normal breath sounds. No stridor. No wheezing, rhonchi or rales.      Comments:  diminished   Abdominal:      General: There is no distension.   Musculoskeletal:         General: No deformity.      Right lower leg: No edema.      Left lower leg: No edema.   Skin:     Findings: No rash.   Neurological:      General: No focal deficit present.      Mental Status: He is alert.   Psychiatric:         Mood and Affect: Mood normal.         Behavior: Behavior normal. Behavior is cooperative.         FLUID BALANCE:  No intake/output data recorded.    LABS:  Recent Labs     05/20/25  0444   SODIUM 134*   POTASSIUM 3.7   CHLORIDE 92*   CO2 32   GLUCOSE 111*   BUN 23*   CREATININE 2.88*   CALCIUM 8.6     Recent Labs     05/20/25  0444   WBC 4.2*   RBC 3.23*   HEMOGLOBIN 9.0*   HEMATOCRIT 29.3*   MCV 90.7   MCH 27.9   MCHC 30.7*   RDW 54.0*   PLATELETCT 121*   MPV 11.6       IMAGING:  - All imaging reviewed from admission to present day    IMPRESSION:  # ESRD  - Etiology likely 2/2 HTN/DM  - MWF iHD at [facility]  - via AVF(+thrill/bruit)  # Acute resp on chronic resp failure with hypoxia  - likely fluid contributing   # Pleural effusion   - s/p thoracentesis (5/19) 1.7 L off  - hx thoracentesis 05/2025, (-) for malignancy  # HTN  - Goal BP < 140/90  - At goal  # Anemia of CKD  - Goal Hgb 10-11  - At goal  # CKD-MBD  - Phos pen  - PTH pen  - Vit D pen  - Cca 9.0  # Type II diabetes   # a fib   - On eliquis   # Hyponatremia, mild  - volume contributing  - Correct with HD   # thrombocytopenia  - monitor           "   PLAN:  - HD tomorrow (WED), con MWF iHD and PRN   - UF as tolerated  - No dietary protein restrictions  - Dose all meds per ESRD  - Renal diet   - F/u thoracentesis fluid       Thank you for the consultation!          [1]   Past Medical History:  Diagnosis Date    Anemia     Blind in both eyes     Diabetes (HCC)     insulin dependent    Dialysis patient ([facility])     MWF, [facility]    ESRD (end stage renal disease) ([facility])     Dr. Haskins    High cholesterol     Hyperlipemia     Hypertension     Oxygen dependent     2 liters, [facility]    Snoring     no sleep study   [2]   Past Surgical History:  Procedure Laterality Date    CERVICAL DISK AND FUSION ANTERIOR  7/12/2022    Procedure: STAGE 1- ANTERIOR C5 CORPECTOMY, C4-6 FUSION STAGE 2- POSTERIOR C3-T1 LAMINECTOMY AND FUSION;  Surgeon: Ady Barr M.D.;  Location: [facility];  Service: Neurosurgery    CERVICAL FUSION POSTERIOR  7/12/2022    Procedure: FUSION, SPINE, CERVICAL, POSTERIOR APPROACH;  Surgeon: Ady Barr M.D.;  Location: [facility];  Service: Neurosurgery    CORPECTOMY  7/12/2022    Procedure: CORPECTOMY, SPINE;  Surgeon: Ady Barr M.D.;  Location: SURGERY [facility];  Service: Neurosurgery    CERVICAL LAMINECTOMY POSTERIOR  7/12/2022    Procedure: LAMINECTOMY, SPINE, CERVICAL, POSTERIOR APPROACH;  Surgeon: Ady Barr M.D.;  Location: [facility];  Service: Neurosurgery    RECOVERY  4/19/2016    Procedure: VASCULAR CASE-ILEANA-LEFT ARM FISTULOGRAM WITH ANGIOPLASTY 35098, 52603, 30046-PAN STAGE RENAL DISEASE N18.6;  Surgeon: Perfecto Surgery;  Location: SURGERY [facility] PROC UN[facility];  Service:     RECOVERY  2/24/2016    Procedure: IR1 VASCULAR CASE LEFT ARM FISTULOGRAM WITH ANGIOPLASTY;  Surgeon: Perfecto Surgery;  Location: SURGERY PRE-POST PROC UNIT [facility];  Service:     CATH PLACEMENT Right 12/10/2015    Procedure: CATH PLACEMENT;  Surgeon: Lorene Baldwin M.D.;  Location: SURGERY  [facility];  Service:     AV FISTULA CREATION Left 12/10/2015    Procedure: AV FISTULA CREATION;  Surgeon: Loreen Baldwin M.D.;  Location: SURGERY [facility];  Service:     CT CATARACT SURG W/IOL 1 STAGE WO ENDO  10/21/15    OTHER  9/22/15    trabecular micro bypass stent- right eye    OTHER  7/2015    take out blood of left eye    OTHER  3/2015    take blood out of eye    [3]   Social History  Tobacco Use   Smoking Status Never   Smokeless Tobacco Never

## 2025-05-20 NOTE — ED NOTES
Med Rec completed  per patient luis angel Kinsey over the phone     Allergies reviewed: yes     Oral antibiotics in the past 30 days: no    Anticoagulant in past 14 days: no  Patient stopped taking Eliquis 2.5 mg on the 1st of April due to surgery. Per Son patient has not restated.     Dispense history available in EPIC: partial    Pharmacy patient utilizes: [facility]  [phone]    Patient receives Dialysis at [facility] [phone] on Mon, Wed, Fri. Last treatment Monday 05/19/2025 @ 0600  Unknown last dose of Mircera

## 2025-05-20 NOTE — ED PROVIDER NOTES
ED Provider Note    CHIEF COMPLAINT  Chief Complaint   Patient presents with    Sent by MD    Shortness of Breath     BIBA transfer from [facility] for SOB with associated chest pain. AAOx4. Denies other complaints       LIMITATION TO HISTORY   Select: None    HPI    Pj Britt is a 64 y.o. male who presents to the Emergency Department for evaluation of shortness of breath onset 1 week ago. Per his other doctor, the patient has fluid around his lungs, and his oxygen requirements have increased from a baseline of 3 L. Endorses Eliquis. Patient has been receiving dialysis treatments as scheduled without missing appointments (MWF).    OUTSIDE HISTORIAN(S):  Select: None    EXTERNAL RECORDS REVIEWED  Select: Patient underwent a right lower extremity external fixator removal and ankle manipulation under anesthesia on 4/1/25.    PAST MEDICAL HISTORY  Past Medical History[1]    SURGICAL HISTORY  Past Surgical History[2]    FAMILY HISTORY  Family History   Problem Relation Age of Onset    Alzheimer's Disease Mother 80        Lives in [place]    Hypertension Father     Diabetes Father     Diabetes Brother         SOCIAL HISTORY  Social History     Socioeconomic History    Marital status:      Spouse name: Not on file    Number of children: Not on file    Years of education: Not on file    Highest education level: Not on file   Occupational History    Not on file   Tobacco Use    Smoking status: Never    Smokeless tobacco: Never   Vaping Use    Vaping status: Never Used   Substance and Sexual Activity    Alcohol use: No     Alcohol/week: 0.0 oz    Drug use: No    Sexual activity: Not on file   Other Topics Concern     Service Not Asked    Blood Transfusions Not Asked    Caffeine Concern Not Asked    Occupational Exposure Not Asked    Hobby Hazards Not Asked    Sleep Concern Not Asked    Stress Concern Not Asked    Weight Concern No    Special Diet Yes    Back Care Not Asked    Exercise Not  "Asked    Bike Helmet Not Asked    Seat Belt Not Asked    Self-Exams Not Asked   Social History Narrative    Not on file     Social Drivers of Health     Financial Resource Strain: Not on file   Food Insecurity: Not on file (2/12/2024)   Recent Concern: Food Insecurity - High Risk (2/12/2024)    Received from [facility]    [facility] Food Insecurity     In the last month, did you feel there was not enough money for food?: Yes   Transportation Needs: Not on file (2/12/2024)   Recent Concern: Transportation Needs - High Risk (2/12/2024)    Received from [facility]    [facility] Transportation Needs     In the last month, have you not seen a doctor because you didn't have a way to get to the clinic or hospital?: Yes   Physical Activity: Not on file   Stress: Not on file   Social Connections: Not on file   Intimate Partner Violence: Not At Risk (5/24/2024)    Humiliation, Afraid, Rape, and Kick questionnaire     Fear of Current or Ex-Partner: No     Emotionally Abused: No     Physically Abused: No     Sexually Abused: No   Housing Stability: Not on file (2/12/2024)   Recent Concern: Housing Stability - High Risk (2/12/2024)    Received from [facility]    [facility] Housing Needs     In the last month, was there a time when you were not able to pay your mortgage or rent?: Yes     In the last month, have you slept outside, in a shelter, in a car, or any place not meant for sleeping?: Not on file       CURRENT MEDICATIONS  Medications Ordered Prior to Encounter[3]    ALLERGIES  Allergies[4]    PHYSICAL EXAM  VITAL SIGNS:BP (!) 191/86   Pulse 77   Temp 36.6 °C (97.8 °F) (Temporal)   Resp 16   Ht 1.702 m (5' 7\")   Wt 61 kg (134 lb 7.7 oz)   SpO2 97%   BMI 21.06 kg/m²       GENERAL: Awake and alert, on 5 L nasal cannula  HEAD: Normocephalic and atraumatic  NECK: Normal range of motion, without meningismus  EYES: Pupils Equal, Round, Reactive to Light, extraocular movements intact, " conjunctiva white  ENT: Mucous membranes moist, oropharynx clear  PULMONARY: Normal effort, clear to auscultation  CARDIOVASCULAR: No murmurs, clicks or rubs, peripheral pulses 2+  ABDOMINAL: Soft, non-tender, no guarding or rigidity present, no pulsatile masses  BACK: no midline tenderness, no costovertebral tenderness  NEUROLOGICAL: Grossly non-focal neurological examination, speech normal, gait normal  EXTREMITIES: No edema, AV fistula  SKIN: Warm and dry.  PSYCHIATRIC: Affect is appropriate    DIAGNOSTIC STUDIES / PROCEDURES  EKG  I have independently interpreted this EKG  Results for orders placed or performed during the hospital encounter of 25   EKG   Result Value Ref Range    Report       [facility] Emergency Dept.    Test Date:  2025  Pt Name:    CARLY LIGHT                  Department: ER  MRN:        [id number]                      Room:        28  Gender:     Male                         Technician: [id number]  :        1960                   Requested By:DELPHINE SHANAKR  Order #:    [id number]                    Reading MD:    Measurements  Intervals                                Axis  Rate:       65                           P:          0  CO:         0                            QRS:        48  QRSD:       100                          T:          55  QT:         466  QTc:        485    Interpretive Statements  Atrial flutter with predominant 4:1 AV block  Probable anteroseptal infarct, old  Compared to ECG 2024 08:56:17  Myocardial infarct finding now present  ST (T wave) deviation no longer present          LABS  Labs Reviewed   COMP METABOLIC PANEL - Abnormal; Notable for the following components:       Result Value    Sodium 134 (*)     Chloride 92 (*)     Glucose 111 (*)     Bun 23 (*)     Creatinine 2.88 (*)     Alkaline Phosphatase 170 (*)     All other components within normal limits   CBC WITH DIFFERENTIAL - Abnormal; Notable for the following  components:    WBC 4.2 (*)     RBC 3.23 (*)     Hemoglobin 9.0 (*)     Hematocrit 29.3 (*)     MCHC 30.7 (*)     RDW 54.0 (*)     Platelet Count 121 (*)     Lymphocytes 16.60 (*)     Lymphs (Absolute) 0.69 (*)     All other components within normal limits   PROBRAIN NATRIURETIC PEPTIDE, NT - Abnormal; Notable for the following components:    NT-proBNP 80289 (*)     All other components within normal limits   ESTIMATED GFR - Abnormal; Notable for the following components:    GFR (CKD-EPI) 23 (*)     All other components within normal limits   PROTHROMBIN TIME - Abnormal; Notable for the following components:    PT 15.3 (*)     INR 1.21 (*)     All other components within normal limits     All labs reviewed by me.     RADIOLOGY  I have independently interpreted the diagnostic imaging associated with this visit and am waiting the final reading from the radiologist.   My preliminary interpretation is as follows: pleural effusion    Formal Radiologist interpretation:  DX-OUTSIDE IMAGES-DX CHEST   Final Result      US-THORACENTESIS PUNCTURE LEFT    (Results Pending)   US-THORACENTESIS PUNCTURE RIGHT    (Results Pending)       COURSE & MEDICAL DECISION MAKING    ED COURSE:    INTERVENTIONS BY ME:  Medications   acetaminophen (Tylenol) tablet 650 mg (has no administration in time range)   labetalol (Normodyne/Trandate) injection 10 mg (has no administration in time range)   ondansetron (Zofran) syringe/vial injection 4 mg (has no administration in time range)     Or   ondansetron (Zofran ODT) dispertab 4 mg (has no administration in time range)   amLODIPine (Norvasc) tablet 10 mg (has no administration in time range)   atorvastatin (Lipitor) tablet 20 mg (has no administration in time range)   metoprolol tartrate (Lopressor) tablet 100 mg (has no administration in time range)   paroxetine (Paxil) TABS 40 mg (has no administration in time range)   insulin lispro (HumaLOG,AdmeLOG) subcutaneous injection (has no administration  in time range)     And   dextrose 50 % (D50W) injection 25 g (has no administration in time range)   oxyCODONE immediate-release (Roxicodone) tablet 2.5 mg (has no administration in time range)     Or   oxyCODONE immediate-release (Roxicodone) tablet 5 mg (has no administration in time range)     Or   HYDROmorphone (Dilaudid) injection 0.25 mg (has no administration in time range)       Response on recheck:    4:02 AM - Patient seen and examined at bedside.     4:37 AM - Performed ultrasound at bedside. Discussed the plan for admission, patient is agreeable.    4:37 AM I discussed the patient's case and the above findings with Dr. Sorto    INITIAL ASSESSMENT, COURSE AND PLAN  Care Narrative: Discussed thoracocentesis, but patient is on Eliquis. He is not sure which other medications he is taking.    This 64-year-old dialysis-dependent male with extensive comorbidities including ESRD, insulin-dependent diabetes, and chronic anticoagulation presented with progressive dyspnea and increasing oxygen needs. He was found to have significant bilateral pleural effusions contributing to acute on chronic hypoxic respiratory failure. On arrival, he was tachypneic and required 5 L O2 to maintain saturation. His exam was notable for clear breath sounds without focal findings, though imaging confirmed large effusions. Labs showed anemia, thrombocytopenia, renal insufficiency consistent with baseline, and markedly elevated NT-proBNP suggesting volume overload. Thoracentesis was discussed but deferred due to ongoing anticoagulation with Eliquis. Given the potential for rapid deterioration and the complexity of his underlying disease, critical care services were initiated. He was admitted in guarded condition for further management, including close monitoring and potential procedural intervention when safe.    CRITICAL CARE  The very real possibilty of a deterioration of this patient's condition required the highest level of my  preparedness for sudden, emergent intervention.  I provided critical care services, which included medication orders, frequent reevaluations of the patient's condition and response to treatment, ordering and reviewing test results, and discussing the case with various consultants.  The critical care time associated with the care of the patient was 33 minutes. Review chart for interventions. This time is exclusive of any other billable procedures.      ADDITIONAL PROBLEM LIST      DISPOSITION AND DISCUSSIONS  Discussion of management with other [facility] or appropriate source(s): None    I have discussed management of the patient with the following physicians and JESUS's:        Escalation of care considered, and ultimately not performed:    Barriers to care at this time, including but not limited to: None.     Decision tools and prescription drugs considered including, but not limited to:     DISPOSITION:  Patient will be hospitalized by Dr. Sorto in guarded condition.     FINAL DIAGNOSIS  1. Acute on chronic respiratory failure with hypoxia (HCC)    2. Bilateral pleural effusion    3. Chronic anticoagulation        Critical care time: 33 minutes    Heidy RODRIGUEZ (Lisa), am scribing for, and in the presence of, Darian Camara.    Electronically signed by: Heidy Warner), 5/20/2025    IDarian personally performed the services described in this documentation, as scribed by Heidy Elena in my presence, and it is both accurate and complete.     Electronically signed by: Darian Camara DO ,7:00 AM 05/20/25          [1]   Past Medical History:  Diagnosis Date    Anemia     Blind in both eyes     Diabetes ([facility])     insulin dependent    Dialysis patient ([facility])     Earline KEATING    ESRD (end stage renal disease) ([facility])     Dr. Haskins    High cholesterol     Hyperlipemia     Hypertension     Oxygen dependent     2 liters, [facility]    Snoring     no sleep study   [2]   Past Surgical History:  Procedure  Laterality Date    CERVICAL DISK AND FUSION ANTERIOR  7/12/2022    Procedure: STAGE 1- ANTERIOR C5 CORPECTOMY, C4-6 FUSION STAGE 2- POSTERIOR C3-T1 LAMINECTOMY AND FUSION;  Surgeon: Ady Barr M.D.;  Location: [facility];  Service: Neurosurgery    CERVICAL FUSION POSTERIOR  7/12/2022    Procedure: FUSION, SPINE, CERVICAL, POSTERIOR APPROACH;  Surgeon: Ady Barr M.D.;  Location: [facility];  Service: Neurosurgery    CORPECTOMY  7/12/2022    Procedure: CORPECTOMY, SPINE;  Surgeon: Ady Barr M.D.;  Location: [facility];  Service: Neurosurgery    CERVICAL LAMINECTOMY POSTERIOR  7/12/2022    Procedure: LAMINECTOMY, SPINE, CERVICAL, POSTERIOR APPROACH;  Surgeon: Ady Barr M.D.;  Location: [facility];  Service: Neurosurgery    RECOVERY  4/19/2016    Procedure: VASCULAR CASE-ILEANA-LEFT ARM FISTULOGRAM WITH ANGIOPLASTY 42089, 36073, 47028-TVT STAGE RENAL DISEASE N18.6;  Surgeon: Recoveryonly Surgery;  [place] [facility]  Service:     RECOVERY  2/24/2016    Procedure: IR1 VASCULAR CASE LEFT ARM FISTULOGRAM WITH ANGIOPLASTY;  Surgeon: Recoveryonclay Surgery;  [place] [facility]  Service:     CATH PLACEMENT Right 12/10/2015    Procedure: CATH PLACEMENT;  Surgeon: Lorene Baldwin M.D.;  Location: [facility];  Service:     AV FISTULA CREATION Left 12/10/2015    Procedure: AV FISTULA CREATION;  Surgeon: Lorene Baldwin M.D.;  Location: [facility];  Service:     PA CATARACT SURG W/IOL 1 STAGE WO ENDO  10/21/15    OTHER  9/22/15    trabecular micro bypass stent- right eye    OTHER  7/2015    take out blood of left eye    OTHER  3/2015    take blood out of eye    [3]   No current facility-administered medications on file prior to encounter.     Current Outpatient Medications on File Prior to Encounter   Medication Sig Dispense Refill    cephALEXin (KEFLEX) 500 MG Cap Take 1  tablet by mouth 4 times a day for 7 days. Then 1 tablet by mouth 2 times a day for 7 days. 42 Capsule 0    hydrOXYzine pamoate (VISTARIL) 50 MG Cap Take 1 Capsule by mouth 3 times a day as needed for Itching or Other (nausea, anxiety, insomnia). 20 Capsule 1    gabapentin (NEURONTIN) 300 MG Cap Take 1 po qhs 7 Capsule 0    acetaminophen (TYLENOL) 500 MG Tab Take 2 tabs up to 3 times a day prn pain 60 Tablet 0    apixaban (ELIQUIS) 2.5mg Tab Take 1 Tablet by mouth 2 times a day. Need appt prior to further refills. 60 Tablet 0    acetaminophen (TYLENOL) 500 MG Tab Take 1.5 Tablets by mouth every 6 hours as needed for Mild Pain or Moderate Pain.      insulin NPH (HUMULIN/NOVOLIN N) 100 UNIT/ML Suspension Inject 20 Units under the skin every morning.      amLODIPine (NORVASC) 10 MG Tab Take 10 mg by mouth 2 times a day.      Methoxy PEG-Epoetin Beta (MIRCERA INJ) Inject 175 mcg as directed every 14 days. Info obtained by staff at [facility] [phone]      paroxetine (PAXIL) 40 MG tablet Take 1 Tablet by mouth every day. 90 Tablet 3    atorvastatin (LIPITOR) 20 MG Tab Take 1 Tablet by mouth every day. 30 Tablet 2    metoprolol tartrate (LOPRESSOR) 100 MG Tab Take 1 Tablet by mouth 2 times a day. 60 Tablet 2    calcium acetate (PHOS-LO) 667 MG Tab tablet Take 3 Tablets by mouth 3 times a day with meals. 810 Tablet 3   [4]   Allergies  Allergen Reactions    Aspirin Unspecified     Cannot have because on Dialysis

## 2025-05-20 NOTE — PROGRESS NOTES
4 Eyes Skin Assessment Completed by SAMAN Rosenberg and SAMAN Marcelo.    Head WDL  Ears Redness and Blanching  Nose WDL  Mouth WDL  Neck WDL  Breast/Chest Redness  Shoulder Blades WDL  Spine WDL  (R) Arm/Elbow/Hand Redness and Blanching  (L) Arm/Elbow/Hand Redness and Blanching Fistula to L arm  Abdomen Redness  Groin Redness and Excoriation moisture associated redness  Scrotum/Coccyx/Buttocks Redness/moisture associated redness  (R) Leg Redness, Non-Blanching, Scar, and Scab open to R lower leg/top of R foot  (L) Leg Redness and Scab  (R) Heel/Foot/Toe Redness and Scab open spot noted to top of R foot  (L) Heel/Foot/Toe Redness, Discoloration, and Boggy                                            Devices In Places Tele Box, Pulse Ox, and Nasal Cannula      Interventions In Place Gray Ear Foams and Pillows    Possible Skin Injury Yes    Pictures Uploaded Into Epic Yes  Wound Consult Placed Yes  RN Wound Prevention Protocol Ordered Yes

## 2025-05-20 NOTE — ED NOTES
Pt O2 sat decreases to 50% on 5L/min NC. Pt placed to oxymask 10L/min. O2 sat improves to 92%. МАРИЯ carrera.

## 2025-05-20 NOTE — PROGRESS NOTES
Hospital medicine progress note after midnight:     Mr.Jose Edward Britt is a 64 y.o. male who presented 5/20/2025 with shortness of breath.  Patient reports compliance with his dialysis appointments.  He is not able to produce urine.  For the last few days, he has been noted to have progressive worsening shortness of breath and a sharp left-sided rib pain that is worse with deep inspiration.  Symptoms were not improved, he said to come to the ER for further evaluation.     Patient has past medical history of type 2 diabetes, end-stage renal disease on hemodialysis.  He was recently discharged from a nonhospital 1 year ago where he was found to have moderate bilateral pleural effusions.  He underwent left-sided thoracentesis.  Pathology results of pleural effusion negative for malignancy.     Patient initially presented to outside facility ER. Chest x-ray at outside hospital showing bilateral pleural effusions, left side greater than right. In ER, patient found to be hypertensive.  BNP 33,000.  Patient will be admitted for thoracentesis of his bilateral pleural effusions.   Patient admitted to hospital medicine for management of care.    During this hospitalization, ultrasound thoracentesis was ordered.  Obtained 1700 mL the right side, and pursue left side tomorrow 5/21.  Nephrology Dr. Rivera also consulted for continuation of HD during this admission.    Plan of care: Continue to monitor oxygen needs; plan for left side thoracentesis on 5/21; nephrology following for continued HD    Disposition: Patient currently inpatient status as he is anticipated to stay 2-3 midnights for management of bilateral pleural effusions, acute hypoxic respiratory failure, and need for dialysis    Problem list:   ESRD on HD  DMT2  Bilateral pleural effusions  Acute hypoxic respiratory failure  Shortness of breath    Please note that this dictation was created using voice recognition software. I have made every reasonable attempt to  correct obvious errors, but there may be errors of grammar and possibly content that I did not discover before finalizing the note.    Electronically signed by:  Dr. LEO Gaming, CELINA, APRN, FNP-C  Hospitalist Services  [facility]  [phone]  [email or web address]  05/20/25                  [phone]

## 2025-05-20 NOTE — H&P
Hospital Medicine History & Physical Note    Date of Service  5/20/2025    Primary Care Physician  Faisal Germain M.D.    Consultants  None    Code Status  Full Code    Chief Complaint  Chief Complaint   Patient presents with    Sent by MD Padilla of Breath     BIBA transfer from [facility] for SOB with associated chest pain. AAOx4. Denies other complaints       History of Presenting Illness  Pj Britt is a 64 y.o. male who presented 5/20/2025 with shortness of breath.  Patient reports compliance with his dialysis appointments.  He is not able to produce urine.  For the last few days, he has been noted to have progressive worsening shortness of breath and a sharp left-sided rib pain that is worse with deep inspiration.  Symptoms were not improved, he said to come to the ER for further evaluation.    Patient has past medical history of type 2 diabetes, end-stage renal disease on hemodialysis.  He was recently discharged from a nonhospital 1 year ago where he was found to have moderate bilateral pleural effusions.  He underwent left-sided thoracentesis.  Pathology results of pleural effusion negative for malignancy.      Patient initially presented to outside facility ER.  Chest x-ray at outside hospital showing bilateral pleural effusions, left side greater than right.  In ER, patient found to be hypertensive.  BNP 33,000.  Patient will be admitted for thoracentesis of his bilateral pleural effusions.      I discussed the plan of care with patient.    Review of Systems  ROS    Past Medical History   has a past medical history of Anemia, Blind in both eyes, Diabetes ([facility]), Dialysis patient ([facility]), ESRD (end stage renal disease) ([facility]), High cholesterol, Hyperlipemia, Hypertension, Oxygen dependent, and Snoring.    Surgical History   has a past surgical history that includes pr cataract surg w/iol 1 stage wo endo (10/21/15); other (3/2015); other (7/2015); other (9/22/15); cath placement (Right,  12/10/2015); av fistula creation (Left, 12/10/2015); recovery (2/24/2016); recovery (4/19/2016); cervical disk and fusion anterior (7/12/2022); cervical fusion posterior (7/12/2022); corpectomy (7/12/2022); and cervical laminectomy posterior (7/12/2022).     Family History  family history includes Alzheimer's Disease (age of onset: 80) in his mother; Diabetes in his brother and father; Hypertension in his father.   Family history reviewed with patient. There is no family history that is pertinent to the chief complaint.     Social History   reports that he has never smoked. He has never used smokeless tobacco. He reports that he does not drink alcohol and does not use drugs.    Allergies  Allergies[1]    Medications  Prior to Admission Medications   Prescriptions Last Dose Informant Patient Reported? Taking?   Methoxy PEG-Epoetin Beta (MIRCERA INJ)   Yes No   Sig: Inject 175 mcg as directed every 14 days. Info obtained by staff at [facility] [phone]   acetaminophen (TYLENOL) 500 MG Tab   No No   Sig: Take 1.5 Tablets by mouth every 6 hours as needed for Mild Pain or Moderate Pain.   acetaminophen (TYLENOL) 500 MG Tab   No No   Sig: Take 2 tabs up to 3 times a day prn pain   amLODIPine (NORVASC) 10 MG Tab   Yes No   Sig: Take 10 mg by mouth 2 times a day.   apixaban (ELIQUIS) 2.5mg Tab   No No   Sig: Take 1 Tablet by mouth 2 times a day. Need appt prior to further refills.   atorvastatin (LIPITOR) 20 MG Tab   No No   Sig: Take 1 Tablet by mouth every day.   calcium acetate (PHOS-LO) 667 MG Tab tablet   No No   Sig: Take 3 Tablets by mouth 3 times a day with meals.   cephALEXin (KEFLEX) 500 MG Cap   No No   Sig: Take 1 tablet by mouth 4 times a day for 7 days. Then 1 tablet by mouth 2 times a day for 7 days.   gabapentin (NEURONTIN) 300 MG Cap   No No   Sig: Take 1 po qhs   hydrOXYzine pamoate (VISTARIL) 50 MG Cap   No No   Sig: Take 1 Capsule by mouth 3 times a day as needed for Itching or Other  (nausea, anxiety, insomnia).   insulin NPH (HUMULIN/NOVOLIN N) 100 UNIT/ML Suspension   Yes No   Sig: Inject 20 Units under the skin every morning.   metoprolol tartrate (LOPRESSOR) 100 MG Tab   No No   Sig: Take 1 Tablet by mouth 2 times a day.   paroxetine (PAXIL) 40 MG tablet   No No   Sig: Take 1 Tablet by mouth every day.      Facility-Administered Medications: None       Physical Exam  Temp:  [36.6 °C (97.8 °F)] 36.6 °C (97.8 °F)  Pulse:  [72-77] 75  Resp:  [16] 16  BP: (190-195)/(86-88) 190/88  SpO2:  [95 %-97 %] 95 %  Blood Pressure : (!) 190/88   Temperature: 36.6 °C (97.8 °F)   Pulse: 75   Respiration: 16   Pulse Oximetry: 95 %       Physical Exam  Constitutional:       Appearance: Normal appearance. He is normal weight.   HENT:      Head: Normocephalic.      Nose: Nose normal.      Mouth/Throat:      Mouth: Mucous membranes are moist.   Eyes:      Pupils: Pupils are equal, round, and reactive to light.   Cardiovascular:      Rate and Rhythm: Normal rate and regular rhythm.      Pulses: Normal pulses.   Pulmonary:      Effort: Pulmonary effort is normal.      Comments: Decreased breath sound heard up to mid lung fields  Abdominal:      General: Abdomen is flat. Bowel sounds are normal.      Palpations: Abdomen is soft.   Musculoskeletal:         General: Normal range of motion.      Cervical back: Neck supple.   Skin:     General: Skin is warm.   Neurological:      General: No focal deficit present.      Mental Status: He is alert and oriented to person, place, and time. Mental status is at baseline.   Psychiatric:         Mood and Affect: Mood normal.         Behavior: Behavior normal.         Thought Content: Thought content normal.         Judgment: Judgment normal.         Laboratory:  Recent Labs     05/20/25  0444   WBC 4.2*   RBC 3.23*   HEMOGLOBIN 9.0*   HEMATOCRIT 29.3*   MCV 90.7   MCH 27.9   MCHC 30.7*   RDW 54.0*   PLATELETCT 121*   MPV 11.6     Recent Labs     05/20/25  0444   SODIUM 134*  "  POTASSIUM 3.7   CHLORIDE 92*   CO2 32   GLUCOSE 111*   BUN 23*   CREATININE 2.88*   CALCIUM 8.6     Recent Labs     05/20/25  0444   ALTSGPT 6   ASTSGOT 14   ALKPHOSPHAT 170*   TBILIRUBIN 0.5   GLUCOSE 111*         Recent Labs     05/20/25  0444   NTPROBNP 08313*         No results for input(s): \"TROPONINT\" in the last 72 hours.    Imaging:  DX-OUTSIDE IMAGES-DX CHEST   Final Result      US-THORACENTESIS PUNCTURE LEFT    (Results Pending)   US-THORACENTESIS PUNCTURE RIGHT    (Results Pending)       X-Ray:  I have personally reviewed the images and compared with prior images.    Assessment/Plan:  Justification for Admission Status  I anticipate this patient will require at least two midnights for appropriate medical management, necessitating inpatient admission because patient has acute hypoxemic respiratory failure secondary to bilateral pleural effusions    Patient will need a Telemetry bed on MEDICAL service .  The need is secondary to hypoxemia.    * Acute hypoxemic respiratory failure (HCC)- (present on admission)  Assessment & Plan  Noted to have hypoxemia, above 90% with nasal cannula  Chest x-ray at outside hospital showing bilateral pleural effusions, left side greater than right.  Had previous thoracentesis in the past, negative for malignancy  Thoracentesis in AM  Elevate head of bed  Oxygen as needed, keep O2 >90%    AF (atrial fibrillation) ([facility])  Assessment & Plan  History of  Continue home medications, hold Eliquis for now.    ESRD on hemodialysis ([facility])  Assessment & Plan  Nephrology consult in the morning  Pleural effusions may benefit from dialysis    Type 2 diabetes mellitus with nephropathy, and retinopathy ([facility])- (present on admission)  Assessment & Plan  Sliding scale    Hyperlipidemia- (present on admission)  Assessment & Plan  Lipitor        VTE prophylaxis: pharmacologic prophylaxis contraindicated due to thoracentesis in the morning       [1]   Allergies  Allergen Reactions    Aspirin " Unspecified     Cannot have because on Dialysis

## 2025-05-20 NOTE — ED NOTES
Bedside report received from off going RN: Didier, assumed care of patient.  Pt sleeping. Active chest rise and fall. Respirations equal and unlabored. Call light within reach, all needs addressed at this time.       Fall risk interventions in place: Give patient urinal if applicable and Keep floor surfaces clean and dry (all applicable per Moore Fall risk assessment)   Continuous monitoring: Cardiac Leads, Pulse Ox, or Blood Pressure  IVF/IV medications: Not Applicable   Oxygen: How many liters 5L, Traced the line to wall oxygen, and No oxygen tank in room  Bedside sitter: Not Applicable   Isolation: Not Applicable

## 2025-05-20 NOTE — PROCEDURES
Diagnostic RT SIDE Thoracentesis was performed bedside by МАРИЯ Stuart, removing 1700 mL of cloudy, yellow fluid. 1000 mL was taken to the lab. Vitals were monitored by the floor. Pt tolerated the procedure well. Post DX Chest was ordered and performed bedside.     Post Procedure Report was provided verbally to RN.

## 2025-05-21 ENCOUNTER — APPOINTMENT (OUTPATIENT)
Dept: RADIOLOGY | Facility: MEDICAL CENTER | Age: 65
DRG: 186 | End: 2025-05-21
Attending: STUDENT IN AN ORGANIZED HEALTH CARE EDUCATION/TRAINING PROGRAM
Payer: MEDICARE

## 2025-05-21 ENCOUNTER — APPOINTMENT (OUTPATIENT)
Dept: CARDIOLOGY | Facility: MEDICAL CENTER | Age: 65
DRG: 186 | End: 2025-05-21
Payer: MEDICARE

## 2025-05-21 PROBLEM — I48.0 PAROXYSMAL ATRIAL FIBRILLATION (HCC): Status: ACTIVE | Noted: 2022-12-13

## 2025-05-21 LAB
ALBUMIN SERPL BCP-MCNC: 3.7 G/DL (ref 3.2–4.9)
ANION GAP SERPL CALC-SCNC: 11 MMOL/L (ref 7–16)
ANION GAP SERPL CALC-SCNC: 11 MMOL/L (ref 7–16)
BUN SERPL-MCNC: 30 MG/DL (ref 8–22)
BUN SERPL-MCNC: 33 MG/DL (ref 8–22)
CALCIUM ALBUM COR SERPL-MCNC: 9.2 MG/DL (ref 8.5–10.5)
CALCIUM SERPL-MCNC: 8.7 MG/DL (ref 8.5–10.5)
CALCIUM SERPL-MCNC: 9 MG/DL (ref 8.5–10.5)
CHLORIDE SERPL-SCNC: 91 MMOL/L (ref 96–112)
CHLORIDE SERPL-SCNC: 93 MMOL/L (ref 96–112)
CO2 SERPL-SCNC: 31 MMOL/L (ref 20–33)
CO2 SERPL-SCNC: 31 MMOL/L (ref 20–33)
CREAT SERPL-MCNC: 3.99 MG/DL (ref 0.5–1.4)
CREAT SERPL-MCNC: 4.36 MG/DL (ref 0.5–1.4)
CYTOLOGY REG CYTOL: NORMAL
ERYTHROCYTE [DISTWIDTH] IN BLOOD BY AUTOMATED COUNT: 53.5 FL (ref 35.9–50)
EST. AVERAGE GLUCOSE BLD GHB EST-MCNC: 163 MG/DL
GFR SERPLBLD CREATININE-BSD FMLA CKD-EPI: 14 ML/MIN/1.73 M 2
GFR SERPLBLD CREATININE-BSD FMLA CKD-EPI: 16 ML/MIN/1.73 M 2
GLUCOSE BLD STRIP.AUTO-MCNC: 118 MG/DL (ref 65–99)
GLUCOSE BLD STRIP.AUTO-MCNC: 171 MG/DL (ref 65–99)
GLUCOSE BLD STRIP.AUTO-MCNC: 88 MG/DL (ref 65–99)
GLUCOSE SERPL-MCNC: 126 MG/DL (ref 65–99)
GLUCOSE SERPL-MCNC: 97 MG/DL (ref 65–99)
HBA1C MFR BLD: 7.3 % (ref 4–5.6)
HBV CORE AB SERPL QL IA: NONREACTIVE
HBV SURFACE AB SERPL IA-ACNC: <3.5 MIU/ML (ref 0–10)
HBV SURFACE AG SER QL: NORMAL
HCT VFR BLD AUTO: 29.6 % (ref 42–52)
HGB BLD-MCNC: 9.3 G/DL (ref 14–18)
MCH RBC QN AUTO: 28.2 PG (ref 27–33)
MCHC RBC AUTO-ENTMCNC: 31.4 G/DL (ref 32.3–36.5)
MCV RBC AUTO: 89.7 FL (ref 81.4–97.8)
PHOSPHATE SERPL-MCNC: 2.2 MG/DL (ref 2.5–4.5)
PHOSPHATE SERPL-MCNC: 2.5 MG/DL (ref 2.5–4.5)
PLATELET # BLD AUTO: 130 K/UL (ref 164–446)
PMV BLD AUTO: 11.3 FL (ref 9–12.9)
POTASSIUM SERPL-SCNC: 4.2 MMOL/L (ref 3.6–5.5)
POTASSIUM SERPL-SCNC: 4.3 MMOL/L (ref 3.6–5.5)
PRELIMINARY RPT Q0601: NORMAL
RBC # BLD AUTO: 3.3 M/UL (ref 4.7–6.1)
RHODAMINE-AURAMINE STN SPEC: NORMAL
SODIUM SERPL-SCNC: 133 MMOL/L (ref 135–145)
SODIUM SERPL-SCNC: 135 MMOL/L (ref 135–145)
WBC # BLD AUTO: 5 K/UL (ref 4.8–10.8)

## 2025-05-21 PROCEDURE — 80069 RENAL FUNCTION PANEL: CPT

## 2025-05-21 PROCEDURE — 82962 GLUCOSE BLOOD TEST: CPT

## 2025-05-21 PROCEDURE — A9270 NON-COVERED ITEM OR SERVICE: HCPCS | Performed by: STUDENT IN AN ORGANIZED HEALTH CARE EDUCATION/TRAINING PROGRAM

## 2025-05-21 PROCEDURE — 90935 HEMODIALYSIS ONE EVALUATION: CPT

## 2025-05-21 PROCEDURE — 86706 HEP B SURFACE ANTIBODY: CPT

## 2025-05-21 PROCEDURE — 87340 HEPATITIS B SURFACE AG IA: CPT

## 2025-05-21 PROCEDURE — 0W9B3ZZ DRAINAGE OF LEFT PLEURAL CAVITY, PERCUTANEOUS APPROACH: ICD-10-PCS

## 2025-05-21 PROCEDURE — 700102 HCHG RX REV CODE 250 W/ 637 OVERRIDE(OP): Performed by: STUDENT IN AN ORGANIZED HEALTH CARE EDUCATION/TRAINING PROGRAM

## 2025-05-21 PROCEDURE — 85027 COMPLETE CBC AUTOMATED: CPT

## 2025-05-21 PROCEDURE — 97602 WOUND(S) CARE NON-SELECTIVE: CPT

## 2025-05-21 PROCEDURE — 80048 BASIC METABOLIC PNL TOTAL CA: CPT

## 2025-05-21 PROCEDURE — 71045 X-RAY EXAM CHEST 1 VIEW: CPT

## 2025-05-21 PROCEDURE — 5A1D70Z PERFORMANCE OF URINARY FILTRATION, INTERMITTENT, LESS THAN 6 HOURS PER DAY: ICD-10-PCS | Performed by: INTERNAL MEDICINE

## 2025-05-21 PROCEDURE — 4410569 US-THORACENTESIS PUNCTURE

## 2025-05-21 PROCEDURE — 86704 HEP B CORE ANTIBODY TOTAL: CPT

## 2025-05-21 PROCEDURE — 99233 SBSQ HOSP IP/OBS HIGH 50: CPT

## 2025-05-21 PROCEDURE — 84100 ASSAY OF PHOSPHORUS: CPT

## 2025-05-21 PROCEDURE — 82652 VIT D 1 25-DIHYDROXY: CPT

## 2025-05-21 PROCEDURE — 83036 HEMOGLOBIN GLYCOSYLATED A1C: CPT

## 2025-05-21 PROCEDURE — 770020 HCHG ROOM/CARE - TELE (206)

## 2025-05-21 RX ORDER — INSULIN LISPRO 100 [IU]/ML
1-6 INJECTION, SOLUTION INTRAVENOUS; SUBCUTANEOUS
Status: DISCONTINUED | OUTPATIENT
Start: 2025-05-21 | End: 2025-05-22 | Stop reason: HOSPADM

## 2025-05-21 RX ORDER — DEXTROSE MONOHYDRATE 25 G/50ML
25 INJECTION, SOLUTION INTRAVENOUS
Status: DISCONTINUED | OUTPATIENT
Start: 2025-05-21 | End: 2025-05-22 | Stop reason: HOSPADM

## 2025-05-21 RX ADMIN — PAROXETINE HYDROCHLORIDE 40 MG: 20 TABLET, FILM COATED ORAL at 05:38

## 2025-05-21 RX ADMIN — AMLODIPINE BESYLATE 10 MG: 10 TABLET ORAL at 05:38

## 2025-05-21 RX ADMIN — AMLODIPINE BESYLATE 10 MG: 10 TABLET ORAL at 17:37

## 2025-05-21 RX ADMIN — METOPROLOL TARTRATE 100 MG: 50 TABLET, FILM COATED ORAL at 05:38

## 2025-05-21 RX ADMIN — ATORVASTATIN CALCIUM 20 MG: 20 TABLET, FILM COATED ORAL at 05:38

## 2025-05-21 ASSESSMENT — PAIN DESCRIPTION - PAIN TYPE: TYPE: ACUTE PAIN

## 2025-05-21 ASSESSMENT — FIBROSIS 4 INDEX: FIB4 SCORE: 2.9

## 2025-05-21 NOTE — HOSPITAL COURSE
64-year-old male with T2DM, Afib not on Eliquis, ESRD on HD, blindness who presented as a transfer from [facility] 5/20/2025 for SOB.  On 5/2024 a year ago, patient presented for similar symptoms and was found to have bilateral pleural effusions requiring urgent hemodialysis and urgent thoracentesis.  His pathology then was negative for any malignancy.    Patient stated that he was compliant with his dialysis appointment.  He is anuric.  For the last few days he was noted to have progressive worsening shortness of breath and sharp left-sided rib pain worse with inspiration.  Given worsening and persistent symptoms, he presented to the ED.    On initial presentation at [facility] for this admission, patient was once again found back with bilateral pleural effusions, left greater than right.  He was hypertensive with a BNP of 33,000.  He is admitted for thoracentesis of his bilateral pleural effusions.    On 5/20 he underwent thoracentesis of the right side and obtained 1.7 L.  Left-sided thoracentesis done on 5/21.  Postthoracentesis chest x-rays negative for any pneumothorax.  He had some increased interstitial opacities throughout the lungs after the left-sided thoracentesis however this was before his dialysis.  He did not exhibit any signs of pneumonia so abx deferred.  Lights criteria transudative with samples taken from the right thoracentesis for total protein ratio.  Pathology in process at time of discharge. Echo was done to evaluate for possible cardiac causes of effusion but was overall unremarkable-mild concentric LVH, normal LVSF, mild MR.  Recurrent pleural effusions and pulmonary edema likely in the setting of ESRD.  Recommend follow-up with nephrology.  Eventually patient symptomatically improved and was able to be weaned down to baseline oxygen.     Regarding his Eliquis for his atrial fibrillation.  He has a [id number] of 3.  He has been off his Eliquis apparently since 12/2024.  He  broke his ankle earlier this year and has been requiring multiple surgeries complicated by bleeding and so was recommended for him to stop.  On discussion with patient and wife, they want to keep holding this medication and talk with his cardiologist.  They have an upcoming appointment on 6/5/2025.

## 2025-05-21 NOTE — THERAPY
Physical Therapy Contact Note    PT consult received and acknowledged. Evaluation attempted. Patient undergoing HD. Will re attempt as able and appropriate.     Malini Villafana, PT, DPT  [phone]

## 2025-05-21 NOTE — DISCHARGE PLANNING
Care Transition Team Discharge Planning    Anticipated Discharge Information  Discharge Disposition: D/T to SNF with Medicare cert in anticipation of skilled care (03)  [address] [place] [address]  Discharge Contact Phone Number: [phone]            [place] [facility] [place] [id number]

## 2025-05-21 NOTE — DISCHARGE PLANNING
Care Transition Team Discharge Planning    Anticipated Discharge Information  Discharge Disposition: D/T to SNF with Medicare cert in anticipation of skilled care (03)    Discharge Plan: RNCM informed by Mouna Plata that patient is on service with their [facility] for , will need resumption prior to DC if patient goes home.

## 2025-05-21 NOTE — PROGRESS NOTES
Bedside report received from off going RN Mariann, assumed care of patient.     Fall Risk Score: HIGH RISK  Fall risk interventions in place: Place yellow fall risk ID band on patient, Provide patient/family education based on risk assessment, Educate patient/family to call staff for assistance when getting out of bed, Place fall precaution signage outside patient door, and Utilize bed/chair fall alarm  Bed type: Regular (Ashok Score less than 17 interventions in place)  Patient on cardiac monitor: Yes  IVF/IV medications: Not Applicable   Oxygen: How many liters 3L  Bedside sitter: Not Applicable   Isolation: Not applicable

## 2025-05-21 NOTE — PROGRESS NOTES
Hospital Medicine Daily Progress Note    Date of Service  5/21/2025    Chief Complaint  Pj Britt is a 64 y.o. male admitted 5/20/2025 with SOB    Hospital Course  64-year-old male with T2DM, Afib on Eliquis, ESRD on HD, blindness who presented as a transfer from [facility] 5/20/2025 for SOB.  On 5/2024 a year ago, patient presented for similar symptoms and was found to have bilateral pleural effusions requiring urgent hemodialysis and urgent thoracentesis.  His pathology then was negative for any malignancy.    Patient stated that he was compliant with his dialysis appointment.  He is anuric.  For the last few days he was noted to have progressive worsening shortness of breath and sharp left-sided rib pain worse with inspiration.  Given worsening and persistent symptoms, he presented to the ED.    On initial presentation at [facility] for this admission, patient was once again found back with bilateral pleural effusions, left greater than right.  He was hypertensive with a BNP of 33,000.  He is admitted for thoracentesis of his bilateral pleural effusions.    On 5/20 he underwent thoracentesis of the right side and obtained 1.7 L.  Plan is for left-sided thoracentesis on 5/21.  Nephrology is following for HD needs.     Interval Problem Update    5/21  Patient new to me today  On 3 to 6 L overnight.  Afebrile.  Fluid cultures from right thoracentesis 5/20 NGTD.  Light's criteria transudative with R thoracentesis fluid studies per total protein ratio.  Pathology in process.    Plan for left-sided therapeutic thoracentesis today and also HD.  Check echocardiogram  Follow thoracentesis studies   Discussed with nephrology -patient has been compliant with HD and has had no trouble with it.  From their perspective his ESRD may not be cause of recurrent pleural effusions.  Discussed with patient and wife who is at bedside.    I have discussed this patient's plan of care and discharge plan at IDT rounds  today with Case Management, Nursing, Nursing leadership, and other members of the IDT team.    Consultants/Specialty  nephrology and IR     Code Status  Full Code    Disposition  I have placed the appropriate orders for post-discharge needs.    Review of Systems  ROS     Physical Exam  Temp:  [36.1 °C (97 °F)-37.2 °C (99 °F)] 36.1 °C (97 °F)  Pulse:  [62-69] 62  Resp:  [16-18] 18  BP: (135-145)/(59-80) 135/61  SpO2:  [87 %-95 %] 95 %    Physical Exam  Constitutional:       Appearance: He is ill-appearing.   Eyes:      Comments: Blind   Cardiovascular:      Rate and Rhythm: Normal rate and regular rhythm.      Pulses: Normal pulses.      Heart sounds: Normal heart sounds.   Pulmonary:      Effort: Pulmonary effort is normal.      Breath sounds: Rales present.   Abdominal:      General: Abdomen is flat. Bowel sounds are normal. There is no distension.      Palpations: Abdomen is soft.      Tenderness: There is no abdominal tenderness.   Skin:     General: Skin is warm.      Capillary Refill: Capillary refill takes less than 2 seconds.   Neurological:      Mental Status: He is oriented to person, place, and time. Mental status is at baseline.         Fluids    Intake/Output Summary (Last 24 hours) at 5/21/2025 1411  Last data filed at 5/21/2025 0900  Gross per 24 hour   Intake 440 ml   Output 0 ml   Net 440 ml        Laboratory  Recent Labs     05/20/25 0444 05/21/25  0147   WBC 4.2* 5.0   RBC 3.23* 3.30*   HEMOGLOBIN 9.0* 9.3*   HEMATOCRIT 29.3* 29.6*   MCV 90.7 89.7   MCH 27.9 28.2   MCHC 30.7* 31.4*   RDW 54.0* 53.5*   PLATELETCT 121* 130*   MPV 11.6 11.3     Recent Labs     05/20/25  0444 05/21/25  0147 05/21/25  0857   SODIUM 134* 135 133*   POTASSIUM 3.7 4.2 4.3   CHLORIDE 92* 93* 91*   CO2 32 31 31   GLUCOSE 111* 97 126*   BUN 23* 30* 33*   CREATININE 2.88* 3.99* 4.36*   CALCIUM 8.6 8.7 9.0     Recent Labs     05/20/25  0444   INR 1.21*               Imaging  DX-CHEST-PORTABLE (1 VIEW)   Final Result      1.   Interval decrease in size of the left pleural effusion after left thoracentesis. No visible pneumothorax.   2.  Worsening interstitial opacities throughout the lungs.   3.  Small right pleural effusion.      US-THORACENTESIS PUNCTURE LEFT   Final Result      1. Ultrasound guided left sided therapeutic thoracentesis.      2. 2,000 mL of fluid withdrawn.      DX-CHEST-PORTABLE (1 VIEW)   Final Result      1.  Interval decrease in size of the right pleural effusion after right thoracentesis. No postprocedure visible pneumothorax.   2.  Stable large left pleural effusion.   3.  Interstitial edema.   4.  The remainder is stable.      US-THORACENTESIS PUNCTURE RIGHT   Final Result      1. Ultrasound guided right sided diagnostic and therapeutic thoracentesis.      2. 1700 mL of fluid withdrawn.      DX-OUTSIDE IMAGES-DX CHEST   Final Result      EC-ECHOCARDIOGRAM COMPLETE W/O CONT    (Results Pending)        Assessment/Plan  * Acute hypoxemic respiratory failure (HCC)- (present on admission)  Assessment & Plan  Noted to have hypoxemia, above 90% with nasal cannula  Chest x-ray at outside hospital showing bilateral pleural effusions, left side greater than right.  Had previous thoracentesis in the past, negative for malignancy. This is the second episode  S/p thoracentesis right side 5/20.  Plan for left-sided thoracentesis today.  Check echocardiogram.  Follow studies.  Elevate head of bed  Oxygen as needed, keep O2 >90%    Pleural effusion, bilateral- (present on admission)  Assessment & Plan  See plan for hypoxic respiratory failure.    Paroxysmal atrial fibrillation (HCC)  Assessment & Plan  History of  Continue home medications, hold Eliquis for now.    ESRD on hemodialysis (HCC)  Assessment & Plan  Nephrology following    Type 2 diabetes mellitus with nephropathy, and retinopathy (HCC)- (present on admission)  Assessment & Plan  Sliding scale    Hyperlipidemia- (present on admission)  Assessment & Plan  Lipitor          VTE prophylaxis:   SCDs/TEDs      I have performed a physical exam and reviewed and updated ROS and Plan today (5/21/2025). In review of yesterday's note (5/20/2025), there are no changes except as documented above.    Greater than 51 minutes spent prepping to see patient (e.g. review of tests) obtaining and/or reviewing separately obtained history. Performing a medically appropriate examination and evaluation.  Counseling and educating the patient/family/caregiver.  Ordering medications, tests, or procedures.  Referring and communicating with other health care professionals.  Documenting clinical information in EPIC.  Independently interpreting results and communicating results to patient/family/caregiver.  Care coordination.

## 2025-05-21 NOTE — PROGRESS NOTES
Dialysis Progress Notes       Hemodialysis ordered by Dr. Maurice Rivera    Treatment well tolerated for 3 hours, started at 1352, ended at 1652. No other complications noted, VSS during treatment. Please refer to HD flowsheet for details      NET UF: 2L    AVF on left arm patent. Dressing with gauze and tape, no bleeding on site. Report to priamry RN to monitor access site for bleeding and remove dressing after 4 hours.     Report to Kathy DAVISON

## 2025-05-21 NOTE — CARE PLAN
The patient is Watcher - Medium risk of patient condition declining or worsening    Shift Goals  Clinical Goals: HDS, thora, maintain skin integrity, safety, mobility  Patient Goals: rest, comfort  Family Goals: POOJA    Progress made toward(s) clinical / shift goals:    Problem: Knowledge Deficit - Standard  Goal: Patient and family/care givers will demonstrate understanding of plan of care, disease process/condition, diagnostic tests and medications  Outcome: Progressing  Note: Discussed plan of care, pt able to actively participate in the learning process and demonstrates understanding by return demonstration or return explanation. Improved ability to communicate regarding their healthcare and current admission. Improved ability to identify barriers to learning and care.       Problem: Skin Integrity  Goal: Skin integrity is maintained or improved  Outcome: Progressing  Note: Skin integrity and turgor assessed, pressure points and bony prominences assessed by this RN. Ashok score evaluated and appropriate interventions implemented to maintain skin integrity. Pillows for positioning, q2hour turns, Foam protectors, Heel float boots, and Pressure re-distribution mattress Education provided on skin integrity and risk for decubitus ulcers. Measures implemented to improve nutrition as needed. CMS assessed. Positioning pad in use to reduce shearing. RN wound protocol ordered / in place, Wound care administered as ordered, and Moisture control measures in use       Problem: Fall Risk  Goal: Patient will remain free from falls  Outcome: Progressing  Note: Fall prevention measures in place, bed alarm on and connected correctly, call light within reach, hourly rounding, Education provided on fall prevention. Pt instructed to use call light prior to exiting the bed, educated on use of bed alarms, and risks and complications related to falls. Medication orders evaluated for fall risk, gait/mobility assessed, sensory deficits  assessed, mental status assessed, assessed for hypotension, hypovolemia, weakness, fatigue, elimination, and confusion.       Problem: Safety  Goal: Will remain free from injury  Outcome: Progressing     Problem: Fluid Volume:  Goal: Will maintain balanced intake and output  Outcome: Progressing     Problem: Respiratory:  Goal: Respiratory status will improve  Outcome: Progressing  Note: WoB assessed, all lobes auscultated. O2 weaned as tolerated. Remaining on 4L NC.

## 2025-05-21 NOTE — PROGRESS NOTES
Bedside report received from off going RN: Jessica, assumed care of patient.     Fall Risk Score: HIGH RISK  Fall risk interventions in place: Place yellow fall risk ID band on patient, Provide patient/family education based on risk assessment, Educate patient/family to call staff for assistance when getting out of bed, Place fall precaution signage outside patient door, Place patient in room close to nursing station, Utilize bed/chair fall alarm, and Notify charge of high risk for huddle  Bed type: Regular (Ashok Score less than 17 interventions in place)  Patient on cardiac monitor: Yes  IVF/IV medications: Not Applicable   Oxygen: How many liters 4L, Traced the line to wall oxygen, and No oxygen tank in room  Bedside sitter: Not Applicable   Isolation: Not applicable

## 2025-05-21 NOTE — WOUND TEAM
[facility]  Inpatient Services  Wound and Skin Care Brief Evaluation    Admission Date: 5/20/2025     Last order of IP CONSULT TO WOUND CARE was found on 5/20/2025 from Hospital Encounter on 5/19/2025     HPI, PMH, SH: Reviewed    Chief Complaint   Patient presents with    Sent by MD    Shortness of Breath     BIBA transfer from [facility] for SOB with associated chest pain. AAOx4. Denies other complaints     Diagnosis: Acute hypoxemic respiratory failure (HCC) [J96.01]    Unit where seen by Wound Team: T827/02     Wound consult placed regarding RLE. Chart and images reviewed. This discussed with bedside RN. This clinician in to assess patient. Patient pleasant and agreeable. Skin. Non-selectively debrided with Moist warm washcloth.     No pressure injuries or advanced wound care needs identified. Wound consult completed. No further follow up unless indicated and consulted.          PREVENTATIVE INTERVENTIONS:    Q shift Ashok - performed per nursing policy  Q shift pressure point assessments - performed per nursing policy    Surface/Positioning  Low Airloss - Currently in Place  Reposition q 2 hours with pillows - Currently in Place    Offloading/Redistribution  Heel offloading dressing (Silicone dressing) - Currently in Place  Heel float boots (Prevalon boot) - Currently in Place

## 2025-05-21 NOTE — DISCHARGE PLANNING
Care Transition Team Assessment    Patient is a 64 year-old male admitted for acute hypoxemic respiratory failure. Please see pt's H&P for prior medical history. RNCM met with pt and spouse at bedside to complete assessment. Pt A&Ox4 and able to verify the information on the face sheet with help from spouse. Pt lives with spouse in a single-story with no SANDRA. Emergency contact is spouse Lindsey [phone], Advanced Directive is on file. Prior to admission patient is non-ambulatory d/t multiple ankle surgeries and delays with  PT. Pt does not drive, Lindsey reports she drives despite being legally blind. Pt is established with [facility] in [place] for HD MWF. Pt has a FWW and wheelchair. Pt uses 3L O2 at baseline supplied by [employer]. Pt reported that S/O is good support. Pt receives monthly SSD deposits of $1200. The patient's PCP is Dr. Faisal Germain. Patient's preferred pharmacy is [facility] in [place]. Patient currently on service with [facility] for PT. Pt denies any SA or MH concerns. Patients confirmed medical coverage with [employer].  Patient has means to transportation and friend will provide transport once medically stable for discharge. RNCM discussed discharge planning. Patient reported pt's goal is to return home once medically stable.                Information Source  Orientation Level: Oriented X4  Information Given By: Patient, Spouse  Informant's Name: .  Who is responsible for making decisions for patient? : Patient    Readmission Evaluation  Is this a readmission?: No    Elopement Risk  Legal Hold: No  Ambulatory or Self Mobile in Wheelchair: No-Not an Elopement Risk    Interdisciplinary Discharge Planning  Lives with - Patient's Self Care Capacity: Spouse  Patient or legal guardian wants to designate a caregiver: No  Support Systems: None  Housing / Facility: 1 Story House    Discharge Preparedness  What is your plan after discharge?: Skilled nursing facility  What are your discharge  supports?: Spouse  Prior Functional Level: Uses Walker, Uses Wheelchair, Wheelchair Dependent, Needs Assist with Activities of Daily Living, Needs Assist with Medication Management  Difficulity with ADLs: Bathing, Dressing, Walking  Difficulty with ADLs Comment: spouse helps with ADLs  Difficulity with IADLs: Cooking, Driving, Laundry, Shopping  Difficulity with IADL Comments: spouse helps with IADLs    Functional Assesment  Prior Functional Level: Uses Walker, Uses Wheelchair, Wheelchair Dependent, Needs Assist with Activities of Daily Living, Needs Assist with Medication Management    Finances  Financial Barriers to Discharge: No  Prescription Coverage: Yes    Vision / Hearing Impairment  Vision Impairment : Yes  Right Eye Vision: Blind  Left Eye Vision: Blind  Hearing Impairment : No         Advance Directive  Advance Directive?: DPOA for Health Care  Durable Power of  Name and Contact : Lindsey Britt (spouse) [phone]    Domestic Abuse  Have you ever been the victim of abuse or violence?: No  Possible Abuse/Neglect Reported to:: Not Applicable    Psychological Assessment  History of Substance Abuse: None  History of Psychiatric Problems: No  Non-compliant with Treatment: No  Newly Diagnosed Illness: No    Discharge Risks or Barriers  Discharge risks or barriers?: Transportation, Complex medical needs  Patient risk factors: Complex medical needs, Language barrier, Vulnerable adult, Cognitive / sensory / physical deficit    Anticipated Discharge Information  Discharge Disposition: D/T to SNF with Medicare cert in anticipation of skilled care (03)  Discharge Address: [address] [place] [address]  Discharge Contact Phone Number: [phone]            Case Management Discharge Planning    Admission Date: 5/20/2025  GMLOS: 4.4  ALOS: 1    6-Clicks ADL Score: 13  6-Clicks Mobility Score: 10  PT and/or OT Eval ordered: Yes  Post-acute Referrals Ordered: No  Post-acute Choice Obtained: No  Has  referral(s) been sent to post-acute provider:  No      Anticipated Discharge Dispo: Discharge Disposition: D/T to SNF with Medicare cert in anticipation of skilled care (03)  Discharge Address: [address] [place] [address]  Discharge Contact Phone Number: [phone]    DME Needed: No    Action(s) Taken: DC Assessment Complete (See below)    Escalations Completed: None    Medically Clear: No    Next Steps: RNCM to follow for further DCP needs.    Barriers to Discharge: Medical clearance and Pending PT Evaluation    Is the patient up for discharge tomorrow: No

## 2025-05-21 NOTE — PROGRESS NOTES
"[facility] -  PROGRESS NOTE               Author: Maurice Rivera M.D. Date & Time: 5/21/2025  6:03 AM     HPI:  63 yo M PMH ESRD MWF iHD in [place] at [facility], HTN, Type 2 DM, anemia of CKD, and CKD-MBD who presents to [facility] after presenting to [facility] ER with shortness of breath associated with Left-sided rib pain that worsens with deep inspiration. On arrival to ED he was tachypneic on 5L NC. He reports he has not missed any HD recently, last tx was MON.  He hasn't been eating or drinking well due to poor appetite.  CXR at OSH showed bilateral pleural effusions, Left greater than right. Resting in bed comfortably on 3L NC.   Labs revealed hemoglobin 10.3, sodium 137, K 5.0, glucose 208, phos 1.9, Mg 2.7, BNP 17443.  Ultrasound-guided Right sided thoracocentesis performed in the ED with 1.7L fluid removed.      We have been consulted for evaluation and management of acute and chronic conditions related to Nephrology      No F/C/N/V/CP.  No melena, hematochezia, hematemesis.  No HA, visual changes, or abdominal pain.    DAILY NEPHROLOGY SUMMARY:  5/21 - No new overnight renal events. HD today.    REVIEW OF SYSTEMS:    10 point ROS reviewed and is as per HPI/daily summary or otherwise negative    PMH/PSH/SH/FH:   Reviewed and unchanged since admission note    CURRENT MEDICATIONS:   Reviewed from admission to present day    VS:  /61   Pulse 63   Temp 37.1 °C (98.8 °F) (Temporal)   Resp 16   Ht 1.702 m (5' 7\")   Wt 58.5 kg (128 lb 15.5 oz)   SpO2 92%   BMI 20.20 kg/m²     Physical Exam  Vitals and nursing note reviewed.       Constitutional:       Appearance: Normal appearance.   Eyes:      General: No scleral icterus.  Cardiovascular:      Rate and Rhythm: Normal rate.      Comments: No edema   +LFA AVF +b/t aneurysmal   Pulmonary:      Effort: Pulmonary effort is normal. No respiratory distress.      Breath sounds: Normal breath sounds. No stridor. No wheezing, rhonchi " or rales.      Comments:  diminished   Abdominal:      General: There is no distension.   Musculoskeletal:         General: No deformity.      Right lower leg: No edema.      Left lower leg: No edema.   Skin:     Findings: No rash.   Neurological:      General: No focal deficit present.      Mental Status: He is alert.   Psychiatric:         Mood and Affect: Mood normal.         Behavior: Behavior normal. Behavior is cooperative.     Fluids:  No intake/output data recorded.    LABS:  Recent Labs     05/20/25  0444 05/21/25  0147   SODIUM 134* 135   POTASSIUM 3.7 4.2   CHLORIDE 92* 93*   CO2 32 31   GLUCOSE 111* 97   BUN 23* 30*   CREATININE 2.88* 3.99*   CALCIUM 8.6 8.7       IMAGING:   All imaging reviewed from admission to present day    IMPRESSION:  # ESRD  - Etiology likely 2/2 HTN/DM  - MWF iHD at [facility]  - via AVF(+thrill/bruit)  # Acute resp on chronic resp failure with hypoxia  - likely fluid contributing   # Pleural effusion   - s/p thoracentesis (5/19) 1.7 L off  - hx thoracentesis 05/2025, (-) for malignancy  # HTN  - Goal BP < 140/90  # Anemia of CKD  - Goal Hgb 10-11  # CKD-MBD  # Type II diabetes   # Afib   - On eliquis   # Hyponatremia, mild  - volume contributing  - Correct with HD   # thrombocytopenia  - monitor     PLAN:  - HD today (WED)  - MWF iHD and PRN schedule  - UF as tolerated  - No dietary protein restrictions  - Dose all meds per ESRD  - Renal diet

## 2025-05-21 NOTE — CARE PLAN
The patient is Watcher - Medium risk of patient condition declining or worsening    Shift Goals  Clinical Goals: monitor respiratory status, maintain skin integrity  Patient Goals: eat and comfort  Family Goals: POOJA    Progress made toward(s) clinical / shift goals:      Patient is not progressing towards the following goals:      Problem: Skin Integrity  Goal: Skin integrity is maintained or improved  Outcome: Progressing  Note: Assess skin and monitor for skin breakdown. Alleviate pressure to bony prominences and provide assistance with turning, repositioning, ROM and mobility as appropriate. Heel boots in place, pillows for Q2 turns     Problem: Fall Risk  Goal: Patient will remain free from falls  Outcome: Progressing  Note: Fall risk assessment complete. Fall precautions implemented; bed alarm on, patient wearing non-slip socks, call light within reach, hourly rounding in progress, and tolieting needs assessed.

## 2025-05-22 ENCOUNTER — APPOINTMENT (OUTPATIENT)
Dept: CARDIOLOGY | Facility: MEDICAL CENTER | Age: 65
DRG: 186 | End: 2025-05-22
Payer: MEDICARE

## 2025-05-22 VITALS
OXYGEN SATURATION: 100 % | SYSTOLIC BLOOD PRESSURE: 132 MMHG | WEIGHT: 130.07 LBS | BODY MASS INDEX: 20.42 KG/M2 | HEART RATE: 65 BPM | RESPIRATION RATE: 17 BRPM | TEMPERATURE: 98.1 F | DIASTOLIC BLOOD PRESSURE: 63 MMHG | HEIGHT: 67 IN

## 2025-05-22 PROBLEM — I48.0 PAROXYSMAL ATRIAL FIBRILLATION (HCC): Status: RESOLVED | Noted: 2022-12-13 | Resolved: 2025-05-22

## 2025-05-22 PROBLEM — J90 PLEURAL EFFUSION, BILATERAL: Status: RESOLVED | Noted: 2024-05-22 | Resolved: 2025-05-22

## 2025-05-22 PROBLEM — J96.01 ACUTE HYPOXEMIC RESPIRATORY FAILURE (HCC): Status: RESOLVED | Noted: 2025-05-20 | Resolved: 2025-05-22

## 2025-05-22 LAB
1,25(OH)2D3 SERPL-MCNC: 8 PG/ML (ref 19.9–79.3)
ANION GAP SERPL CALC-SCNC: 10 MMOL/L (ref 7–16)
BUN SERPL-MCNC: 15 MG/DL (ref 8–22)
CALCIUM SERPL-MCNC: 8.6 MG/DL (ref 8.5–10.5)
CHLORIDE SERPL-SCNC: 95 MMOL/L (ref 96–112)
CO2 SERPL-SCNC: 30 MMOL/L (ref 20–33)
CREAT SERPL-MCNC: 2.74 MG/DL (ref 0.5–1.4)
ERYTHROCYTE [DISTWIDTH] IN BLOOD BY AUTOMATED COUNT: 55.8 FL (ref 35.9–50)
GFR SERPLBLD CREATININE-BSD FMLA CKD-EPI: 25 ML/MIN/1.73 M 2
GLUCOSE BLD STRIP.AUTO-MCNC: 178 MG/DL (ref 65–99)
GLUCOSE BLD STRIP.AUTO-MCNC: 94 MG/DL (ref 65–99)
GLUCOSE SERPL-MCNC: 119 MG/DL (ref 65–99)
HCT VFR BLD AUTO: 31.8 % (ref 42–52)
HGB BLD-MCNC: 9.8 G/DL (ref 14–18)
LV EJECT FRACT  99904: 63
LV EJECT FRACT MOD 2C 99903: 62.1
LV EJECT FRACT MOD 4C 99902: 59.45
LV EJECT FRACT MOD BP 99901: 62.81
MAGNESIUM SERPL-MCNC: 2.1 MG/DL (ref 1.5–2.5)
MCH RBC QN AUTO: 28 PG (ref 27–33)
MCHC RBC AUTO-ENTMCNC: 30.8 G/DL (ref 32.3–36.5)
MCV RBC AUTO: 90.9 FL (ref 81.4–97.8)
PHOSPHATE SERPL-MCNC: 2.2 MG/DL (ref 2.5–4.5)
PLATELET # BLD AUTO: 132 K/UL (ref 164–446)
PMV BLD AUTO: 11.5 FL (ref 9–12.9)
POTASSIUM SERPL-SCNC: 4.4 MMOL/L (ref 3.6–5.5)
RBC # BLD AUTO: 3.5 M/UL (ref 4.7–6.1)
SODIUM SERPL-SCNC: 135 MMOL/L (ref 135–145)
WBC # BLD AUTO: 5.4 K/UL (ref 4.8–10.8)

## 2025-05-22 PROCEDURE — 97162 PT EVAL MOD COMPLEX 30 MIN: CPT

## 2025-05-22 PROCEDURE — 93306 TTE W/DOPPLER COMPLETE: CPT

## 2025-05-22 PROCEDURE — 97535 SELF CARE MNGMENT TRAINING: CPT

## 2025-05-22 PROCEDURE — 85027 COMPLETE CBC AUTOMATED: CPT

## 2025-05-22 PROCEDURE — 700102 HCHG RX REV CODE 250 W/ 637 OVERRIDE(OP): Performed by: STUDENT IN AN ORGANIZED HEALTH CARE EDUCATION/TRAINING PROGRAM

## 2025-05-22 PROCEDURE — 93306 TTE W/DOPPLER COMPLETE: CPT | Mod: 26 | Performed by: INTERNAL MEDICINE

## 2025-05-22 PROCEDURE — A9270 NON-COVERED ITEM OR SERVICE: HCPCS | Performed by: STUDENT IN AN ORGANIZED HEALTH CARE EDUCATION/TRAINING PROGRAM

## 2025-05-22 PROCEDURE — 84100 ASSAY OF PHOSPHORUS: CPT

## 2025-05-22 PROCEDURE — 97166 OT EVAL MOD COMPLEX 45 MIN: CPT

## 2025-05-22 PROCEDURE — 82962 GLUCOSE BLOOD TEST: CPT | Mod: 91

## 2025-05-22 PROCEDURE — 80048 BASIC METABOLIC PNL TOTAL CA: CPT

## 2025-05-22 PROCEDURE — 83735 ASSAY OF MAGNESIUM: CPT

## 2025-05-22 PROCEDURE — 99239 HOSP IP/OBS DSCHRG MGMT >30: CPT

## 2025-05-22 RX ADMIN — AMLODIPINE BESYLATE 10 MG: 10 TABLET ORAL at 04:54

## 2025-05-22 RX ADMIN — PAROXETINE HYDROCHLORIDE 40 MG: 20 TABLET, FILM COATED ORAL at 04:54

## 2025-05-22 RX ADMIN — METOPROLOL TARTRATE 100 MG: 50 TABLET, FILM COATED ORAL at 04:55

## 2025-05-22 RX ADMIN — ATORVASTATIN CALCIUM 20 MG: 20 TABLET, FILM COATED ORAL at 04:55

## 2025-05-22 RX ADMIN — INSULIN LISPRO 1 UNITS: 100 INJECTION, SOLUTION INTRAVENOUS; SUBCUTANEOUS at 11:59

## 2025-05-22 ASSESSMENT — COGNITIVE AND FUNCTIONAL STATUS - GENERAL
MOVING TO AND FROM BED TO CHAIR: A LITTLE
WALKING IN HOSPITAL ROOM: A LITTLE
STANDING UP FROM CHAIR USING ARMS: A LITTLE
CLIMB 3 TO 5 STEPS WITH RAILING: TOTAL
MOBILITY SCORE: 18
SUGGESTED CMS G CODE MODIFIER DAILY ACTIVITY: CJ
TOILETING: A LITTLE
HELP NEEDED FOR BATHING: A LITTLE
DAILY ACTIVITIY SCORE: 21
DRESSING REGULAR LOWER BODY CLOTHING: A LITTLE
SUGGESTED CMS G CODE MODIFIER MOBILITY: CK

## 2025-05-22 ASSESSMENT — ACTIVITIES OF DAILY LIVING (ADL): TOILETING: INDEPENDENT

## 2025-05-22 ASSESSMENT — FIBROSIS 4 INDEX: FIB4 SCORE: 2.857738033247041115

## 2025-05-22 NOTE — PROGRESS NOTES
Monitor Summary  Rhythm: Atrial Flutter  Rate: 51-93  Ectopy: PVCs  .- / .05 / .-    12 hr Chart check.

## 2025-05-22 NOTE — CARE PLAN
The patient is Stable - Low risk of patient condition declining or worsening    Shift Goals  Clinical Goals: monitor respiratory status, maintain skin integrity  Patient Goals: comfort  Family Goals: POOJA    Progress made toward(s) clinical / shift goals:      Patient is not progressing towards the following goals:      Problem: Skin Integrity  Goal: Skin integrity is maintained or improved  Outcome: Progressing  Note: Assess skin and monitor for skin breakdown. Alleviate pressure to bony prominences and provide assistance with turning, repositioning, ROM and mobility as appropriate. Q2 turn with wedges, float boots in place       Problem: Respiratory:  Goal: Respiratory status will improve  Outcome: Progressing  Note: Assess and monitor pulmonary status. Adjust oxygen as needed to maintain adequate saturation. Encourage ambulation, turning, coughing and deep breathing.

## 2025-05-22 NOTE — THERAPY
"Occupational Therapy   Initial Evaluation     Patient Name: Pj Britt  Age:  64 y.o., Sex:  male  Medical Record #: [id number]  Today's Date: 5/22/2025     Precautions  Precautions: Fall Risk  Comments: blind; R ankle sx 4/1, was TTWB; as of 5/8 \"He can begin doing some weightbearing utilizing assistive devices as needed.  He should be doing this in a tall cam boot for support at all times while weightbearing\" CAM boot at home    Assessment  Patient is 64 y.o. male admitted 5/20 with SOB found to have B pleural effusions L>R; s/p thoracentesis on 5/20 and 5/21. He has a hx of HTN, T2DM, Afib on Eliquis, ESRD on HD MWF, B blindness; 3L o2 baseline, and recent R ankle surgeries.     No further acute OT needs; would benefit from home health OT to increase independence with functional mobility and work back towards showering.    Plan    Occupational Therapy Initial Treatment Plan   Duration: Discharge Needs Only    DC Equipment Recommendations: Tub Transfer Bench  Discharge Recommendations: Recommend home health for continued occupational therapy services      Objective       05/22/25 0931   Prior Living Situation   Prior Services Skilled Home Health Services   Housing / Facility [address]   Steps Into Home 0  (ramp)   Bathroom Set up   (sponge bathing only)   Equipment Owned Front-Wheel Walker;Wheelchair   Lives with - Patient's Self Care Capacity Spouse   Comments reports his wife is usually home and can assist as needed. however she is also blind and they have other family that comes to help. wife reports that his w/c fits into his bathroom okay. he has only been spongebathing.   Prior Level of ADL Function   Self Feeding Independent   Grooming / Hygiene Independent   Bathing Independent   Dressing Independent   Toileting Independent   Prior Level of IADL Function   Prior Level Of Mobility Uses Wheel Chair for Community Mobility;Uses Wheel Chair for in Home Mobility   Precautions   Precautions Fall Risk " "  Comments blind; R ankle sx 4/1, was TTWB; as of 5/8 \"He can begin doing some weightbearing utilizing assistive devices as needed.  He should be doing this in a tall cam boot for support at all times while weightbearing\" CAM boot at home   Vitals   Vitals Comments 1L NC; VSS   Pain 0 - 10 Group   Therapist Pain Assessment Nurse Notified;0   Cognition    Cognition / Consciousness X   Speech/ Communication Delayed Responses   Level of Consciousness Alert   Comments pleasant and cooperative, at times would not answer a question unless max prompting provided   Active ROM Upper Body   Active ROM Upper Body  WDL   Strength Upper Body   Comments functional for ADLs/txfs   Balance Assessment   Sitting Balance (Static) Fair +   Sitting Balance (Dynamic) Fair   Standing Balance (Static) Fair +   Standing Balance (Dynamic) Fair   Weight Shift Sitting Fair   Weight Shift Standing Poor   Comments for squat pivot; pt doing slightly more than ttwb   Bed Mobility    Scooting Supervised   Comments eob w/ PT   ADL Assessment   Grooming Supervision;Seated   Lower Body Dressing Supervision  (don/doff socks via figure 4)   Toileting   (declined need or concern)   Comments has only been spongebathing since his surgery   How much help from another person does the patient currently need...   Putting on and taking off regular lower body clothing? 3   Bathing (including washing, rinsing, and drying)? 3   Toileting, which includes using a toilet, bedpan, or urinal? 3   Putting on and taking off regular upper body clothing? 4   Taking care of personal grooming such as brushing teeth? 4   Eating meals? 4   6 Clicks Daily Activity Score 21   Functional Mobility   Sit to Stand Contact Guard Assist   Bed, Chair, Wheelchair Transfer Contact Guard Assist   Transfer Method Squat Pivot   Mobility EOB>chair   Comments no AD                   "

## 2025-05-22 NOTE — PROGRESS NOTES
Discharge orders received.  Patient arrived to the discharge lounge.  PIV removed.  Instructions given, medications reviewed and general discharge education provided to patient.  Follow up appointments discussed.  Patient verbalized understanding of dc instructions and prescriptions.  Patient signed discharge instructions.  Patient verbalized understanding had all belongings with him.  Patient left with family.  Wished patient a speedy recovery.

## 2025-05-22 NOTE — DISCHARGE SUMMARY
Discharge Summary    CHIEF COMPLAINT ON ADMISSION  Chief Complaint   Patient presents with    Sent by MD    Shortness of Breath     BIBA transfer from [facility] for SOB with associated chest pain. AAOx4. Denies other complaints       Reason for Admission  EMS     Admission Date  5/20/2025    CODE STATUS  Full Code    HPI & HOSPITAL COURSE    64-year-old male with T2DM, Afib not on Eliquis, ESRD on HD, blindness who presented as a transfer from [facility] 5/20/2025 for SOB.  On 5/2024 a year ago, patient presented for similar symptoms and was found to have bilateral pleural effusions requiring urgent hemodialysis and urgent thoracentesis.  His pathology then was negative for any malignancy.    Patient stated that he was compliant with his dialysis appointment.  He is anuric.  For the last few days he was noted to have progressive worsening shortness of breath and sharp left-sided rib pain worse with inspiration.  Given worsening and persistent symptoms, he presented to the ED.    On initial presentation at [facility] for this admission, patient was once again found back with bilateral pleural effusions, left greater than right.  He was hypertensive with a BNP of 33,000.  He is admitted for thoracentesis of his bilateral pleural effusions.    On 5/20 he underwent thoracentesis of the right side and obtained 1.7 L.  Left-sided thoracentesis done on 5/21.  Postthoracentesis chest x-rays negative for any pneumothorax.  He had some increased interstitial opacities throughout the lungs after the left-sided thoracentesis however this was before his dialysis.  He did not exhibit any signs of pneumonia so abx deferred.  Lights criteria transudative with samples taken from the right thoracentesis for total protein ratio.  Pathology in process at time of discharge. Echo was done to evaluate for possible cardiac causes of effusion but was overall unremarkable-mild concentric LVH, normal LVSF, mild MR.   Recurrent pleural effusions and pulmonary edema likely in the setting of ESRD.  Recommend follow-up with nephrology.  Eventually patient symptomatically improved and was able to be weaned down to baseline oxygen.     Regarding his Eliquis for his atrial fibrillation.  He has a [id number] of 3.  He has been off his Eliquis apparently since 12/2024.  He broke his ankle earlier this year and has been requiring multiple surgeries complicated by bleeding and so was recommended for him to stop.  On discussion with patient and wife, they want to keep holding this medication and talk with his cardiologist.  They have an upcoming appointment on 6/5/2025.    Therefore, he is discharged in fair and stable condition to home with close outpatient follow-up.    The patient met 2-midnight criteria for an inpatient stay at the time of discharge.    Discharge Date  5/22/2025    FOLLOW UP ITEMS POST DISCHARGE  Follow up with cardiology   Follow up with PCP   Follow up with nephrology     DISCHARGE DIAGNOSES  Principal Problem (Resolved):    Acute hypoxemic respiratory failure (HCC) (POA: Yes)  Active Problems:    Hyperlipidemia (Chronic) (POA: Yes)    Type 2 diabetes mellitus with nephropathy, and retinopathy (HCC) (Chronic) (POA: Yes)    ESRD on hemodialysis (HCC) (POA: Unknown)  Resolved Problems:    Paroxysmal atrial fibrillation (HCC) (POA: Unknown)    Pleural effusion, bilateral (POA: Yes)      FOLLOW UP  Future Appointments   Date Time Provider Department Center   7/10/2025  8:15 AM Ady Goncalves M.D. [id number]  [facility] [id number]  [phone]        Faisal Germain M.D.  [address] [facility] [address]  [phone]    Call in 1 week(s)        MEDICATIONS ON DISCHARGE     Medication List        CONTINUE taking these medications        Instructions   acetaminophen 500 MG Tabs  Commonly known as: Tylenol   Take 2 tabs up to 3 times a day prn  pain     amLODIPine 10 MG Tabs  Commonly known as: Norvasc   Take 10 mg by mouth 2 times a day.  Dose: 10 mg     atorvastatin 20 MG Tabs  Commonly known as: Lipitor   Acres Green 1 tableta por vía oral diariamente.  (Take 1 Tablet by mouth every day.)  Dose: 20 mg     calcium acetate 667 MG Tabs tablet  Commonly known as: Phos-Lo   Doctor's comments: Patient takes 2 or 3 capsules depending on size of the meal.  Take 3 Tablets by mouth 3 times a day with meals.  Dose: 2,001 mg     gabapentin 300 MG Caps  Commonly known as: Neurontin   Take 1 po qhs     hydrOXYzine pamoate 50 MG Caps  Commonly known as: Vistaril   Take 1 Capsule by mouth 3 times a day as needed for Itching or Other (nausea, anxiety, insomnia).  Dose: 50 mg     insulin  UNIT/ML Susp  Commonly known as: HumuLIN/NovoLIN N   Inject 20 Units under the skin every morning.  Dose: 20 Units     metoprolol tartrate 100 MG Tabs  Commonly known as: Lopressor   Acres Green 1 tableta por vía oral 2 veces al día.  (Take 1 Tablet by mouth 2 times a day.)  Dose: 100 mg     paroxetine 40 MG tablet  Commonly known as: Paxil   Take 1 Tablet by mouth every day.  Dose: 40 mg            STOP taking these medications      apixaban 2.5mg Tabs  Commonly known as: Eliquis     Vitamin C 1000 MG Tabs              Allergies  Allergies[1]    DIET  Orders Placed This Encounter   Procedures    Diet Order Diet: Renal     Standing Status:   Standing     Number of Occurrences:   1     Diet::   Renal [8]       ACTIVITY  As tolerated.  Weight bearing as tolerated    CONSULTATIONS  Nephrology    PROCEDURES  -    LABORATORY  Lab Results   Component Value Date    SODIUM 135 05/22/2025    POTASSIUM 4.4 05/22/2025    CHLORIDE 95 (L) 05/22/2025    CO2 30 05/22/2025    GLUCOSE 119 (H) 05/22/2025    BUN 15 05/22/2025    CREATININE 2.74 (H) 05/22/2025        Lab Results   Component Value Date    WBC 5.4 05/22/2025    HEMOGLOBIN 9.8 (L) 05/22/2025    HEMATOCRIT 31.8 (L) 05/22/2025    PLATELETCT 132 (L)  05/22/2025        Total time of the discharge process exceeds 35 minutes.       [1]   Allergies  Allergen Reactions    Aspirin Unspecified     Cannot have because on Dialysis      Normal rate, regular rhythm.  Heart sounds S1, S2.  No murmurs, rubs or gallops.

## 2025-05-22 NOTE — THERAPY
"Physical Therapy   Initial Evaluation     Patient Name: Pj Britt  Age:  64 y.o., Sex:  male  Medical Record #: [id number]  Today's Date: 5/22/2025     Precautions  Precautions: Fall Risk  Comments: R ankle sx 4/1, was TTWB    Assessment  Patient is a 64 y.o. male with hx of T2DM, Afib on Eliquis, ESRD on HD, and blindness. Pt admitted from OSH with acute hypoxic respiratory failure and bilateral pleural effusions s/p bilateral thoracentesis. Pt's wife reported that pt recently had a right ankle surgery on 4/1 for which he was TTWB. Per EMR, pt is allowed to start \"some weight bearing\" as of 5/8 as long as he has his CAM boot on. PT eval complete, pt currently presents at/near his baseline and completed all mobility at SBA-SPV level with no AD. Pt is independent at WC level with ambulation deferred as pt did not have his CAM boot with him today. Anticipate safe DC home with family support and HHPT once medically cleared.  Patient will not be actively followed for physical therapy services at this time, however may be seen if requested by physician for 1 more visit within 30 days to address any discharge or equipment needs.     Plan    Physical Therapy Initial Treatment Plan   Duration: Discharge Needs Only    DC Equipment Recommendations: None  Discharge Recommendations: Recommend home health for continued physical therapy services         Vitals   O2 (LPM) 1   O2 Delivery Device Silicone Nasal Cannula   Prior Living Situation   Prior Services Skilled Home Health Services   Housing / Facility 1 [facility]   Steps Into Home 0  (ramp)   Equipment Owned Front-Wheel Walker;Wheelchair   Lives with - Patient's Self Care Capacity Spouse   Comments reports his wife is usually home and can assist as needed. however she is also blind and they have other family that comes to help   Prior Level of Functional Mobility   Bed Mobility Independent   Transfer Status Independent   Ambulation Dependent   Assistive Devices Used " Wheelchair   Wheelchair Independent   Comments has not walked since injuring his right ankle in November   Cognition    Cognition / Consciousness X   Speech/ Communication Delayed Responses  (needing increased prompting to answer questions at times)   Level of Consciousness Alert   Comments pleasant and participatory   Passive ROM Lower Body   Passive ROM Lower Body WDL   Active ROM Lower Body    Active ROM Lower Body  WDL   Strength Lower Body   Lower Body Strength  WDL   Coordination Lower Body    Coordination Lower Body  WDL   Vision   Vision Comments blind at baseline   Balance Assessment   Sitting Balance (Static) Fair +   Sitting Balance (Dynamic) Fair +   Standing Balance (Static) Fair   Standing Balance (Dynamic) Fair -   Weight Shift Sitting Good   Comments holding onto rail/chair as needed   Bed Mobility    Supine to Sit Supervised   Scooting Supervised   Rolling Supervised   Gait Analysis   Comments NT due to no boot   Functional Mobility   Sit to Stand Standby Assist   Bed, Chair, Wheelchair Transfer Standby Assist   Transfer Method Stand Pivot   Mobility eob>chair   6 Clicks Assessment - How much HELP from from another person do you currently need... (If the patient hasn't done an activity recently, how much help from another person do you think he/she would need if he/she tried?)   Turning from your back to your side while in a flat bed without using bedrails? 4   Moving from lying on your back to sitting on the side of a flat bed without using bedrails? 4   Moving to and from a bed to a chair (including a wheelchair)? 3   Standing up from a chair using your arms (e.g., wheelchair, or bedside chair)? 3   Walking in hospital room? 3   Climbing 3-5 steps with a railing? 1   6 clicks Mobility Score 18   Activity Tolerance   Comments no overt pain, SOB, or fatigue   Education Group   Education Provided Role of Physical Therapist   Role of Physical Therapist Patient Response Patient;Significant  Other;Acceptance;Explanation;Verbal Demonstration   Additional Comments education with pt and his wife on pt's CLOF, WB precautions based on ortho notes, HHPT, advancing to walking with PT supervision   Physical Therapy Initial Treatment Plan    Duration Discharge Needs Only   Problem List    Problems None   Anticipated Discharge Equipment and Recommendations   DC Equipment Recommendations None   Discharge Recommendations Recommend home health for continued physical therapy services   Interdisciplinary Plan of Care Collaboration   IDT Collaboration with  Family / Caregiver;Nursing;Occupational Therapist   Patient Position at End of Therapy Seated;Family / Friend in Room  (OT with pt)   Collaboration Comments RN updated; handoff to OT   Session Information   Date / Session Number  5/22- 1x only (DC needs)

## 2025-05-22 NOTE — PROGRESS NOTES
"[facility] -  PROGRESS NOTE               Author: Maurice Rivera M.D. Date & Time: 5/22/2025  9:50 AM     HPI:  65 yo M PMH ESRD MWF iHD in [place] at [facility], HTN, Type 2 DM, anemia of CKD, and CKD-MBD who presents to [facility] after presenting to [facility] ER with shortness of breath associated with Left-sided rib pain that worsens with deep inspiration. On arrival to ED he was tachypneic on 5L NC. He reports he has not missed any HD recently, last tx was MON.  He hasn't been eating or drinking well due to poor appetite.  CXR at OSH showed bilateral pleural effusions, Left greater than right. Resting in bed comfortably on 3L NC.   Labs revealed hemoglobin 10.3, sodium 137, K 5.0, glucose 208, phos 1.9, Mg 2.7, BNP 40165.  Ultrasound-guided Right sided thoracocentesis performed in the ED with 1.7L fluid removed.      We have been consulted for evaluation and management of acute and chronic conditions related to Nephrology      No F/C/N/V/CP.  No melena, hematochezia, hematemesis.  No HA, visual changes, or abdominal pain.    DAILY NEPHROLOGY SUMMARY:  5/21 - No new overnight renal events. HD today.  5/22 - No new overnight renal events. S/p HD yesterday with net UF of 2L and tolerated well.     REVIEW OF SYSTEMS:    10 point ROS reviewed and is as per HPI/daily summary or otherwise negative    PMH/PSH/SH/FH:   Reviewed and unchanged since admission note    CURRENT MEDICATIONS:   Reviewed from admission to present day    VS:  /51   Pulse 67   Temp 36.8 °C (98.2 °F) (Temporal)   Resp 16   Ht 1.702 m (5' 7\")   Wt 59 kg (130 lb 1.1 oz)   SpO2 98%   BMI 20.37 kg/m²     Physical Exam  Vitals and nursing note reviewed.       Constitutional:       Appearance: Normal appearance.   Eyes:      General: No scleral icterus.  Cardiovascular:      Rate and Rhythm: Normal rate.      Comments: No edema   +LFA AVF +b/t aneurysmal   Pulmonary:      Effort: Pulmonary effort is normal. No " respiratory distress.      Breath sounds: Normal breath sounds. No stridor. No wheezing, rhonchi or rales.      Comments:  diminished   Abdominal:      General: There is no distension.   Musculoskeletal:         General: No deformity.      Right lower leg: No edema.      Left lower leg: No edema.   Skin:     Findings: No rash.   Neurological:      General: No focal deficit present.      Mental Status: He is alert.   Psychiatric:         Mood and Affect: Mood normal.         Behavior: Behavior normal. Behavior is cooperative.     Fluids:  In: 840 [P.O.:340; Dialysis:500]  Out: 2500     LABS:  Recent Labs     05/21/25  0147 05/21/25  0857 05/22/25  0151   SODIUM 135 133* 135   POTASSIUM 4.2 4.3 4.4   CHLORIDE 93* 91* 95*   CO2 31 31 30   GLUCOSE 97 126* 119*   BUN 30* 33* 15   CREATININE 3.99* 4.36* 2.74*   CALCIUM 8.7 9.0 8.6       IMAGING:   All imaging reviewed from admission to present day    IMPRESSION:  # ESRD  - Etiology likely 2/2 HTN/DM  - MWF iHD at [facility]  - via AVF(+thrill/bruit)  # Acute resp on chronic resp failure with hypoxia  - likely fluid contributing   # Pleural effusion   - s/p thoracentesis (5/19) 1.7 L off  - hx thoracentesis 05/2025, (-) for malignancy  # HTN  - Goal BP < 140/90  # Anemia of CKD  - Goal Hgb 10-11  # CKD-MBD  # Type II diabetes   # Afib   - On eliquis   # Hyponatremia, mild  - volume contributing  - Correct with HD   # thrombocytopenia  - monitor     PLAN:  - No plans for iHD today  - MWF iHD and PRN schedule  - UF as tolerated  - No dietary protein restrictions  - Dose all meds per ESRD  - Renal diet

## 2025-05-22 NOTE — DISCHARGE PLANNING
Care Transition Team Discharge Planning    Anticipated Discharge Information  Discharge Disposition: D/T to home under [facility]HA care in anticipation of covered skilled care (06)  Discharge Address: [address] [place] [address]  Discharge Contact Phone Number: [phone]    Discharge Plan:  Patient discussed in IDT rounds. MC for DC today. PT recommending HH. Requested order from MD. RNCM met with patient and spouse and  friends at bedside. Choice obtained for [employer], this is the company that they are happiest with, faxed to DPA.     1215: Patient accepted by [employer]

## 2025-05-22 NOTE — PROGRESS NOTES
Bedside report received from off going RN: Jessica, assumed care of patient.     Fall Risk Score: HIGH RISK  Fall risk interventions in place: Place yellow fall risk ID band on patient, Provide patient/family education based on risk assessment, Educate patient/family to call staff for assistance when getting out of bed, Place fall precaution signage outside patient door, Utilize bed/chair fall alarm, and Notify charge of high risk for huddle  Bed type: Regular (Ashok Score less than 17 interventions in place)  Patient on cardiac monitor: Yes  IVF/IV medications: Not Applicable   Oxygen: How many liters 1L, Traced the line to wall oxygen, and No oxygen tank in room  Bedside sitter: Not Applicable   Isolation: Not applicable

## 2025-05-23 LAB
BACTERIA FLD AEROBE CULT: NORMAL
GRAM STN SPEC: NORMAL
SIGNIFICANT IND 70042: NORMAL
SITE SITE: NORMAL
SOURCE SOURCE: NORMAL

## 2025-05-23 NOTE — DISCHARGE PLANNING
-[phone]  DPA left message for Mouna at [facility], informing that pt wishes to switch to [facility] and requesting a call back with info on if this is possible.

## 2025-07-15 LAB
FINAL REPORT Q0603: NORMAL
PRELIMINARY RPT Q0601: NORMAL
RHODAMINE-AURAMINE STN SPEC: NORMAL

## 2025-08-05 ENCOUNTER — HOSPITAL ENCOUNTER (INPATIENT)
Facility: MEDICAL CENTER | Age: 65
LOS: 6 days | DRG: 189 | End: 2025-08-11
Attending: HOSPITALIST | Admitting: STUDENT IN AN ORGANIZED HEALTH CARE EDUCATION/TRAINING PROGRAM
Payer: MEDICARE

## 2025-08-05 ENCOUNTER — APPOINTMENT (OUTPATIENT)
Dept: RADIOLOGY | Facility: MEDICAL CENTER | Age: 65
DRG: 189 | End: 2025-08-05
Attending: STUDENT IN AN ORGANIZED HEALTH CARE EDUCATION/TRAINING PROGRAM
Payer: MEDICARE

## 2025-08-05 DIAGNOSIS — Z99.2 ESRD NEEDING DIALYSIS (HCC): ICD-10-CM

## 2025-08-05 DIAGNOSIS — M48.02 SPINAL STENOSIS IN CERVICAL REGION: ICD-10-CM

## 2025-08-05 DIAGNOSIS — E11.610: ICD-10-CM

## 2025-08-05 DIAGNOSIS — I16.9 HYPERTENSIVE CRISIS: ICD-10-CM

## 2025-08-05 DIAGNOSIS — D68.69 SECONDARY HYPERCOAGULABLE STATE (HCC): ICD-10-CM

## 2025-08-05 DIAGNOSIS — T84.84XA PAINFUL ORTHOPAEDIC HARDWARE (HCC): ICD-10-CM

## 2025-08-05 DIAGNOSIS — R79.89 ELEVATED TROPONIN: ICD-10-CM

## 2025-08-05 DIAGNOSIS — G89.29 CHRONIC ABDOMINAL PAIN: ICD-10-CM

## 2025-08-05 DIAGNOSIS — N18.6 ESRD NEEDING DIALYSIS (HCC): ICD-10-CM

## 2025-08-05 DIAGNOSIS — E11.42 DIABETIC POLYNEUROPATHY ASSOCIATED WITH TYPE 2 DIABETES MELLITUS (HCC): ICD-10-CM

## 2025-08-05 DIAGNOSIS — M54.12 CERVICAL RADICULOPATHY: ICD-10-CM

## 2025-08-05 DIAGNOSIS — G95.9 CERVICAL MYELOPATHY (HCC): ICD-10-CM

## 2025-08-05 DIAGNOSIS — J18.9 HCAP (HEALTHCARE-ASSOCIATED PNEUMONIA): ICD-10-CM

## 2025-08-05 DIAGNOSIS — M79.671 PAIN IN RIGHT FOOT: ICD-10-CM

## 2025-08-05 DIAGNOSIS — E27.8 ADRENAL INCIDENTALOMA (HCC): Chronic | ICD-10-CM

## 2025-08-05 DIAGNOSIS — Z99.81 ON HOME OXYGEN THERAPY: Chronic | ICD-10-CM

## 2025-08-05 DIAGNOSIS — F39 MOOD DISORDER (HCC): Chronic | ICD-10-CM

## 2025-08-05 DIAGNOSIS — J90 PLEURAL EFFUSION: ICD-10-CM

## 2025-08-05 DIAGNOSIS — R78.81 POSITIVE BLOOD CULTURE: ICD-10-CM

## 2025-08-05 DIAGNOSIS — R12 HEARTBURN: Chronic | ICD-10-CM

## 2025-08-05 DIAGNOSIS — Z71.89 GOALS OF CARE, COUNSELING/DISCUSSION: ICD-10-CM

## 2025-08-05 DIAGNOSIS — M47.12 CERVICAL SPONDYLOSIS WITH MYELOPATHY: ICD-10-CM

## 2025-08-05 DIAGNOSIS — Z47.89 ORTHOPEDIC AFTERCARE: Primary | ICD-10-CM

## 2025-08-05 DIAGNOSIS — E43 SEVERE PROTEIN-CALORIE MALNUTRITION (HCC): ICD-10-CM

## 2025-08-05 DIAGNOSIS — R10.9 CHRONIC ABDOMINAL PAIN: ICD-10-CM

## 2025-08-05 DIAGNOSIS — R50.9 FEVER, UNKNOWN ORIGIN: ICD-10-CM

## 2025-08-05 DIAGNOSIS — R91.8 BILATERAL PULMONARY INFILTRATES ON CHEST X-RAY: ICD-10-CM

## 2025-08-05 DIAGNOSIS — J96.21 ACUTE ON CHRONIC RESPIRATORY FAILURE WITH HYPOXIA (HCC): ICD-10-CM

## 2025-08-05 PROBLEM — J96.20 ACUTE ON CHRONIC RESPIRATORY FAILURE (HCC): Status: ACTIVE | Noted: 2025-08-05

## 2025-08-05 LAB
ALBUMIN SERPL BCP-MCNC: 4.1 G/DL (ref 3.2–4.9)
ALBUMIN/GLOB SERPL: 1.5 G/DL
ALP SERPL-CCNC: 123 U/L (ref 30–99)
ALT SERPL-CCNC: 7 U/L (ref 2–50)
ANION GAP SERPL CALC-SCNC: 14 MMOL/L (ref 7–16)
AST SERPL-CCNC: 15 U/L (ref 12–45)
BASOPHILS # BLD AUTO: 1 % (ref 0–1.8)
BASOPHILS # BLD: 0.06 K/UL (ref 0–0.12)
BILIRUB SERPL-MCNC: 0.6 MG/DL (ref 0.1–1.5)
BUN SERPL-MCNC: 24 MG/DL (ref 8–22)
CALCIUM ALBUM COR SERPL-MCNC: 9.6 MG/DL (ref 8.5–10.5)
CALCIUM SERPL-MCNC: 9.7 MG/DL (ref 8.5–10.5)
CHLORIDE SERPL-SCNC: 92 MMOL/L (ref 96–112)
CK SERPL-CCNC: 22 U/L (ref 0–154)
CO2 SERPL-SCNC: 29 MMOL/L (ref 20–33)
CORTIS SERPL-MCNC: 15 UG/DL (ref 0–23)
CREAT SERPL-MCNC: 3.45 MG/DL (ref 0.5–1.4)
CRP SERPL HS-MCNC: <0.3 MG/DL (ref 0–0.75)
EOSINOPHIL # BLD AUTO: 0.15 K/UL (ref 0–0.51)
EOSINOPHIL NFR BLD: 2.5 % (ref 0–6.9)
ERYTHROCYTE [DISTWIDTH] IN BLOOD BY AUTOMATED COUNT: 55.3 FL (ref 35.9–50)
ERYTHROCYTE [SEDIMENTATION RATE] IN BLOOD BY WESTERGREN METHOD: 10 MM/HOUR (ref 0–20)
GFR SERPLBLD CREATININE-BSD FMLA CKD-EPI: 19 ML/MIN/1.73 M 2
GLOBULIN SER CALC-MCNC: 2.8 G/DL (ref 1.9–3.5)
GLUCOSE BLD STRIP.AUTO-MCNC: 156 MG/DL (ref 65–99)
GLUCOSE SERPL-MCNC: 149 MG/DL (ref 65–99)
HCT VFR BLD AUTO: 37 % (ref 42–52)
HGB BLD-MCNC: 11.5 G/DL (ref 14–18)
IMM GRANULOCYTES # BLD AUTO: 0.02 K/UL (ref 0–0.11)
IMM GRANULOCYTES NFR BLD AUTO: 0.3 % (ref 0–0.9)
INR PPP: 1.25 (ref 0.87–1.13)
LACTATE SERPL-SCNC: 1.1 MMOL/L (ref 0.5–2)
LDH SERPL L TO P-CCNC: 205 U/L (ref 107–266)
LYMPHOCYTES # BLD AUTO: 0.73 K/UL (ref 1–4.8)
LYMPHOCYTES NFR BLD: 12.2 % (ref 22–41)
MAGNESIUM SERPL-MCNC: 2.4 MG/DL (ref 1.5–2.5)
MCH RBC QN AUTO: 29.4 PG (ref 27–33)
MCHC RBC AUTO-ENTMCNC: 31.1 G/DL (ref 32.3–36.5)
MCV RBC AUTO: 94.6 FL (ref 81.4–97.8)
MONOCYTES # BLD AUTO: 0.58 K/UL (ref 0–0.85)
MONOCYTES NFR BLD AUTO: 9.7 % (ref 0–13.4)
NEUTROPHILS # BLD AUTO: 4.43 K/UL (ref 1.82–7.42)
NEUTROPHILS NFR BLD: 74.3 % (ref 44–72)
NRBC # BLD AUTO: 0 K/UL
NRBC BLD-RTO: 0 /100 WBC (ref 0–0.2)
NT-PROBNP SERPL IA-MCNC: ABNORMAL PG/ML (ref 0–125)
PHOSPHATE SERPL-MCNC: 2.3 MG/DL (ref 2.5–4.5)
PLATELET # BLD AUTO: 140 K/UL (ref 164–446)
PMV BLD AUTO: 11.8 FL (ref 9–12.9)
POTASSIUM SERPL-SCNC: 4.7 MMOL/L (ref 3.6–5.5)
PROCALCITONIN SERPL-MCNC: 0.1 NG/ML
PROT SERPL-MCNC: 6.9 G/DL (ref 6–8.2)
PROTHROMBIN TIME: 15.7 SEC (ref 12–14.6)
RBC # BLD AUTO: 3.91 M/UL (ref 4.7–6.1)
SODIUM SERPL-SCNC: 135 MMOL/L (ref 135–145)
WBC # BLD AUTO: 6 K/UL (ref 4.8–10.8)

## 2025-08-05 PROCEDURE — 85610 PROTHROMBIN TIME: CPT

## 2025-08-05 PROCEDURE — 82533 TOTAL CORTISOL: CPT

## 2025-08-05 PROCEDURE — 83880 ASSAY OF NATRIURETIC PEPTIDE: CPT

## 2025-08-05 PROCEDURE — 85025 COMPLETE CBC W/AUTO DIFF WBC: CPT

## 2025-08-05 PROCEDURE — 71045 X-RAY EXAM CHEST 1 VIEW: CPT

## 2025-08-05 PROCEDURE — 85652 RBC SED RATE AUTOMATED: CPT

## 2025-08-05 PROCEDURE — 36415 COLL VENOUS BLD VENIPUNCTURE: CPT

## 2025-08-05 PROCEDURE — 80053 COMPREHEN METABOLIC PANEL: CPT

## 2025-08-05 PROCEDURE — 84100 ASSAY OF PHOSPHORUS: CPT

## 2025-08-05 PROCEDURE — 83605 ASSAY OF LACTIC ACID: CPT

## 2025-08-05 PROCEDURE — 82962 GLUCOSE BLOOD TEST: CPT | Performed by: STUDENT IN AN ORGANIZED HEALTH CARE EDUCATION/TRAINING PROGRAM

## 2025-08-05 PROCEDURE — 82550 ASSAY OF CK (CPK): CPT

## 2025-08-05 PROCEDURE — 86140 C-REACTIVE PROTEIN: CPT

## 2025-08-05 PROCEDURE — 700102 HCHG RX REV CODE 250 W/ 637 OVERRIDE(OP): Performed by: STUDENT IN AN ORGANIZED HEALTH CARE EDUCATION/TRAINING PROGRAM

## 2025-08-05 PROCEDURE — 99223 1ST HOSP IP/OBS HIGH 75: CPT | Mod: AI | Performed by: STUDENT IN AN ORGANIZED HEALTH CARE EDUCATION/TRAINING PROGRAM

## 2025-08-05 PROCEDURE — 700111 HCHG RX REV CODE 636 W/ 250 OVERRIDE (IP): Mod: JZ | Performed by: STUDENT IN AN ORGANIZED HEALTH CARE EDUCATION/TRAINING PROGRAM

## 2025-08-05 PROCEDURE — 84145 PROCALCITONIN (PCT): CPT

## 2025-08-05 PROCEDURE — 87040 BLOOD CULTURE FOR BACTERIA: CPT

## 2025-08-05 PROCEDURE — 83615 LACTATE (LD) (LDH) ENZYME: CPT

## 2025-08-05 PROCEDURE — 93005 ELECTROCARDIOGRAM TRACING: CPT | Mod: TC | Performed by: STUDENT IN AN ORGANIZED HEALTH CARE EDUCATION/TRAINING PROGRAM

## 2025-08-05 PROCEDURE — 770020 HCHG ROOM/CARE - TELE (206)

## 2025-08-05 PROCEDURE — A9270 NON-COVERED ITEM OR SERVICE: HCPCS | Performed by: STUDENT IN AN ORGANIZED HEALTH CARE EDUCATION/TRAINING PROGRAM

## 2025-08-05 PROCEDURE — 83735 ASSAY OF MAGNESIUM: CPT

## 2025-08-05 RX ORDER — GUAIFENESIN/DEXTROMETHORPHAN 100-10MG/5
10 SYRUP ORAL EVERY 6 HOURS PRN
Status: DISCONTINUED | OUTPATIENT
Start: 2025-08-05 | End: 2025-08-11 | Stop reason: HOSPADM

## 2025-08-05 RX ORDER — FUROSEMIDE 10 MG/ML
80 INJECTION INTRAMUSCULAR; INTRAVENOUS 2 TIMES DAILY
Status: DISCONTINUED | OUTPATIENT
Start: 2025-08-05 | End: 2025-08-06

## 2025-08-05 RX ORDER — AMLODIPINE BESYLATE 10 MG/1
10 TABLET ORAL 2 TIMES DAILY
Status: DISCONTINUED | OUTPATIENT
Start: 2025-08-05 | End: 2025-08-05

## 2025-08-05 RX ORDER — MONTELUKAST SODIUM 10 MG/1
10 TABLET ORAL NIGHTLY
Status: DISCONTINUED | OUTPATIENT
Start: 2025-08-05 | End: 2025-08-07

## 2025-08-05 RX ORDER — AMOXICILLIN 250 MG
2 CAPSULE ORAL EVERY EVENING
Status: DISCONTINUED | OUTPATIENT
Start: 2025-08-05 | End: 2025-08-11 | Stop reason: HOSPADM

## 2025-08-05 RX ORDER — SODIUM CHLORIDE, SODIUM LACTATE, POTASSIUM CHLORIDE, AND CALCIUM CHLORIDE .6; .31; .03; .02 G/100ML; G/100ML; G/100ML; G/100ML
500 INJECTION, SOLUTION INTRAVENOUS
Status: DISCONTINUED | OUTPATIENT
Start: 2025-08-05 | End: 2025-08-11 | Stop reason: HOSPADM

## 2025-08-05 RX ORDER — AMLODIPINE BESYLATE 10 MG/1
10 TABLET ORAL 2 TIMES DAILY
Status: DISCONTINUED | OUTPATIENT
Start: 2025-08-05 | End: 2025-08-07

## 2025-08-05 RX ORDER — HYDROXYZINE HYDROCHLORIDE 25 MG/1
50 TABLET, FILM COATED ORAL 3 TIMES DAILY PRN
Status: DISCONTINUED | OUTPATIENT
Start: 2025-08-05 | End: 2025-08-11 | Stop reason: HOSPADM

## 2025-08-05 RX ORDER — DOXYCYCLINE 100 MG/1
100 TABLET ORAL EVERY 12 HOURS
Status: DISCONTINUED | OUTPATIENT
Start: 2025-08-05 | End: 2025-08-05

## 2025-08-05 RX ORDER — DEXTROSE MONOHYDRATE 25 G/50ML
25 INJECTION, SOLUTION INTRAVENOUS
Status: DISCONTINUED | OUTPATIENT
Start: 2025-08-05 | End: 2025-08-11 | Stop reason: HOSPADM

## 2025-08-05 RX ORDER — ONDANSETRON 4 MG/1
4 TABLET, ORALLY DISINTEGRATING ORAL EVERY 4 HOURS PRN
Status: DISCONTINUED | OUTPATIENT
Start: 2025-08-05 | End: 2025-08-11 | Stop reason: HOSPADM

## 2025-08-05 RX ORDER — POLYETHYLENE GLYCOL 3350 17 G/17G
1 POWDER, FOR SOLUTION ORAL
Status: DISCONTINUED | OUTPATIENT
Start: 2025-08-05 | End: 2025-08-11 | Stop reason: HOSPADM

## 2025-08-05 RX ORDER — INSULIN LISPRO 100 [IU]/ML
3-14 INJECTION, SOLUTION INTRAVENOUS; SUBCUTANEOUS EVERY 6 HOURS
Status: DISCONTINUED | OUTPATIENT
Start: 2025-08-06 | End: 2025-08-10

## 2025-08-05 RX ORDER — METOPROLOL TARTRATE 50 MG
100 TABLET ORAL 2 TIMES DAILY
Status: DISCONTINUED | OUTPATIENT
Start: 2025-08-06 | End: 2025-08-11 | Stop reason: HOSPADM

## 2025-08-05 RX ORDER — IPRATROPIUM BROMIDE AND ALBUTEROL SULFATE 2.5; .5 MG/3ML; MG/3ML
3 SOLUTION RESPIRATORY (INHALATION)
Status: DISCONTINUED | OUTPATIENT
Start: 2025-08-05 | End: 2025-08-11 | Stop reason: HOSPADM

## 2025-08-05 RX ORDER — ATORVASTATIN CALCIUM 20 MG/1
20 TABLET, FILM COATED ORAL DAILY
Status: DISCONTINUED | OUTPATIENT
Start: 2025-08-06 | End: 2025-08-11 | Stop reason: HOSPADM

## 2025-08-05 RX ORDER — LABETALOL HYDROCHLORIDE 5 MG/ML
10 INJECTION, SOLUTION INTRAVENOUS EVERY 4 HOURS PRN
Status: DISCONTINUED | OUTPATIENT
Start: 2025-08-05 | End: 2025-08-11 | Stop reason: HOSPADM

## 2025-08-05 RX ORDER — ONDANSETRON 2 MG/ML
4 INJECTION INTRAMUSCULAR; INTRAVENOUS EVERY 4 HOURS PRN
Status: DISCONTINUED | OUTPATIENT
Start: 2025-08-05 | End: 2025-08-11 | Stop reason: HOSPADM

## 2025-08-05 RX ORDER — ACETAMINOPHEN 325 MG/1
650 TABLET ORAL EVERY 6 HOURS PRN
Status: DISCONTINUED | OUTPATIENT
Start: 2025-08-05 | End: 2025-08-11 | Stop reason: HOSPADM

## 2025-08-05 RX ORDER — CALCIUM ACETATE 667 MG/1
2001 TABLET ORAL
Status: DISCONTINUED | OUTPATIENT
Start: 2025-08-06 | End: 2025-08-11 | Stop reason: HOSPADM

## 2025-08-05 RX ORDER — PAROXETINE 20 MG/1
40 TABLET, FILM COATED ORAL DAILY
Status: DISCONTINUED | OUTPATIENT
Start: 2025-08-06 | End: 2025-08-11 | Stop reason: HOSPADM

## 2025-08-05 RX ORDER — BENZONATATE 100 MG/1
100 CAPSULE ORAL 3 TIMES DAILY PRN
Status: DISCONTINUED | OUTPATIENT
Start: 2025-08-05 | End: 2025-08-11 | Stop reason: HOSPADM

## 2025-08-05 RX ORDER — GABAPENTIN 300 MG/1
300 CAPSULE ORAL 3 TIMES DAILY
Status: DISCONTINUED | OUTPATIENT
Start: 2025-08-05 | End: 2025-08-05

## 2025-08-05 RX ADMIN — Medication 500 MG: at 23:01

## 2025-08-05 RX ADMIN — INSULIN GLARGINE-YFGN 10 UNITS: 100 INJECTION, SOLUTION SUBCUTANEOUS at 23:35

## 2025-08-05 RX ADMIN — FUROSEMIDE 80 MG: 10 INJECTION, SOLUTION INTRAVENOUS at 23:01

## 2025-08-05 RX ADMIN — LABETALOL HYDROCHLORIDE 10 MG: 5 INJECTION, SOLUTION INTRAVENOUS at 23:20

## 2025-08-05 ASSESSMENT — PAIN DESCRIPTION - PAIN TYPE: TYPE: ACUTE PAIN

## 2025-08-05 ASSESSMENT — ENCOUNTER SYMPTOMS
PALPITATIONS: 0
NAUSEA: 0
HEADACHES: 0
HEARTBURN: 0
EYE PAIN: 0
DIZZINESS: 0
ORTHOPNEA: 1
WEAKNESS: 1
VOMITING: 0
COUGH: 0
SHORTNESS OF BREATH: 1
FEVER: 0
DEPRESSION: 0
ABDOMINAL PAIN: 0
MYALGIAS: 0
BRUISES/BLEEDS EASILY: 0
CHILLS: 0

## 2025-08-05 ASSESSMENT — LIFESTYLE VARIABLES
HAVE PEOPLE ANNOYED YOU BY CRITICIZING YOUR DRINKING: NO
ALCOHOL_USE: NO
TOTAL SCORE: 0
EVER HAD A DRINK FIRST THING IN THE MORNING TO STEADY YOUR NERVES TO GET RID OF A HANGOVER: NO
ON A TYPICAL DAY WHEN YOU DRINK ALCOHOL HOW MANY DRINKS DO YOU HAVE: 0
AVERAGE NUMBER OF DAYS PER WEEK YOU HAVE A DRINK CONTAINING ALCOHOL: 0
SUBSTANCE_ABUSE: 0
CONSUMPTION TOTAL: NEGATIVE
HAVE YOU EVER FELT YOU SHOULD CUT DOWN ON YOUR DRINKING: NO
TOTAL SCORE: 0
HOW MANY TIMES IN THE PAST YEAR HAVE YOU HAD 5 OR MORE DRINKS IN A DAY: 0
EVER FELT BAD OR GUILTY ABOUT YOUR DRINKING: NO
TOTAL SCORE: 0

## 2025-08-05 ASSESSMENT — COGNITIVE AND FUNCTIONAL STATUS - GENERAL
DRESSING REGULAR LOWER BODY CLOTHING: A LITTLE
DRESSING REGULAR UPPER BODY CLOTHING: A LITTLE
SUGGESTED CMS G CODE MODIFIER DAILY ACTIVITY: CK
MOVING TO AND FROM BED TO CHAIR: A LOT
SUGGESTED CMS G CODE MODIFIER MOBILITY: CK
STANDING UP FROM CHAIR USING ARMS: A LITTLE
TOILETING: A LITTLE
CLIMB 3 TO 5 STEPS WITH RAILING: TOTAL
EATING MEALS: A LITTLE
HELP NEEDED FOR BATHING: A LOT
MOBILITY SCORE: 15
DAILY ACTIVITIY SCORE: 17
PERSONAL GROOMING: A LITTLE
WALKING IN HOSPITAL ROOM: A LOT
MOVING FROM LYING ON BACK TO SITTING ON SIDE OF FLAT BED: A LITTLE

## 2025-08-05 ASSESSMENT — FIBROSIS 4 INDEX
FIB4 SCORE: 2.69
FIB4 SCORE: 2.69

## 2025-08-06 ENCOUNTER — APPOINTMENT (OUTPATIENT)
Dept: RADIOLOGY | Facility: MEDICAL CENTER | Age: 65
DRG: 189 | End: 2025-08-06
Attending: STUDENT IN AN ORGANIZED HEALTH CARE EDUCATION/TRAINING PROGRAM
Payer: MEDICARE

## 2025-08-06 PROBLEM — A41.9 SEPSIS (HCC): Status: ACTIVE | Noted: 2025-08-06

## 2025-08-06 LAB
ALBUMIN SERPL BCP-MCNC: 3.8 G/DL (ref 3.2–4.9)
ALBUMIN/GLOB SERPL: 1.4 G/DL
ALP SERPL-CCNC: 109 U/L (ref 30–99)
ALT SERPL-CCNC: 5 U/L (ref 2–50)
AMYLASE FLD-CCNC: 21 U/L
ANION GAP SERPL CALC-SCNC: 14 MMOL/L (ref 7–16)
APPEARANCE FLD: CLEAR
AST SERPL-CCNC: 13 U/L (ref 12–45)
BASE EXCESS BLDA CALC-SCNC: 10 MMOL/L (ref -4–3)
BASOPHILS # BLD AUTO: 0.8 % (ref 0–1.8)
BASOPHILS # BLD: 0.04 K/UL (ref 0–0.12)
BILIRUB SERPL-MCNC: 0.5 MG/DL (ref 0.1–1.5)
BODY FLD TYPE: NORMAL
BODY TEMPERATURE: 36.3 CENTIGRADE
BUN SERPL-MCNC: 26 MG/DL (ref 8–22)
CALCIUM ALBUM COR SERPL-MCNC: 9.7 MG/DL (ref 8.5–10.5)
CALCIUM SERPL-MCNC: 9.5 MG/DL (ref 8.5–10.5)
CELLS FLD: 2
CHLORIDE SERPL-SCNC: 92 MMOL/L (ref 96–112)
CO2 SERPL-SCNC: 28 MMOL/L (ref 20–33)
COLOR FLD: YELLOW
CREAT SERPL-MCNC: 3.53 MG/DL (ref 0.5–1.4)
EKG IMPRESSION: NORMAL
EOSINOPHIL # BLD AUTO: 0.11 K/UL (ref 0–0.51)
EOSINOPHIL NFR BLD: 2.2 % (ref 0–6.9)
ERYTHROCYTE [DISTWIDTH] IN BLOOD BY AUTOMATED COUNT: 51.1 FL (ref 35.9–50)
FUNGUS SPEC FUNGUS STN: NORMAL
GFR SERPLBLD CREATININE-BSD FMLA CKD-EPI: 18 ML/MIN/1.73 M 2
GLOBULIN SER CALC-MCNC: 2.7 G/DL (ref 1.9–3.5)
GLUCOSE BLD STRIP.AUTO-MCNC: 103 MG/DL (ref 65–99)
GLUCOSE BLD STRIP.AUTO-MCNC: 109 MG/DL (ref 65–99)
GLUCOSE BLD STRIP.AUTO-MCNC: 87 MG/DL (ref 65–99)
GLUCOSE BLD STRIP.AUTO-MCNC: 96 MG/DL (ref 65–99)
GLUCOSE FLD-MCNC: 133 MG/DL
GLUCOSE SERPL-MCNC: 166 MG/DL (ref 65–99)
GRAM STN SPEC: NORMAL
HBV CORE AB SERPL QL IA: NONREACTIVE
HBV SURFACE AB SERPL IA-ACNC: <3.5 MIU/ML (ref 0–10)
HBV SURFACE AG SER QL: NORMAL
HCO3 BLDA-SCNC: 35 MMOL/L (ref 21–28)
HCT VFR BLD AUTO: 33.4 % (ref 42–52)
HGB BLD-MCNC: 10.5 G/DL (ref 14–18)
IMM GRANULOCYTES # BLD AUTO: 0.02 K/UL (ref 0–0.11)
IMM GRANULOCYTES NFR BLD AUTO: 0.4 % (ref 0–0.9)
LACTATE SERPL-SCNC: 1.1 MMOL/L (ref 0.5–2)
LDH FLD L TO P-CCNC: 56 U/L
LDH SERPL L TO P-CCNC: 206 U/L (ref 107–266)
LYMPHOCYTES # BLD AUTO: 0.54 K/UL (ref 1–4.8)
LYMPHOCYTES NFR BLD: 10.7 % (ref 22–41)
LYMPHOCYTES NFR FLD: 27 %
MAGNESIUM SERPL-MCNC: 2.2 MG/DL (ref 1.5–2.5)
MCH RBC QN AUTO: 28.5 PG (ref 27–33)
MCHC RBC AUTO-ENTMCNC: 31.4 G/DL (ref 32.3–36.5)
MCV RBC AUTO: 90.5 FL (ref 81.4–97.8)
MONOCYTES # BLD AUTO: 0.49 K/UL (ref 0–0.85)
MONOCYTES NFR BLD AUTO: 9.7 % (ref 0–13.4)
MONOS+MACROS NFR FLD MANUAL: 70 %
NEUTROPHILS # BLD AUTO: 3.85 K/UL (ref 1.82–7.42)
NEUTROPHILS NFR BLD: 76.2 % (ref 44–72)
NEUTROPHILS NFR FLD: 1 %
NRBC # BLD AUTO: 0 K/UL
NRBC BLD-RTO: 0 /100 WBC (ref 0–0.2)
NUC CELL # FLD: 67 CELLS/UL
PCO2 BLDA: 52.7 MMHG (ref 32–48)
PCO2 TEMP ADJ BLDA: 51.1 MMHG (ref 32–48)
PH BLDA: 7.43 [PH] (ref 7.35–7.45)
PH FLD: 8 [PH]
PH TEMP ADJ BLDA: 7.44 [PH] (ref 7.35–7.45)
PHOSPHATE SERPL-MCNC: 2.4 MG/DL (ref 2.5–4.5)
PLATELET # BLD AUTO: 141 K/UL (ref 164–446)
PMV BLD AUTO: 11.9 FL (ref 9–12.9)
PO2 BLDA: 74.3 MMHG (ref 83–108)
PO2 TEMP ADJ BLDA: 70.9 MMHG (ref 83–108)
POTASSIUM SERPL-SCNC: 4.5 MMOL/L (ref 3.6–5.5)
PROCALCITONIN SERPL-MCNC: 0.08 NG/ML
PROT FLD-MCNC: 2.5 G/DL
PROT SERPL-MCNC: 6.5 G/DL (ref 6–8.2)
RBC # BLD AUTO: 3.69 M/UL (ref 4.7–6.1)
RBC # FLD: <2000 CELLS/UL
SAO2 % BLDA: 92 % (ref 93–99)
SIGNIFICANT IND 70042: NORMAL
SIGNIFICANT IND 70042: NORMAL
SITE SITE: NORMAL
SITE SITE: NORMAL
SODIUM SERPL-SCNC: 134 MMOL/L (ref 135–145)
SOURCE SOURCE: NORMAL
SOURCE SOURCE: NORMAL
WBC # BLD AUTO: 5.1 K/UL (ref 4.8–10.8)

## 2025-08-06 PROCEDURE — 94640 AIRWAY INHALATION TREATMENT: CPT

## 2025-08-06 PROCEDURE — 0W9B3ZZ DRAINAGE OF LEFT PLEURAL CAVITY, PERCUTANEOUS APPROACH: ICD-10-PCS

## 2025-08-06 PROCEDURE — 85025 COMPLETE CBC W/AUTO DIFF WBC: CPT

## 2025-08-06 PROCEDURE — 92610 EVALUATE SWALLOWING FUNCTION: CPT

## 2025-08-06 PROCEDURE — 87116 MYCOBACTERIA CULTURE: CPT

## 2025-08-06 PROCEDURE — 87205 SMEAR GRAM STAIN: CPT | Mod: 91

## 2025-08-06 PROCEDURE — 84145 PROCALCITONIN (PCT): CPT

## 2025-08-06 PROCEDURE — 83986 ASSAY PH BODY FLUID NOS: CPT

## 2025-08-06 PROCEDURE — 87040 BLOOD CULTURE FOR BACTERIA: CPT

## 2025-08-06 PROCEDURE — 82150 ASSAY OF AMYLASE: CPT

## 2025-08-06 PROCEDURE — 770020 HCHG ROOM/CARE - TELE (206)

## 2025-08-06 PROCEDURE — 5A1D70Z PERFORMANCE OF URINARY FILTRATION, INTERMITTENT, LESS THAN 6 HOURS PER DAY: ICD-10-PCS | Performed by: INTERNAL MEDICINE

## 2025-08-06 PROCEDURE — 80053 COMPREHEN METABOLIC PANEL: CPT

## 2025-08-06 PROCEDURE — 700105 HCHG RX REV CODE 258: Performed by: STUDENT IN AN ORGANIZED HEALTH CARE EDUCATION/TRAINING PROGRAM

## 2025-08-06 PROCEDURE — 99291 CRITICAL CARE FIRST HOUR: CPT | Performed by: STUDENT IN AN ORGANIZED HEALTH CARE EDUCATION/TRAINING PROGRAM

## 2025-08-06 PROCEDURE — 36415 COLL VENOUS BLD VENIPUNCTURE: CPT

## 2025-08-06 PROCEDURE — 83735 ASSAY OF MAGNESIUM: CPT

## 2025-08-06 PROCEDURE — 84100 ASSAY OF PHOSPHORUS: CPT

## 2025-08-06 PROCEDURE — 71045 X-RAY EXAM CHEST 1 VIEW: CPT

## 2025-08-06 PROCEDURE — 93010 ELECTROCARDIOGRAM REPORT: CPT | Performed by: INTERNAL MEDICINE

## 2025-08-06 PROCEDURE — 82803 BLOOD GASES ANY COMBINATION: CPT

## 2025-08-06 PROCEDURE — 83615 LACTATE (LD) (LDH) ENZYME: CPT

## 2025-08-06 PROCEDURE — 89051 BODY FLUID CELL COUNT: CPT

## 2025-08-06 PROCEDURE — 90935 HEMODIALYSIS ONE EVALUATION: CPT

## 2025-08-06 PROCEDURE — 87015 SPECIMEN INFECT AGNT CONCNTJ: CPT

## 2025-08-06 PROCEDURE — 83605 ASSAY OF LACTIC ACID: CPT

## 2025-08-06 PROCEDURE — 88305 TISSUE EXAM BY PATHOLOGIST: CPT | Mod: 26 | Performed by: PATHOLOGY

## 2025-08-06 PROCEDURE — 87102 FUNGUS ISOLATION CULTURE: CPT

## 2025-08-06 PROCEDURE — 700111 HCHG RX REV CODE 636 W/ 250 OVERRIDE (IP): Performed by: STUDENT IN AN ORGANIZED HEALTH CARE EDUCATION/TRAINING PROGRAM

## 2025-08-06 PROCEDURE — 87070 CULTURE OTHR SPECIMN AEROBIC: CPT

## 2025-08-06 PROCEDURE — 82945 GLUCOSE OTHER FLUID: CPT

## 2025-08-06 PROCEDURE — 99497 ADVNCD CARE PLAN 30 MIN: CPT | Performed by: STUDENT IN AN ORGANIZED HEALTH CARE EDUCATION/TRAINING PROGRAM

## 2025-08-06 PROCEDURE — 87340 HEPATITIS B SURFACE AG IA: CPT

## 2025-08-06 PROCEDURE — C1729 CATH, DRAINAGE: HCPCS

## 2025-08-06 PROCEDURE — 700102 HCHG RX REV CODE 250 W/ 637 OVERRIDE(OP): Performed by: STUDENT IN AN ORGANIZED HEALTH CARE EDUCATION/TRAINING PROGRAM

## 2025-08-06 PROCEDURE — 84157 ASSAY OF PROTEIN OTHER: CPT

## 2025-08-06 PROCEDURE — A9270 NON-COVERED ITEM OR SERVICE: HCPCS | Performed by: STUDENT IN AN ORGANIZED HEALTH CARE EDUCATION/TRAINING PROGRAM

## 2025-08-06 PROCEDURE — 88305 TISSUE EXAM BY PATHOLOGIST: CPT | Performed by: PATHOLOGY

## 2025-08-06 PROCEDURE — 88112 CYTOPATH CELL ENHANCE TECH: CPT | Mod: 26 | Performed by: PATHOLOGY

## 2025-08-06 PROCEDURE — 82962 GLUCOSE BLOOD TEST: CPT | Performed by: STUDENT IN AN ORGANIZED HEALTH CARE EDUCATION/TRAINING PROGRAM

## 2025-08-06 PROCEDURE — 94669 MECHANICAL CHEST WALL OSCILL: CPT

## 2025-08-06 PROCEDURE — 86704 HEP B CORE ANTIBODY TOTAL: CPT

## 2025-08-06 PROCEDURE — 88112 CYTOPATH CELL ENHANCE TECH: CPT | Performed by: PATHOLOGY

## 2025-08-06 PROCEDURE — 86706 HEP B SURFACE ANTIBODY: CPT

## 2025-08-06 RX ORDER — SODIUM CHLORIDE 9 MG/ML
100 INJECTION, SOLUTION INTRAVENOUS
Status: DISCONTINUED | OUTPATIENT
Start: 2025-08-06 | End: 2025-08-11 | Stop reason: HOSPADM

## 2025-08-06 RX ORDER — FUROSEMIDE 10 MG/ML
20 INJECTION INTRAMUSCULAR; INTRAVENOUS ONCE
Status: COMPLETED | OUTPATIENT
Start: 2025-08-06 | End: 2025-08-06

## 2025-08-06 RX ORDER — THIAMINE HYDROCHLORIDE 100 MG/ML
200 INJECTION, SOLUTION INTRAMUSCULAR; INTRAVENOUS DAILY
Status: COMPLETED | OUTPATIENT
Start: 2025-08-06 | End: 2025-08-08

## 2025-08-06 RX ORDER — FUROSEMIDE 10 MG/ML
100 INJECTION INTRAMUSCULAR; INTRAVENOUS 2 TIMES DAILY
Status: COMPLETED | OUTPATIENT
Start: 2025-08-06 | End: 2025-08-07

## 2025-08-06 RX ADMIN — TIOTROPIUM BROMIDE INHALATION SPRAY 5 MCG: 3.12 SPRAY, METERED RESPIRATORY (INHALATION) at 09:05

## 2025-08-06 RX ADMIN — AMPICILLIN AND SULBACTAM 3 G: 1; 2 INJECTION, POWDER, FOR SOLUTION INTRAMUSCULAR; INTRAVENOUS at 00:59

## 2025-08-06 RX ADMIN — THIAMINE HYDROCHLORIDE 200 MG: 100 INJECTION, SOLUTION INTRAMUSCULAR; INTRAVENOUS at 01:04

## 2025-08-06 RX ADMIN — FUROSEMIDE 100 MG: 10 INJECTION, SOLUTION INTRAVENOUS at 17:34

## 2025-08-06 RX ADMIN — METOPROLOL TARTRATE 100 MG: 50 TABLET, FILM COATED ORAL at 17:34

## 2025-08-06 RX ADMIN — FUROSEMIDE 100 MG: 10 INJECTION, SOLUTION INTRAVENOUS at 06:45

## 2025-08-06 RX ADMIN — FUROSEMIDE 20 MG: 10 INJECTION, SOLUTION INTRAVENOUS at 01:04

## 2025-08-06 RX ADMIN — MOMETASONE FUROATE AND FORMOTEROL FUMARATE DIHYDRATE 2 PUFF: 200; 5 AEROSOL RESPIRATORY (INHALATION) at 20:24

## 2025-08-06 RX ADMIN — MOMETASONE FUROATE AND FORMOTEROL FUMARATE DIHYDRATE 2 PUFF: 200; 5 AEROSOL RESPIRATORY (INHALATION) at 06:39

## 2025-08-06 RX ADMIN — LABETALOL HYDROCHLORIDE 10 MG: 5 INJECTION, SOLUTION INTRAVENOUS at 06:34

## 2025-08-06 RX ADMIN — MONTELUKAST 10 MG: 10 TABLET, FILM COATED ORAL at 20:24

## 2025-08-06 RX ADMIN — ONDANSETRON 4 MG: 2 INJECTION INTRAMUSCULAR; INTRAVENOUS at 13:29

## 2025-08-06 RX ADMIN — DOXYCYCLINE 100 MG: 100 INJECTION, POWDER, LYOPHILIZED, FOR SOLUTION INTRAVENOUS at 06:48

## 2025-08-06 ASSESSMENT — FIBROSIS 4 INDEX: FIB4 SCORE: 2.72

## 2025-08-06 ASSESSMENT — COPD QUESTIONNAIRES
HAVE YOU SMOKED AT LEAST 100 CIGARETTES IN YOUR ENTIRE LIFE: NO/DON'T KNOW
DURING THE PAST 4 WEEKS HOW MUCH DID YOU FEEL SHORT OF BREATH: MOST  OR ALL OF THE TIME
COPD SCREENING SCORE: 5
DO YOU EVER COUGH UP ANY MUCUS OR PHLEGM?: NO/ONLY WITH OCCASIONAL COLDS OR INFECTIONS

## 2025-08-07 ENCOUNTER — APPOINTMENT (OUTPATIENT)
Dept: RADIOLOGY | Facility: MEDICAL CENTER | Age: 65
DRG: 189 | End: 2025-08-07
Attending: STUDENT IN AN ORGANIZED HEALTH CARE EDUCATION/TRAINING PROGRAM
Payer: MEDICARE

## 2025-08-07 ENCOUNTER — PATIENT OUTREACH (OUTPATIENT)
Dept: SCHEDULING | Facility: IMAGING CENTER | Age: 65
End: 2025-08-07
Payer: MEDICARE

## 2025-08-07 PROBLEM — E83.39 HYPOPHOSPHATEMIA: Status: ACTIVE | Noted: 2025-08-07

## 2025-08-07 PROBLEM — J93.9 PNEUMOTHORAX: Status: ACTIVE | Noted: 2025-08-07

## 2025-08-07 PROBLEM — J18.9 PNEUMONIA DUE TO INFECTIOUS ORGANISM: Status: RESOLVED | Noted: 2018-11-26 | Resolved: 2025-08-07

## 2025-08-07 PROBLEM — Z71.89 ACP (ADVANCE CARE PLANNING): Status: ACTIVE | Noted: 2025-08-07

## 2025-08-07 LAB
ANION GAP SERPL CALC-SCNC: 9 MMOL/L (ref 7–16)
APPEARANCE FLD: CLEAR
BASOPHILS # BLD AUTO: 0.7 % (ref 0–1.8)
BASOPHILS # BLD: 0.04 K/UL (ref 0–0.12)
BODY FLD TYPE: NORMAL
BUN SERPL-MCNC: 16 MG/DL (ref 8–22)
CALCIUM SERPL-MCNC: 9 MG/DL (ref 8.5–10.5)
CHLORIDE SERPL-SCNC: 96 MMOL/L (ref 96–112)
CO2 SERPL-SCNC: 29 MMOL/L (ref 20–33)
COLOR FLD: YELLOW
CREAT SERPL-MCNC: 2.65 MG/DL (ref 0.5–1.4)
CYTOLOGY REG CYTOL: NORMAL
EKG IMPRESSION: NORMAL
EOSINOPHIL # BLD AUTO: 0.09 K/UL (ref 0–0.51)
EOSINOPHIL NFR BLD: 1.5 % (ref 0–6.9)
ERYTHROCYTE [DISTWIDTH] IN BLOOD BY AUTOMATED COUNT: 52.2 FL (ref 35.9–50)
GFR SERPLBLD CREATININE-BSD FMLA CKD-EPI: 26 ML/MIN/1.73 M 2
GLUCOSE BLD STRIP.AUTO-MCNC: 110 MG/DL (ref 65–99)
GLUCOSE BLD STRIP.AUTO-MCNC: 114 MG/DL (ref 65–99)
GLUCOSE BLD STRIP.AUTO-MCNC: 115 MG/DL (ref 65–99)
GLUCOSE BLD STRIP.AUTO-MCNC: 117 MG/DL (ref 65–99)
GLUCOSE BLD STRIP.AUTO-MCNC: 62 MG/DL (ref 65–99)
GLUCOSE SERPL-MCNC: 88 MG/DL (ref 65–99)
GRAM STN SPEC: NORMAL
HCT VFR BLD AUTO: 33.3 % (ref 42–52)
HGB BLD-MCNC: 10.8 G/DL (ref 14–18)
IMM GRANULOCYTES # BLD AUTO: 0.02 K/UL (ref 0–0.11)
IMM GRANULOCYTES NFR BLD AUTO: 0.3 % (ref 0–0.9)
LYMPHOCYTES # BLD AUTO: 0.54 K/UL (ref 1–4.8)
LYMPHOCYTES NFR BLD: 8.9 % (ref 22–41)
LYMPHOCYTES NFR FLD: 13 %
MAGNESIUM SERPL-MCNC: 2.1 MG/DL (ref 1.5–2.5)
MCH RBC QN AUTO: 29 PG (ref 27–33)
MCHC RBC AUTO-ENTMCNC: 32.4 G/DL (ref 32.3–36.5)
MCV RBC AUTO: 89.3 FL (ref 81.4–97.8)
MONOCYTES # BLD AUTO: 0.65 K/UL (ref 0–0.85)
MONOCYTES NFR BLD AUTO: 10.7 % (ref 0–13.4)
MONOS+MACROS NFR FLD MANUAL: 87 %
NEUTROPHILS # BLD AUTO: 4.74 K/UL (ref 1.82–7.42)
NEUTROPHILS NFR BLD: 77.9 % (ref 44–72)
NEUTROPHILS NFR FLD: 0 %
NRBC # BLD AUTO: 0 K/UL
NRBC BLD-RTO: 0 /100 WBC (ref 0–0.2)
NUC CELL # FLD: 108 CELLS/UL
PHOSPHATE SERPL-MCNC: 1.9 MG/DL (ref 2.5–4.5)
PLATELET # BLD AUTO: 133 K/UL (ref 164–446)
PMV BLD AUTO: 12.7 FL (ref 9–12.9)
POTASSIUM SERPL-SCNC: 4.3 MMOL/L (ref 3.6–5.5)
RBC # BLD AUTO: 3.73 M/UL (ref 4.7–6.1)
RBC # FLD: <2000 CELLS/UL
SIGNIFICANT IND 70042: NORMAL
SITE SITE: NORMAL
SODIUM SERPL-SCNC: 134 MMOL/L (ref 135–145)
SOURCE SOURCE: NORMAL
WBC # BLD AUTO: 6.1 K/UL (ref 4.8–10.8)

## 2025-08-07 PROCEDURE — 99222 1ST HOSP IP/OBS MODERATE 55: CPT | Performed by: STUDENT IN AN ORGANIZED HEALTH CARE EDUCATION/TRAINING PROGRAM

## 2025-08-07 PROCEDURE — 93010 ELECTROCARDIOGRAM REPORT: CPT | Performed by: STUDENT IN AN ORGANIZED HEALTH CARE EDUCATION/TRAINING PROGRAM

## 2025-08-07 PROCEDURE — 84100 ASSAY OF PHOSPHORUS: CPT

## 2025-08-07 PROCEDURE — 700102 HCHG RX REV CODE 250 W/ 637 OVERRIDE(OP): Performed by: STUDENT IN AN ORGANIZED HEALTH CARE EDUCATION/TRAINING PROGRAM

## 2025-08-07 PROCEDURE — 82962 GLUCOSE BLOOD TEST: CPT | Performed by: STUDENT IN AN ORGANIZED HEALTH CARE EDUCATION/TRAINING PROGRAM

## 2025-08-07 PROCEDURE — 93005 ELECTROCARDIOGRAM TRACING: CPT | Mod: TC | Performed by: STUDENT IN AN ORGANIZED HEALTH CARE EDUCATION/TRAINING PROGRAM

## 2025-08-07 PROCEDURE — 700111 HCHG RX REV CODE 636 W/ 250 OVERRIDE (IP): Mod: JZ | Performed by: STUDENT IN AN ORGANIZED HEALTH CARE EDUCATION/TRAINING PROGRAM

## 2025-08-07 PROCEDURE — 71045 X-RAY EXAM CHEST 1 VIEW: CPT

## 2025-08-07 PROCEDURE — 80048 BASIC METABOLIC PNL TOTAL CA: CPT

## 2025-08-07 PROCEDURE — 99233 SBSQ HOSP IP/OBS HIGH 50: CPT | Performed by: STUDENT IN AN ORGANIZED HEALTH CARE EDUCATION/TRAINING PROGRAM

## 2025-08-07 PROCEDURE — 85025 COMPLETE CBC W/AUTO DIFF WBC: CPT

## 2025-08-07 PROCEDURE — 87102 FUNGUS ISOLATION CULTURE: CPT

## 2025-08-07 PROCEDURE — 94669 MECHANICAL CHEST WALL OSCILL: CPT

## 2025-08-07 PROCEDURE — 83735 ASSAY OF MAGNESIUM: CPT

## 2025-08-07 PROCEDURE — 36415 COLL VENOUS BLD VENIPUNCTURE: CPT

## 2025-08-07 PROCEDURE — A9270 NON-COVERED ITEM OR SERVICE: HCPCS | Performed by: STUDENT IN AN ORGANIZED HEALTH CARE EDUCATION/TRAINING PROGRAM

## 2025-08-07 PROCEDURE — 97166 OT EVAL MOD COMPLEX 45 MIN: CPT

## 2025-08-07 PROCEDURE — 87116 MYCOBACTERIA CULTURE: CPT

## 2025-08-07 PROCEDURE — 97162 PT EVAL MOD COMPLEX 30 MIN: CPT

## 2025-08-07 PROCEDURE — 87070 CULTURE OTHR SPECIMN AEROBIC: CPT

## 2025-08-07 PROCEDURE — 0W993ZZ DRAINAGE OF RIGHT PLEURAL CAVITY, PERCUTANEOUS APPROACH: ICD-10-PCS | Performed by: NURSE PRACTITIONER

## 2025-08-07 PROCEDURE — 770020 HCHG ROOM/CARE - TELE (206)

## 2025-08-07 PROCEDURE — 87205 SMEAR GRAM STAIN: CPT

## 2025-08-07 PROCEDURE — C1729 CATH, DRAINAGE: HCPCS

## 2025-08-07 PROCEDURE — 89051 BODY FLUID CELL COUNT: CPT

## 2025-08-07 RX ORDER — AMLODIPINE BESYLATE 10 MG/1
10 TABLET ORAL
Status: DISCONTINUED | OUTPATIENT
Start: 2025-08-08 | End: 2025-08-07

## 2025-08-07 RX ORDER — ENOXAPARIN SODIUM 100 MG/ML
40 INJECTION SUBCUTANEOUS DAILY
Status: DISCONTINUED | OUTPATIENT
Start: 2025-08-07 | End: 2025-08-09

## 2025-08-07 RX ORDER — METOPROLOL SUCCINATE 100 MG/1
100 TABLET, EXTENDED RELEASE ORAL
Status: DISCONTINUED | OUTPATIENT
Start: 2025-08-07 | End: 2025-08-07

## 2025-08-07 RX ADMIN — METOPROLOL TARTRATE 100 MG: 50 TABLET, FILM COATED ORAL at 18:12

## 2025-08-07 RX ADMIN — FUROSEMIDE 100 MG: 10 INJECTION, SOLUTION INTRAVENOUS at 06:01

## 2025-08-07 RX ADMIN — TIOTROPIUM BROMIDE INHALATION SPRAY 5 MCG: 3.12 SPRAY, METERED RESPIRATORY (INHALATION) at 06:02

## 2025-08-07 RX ADMIN — METOPROLOL TARTRATE 100 MG: 50 TABLET, FILM COATED ORAL at 06:00

## 2025-08-07 RX ADMIN — PAROXETINE HYDROCHLORIDE 40 MG: 20 TABLET, FILM COATED ORAL at 06:00

## 2025-08-07 RX ADMIN — ENOXAPARIN SODIUM 40 MG: 100 INJECTION SUBCUTANEOUS at 18:12

## 2025-08-07 RX ADMIN — THIAMINE HYDROCHLORIDE 200 MG: 100 INJECTION, SOLUTION INTRAMUSCULAR; INTRAVENOUS at 06:01

## 2025-08-07 RX ADMIN — ATORVASTATIN CALCIUM 20 MG: 20 TABLET, FILM COATED ORAL at 06:00

## 2025-08-07 RX ADMIN — MOMETASONE FUROATE AND FORMOTEROL FUMARATE DIHYDRATE 2 PUFF: 200; 5 AEROSOL RESPIRATORY (INHALATION) at 07:52

## 2025-08-07 ASSESSMENT — COGNITIVE AND FUNCTIONAL STATUS - GENERAL
MOVING TO AND FROM BED TO CHAIR: A LITTLE
STANDING UP FROM CHAIR USING ARMS: A LITTLE
PERSONAL GROOMING: A LITTLE
MOBILITY SCORE: 17
SUGGESTED CMS G CODE MODIFIER DAILY ACTIVITY: CK
SUGGESTED CMS G CODE MODIFIER MOBILITY: CK
DAILY ACTIVITIY SCORE: 18
DRESSING REGULAR LOWER BODY CLOTHING: A LITTLE
WALKING IN HOSPITAL ROOM: A LOT
DRESSING REGULAR UPPER BODY CLOTHING: A LITTLE
EATING MEALS: A LITTLE
CLIMB 3 TO 5 STEPS WITH RAILING: TOTAL
HELP NEEDED FOR BATHING: A LITTLE
TOILETING: A LITTLE

## 2025-08-07 ASSESSMENT — ENCOUNTER SYMPTOMS
FEVER: 0
ABDOMINAL PAIN: 0
CHILLS: 0
HEMOPTYSIS: 0
VOMITING: 0
COUGH: 0
PALPITATIONS: 0
LOSS OF CONSCIOUSNESS: 0
SHORTNESS OF BREATH: 1
SEIZURES: 0
DIZZINESS: 0

## 2025-08-07 ASSESSMENT — PAIN DESCRIPTION - PAIN TYPE
TYPE: ACUTE PAIN

## 2025-08-07 ASSESSMENT — ACTIVITIES OF DAILY LIVING (ADL): TOILETING: INDEPENDENT

## 2025-08-07 ASSESSMENT — FIBROSIS 4 INDEX: FIB4 SCORE: 2.89

## 2025-08-08 ENCOUNTER — APPOINTMENT (OUTPATIENT)
Dept: RADIOLOGY | Facility: MEDICAL CENTER | Age: 65
DRG: 189 | End: 2025-08-08
Attending: STUDENT IN AN ORGANIZED HEALTH CARE EDUCATION/TRAINING PROGRAM
Payer: MEDICARE

## 2025-08-08 LAB
ANION GAP SERPL CALC-SCNC: 11 MMOL/L (ref 7–16)
BUN SERPL-MCNC: 31 MG/DL (ref 8–22)
CALCIUM SERPL-MCNC: 8.7 MG/DL (ref 8.5–10.5)
CHLORIDE SERPL-SCNC: 96 MMOL/L (ref 96–112)
CO2 SERPL-SCNC: 30 MMOL/L (ref 20–33)
CREAT SERPL-MCNC: 4 MG/DL (ref 0.5–1.4)
ERYTHROCYTE [DISTWIDTH] IN BLOOD BY AUTOMATED COUNT: 52 FL (ref 35.9–50)
FUNGUS SPEC FUNGUS STN: NORMAL
GFR SERPLBLD CREATININE-BSD FMLA CKD-EPI: 16 ML/MIN/1.73 M 2
GLUCOSE BLD STRIP.AUTO-MCNC: 100 MG/DL (ref 65–99)
GLUCOSE BLD STRIP.AUTO-MCNC: 139 MG/DL (ref 65–99)
GLUCOSE BLD STRIP.AUTO-MCNC: 153 MG/DL (ref 65–99)
GLUCOSE BLD STRIP.AUTO-MCNC: 82 MG/DL (ref 65–99)
GLUCOSE SERPL-MCNC: 80 MG/DL (ref 65–99)
HCT VFR BLD AUTO: 32 % (ref 42–52)
HGB BLD-MCNC: 10.3 G/DL (ref 14–18)
MCH RBC QN AUTO: 28.6 PG (ref 27–33)
MCHC RBC AUTO-ENTMCNC: 32.2 G/DL (ref 32.3–36.5)
MCV RBC AUTO: 88.9 FL (ref 81.4–97.8)
PHOSPHATE SERPL-MCNC: 2.4 MG/DL (ref 2.5–4.5)
PLATELET # BLD AUTO: 128 K/UL (ref 164–446)
PMV BLD AUTO: 13 FL (ref 9–12.9)
POTASSIUM SERPL-SCNC: 5 MMOL/L (ref 3.6–5.5)
PRELIMINARY RPT Q0601: NORMAL
RBC # BLD AUTO: 3.6 M/UL (ref 4.7–6.1)
RHODAMINE-AURAMINE STN SPEC: NORMAL
SIGNIFICANT IND 70042: NORMAL
SITE SITE: NORMAL
SODIUM SERPL-SCNC: 137 MMOL/L (ref 135–145)
SOURCE SOURCE: NORMAL
WBC # BLD AUTO: 6.1 K/UL (ref 4.8–10.8)

## 2025-08-08 PROCEDURE — 71045 X-RAY EXAM CHEST 1 VIEW: CPT

## 2025-08-08 PROCEDURE — 700102 HCHG RX REV CODE 250 W/ 637 OVERRIDE(OP): Performed by: STUDENT IN AN ORGANIZED HEALTH CARE EDUCATION/TRAINING PROGRAM

## 2025-08-08 PROCEDURE — 700111 HCHG RX REV CODE 636 W/ 250 OVERRIDE (IP): Mod: JZ | Performed by: STUDENT IN AN ORGANIZED HEALTH CARE EDUCATION/TRAINING PROGRAM

## 2025-08-08 PROCEDURE — 94669 MECHANICAL CHEST WALL OSCILL: CPT

## 2025-08-08 PROCEDURE — 85027 COMPLETE CBC AUTOMATED: CPT

## 2025-08-08 PROCEDURE — 82962 GLUCOSE BLOOD TEST: CPT | Performed by: STUDENT IN AN ORGANIZED HEALTH CARE EDUCATION/TRAINING PROGRAM

## 2025-08-08 PROCEDURE — A9270 NON-COVERED ITEM OR SERVICE: HCPCS | Performed by: STUDENT IN AN ORGANIZED HEALTH CARE EDUCATION/TRAINING PROGRAM

## 2025-08-08 PROCEDURE — 99233 SBSQ HOSP IP/OBS HIGH 50: CPT | Performed by: STUDENT IN AN ORGANIZED HEALTH CARE EDUCATION/TRAINING PROGRAM

## 2025-08-08 PROCEDURE — 700111 HCHG RX REV CODE 636 W/ 250 OVERRIDE (IP): Performed by: STUDENT IN AN ORGANIZED HEALTH CARE EDUCATION/TRAINING PROGRAM

## 2025-08-08 PROCEDURE — 770020 HCHG ROOM/CARE - TELE (206)

## 2025-08-08 PROCEDURE — 99232 SBSQ HOSP IP/OBS MODERATE 35: CPT | Performed by: STUDENT IN AN ORGANIZED HEALTH CARE EDUCATION/TRAINING PROGRAM

## 2025-08-08 PROCEDURE — 90935 HEMODIALYSIS ONE EVALUATION: CPT

## 2025-08-08 PROCEDURE — 80048 BASIC METABOLIC PNL TOTAL CA: CPT

## 2025-08-08 PROCEDURE — 84100 ASSAY OF PHOSPHORUS: CPT

## 2025-08-08 PROCEDURE — 36415 COLL VENOUS BLD VENIPUNCTURE: CPT

## 2025-08-08 RX ORDER — AMLODIPINE BESYLATE 10 MG/1
10 TABLET ORAL
Status: DISCONTINUED | OUTPATIENT
Start: 2025-08-08 | End: 2025-08-08

## 2025-08-08 RX ORDER — AMLODIPINE BESYLATE 5 MG/1
5 TABLET ORAL
Status: DISCONTINUED | OUTPATIENT
Start: 2025-08-09 | End: 2025-08-08

## 2025-08-08 RX ORDER — AMLODIPINE BESYLATE 10 MG/1
10 TABLET ORAL
Status: DISCONTINUED | OUTPATIENT
Start: 2025-08-09 | End: 2025-08-09

## 2025-08-08 RX ADMIN — ATORVASTATIN CALCIUM 20 MG: 20 TABLET, FILM COATED ORAL at 05:36

## 2025-08-08 RX ADMIN — PAROXETINE HYDROCHLORIDE 40 MG: 20 TABLET, FILM COATED ORAL at 05:36

## 2025-08-08 RX ADMIN — SENNOSIDES, DOCUSATE SODIUM 2 TABLET: 50; 8.6 TABLET, FILM COATED ORAL at 17:43

## 2025-08-08 RX ADMIN — AMLODIPINE BESYLATE 10 MG: 10 TABLET ORAL at 10:44

## 2025-08-08 RX ADMIN — ENOXAPARIN SODIUM 40 MG: 100 INJECTION SUBCUTANEOUS at 17:43

## 2025-08-08 RX ADMIN — THIAMINE HYDROCHLORIDE 200 MG: 100 INJECTION, SOLUTION INTRAMUSCULAR; INTRAVENOUS at 05:38

## 2025-08-08 RX ADMIN — METOPROLOL TARTRATE 100 MG: 50 TABLET, FILM COATED ORAL at 05:36

## 2025-08-08 RX ADMIN — INSULIN LISPRO 3 UNITS: 100 INJECTION, SOLUTION INTRAVENOUS; SUBCUTANEOUS at 17:49

## 2025-08-08 RX ADMIN — METOPROLOL TARTRATE 100 MG: 50 TABLET, FILM COATED ORAL at 17:43

## 2025-08-08 ASSESSMENT — PAIN DESCRIPTION - PAIN TYPE
TYPE: ACUTE PAIN
TYPE: ACUTE PAIN

## 2025-08-08 ASSESSMENT — FIBROSIS 4 INDEX: FIB4 SCORE: 3

## 2025-08-09 ENCOUNTER — APPOINTMENT (OUTPATIENT)
Dept: RADIOLOGY | Facility: MEDICAL CENTER | Age: 65
DRG: 189 | End: 2025-08-09
Attending: STUDENT IN AN ORGANIZED HEALTH CARE EDUCATION/TRAINING PROGRAM
Payer: MEDICARE

## 2025-08-09 LAB
ANION GAP SERPL CALC-SCNC: 9 MMOL/L (ref 7–16)
BACTERIA FLD AEROBE CULT: NORMAL
BUN SERPL-MCNC: 35 MG/DL (ref 8–22)
CALCIUM SERPL-MCNC: 8.7 MG/DL (ref 8.5–10.5)
CHLORIDE SERPL-SCNC: 95 MMOL/L (ref 96–112)
CO2 SERPL-SCNC: 30 MMOL/L (ref 20–33)
CREAT SERPL-MCNC: 3.17 MG/DL (ref 0.5–1.4)
ERYTHROCYTE [DISTWIDTH] IN BLOOD BY AUTOMATED COUNT: 54 FL (ref 35.9–50)
GFR SERPLBLD CREATININE-BSD FMLA CKD-EPI: 21 ML/MIN/1.73 M 2
GLUCOSE BLD STRIP.AUTO-MCNC: 118 MG/DL (ref 65–99)
GLUCOSE BLD STRIP.AUTO-MCNC: 125 MG/DL (ref 65–99)
GLUCOSE BLD STRIP.AUTO-MCNC: 162 MG/DL (ref 65–99)
GLUCOSE BLD STRIP.AUTO-MCNC: 178 MG/DL (ref 65–99)
GLUCOSE BLD STRIP.AUTO-MCNC: 79 MG/DL (ref 65–99)
GLUCOSE SERPL-MCNC: 124 MG/DL (ref 65–99)
GRAM STN SPEC: NORMAL
HCT VFR BLD AUTO: 35.8 % (ref 42–52)
HGB BLD-MCNC: 11.4 G/DL (ref 14–18)
MCH RBC QN AUTO: 29.4 PG (ref 27–33)
MCHC RBC AUTO-ENTMCNC: 31.8 G/DL (ref 32.3–36.5)
MCV RBC AUTO: 92.3 FL (ref 81.4–97.8)
PLATELET # BLD AUTO: 141 K/UL (ref 164–446)
PMV BLD AUTO: 13 FL (ref 9–12.9)
POTASSIUM SERPL-SCNC: 4.3 MMOL/L (ref 3.6–5.5)
PRELIMINARY RPT Q0601: NORMAL
RBC # BLD AUTO: 3.88 M/UL (ref 4.7–6.1)
RHODAMINE-AURAMINE STN SPEC: NORMAL
SIGNIFICANT IND 70042: NORMAL
SITE SITE: NORMAL
SODIUM SERPL-SCNC: 134 MMOL/L (ref 135–145)
SOURCE SOURCE: NORMAL
WBC # BLD AUTO: 6.1 K/UL (ref 4.8–10.8)

## 2025-08-09 PROCEDURE — 85027 COMPLETE CBC AUTOMATED: CPT

## 2025-08-09 PROCEDURE — 770001 HCHG ROOM/CARE - MED/SURG/GYN PRIV*

## 2025-08-09 PROCEDURE — 82962 GLUCOSE BLOOD TEST: CPT | Performed by: STUDENT IN AN ORGANIZED HEALTH CARE EDUCATION/TRAINING PROGRAM

## 2025-08-09 PROCEDURE — 90935 HEMODIALYSIS ONE EVALUATION: CPT

## 2025-08-09 PROCEDURE — A9270 NON-COVERED ITEM OR SERVICE: HCPCS | Performed by: STUDENT IN AN ORGANIZED HEALTH CARE EDUCATION/TRAINING PROGRAM

## 2025-08-09 PROCEDURE — 80048 BASIC METABOLIC PNL TOTAL CA: CPT

## 2025-08-09 PROCEDURE — 36415 COLL VENOUS BLD VENIPUNCTURE: CPT

## 2025-08-09 PROCEDURE — 700102 HCHG RX REV CODE 250 W/ 637 OVERRIDE(OP): Performed by: STUDENT IN AN ORGANIZED HEALTH CARE EDUCATION/TRAINING PROGRAM

## 2025-08-09 PROCEDURE — 71045 X-RAY EXAM CHEST 1 VIEW: CPT

## 2025-08-09 PROCEDURE — 99233 SBSQ HOSP IP/OBS HIGH 50: CPT | Performed by: STUDENT IN AN ORGANIZED HEALTH CARE EDUCATION/TRAINING PROGRAM

## 2025-08-09 PROCEDURE — 94669 MECHANICAL CHEST WALL OSCILL: CPT

## 2025-08-09 PROCEDURE — 97597 DBRDMT OPN WND 1ST 20 CM/<: CPT

## 2025-08-09 PROCEDURE — 700111 HCHG RX REV CODE 636 W/ 250 OVERRIDE (IP): Mod: JZ | Performed by: STUDENT IN AN ORGANIZED HEALTH CARE EDUCATION/TRAINING PROGRAM

## 2025-08-09 PROCEDURE — 97162 PT EVAL MOD COMPLEX 30 MIN: CPT

## 2025-08-09 RX ORDER — HEPARIN SODIUM 5000 [USP'U]/ML
5000 INJECTION, SOLUTION INTRAVENOUS; SUBCUTANEOUS EVERY 8 HOURS
Status: DISCONTINUED | OUTPATIENT
Start: 2025-08-09 | End: 2025-08-11 | Stop reason: HOSPADM

## 2025-08-09 RX ADMIN — PAROXETINE HYDROCHLORIDE 40 MG: 20 TABLET, FILM COATED ORAL at 05:41

## 2025-08-09 RX ADMIN — INSULIN LISPRO 3 UNITS: 100 INJECTION, SOLUTION INTRAVENOUS; SUBCUTANEOUS at 23:55

## 2025-08-09 RX ADMIN — HEPARIN SODIUM 5000 UNITS: 5000 INJECTION, SOLUTION INTRAVENOUS; SUBCUTANEOUS at 21:59

## 2025-08-09 RX ADMIN — METOPROLOL TARTRATE 100 MG: 50 TABLET, FILM COATED ORAL at 05:40

## 2025-08-09 RX ADMIN — INSULIN LISPRO 3 UNITS: 100 INJECTION, SOLUTION INTRAVENOUS; SUBCUTANEOUS at 12:18

## 2025-08-09 RX ADMIN — ATORVASTATIN CALCIUM 20 MG: 20 TABLET, FILM COATED ORAL at 05:40

## 2025-08-09 RX ADMIN — METOPROLOL TARTRATE 100 MG: 50 TABLET, FILM COATED ORAL at 18:05

## 2025-08-09 RX ADMIN — HEPARIN SODIUM 5000 UNITS: 5000 INJECTION, SOLUTION INTRAVENOUS; SUBCUTANEOUS at 12:16

## 2025-08-09 ASSESSMENT — FIBROSIS 4 INDEX: FIB4 SCORE: 2.72

## 2025-08-09 ASSESSMENT — PAIN DESCRIPTION - PAIN TYPE
TYPE: ACUTE PAIN
TYPE: ACUTE PAIN

## 2025-08-10 PROBLEM — E16.2 HYPOGLYCEMIA: Status: ACTIVE | Noted: 2025-08-10

## 2025-08-10 PROBLEM — E43 SEVERE PROTEIN-CALORIE MALNUTRITION (HCC): Status: ACTIVE | Noted: 2025-08-10

## 2025-08-10 LAB
25(OH)D3 SERPL-MCNC: 20 NG/ML (ref 30–100)
ANION GAP SERPL CALC-SCNC: 11 MMOL/L (ref 7–16)
BACTERIA BLD CULT: NORMAL
BACTERIA FLD AEROBE CULT: NORMAL
BUN SERPL-MCNC: 32 MG/DL (ref 8–22)
CALCIUM SERPL-MCNC: 8.7 MG/DL (ref 8.5–10.5)
CHLORIDE SERPL-SCNC: 98 MMOL/L (ref 96–112)
CO2 SERPL-SCNC: 27 MMOL/L (ref 20–33)
CREAT SERPL-MCNC: 3.1 MG/DL (ref 0.5–1.4)
ERYTHROCYTE [DISTWIDTH] IN BLOOD BY AUTOMATED COUNT: 54 FL (ref 35.9–50)
GFR SERPLBLD CREATININE-BSD FMLA CKD-EPI: 21 ML/MIN/1.73 M 2
GLUCOSE BLD STRIP.AUTO-MCNC: 186 MG/DL (ref 65–99)
GLUCOSE BLD STRIP.AUTO-MCNC: 196 MG/DL (ref 65–99)
GLUCOSE BLD STRIP.AUTO-MCNC: 39 MG/DL (ref 65–99)
GLUCOSE SERPL-MCNC: 173 MG/DL (ref 65–99)
GRAM STN SPEC: NORMAL
HCT VFR BLD AUTO: 35.6 % (ref 42–52)
HGB BLD-MCNC: 11.4 G/DL (ref 14–18)
MAGNESIUM SERPL-MCNC: 2.2 MG/DL (ref 1.5–2.5)
MCH RBC QN AUTO: 29 PG (ref 27–33)
MCHC RBC AUTO-ENTMCNC: 32 G/DL (ref 32.3–36.5)
MCV RBC AUTO: 90.6 FL (ref 81.4–97.8)
PHOSPHATE SERPL-MCNC: 2.5 MG/DL (ref 2.5–4.5)
PLATELET # BLD AUTO: 140 K/UL (ref 164–446)
PMV BLD AUTO: 13.3 FL (ref 9–12.9)
POTASSIUM SERPL-SCNC: 4.1 MMOL/L (ref 3.6–5.5)
PREALB SERPL-MCNC: 13.8 MG/DL (ref 18–38)
RBC # BLD AUTO: 3.93 M/UL (ref 4.7–6.1)
SIGNIFICANT IND 70042: NORMAL
SIGNIFICANT IND 70042: NORMAL
SITE SITE: NORMAL
SITE SITE: NORMAL
SODIUM SERPL-SCNC: 136 MMOL/L (ref 135–145)
SOURCE SOURCE: NORMAL
SOURCE SOURCE: NORMAL
WBC # BLD AUTO: 4.8 K/UL (ref 4.8–10.8)

## 2025-08-10 PROCEDURE — 700101 HCHG RX REV CODE 250: Performed by: STUDENT IN AN ORGANIZED HEALTH CARE EDUCATION/TRAINING PROGRAM

## 2025-08-10 PROCEDURE — 82962 GLUCOSE BLOOD TEST: CPT | Performed by: STUDENT IN AN ORGANIZED HEALTH CARE EDUCATION/TRAINING PROGRAM

## 2025-08-10 PROCEDURE — G0316 PR PROLONGED IP/OBS E&M EA 15 MIN: HCPCS | Performed by: STUDENT IN AN ORGANIZED HEALTH CARE EDUCATION/TRAINING PROGRAM

## 2025-08-10 PROCEDURE — 36415 COLL VENOUS BLD VENIPUNCTURE: CPT

## 2025-08-10 PROCEDURE — 700102 HCHG RX REV CODE 250 W/ 637 OVERRIDE(OP): Performed by: STUDENT IN AN ORGANIZED HEALTH CARE EDUCATION/TRAINING PROGRAM

## 2025-08-10 PROCEDURE — 80048 BASIC METABOLIC PNL TOTAL CA: CPT

## 2025-08-10 PROCEDURE — 82306 VITAMIN D 25 HYDROXY: CPT

## 2025-08-10 PROCEDURE — 84100 ASSAY OF PHOSPHORUS: CPT

## 2025-08-10 PROCEDURE — 94669 MECHANICAL CHEST WALL OSCILL: CPT

## 2025-08-10 PROCEDURE — A9270 NON-COVERED ITEM OR SERVICE: HCPCS | Performed by: STUDENT IN AN ORGANIZED HEALTH CARE EDUCATION/TRAINING PROGRAM

## 2025-08-10 PROCEDURE — 700111 HCHG RX REV CODE 636 W/ 250 OVERRIDE (IP): Mod: JZ | Performed by: STUDENT IN AN ORGANIZED HEALTH CARE EDUCATION/TRAINING PROGRAM

## 2025-08-10 PROCEDURE — 84134 ASSAY OF PREALBUMIN: CPT

## 2025-08-10 PROCEDURE — 700111 HCHG RX REV CODE 636 W/ 250 OVERRIDE (IP): Performed by: STUDENT IN AN ORGANIZED HEALTH CARE EDUCATION/TRAINING PROGRAM

## 2025-08-10 PROCEDURE — 83735 ASSAY OF MAGNESIUM: CPT

## 2025-08-10 PROCEDURE — 770006 HCHG ROOM/CARE - MED/SURG/GYN SEMI*

## 2025-08-10 PROCEDURE — 85027 COMPLETE CBC AUTOMATED: CPT

## 2025-08-10 PROCEDURE — 99233 SBSQ HOSP IP/OBS HIGH 50: CPT | Performed by: STUDENT IN AN ORGANIZED HEALTH CARE EDUCATION/TRAINING PROGRAM

## 2025-08-10 RX ORDER — THIAMINE HYDROCHLORIDE 100 MG/ML
100 INJECTION, SOLUTION INTRAMUSCULAR; INTRAVENOUS DAILY
Status: DISCONTINUED | OUTPATIENT
Start: 2025-08-10 | End: 2025-08-11 | Stop reason: HOSPADM

## 2025-08-10 RX ORDER — ERGOCALCIFEROL 1.25 MG/1
50000 CAPSULE, LIQUID FILLED ORAL
Status: DISCONTINUED | OUTPATIENT
Start: 2025-08-10 | End: 2025-08-11 | Stop reason: HOSPADM

## 2025-08-10 RX ADMIN — PAROXETINE HYDROCHLORIDE 40 MG: 20 TABLET, FILM COATED ORAL at 05:41

## 2025-08-10 RX ADMIN — HEPARIN SODIUM 5000 UNITS: 5000 INJECTION, SOLUTION INTRAVENOUS; SUBCUTANEOUS at 14:19

## 2025-08-10 RX ADMIN — HEPARIN SODIUM 5000 UNITS: 5000 INJECTION, SOLUTION INTRAVENOUS; SUBCUTANEOUS at 21:13

## 2025-08-10 RX ADMIN — DEXTROSE MONOHYDRATE 25 G: 25 INJECTION, SOLUTION INTRAVENOUS at 02:18

## 2025-08-10 RX ADMIN — THIAMINE HYDROCHLORIDE 100 MG: 100 INJECTION, SOLUTION INTRAMUSCULAR; INTRAVENOUS at 15:18

## 2025-08-10 RX ADMIN — ERGOCALCIFEROL 50000 UNITS: 1.25 CAPSULE ORAL at 17:32

## 2025-08-10 RX ADMIN — HEPARIN SODIUM 5000 UNITS: 5000 INJECTION, SOLUTION INTRAVENOUS; SUBCUTANEOUS at 05:44

## 2025-08-10 RX ADMIN — ATORVASTATIN CALCIUM 20 MG: 20 TABLET, FILM COATED ORAL at 05:41

## 2025-08-10 ASSESSMENT — ENCOUNTER SYMPTOMS
NAUSEA: 0
COUGH: 0
SHORTNESS OF BREATH: 0
VOMITING: 0

## 2025-08-10 ASSESSMENT — PAIN DESCRIPTION - PAIN TYPE
TYPE: ACUTE PAIN
TYPE: ACUTE PAIN

## 2025-08-11 VITALS
WEIGHT: 94.8 LBS | SYSTOLIC BLOOD PRESSURE: 139 MMHG | OXYGEN SATURATION: 100 % | DIASTOLIC BLOOD PRESSURE: 70 MMHG | BODY MASS INDEX: 14.88 KG/M2 | RESPIRATION RATE: 17 BRPM | HEIGHT: 67 IN | TEMPERATURE: 97.4 F | HEART RATE: 66 BPM

## 2025-08-11 LAB
ALBUMIN SERPL BCP-MCNC: 3.3 G/DL (ref 3.2–4.9)
ANION GAP SERPL CALC-SCNC: 12 MMOL/L (ref 7–16)
BACTERIA BLD CULT: NORMAL
BUN SERPL-MCNC: 56 MG/DL (ref 8–22)
CALCIUM ALBUM COR SERPL-MCNC: 9.4 MG/DL (ref 8.5–10.5)
CALCIUM SERPL-MCNC: 8.8 MG/DL (ref 8.5–10.5)
CHLORIDE SERPL-SCNC: 96 MMOL/L (ref 96–112)
CO2 SERPL-SCNC: 26 MMOL/L (ref 20–33)
CREAT SERPL-MCNC: 4.91 MG/DL (ref 0.5–1.4)
ERYTHROCYTE [DISTWIDTH] IN BLOOD BY AUTOMATED COUNT: 53.9 FL (ref 35.9–50)
GFR SERPLBLD CREATININE-BSD FMLA CKD-EPI: 12 ML/MIN/1.73 M 2
GLUCOSE BLD STRIP.AUTO-MCNC: 137 MG/DL (ref 65–99)
GLUCOSE SERPL-MCNC: 234 MG/DL (ref 65–99)
HCT VFR BLD AUTO: 34.2 % (ref 42–52)
HGB BLD-MCNC: 10.9 G/DL (ref 14–18)
MAGNESIUM SERPL-MCNC: 2.4 MG/DL (ref 1.5–2.5)
MCH RBC QN AUTO: 29 PG (ref 27–33)
MCHC RBC AUTO-ENTMCNC: 31.9 G/DL (ref 32.3–36.5)
MCV RBC AUTO: 91 FL (ref 81.4–97.8)
PHOSPHATE SERPL-MCNC: 3.3 MG/DL (ref 2.5–4.5)
PLATELET # BLD AUTO: 163 K/UL (ref 164–446)
PMV BLD AUTO: 12.9 FL (ref 9–12.9)
POTASSIUM SERPL-SCNC: 4.8 MMOL/L (ref 3.6–5.5)
RBC # BLD AUTO: 3.76 M/UL (ref 4.7–6.1)
SIGNIFICANT IND 70042: NORMAL
SITE SITE: NORMAL
SODIUM SERPL-SCNC: 134 MMOL/L (ref 135–145)
SOURCE SOURCE: NORMAL
WBC # BLD AUTO: 5.6 K/UL (ref 4.8–10.8)

## 2025-08-11 PROCEDURE — 85027 COMPLETE CBC AUTOMATED: CPT

## 2025-08-11 PROCEDURE — A9270 NON-COVERED ITEM OR SERVICE: HCPCS | Performed by: STUDENT IN AN ORGANIZED HEALTH CARE EDUCATION/TRAINING PROGRAM

## 2025-08-11 PROCEDURE — 700111 HCHG RX REV CODE 636 W/ 250 OVERRIDE (IP): Performed by: STUDENT IN AN ORGANIZED HEALTH CARE EDUCATION/TRAINING PROGRAM

## 2025-08-11 PROCEDURE — 700111 HCHG RX REV CODE 636 W/ 250 OVERRIDE (IP): Mod: JZ | Performed by: STUDENT IN AN ORGANIZED HEALTH CARE EDUCATION/TRAINING PROGRAM

## 2025-08-11 PROCEDURE — 90935 HEMODIALYSIS ONE EVALUATION: CPT

## 2025-08-11 PROCEDURE — 700102 HCHG RX REV CODE 250 W/ 637 OVERRIDE(OP): Performed by: STUDENT IN AN ORGANIZED HEALTH CARE EDUCATION/TRAINING PROGRAM

## 2025-08-11 PROCEDURE — 80069 RENAL FUNCTION PANEL: CPT

## 2025-08-11 PROCEDURE — 82962 GLUCOSE BLOOD TEST: CPT | Performed by: STUDENT IN AN ORGANIZED HEALTH CARE EDUCATION/TRAINING PROGRAM

## 2025-08-11 PROCEDURE — 99239 HOSP IP/OBS DSCHRG MGMT >30: CPT | Performed by: STUDENT IN AN ORGANIZED HEALTH CARE EDUCATION/TRAINING PROGRAM

## 2025-08-11 PROCEDURE — 92526 ORAL FUNCTION THERAPY: CPT

## 2025-08-11 PROCEDURE — 83735 ASSAY OF MAGNESIUM: CPT

## 2025-08-11 PROCEDURE — 36415 COLL VENOUS BLD VENIPUNCTURE: CPT

## 2025-08-11 RX ADMIN — HEPARIN SODIUM 5000 UNITS: 5000 INJECTION, SOLUTION INTRAVENOUS; SUBCUTANEOUS at 04:28

## 2025-08-11 RX ADMIN — PAROXETINE HYDROCHLORIDE 40 MG: 20 TABLET, FILM COATED ORAL at 04:28

## 2025-08-11 RX ADMIN — THIAMINE HYDROCHLORIDE 100 MG: 100 INJECTION, SOLUTION INTRAMUSCULAR; INTRAVENOUS at 04:28

## 2025-08-11 RX ADMIN — METOPROLOL TARTRATE 100 MG: 50 TABLET, FILM COATED ORAL at 04:28

## 2025-08-11 RX ADMIN — ATORVASTATIN CALCIUM 20 MG: 20 TABLET, FILM COATED ORAL at 04:28

## 2025-08-15 LAB
FUNGUS SPEC CULT: NORMAL
FUNGUS SPEC CULT: NORMAL
FUNGUS SPEC FUNGUS STN: NORMAL
FUNGUS SPEC FUNGUS STN: NORMAL
SIGNIFICANT IND 70042: NORMAL
SIGNIFICANT IND 70042: NORMAL
SITE SITE: NORMAL
SITE SITE: NORMAL
SOURCE SOURCE: NORMAL
SOURCE SOURCE: NORMAL

## (undated) DEVICE — DRAPESURG STERI-DRAPE LONG - (10/BX 4BX/CA)

## (undated) DEVICE — GLOVE BIOGEL PI ORTHO SZ 6 SURGICAL PF LF (40PR/BX)

## (undated) DEVICE — CORDS BIPOLAR COAGULATION - 12FT STERILE DISP. (10EA/BX)

## (undated) DEVICE — PROTECTOR ULNA NERVE - (36PR/CA)

## (undated) DEVICE — HEAD HOLDER JUNIOR/ADULT

## (undated) DEVICE — SODIUM CHL IRRIGATION 0.9% 1000ML (12EA/CA)

## (undated) DEVICE — SEALER BIPOLAR 2.3 AQUAMANTYS

## (undated) DEVICE — SUTURE 1 VICRYL PLUS CTX - 8 X 18 INCH (12/BX)

## (undated) DEVICE — CHLORAPREP 26 ML APPLICATOR - ORANGE TINT(25/CA)

## (undated) DEVICE — INTRAOP NEURO IN OR 1:1 PER 15 MIN

## (undated) DEVICE — SYRINGE NON SAFETY 10 CC 20 GA X 1-1/2 IN (100/BX 4BX/CA)

## (undated) DEVICE — CANISTER SUCTION 3000ML MECHANICAL FILTER AUTO SHUTOFF MEDI-VAC NONSTERILE LF DISP  (40EA/CA)

## (undated) DEVICE — SCREW DISTRACTION 14MM YELLOW - STERILE (10EA/BX) (5TX4=20)

## (undated) DEVICE — GLOVE BIOGEL PI INDICATOR SZ 6.0 SURGICAL PF LF -(200PR/CA)

## (undated) DEVICE — GOWN SURGICAL XX-LARGE - (28EA/CA) SIRUS NON REINFORCED

## (undated) DEVICE — GLOVE BIOGEL SZ 6.5 SURGICAL PF LTX (50PR/BX 4BX/CA)

## (undated) DEVICE — SHEET PEDIATRIC LAPAROTOMY - (10/CA)

## (undated) DEVICE — BLADE SURGICAL CLIPPER - (50EA/CA)

## (undated) DEVICE — SENSOR OXIMETER ADULT SPO2 RD SET (20EA/BX)

## (undated) DEVICE — GLOVE BIOGEL SZ 8.5 SURGICAL PF LTX - (50PR/BX 4BX/CA)

## (undated) DEVICE — FORCEPS ELECTROSURGICAL DISPOSABLE CODMAN 8IN 1.5MM

## (undated) DEVICE — LACTATED RINGERS INJ 1000 ML - (14EA/CA 60CA/PF)

## (undated) DEVICE — TOWEL STOP TIMEOUT SAFETY FLAG (40EA/CA)

## (undated) DEVICE — SHEET THYROID - (10EA/CA)

## (undated) DEVICE — SUCTION INSTRUMENT YANKAUER BULBOUS TIP W/O VENT (50EA/CA)

## (undated) DEVICE — SUTURE 4-0 MONOCRYL PLUS PS-2 - 27 INCH (36/BX)

## (undated) DEVICE — SPONGE PEANUT - (5/PK 50PK/CA)

## (undated) DEVICE — MASK ANESTHESIA ADULT  - (100/CA)

## (undated) DEVICE — ELECTRODE 850 FOAM ADHESIVE - HYDROGEL RADIOTRNSPRNT (50/PK)

## (undated) DEVICE — BONE MILL BM210

## (undated) DEVICE — Device

## (undated) DEVICE — PIN HEAD MAYFIELD DISP. (3EA/PK 12PK/BX)

## (undated) DEVICE — BOVIE BLADE COATED &INSULATED - 25/PK

## (undated) DEVICE — SUTURE GENERAL

## (undated) DEVICE — SPONGE GAUZESTER. 2X2 4-PL - (2/PK 50PK/BX 30BX/CS)

## (undated) DEVICE — KIT SURGIFLO W/OUT THROMBIN - (6EA/CA)

## (undated) DEVICE — RESTRAINTS LIMB DISP. - (12/BX 4BX/CA)

## (undated) DEVICE — GOWN WARMING STANDARD FLEX - (30/CA)

## (undated) DEVICE — DERMABOND ADVANCED - (12EA/BX)

## (undated) DEVICE — SUTURE 0 VICRYL PLUS CT-2 - 8 X 18 INCH (12/BX)

## (undated) DEVICE — KIT EVACUATER 3 SPRING PVC LF 1/8 DRAIN SIZE (10EA/CA)"

## (undated) DEVICE — NEEDLE SPINAL NON-SAFETY 18 GA X 3 IN (25EA/BX)

## (undated) DEVICE — BLADE SURGICAL #15 - (50/BX 3BX/CA)

## (undated) DEVICE — DRESSING POST OP BORDER 4 X 10 (5EA/BX)

## (undated) DEVICE — TOWELS CLOTH SURGICAL - (4/PK 20PK/CA)

## (undated) DEVICE — SLEEVE, VASO, THIGH, MED

## (undated) DEVICE — SUTURE 3-0 VICRYL PLUS SH - 8X 18 INCH (12/BX)

## (undated) DEVICE — DRESSING TRANSPARENT FILM TEGADERM 4 X 4.75" (50EA/BX)"

## (undated) DEVICE — SET EXTENSION WITH 2 PORTS (48EA/CA) ***PART #2C8610 IS A SUBSTITUTE*****

## (undated) DEVICE — ELECTRODE DUAL RETURN W/ CORD - (50/PK)

## (undated) DEVICE — MIDAS LUBRICATOR DIFFUSER PACK (4EA/CA)

## (undated) DEVICE — GLOVE BIOGEL INDICATOR SZ 6.5 SURGICAL PF LTX - (50PR/BX 4BX/CA)

## (undated) DEVICE — DRAPE MICROSCOPE ARMATEC 120IN X 46IN (10EA/CA)

## (undated) DEVICE — GLOVE BIOGEL PI INDICATOR SZ 7.0 SURGICAL PF LF - (50/BX 4BX/CA)

## (undated) DEVICE — SET LEADWIRE 5 LEAD BEDSIDE DISPOSABLE ECG (1SET OF 5/EA)

## (undated) DEVICE — PACK NEURO - (2EA/CA)

## (undated) DEVICE — TOOL DISSECT MATCH HEAD

## (undated) DEVICE — KIT ANESTHESIA W/CIRCUIT & 3/LT BAG W/FILTER (20EA/CA)

## (undated) DEVICE — TUBING CLEARLINK DUO-VENT - C-FLO (48EA/CA)